# Patient Record
Sex: MALE | Race: WHITE | NOT HISPANIC OR LATINO | Employment: OTHER | URBAN - METROPOLITAN AREA
[De-identification: names, ages, dates, MRNs, and addresses within clinical notes are randomized per-mention and may not be internally consistent; named-entity substitution may affect disease eponyms.]

---

## 2018-08-15 ENCOUNTER — NON-PROVIDER VISIT (OUTPATIENT)
Dept: MEDICAL GROUP | Facility: MEDICAL CENTER | Age: 68
End: 2018-08-15
Payer: MEDICARE

## 2018-08-15 DIAGNOSIS — E11.9 TYPE 2 DIABETES MELLITUS WITHOUT COMPLICATION, WITHOUT LONG-TERM CURRENT USE OF INSULIN (HCC): ICD-10-CM

## 2018-08-15 PROCEDURE — G0109 DIAB MANAGE TRN IND/GROUP: HCPCS | Performed by: INTERNAL MEDICINE

## 2018-08-15 NOTE — LETTER
"         August 15, 2018      Cole Alonso D.O.  [address] [place] [address]          RE: Deangelo Bella  [id number]    Dear:Cole Alonso D.O.    The above referenced patient received 3 hours of diabetes education from [facility].    Topics taught (may include but not limited to):  Introduction to diabetes, benefits and responsibilities of patient, physiology of diabetes and the diease process, benefits of blood glucose monitoring and record keeping, medication action and possible side effects, hypoglycemia, sick day management, exercise, stress reduction and travel with diabetes.     Provided meter and instructed in use: no. Pt using Accucheck Avivameter.  Blood pressure:    Goal is less than 130/80  Dilated eye exam within the past year: yes Goal is for patient to have yearly.   Lipid panel:   Lab Results   Component Value Date/Time    CHOLSTRLTOT 193 03/10/2016 06:09 AM     (H) 03/10/2016 06:09 AM    HDL 41 03/10/2016 06:09 AM    TRIGLYCERIDE 183 (H) 03/10/2016 06:09 AM    Chol./HDL ratio 4.70  Goal is less than 5   Micro-albumin/Creat. Ratio: not available  Goal is less than 20 ng/dl  HbA1c:   Lab Results   Component Value Date/Time    HBA1C 5.9 (H) 03/10/2016 06:09 AM    Goal is <7% in the next 3 months.  Vitamin D level: not available Goal is greater than 30 ng/ml.    Daily aspirin use if >30 years old w/o contradictions:no     Patient/caregiver appeared to understand the content as demonstrated by appropriate questions.         Notes:William presents for diabetes education Type II DM class, 1202-7078.  He reports he has \"boarderline diabetes\", medical records, HbA1c not available.  Fasting FSBG 107-145, no diabetes medications.  I recommend he quit smoking.    The patient was provided with written materials to back-up verbal education.   Thank you for this consultation,     Micki Rogers R.N.  Certified Diabetes Nurse Educator  "

## 2018-08-15 NOTE — PROGRESS NOTES
"William presents for diabetes education Type II DM class, 3585-1830.  He reports he has \"boarderline diabetes\", medical records, HbA1c not available.  Fasting FSBG 107-145, no diabetes medications.  I recommend he quit smoking.    Define Type 2 diabetes: Education taught  Define Type 1 diabetes: Education taught  Understand feaures and benefits of education and management: Education taught  Describe who is responsible for diabetes management: Education taught      Diabetes Lifestyle Changes / Goals  State benefits of making appropriate lifestyle changes: Education taught  Identify lifestyle behaviors participant wants to change: Education taught  Identify risk factors that interfere with health and strategies to reduce : Education taught  Verbalize need for and frequency of health care follow-up: Education taught  Develop behavioral objectives and expected health outcomes: Education taught    Diabetes Exercise and Activity  Describe role of exercise in diabetes management: Education taught  State relationship of exercise to blood sugar: Education taught  State the benefits/risk(s) of exercise and precautions to follow: Education taught    Diabetes Self Blood Glucose Monitoring  Discuss rationale and importance of SBGM: Education taught  Discuss appropriate record keeping: Education taught  Discuss how to use results from blood glucose testing: Education taught  Evaluation and interpretation of blood glucose patterns: Education taught    Diabetes Disease Process  Discuss signs, symptoms, TX and prevention of hyperglycemia: Education taught  Discuss beta cell dysfunction and insulin resistance: Education taught  Discuss Insulin and its role in the body: Education taught  Discuss the role of the liver in glucose metabolism: Education taught  Discuss hormonal regulation: Education taught  Define benefits of good control and discuss what it means to be in \"good control\": Education taught  Discuss impact of exercise, food, " meds, stress, and special factors on diabetes: Education taught  Discuss when to confer with HCP for possible treatment plan adjustments: Education taught    Diabetes Insulin and Medications  Action of oral medications, onset and duration: Education taught  Discuss incretin secretagogs and their use in diabetes management: Education taught    Hypoglycemia  List signs, symptoms and causes of hypoglycemia: Education taught  Discuss physiology of hypoglycemic reactions: Education taught  Accurately describe appropriate treatment and prevention: Education taught  Discuss when to contact HCP: Education taught    Sick Day Care  Verbalize important items to monitor when sick and when to contact HCP: Education taught  Review sick day box: Education taught  State diabetes medication adjustments on sick days: Education taught    Complications (Chronic)  Explain prevention, TX, signs/symptoms of: retinopathy, neuropathy, nephropathy, infections: Education taught  Explain prevention, TX, signs/symptoms of: CAD, cerebral-vascular disease and sexual dysfunction: Education taught  Identify when to notify HCP of complications: Education taught  State principles of skin, dental and foot care.  Discuss proper foot care, prevention of foot probelms when to notify HCP>: Education taught  Demonstrate how to examine feet and what to look for: Education taught    Psychosocial adjustment  Identify sources of stress: Education taught  Identify resources and techniques for stress reduction: Education taught  Discuss health care referral network / community resources for support: Education taught

## 2018-08-17 ENCOUNTER — NON-PROVIDER VISIT (OUTPATIENT)
Dept: MEDICAL GROUP | Facility: MEDICAL CENTER | Age: 68
End: 2018-08-17
Payer: MEDICARE

## 2018-08-17 DIAGNOSIS — E11.9 TYPE 2 DIABETES MELLITUS WITHOUT COMPLICATION, WITHOUT LONG-TERM CURRENT USE OF INSULIN (HCC): ICD-10-CM

## 2018-08-17 PROCEDURE — G0109 DIAB MANAGE TRN IND/GROUP: HCPCS | Performed by: INTERNAL MEDICINE

## 2018-08-17 NOTE — PROGRESS NOTES
8/17/2018    Cole Alonso D.O.  68 y.o.   Time in/out: 137 - 7475    Subjective:  -Here for day 2 of type 2 class    Nutrition Diagnosis (PES Statement)    Altered nutrition-related lab values related to endocrine dysfunction as evidenced by HbA1c of 5.9%.    Biochemical data, medical test and procedures  Lab Results   Component Value Date/Time    HBA1C 5.9 (H) 03/10/2016 06:09 AM     Lab Results   Component Value Date/Time    CHOLSTRLTOT 193 03/10/2016 06:09 AM     (H) 03/10/2016 06:09 AM    HDL 41 03/10/2016 06:09 AM    TRIGLYCERIDE 183 (H) 03/10/2016 06:09 AM         Nutrition Intervention  Meal and Snack  Recommend a general/healthful diet    Comprehensive Nutrition education Instruction or training leading to in-depth nutrition related knowledge about:  Benefits to following meal plan, Combine carb, protein and fat at each meal, Eating out, Fast food, Meal timing and spacing, Menu Planning, Metabolism of carb, protein, fat, Physical activity/exercise, Portion control, Sweets and alcohol in moderation, Heart-healthy guidelines, Increasing/Decreasing PO intake, Label Reading and Handouts provided regarding topics discussed    Monitoring & Evaluation Plan  Behavioral-Environmental:  Behavior:  Consistent CHO intake throughout the day  Physical activity:  Increase as tolerated    Food / Nutrient Intake:  Food intake:  Use the plate method recommendations for portions/balance at meals  Macronutrient intake:  ~60 grams CHO with meals, 15-20 grams CHO with snacks    Physical Signs / Symptoms:  HbA1c profiles:  Within ADA guidelines      Assessment Notes:  William attended day 2 of the Type 2 class today.  He was taught that exercise works as a medication for controlling DM.  Different exercises were shown that can be done easily at home, like walking in place, lifting light weights while sitting, etc.  It was emphasized that if exercise is a consistent part of William’s habits that DM control will be the most  ideal it can be.     Food was then discussed next, with a brief review of William’s current eating habits.  He was taught the differences between CHO/protein/fat and their effects on BG’s.  This information was related to the plate method to help with meal planning/portions at meals; he was also taught to use their hands as measuring tools (fist for starch, palm for protein) to help when eating out or on a large plate.  Non-starchy vegetables were emphasized as foods that will help satisfy without raising BG’s at meals and snacks.  I stressed to the patient to not go more than 4 hours without eating and if a snack is necessary he was taught how to construct a proper snack of ~15 grams CHO and ~7 grams protein.  Finally, we moved onto the food label and how to effectively read a label using serving size, trans fat, total CHO, and % sodium to evaluate a food.  An alternative way of meal planning was given by using the food label and CHO counting; the patient can eat ~60 grams CHO at meals and 15-20 grams CHO at snacks, if needed.  He set goals to try to achieve in the next 3 months to help with DM control; he can follow-up with me, if needed, for a more intensive meal plan and CHO counting education.  F/u prn.

## 2018-08-17 NOTE — LETTER
Dr. Alonso,    Your patient, William Bella, was seen August 17, 2018 for 2.5 hours regarding nutrition/exercise recommendations for diabetes as part of day 2 of our Type 2 diabetes management class.  He was taught the following information:     -The basics of nutrition  -The plate method for portion control (¼ plate starch, ¼ plate protein, ½ plate non-starchy vegetables)  -Appropriate snacking recommendations  -Heart-healthy eating, including controlling/managing/improving hyperlipidemia and HTN  -Food label reading, including CHO counting  -Exercise recommendations of at least 150 minutes/week of moderate-intensity exercise on a minimum of 3 days each week    William was encouraged to use community resources to help improve their glycemic control and given a number of resources available to him.  He was also given our phone number in the case of any question that may arise.      Thank you for your referral for this patient.  Feel free to contact us with any questions you may have at [phone].    Sincerely,    MARISABEL Arredondo Jr, RD, LD, CDE  Outpatient Dietary Educator  [facility]

## 2018-09-20 ENCOUNTER — APPOINTMENT (RX ONLY)
Dept: URBAN - METROPOLITAN AREA CLINIC 4 | Facility: CLINIC | Age: 68
Setting detail: DERMATOLOGY
End: 2018-09-20

## 2018-09-20 DIAGNOSIS — L81.4 OTHER MELANIN HYPERPIGMENTATION: ICD-10-CM

## 2018-09-20 DIAGNOSIS — L82.1 OTHER SEBORRHEIC KERATOSIS: ICD-10-CM

## 2018-09-20 DIAGNOSIS — L11.1 TRANSIENT ACANTHOLYTIC DERMATOSIS [GROVER]: ICD-10-CM

## 2018-09-20 DIAGNOSIS — D22 MELANOCYTIC NEVI: ICD-10-CM

## 2018-09-20 DIAGNOSIS — Z71.89 OTHER SPECIFIED COUNSELING: ICD-10-CM

## 2018-09-20 DIAGNOSIS — L57.0 ACTINIC KERATOSIS: ICD-10-CM

## 2018-09-20 PROBLEM — D22.5 MELANOCYTIC NEVI OF TRUNK: Status: ACTIVE | Noted: 2018-09-20

## 2018-09-20 PROBLEM — I10 ESSENTIAL (PRIMARY) HYPERTENSION: Status: ACTIVE | Noted: 2018-09-20

## 2018-09-20 PROCEDURE — 99203 OFFICE O/P NEW LOW 30 MIN: CPT | Mod: 25

## 2018-09-20 PROCEDURE — 17000 DESTRUCT PREMALG LESION: CPT

## 2018-09-20 PROCEDURE — 17003 DESTRUCT PREMALG LES 2-14: CPT

## 2018-09-20 PROCEDURE — ? PRESCRIPTION

## 2018-09-20 PROCEDURE — ? COUNSELING

## 2018-09-20 PROCEDURE — ? LIQUID NITROGEN

## 2018-09-20 RX ORDER — TRIAMCINOLONE ACETONIDE 1 MG/G
CREAM TOPICAL BID
Qty: 1 | Refills: 1 | Status: ERX | COMMUNITY
Start: 2018-09-20

## 2018-09-20 RX ADMIN — TRIAMCINOLONE ACETONIDE: 1 CREAM TOPICAL at 00:00

## 2018-09-20 ASSESSMENT — LOCATION DETAILED DESCRIPTION DERM
LOCATION DETAILED: LEFT ANTERIOR PROXIMAL THIGH
LOCATION DETAILED: RIGHT SUPERIOR LATERAL LOWER BACK
LOCATION DETAILED: SUPERIOR LUMBAR SPINE
LOCATION DETAILED: RIGHT LATERAL EYEBROW
LOCATION DETAILED: RIGHT SUPERIOR UPPER BACK
LOCATION DETAILED: RIGHT PROXIMAL DORSAL FOREARM
LOCATION DETAILED: LEFT ANTERIOR DISTAL THIGH
LOCATION DETAILED: LEFT SUPERIOR PREAURICULAR CHEEK
LOCATION DETAILED: LEFT FOREHEAD
LOCATION DETAILED: LEFT ANTERIOR DISTAL UPPER ARM
LOCATION DETAILED: RIGHT ANTERIOR DISTAL UPPER ARM
LOCATION DETAILED: LEFT SUPERIOR LATERAL LOWER BACK
LOCATION DETAILED: LEFT LATERAL MANDIBULAR CHEEK
LOCATION DETAILED: PERIUMBILICAL SKIN
LOCATION DETAILED: RIGHT ANTERIOR SHOULDER
LOCATION DETAILED: RIGHT FOREHEAD
LOCATION DETAILED: RIGHT ANTERIOR DISTAL THIGH
LOCATION DETAILED: LEFT INFERIOR ANTERIOR NECK
LOCATION DETAILED: LEFT MEDIAL UPPER BACK
LOCATION DETAILED: EPIGASTRIC SKIN
LOCATION DETAILED: RIGHT DISTAL DORSAL FOREARM

## 2018-09-20 ASSESSMENT — LOCATION SIMPLE DESCRIPTION DERM
LOCATION SIMPLE: LEFT LOWER BACK
LOCATION SIMPLE: LEFT ANTERIOR NECK
LOCATION SIMPLE: ABDOMEN
LOCATION SIMPLE: RIGHT UPPER BACK
LOCATION SIMPLE: LEFT FOREHEAD
LOCATION SIMPLE: LOWER BACK
LOCATION SIMPLE: RIGHT SHOULDER
LOCATION SIMPLE: LEFT UPPER BACK
LOCATION SIMPLE: LEFT CHEEK
LOCATION SIMPLE: LEFT UPPER ARM
LOCATION SIMPLE: RIGHT THIGH
LOCATION SIMPLE: RIGHT UPPER ARM
LOCATION SIMPLE: RIGHT FOREARM
LOCATION SIMPLE: RIGHT FOREHEAD
LOCATION SIMPLE: LEFT THIGH
LOCATION SIMPLE: RIGHT EYEBROW
LOCATION SIMPLE: RIGHT LOWER BACK

## 2018-09-20 ASSESSMENT — LOCATION ZONE DERM
LOCATION ZONE: NECK
LOCATION ZONE: LEG
LOCATION ZONE: TRUNK
LOCATION ZONE: ARM
LOCATION ZONE: FACE

## 2019-10-24 ENCOUNTER — APPOINTMENT (RX ONLY)
Dept: URBAN - METROPOLITAN AREA CLINIC 22 | Facility: CLINIC | Age: 69
Setting detail: DERMATOLOGY
End: 2019-10-24

## 2019-10-24 DIAGNOSIS — L85.3 XEROSIS CUTIS: ICD-10-CM

## 2019-10-24 DIAGNOSIS — Z71.89 OTHER SPECIFIED COUNSELING: ICD-10-CM

## 2019-10-24 DIAGNOSIS — L82.1 OTHER SEBORRHEIC KERATOSIS: ICD-10-CM

## 2019-10-24 DIAGNOSIS — L663 OTHER SPECIFIED DISEASES OF HAIR AND HAIR FOLLICLES: ICD-10-CM

## 2019-10-24 DIAGNOSIS — D22 MELANOCYTIC NEVI: ICD-10-CM

## 2019-10-24 DIAGNOSIS — L11.1 TRANSIENT ACANTHOLYTIC DERMATOSIS [GROVER]: ICD-10-CM

## 2019-10-24 DIAGNOSIS — L57.0 ACTINIC KERATOSIS: ICD-10-CM

## 2019-10-24 DIAGNOSIS — L738 OTHER SPECIFIED DISEASES OF HAIR AND HAIR FOLLICLES: ICD-10-CM

## 2019-10-24 DIAGNOSIS — L73.9 FOLLICULAR DISORDER, UNSPECIFIED: ICD-10-CM

## 2019-10-24 PROBLEM — D22.5 MELANOCYTIC NEVI OF TRUNK: Status: ACTIVE | Noted: 2019-10-24

## 2019-10-24 PROBLEM — L02.223 FURUNCLE OF CHEST WALL: Status: ACTIVE | Noted: 2019-10-24

## 2019-10-24 PROCEDURE — ? PRESCRIPTION

## 2019-10-24 PROCEDURE — 99214 OFFICE O/P EST MOD 30 MIN: CPT | Mod: 25

## 2019-10-24 PROCEDURE — 17003 DESTRUCT PREMALG LES 2-14: CPT

## 2019-10-24 PROCEDURE — ? COUNSELING

## 2019-10-24 PROCEDURE — ? LIQUID NITROGEN

## 2019-10-24 PROCEDURE — 17000 DESTRUCT PREMALG LESION: CPT

## 2019-10-24 RX ORDER — CLINDAMYCIN PHOSPHATE 10 MG/ML
SOLUTION TOPICAL
Qty: 1 | Refills: 5 | Status: ERX | COMMUNITY
Start: 2019-10-24

## 2019-10-24 RX ORDER — TRIAMCINOLONE ACETONIDE 1 MG/G
CREAM TOPICAL BID
Qty: 1 | Refills: 2 | Status: ERX | COMMUNITY
Start: 2019-10-24

## 2019-10-24 RX ADMIN — TRIAMCINOLONE ACETONIDE: 1 CREAM TOPICAL at 18:07

## 2019-10-24 RX ADMIN — CLINDAMYCIN PHOSPHATE: 10 SOLUTION TOPICAL at 18:08

## 2019-10-24 ASSESSMENT — LOCATION DETAILED DESCRIPTION DERM
LOCATION DETAILED: LEFT INFERIOR POSTERIOR NECK
LOCATION DETAILED: SUPERIOR THORACIC SPINE
LOCATION DETAILED: NASAL DORSUM
LOCATION DETAILED: EPIGASTRIC SKIN
LOCATION DETAILED: RIGHT INFERIOR MEDIAL UPPER BACK
LOCATION DETAILED: INFERIOR THORACIC SPINE
LOCATION DETAILED: LOWER STERNUM
LOCATION DETAILED: RIGHT SUPERIOR LATERAL BUCCAL CHEEK
LOCATION DETAILED: STERNUM

## 2019-10-24 ASSESSMENT — LOCATION SIMPLE DESCRIPTION DERM
LOCATION SIMPLE: ABDOMEN
LOCATION SIMPLE: POSTERIOR NECK
LOCATION SIMPLE: UPPER BACK
LOCATION SIMPLE: RIGHT CHEEK
LOCATION SIMPLE: RIGHT UPPER BACK
LOCATION SIMPLE: NOSE
LOCATION SIMPLE: CHEST

## 2019-10-24 ASSESSMENT — LOCATION ZONE DERM
LOCATION ZONE: NECK
LOCATION ZONE: NOSE
LOCATION ZONE: TRUNK
LOCATION ZONE: FACE

## 2019-10-24 NOTE — PROCEDURE: MIPS QUALITY
Quality 226: Preventive Care And Screening: Tobacco Use: Screening And Cessation Intervention: Patient screened for tobacco use, is a smoker AND received Cessation Counseling
Quality 111:Pneumonia Vaccination Status For Older Adults: Pneumococcal Vaccination not Administered or Previously Received, Reason not Otherwise Specified
Quality 130: Documentation Of Current Medications In The Medical Record: Current Medications Documented
Detail Level: Detailed

## 2019-10-31 ENCOUNTER — HOSPITAL ENCOUNTER (OUTPATIENT)
Dept: RADIOLOGY | Facility: MEDICAL CENTER | Age: 69
End: 2019-10-31

## 2019-10-31 ENCOUNTER — HOSPITAL ENCOUNTER (INPATIENT)
Facility: MEDICAL CENTER | Age: 69
LOS: 6 days | DRG: 988 | End: 2019-11-06
Attending: EMERGENCY MEDICINE | Admitting: INTERNAL MEDICINE
Payer: MEDICARE

## 2019-10-31 DIAGNOSIS — E11.9 TYPE 2 DIABETES MELLITUS WITHOUT COMPLICATION, WITHOUT LONG-TERM CURRENT USE OF INSULIN (HCC): ICD-10-CM

## 2019-10-31 DIAGNOSIS — R16.0 LIVER MASS, RIGHT LOBE: ICD-10-CM

## 2019-10-31 PROBLEM — I10 ESSENTIAL HYPERTENSION: Status: ACTIVE | Noted: 2019-10-31

## 2019-10-31 PROBLEM — R19.00 MASS IN THE ABDOMEN: Status: ACTIVE | Noted: 2019-10-31

## 2019-10-31 PROBLEM — D69.6 THROMBOCYTOPENIA (HCC): Status: ACTIVE | Noted: 2019-10-31

## 2019-10-31 LAB
ALBUMIN SERPL BCP-MCNC: 3.1 G/DL (ref 3.2–4.9)
ALBUMIN/GLOB SERPL: 1.1 G/DL
ALP SERPL-CCNC: 118 U/L (ref 30–99)
ALT SERPL-CCNC: 13 U/L (ref 2–50)
ANION GAP SERPL CALC-SCNC: 7 MMOL/L (ref 0–11.9)
AST SERPL-CCNC: 17 U/L (ref 12–45)
BASOPHILS # BLD AUTO: 0.5 % (ref 0–1.8)
BASOPHILS # BLD: 0.03 K/UL (ref 0–0.12)
BILIRUB SERPL-MCNC: 0.8 MG/DL (ref 0.1–1.5)
BUN SERPL-MCNC: 11 MG/DL (ref 8–22)
CALCIUM SERPL-MCNC: 7.9 MG/DL (ref 8.5–10.5)
CHLORIDE SERPL-SCNC: 108 MMOL/L (ref 96–112)
CO2 SERPL-SCNC: 23 MMOL/L (ref 20–33)
CREAT SERPL-MCNC: 0.65 MG/DL (ref 0.5–1.4)
EOSINOPHIL # BLD AUTO: 0.03 K/UL (ref 0–0.51)
EOSINOPHIL NFR BLD: 0.5 % (ref 0–6.9)
ERYTHROCYTE [DISTWIDTH] IN BLOOD BY AUTOMATED COUNT: 44.4 FL (ref 35.9–50)
GLOBULIN SER CALC-MCNC: 2.8 G/DL (ref 1.9–3.5)
GLUCOSE SERPL-MCNC: 88 MG/DL (ref 65–99)
HCT VFR BLD AUTO: 37.7 % (ref 42–52)
HGB BLD-MCNC: 12.7 G/DL (ref 14–18)
IMM GRANULOCYTES # BLD AUTO: 0.01 K/UL (ref 0–0.11)
IMM GRANULOCYTES NFR BLD AUTO: 0.2 % (ref 0–0.9)
LIPASE SERPL-CCNC: 10 U/L (ref 11–82)
LYMPHOCYTES # BLD AUTO: 0.97 K/UL (ref 1–4.8)
LYMPHOCYTES NFR BLD: 17.4 % (ref 22–41)
MCH RBC QN AUTO: 33.5 PG (ref 27–33)
MCHC RBC AUTO-ENTMCNC: 33.7 G/DL (ref 33.7–35.3)
MCV RBC AUTO: 99.5 FL (ref 81.4–97.8)
MONOCYTES # BLD AUTO: 0.79 K/UL (ref 0–0.85)
MONOCYTES NFR BLD AUTO: 14.2 % (ref 0–13.4)
NEUTROPHILS # BLD AUTO: 3.74 K/UL (ref 1.82–7.42)
NEUTROPHILS NFR BLD: 67.2 % (ref 44–72)
NRBC # BLD AUTO: 0 K/UL
NRBC BLD-RTO: 0 /100 WBC
PLATELET # BLD AUTO: 109 K/UL (ref 164–446)
PMV BLD AUTO: 8.3 FL (ref 9–12.9)
POTASSIUM SERPL-SCNC: 3.7 MMOL/L (ref 3.6–5.5)
PROT SERPL-MCNC: 5.9 G/DL (ref 6–8.2)
RBC # BLD AUTO: 3.79 M/UL (ref 4.7–6.1)
SODIUM SERPL-SCNC: 138 MMOL/L (ref 135–145)
WBC # BLD AUTO: 5.6 K/UL (ref 4.8–10.8)

## 2019-10-31 PROCEDURE — 99285 EMERGENCY DEPT VISIT HI MDM: CPT

## 2019-10-31 PROCEDURE — 99223 1ST HOSP IP/OBS HIGH 75: CPT | Performed by: INTERNAL MEDICINE

## 2019-10-31 PROCEDURE — 770006 HCHG ROOM/CARE - MED/SURG/GYN SEMI*

## 2019-10-31 PROCEDURE — 83690 ASSAY OF LIPASE: CPT

## 2019-10-31 PROCEDURE — A9270 NON-COVERED ITEM OR SERVICE: HCPCS | Performed by: INTERNAL MEDICINE

## 2019-10-31 PROCEDURE — 85025 COMPLETE CBC W/AUTO DIFF WBC: CPT

## 2019-10-31 PROCEDURE — 700105 HCHG RX REV CODE 258: Performed by: EMERGENCY MEDICINE

## 2019-10-31 PROCEDURE — 700102 HCHG RX REV CODE 250 W/ 637 OVERRIDE(OP): Performed by: INTERNAL MEDICINE

## 2019-10-31 PROCEDURE — 80053 COMPREHEN METABOLIC PANEL: CPT

## 2019-10-31 PROCEDURE — 36415 COLL VENOUS BLD VENIPUNCTURE: CPT

## 2019-10-31 PROCEDURE — 99358 PROLONG SERVICE W/O CONTACT: CPT | Performed by: INTERNAL MEDICINE

## 2019-10-31 PROCEDURE — 700105 HCHG RX REV CODE 258: Performed by: INTERNAL MEDICINE

## 2019-10-31 RX ORDER — LOSARTAN POTASSIUM 50 MG/1
50 TABLET ORAL EVERY MORNING
COMMUNITY
End: 2021-07-02

## 2019-10-31 RX ORDER — POLYETHYLENE GLYCOL 3350 17 G/17G
1 POWDER, FOR SOLUTION ORAL
Status: DISCONTINUED | OUTPATIENT
Start: 2019-10-31 | End: 2019-11-06 | Stop reason: HOSPADM

## 2019-10-31 RX ORDER — SODIUM CHLORIDE, SODIUM LACTATE, POTASSIUM CHLORIDE, CALCIUM CHLORIDE 600; 310; 30; 20 MG/100ML; MG/100ML; MG/100ML; MG/100ML
1000 INJECTION, SOLUTION INTRAVENOUS ONCE
Status: COMPLETED | OUTPATIENT
Start: 2019-10-31 | End: 2019-10-31

## 2019-10-31 RX ORDER — ONDANSETRON 2 MG/ML
4 INJECTION INTRAMUSCULAR; INTRAVENOUS EVERY 4 HOURS PRN
Status: DISCONTINUED | OUTPATIENT
Start: 2019-10-31 | End: 2019-11-06 | Stop reason: HOSPADM

## 2019-10-31 RX ORDER — ONDANSETRON 4 MG/1
4 TABLET, ORALLY DISINTEGRATING ORAL EVERY 4 HOURS PRN
Status: DISCONTINUED | OUTPATIENT
Start: 2019-10-31 | End: 2019-11-06 | Stop reason: HOSPADM

## 2019-10-31 RX ORDER — BISACODYL 10 MG
10 SUPPOSITORY, RECTAL RECTAL
Status: DISCONTINUED | OUTPATIENT
Start: 2019-10-31 | End: 2019-11-06 | Stop reason: HOSPADM

## 2019-10-31 RX ORDER — ACETAMINOPHEN 325 MG/1
650 TABLET ORAL EVERY 6 HOURS PRN
Status: DISCONTINUED | OUTPATIENT
Start: 2019-10-31 | End: 2019-11-06 | Stop reason: HOSPADM

## 2019-10-31 RX ORDER — SODIUM CHLORIDE 9 MG/ML
INJECTION, SOLUTION INTRAVENOUS CONTINUOUS
Status: DISCONTINUED | OUTPATIENT
Start: 2019-10-31 | End: 2019-11-05

## 2019-10-31 RX ORDER — LOSARTAN POTASSIUM 50 MG/1
50 TABLET ORAL DAILY
Status: DISCONTINUED | OUTPATIENT
Start: 2019-11-01 | End: 2019-11-06 | Stop reason: HOSPADM

## 2019-10-31 RX ORDER — MORPHINE SULFATE 4 MG/ML
2 INJECTION, SOLUTION INTRAMUSCULAR; INTRAVENOUS EVERY 4 HOURS PRN
Status: DISCONTINUED | OUTPATIENT
Start: 2019-10-31 | End: 2019-11-06 | Stop reason: HOSPADM

## 2019-10-31 RX ORDER — AMOXICILLIN 250 MG
2 CAPSULE ORAL 2 TIMES DAILY
Status: DISCONTINUED | OUTPATIENT
Start: 2019-11-01 | End: 2019-11-06 | Stop reason: HOSPADM

## 2019-10-31 RX ORDER — OXYCODONE HYDROCHLORIDE 5 MG/1
5 TABLET ORAL EVERY 4 HOURS PRN
Status: DISCONTINUED | OUTPATIENT
Start: 2019-10-31 | End: 2019-11-06 | Stop reason: HOSPADM

## 2019-10-31 RX ADMIN — SODIUM CHLORIDE: 9 INJECTION, SOLUTION INTRAVENOUS at 18:14

## 2019-10-31 RX ADMIN — SODIUM CHLORIDE, POTASSIUM CHLORIDE, SODIUM LACTATE AND CALCIUM CHLORIDE 1000 ML: 600; 310; 30; 20 INJECTION, SOLUTION INTRAVENOUS at 15:33

## 2019-10-31 RX ADMIN — OXYCODONE HYDROCHLORIDE 5 MG: 5 TABLET ORAL at 18:29

## 2019-10-31 SDOH — HEALTH STABILITY: MENTAL HEALTH: HOW OFTEN DO YOU HAVE 6 OR MORE DRINKS ON ONE OCCASION?: LESS THAN MONTHLY

## 2019-10-31 SDOH — HEALTH STABILITY: MENTAL HEALTH: HOW OFTEN DO YOU HAVE A DRINK CONTAINING ALCOHOL?: 4 OR MORE TIMES A WEEK

## 2019-10-31 SDOH — HEALTH STABILITY: MENTAL HEALTH: HOW MANY STANDARD DRINKS CONTAINING ALCOHOL DO YOU HAVE ON A TYPICAL DAY?: 3 OR 4

## 2019-10-31 ASSESSMENT — ENCOUNTER SYMPTOMS
MYALGIAS: 0
STRIDOR: 0
PALPITATIONS: 0
NECK PAIN: 0
HEADACHES: 0
EYE REDNESS: 0
VOMITING: 0
COUGH: 0
NAUSEA: 0
NERVOUS/ANXIOUS: 0
EYE PAIN: 0
BACK PAIN: 0
FOCAL WEAKNESS: 0
BLURRED VISION: 0
SEIZURES: 0
HEARTBURN: 0
CHILLS: 0
EYE DISCHARGE: 0
ABDOMINAL PAIN: 1
DIARRHEA: 0
WEIGHT LOSS: 0
ORTHOPNEA: 0
DEPRESSION: 0
DIZZINESS: 0
INSOMNIA: 0
SPUTUM PRODUCTION: 0
SHORTNESS OF BREATH: 0
FEVER: 0

## 2019-10-31 ASSESSMENT — COPD QUESTIONNAIRES
HAVE YOU SMOKED AT LEAST 100 CIGARETTES IN YOUR ENTIRE LIFE: YES
DO YOU EVER COUGH UP ANY MUCUS OR PHLEGM?: NO/ONLY WITH OCCASIONAL COLDS OR INFECTIONS
DURING THE PAST 4 WEEKS HOW MUCH DID YOU FEEL SHORT OF BREATH: NONE/LITTLE OF THE TIME
COPD SCREENING SCORE: 4
IN THE PAST 12 MONTHS DO YOU DO LESS THAN YOU USED TO BECAUSE OF YOUR BREATHING PROBLEMS: DISAGREE/UNSURE

## 2019-10-31 ASSESSMENT — LIFESTYLE VARIABLES
TOTAL SCORE: 0
ALCOHOL_USE: YES
DOES PATIENT WANT TO STOP DRINKING: NO
EVER FELT BAD OR GUILTY ABOUT YOUR DRINKING: NO
EVER_SMOKED: YES
HAVE PEOPLE ANNOYED YOU BY CRITICIZING YOUR DRINKING: NO
TOTAL SCORE: 0
HOW MANY TIMES IN THE PAST YEAR HAVE YOU HAD 5 OR MORE DRINKS IN A DAY: 20
AVERAGE NUMBER OF DAYS PER WEEK YOU HAVE A DRINK CONTAINING ALCOHOL: 5
ON A TYPICAL DAY WHEN YOU DRINK ALCOHOL HOW MANY DRINKS DO YOU HAVE: 2
HAVE YOU EVER FELT YOU SHOULD CUT DOWN ON YOUR DRINKING: NO
CONSUMPTION TOTAL: POSITIVE
EVER HAD A DRINK FIRST THING IN THE MORNING TO STEADY YOUR NERVES TO GET RID OF A HANGOVER: NO
TOTAL SCORE: 0

## 2019-10-31 ASSESSMENT — COGNITIVE AND FUNCTIONAL STATUS - GENERAL
SUGGESTED CMS G CODE MODIFIER DAILY ACTIVITY: CH
DAILY ACTIVITIY SCORE: 24
SUGGESTED CMS G CODE MODIFIER MOBILITY: CH
MOBILITY SCORE: 24

## 2019-10-31 ASSESSMENT — PATIENT HEALTH QUESTIONNAIRE - PHQ9
SUM OF ALL RESPONSES TO PHQ9 QUESTIONS 1 AND 2: 0
2. FEELING DOWN, DEPRESSED, IRRITABLE, OR HOPELESS: NOT AT ALL
1. LITTLE INTEREST OR PLEASURE IN DOING THINGS: NOT AT ALL

## 2019-10-31 NOTE — H&P
Hospital Medicine History & Physical Note    Date of Service  10/31/2019    Primary Care Physician  Cole Alonso D.O.    Consultants  IR  oncology    Code Status  full    Chief Complaint  RUQ pain    History of Presenting Illness  69 y.o. male with past medical history of essential hypertension who was transferred from [facility] emergency room to [facility] for liver and pancreatic mass.  According to the patient since around 6 PM last night he started having sudden onset of right upper quadrant and epigastric pain.  He described the pain as dull aching pain, 10 out of 10 intensity, radiated to his back, constant in nature.  The patient denies any fever, chills, weight loss, loss of appetite, constipation or diarrhea.  He initially presented to [facility] where he had ultrasound abdomen and CT scan of the abdomen done and found to have liver and pancreatic mass.  He was transferred here for oncology evaluation.  I review of the chart from [facility] ER including labs and imaging.  Labs WBC 5.8, hemoglobin 14.9, platelet 132, sodium 137, potassium 3.9, chloride 103, CO2 24, glucose 135, BUN 13, creatinine 0.9, calcium 9, bilirubin 1.1, alkaline phosphatase 168, AST 34, ALT 25, troponin is 0.017  Chest x-ray no acute finding  Ultrasound abdomen showed 15 cm liver lesion could be related to hemangioma, metastatic focus or primary hepatocellular carcinoma.  CT scan was done and showed 18 cm enhancing mass in the medial segment of the left lobe of liver and anterior segment right lower liver extending from the dome of the liver to the inferior edge potentially representing metastatic disease from a 3 cm cystic tumor in the pancreatic tail potentially representing a mucinous cyst adenocarcinoma.  Metastatic disease of other origin or primary hepatocellular carcinoma or consideration.  Benign cystic lesions of the pancreatic tail such as  pseudocyst or cyst adenoma are also consideration.  Consider CT-guided biopsy of the liver for further evaluation.      Review of Systems  Review of Systems   Constitutional: Negative for chills, fever and weight loss.   HENT: Negative for congestion and nosebleeds.    Eyes: Negative for blurred vision, pain, discharge and redness.   Respiratory: Negative for cough, sputum production, shortness of breath and stridor.    Cardiovascular: Negative for chest pain, palpitations and orthopnea.   Gastrointestinal: Positive for abdominal pain. Negative for diarrhea, heartburn, nausea and vomiting.   Genitourinary: Negative for dysuria, frequency and urgency.   Musculoskeletal: Negative for back pain, myalgias and neck pain.   Skin: Negative for itching and rash.   Neurological: Negative for dizziness, focal weakness, seizures and headaches.   Psychiatric/Behavioral: Negative for depression. The patient is not nervous/anxious and does not have insomnia.        Past Medical History   has a past medical history of Alcohol use (3/31/2016), Borderline diabetes, Bowel habit changes, Breath shortness, Cataract, Dental disorder, Disorder of thyroid, Heart burn, Hypertension, Indigestion, and Infectious disease (3/31/2016).    Surgical History   has a past surgical history that includes bone graft (Right, 1960's); wrist orif (Right, 1960's); cataract phaco with iol (Left, 4/7/2016); and parathyroidectomy (N/A, 5/13/2016).     Family History  Reviewed and no pertinent family history     Social History   reports that he has been smoking cigarettes. He has a 30.00 pack-year smoking history. He has never used smokeless tobacco. He reports that he drinks alcohol. He reports that he does not use drugs.    Allergies  Allergies   Allergen Reactions   • Sulfa Drugs      Reaction unknown       Medications  Prior to Admission Medications   Prescriptions Last Dose Informant Patient Reported? Taking?   losartan (COZAAR) 50 MG Tab 10/31/2019 at  0600 Patient Yes Yes   Sig: Take 50 mg by mouth every day.      Facility-Administered Medications: None       Physical Exam  Pulse:  [84-92] 90  BP: (115-135)/(68-75) 135/71  SpO2:  [93 %-96 %] 96 %    Physical Exam   Constitutional: He is oriented to person, place, and time. No distress.   HENT:   Head: Normocephalic and atraumatic.   Mouth/Throat: Oropharynx is clear and moist.   Eyes: Pupils are equal, round, and reactive to light. Conjunctivae and EOM are normal.   Neck: Normal range of motion. Neck supple. No tracheal deviation present. No thyromegaly present.   Cardiovascular: Normal rate and regular rhythm.   No murmur heard.  Pulmonary/Chest: Effort normal and breath sounds normal. No respiratory distress. He has no wheezes.   Abdominal: Soft. Bowel sounds are normal. He exhibits no distension. There is tenderness. There is no rebound.   Musculoskeletal: He exhibits no edema or tenderness.   Neurological: He is alert and oriented to person, place, and time. No cranial nerve deficit.   Skin: Skin is warm and dry. He is not diaphoretic. No erythema.   Psychiatric: He has a normal mood and affect. His behavior is normal. Thought content normal.       Laboratory:  Recent Labs     10/31/19  1538   WBC 5.6   RBC 3.79*   HEMOGLOBIN 12.7*   HEMATOCRIT 37.7*   MCV 99.5*   MCH 33.5*   MCHC 33.7   RDW 44.4   PLATELETCT 109*   MPV 8.3*         No results for input(s): ALTSGPT, ASTSGOT, ALKPHOSPHAT, TBILIRUBIN, DBILIRUBIN, GAMMAGT, AMYLASE, LIPASE, ALB, PREALBUMIN, GLUCOSE in the last 72 hours.      No results for input(s): NTPROBNP in the last 72 hours.      No results for input(s): TROPONINT in the last 72 hours.    Urinalysis:    No results found     Imaging:  OUTSIDE IMAGES-DX CHEST   Final Result      US-FOREIGN FILM ULTRASOUND   Final Result      OUTSIDE IMAGES-CT ABDOMEN /PELVIS   Final Result      IR-CONSULT AND TREAT    (Results Pending)         Assessment/Plan:  I anticipate this patient will require at least  two midnights for appropriate medical management, necessitating inpatient admission.    Mass in the abdomen- (present on admission)  Assessment & Plan  Liver and pancreatic mass(concerning for malignancy)  CT scan: showed 18 cm enhancing mass in the medial segment of the left lobe of liver and anterior segment right lower liver extending from the dome of the liver to the inferior edge potentially representing metastatic disease from a 3 cm cystic tumor in the pancreatic tail potentially representing a mucinous cyst adenocarcinoma.  Metastatic disease of other origin or primary hepatocellular carcinoma or consideration.  Benign cystic lesions of the pancreatic tail such as pseudocyst or cyst adenoma are also consideration.    Will get IR to have liver biopsy: ordered  I ordered AFP and   To get CT chest with contrast in next 24-48 hrs, patient received contrast at [facility]  Once biopsy result is back, to consult oncology    Thrombocytopenia (HCC)- (present on admission)  Assessment & Plan  Follow cbc in am    Essential hypertension- (present on admission)  Assessment & Plan  On losartan    I spent a total of 30 minutes of non face to face time performing additional research, reviewing old medical records, provided on going management by following up on labs, placing orders, discussing plan of care with other healthcare providers and nursing staff. Start time: 04:00PM. End time: 04:30PM     Billing code [id number]    VTE prophylaxis: ambulatory

## 2019-10-31 NOTE — ED PROVIDER NOTES
ED Provider Note    Scribed for Neri Mo D.O. by Nila Farias. 10/31/2019  2:56 PM    Primary care provider: Cole Alonso D.O.  Means of arrival: Ambulance  History obtained from: Patient  History limited by: None    CHIEF COMPLAINT  Chief Complaint   Patient presents with   • RUQ Pain     Arrived via [facility] transfer from [facility] following c/o abd pain and workup at  with CT finding of large mass on liver.        ZULY Bella is a 69 y.o. male who presents to the Emergency Department via [facility] as a transfer from [facility] for evaluation of right upper quadrant abdominal pain. Patient received a CT that indicated a large mass on his liver. Patient states the pain began last night at 8 PM. He describes the pain as sharp and intermittent with diffuse radiation and radiation to his back. He has been unable to eat or lay flat due to his pain. Patient says that movement and deep breaths exacerbates his pain. Patient is having some difficulty breathing secondary to his pain. He denies any nausea, fever, cough, or shortness of breath. Patient denies a history or familial history of cancer. Patient was placed on oxygen in ED due to his decreased respiration secondary to pain.     REVIEW OF SYSTEMS  Pertinent positives include right upper quadrant pain, difficulty breathing. Pertinent negatives include no nausea, fever, cough, shortness of breath.  All other systems reviewed and negative.    PAST MEDICAL HISTORY  Past Medical History:   Diagnosis Date   • Alcohol use 3/31/2016    2-3 per day   • Borderline diabetes    • Bowel habit changes     occasional constipation or diarrhea   • Breath shortness    • Cataract    • Dental disorder     needs dentures upper   • Disorder of thyroid    • Heart burn    • Hypertension    • Indigestion    • Infectious disease 3/31/2016    wife with cold       SURGICAL HISTORY  Past Surgical History:   Procedure Laterality Date   • PARATHYROIDECTOMY N/A 5/13/2016    Procedure:  PARATHYROIDECTOMY;  Surgeon: Kale Lord M.D.;  Location: SURGERY SAME DAY [facility];  Service:    • CATARACT PHACO WITH IOL Left 4/7/2016    Procedure: CATARACT PHACO WITH IOL;  Surgeon: Ephraim Jamison M.D.;  Location: SURGERY SURGICAL [facility];  Service:    • BONE GRAFT Right 1960's    right wrist   • WRIST ORIF Right 1960's        SOCIAL HISTORY  Social History     Tobacco Use   • Smoking status: Current Every Day Smoker     Packs/day: 1.00     Years: 30.00     Pack years: 30.00     Types: Cigarettes   • Smokeless tobacco: Never Used   Substance Use Topics   • Alcohol use: Yes     Comment: 2-3 per day, 8-10/week   • Drug use: No      Social History     Substance and Sexual Activity   Drug Use No       FAMILY HISTORY  No family history noted.     CURRENT MEDICATIONS     Medication List      ASK your doctor about these medications      Instructions   APPLE CIDER VINEGAR PO   Take 100 mg by mouth every day.  Dose:  100 mg     ascorbic acid 500 MG Tabs  Commonly known as:  ascorbic acid   Take 500 mg by mouth every day.  Dose:  500 mg     calcium carbonate 500 MG Chew  Commonly known as:  TUMS   Take 2 Tabs by mouth 6 Times a Day.  Dose:  1,000 mg     Chromium Picolinate 200 MCG Caps   Take  by mouth every day.     CoQ10 100 MG Caps   Take  by mouth every day.     FISH OIL PO   Take 180 mg by mouth every day.  Dose:  180 mg     GRAPE SEED EXTRACT PO   Take  by mouth every day.     HYDROcodone-acetaminophen 5-325 MG Tabs per tablet  Commonly known as:  NORCO   Take 1-2 Tabs by mouth every four hours as needed.  Dose:  1-2 Tab     losartan-hydrochlorothiazide 100-25 MG per tablet  Commonly known as:  HYZAAR   Take 1 Tab by mouth every day.  Dose:  1 Tab     lysine 500 MG Tabs   Take 500 mg by mouth every day.  Dose:  500 mg     raNITidine 150 MG Tabs  Commonly known as:  ZANTAC   Take 150 mg by mouth 2 times a day.  Dose:  150 mg     thiamine 100 MG Tabs  Commonly known as:  THIAMINE   Take 100 mg by mouth every  "day.  Dose:  100 mg              ALLERGIES  Allergies   Allergen Reactions   • Sulfa Drugs      Reaction unknown       PHYSICAL EXAM  VITAL SIGNS: /75   Pulse 84   Ht 1.803 m (5' 11\")   Wt 93 kg (205 lb)   SpO2 96%   BMI 28.59 kg/m²     Nursing note and vitals reviewed.  Constitutional: Well developed, Well nourished, No acute distress, Non-toxic appearance.   Eyes: PERRLA, EOMI, Conjunctiva normal, No discharge.   Cardiovascular: Normal heart rate, Normal rhythm, No murmurs, No rubs, No gallops.   Thorax & Lungs:  Normal breath sounds, breath sounds clear to auscultation bilaterally, No respiratory distress, no rales, no rhonchi, No wheezing, No chest tenderness.   Abdomen: Abdomen is softSignificant right upper quadrant tenderness. Positive Jordan’s sign, no Mcburney’s point tenderness, negative psoas sign, negative Rosving's sign, negative heal tap, no pulsatile mass, normal bowel sounds.   Skin: Warm, Dry, No erythema, No rash.   Extremities: Intact distal pulses, No edema, No tenderness, No cyanosis, No clubbing.   Musculoskeletal: Good range of motion in all major joints. No tenderness to palpation or major deformities noted, no CVA tenderness, no midline back tenderness.   Neurologic: Alert & oriented x 3, Normal motor function, Normal sensory function, No focal deficits noted, normal gait.  Psychiatric: Affect normal for clinical presentation.    DIAGNOSTIC STUDIES/PROCEDURES    LABS  Results for orders placed or performed during the hospital encounter of 10/31/19   CBC WITH DIFFERENTIAL   Result Value Ref Range    WBC 5.6 4.8 - 10.8 K/uL    RBC 3.79 (L) 4.70 - 6.10 M/uL    Hemoglobin 12.7 (L) 14.0 - 18.0 g/dL    Hematocrit 37.7 (L) 42.0 - 52.0 %    MCV 99.5 (H) 81.4 - 97.8 fL    MCH 33.5 (H) 27.0 - 33.0 pg    MCHC 33.7 33.7 - 35.3 g/dL    RDW 44.4 35.9 - 50.0 fL    Platelet Count 109 (L) 164 - 446 K/uL    MPV 8.3 (L) 9.0 - 12.9 fL    Neutrophils-Polys 67.20 44.00 - 72.00 %    Lymphocytes 17.40 " (L) 22.00 - 41.00 %    Monocytes 14.20 (H) 0.00 - 13.40 %    Eosinophils 0.50 0.00 - 6.90 %    Basophils 0.50 0.00 - 1.80 %    Immature Granulocytes 0.20 0.00 - 0.90 %    Nucleated RBC 0.00 /100 WBC    Neutrophils (Absolute) 3.74 1.82 - 7.42 K/uL    Lymphs (Absolute) 0.97 (L) 1.00 - 4.80 K/uL    Monos (Absolute) 0.79 0.00 - 0.85 K/uL    Eos (Absolute) 0.03 0.00 - 0.51 K/uL    Baso (Absolute) 0.03 0.00 - 0.12 K/uL    Immature Granulocytes (abs) 0.01 0.00 - 0.11 K/uL    NRBC (Absolute) 0.00 K/uL   CMP   Result Value Ref Range    Sodium 138 135 - 145 mmol/L    Potassium 3.7 3.6 - 5.5 mmol/L    Chloride 108 96 - 112 mmol/L    Co2 23 20 - 33 mmol/L    Anion Gap 7.0 0.0 - 11.9    Glucose 88 65 - 99 mg/dL    Bun 11 8 - 22 mg/dL    Creatinine 0.65 0.50 - 1.40 mg/dL    Calcium 7.9 (L) 8.5 - 10.5 mg/dL    AST(SGOT) 17 12 - 45 U/L    ALT(SGPT) 13 2 - 50 U/L    Alkaline Phosphatase 118 (H) 30 - 99 U/L    Total Bilirubin 0.8 0.1 - 1.5 mg/dL    Albumin 3.1 (L) 3.2 - 4.9 g/dL    Total Protein 5.9 (L) 6.0 - 8.2 g/dL    Globulin 2.8 1.9 - 3.5 g/dL    A-G Ratio 1.1 g/dL   LIPASE   Result Value Ref Range    Lipase 10 (L) 11 - 82 U/L   ESTIMATED GFR   Result Value Ref Range    GFR If African American >60 >60 mL/min/1.73 m 2    GFR If Non African American >60 >60 mL/min/1.73 m 2     All labs reviewed by me.      COURSE & MEDICAL DECISION MAKING  Pertinent Labs & Imaging studies reviewed. (See chart for details)    Review of past medical records shows the patient show US indicating 18 cm mass on liver. CT indicting 18 cm mass on liver and 3 cm cystic tumor on pancreatic tail.     2:56 PM - Patient seen and examined at bedside. Patient will be treated with lactated ringer bolus. Ordered lipase, CBC w/ differential, CMP to evaluate his symptoms.     3:28 PM Paged Hospitalist.     3:36 PM I discussed the patient's case and the above findings with Dr. Bowen (Hospitalist) who agreed to admit the patient.     This is a charming 69 y.o. male that  presents with right upper quadrant abdominal pain.  The patient was found to have an 18 cm mass in the liver, severe pain and shortness of breath secondary the pain.  For this reason, I did repeat the labs showed evidence of pancreatitis, no significant total bilirubin elevation, no evidence of a significant leukocytosis.  The patient will be admitted to Dr. Curry for pain control as well as probable biopsy of the liver mass and oncology consultation.    DISPOSITION:  Patient will be admitted to Dr. Bowen (Hospitalist) in guarded condition.    FINAL IMPRESSION  Liver mass  Abdominal pain     Nila FRANCIS (Charito), am scribing for, and in the presence of, Neri Mo D.O    Electronically signed by: Nila Farias (Charito), 10/31/2019    INeri D.O. personally performed the services described in this documentation, as scribed by Nila Farias in my presence, and it is both accurate and complete.    E    The note accurately reflects work and decisions made by me.  Neri Mo  10/31/2019  7:23 PM

## 2019-10-31 NOTE — ED TRIAGE NOTES
Chief Complaint   Patient presents with   • RUQ Pain     Arrived via [facility] transfer from [facility] following c/o abd pain and workup at  with CT finding of large mass on liver.

## 2019-11-01 ENCOUNTER — APPOINTMENT (OUTPATIENT)
Dept: RADIOLOGY | Facility: MEDICAL CENTER | Age: 69
DRG: 988 | End: 2019-11-01
Attending: NURSE PRACTITIONER
Payer: MEDICARE

## 2019-11-01 PROBLEM — Z72.0 TOBACCO ABUSE: Status: ACTIVE | Noted: 2019-11-01

## 2019-11-01 PROBLEM — R06.2 WHEEZING: Status: ACTIVE | Noted: 2019-11-01

## 2019-11-01 PROBLEM — R06.89 ABNORMAL BREATH SOUNDS: Status: ACTIVE | Noted: 2019-11-01

## 2019-11-01 LAB
ALBUMIN SERPL BCP-MCNC: 2.9 G/DL (ref 3.2–4.9)
ALBUMIN/GLOB SERPL: 1.1 G/DL
ALP SERPL-CCNC: 106 U/L (ref 30–99)
ALT SERPL-CCNC: 12 U/L (ref 2–50)
ANION GAP SERPL CALC-SCNC: 8 MMOL/L (ref 0–11.9)
APTT PPP: 29.9 SEC (ref 24.7–36)
AST SERPL-CCNC: 18 U/L (ref 12–45)
BILIRUB SERPL-MCNC: 0.7 MG/DL (ref 0.1–1.5)
BUN SERPL-MCNC: 10 MG/DL (ref 8–22)
CALCIUM SERPL-MCNC: 8 MG/DL (ref 8.5–10.5)
CANCER AG19-9 SERPL-ACNC: 9.3 U/ML (ref 0–35)
CHLORIDE SERPL-SCNC: 107 MMOL/L (ref 96–112)
CO2 SERPL-SCNC: 24 MMOL/L (ref 20–33)
CREAT SERPL-MCNC: 0.72 MG/DL (ref 0.5–1.4)
EKG IMPRESSION: NORMAL
ERYTHROCYTE [DISTWIDTH] IN BLOOD BY AUTOMATED COUNT: 44 FL (ref 35.9–50)
GLOBULIN SER CALC-MCNC: 2.7 G/DL (ref 1.9–3.5)
GLUCOSE SERPL-MCNC: 102 MG/DL (ref 65–99)
HCT VFR BLD AUTO: 38.2 % (ref 42–52)
HGB BLD-MCNC: 12.3 G/DL (ref 14–18)
INR PPP: 1.03 (ref 0.87–1.13)
MCH RBC QN AUTO: 32.4 PG (ref 27–33)
MCHC RBC AUTO-ENTMCNC: 32.2 G/DL (ref 33.7–35.3)
MCV RBC AUTO: 100.5 FL (ref 81.4–97.8)
PLATELET # BLD AUTO: 99 K/UL (ref 164–446)
PMV BLD AUTO: 8.2 FL (ref 9–12.9)
POTASSIUM SERPL-SCNC: 3.8 MMOL/L (ref 3.6–5.5)
PROT SERPL-MCNC: 5.6 G/DL (ref 6–8.2)
PROTHROMBIN TIME: 13.7 SEC (ref 12–14.6)
RBC # BLD AUTO: 3.8 M/UL (ref 4.7–6.1)
SODIUM SERPL-SCNC: 139 MMOL/L (ref 135–145)
WBC # BLD AUTO: 4.9 K/UL (ref 4.8–10.8)

## 2019-11-01 PROCEDURE — 700101 HCHG RX REV CODE 250: Performed by: NURSE PRACTITIONER

## 2019-11-01 PROCEDURE — 71260 CT THORAX DX C+: CPT

## 2019-11-01 PROCEDURE — 770006 HCHG ROOM/CARE - MED/SURG/GYN SEMI*

## 2019-11-01 PROCEDURE — 71045 X-RAY EXAM CHEST 1 VIEW: CPT

## 2019-11-01 PROCEDURE — 94640 AIRWAY INHALATION TREATMENT: CPT

## 2019-11-01 PROCEDURE — 93010 ELECTROCARDIOGRAM REPORT: CPT | Performed by: INTERNAL MEDICINE

## 2019-11-01 PROCEDURE — 82105 ALPHA-FETOPROTEIN SERUM: CPT

## 2019-11-01 PROCEDURE — A9270 NON-COVERED ITEM OR SERVICE: HCPCS | Performed by: INTERNAL MEDICINE

## 2019-11-01 PROCEDURE — 36415 COLL VENOUS BLD VENIPUNCTURE: CPT

## 2019-11-01 PROCEDURE — 85027 COMPLETE CBC AUTOMATED: CPT

## 2019-11-01 PROCEDURE — 80053 COMPREHEN METABOLIC PANEL: CPT

## 2019-11-01 PROCEDURE — 93005 ELECTROCARDIOGRAM TRACING: CPT | Performed by: NURSE PRACTITIONER

## 2019-11-01 PROCEDURE — 85610 PROTHROMBIN TIME: CPT

## 2019-11-01 PROCEDURE — 99233 SBSQ HOSP IP/OBS HIGH 50: CPT | Performed by: INTERNAL MEDICINE

## 2019-11-01 PROCEDURE — 86301 IMMUNOASSAY TUMOR CA 19-9: CPT

## 2019-11-01 PROCEDURE — 94760 N-INVAS EAR/PLS OXIMETRY 1: CPT

## 2019-11-01 PROCEDURE — 700117 HCHG RX CONTRAST REV CODE 255: Performed by: NURSE PRACTITIONER

## 2019-11-01 PROCEDURE — 85730 THROMBOPLASTIN TIME PARTIAL: CPT

## 2019-11-01 PROCEDURE — 700102 HCHG RX REV CODE 250 W/ 637 OVERRIDE(OP): Performed by: INTERNAL MEDICINE

## 2019-11-01 RX ORDER — IPRATROPIUM BROMIDE AND ALBUTEROL SULFATE 2.5; .5 MG/3ML; MG/3ML
3 SOLUTION RESPIRATORY (INHALATION)
Status: DISCONTINUED | OUTPATIENT
Start: 2019-11-01 | End: 2019-11-02

## 2019-11-01 RX ADMIN — IPRATROPIUM BROMIDE AND ALBUTEROL SULFATE 3 ML: .5; 3 SOLUTION RESPIRATORY (INHALATION) at 15:33

## 2019-11-01 RX ADMIN — OXYCODONE HYDROCHLORIDE 5 MG: 5 TABLET ORAL at 02:52

## 2019-11-01 RX ADMIN — LOSARTAN POTASSIUM 50 MG: 50 TABLET, FILM COATED ORAL at 04:12

## 2019-11-01 RX ADMIN — SENNOSIDES AND DOCUSATE SODIUM 2 TABLET: 8.6; 5 TABLET ORAL at 16:27

## 2019-11-01 RX ADMIN — OXYCODONE HYDROCHLORIDE 5 MG: 5 TABLET ORAL at 16:27

## 2019-11-01 RX ADMIN — OXYCODONE HYDROCHLORIDE 5 MG: 5 TABLET ORAL at 08:29

## 2019-11-01 RX ADMIN — OXYCODONE HYDROCHLORIDE 5 MG: 5 TABLET ORAL at 22:17

## 2019-11-01 RX ADMIN — IPRATROPIUM BROMIDE AND ALBUTEROL SULFATE 3 ML: .5; 3 SOLUTION RESPIRATORY (INHALATION) at 20:37

## 2019-11-01 RX ADMIN — IOHEXOL 75 ML: 350 INJECTION, SOLUTION INTRAVENOUS at 17:55

## 2019-11-01 ASSESSMENT — ENCOUNTER SYMPTOMS
COUGH: 0
DIAPHORESIS: 1
SHORTNESS OF BREATH: 0
FEVER: 0
CHILLS: 0
VOMITING: 0
WEIGHT LOSS: 0
BLOOD IN STOOL: 0
NAUSEA: 0
DIARRHEA: 0
ABDOMINAL PAIN: 1
BACK PAIN: 0
MYALGIAS: 0
SPUTUM PRODUCTION: 0
NECK PAIN: 0
HEMOPTYSIS: 0

## 2019-11-01 ASSESSMENT — COPD QUESTIONNAIRES
COPD SCREENING SCORE: 4
DURING THE PAST 4 WEEKS HOW MUCH DID YOU FEEL SHORT OF BREATH: NONE/LITTLE OF THE TIME
HAVE YOU SMOKED AT LEAST 100 CIGARETTES IN YOUR ENTIRE LIFE: YES
DO YOU EVER COUGH UP ANY MUCUS OR PHLEGM?: NO/ONLY WITH OCCASIONAL COLDS OR INFECTIONS

## 2019-11-01 ASSESSMENT — LIFESTYLE VARIABLES: EVER_SMOKED: YES

## 2019-11-01 NOTE — PROGRESS NOTES
Alert and oriented x4. Diet re-instructed to be on NPO. Admission profile done. Safety precautions in placed. Bed in lowest position. Upper side rails up. Treaded socks on. Reinforced the use of call light when needing assistance.

## 2019-11-01 NOTE — PROGRESS NOTES
2 RN skin check complete with YASMIN Taylor.   Devices in place: PIV  Skin assessed under devices: yes.  Confirmed pressure ulcers found on NA.  New potential pressure ulcers noted on na. Wound consult placed na.  The following skin issues noted: ears are red and blanching. No tubing in place. Heels are red and blanching. Heel float boots applied. Sacrum is red and blanching. Refused mepilex. Left buttock had healed old injury, red and blanching. Elbows are red and blanching. Refused padding with mepilex.

## 2019-11-01 NOTE — ASSESSMENT & PLAN NOTE
Consistent with hepatocellular carcinoma.  Alpha-fetoprotein high.  Hepatitis panel negative.  Suspect hemorrhage into tumor causing pain/symptoms-post IR embolization.  Check bone scan to help with staging.  Once the patient is stable he may be a candidate for neoadjuvant treatment-to follow-up with oncology

## 2019-11-01 NOTE — PROGRESS NOTES
[facility] Medicine Daily Progress Note    Date of Service  11/1/2019    Chief Complaint  69 y.o. male admitted 10/31/2019 with RUQ pain and liver/pancreatic mass    Hospital Course   Patient is a 69-year-old male with history of hypertension, EtOH abuse, and tobacco abuse with approximately 30-year pack history who presented with right upper quadrant abdominal pain radiating to the right flank.  Duration was approximately 2 to 3 days.  He endorsed periodic night sweats but otherwise was negative for unintentional weight loss, anorexia, or jaundice.  CT scan of the abdomen and pelvis showed an 18 cm enhancing mass in the medial segment of the left lobe of the liver and anterior segment of the right lower liver extending from the dome to the inferior edge.  Oncology consultation ensued.       Interval Problem Update  H/H 12/38, plt 99  Alk phos 106  Hypoalbuminemia  -- mild  Ca 19-9 normal  Coags ok  EKG  C/O RUQ pain radiating to the right flank worse with movement and relieved with rest.  No further associated symptoms although he does endorse night sweats periodically  Case discussed extensively with oncology--Dr. Birch    Consultants/Specialty  Oncology    Code Status  Full    Disposition  To be determined    Review of Systems  Review of Systems   Constitutional: Positive for diaphoresis. Negative for chills, fever, malaise/fatigue and weight loss.   Respiratory: Negative for cough, hemoptysis, sputum production and shortness of breath.    Cardiovascular: Negative for chest pain.   Gastrointestinal: Positive for abdominal pain. Negative for blood in stool, diarrhea, melena, nausea and vomiting.   Genitourinary: Negative for dysuria.   Musculoskeletal: Negative for back pain, myalgias and neck pain.   Skin: Negative for itching and rash.   All other systems reviewed and are negative.       Physical Exam  Temp:  [36.6 °C (97.8 °F)-36.9 °C (98.5 °F)] 36.6 °C (97.8 °F)  Pulse:  [80-99] 80  Resp:  [17-19] 19  BP:  (115-146)/(65-81) 127/78  SpO2:  [91 %-99 %] 95 %    Physical Exam   Constitutional: He is oriented to person, place, and time. He appears well-developed and well-nourished. No distress.   HENT:   Head: Normocephalic and atraumatic.   Eyes: Pupils are equal, round, and reactive to light. EOM are normal.   Neck: Neck supple.   Cardiovascular: Normal rate, regular rhythm and normal heart sounds.   Pulmonary/Chest: Effort normal. No respiratory distress. He has no wheezes. He has rales in the right lower field.   Abdominal: Soft. He exhibits no distension. There is tenderness in the right upper quadrant, epigastric area and periumbilical area.   Neurological: He is alert and oriented to person, place, and time. No cranial nerve deficit.   Skin: Skin is warm and dry.   Nursing note and vitals reviewed.      Fluids    Intake/Output Summary (Last 24 hours) at 11/1/2019 0756  Last data filed at 10/31/2019 1719  Gross per 24 hour   Intake 1000 ml   Output --   Net 1000 ml       Laboratory  Recent Labs     10/31/19  1538 11/01/19  0242   WBC 5.6 4.9   RBC 3.79* 3.80*   HEMOGLOBIN 12.7* 12.3*   HEMATOCRIT 37.7* 38.2*   MCV 99.5* 100.5*   MCH 33.5* 32.4   MCHC 33.7 32.2*   RDW 44.4 44.0   PLATELETCT 109* 99*   MPV 8.3* 8.2*     Recent Labs     10/31/19  1538 11/01/19  0242   SODIUM 138 139   POTASSIUM 3.7 3.8   CHLORIDE 108 107   CO2 23 24   GLUCOSE 88 102*   BUN 11 10   CREATININE 0.65 0.72   CALCIUM 7.9* 8.0*                   Imaging  OUTSIDE IMAGES-DX CHEST   Final Result      US-FOREIGN FILM ULTRASOUND   Final Result      OUTSIDE IMAGES-CT ABDOMEN /PELVIS   Final Result      IR-CONSULT AND TREAT    (Results Pending)   MR-ABDOMEN-WITH & W/O    (Results Pending)   CT-CHEST (THORAX) WITH    (Results Pending)        Assessment/Plan  Mass in the abdomen- (present on admission)  Assessment & Plan  Liver and pancreatic mass(concerning for malignancy)  CT scan: showed 18 cm enhancing mass in the medial segment of the left lobe of  liver and anterior segment right lower liver extending from the dome of the liver to the inferior edge potentially representing metastatic disease from a 3 cm cystic tumor in the pancreatic tail potentially representing a mucinous cyst adenocarcinoma.  Metastatic disease of other origin or primary hepatocellular carcinoma or consideration.  Benign cystic lesions of the pancreatic tail such as pseudocyst or cyst adenoma are also consideration.    AFP    normal  Oncology consultation  MRI liver  CT chest with contrast  Hepatitis panel    Abnormal breath sounds  Assessment & Plan  ProBNP  CXR    Wheezing  Assessment & Plan  Likely a component of COPD  RT protocol  PRN nebs    Tobacco abuse  Assessment & Plan  Less than 10 minutes spent discussing tobacco cessation including strategies and medications with side effects    Thrombocytopenia (HCC)- (present on admission)  Assessment & Plan  Follow cbc in am    Essential hypertension- (present on admission)  Assessment & Plan  On losartan       VTE prophylaxis: SCDs

## 2019-11-01 NOTE — ASSESSMENT & PLAN NOTE
Follow-up chest x-ray with findings atelectasis on right.  Mild hypoxia requiring oxygen overnight.  Encourage I-S  Wean off O2 as tolerated  Pain control  Arrange for home O2 if needed

## 2019-11-01 NOTE — DIETARY
Nutrition Services - Poor po and/or weight loss reported on admission. Malnutrition Screening Tool score <2. No other risk for malnutrition, or other nutrition concerns,  identified on screening. Nutrition assessment not indicated at this time.     RD will monitor per department guidelines.

## 2019-11-01 NOTE — CONSULTS
DATE OF SERVICE:  11/01/2019    Consultation was called by Dr. Brett Dmuont.    REASON FOR CONSULTATION:    1.  Liver mass.  2.  Pancreatic mass.    HISTORY OF PRESENT ILLNESS:  The patient is a very pleasant 69-year-old   gentleman who was seen by me as an inpatient at the request of Dr. Brett Ott for the above-mentioned problems  Patient otherwise is fairly healthy   other than history of hypertension, dyslipidemia, and borderline diabetes who   was transferred to the [facility] from [facility] due to the above-mentioned reasons.  The patient started having   severe abdominal pain last night and went to the [facility] ER where a   CT scan showed a very large liver mass and also a mass in the pancreatic tail   due to which he was transferred to [facility].  Patient's   pain is well controlled.  His wife is at the bedside.  Otherwise, his blood   counts look fairly stable and labs also looked acceptable including fairly   unremarkable liver function tests.  CT scan showed an 18 cm mass in his liver   and also approximately 3 cm mass in his pancreatic tail.  Ultrasound of the   abdomen was also done on 10/31/2019.  Heme/onc consultation was called for   further management of his condition.  His CA 19-9 was 9.3.  His calcium was   lowish normal at 8 and his total bilirubin was also found to be within normal   limits.  His wife is at the bedside.    PAST MEDICAL HISTORY:  Hypertension, borderline diabetes, dyslipidemia, GERD.    PAST SURGICAL HISTORY:  Right hand surgery including bone graft ORIF, cataract   surgery, parathyroidectomy in 2016.    PERSONAL AND SOCIAL HISTORY:  Retired, smoker, less than a pack of cigarettes   per day as per the patient.  Social drinker, , lives in [place].    ALLERGIES:  SULFA.    FAMILY HISTORY:  Not pertinent to this hospitalization.    REVIEW OF SYSTEMS:  GENERAL AND CONSTITUTIONAL:  Denies any  weight loss, night sweats or fevers.  HEAD AND NECK, EARS, NOSE AND THROAT:  Denies any headaches or any visual   symptoms.  RESPIRATORY:  Denies any cough or hemoptysis.  CARDIOVASCULAR:  No chest pain or palpitations.  GASTROINTESTINAL:  Abdominal pain as mentioned in history of present illness.    No diarrhea or constipation.  No nausea.  Has GERD.  NEUROLOGICAL:  Denies any history of seizure, stroke, or weakness.  PSYCHIATRIC:  Anxious, but denies any depression.  SKIN/INTEGUMENTARY:  No rash or any bruising.  MUSCULOSKELETAL:  No back or any bony discomfort.    PHYSICAL EXAMINATION:  GENERAL:  The patient is alert and oriented x3.  He is not in any distress.  VITAL SIGNS:  Temperature 36.1, pulse 97, respiratory rate is 17, /76.  HEENT:  Pupils are equal.  There is no icterus.  There is no subconjunctival   hemorrhage.    NECK:  Supple with no JVD or lymphadenopathy.  LUNGS:  Clear to auscultation with good bilateral air entry.  HEART:  Reveals no S3, S4.  ABDOMEN:  There is minimal tenderness in the upper abdomen and the right upper   quadrant.  There is no splenomegaly, but there is possible some hepatomegaly.      EXTREMITIES:  There is no edema of lower extremities.  BACK:  Reveals no kyphoscoliosis.  NEUROLOGIC:  Power is equal bilaterally in both upper and lower extremities.  PSYCHIATRIC:  Reveals normal affect, orientation, and mood.     LABORATORY DATA:  WBC 4900, hemoglobin 12.3, hematocrit 38.2, platelet count   99,000, neutrophils 67.2%.    ASSESSMENT AND PLAN:  Liver mass, underlying etiology is not clear.  The   patient denies any history of hepatitis or cirrhosis of the liver.  There is a   small cystic lesion in his pancreatic tail.  My plan at this time is to get   an MRI of the abdomen.  I will also check his tumor markers including CA 19-9.    I will also check out his alpha-fetoprotein level.  Further management will   depend upon the results of the above-mentioned testing.  If needed,  we will   perform a biopsy from his liver lesions.  He denies any history of bleeding   per rectum or any significant weight loss.  I explained to the patient and his   wife that most likely he has an underlying malignancy, which he has a good   understanding of that.  I will continue to follow him up ____ this remains   well controlled.  I have discussed this with Dr. Brett Dumont.       ____________________________________     MD WILLIAM Gabriel / ARRON    DD:  11/01/2019 15:18:17  DT:  11/01/2019 16:45:19    D#:  [id number]  Job#:  [id number]

## 2019-11-01 NOTE — PROGRESS NOTES
Procedure Requested: CT Liver Biopsy    Date of request: 11/1/2019    Date of consultation: same    Requesting Provider: Dr Bowen    Summary:    68 yo male presents with large hepatic mass likely representing a Hepatocellular Carcinoma      Imaging Findings:    Recent CT scan of Abdomen /pelvis demonstrates large aggressive hepatic mass    Interventional Radiology Recommendation:      1.  Do not perform CT biopsy of hepatic mass until results of AFP are obtained.  Our policy is not to biopsy obvious HCCs to prevent seeding of tumor along needle track.

## 2019-11-01 NOTE — CARE PLAN
Problem: Knowledge Deficit  Goal: Knowledge of the prescribed therapeutic regimen will improve  Outcome: PROGRESSING AS EXPECTED  Intervention: Discuss information regarding therpeutic regimen and document in education  Note:   Educated pt about MRI and Ct. Pt verbalized understanding     Problem: Pain Management  Goal: Pain level will decrease to patient's comfort goal  Outcome: PROGRESSING AS EXPECTED  Intervention: Follow pain managment plan developed in collaboration with patient and Interdisciplinary Team  Note:   PRN oxycodone given for pain

## 2019-11-01 NOTE — PROGRESS NOTES
Assumed care of pt at shift change. A/Ox4, discussed plan of care. Pt on room air. PRN oxycodone given for pain. And refused more meds for pain. MRI screening completed. Pt is on NPO waiting for liver biopsy.       All needs met at this time. Bed in lowest position, treaded socks on, personal belongings and call light within reach, instructed to call for any assistance, hourly rounding in place.

## 2019-11-01 NOTE — DISCHARGE PLANNING
Care Transition Team Assessment    Anticipated Discharge Disposition: home with no needs    Action: Meet with pt at bedside. Pt NPO pending IR biopsy per documentation and pt. Pt lives with his wife in [place]. Information on facesheet verified. Pt was independent of ADLs prior to arrival.     Barriers to Discharge: medial clearance, IR biopsy     Plan: pending clinical course, biopsy     Length of Stay: 1    Information Source  Orientation : Oriented x 4  Information Given By: Patient  Informant's Name: William Bella  Who is responsible for making decisions for patient? : Patient    Readmission Evaluation  Is this a readmission?: No    Elopement Risk  Legal Hold: No  Ambulatory or Self Mobile in Wheelchair: Yes  Disoriented: No  Psychiatric Symptoms: None  History of Wandering: No  Elopement this Admit: No  Vocalizing Wanting to Leave: No  Displays Behaviors, Body Language Wanting to Leave: No-Not at Risk for Elopement  Elopement Risk: Not at Risk for Elopement  Wanderguard On: No (See Comments)  Personal Belongings: Hospital Clothing Only  Picture of Patient on Inside Chart Front Cover: No (See Comments)  Purple Armband on Patient: No (See Comments)    Interdisciplinary Discharge Planning  Patient or legal guardian wants to designate a caregiver (see row info): No    Discharge Preparedness  What is your plan after discharge?: Uncertain - pending medical team collaboration  What are your discharge supports?: Spouse  Prior Functional Level: Ambulatory  Difficulity with ADLs: None  Difficulity with IADLs: None    Functional Assesment  Prior Functional Level: Ambulatory    Finances  Financial Barriers to Discharge: No  Prescription Coverage: Yes    Vision / Hearing Impairment  Vision Impairment : Yes  Right Eye Vision: Impaired, Patient Declines to Wear Visual Aid  Left Eye Vision: Impaired, Patient Declines to Wear Visual Aid  Hearing Impairment : No         Advance Directive  Advance Directive?: None  Advance  Directive offered?: AD Booklet refused    Domestic Abuse  Have you ever been the victim of abuse or violence?: No  Physical Abuse or Sexual Abuse: No  Verbal Abuse or Emotional Abuse: No  Possible Abuse Reported to:: Not Applicable    Psychological Assessment  History of Substance Abuse: None  History of Psychiatric Problems: No  Non-compliant with Treatment: No  Newly Diagnosed Illness: No    Discharge Risks or Barriers  Discharge risks or barriers?: No  Patient risk factors: Complex medical needs    Anticipated Discharge Information  Anticipated discharge disposition: Home  Discharge Address: [address] [place] [id number]  Discharge Contact Phone Number: [phone]

## 2019-11-02 ENCOUNTER — APPOINTMENT (OUTPATIENT)
Dept: RADIOLOGY | Facility: MEDICAL CENTER | Age: 69
DRG: 988 | End: 2019-11-02
Attending: NURSE PRACTITIONER
Payer: MEDICARE

## 2019-11-02 PROBLEM — K59.00 CONSTIPATION: Status: ACTIVE | Noted: 2019-11-02

## 2019-11-02 LAB
AFP-TM SERPL-MCNC: 3137 NG/ML (ref 0–9)
ALBUMIN SERPL BCP-MCNC: 2.9 G/DL (ref 3.2–4.9)
ALBUMIN/GLOB SERPL: 1.1 G/DL
ALP SERPL-CCNC: 95 U/L (ref 30–99)
ALT SERPL-CCNC: 9 U/L (ref 2–50)
ANION GAP SERPL CALC-SCNC: 7 MMOL/L (ref 0–11.9)
AST SERPL-CCNC: 17 U/L (ref 12–45)
BASOPHILS # BLD AUTO: 0.6 % (ref 0–1.8)
BASOPHILS # BLD: 0.03 K/UL (ref 0–0.12)
BILIRUB SERPL-MCNC: 0.4 MG/DL (ref 0.1–1.5)
BUN SERPL-MCNC: 10 MG/DL (ref 8–22)
CALCIUM SERPL-MCNC: 8 MG/DL (ref 8.5–10.5)
CHLORIDE SERPL-SCNC: 107 MMOL/L (ref 96–112)
CHOLEST SERPL-MCNC: 121 MG/DL (ref 100–199)
CO2 SERPL-SCNC: 24 MMOL/L (ref 20–33)
CREAT SERPL-MCNC: 0.73 MG/DL (ref 0.5–1.4)
EOSINOPHIL # BLD AUTO: 0.09 K/UL (ref 0–0.51)
EOSINOPHIL NFR BLD: 1.9 % (ref 0–6.9)
ERYTHROCYTE [DISTWIDTH] IN BLOOD BY AUTOMATED COUNT: 43.6 FL (ref 35.9–50)
EST. AVERAGE GLUCOSE BLD GHB EST-MCNC: 120 MG/DL
GLOBULIN SER CALC-MCNC: 2.7 G/DL (ref 1.9–3.5)
GLUCOSE SERPL-MCNC: 125 MG/DL (ref 65–99)
HAV IGM SERPL QL IA: NEGATIVE
HBA1C MFR BLD: 5.8 % (ref 0–5.6)
HBV CORE IGM SER QL: NEGATIVE
HBV SURFACE AG SER QL: NEGATIVE
HCT VFR BLD AUTO: 35.1 % (ref 42–52)
HCV AB SER QL: NEGATIVE
HDLC SERPL-MCNC: 38 MG/DL
HGB BLD-MCNC: 11.7 G/DL (ref 14–18)
IMM GRANULOCYTES # BLD AUTO: 0.02 K/UL (ref 0–0.11)
IMM GRANULOCYTES NFR BLD AUTO: 0.4 % (ref 0–0.9)
LDLC SERPL CALC-MCNC: 71 MG/DL
LYMPHOCYTES # BLD AUTO: 0.82 K/UL (ref 1–4.8)
LYMPHOCYTES NFR BLD: 17.3 % (ref 22–41)
MAGNESIUM SERPL-MCNC: 1.9 MG/DL (ref 1.5–2.5)
MCH RBC QN AUTO: 33.2 PG (ref 27–33)
MCHC RBC AUTO-ENTMCNC: 33.3 G/DL (ref 33.7–35.3)
MCV RBC AUTO: 99.7 FL (ref 81.4–97.8)
MONOCYTES # BLD AUTO: 0.65 K/UL (ref 0–0.85)
MONOCYTES NFR BLD AUTO: 13.7 % (ref 0–13.4)
NEUTROPHILS # BLD AUTO: 3.12 K/UL (ref 1.82–7.42)
NEUTROPHILS NFR BLD: 66.1 % (ref 44–72)
NRBC # BLD AUTO: 0 K/UL
NRBC BLD-RTO: 0 /100 WBC
NT-PROBNP SERPL IA-MCNC: 477 PG/ML (ref 0–125)
PHOSPHATE SERPL-MCNC: 3.5 MG/DL (ref 2.5–4.5)
PLATELET # BLD AUTO: 101 K/UL (ref 164–446)
PMV BLD AUTO: 8.1 FL (ref 9–12.9)
POTASSIUM SERPL-SCNC: 3.7 MMOL/L (ref 3.6–5.5)
PROT SERPL-MCNC: 5.6 G/DL (ref 6–8.2)
RBC # BLD AUTO: 3.52 M/UL (ref 4.7–6.1)
SODIUM SERPL-SCNC: 138 MMOL/L (ref 135–145)
TRIGL SERPL-MCNC: 60 MG/DL (ref 0–149)
WBC # BLD AUTO: 4.7 K/UL (ref 4.8–10.8)

## 2019-11-02 PROCEDURE — 700102 HCHG RX REV CODE 250 W/ 637 OVERRIDE(OP): Performed by: INTERNAL MEDICINE

## 2019-11-02 PROCEDURE — 84100 ASSAY OF PHOSPHORUS: CPT

## 2019-11-02 PROCEDURE — 99232 SBSQ HOSP IP/OBS MODERATE 35: CPT | Performed by: INTERNAL MEDICINE

## 2019-11-02 PROCEDURE — 770006 HCHG ROOM/CARE - MED/SURG/GYN SEMI*

## 2019-11-02 PROCEDURE — 94760 N-INVAS EAR/PLS OXIMETRY 1: CPT

## 2019-11-02 PROCEDURE — A9270 NON-COVERED ITEM OR SERVICE: HCPCS | Performed by: INTERNAL MEDICINE

## 2019-11-02 PROCEDURE — 700101 HCHG RX REV CODE 250: Performed by: NURSE PRACTITIONER

## 2019-11-02 PROCEDURE — 85025 COMPLETE CBC W/AUTO DIFF WBC: CPT

## 2019-11-02 PROCEDURE — 80061 LIPID PANEL: CPT

## 2019-11-02 PROCEDURE — 83735 ASSAY OF MAGNESIUM: CPT

## 2019-11-02 PROCEDURE — 700101 HCHG RX REV CODE 250: Performed by: INTERNAL MEDICINE

## 2019-11-02 PROCEDURE — 80053 COMPREHEN METABOLIC PANEL: CPT

## 2019-11-02 PROCEDURE — 83880 ASSAY OF NATRIURETIC PEPTIDE: CPT

## 2019-11-02 PROCEDURE — 700102 HCHG RX REV CODE 250 W/ 637 OVERRIDE(OP): Performed by: NURSE PRACTITIONER

## 2019-11-02 PROCEDURE — 94640 AIRWAY INHALATION TREATMENT: CPT

## 2019-11-02 PROCEDURE — 36415 COLL VENOUS BLD VENIPUNCTURE: CPT

## 2019-11-02 PROCEDURE — 80074 ACUTE HEPATITIS PANEL: CPT

## 2019-11-02 PROCEDURE — 83036 HEMOGLOBIN GLYCOSYLATED A1C: CPT

## 2019-11-02 PROCEDURE — A9270 NON-COVERED ITEM OR SERVICE: HCPCS | Performed by: NURSE PRACTITIONER

## 2019-11-02 RX ORDER — LORAZEPAM 1 MG/1
1 TABLET ORAL
Status: COMPLETED | OUTPATIENT
Start: 2019-11-02 | End: 2019-11-02

## 2019-11-02 RX ORDER — GABAPENTIN 100 MG/1
100 CAPSULE ORAL 3 TIMES DAILY
Status: DISCONTINUED | OUTPATIENT
Start: 2019-11-02 | End: 2019-11-06 | Stop reason: HOSPADM

## 2019-11-02 RX ORDER — IPRATROPIUM BROMIDE AND ALBUTEROL SULFATE 2.5; .5 MG/3ML; MG/3ML
3 SOLUTION RESPIRATORY (INHALATION)
Status: DISCONTINUED | OUTPATIENT
Start: 2019-11-02 | End: 2019-11-02

## 2019-11-02 RX ORDER — IPRATROPIUM BROMIDE AND ALBUTEROL SULFATE 2.5; .5 MG/3ML; MG/3ML
3 SOLUTION RESPIRATORY (INHALATION)
Status: DISCONTINUED | OUTPATIENT
Start: 2019-11-03 | End: 2019-11-03

## 2019-11-02 RX ADMIN — IPRATROPIUM BROMIDE AND ALBUTEROL SULFATE 3 ML: .5; 3 SOLUTION RESPIRATORY (INHALATION) at 23:08

## 2019-11-02 RX ADMIN — LORAZEPAM 1 MG: 1 TABLET ORAL at 12:50

## 2019-11-02 RX ADMIN — IPRATROPIUM BROMIDE AND ALBUTEROL SULFATE 3 ML: .5; 3 SOLUTION RESPIRATORY (INHALATION) at 11:55

## 2019-11-02 RX ADMIN — OXYCODONE HYDROCHLORIDE 5 MG: 5 TABLET ORAL at 08:30

## 2019-11-02 RX ADMIN — IPRATROPIUM BROMIDE AND ALBUTEROL SULFATE 3 ML: .5; 3 SOLUTION RESPIRATORY (INHALATION) at 06:37

## 2019-11-02 RX ADMIN — OXYCODONE HYDROCHLORIDE 5 MG: 5 TABLET ORAL at 17:32

## 2019-11-02 RX ADMIN — GABAPENTIN 100 MG: 100 CAPSULE ORAL at 17:35

## 2019-11-02 RX ADMIN — LOSARTAN POTASSIUM 50 MG: 50 TABLET, FILM COATED ORAL at 05:33

## 2019-11-02 RX ADMIN — POLYETHYLENE GLYCOL 3350 1 PACKET: 17 POWDER, FOR SOLUTION ORAL at 15:32

## 2019-11-02 RX ADMIN — OXYCODONE HYDROCHLORIDE 5 MG: 5 TABLET ORAL at 03:40

## 2019-11-02 ASSESSMENT — COPD QUESTIONNAIRES
COPD SCREENING SCORE: 4
HAVE YOU SMOKED AT LEAST 100 CIGARETTES IN YOUR ENTIRE LIFE: YES
DO YOU EVER COUGH UP ANY MUCUS OR PHLEGM?: NO/ONLY WITH OCCASIONAL COLDS OR INFECTIONS
DURING THE PAST 4 WEEKS HOW MUCH DID YOU FEEL SHORT OF BREATH: NONE/LITTLE OF THE TIME
DURING THE PAST 4 WEEKS HOW MUCH DID YOU FEEL SHORT OF BREATH: NONE/LITTLE OF THE TIME
HAVE YOU SMOKED AT LEAST 100 CIGARETTES IN YOUR ENTIRE LIFE: YES
COPD SCREENING SCORE: 4
DO YOU EVER COUGH UP ANY MUCUS OR PHLEGM?: NO/ONLY WITH OCCASIONAL COLDS OR INFECTIONS

## 2019-11-02 ASSESSMENT — LIFESTYLE VARIABLES
EVER_SMOKED: YES
EVER_SMOKED: YES

## 2019-11-02 ASSESSMENT — ENCOUNTER SYMPTOMS
PALPITATIONS: 0
HEMOPTYSIS: 0
BACK PAIN: 0
DIARRHEA: 0
BLOOD IN STOOL: 0
SPUTUM PRODUCTION: 0
SHORTNESS OF BREATH: 0
NAUSEA: 0
WEIGHT LOSS: 0
DEPRESSION: 0
FEVER: 0
MYALGIAS: 0
COUGH: 0
ORTHOPNEA: 0
VOMITING: 0
ABDOMINAL PAIN: 1
CONSTIPATION: 1
NECK PAIN: 0
NERVOUS/ANXIOUS: 1
CHILLS: 0

## 2019-11-02 NOTE — PROGRESS NOTES
Received call from [facility], machine used for bone scan not functioning. YESSI Fair notified.

## 2019-11-02 NOTE — PROGRESS NOTES
Oncology/Hematology Progress Note               Author: Abdelrahmanyinajames Eyal Date & Time created: 11/2/2019  1:58 PM   Diagnosis-likely hepatocellular carcinoma.  Interval History:  CT of the chest did not show any metastatic disease.  Alpha  fetoprotein level is high.  Hepatitis panel is negative.  And is well controlled.    Review of Systems:  Review of Systems   Constitutional: Negative for chills, fever and weight loss.   Respiratory: Negative for cough and shortness of breath.    Cardiovascular: Negative for chest pain, palpitations and orthopnea.   Gastrointestinal: Positive for abdominal pain and constipation.   Genitourinary: Negative for dysuria and hematuria.   Musculoskeletal: Positive for joint pain. Negative for myalgias.   Skin: Negative for itching and rash.   Psychiatric/Behavioral: Negative for depression. The patient is nervous/anxious.        Physical Exam:  Physical Exam   Constitutional: He appears well-developed and well-nourished. No distress.   HENT:   Head: Normocephalic.   Mouth/Throat: No oropharyngeal exudate.   Eyes: Pupils are equal, round, and reactive to light. Conjunctivae are normal. Right eye exhibits no discharge. Left eye exhibits no discharge.   Abdominal: Soft. Bowel sounds are normal. He exhibits no distension. There is no tenderness. There is no rebound.   Skin: He is not diaphoretic.       Labs:          Recent Labs     10/31/19  1538 11/01/19 0242 11/02/19  0200   SODIUM 138 139 138   POTASSIUM 3.7 3.8 3.7   CHLORIDE 108 107 107   CO2 23 24 24   BUN 11 10 10   CREATININE 0.65 0.72 0.73   MAGNESIUM  --   --  1.9   PHOSPHORUS  --   --  3.5   CALCIUM 7.9* 8.0* 8.0*     Recent Labs     10/31/19  1538 11/01/19  0242 11/02/19  0200   ALTSGPT 13 12 9   ASTSGOT 17 18 17   ALKPHOSPHAT 118* 106* 95   TBILIRUBIN 0.8 0.7 0.4   LIPASE 10*  --   --    GLUCOSE 88 102* 125*     Recent Labs     10/31/19  1538 11/01/19  0242 11/01/19  1053 11/02/19  0200   RBC 3.79* 3.80*  --  3.52*   HEMOGLOBIN  12.7* 12.3*  --  11.7*   HEMATOCRIT 37.7* 38.2*  --  35.1*   PLATELETCT 109* 99*  --  101*   PROTHROMBTM  --   --  13.7  --    APTT  --   --  29.9  --    INR  --   --  1.03  --      Recent Labs     10/31/19  1538 19  0242 19  0200   WBC 5.6 4.9 4.7*   NEUTSPOLYS 67.20  --  66.10   LYMPHOCYTES 17.40*  --  17.30*   MONOCYTES 14.20*  --  13.70*   EOSINOPHILS 0.50  --  1.90   BASOPHILS 0.50  --  0.60   ASTSGOT 17 18 17   ALTSGPT 13 12 9   ALKPHOSPHAT 118* 106* 95   TBILIRUBIN 0.8 0.7 0.4     Recent Labs     10/31/19  1538 19  0242 19  0200   SODIUM 138 139 138   POTASSIUM 3.7 3.8 3.7   CHLORIDE 108 107 107   CO2 23 24 24   GLUCOSE 88 102* 125*   BUN 11 10 10   CREATININE 0.65 0.72 0.73   CALCIUM 7.9* 8.0* 8.0*     Hemodynamics:  Temp (24hrs), Av.8 °C (98.2 °F), Min:36.5 °C (97.7 °F), Max:37 °C (98.6 °F)  Temperature: 37 °C (98.6 °F)  Pulse  Av.2  Min: 80  Max: 102   Blood Pressure : 124/68     Respiratory:    Respiration: 16, Pulse Oximetry: 92 %, O2 Daily Delivery Respiratory : Room Air with O2 Available     Given By:: Mouthpiece, Work Of Breathing / Effort: Mild  RUL Breath Sounds: Clear, RML Breath Sounds: Clear, RLL Breath Sounds: Clear, ASHLEY Breath Sounds: Clear, LLL Breath Sounds: Clear  Fluids:    Intake/Output Summary (Last 24 hours) at 2019 1358  Last data filed at 2019 0356  Gross per 24 hour   Intake 240 ml   Output 600 ml   Net -360 ml        GI/Nutrition:  Orders Placed This Encounter   Procedures   • Diet Order Regular     Standing Status:   Standing     Number of Occurrences:   1     Order Specific Question:   Diet:     Answer:   Regular [1]     Medical Decision Making, by Problem:  Active Hospital Problems    Diagnosis   • Mass in the abdomen [R19.00]   • Tobacco abuse [Z72.0]   • Wheezing [R06.2]   • Abnormal breath sounds [R06.89]   • Essential hypertension [I10]   • Thrombocytopenia (HCC) [D69.6]       Plan:  Liver mass-likely of hepatocellular cancer.   Alpha-fetoprotein levels is high.  MRI of the abdomen today.  Considering the diagnosis and the fact that the mass is very large, a bone scan was ordered.  Pain is well controlled.  Constipation- management per Dr. Tru Ramos.  Quality-Core Measures

## 2019-11-02 NOTE — PROGRESS NOTES
Until further notice bone scans cannot be done at main campus due to out of service scanners for an indefinite period of time. Informed YASMIN Watts

## 2019-11-02 NOTE — PROGRESS NOTES
Hospital Medicine Daily Progress Note    Date of Service  11/2/2019    Chief Complaint  69 y.o. male admitted 10/31/2019 with RUQ pain and liver/pancreatic mass    Hospital Course   Patient is a 69-year-old male with history of hypertension, EtOH abuse, and tobacco abuse with approximately 30-year pack history who presented with right upper quadrant abdominal pain radiating to the right flank.  Duration was approximately 2 to 3 days.  He endorsed periodic night sweats but otherwise was negative for unintentional weight loss, anorexia, or jaundice.  CT scan of the abdomen and pelvis showed an 18 cm enhancing mass in the medial segment of the left lobe of the liver and anterior segment of the right lower liver extending from the dome to the inferior edge.  Oncology consultation ensued.       Interval Problem Update  Patient in good spirits.  Says his abdomen feels somewhat distended and constipated.  Complains of left lower extremity radicular pattern pain which she says has been a chronic problem.  CT scan of his chest negative for mass/tumor burden  MRI of the abdomen pending--patient was not able to tolerate IV sedation  AFP  Lipid panel overall benign  BNP mildly elevated  A1c 5.8  Macrocytic anemia stable    Consultants/Specialty  Oncology    Code Status  Full    Disposition  To be determined    Review of Systems  Review of Systems   Constitutional: Negative for chills, fever, malaise/fatigue and weight loss.   Respiratory: Negative for cough, hemoptysis, sputum production and shortness of breath.    Cardiovascular: Negative for chest pain.   Gastrointestinal: Positive for abdominal pain and constipation. Negative for blood in stool, diarrhea, melena, nausea and vomiting.   Genitourinary: Negative for dysuria.   Musculoskeletal: Negative for back pain, myalgias and neck pain.   Skin: Negative for itching and rash.   All other systems reviewed and are negative.       Physical Exam  Temp:  [36.5 °C (97.7 °F)-37 °C  (98.6 °F)] 37 °C (98.6 °F)  Pulse:  [] 94  Resp:  [16-19] 16  BP: (113-168)/(56-83) 124/68  SpO2:  [91 %-97 %] 92 %    Physical Exam   Constitutional: He is oriented to person, place, and time. He appears well-developed and well-nourished. No distress.   HENT:   Head: Normocephalic and atraumatic.   Eyes: Pupils are equal, round, and reactive to light. EOM are normal.   Neck: Neck supple.   Cardiovascular: Normal rate, regular rhythm and normal heart sounds.   Pulmonary/Chest: Effort normal. No respiratory distress. He has no wheezes. He has no rales.   Abdominal: Soft. He exhibits no distension. There is tenderness in the right upper quadrant, epigastric area and periumbilical area.   Neurological: He is alert and oriented to person, place, and time. No cranial nerve deficit.   Skin: Skin is warm and dry.   Nursing note and vitals reviewed.      Fluids    Intake/Output Summary (Last 24 hours) at 11/2/2019 1505  Last data filed at 11/2/2019 0356  Gross per 24 hour   Intake 240 ml   Output 600 ml   Net -360 ml       Laboratory  Recent Labs     10/31/19  1538 11/01/19  0242 11/02/19  0200   WBC 5.6 4.9 4.7*   RBC 3.79* 3.80* 3.52*   HEMOGLOBIN 12.7* 12.3* 11.7*   HEMATOCRIT 37.7* 38.2* 35.1*   MCV 99.5* 100.5* 99.7*   MCH 33.5* 32.4 33.2*   MCHC 33.7 32.2* 33.3*   RDW 44.4 44.0 43.6   PLATELETCT 109* 99* 101*   MPV 8.3* 8.2* 8.1*     Recent Labs     10/31/19  1538 11/01/19  0242 11/02/19  0200   SODIUM 138 139 138   POTASSIUM 3.7 3.8 3.7   CHLORIDE 108 107 107   CO2 23 24 24   GLUCOSE 88 102* 125*   BUN 11 10 10   CREATININE 0.65 0.72 0.73   CALCIUM 7.9* 8.0* 8.0*     Recent Labs     11/01/19  1053   APTT 29.9   INR 1.03         Recent Labs     11/02/19  0200   TRIGLYCERIDE 60   HDL 38*   LDL 71       Imaging  CT-CHEST (THORAX) WITH   Final Result      Small right and trace left pleural effusions.   Atelectasis/possible mild edema or pneumonitis within the lower lobes.   No pulmonary consolidation or mass.    Large right hepatic lobe heterogeneously enhancing mass consistent with malignant neoplasm/hepatocellular carcinoma.   Hepatic steatosis. Splenomegaly.            DX-CHEST-PORTABLE (1 VIEW)   Final Result      Elevated right hemidiaphragm and mild right lung base atelectasis similar to previous findings.      OUTSIDE IMAGES-DX CHEST   Final Result      US-FOREIGN FILM ULTRASOUND   Final Result      OUTSIDE IMAGES-CT ABDOMEN /PELVIS   Final Result      NM-BONE SCAN WHOLE BODY    (Results Pending)   MR-ABDOMEN-WITH & W/O    (Results Pending)        Assessment/Plan  Mass in the abdomen- (present on admission)  Assessment & Plan  Liver and pancreatic mass(concerning for malignancy)  CT scan: showed 18 cm enhancing mass in the medial segment of the left lobe of liver and anterior segment right lower liver extending from the dome of the liver to the inferior edge potentially representing metastatic disease from a 3 cm cystic tumor in the pancreatic tail potentially representing a mucinous cyst adenocarcinoma.  Metastatic disease of other origin or primary hepatocellular carcinoma or consideration.  Benign cystic lesions of the pancreatic tail such as pseudocyst or cyst adenoma are also consideration.    AFP high   normal  Oncology consultation  MRI liver - reordered w anesthesia  CT chest with contrast  Hepatitis panel    Constipation  Assessment & Plan  Advance bowel regimen    Abnormal breath sounds  Assessment & Plan  ProBNP  CXR    Wheezing  Assessment & Plan  Likely a component of COPD  RT protocol  PRN nebs    Tobacco abuse  Assessment & Plan  Less than 10 minutes spent discussing tobacco cessation including strategies and medications with side effects    Thrombocytopenia (HCC)- (present on admission)  Assessment & Plan  Follow cbc in am    Essential hypertension- (present on admission)  Assessment & Plan  On losartan       VTE prophylaxis: SCDs

## 2019-11-02 NOTE — PROGRESS NOTES
Patient states of claustrophobia for upcoming MRI scan. Informed and received a one-time PRN Ativan 1 mg from Dr. Dumont. Ativan administered to patient. Called MRI and was informed it should take about half hour to be ready for patient.

## 2019-11-03 ENCOUNTER — APPOINTMENT (OUTPATIENT)
Dept: RADIOLOGY | Facility: MEDICAL CENTER | Age: 69
DRG: 988 | End: 2019-11-03
Attending: INTERNAL MEDICINE
Payer: MEDICARE

## 2019-11-03 PROBLEM — M54.10 RADICULOPATHY: Status: ACTIVE | Noted: 2019-11-03

## 2019-11-03 PROBLEM — F41.8 SITUATIONAL ANXIETY: Status: ACTIVE | Noted: 2019-11-03

## 2019-11-03 PROCEDURE — 99232 SBSQ HOSP IP/OBS MODERATE 35: CPT | Performed by: INTERNAL MEDICINE

## 2019-11-03 PROCEDURE — 94760 N-INVAS EAR/PLS OXIMETRY 1: CPT

## 2019-11-03 PROCEDURE — A9270 NON-COVERED ITEM OR SERVICE: HCPCS | Performed by: INTERNAL MEDICINE

## 2019-11-03 PROCEDURE — 770006 HCHG ROOM/CARE - MED/SURG/GYN SEMI*

## 2019-11-03 PROCEDURE — 700102 HCHG RX REV CODE 250 W/ 637 OVERRIDE(OP): Performed by: NURSE PRACTITIONER

## 2019-11-03 PROCEDURE — A9270 NON-COVERED ITEM OR SERVICE: HCPCS | Performed by: NURSE PRACTITIONER

## 2019-11-03 PROCEDURE — 94667 MNPJ CHEST WALL 1ST: CPT

## 2019-11-03 PROCEDURE — 94640 AIRWAY INHALATION TREATMENT: CPT

## 2019-11-03 PROCEDURE — 700102 HCHG RX REV CODE 250 W/ 637 OVERRIDE(OP): Performed by: INTERNAL MEDICINE

## 2019-11-03 PROCEDURE — 700101 HCHG RX REV CODE 250: Performed by: INTERNAL MEDICINE

## 2019-11-03 RX ORDER — ALPRAZOLAM 0.25 MG/1
0.25 TABLET ORAL 2 TIMES DAILY PRN
Status: DISCONTINUED | OUTPATIENT
Start: 2019-11-03 | End: 2019-11-06 | Stop reason: HOSPADM

## 2019-11-03 RX ORDER — IPRATROPIUM BROMIDE AND ALBUTEROL SULFATE 2.5; .5 MG/3ML; MG/3ML
3 SOLUTION RESPIRATORY (INHALATION)
Status: DISCONTINUED | OUTPATIENT
Start: 2019-11-03 | End: 2019-11-06 | Stop reason: HOSPADM

## 2019-11-03 RX ADMIN — GABAPENTIN 100 MG: 100 CAPSULE ORAL at 12:47

## 2019-11-03 RX ADMIN — SENNOSIDES AND DOCUSATE SODIUM 2 TABLET: 8.6; 5 TABLET ORAL at 18:25

## 2019-11-03 RX ADMIN — IPRATROPIUM BROMIDE AND ALBUTEROL SULFATE 3 ML: .5; 3 SOLUTION RESPIRATORY (INHALATION) at 06:59

## 2019-11-03 RX ADMIN — MAGNESIUM HYDROXIDE 30 ML: 400 SUSPENSION ORAL at 15:46

## 2019-11-03 RX ADMIN — LOSARTAN POTASSIUM 50 MG: 50 TABLET, FILM COATED ORAL at 06:07

## 2019-11-03 RX ADMIN — GABAPENTIN 100 MG: 100 CAPSULE ORAL at 18:25

## 2019-11-03 RX ADMIN — GABAPENTIN 100 MG: 100 CAPSULE ORAL at 06:07

## 2019-11-03 ASSESSMENT — ENCOUNTER SYMPTOMS
COUGH: 0
NERVOUS/ANXIOUS: 1
NAUSEA: 0
DIARRHEA: 0
CONSTIPATION: 1
FEVER: 0
DEPRESSION: 0
BACK PAIN: 0
BLOOD IN STOOL: 0
WEIGHT LOSS: 0
ORTHOPNEA: 0
HEMOPTYSIS: 0
SHORTNESS OF BREATH: 0
CHILLS: 0
MYALGIAS: 0
NECK PAIN: 0
VOMITING: 0
PALPITATIONS: 0
SPUTUM PRODUCTION: 0
ABDOMINAL PAIN: 0
ABDOMINAL PAIN: 1

## 2019-11-03 NOTE — PROGRESS NOTES
Oncology/Hematology Progress Note               Author: Nori Birch Date & Time created: 11/3/2019  1:20 PM   Diagnosis-likely hepatocellular carcinoma.  Interval History:  CT of the chest did not show any metastatic disease.  Alpha  fetoprotein level is high.  Hepatitis panel is negative.  Pain is well controlled.  Or MRI of the abdomen tomorrow  Review of Systems:  Review of Systems   Constitutional: Negative for chills, fever and weight loss.   HENT: Negative for hearing loss and tinnitus.    Respiratory: Negative for cough and shortness of breath.    Cardiovascular: Negative for chest pain, palpitations and orthopnea.   Gastrointestinal: Positive for constipation. Negative for abdominal pain.   Genitourinary: Negative for dysuria and hematuria.   Musculoskeletal: Negative for joint pain and myalgias.   Skin: Negative for itching and rash.   Psychiatric/Behavioral: Negative for depression. The patient is nervous/anxious.        Physical Exam:  Physical Exam   Constitutional: He is oriented to person, place, and time. He appears well-developed and well-nourished. No distress.   HENT:   Head: Normocephalic.   Mouth/Throat: No oropharyngeal exudate.   Eyes: Pupils are equal, round, and reactive to light. Conjunctivae are normal. Right eye exhibits no discharge. Left eye exhibits no discharge.   Abdominal: Soft. Bowel sounds are normal. He exhibits no distension. There is no tenderness. There is no rebound.   Neurological: He is alert and oriented to person, place, and time.   Skin: Skin is warm and dry. No rash noted. He is not diaphoretic. No erythema. No pallor.   Psychiatric: He has a normal mood and affect.       Labs:          Recent Labs     10/31/19  1538 11/01/19  0242 11/02/19  0200   SODIUM 138 139 138   POTASSIUM 3.7 3.8 3.7   CHLORIDE 108 107 107   CO2 23 24 24   BUN 11 10 10   CREATININE 0.65 0.72 0.73   MAGNESIUM  --   --  1.9   PHOSPHORUS  --   --  3.5   CALCIUM 7.9* 8.0* 8.0*     Recent Labs      10/31/19  1538 19  0242 19  0200   ALTSGPT 13 12 9   ASTSGOT 17 18 17   ALKPHOSPHAT 118* 106* 95   TBILIRUBIN 0.8 0.7 0.4   LIPASE 10*  --   --    GLUCOSE 88 102* 125*     Recent Labs     10/31/19  1538 19  0242 19  1053 19  0200   RBC 3.79* 3.80*  --  3.52*   HEMOGLOBIN 12.7* 12.3*  --  11.7*   HEMATOCRIT 37.7* 38.2*  --  35.1*   PLATELETCT 109* 99*  --  101*   PROTHROMBTM  --   --  13.7  --    APTT  --   --  29.9  --    INR  --   --  1.03  --      Recent Labs     10/31/19  15319  0242 19  0200   WBC 5.6 4.9 4.7*   NEUTSPOLYS 67.20  --  66.10   LYMPHOCYTES 17.40*  --  17.30*   MONOCYTES 14.20*  --  13.70*   EOSINOPHILS 0.50  --  1.90   BASOPHILS 0.50  --  0.60   ASTSGOT 17 18 17   ALTSGPT 13 12 9   ALKPHOSPHAT 118* 106* 95   TBILIRUBIN 0.8 0.7 0.4     Recent Labs     10/31/19  15319  0242 19  0200   SODIUM 138 139 138   POTASSIUM 3.7 3.8 3.7   CHLORIDE 108 107 107   CO2 23 24 24   GLUCOSE 88 102* 125*   BUN 11 10 10   CREATININE 0.65 0.72 0.73   CALCIUM 7.9* 8.0* 8.0*     Hemodynamics:  Temp (24hrs), Av.7 °C (98 °F), Min:36.3 °C (97.4 °F), Max:37.2 °C (99 °F)  Temperature: 36.6 °C (97.9 °F)  Pulse  Av.7  Min: 64  Max: 114   Blood Pressure : 109/63     Respiratory:    Respiration: 18, Pulse Oximetry: 90 %, O2 Daily Delivery Respiratory : Room Air with O2 Available     Given By:: Mouthpiece, PEP/CPT Method: Flutter Valve, Work Of Breathing / Effort: Mild  RUL Breath Sounds: Clear, RML Breath Sounds: Clear, RLL Breath Sounds: Clear, ASHLEY Breath Sounds: Clear, LLL Breath Sounds: Clear  Fluids:    Intake/Output Summary (Last 24 hours) at 2019 1358  Last data filed at 2019 0356  Gross per 24 hour   Intake 240 ml   Output 600 ml   Net -360 ml     Weight: 95.9 kg (211 lb 6.7 oz)  GI/Nutrition:  Orders Placed This Encounter   Procedures   • Diet Order Regular     Standing Status:   Standing     Number of Occurrences:   1     Order Specific Question:    Diet:     Answer:   Regular [1]     Medical Decision Making, by Problem:  Active Hospital Problems    Diagnosis   • Mass in the abdomen [R19.00]   • Tobacco abuse [Z72.0]   • Wheezing [R06.2]   • Abnormal breath sounds [R06.89]   • Essential hypertension [I10]   • Thrombocytopenia (HCC) [D69.6]       Plan:  Liver mass-likely of hepatocellular cancer.  Alpha-fetoprotein levels is high.  MRI of the abdomen tomorrow.  Considering the diagnosis and the fact that the mass is very large, a bone scan was ordered.  Pain is well controlled.  Constipation- management per Dr. Tru Ramos.  I also discussed the case with Dr. Gtz who will see him tomorrow.  Patient will follow-up with me as an outpatient in the office.  Contact information is given to him.  Reconsult as needed.  Will sign off  Quality-Core Measures

## 2019-11-03 NOTE — PROGRESS NOTES
[facility] Medicine Daily Progress Note    Date of Service  11/3/2019    Chief Complaint  69 y.o. male admitted 10/31/2019 with RUQ pain and liver/pancreatic mass    Hospital Course   Patient is a 69-year-old male with history of hypertension, EtOH abuse, and tobacco abuse with approximately 30-year pack history who presented with right upper quadrant abdominal pain radiating to the right flank.  Duration was approximately 2 to 3 days.  He endorsed periodic night sweats but otherwise was negative for unintentional weight loss, anorexia, or jaundice.  CT scan of the abdomen and pelvis showed an 18 cm enhancing mass in the medial segment of the left lobe of the liver and anterior segment of the right lower liver extending from the dome to the inferior edge.  Oncology consultation ensued.       Interval Problem Update  No events overnight  VSS and WNL  Awaiting MRI abd w anesthesia  No labs today  AFP 3137  Elevated BNP  Wheezing resolved. Cheyanne helped his pain - which is much less today. He does feel anxious and restless - particularly given the length of his stay here.    Consultants/Specialty  Oncology    Code Status  Full    Disposition  To be determined    Review of Systems  Review of Systems   Constitutional: Negative for chills, fever, malaise/fatigue and weight loss.   Respiratory: Negative for cough, hemoptysis, sputum production and shortness of breath.    Cardiovascular: Negative for chest pain.   Gastrointestinal: Positive for abdominal pain and constipation. Negative for blood in stool, diarrhea, melena, nausea and vomiting.   Genitourinary: Negative for dysuria.   Musculoskeletal: Negative for back pain, myalgias and neck pain.   Skin: Negative for itching and rash.   All other systems reviewed and are negative.       Physical Exam  Temp:  [36.3 °C (97.4 °F)-37.2 °C (99 °F)] 36.3 °C (97.4 °F)  Pulse:  [64-99] 88  Resp:  [16-22] 16  BP: (110-136)/(51-78) 118/78  SpO2:  [90 %-99 %] 94 %    Physical Exam    Constitutional: He is oriented to person, place, and time. He appears well-developed and well-nourished. No distress.   HENT:   Head: Normocephalic and atraumatic.   Eyes: Pupils are equal, round, and reactive to light. EOM are normal.   Neck: Neck supple.   Cardiovascular: Normal rate, regular rhythm and normal heart sounds.   Pulmonary/Chest: Effort normal. No respiratory distress. He has no wheezes. He has no rales.   Abdominal: Soft. He exhibits no distension. There is tenderness in the right upper quadrant, epigastric area and periumbilical area.   Neurological: He is alert and oriented to person, place, and time. No cranial nerve deficit.   Skin: Skin is warm and dry.   Nursing note and vitals reviewed.      Fluids    Intake/Output Summary (Last 24 hours) at 11/3/2019 0654  Last data filed at 11/2/2019 1930  Gross per 24 hour   Intake 910 ml   Output --   Net 910 ml       Laboratory  Recent Labs     10/31/19  1538 11/01/19  0242 11/02/19  0200   WBC 5.6 4.9 4.7*   RBC 3.79* 3.80* 3.52*   HEMOGLOBIN 12.7* 12.3* 11.7*   HEMATOCRIT 37.7* 38.2* 35.1*   MCV 99.5* 100.5* 99.7*   MCH 33.5* 32.4 33.2*   MCHC 33.7 32.2* 33.3*   RDW 44.4 44.0 43.6   PLATELETCT 109* 99* 101*   MPV 8.3* 8.2* 8.1*     Recent Labs     10/31/19  1538 11/01/19  0242 11/02/19  0200   SODIUM 138 139 138   POTASSIUM 3.7 3.8 3.7   CHLORIDE 108 107 107   CO2 23 24 24   GLUCOSE 88 102* 125*   BUN 11 10 10   CREATININE 0.65 0.72 0.73   CALCIUM 7.9* 8.0* 8.0*     Recent Labs     11/01/19  1053   APTT 29.9   INR 1.03         Recent Labs     11/02/19  0200   TRIGLYCERIDE 60   HDL 38*   LDL 71       Imaging  CT-CHEST (THORAX) WITH   Final Result      Small right and trace left pleural effusions.   Atelectasis/possible mild edema or pneumonitis within the lower lobes.   No pulmonary consolidation or mass.   Large right hepatic lobe heterogeneously enhancing mass consistent with malignant neoplasm/hepatocellular carcinoma.   Hepatic steatosis.  Splenomegaly.            DX-CHEST-PORTABLE (1 VIEW)   Final Result      Elevated right hemidiaphragm and mild right lung base atelectasis similar to previous findings.      OUTSIDE IMAGES-DX CHEST   Final Result      US-FOREIGN FILM ULTRASOUND   Final Result      OUTSIDE IMAGES-CT ABDOMEN /PELVIS   Final Result      NM-BONE SCAN WHOLE BODY    (Results Pending)   MR-ABDOMEN-WITH & W/O    (Results Pending)        Assessment/Plan  Mass in the abdomen- (present on admission)  Assessment & Plan  Liver and pancreatic mass(concerning for malignancy)  CT scan: showed 18 cm enhancing mass in the medial segment of the left lobe of liver and anterior segment right lower liver extending from the dome of the liver to the inferior edge potentially representing metastatic disease from a 3 cm cystic tumor in the pancreatic tail potentially representing a mucinous cyst adenocarcinoma.  Metastatic disease of other origin or primary hepatocellular carcinoma or consideration.  Benign cystic lesions of the pancreatic tail such as pseudocyst or cyst adenoma are also consideration.    AFP high   normal  Oncology consultation  MRI liver - reordered w anesthesia  CT chest with contrast  Hepatitis panel    Radiculopathy  Assessment & Plan  Improved with low dose neurontin    Situational anxiety  Assessment & Plan  Xanax BID PRN    Constipation- (present on admission)  Assessment & Plan  Advance bowel regimen    Abnormal breath sounds- (present on admission)  Assessment & Plan  ProBNP  CXR  Echo    Wheezing- (present on admission)  Assessment & Plan  Likely a component of COPD  RT protocol  PRN nebs    Tobacco abuse  Assessment & Plan  Less than 10 minutes spent discussing tobacco cessation including strategies and medications with side effects    Thrombocytopenia (HCC)- (present on admission)  Assessment & Plan  Follow cbc in am    Essential hypertension- (present on admission)  Assessment & Plan  On losartan       VTE prophylaxis:  SCDs

## 2019-11-03 NOTE — CARE PLAN
Problem: Bronchoconstriction:  Goal: Improve in air movement and diminished wheezing  Outcome: PROGRESSING SLOWER THAN EXPECTED   Duoneb QID. Patient presents with cough and expiratory wheezing. Patient experiencing difficulty clearing secretions.    Problem: Bronchopulmonary Hygiene:  Goal: Increase mobilization of retained secretions  Outcome: PROGRESSING AS EXPECTED   Flutter QID

## 2019-11-03 NOTE — ASSESSMENT & PLAN NOTE
Controlled, continue Xanax BID PRN  Reports worsened with difficulty sleeping due to noisy roommate-try to accommodate patient and move him to a different room

## 2019-11-03 NOTE — CARE PLAN
Problem: Pain Management  Goal: Pain level will decrease to patient's comfort goal  Outcome: PROGRESSING AS EXPECTED     Problem: Bowel/Gastric:  Goal: Normal bowel function is maintained or improved  Outcome: PROGRESSING SLOWER THAN EXPECTED  Note:   Still no BM since Miralax was given 11/2, will give MOM today to facilitate BM.

## 2019-11-04 ENCOUNTER — APPOINTMENT (OUTPATIENT)
Dept: CARDIOLOGY | Facility: MEDICAL CENTER | Age: 69
DRG: 988 | End: 2019-11-04
Attending: NURSE PRACTITIONER
Payer: MEDICARE

## 2019-11-04 PROBLEM — R16.0 LIVER MASS, RIGHT LOBE: Status: ACTIVE | Noted: 2019-10-31

## 2019-11-04 LAB
LV EJECT FRACT  99904: 60
LV EJECT FRACT MOD 4C 99902: 54.01

## 2019-11-04 PROCEDURE — 700117 HCHG RX CONTRAST REV CODE 255: Performed by: NURSE PRACTITIONER

## 2019-11-04 PROCEDURE — 700102 HCHG RX REV CODE 250 W/ 637 OVERRIDE(OP): Performed by: NURSE PRACTITIONER

## 2019-11-04 PROCEDURE — A9270 NON-COVERED ITEM OR SERVICE: HCPCS | Performed by: INTERNAL MEDICINE

## 2019-11-04 PROCEDURE — 93306 TTE W/DOPPLER COMPLETE: CPT

## 2019-11-04 PROCEDURE — 700102 HCHG RX REV CODE 250 W/ 637 OVERRIDE(OP): Performed by: INTERNAL MEDICINE

## 2019-11-04 PROCEDURE — 99223 1ST HOSP IP/OBS HIGH 75: CPT | Performed by: SURGERY

## 2019-11-04 PROCEDURE — 99233 SBSQ HOSP IP/OBS HIGH 50: CPT | Performed by: INTERNAL MEDICINE

## 2019-11-04 PROCEDURE — 93306 TTE W/DOPPLER COMPLETE: CPT | Mod: 26 | Performed by: INTERNAL MEDICINE

## 2019-11-04 PROCEDURE — 770006 HCHG ROOM/CARE - MED/SURG/GYN SEMI*

## 2019-11-04 PROCEDURE — A9270 NON-COVERED ITEM OR SERVICE: HCPCS | Performed by: NURSE PRACTITIONER

## 2019-11-04 RX ADMIN — SENNOSIDES AND DOCUSATE SODIUM 2 TABLET: 8.6; 5 TABLET ORAL at 08:54

## 2019-11-04 RX ADMIN — GABAPENTIN 100 MG: 100 CAPSULE ORAL at 17:56

## 2019-11-04 RX ADMIN — LOSARTAN POTASSIUM 50 MG: 50 TABLET, FILM COATED ORAL at 05:35

## 2019-11-04 RX ADMIN — GABAPENTIN 100 MG: 100 CAPSULE ORAL at 05:35

## 2019-11-04 RX ADMIN — GABAPENTIN 100 MG: 100 CAPSULE ORAL at 12:07

## 2019-11-04 RX ADMIN — HUMAN ALBUMIN MICROSPHERES AND PERFLUTREN 3 ML: 10; .22 INJECTION, SOLUTION INTRAVENOUS at 11:32

## 2019-11-04 RX ADMIN — SENNOSIDES AND DOCUSATE SODIUM 2 TABLET: 8.6; 5 TABLET ORAL at 17:56

## 2019-11-04 ASSESSMENT — ENCOUNTER SYMPTOMS
DIARRHEA: 0
BACK PAIN: 0
CHILLS: 0
SPUTUM PRODUCTION: 0
WEIGHT LOSS: 0
MYALGIAS: 0
HEMOPTYSIS: 0
COUGH: 0
ABDOMINAL PAIN: 1
SHORTNESS OF BREATH: 0
CONSTIPATION: 1
FEVER: 0
NECK PAIN: 0
BLOOD IN STOOL: 0
VOMITING: 0
NAUSEA: 0

## 2019-11-04 NOTE — PROGRESS NOTES
"0347: Patient A&Ox4. Educated and reinforced on use of IS. Pt refused and stated 'I don't need it\".   MOM 30 mL given per constipation management, as pt's LBM was 10/30.     1820: Pt reports he had a firm and soft bowel movement.  "

## 2019-11-04 NOTE — PROGRESS NOTES
[facility] Medicine Daily Progress Note    Date of Service  11/4/2019    Chief Complaint  69 y.o. male admitted 10/31/2019 with RUQ pain and liver/pancreatic mass    Hospital Course   Patient is a 69-year-old male with history of hypertension, EtOH abuse, and tobacco abuse with approximately 30-year pack history who presented with right upper quadrant abdominal pain radiating to the right flank.  Duration was approximately 2 to 3 days.  He endorsed periodic night sweats but otherwise was negative for unintentional weight loss, anorexia, or jaundice.  CT scan of the abdomen and pelvis showed an 18 cm enhancing mass in the medial segment of the left lobe of the liver and anterior segment of the right lower liver extending from the dome to the inferior edge.  Oncology consultation ensued.    Patient has been worked up for HCC. AFP high and Ca 19-9 normal. Hepatic and pancreatic surgeon evaluation suggests he would benefit from IR embolization of the hepatic parenchymal hematoma, planned for 11/5.      Interval Problem Update  No events overnight  Echo reviewed and normal  IR embolization planned for the morning    Consultants/Specialty  Oncology    Code Status  Full    Disposition  To be determined    Review of Systems  Review of Systems   Constitutional: Negative for chills, fever, malaise/fatigue and weight loss.   Respiratory: Negative for cough, hemoptysis, sputum production and shortness of breath.    Cardiovascular: Negative for chest pain.   Gastrointestinal: Positive for abdominal pain and constipation. Negative for blood in stool, diarrhea, melena, nausea and vomiting.   Genitourinary: Negative for dysuria.   Musculoskeletal: Negative for back pain, myalgias and neck pain.   Skin: Negative for itching and rash.   All other systems reviewed and are negative.       Physical Exam  Temp:  [36.3 °C (97.4 °F)-36.9 °C (98.4 °F)] 36.7 °C (98.1 °F)  Pulse:  [78-88] 81  Resp:  [14-17] 14  BP: (122-134)/(63-77)  127/77  SpO2:  [91 %-95 %] 95 %    Physical Exam   Constitutional: He is oriented to person, place, and time. He appears well-developed and well-nourished. No distress.   HENT:   Head: Normocephalic and atraumatic.   Eyes: Pupils are equal, round, and reactive to light. EOM are normal.   Neck: Neck supple.   Cardiovascular: Normal rate, regular rhythm and normal heart sounds.   Pulmonary/Chest: Effort normal. No respiratory distress. He has no wheezes. He has no rales.   Abdominal: Soft. He exhibits no distension. There is tenderness in the right upper quadrant.   Neurological: He is alert and oriented to person, place, and time. No cranial nerve deficit.   Skin: Skin is warm and dry.   Nursing note and vitals reviewed.      Fluids  No intake or output data in the 24 hours ending 11/04/19 1426    Laboratory  Recent Labs     11/02/19  0200   WBC 4.7*   RBC 3.52*   HEMOGLOBIN 11.7*   HEMATOCRIT 35.1*   MCV 99.7*   MCH 33.2*   MCHC 33.3*   RDW 43.6   PLATELETCT 101*   MPV 8.1*     Recent Labs     11/02/19  0200   SODIUM 138   POTASSIUM 3.7   CHLORIDE 107   CO2 24   GLUCOSE 125*   BUN 10   CREATININE 0.73   CALCIUM 8.0*             Recent Labs     11/02/19  0200   TRIGLYCERIDE 60   HDL 38*   LDL 71       Imaging  EC-ECHOCARDIOGRAM COMPLETE W/ CONT   Final Result      CT-CHEST (THORAX) WITH   Final Result      Small right and trace left pleural effusions.   Atelectasis/possible mild edema or pneumonitis within the lower lobes.   No pulmonary consolidation or mass.   Large right hepatic lobe heterogeneously enhancing mass consistent with malignant neoplasm/hepatocellular carcinoma.   Hepatic steatosis. Splenomegaly.            DX-CHEST-PORTABLE (1 VIEW)   Final Result      Elevated right hemidiaphragm and mild right lung base atelectasis similar to previous findings.      OUTSIDE IMAGES-DX CHEST   Final Result      US-FOREIGN FILM ULTRASOUND   Final Result      OUTSIDE IMAGES-CT ABDOMEN /PELVIS   Final Result       MR-ABDOMEN-WITH & W/O    (Results Pending)   IR-CONSULT AND TREAT    (Results Pending)        Assessment/Plan  Mass in the abdomen- (present on admission)  Assessment & Plan  Liver and pancreatic mass(concerning for malignancy)  CT scan: showed 18 cm enhancing mass in the medial segment of the left lobe of liver and anterior segment right lower liver extending from the dome of the liver to the inferior edge potentially representing metastatic disease from a 3 cm cystic tumor in the pancreatic tail potentially representing a mucinous cyst adenocarcinoma.  Metastatic disease of other origin or primary hepatocellular carcinoma or consideration.  Benign cystic lesions of the pancreatic tail such as pseudocyst or cyst adenoma are also consideration.    AFP high   normal  Oncology consultation  MRI liver - reordered w anesthesia  CT chest with contrast  Hepatitis panel  NPO MN 11/4 for IR embolization 11/5    Radiculopathy  Assessment & Plan  Improved with low dose neurontin    Situational anxiety  Assessment & Plan  Xanax BID PRN    Constipation- (present on admission)  Assessment & Plan  Advance bowel regimen    Abnormal breath sounds- (present on admission)  Assessment & Plan  ProBNP  CXR   Echo -ok    Wheezing- (present on admission)  Assessment & Plan  Likely a component of COPD  RT protocol  PRN nebs    Tobacco abuse  Assessment & Plan  Less than 10 minutes spent discussing tobacco cessation including strategies and medications with side effects    Thrombocytopenia (HCC)- (present on admission)  Assessment & Plan  Follow cbc in am    Essential hypertension- (present on admission)  Assessment & Plan  On losartan       VTE prophylaxis: SCDs

## 2019-11-04 NOTE — DISCHARGE PLANNING
Anticipated Discharge Disposition: Home     Action: Discussed pt in IDT rounds. Per MD, pt to be NPO at midnight for IR embolization 11/5. Pt also in need of a bone scan and MRI. Pt to follow up with Dr. Gtz outpt.     Barriers to Discharge: medical clearance: IR embolization, bone scan, MRI    Plan: pending clinical course     Length of Stay: 4

## 2019-11-04 NOTE — PROGRESS NOTES
Assumed care of patient at change of shift.  Patient (pt) A&Ox4, laying in bed, on room air. Pt reported pain 5/10 but declines any medication. Discussed non-pharmacological interventions- positioning, rest, distraction. Pt sleeping comfortably. No other needs at this time.

## 2019-11-04 NOTE — CARE PLAN
Problem: Safety  Goal: Will remain free from falls  Outcome: PROGRESSING AS EXPECTED     Problem: Bowel/Gastric:  Goal: Normal bowel function is maintained or improved  Outcome: PROGRESSING AS EXPECTED  Note:   Had BM today 11/3

## 2019-11-04 NOTE — CONSULTS
DATE OF SERVICE:  11/04/2019    REQUESTING PHYSICIANS:  Dr. Birch and Dr. Calixto.    PROBLEM LIST:    1) mass in the liver  2) HCC,   3) thrombocytopenia  4) essential hypertension  5) history of alcohol abuse  6) early cirrhosis  7) right upper quadrant abdominal pain  8) ruptured liver tumor.    HISTORY OF PRESENT ILLNESS:  The patient is a 69-year-old male patient who was   recently admitted to the hospital under Dr. Ott for a liver mass and a   questionable pancreatic mass.  The patient also suffers from borderline   diabetes, was transferred to [facility] due to   what was concerning for this large liver tumor.  In any event, the patient   was seen last night.  He was seen on 10/31/2019 at [facility] for severe   right upper quadrant pain.  The patient immediately underwent a CT scan, and   at that time, they noted the patient had a large tumor in the right upper   quadrant.  He ended up transferred to [facility] due to   this large tumor.  As of note, the patient complained that he was sleeping   fine, he has had no pain whatsoever and this had awakened him from sleep.  The   tumor at its greatest dimension measures approximately 17-18 cm and extends   from the right lobe into the medial segment of the left lobe and abuts both   the right and left portal pedicle.  I have personally reviewed the imaging   from 10/31 as well as his chest imaging from a CT scan on 11/01/2019, which   reveals that the tumor is present again involving the right lobe and portion   of the medial segment of the left lobe all the way to the falciform ligament.    It also seems to abut and/or involve the area of the bifurcation of the   denise.  The patient also had a chest CT, which I have personally reviewed   during this visit with contrast.  The patient does have a cystic lesion in the   tail of his pancreas, could be splenosis versus an actual tumor, but it is   unclear,  we do not have any biopsies of this mass.  The mass in the tail of   pancreas also is well demarcated, but the size is identical to what was seen   in [facility].  In any event, the patient has a history of heavy drinking   several 6 packs a day with some shots of vodka, but stopped over the last 3   years.  His CT scan does not show any obvious cirrhosis, but he does have what   appears to be mild cytopenia and enlarged spleen and the fact that his AFP is   in the 3000s, this is consistent with ruptured hepatoma that most likely has   some underlying cirrhosis.  The patient's CA 19-9 was 9.3, but his AFP was   elevated at 3137.    PAST MEDICAL HISTORY:  Hypertension, borderline diabetes, dyslipidemia,   history of heavy drinking over the last 3 years.    PAST SURGICAL HISTORY:  He has had a right hand surgery including bone graft,   ORIF, cataract surgery, parathyroidectomy in 2016.    SOCIAL HISTORY:  He is retired.  He is a smoker, less than a pack a day, but   he is a social drinker.   and lives in [place].    ALLERGIES:  HE HAS ALLERGY TO SULFA.    FAMILY HISTORY:  None pertinent to this.  He denies any history of malignant   liver tumors.  He denies any upper or lower GI bleeds.    REVIEW OF SYSTEMS:  He states he still has pain in the right upper quadrant   and has difficulty taking a deep breath, but he denies any hematochezia,   melanotic stools or hemoptysis.  He denies any weight loss.  He denies any   shortness of breath at this time, but just pain on deep breath.  The rest of   the review of systems are all negative.    PHYSICAL EXAMINATION:  VITAL SIGNS:  He is afebrile.  Vital signs are stable.  His blood pressure is   127/77, his pulse is 81 with a respiratory rate of 14.  He has normal sats on   room air.  HEENT:  Extraocular movements are intact.  No sign of jaundice.  NECK:  Soft and supple.  There is no cervical, supraclavicular, or occipital   lymphadenopathy.  LUNGS:  Clear to  auscultation, good inspiratory effort.  CARDIOVASCULAR:  Regular rate and rhythm.  ABDOMEN:  Soft, distended, but some discomfort in the right upper quadrant.    No obvious scars or flank hematomas.  EXTREMITIES:  No clubbing, cyanosis or edema.  RECTAL:  Deferred, but he does state he has hemorrhoids at times.    LABORATORY DATA:  His H and H from the 2nd shows hemoglobin of 11.7 and 35.1,   with platelet count of 101.  It was down to 99 the day before.  His LFTs show   slight bump in his total protein, but other than that, essentially he had a   little bump in his alkaline phosphatase consistent with some parenchymal   injury.  His coags show an INR of 1.03.    PROBLEM LIST:    1) mass in the liver  2) HCC,   3) thrombocytopenia  4) essential hypertension  5) history of alcohol abuse  6) early cirrhosis  7) right upper quadrant abdominal pain  8) ruptured liver tumor    ASSESSMENT AND PLAN:  Based on my findings and review of all the imaging and   the patient's story, my concern is he has actually had a ruptured hepatoma   with large hematoma within it.  It was contained within the liver parenchyma   and has not ruptured.  There was no free fluid on the imaging.  My concern is   that this may continue to rupture and thus I am recommending the patient   undergo a chemoembolization versus bland embolization.  This will be decided   by interventional radiology and have it done while in the hospital.  Also, the   patient will go for an MRI.  I suspect the patient's original tumor was much   smaller than what is present at this time and that the massive expansion and   the rapid expansion was causing him pain.  Presently, he is not a surgical   candidate from a perspective of resection, but the patient might benefit from   down staging embolization using chemo and/or bland embolization, reassess him,   down stage him and then proceed with possible laparoscopic ablation if he   does well.  The patient also will need a  bone scan.  He has had a chest CT   already, which does show no evidence of adenopathy.  There is slight   right-sided pleural effusion, which is not unusual for what happened recently;   some atelectasis, no mass or consolidation, no sign of metastatic disease.    Again, I gave the patient my card and he can follow up with me as an   outpatient, but I did order the TACE/bland embolization to be done in the   hospital to stop any future rupturing or expansion.       ____________________________________     MD RODERICK Mittal / NTS    DD:  11/04/2019 12:25:11  DT:  11/04/2019 12:43:42    D#:  [id number]  Job#:  [id number]    cc: Nori Birch MD

## 2019-11-04 NOTE — CARE PLAN
Problem: Communication  Goal: The ability to communicate needs accurately and effectively will improve  Outcome: PROGRESSING AS EXPECTED  Note:   Patient understands when to call for assistance and symptoms to report to nursing.     Problem: Bowel/Gastric:  Goal: Normal bowel function is maintained or improved  Outcome: PROGRESSING AS EXPECTED  Note:   Last bowel movement 11/3/19.  On scheduled medications.     Problem: Pain Management  Goal: Pain level will decrease to patient's comfort goal  Outcome: PROGRESSING AS EXPECTED  Note:   Patient understand pain rating scale and asks for PRN medication appropriately.

## 2019-11-05 ENCOUNTER — APPOINTMENT (OUTPATIENT)
Dept: RADIOLOGY | Facility: MEDICAL CENTER | Age: 69
DRG: 988 | End: 2019-11-05
Attending: SURGERY
Payer: MEDICARE

## 2019-11-05 ENCOUNTER — ANESTHESIA (OUTPATIENT)
Dept: RADIOLOGY | Facility: MEDICAL CENTER | Age: 69
DRG: 988 | End: 2019-11-05
Payer: MEDICARE

## 2019-11-05 ENCOUNTER — ANESTHESIA EVENT (OUTPATIENT)
Dept: RADIOLOGY | Facility: MEDICAL CENTER | Age: 69
DRG: 988 | End: 2019-11-05
Payer: MEDICARE

## 2019-11-05 ENCOUNTER — APPOINTMENT (OUTPATIENT)
Dept: RADIOLOGY | Facility: MEDICAL CENTER | Age: 69
DRG: 988 | End: 2019-11-05
Attending: NURSE PRACTITIONER
Payer: MEDICARE

## 2019-11-05 PROCEDURE — 04L33DZ OCCLUSION OF HEPATIC ARTERY WITH INTRALUMINAL DEVICE, PERCUTANEOUS APPROACH: ICD-10-PCS | Performed by: RADIOLOGY

## 2019-11-05 PROCEDURE — 700111 HCHG RX REV CODE 636 W/ 250 OVERRIDE (IP)

## 2019-11-05 PROCEDURE — 700111 HCHG RX REV CODE 636 W/ 250 OVERRIDE (IP): Performed by: INTERNAL MEDICINE

## 2019-11-05 PROCEDURE — 160002 HCHG RECOVERY MINUTES (STAT)

## 2019-11-05 PROCEDURE — A9576 INJ PROHANCE MULTIPACK: HCPCS | Performed by: NURSE PRACTITIONER

## 2019-11-05 PROCEDURE — 700102 HCHG RX REV CODE 250 W/ 637 OVERRIDE(OP): Performed by: INTERNAL MEDICINE

## 2019-11-05 PROCEDURE — A9270 NON-COVERED ITEM OR SERVICE: HCPCS | Performed by: NURSE PRACTITIONER

## 2019-11-05 PROCEDURE — B4121ZZ FLUOROSCOPY OF HEPATIC ARTERY USING LOW OSMOLAR CONTRAST: ICD-10-PCS | Performed by: RADIOLOGY

## 2019-11-05 PROCEDURE — 770006 HCHG ROOM/CARE - MED/SURG/GYN SEMI*

## 2019-11-05 PROCEDURE — 700105 HCHG RX REV CODE 258: Performed by: RADIOLOGY

## 2019-11-05 PROCEDURE — 4410588 MR-ABDOMEN-WITH & W/O

## 2019-11-05 PROCEDURE — 700111 HCHG RX REV CODE 636 W/ 250 OVERRIDE (IP): Performed by: RADIOLOGY

## 2019-11-05 PROCEDURE — A9270 NON-COVERED ITEM OR SERVICE: HCPCS | Performed by: INTERNAL MEDICINE

## 2019-11-05 PROCEDURE — 700105 HCHG RX REV CODE 258: Performed by: ANESTHESIOLOGY

## 2019-11-05 PROCEDURE — 700111 HCHG RX REV CODE 636 W/ 250 OVERRIDE (IP): Performed by: ANESTHESIOLOGY

## 2019-11-05 PROCEDURE — 700117 HCHG RX CONTRAST REV CODE 255: Performed by: NURSE PRACTITIONER

## 2019-11-05 PROCEDURE — 99232 SBSQ HOSP IP/OBS MODERATE 35: CPT | Performed by: HOSPITALIST

## 2019-11-05 PROCEDURE — 700102 HCHG RX REV CODE 250 W/ 637 OVERRIDE(OP): Performed by: NURSE PRACTITIONER

## 2019-11-05 PROCEDURE — 99153 MOD SED SAME PHYS/QHP EA: CPT

## 2019-11-05 RX ORDER — ONDANSETRON 2 MG/ML
INJECTION INTRAMUSCULAR; INTRAVENOUS
Status: COMPLETED
Start: 2019-11-05 | End: 2019-11-05

## 2019-11-05 RX ORDER — ONDANSETRON 2 MG/ML
4 INJECTION INTRAMUSCULAR; INTRAVENOUS PRN
Status: ACTIVE | OUTPATIENT
Start: 2019-11-05 | End: 2019-11-05

## 2019-11-05 RX ORDER — SODIUM CHLORIDE, SODIUM LACTATE, POTASSIUM CHLORIDE, CALCIUM CHLORIDE 600; 310; 30; 20 MG/100ML; MG/100ML; MG/100ML; MG/100ML
INJECTION, SOLUTION INTRAVENOUS
Status: DISCONTINUED | OUTPATIENT
Start: 2019-11-05 | End: 2019-11-05 | Stop reason: SURG

## 2019-11-05 RX ORDER — MIDAZOLAM HYDROCHLORIDE 1 MG/ML
.5-2 INJECTION INTRAMUSCULAR; INTRAVENOUS PRN
Status: ACTIVE | OUTPATIENT
Start: 2019-11-05 | End: 2019-11-05

## 2019-11-05 RX ORDER — SODIUM CHLORIDE, SODIUM LACTATE, POTASSIUM CHLORIDE, CALCIUM CHLORIDE 600; 310; 30; 20 MG/100ML; MG/100ML; MG/100ML; MG/100ML
1000 INJECTION, SOLUTION INTRAVENOUS CONTINUOUS
Status: DISCONTINUED | OUTPATIENT
Start: 2019-11-05 | End: 2019-11-05 | Stop reason: HOSPADM

## 2019-11-05 RX ORDER — ONDANSETRON 2 MG/ML
4 INJECTION INTRAMUSCULAR; INTRAVENOUS
Status: DISCONTINUED | OUTPATIENT
Start: 2019-11-05 | End: 2019-11-05 | Stop reason: HOSPADM

## 2019-11-05 RX ORDER — MIDAZOLAM HYDROCHLORIDE 1 MG/ML
INJECTION INTRAMUSCULAR; INTRAVENOUS
Status: COMPLETED
Start: 2019-11-05 | End: 2019-11-05

## 2019-11-05 RX ORDER — DIPHENHYDRAMINE HYDROCHLORIDE 50 MG/ML
12.5 INJECTION INTRAMUSCULAR; INTRAVENOUS
Status: DISCONTINUED | OUTPATIENT
Start: 2019-11-05 | End: 2019-11-05 | Stop reason: HOSPADM

## 2019-11-05 RX ORDER — HYDRALAZINE HYDROCHLORIDE 20 MG/ML
5 INJECTION INTRAMUSCULAR; INTRAVENOUS
Status: DISCONTINUED | OUTPATIENT
Start: 2019-11-05 | End: 2019-11-05 | Stop reason: HOSPADM

## 2019-11-05 RX ORDER — SODIUM CHLORIDE 9 MG/ML
500 INJECTION, SOLUTION INTRAVENOUS
Status: ACTIVE | OUTPATIENT
Start: 2019-11-05 | End: 2019-11-05

## 2019-11-05 RX ORDER — MEPERIDINE HYDROCHLORIDE 25 MG/ML
12.5 INJECTION INTRAMUSCULAR; INTRAVENOUS; SUBCUTANEOUS
Status: DISCONTINUED | OUTPATIENT
Start: 2019-11-05 | End: 2019-11-05 | Stop reason: HOSPADM

## 2019-11-05 RX ADMIN — LOSARTAN POTASSIUM 50 MG: 50 TABLET, FILM COATED ORAL at 05:04

## 2019-11-05 RX ADMIN — FENTANYL CITRATE 25 MCG: 50 INJECTION, SOLUTION INTRAMUSCULAR; INTRAVENOUS at 17:10

## 2019-11-05 RX ADMIN — ONDANSETRON 4 MG: 2 INJECTION INTRAMUSCULAR; INTRAVENOUS at 17:31

## 2019-11-05 RX ADMIN — MIDAZOLAM 1 MG: 1 INJECTION INTRAMUSCULAR; INTRAVENOUS at 17:08

## 2019-11-05 RX ADMIN — MIDAZOLAM HYDROCHLORIDE 1 MG: 1 INJECTION, SOLUTION INTRAMUSCULAR; INTRAVENOUS at 16:36

## 2019-11-05 RX ADMIN — ONDANSETRON 4 MG: 2 INJECTION INTRAMUSCULAR; INTRAVENOUS at 13:40

## 2019-11-05 RX ADMIN — MIDAZOLAM 1 MG: 1 INJECTION INTRAMUSCULAR; INTRAVENOUS at 16:36

## 2019-11-05 RX ADMIN — PIPERACILLIN AND TAZOBACTAM 4.5 G: 4; .5 INJECTION, POWDER, LYOPHILIZED, FOR SOLUTION INTRAVENOUS; PARENTERAL at 16:45

## 2019-11-05 RX ADMIN — FENTANYL CITRATE 25 MCG: 50 INJECTION, SOLUTION INTRAMUSCULAR; INTRAVENOUS at 17:19

## 2019-11-05 RX ADMIN — FENTANYL CITRATE 50 MCG: 50 INJECTION, SOLUTION INTRAMUSCULAR; INTRAVENOUS at 13:04

## 2019-11-05 RX ADMIN — MIDAZOLAM 1 MG: 1 INJECTION INTRAMUSCULAR; INTRAVENOUS at 16:45

## 2019-11-05 RX ADMIN — MIDAZOLAM HYDROCHLORIDE 1 MG: 1 INJECTION, SOLUTION INTRAMUSCULAR; INTRAVENOUS at 17:08

## 2019-11-05 RX ADMIN — GABAPENTIN 100 MG: 100 CAPSULE ORAL at 18:30

## 2019-11-05 RX ADMIN — MORPHINE SULFATE 2 MG: 4 INJECTION INTRAVENOUS at 19:48

## 2019-11-05 RX ADMIN — FENTANYL CITRATE 50 MCG: 50 INJECTION, SOLUTION INTRAMUSCULAR; INTRAVENOUS at 16:39

## 2019-11-05 RX ADMIN — FENTANYL CITRATE 50 MCG: 50 INJECTION, SOLUTION INTRAMUSCULAR; INTRAVENOUS at 12:59

## 2019-11-05 RX ADMIN — GABAPENTIN 100 MG: 100 CAPSULE ORAL at 05:04

## 2019-11-05 RX ADMIN — OXYCODONE HYDROCHLORIDE 5 MG: 5 TABLET ORAL at 22:46

## 2019-11-05 RX ADMIN — OXYCODONE HYDROCHLORIDE 5 MG: 5 TABLET ORAL at 18:30

## 2019-11-05 RX ADMIN — LIDOCAINE HYDROCHLORIDE 80 MG: 20 INJECTION, SOLUTION INFILTRATION; PERINEURAL at 12:21

## 2019-11-05 RX ADMIN — GADOTERIDOL 20 ML: 279.3 INJECTION, SOLUTION INTRAVENOUS at 13:31

## 2019-11-05 RX ADMIN — SODIUM CHLORIDE, POTASSIUM CHLORIDE, SODIUM LACTATE AND CALCIUM CHLORIDE: 600; 310; 30; 20 INJECTION, SOLUTION INTRAVENOUS at 12:17

## 2019-11-05 RX ADMIN — FENTANYL CITRATE 50 MCG: 50 INJECTION, SOLUTION INTRAMUSCULAR; INTRAVENOUS at 16:51

## 2019-11-05 RX ADMIN — PROPOFOL 150 MG: 10 INJECTION, EMULSION INTRAVENOUS at 12:21

## 2019-11-05 ASSESSMENT — ENCOUNTER SYMPTOMS
WHEEZING: 0
TREMORS: 0
SENSORY CHANGE: 0
COUGH: 0
BACK PAIN: 0
STRIDOR: 0
VOMITING: 0
DIAPHORESIS: 0
FLANK PAIN: 0
NECK PAIN: 0
ABDOMINAL PAIN: 1
WEAKNESS: 0
EYE REDNESS: 0
FEVER: 0
DIARRHEA: 0
PALPITATIONS: 0
SPEECH CHANGE: 0
CHILLS: 0
EYE DISCHARGE: 0
SHORTNESS OF BREATH: 0
HALLUCINATIONS: 0
FOCAL WEAKNESS: 0

## 2019-11-05 ASSESSMENT — PAIN SCALES - GENERAL: PAIN_LEVEL: 0

## 2019-11-05 ASSESSMENT — LIFESTYLE VARIABLES: SUBSTANCE_ABUSE: 0

## 2019-11-05 NOTE — PROGRESS NOTES
Assumed care of patient at change of shift.  Patient (pt) A&Ox4,, on room air, sitting in bed.  Pt reports pain of 5/10 but declines any intervention. Discussed tomorrows procedure and need for NPO at midnight. Pt sleeping comfortably at this time.

## 2019-11-05 NOTE — ANESTHESIA PREPROCEDURE EVALUATION
Relevant Problems   CARDIAC   (+) Essential hypertension      ENDO   (+) Type 2 diabetes mellitus without complication, without long-term current use of insulin (HCC)      Other   (+) Constipation   (+) Liver mass, right lobe   (+) Parathyroid adenoma   (+) Radiculopathy   (+) Situational anxiety   (+) Thrombocytopenia (HCC)   (+) Tobacco abuse   (+) Wheezing       Physical Exam    Airway   Mallampati: II  TM distance: >3 FB  Neck ROM: full       Cardiovascular - normal exam  Rhythm: regular  Rate: normal  (-) murmur     Dental - normal exam  Comments: Missing dentition w/ exposed roots noted    Very poor dentition   Pulmonary - normal exam  Breath sounds clear to auscultation     Abdominal    Neurological - normal exam               Anesthesia Plan    ASA 2       Plan - general       Airway plan will be LMA        Induction: intravenous    Postoperative Plan: Postoperative administration of opioids is intended.    Pertinent diagnostic labs and testing reviewed    Informed Consent:    Anesthetic plan and risks discussed with patient.    Use of blood products discussed with: patient whom consented to blood products.

## 2019-11-05 NOTE — OR NURSING
1331: received to PACU via MRI gurney from MRI. Awake. Respirations even and unlabored. Denies pain or nausea.  1400: report called to Evelyn HOFFMAN. Patient comfortable and stable. Denies pain or nausea.  1427: transported via MRI gurney to S 626-2 by transport. Stable.

## 2019-11-05 NOTE — ANESTHESIA PROCEDURE NOTES
Airway  Date/Time: 11/5/2019 12:21 PM  Performed by: Soha Madden M.D.  Authorized by: Soha Madden M.D.     Location:  OR (MRI suite)  Urgency:  Elective  Difficult Airway: No    Indications for Airway Management:  Anesthesia  Spontaneous Ventilation: absent    Sedation Level:  Deep  Preoxygenated: Yes    Final Airway Type:  Supraglottic airway  Final Supraglottic Airway:  Standard LMA  SGA Size:  4  Number of Attempts at Approach:  1  Number of Other Approaches Attempted:  0

## 2019-11-05 NOTE — ANESTHESIA POSTPROCEDURE EVALUATION
Patient: Deangelo Bella    Procedure Summary     Date:  11/05/19 Room / Location:  [facility]    Anesthesia Start:  1217 Anesthesia Stop:  1334    Procedure:  MR-ABDOMEN-WITH & W/O Diagnosis:      Scheduled Providers:   Responsible Provider:  Soha Madden M.D.    Anesthesia Type:  general ASA Status:  2          Final Anesthesia Type: general  Last vitals  BP   Blood Pressure : 106/76    Temp   (!) 35.4 °C (95.8 °F)(Informed RN)    Pulse   Pulse: 89   Resp   20    SpO2   96 %      Anesthesia Post Evaluation    Patient location during evaluation: PACU  Patient participation: complete - patient participated  Level of consciousness: awake and alert  Pain score: 0    Airway patency: patent  Anesthetic complications: no  Cardiovascular status: hemodynamically stable  Respiratory status: acceptable  Hydration status: euvolemic    PONV: none           Nurse Pain Score: 0 (NPRS)

## 2019-11-05 NOTE — PROGRESS NOTES
MRI NURSING NOTES:  MRI/ANESTH sched today @ Noon.  S/w bedside RN, Evelyn @ 2901.  Evelyn confirms pt has been NPO since MN, a/o x4 to sign consent, functioning IV to R wrist, on RA, & needs no assistance @ baseline.  Evelyn sanchez.  Request for Fabrizio MRI tech, to have pt come down to inpt MRI about 10 mins early.  Fabrizio sanchez.

## 2019-11-05 NOTE — PROGRESS NOTES
Hospital Medicine Daily Progress Note    Date of Service  11/5/2019    Chief Complaint  69 y.o. male admitted 10/31/2019 with right upper quadrant pain.    Hospital Course    History of hypertension, alcohol abuse, tobacco abuse admitted with findings of 18 similar enhancing mass medial segment of left lobe liver.  Alpha-fetoprotein elevated.  Findings consistent with hepatoma with suspected hemorrhage .  surgery consulted.    Interval Problem Update    Plan for MRI to assess hepatocellular carcinoma with IR embolization.  Pain better controlled.     Consultants/Specialty     , surgery  Dr. Moore, IR    Code Status    Full code    Disposition    Anticipate DC home when stable    Review of Systems  Review of Systems   Constitutional: Positive for malaise/fatigue. Negative for chills, diaphoresis and fever.   HENT: Negative for congestion.    Eyes: Negative for discharge and redness.   Respiratory: Negative for cough, shortness of breath, wheezing and stridor.    Cardiovascular: Negative for chest pain, palpitations and leg swelling.   Gastrointestinal: Positive for abdominal pain. Negative for diarrhea and vomiting.   Genitourinary: Negative for flank pain and hematuria.   Musculoskeletal: Negative for back pain, joint pain and neck pain.   Neurological: Negative for tremors, sensory change, speech change, focal weakness and weakness.   Psychiatric/Behavioral: Negative for hallucinations and substance abuse.        Physical Exam  Temp:  [35.4 °C (95.8 °F)-37.2 °C (99 °F)] 35.4 °C (95.8 °F)  Pulse:  [] 107  Resp:  [16-17] 17  BP: (116-147)/(60-71) 116/60    Physical Exam  Constitutional:       General: He is not in acute distress.     Appearance: Normal appearance. He is not diaphoretic.   HENT:      Head: Normocephalic and atraumatic.      Right Ear: External ear normal.      Left Ear: External ear normal.      Nose: Nose normal.   Eyes:      General: No scleral icterus.      Conjunctiva/sclera: Conjunctivae normal.      Pupils: Pupils are equal, round, and reactive to light.   Neck:      Musculoskeletal: Normal range of motion. No neck rigidity.   Cardiovascular:      Rate and Rhythm: Normal rate and regular rhythm.      Heart sounds: No murmur.   Pulmonary:      Breath sounds: No stridor. No wheezing, rhonchi or rales.      Comments: Diminished breath sounds lung bases right and left  Abdominal:      Tenderness: There is tenderness (Right upper quadrant). There is guarding (Mild). There is no rebound.   Musculoskeletal:         General: No swelling.   Skin:     General: Skin is dry.   Neurological:      Mental Status: He is alert.         Fluids    Intake/Output Summary (Last 24 hours) at 11/5/2019 0841  Last data filed at 11/5/2019 0800  Gross per 24 hour   Intake 480 ml   Output --   Net 480 ml       Laboratory                        Imaging  MR-ABDOMEN-WITH & W/O   Final Result         1.  Large heterogeneously enhancing hepatic mass, likely representing primary hepatic neoplasm. Correlation with AFP would be helpful.      2.  Cystic mass in the pancreatic tail. No communication to the pancreatic duct or ductal dilatation identified. Findings may be related to prior pancreatitis.      3.  Diverticulosis.               EC-ECHOCARDIOGRAM COMPLETE W/ CONT   Final Result      CT-CHEST (THORAX) WITH   Final Result      Small right and trace left pleural effusions.   Atelectasis/possible mild edema or pneumonitis within the lower lobes.   No pulmonary consolidation or mass.   Large right hepatic lobe heterogeneously enhancing mass consistent with malignant neoplasm/hepatocellular carcinoma.   Hepatic steatosis. Splenomegaly.            DX-CHEST-PORTABLE (1 VIEW)   Final Result      Elevated right hemidiaphragm and mild right lung base atelectasis similar to previous findings.      OUTSIDE IMAGES-DX CHEST   Final Result      US-FOREIGN FILM ULTRASOUND   Final Result      OUTSIDE IMAGES-CT  ABDOMEN /PELVIS   Final Result      IR-CONSULT AND TREAT    (Results Pending)        Assessment/Plan  Liver mass, right lobe- (present on admission)  Assessment & Plan  Consistent with hepatocellular carcinoma.  Alpha-fetoprotein high.  Hepatitis panel negative.  Suspect hemorrhage into tumor causing pain/symptoms  Plan for hepatic MRI with IR embolization.  Once the patient is stable he may be a candidate for neoadjuvant treatment-to follow-up with oncology   Surgery input.      Radiculopathy  Assessment & Plan  Improved  Continue Neurontin-titrate up dose if uncontrolled symptom    Situational anxiety  Assessment & Plan  Controlled, continue Xanax BID PRN    Constipation- (present on admission)  Assessment & Plan  Resolved    Abnormal breath sounds- (present on admission)  Assessment & Plan  Follow-up chest x-ray with findings atelectasis on right.  Not hypoxic.  Encourage I-S  Abdominal pain control  Follow clinically    Wheezing- (present on admission)  Assessment & Plan  Currently resolved    Tobacco abuse  Assessment & Plan  Tobacco cessation counseling started.  emphasize stop smoking.    Thrombocytopenia (HCC)- (present on admission)  Assessment & Plan  Stable    Essential hypertension- (present on admission)  Assessment & Plan  On losartan       VTE prophylaxis: SCDs    Discussed with wife and multidisciplinary team plan of care.

## 2019-11-05 NOTE — ANESTHESIA TIME REPORT
Anesthesia Start and Stop Event Times     Date Time Event    11/5/2019 1200 Ready for Procedure     1217 Anesthesia Start     1334 Anesthesia Stop        Responsible Staff  11/05/19    Name Role Begin End    Soha Madden M.D. Anesth 1217 1334        Preop Diagnosis (Free Text):  Pre-op Diagnosis             Preop Diagnosis (Codes):    Post op Diagnosis  Liver mass  Severe Claustrophobia w/ previously failed MRI under sedation    Premium Reason  Non-Premium    Comments: MRI Abdomen

## 2019-11-05 NOTE — PROGRESS NOTES
Echo completed today.  Awaiting MRI with anesthesia tomorrow as well as IR embolization, scheduled for noon.  NPO at midnight.  VSS.  Pain tolerable per patient report.

## 2019-11-05 NOTE — CARE PLAN
Problem: Knowledge Deficit  Goal: Knowledge of disease process/condition, treatment plan, diagnostic tests, and medications will improve  Outcome: PROGRESSING AS EXPECTED  Note:   Patient understands plan of care for today.     Problem: Pain Management  Goal: Pain level will decrease to patient's comfort goal  Outcome: PROGRESSING AS EXPECTED  Note:   Mild pain.  Patient understands to call nursing if PRN medication is required.

## 2019-11-05 NOTE — CARE PLAN
Problem: Communication  Goal: The ability to communicate needs accurately and effectively will improve  Outcome: PROGRESSING AS EXPECTED  Note:   Pt questions about plan for tomorrow answered, pt able to use call light appropriately.     Problem: Safety  Goal: Will remain free from falls  Outcome: PROGRESSING AS EXPECTED  Note:   Bed is locked in lowest position, call light and bedside table are within reach, treaded slipper socks are on, and hourly rounding is in place.

## 2019-11-06 VITALS
HEART RATE: 93 BPM | OXYGEN SATURATION: 93 % | SYSTOLIC BLOOD PRESSURE: 130 MMHG | RESPIRATION RATE: 18 BRPM | WEIGHT: 211.42 LBS | DIASTOLIC BLOOD PRESSURE: 80 MMHG | BODY MASS INDEX: 29.6 KG/M2 | TEMPERATURE: 97.7 F | HEIGHT: 71 IN

## 2019-11-06 PROCEDURE — 700102 HCHG RX REV CODE 250 W/ 637 OVERRIDE(OP): Performed by: NURSE PRACTITIONER

## 2019-11-06 PROCEDURE — A9270 NON-COVERED ITEM OR SERVICE: HCPCS | Performed by: INTERNAL MEDICINE

## 2019-11-06 PROCEDURE — 700102 HCHG RX REV CODE 250 W/ 637 OVERRIDE(OP): Performed by: INTERNAL MEDICINE

## 2019-11-06 PROCEDURE — A9270 NON-COVERED ITEM OR SERVICE: HCPCS | Performed by: NURSE PRACTITIONER

## 2019-11-06 PROCEDURE — 700111 HCHG RX REV CODE 636 W/ 250 OVERRIDE (IP): Performed by: INTERNAL MEDICINE

## 2019-11-06 PROCEDURE — 99239 HOSP IP/OBS DSCHRG MGMT >30: CPT | Performed by: HOSPITALIST

## 2019-11-06 RX ORDER — DOCUSATE SODIUM 100 MG/1
100 CAPSULE, LIQUID FILLED ORAL 2 TIMES DAILY PRN
Qty: 30 CAP | Refills: 0 | Status: SHIPPED | OUTPATIENT
Start: 2019-11-06 | End: 2020-01-21

## 2019-11-06 RX ORDER — GABAPENTIN 100 MG/1
100 CAPSULE ORAL 3 TIMES DAILY
Qty: 90 CAP | Refills: 1 | Status: SHIPPED | OUTPATIENT
Start: 2019-11-06 | End: 2020-01-21

## 2019-11-06 RX ORDER — ONDANSETRON 4 MG/1
4 TABLET, ORALLY DISINTEGRATING ORAL EVERY 6 HOURS PRN
Qty: 20 TAB | Refills: 0 | Status: SHIPPED | OUTPATIENT
Start: 2019-11-06 | End: 2020-01-21

## 2019-11-06 RX ORDER — OXYCODONE HYDROCHLORIDE 5 MG/1
5 TABLET ORAL EVERY 6 HOURS PRN
Qty: 20 TAB | Refills: 0 | Status: SHIPPED | OUTPATIENT
Start: 2019-11-06 | End: 2019-11-13

## 2019-11-06 RX ADMIN — ONDANSETRON 4 MG: 2 INJECTION INTRAMUSCULAR; INTRAVENOUS at 00:58

## 2019-11-06 RX ADMIN — MORPHINE SULFATE 2 MG: 4 INJECTION INTRAVENOUS at 05:43

## 2019-11-06 RX ADMIN — GABAPENTIN 100 MG: 100 CAPSULE ORAL at 05:47

## 2019-11-06 RX ADMIN — LOSARTAN POTASSIUM 50 MG: 50 TABLET, FILM COATED ORAL at 05:47

## 2019-11-06 RX ADMIN — ONDANSETRON 4 MG: 2 INJECTION INTRAMUSCULAR; INTRAVENOUS at 05:17

## 2019-11-06 ASSESSMENT — ENCOUNTER SYMPTOMS
SHORTNESS OF BREATH: 0
FEVER: 0
DIAPHORESIS: 0
TREMORS: 0
FOCAL WEAKNESS: 0
NECK PAIN: 0
SPEECH CHANGE: 0
VOMITING: 0
WEAKNESS: 0
BACK PAIN: 0
SENSORY CHANGE: 0
STRIDOR: 0
PALPITATIONS: 0
DIARRHEA: 0
CHILLS: 0
COUGH: 0
ROS GI COMMENTS: IMPROVED
ABDOMINAL PAIN: 1

## 2019-11-06 NOTE — OR SURGEON
Immediate Post- Operative Note        PostOp Diagnosis: HCC RIGHT LOBE LIVER. POSSIBLE CONTAINED INTRA-TUMORAL HEMORRHAGE      Procedure(s): CELIAC ANGIO, MILAGROS ANGIO, RHA ANGIO, RHA EMBOSPHERE EMBOLIZATION    100-300 MICRON X 3 SYRINGES (10 ML EACH)    500-700 MICRON X 2 SYRINGES (10 ML EACH)    RHA ANGIO THRU EXISTING CATHETER CONTROL ANGIO X 5 (AFTER EACH ALIQUOT SPHERES)    FINAL MILAGROS ANGIO    R CFA ANGIO FOR ANGIOSEAL. 6F ANGIOSEAL PLACED WITH HEMOSTASIS.    US GUIDANCE FOR VASCULAR PUNCTURE R CFA      Estimated Blood Loss: Less than 20 ml (FLUSHES)        Complications: None            11/5/2019     5:29 PM     Cordell Moore

## 2019-11-06 NOTE — PROGRESS NOTES
Patient back from IR at 1800.  Right groin site with gauze dressing, c/d/i.  Neurovascular status WNL.

## 2019-11-06 NOTE — PROGRESS NOTES
Pt discharged off unit via wheelchair. PIV removed. Discharge instructions provided and questions answered.

## 2019-11-06 NOTE — DISCHARGE SUMMARY
Hospital Medicine Discharge Note     Admit Date:  10/31/2019       Discharge Date:   11/6/2019    Attending Physician:  Jesús Mitchell M.D.      Diagnoses (includes active and resolved):     Principal Problem:    Liver mass, right lobe POA: Yes  Active Problems:    Type 2 diabetes mellitus without complication, without long-term current use of insulin (HCC) POA: Yes    Essential hypertension POA: Yes    Thrombocytopenia (HCC) POA: Yes    Tobacco abuse POA: Unknown    Wheezing POA: Yes    Abnormal breath sounds POA: Yes    Constipation POA: Yes    Situational anxiety POA: Unknown    Radiculopathy POA: Unknown  Resolved Problems:  Wheezing  Abnormal breath sounds    Hospital Summary (Brief Narrative):          69-year-old male history of hypertension, alcohol abuse, tobacco abuse admitted with findings of 18 cm enhancing mass medial segment of left lobe liver.  Alpha-fetoprotein elevated at 3137.  CA 19 9 -normal .  His liver enzymes were normal.  Findings consistent with hepatoma with suspected hemorrhage .  surgery consulted.  It was felt he likely had a ruptured hepatoma with large hematoma causing symptoms.  Surgery recommended TACE/embolization to stop any further bleeding.  Interventional radiology was consulted and patient underwent embolization on 11/5/2019.  Post surgery patient felt markedly improved with decreased right upper quadrant pain.  Patient tolerating diet.  He initially did have some wheezing and mild hypoxia that resolved.  Likely from atelectasis he was encouraged to continue I-S.  Recommended for bone scan-unfortunately the machine was down and will be arranged outpatient to further stage the hepatocellular carcinoma.  Dr. Birch oncology was consulted and was recommend he follow-up in his clinic.  Patient had radiculopathy that improved with trial of Neurontin.  Recommend outpatient follow-up.  He was advised stopping alcohol and tobacco use.  Given marked clinical improvement  patient was discharged home.  On day of discharge,  I examined patient, discussed findings, plan of care and follow-up.  Patient was discharged home in stable condition.     Consultants:        Dr. Birch , oncology  Dr. Gtz surgery    Imaging/ Testing:      IR-CONSULT AND TREAT   Final Result         1.  RIGHT HEPATIC ARTERY TAE (BLAND TRANSARTERIAL EMBOLIZATION) WITH EMBOSPHERES FOR TREATMENT OF A LARGE HYPERVASCULAR HEPATOMA OCCUPYING MOST OF THE RIGHT HEPATIC LOBE CONCORDANT WITH MRI AND CT FINDINGS.      MR-ABDOMEN-WITH & W/O   Final Result         1.  Large heterogeneously enhancing hepatic mass, likely representing primary hepatic neoplasm. Correlation with AFP would be helpful.      2.  Cystic mass in the pancreatic tail. No communication to the pancreatic duct or ductal dilatation identified. Findings may be related to prior pancreatitis.      3.  Diverticulosis.               EC-ECHOCARDIOGRAM COMPLETE W/ CONT   Final Result      CT-CHEST (THORAX) WITH   Final Result      Small right and trace left pleural effusions.   Atelectasis/possible mild edema or pneumonitis within the lower lobes.   No pulmonary consolidation or mass.   Large right hepatic lobe heterogeneously enhancing mass consistent with malignant neoplasm/hepatocellular carcinoma.   Hepatic steatosis. Splenomegaly.            DX-CHEST-PORTABLE (1 VIEW)   Final Result      Elevated right hemidiaphragm and mild right lung base atelectasis similar to previous findings.      OUTSIDE IMAGES-DX CHEST   Final Result      US-FOREIGN FILM ULTRASOUND   Final Result      OUTSIDE IMAGES-CT ABDOMEN /PELVIS   Final Result      NM-BONE SCAN WHOLE BODY    (Results Pending)   NM-BONE SCAN WHOLE BODY    (Results Pending)         Procedures:          PostOp Diagnosis: HCC RIGHT LOBE LIVER. POSSIBLE CONTAINED INTRA-TUMORAL HEMORRHAGE        Procedure(s): CELIAC ANGIO, MILAGROS ANGIO, RHA ANGIO, RHA EMBOSPHERE EMBOLIZATION     100-300 MICRON X 3 SYRINGES (10 ML  EACH)     500-700 MICRON X 2 SYRINGES (10 ML EACH)     RHA ANGIO THRU EXISTING CATHETER CONTROL ANGIO X 5 (AFTER EACH ALIQUOT SPHERES)     FINAL MILAGROS ANGIO     R CFA ANGIO FOR ANGIOSEAL. 6F ANGIOSEAL PLACED WITH HEMOSTASIS.     US GUIDANCE FOR VASCULAR PUNCTURE R CFA        Estimated Blood Loss: Less than 20 ml (FLUSHES)           Complications: None                 11/5/2019     5:29 PM     Cordell Moore       Discharge Medications:             Medication List      START taking these medications      Instructions   docusate sodium 100 MG Caps  Commonly known as:  COLACE   Take 1 Cap by mouth 2 times a day as needed for Constipation.  Dose:  100 mg     gabapentin 100 MG Caps  Commonly known as:  NEURONTIN   Take 1 Cap by mouth 3 times a day.  Dose:  100 mg     ondansetron 4 MG Tbdp  Commonly known as:  ZOFRAN ODT   Take 1 Tab by mouth every 6 hours as needed for Nausea.  Dose:  4 mg     oxyCODONE immediate-release 5 MG Tabs  Commonly known as:  ROXICODONE   Take 1 Tab by mouth every 6 hours as needed for Severe Pain for up to 7 days. Indications: Acute Pain  Dose:  5 mg        CONTINUE taking these medications      Instructions   losartan 50 MG Tabs  Commonly known as:  COZAAR   Take 50 mg by mouth every day.  Dose:  50 mg               Diet:       DIET ORDERS (From admission to next 24h)     Start     Ordered    11/05/19 1805  Diet Order Regular  ALL MEALS     Question:  Diet:  Answer:  Regular    11/05/19 1805                  Activity:   As tolerated.      Code status:   Full code    Primary Care Provider:    Cole Alonso D.O.    Follow up appointment details :      .  Cole Alonso D.O.  [address] [facility] [place] [address]  [phone]    In 1 week      River Gtz M.D.  [address] [facility] [address]  [place] [id number]  [phone]    In 1 week      Nori Birch M.D.  [address] [place] [id number] [phone]    In 1 week      outpatient Bone scan as arranged  .                  Time spent on discharge day patient visit: 40 minutes    #################################################

## 2019-11-06 NOTE — CARE PLAN
Problem: Safety  Goal: Will remain free from falls  Outcome: PROGRESSING AS EXPECTED     Problem: Pain Management  Goal: Pain level will decrease to patient's comfort goal  Outcome: PROGRESSING SLOWER THAN EXPECTED  Note:   Pt using oxycodone and morphine to control pain.

## 2019-11-06 NOTE — PROGRESS NOTES
Received bedside report. This pt is AOx4, recently returned to floor from IR for embolization procedure. Required by orders to keep R. Leg still until 2000. Pt compliant. Vitals and RLE  pulse performed Q15x4 and then Q30 x2. Also puncture sight being checked for bleeding Q15x4,  Q30x2, and Q1x4 and dressing has been CDI. Pt using oxycodone and morphine to control pain.  Pulse ox dropped to 89% and pt put on 2L O2 before morphine administered. Pulse ox 94%.  Pt vomited at 2110 but declined additional nauseua medication.  Patient sleeping at this time.

## 2019-11-06 NOTE — PROGRESS NOTES
MRI with anesthesia completed.  Patient tolerated well.  Patient leaving unit now for IR, wife at bedside.  Pain tolerable per patient report.

## 2019-11-06 NOTE — DISCHARGE INSTRUCTIONS
Discharge Instructions    Discharged to home by car with relative. Discharged via wheelchair, hospital escort: Yes.  Special equipment needed: Not Applicable    Be sure to schedule a follow-up appointment with your primary care doctor or any specialists as instructed.     Discharge Plan:   Diet Plan: Discussed  Activity Level: Discussed  Smoking Cessation Offered: Patient Refused  Confirmed Follow up Appointment: Patient to Call and Schedule Appointment  Confirmed Symptoms Management: Discussed  Medication Reconciliation Updated: Yes  Influenza Vaccine Indication: Patient Refuses    I understand that a diet low in cholesterol, fat, and sodium is recommended for good health. Unless I have been given specific instructions below for another diet, I accept this instruction as my diet prescription.   Other diet: Regular    Special Instructions: None    · Is patient discharged on Warfarin / Coumadin?   No         Hematoma  A hematoma is a collection of blood. The collection of blood can turn into a hard, painful lump under the skin. Your skin may turn blue or yellow if the hematoma is close to the surface of the skin. Most hematomas get better in a few days to weeks. Some hematomas are serious and need medical care. Hematomas can be very small or very big.  Follow these instructions at home:  · Apply ice to the injured area:  ¨ Put ice in a plastic bag.  ¨ Place a towel between your skin and the bag.  ¨ Leave the ice on for 20 minutes, 2-3 times a day for the first 1 to 2 days.  · After the first 2 days, switch to using warm packs on the injured area.  · Raise (elevate) the injured area to lessen pain and puffiness (swelling). You may also wrap the area with an elastic bandage. Make sure the bandage is not wrapped too tight.  · If you have a painful hematoma on your leg or foot, you may use crutches for a couple days.  · Only take medicines as told by your doctor.  Get help right away if:  · Your pain gets worse.  · Your  pain is not controlled with medicine.  · You have a fever.  · Your puffiness gets worse.  · Your skin turns more blue or yellow.  · Your skin over the hematoma breaks or starts bleeding.  · Your hematoma is in your chest or belly (abdomen) and you are short of breath, feel weak, or have a change in consciousness.  · Your hematoma is on your scalp and you have a headache that gets worse or a change in alertness or consciousness.  This information is not intended to replace advice given to you by your health care provider. Make sure you discuss any questions you have with your health care provider.  Document Released: 01/25/2006 Document Revised: 05/25/2017 Document Reviewed: 05/28/2014  [employer] Interactive Patient Education © 2017 [employer]    Bone Scan  A bone scan is an imaging study of your bones. It is used to identify and diagnose bone problems. You may have this test to check for:  · Cancer in your bones.  · A broken or cracked bone.  · Bone infection.  · A cause of bone pain.  · Certain other bone diseases.  For this test, a small amount of a radioactive substance (radiotracer) is injected into your blood. Your bones will absorb the radiotracer for a short time. The radiotracer gives off radioactive energy. This energy can be captured by a type of camera that makes images of your bones (scintigrams). Abnormal bones will take up too much or too little of the radiotracer. This will show up in the images.  Tell a health care provider about:  · Any allergies you have, including any previous reactions you have had during an exam that used radiotracer.  · All medicines you are taking, including vitamins, herbs, eye drops, creams, and over-the-counter medicines.  · Any blood disorders you have.  · Any surgeries you have had.  · Any medical conditions you have.  · If you are pregnant or you think that you may be pregnant.  · If you are breastfeeding.  What are the risks?  Generally, this is a safe procedure.  However, problems may occur, including:  · Exposure to radiation (a small amount).  · Bleeding at the injection site.  · Infection at the injection site. This is rare.  · Allergic reaction to the radiotracer. Severe reactions may cause a rash or breathing trouble. These reactions are rare.  What happens before the procedure?  · Ask your health care provider about changing or stopping your regular medicines. This is especially important if you are taking diabetes medicines or blood thinners.  · Do not take any medicines that contain bismuth for 4 days before the scan.  · Do not have X-rays that use barium contrast material for 4 days before the scan.  · Do not wear metallic jewelry to the scan.  · Do not drink very much for 4 hours before the scan. At the beginning of the scan, you will need to drink several glasses of water.  What happens during the procedure?  · An IV tube will be inserted into one of your veins.  · You will lie down on an exam table.  · The radiotracer will be injected through the IV tube. You may feel a cold sensation in your arm.  · Some pictures (images) may be taken right after the injection.  · Then you will need to drink 6-8 glasses of water to flush the excess tracer out of your system.  · You may have to wait several hours before more images are taken.  · You will get back on the exam table for more images.  ¨ The camera may move around your body.  ¨ You may be asked to stay still or to change your position.  The procedure may vary among health care providers and hospitals.  What happens after the procedure?  · You may have to wait until a nuclear medicine specialist (radiologist) checks the images to make sure that they are readable.  · More images may be taken, if necessary.  · You will be watched to make sure that you do not have a reaction to the procedure or the injected medicine.  · It is your responsibility to obtain your test results. Ask your health care provider or the department  performing the test when and how you will get your results.  · Return to your normal activities as directed by your health care provider.  This information is not intended to replace advice given to you by your health care provider. Make sure you discuss any questions you have with your health care provider.  Document Released: 12/15/2001 Document Revised: 04/27/2017 Document Reviewed: 10/21/2015  [employer] Interactive Patient Education © 2017 [employer]          Depression / Suicide Risk    As you are discharged from this [facility] facility, it is important to learn how to keep safe from harming yourself.    Recognize the warning signs:  · Abrupt changes in personality, positive or negative- including increase in energy   · Giving away possessions  · Change in eating patterns- significant weight changes-  positive or negative  · Change in sleeping patterns- unable to sleep or sleeping all the time   · Unwillingness or inability to communicate  · Depression  · Unusual sadness, discouragement and loneliness  · Talk of wanting to die  · Neglect of personal appearance   · Rebelliousness- reckless behavior  · Withdrawal from people/activities they love  · Confusion- inability to concentrate     If you or a loved one observes any of these behaviors or has concerns about self-harm, here's what you can do:  · Talk about it- your feelings and reasons for harming yourself  · Remove any means that you might use to hurt yourself (examples: pills, rope, extension cords, firearm)  · Get professional help from the community (Mental Health, Substance Abuse, psychological counseling)  · Do not be alone:Call your Safe Contact- someone whom you trust who will be there for you.  · Call your local CRISIS HOTLINE 823-8703 or 804-463-6257  · Call your local Children's Mobile Crisis Response Team [place] (155) 013-9792 or www.[employer] [place]  · Call the toll free National Suicide Prevention Hotlines   · National Suicide  Prevention Lifeline 922-118-EEHR (5951)  · National [place] Line Network 800-SUICIDE (537-1495)

## 2019-11-06 NOTE — PROGRESS NOTES
[facility] Medicine Daily Progress Note    Date of Service  11/6/2019    Chief Complaint  69 y.o. male admitted 10/31/2019 with right upper quadrant pain.    Hospital Course    History of hypertension, alcohol abuse, tobacco abuse admitted with findings of 18 similar enhancing mass medial segment of left lobe liver.  Alpha-fetoprotein elevated.  Findings consistent with hepatoma with suspected hemorrhage .  surgery consulted.    Interval Problem Update    Post IR embolization yesterday-reports right upper quadrant pain much improved.  Requiring O2 2 L nasal cannula overnight-trying  to wean off oxygen.  Encouraging IS use.  Patient requesting private room as cannot rest with loud neighbor.  Consultants/Specialty     , surgery  Dr. Moore, IR    Code Status    Full code    Disposition    Anticipate DC home when stable    Review of Systems  Review of Systems   Constitutional: Negative for chills, diaphoresis and fever.   HENT: Negative for congestion.    Respiratory: Negative for cough, shortness of breath and stridor.    Cardiovascular: Negative for chest pain, palpitations and leg swelling.   Gastrointestinal: Positive for abdominal pain. Negative for diarrhea and vomiting.        Improved   Musculoskeletal: Negative for back pain, joint pain and neck pain.   Neurological: Negative for tremors, sensory change, speech change, focal weakness and weakness.        Physical Exam  Temp:  [36.1 °C (97 °F)-36.8 °C (98.3 °F)] 36.5 °C (97.7 °F)  Pulse:  [] 93  Resp:  [13-20] 18  BP: (106-148)/(58-83) 130/80  SpO2:  [89 %-98 %] 93 %    Physical Exam  Constitutional:       General: He is not in acute distress.     Appearance: Normal appearance. He is not diaphoretic.   HENT:      Head: Normocephalic and atraumatic.      Right Ear: External ear normal.      Left Ear: External ear normal.      Nose: Nose normal.   Eyes:      General: No scleral icterus.     Conjunctiva/sclera: Conjunctivae normal.       Pupils: Pupils are equal, round, and reactive to light.   Neck:      Musculoskeletal: Normal range of motion. No neck rigidity.   Cardiovascular:      Rate and Rhythm: Normal rate and regular rhythm.      Heart sounds: No murmur.   Pulmonary:      Breath sounds: No stridor. No wheezing, rhonchi or rales.      Comments: Diminished breath sounds lung bases right and left  Abdominal:      General: There is distension.      Palpations: There is mass.      Tenderness: There is tenderness (Mild right upper quadrant to moderate palpation). There is no guarding or rebound.   Musculoskeletal:         General: No swelling.   Skin:     General: Skin is dry.   Neurological:      Mental Status: He is alert.         Fluids    Intake/Output Summary (Last 24 hours) at 11/6/2019 1113  Last data filed at 11/6/2019 0830  Gross per 24 hour   Intake 1940 ml   Output 950 ml   Net 990 ml       Laboratory                        Imaging  IR-CONSULT AND TREAT   Final Result         1.  RIGHT HEPATIC ARTERY TAE (BLAND TRANSARTERIAL EMBOLIZATION) WITH EMBOSPHERES FOR TREATMENT OF A LARGE HYPERVASCULAR HEPATOMA OCCUPYING MOST OF THE RIGHT HEPATIC LOBE CONCORDANT WITH MRI AND CT FINDINGS.      MR-ABDOMEN-WITH & W/O   Final Result         1.  Large heterogeneously enhancing hepatic mass, likely representing primary hepatic neoplasm. Correlation with AFP would be helpful.      2.  Cystic mass in the pancreatic tail. No communication to the pancreatic duct or ductal dilatation identified. Findings may be related to prior pancreatitis.      3.  Diverticulosis.               EC-ECHOCARDIOGRAM COMPLETE W/ CONT   Final Result      CT-CHEST (THORAX) WITH   Final Result      Small right and trace left pleural effusions.   Atelectasis/possible mild edema or pneumonitis within the lower lobes.   No pulmonary consolidation or mass.   Large right hepatic lobe heterogeneously enhancing mass consistent with malignant neoplasm/hepatocellular carcinoma.    Hepatic steatosis. Splenomegaly.            DX-CHEST-PORTABLE (1 VIEW)   Final Result      Elevated right hemidiaphragm and mild right lung base atelectasis similar to previous findings.      OUTSIDE IMAGES-DX CHEST   Final Result      US-FOREIGN FILM ULTRASOUND   Final Result      OUTSIDE IMAGES-CT ABDOMEN /PELVIS   Final Result      NM-BONE SCAN WHOLE BODY    (Results Pending)        Assessment/Plan  * Liver mass, right lobe- (present on admission)  Assessment & Plan  Consistent with hepatocellular carcinoma.  Alpha-fetoprotein high.  Hepatitis panel negative.  Suspect hemorrhage into tumor causing pain/symptoms-post IR embolization.  Check bone scan to help with staging.  Once the patient is stable he may be a candidate for neoadjuvant treatment-to follow-up with oncology         Radiculopathy  Assessment & Plan  Improved  Continue Neurontin    Situational anxiety  Assessment & Plan  Controlled, continue Xanax BID PRN  Reports worsened with difficulty sleeping due to noisy roommate-try to accommodate patient and move him to a different room    Constipation- (present on admission)  Assessment & Plan  Resolved    Abnormal breath sounds- (present on admission)  Assessment & Plan  Follow-up chest x-ray with findings atelectasis on right.  Mild hypoxia requiring oxygen overnight.  Encourage I-S  Wean off O2 as tolerated  Pain control  Arrange for home O2 if needed    Wheezing- (present on admission)  Assessment & Plan  Currently resolved    Tobacco abuse  Assessment & Plan  Tobacco cessation counseling started.  emphasize stop smoking.    Thrombocytopenia (HCC)- (present on admission)  Assessment & Plan  Stable    Essential hypertension- (present on admission)  Assessment & Plan  On losartan       VTE prophylaxis: SCDs

## 2019-11-06 NOTE — PROGRESS NOTES
Received report from night shift and assumed care. Assessment completed, POC discussed. Pt is A&Ox4. Denies pain or discomfort. Provided incentive spirometer and educated patient on importance of use. Pt verbalizes understanding and demonstrates properly. Pt's current O2 saturation is 92% on RA. All other needs met at this time. Safety precautions and hourly rounding in place.

## 2019-11-06 NOTE — PROGRESS NOTES
Hepatic arteriogram with embolization  performed by Dr Moore via right femoral access. Procedure BARs explained to patient by physician and consent obtained. Patient assisted to table in IR2. Patient was monitored and assessed continuously throughout procedure; ETCO2 32-39 with consistent waveform. Procedure completed without complication, however patient c/o increased mid-abd pain with N & V afterward; zofran given with moderate relief. Right groin puncture closed with angioseal and site CDI; sterile guaze/tegaderm dressing applied. Patient tolerated procedure quite well, was slightly drowsy and appropriately responsive afterward. Patient returned to his room in good condition. Bedside handoff to Carolina.    Terumo Angioseal #6F vascular closure ref [id number] lot [id number]

## 2019-11-22 ENCOUNTER — HOSPITAL ENCOUNTER (OUTPATIENT)
Dept: RADIOLOGY | Facility: MEDICAL CENTER | Age: 69
End: 2019-11-22
Attending: HOSPITALIST
Payer: MEDICARE

## 2019-11-22 DIAGNOSIS — R16.0 LIVER MASS, RIGHT LOBE: ICD-10-CM

## 2019-11-22 DIAGNOSIS — E11.9 TYPE 2 DIABETES MELLITUS WITHOUT COMPLICATION, WITHOUT LONG-TERM CURRENT USE OF INSULIN (HCC): ICD-10-CM

## 2019-11-22 PROCEDURE — A9503 TC99M MEDRONATE: HCPCS

## 2019-12-05 ENCOUNTER — HOSPITAL ENCOUNTER (OUTPATIENT)
Dept: RADIOLOGY | Facility: MEDICAL CENTER | Age: 69
End: 2019-12-05
Attending: INTERNAL MEDICINE
Payer: MEDICARE

## 2019-12-05 DIAGNOSIS — C22.0 ERYTHROCYTOSIS DUE TO HEPATOMA (HCC): ICD-10-CM

## 2019-12-05 DIAGNOSIS — D75.1 ERYTHROCYTOSIS DUE TO HEPATOMA (HCC): ICD-10-CM

## 2019-12-05 PROCEDURE — 73552 X-RAY EXAM OF FEMUR 2/>: CPT | Mod: RT

## 2020-01-07 ENCOUNTER — HOSPITAL ENCOUNTER (OUTPATIENT)
Dept: RADIOLOGY | Facility: MEDICAL CENTER | Age: 70
End: 2020-01-07
Attending: INTERNAL MEDICINE
Payer: MEDICARE

## 2020-01-07 DIAGNOSIS — C22.0 ERYTHROCYTOSIS DUE TO HEPATOMA (HCC): ICD-10-CM

## 2020-01-07 DIAGNOSIS — D75.1 ERYTHROCYTOSIS DUE TO HEPATOMA (HCC): ICD-10-CM

## 2020-01-07 PROCEDURE — 700117 HCHG RX CONTRAST REV CODE 255: Performed by: INTERNAL MEDICINE

## 2020-01-07 PROCEDURE — 74160 CT ABDOMEN W/CONTRAST: CPT

## 2020-01-07 RX ADMIN — IOHEXOL 50 ML: 240 INJECTION, SOLUTION INTRATHECAL; INTRAVASCULAR; INTRAVENOUS; ORAL at 11:46

## 2020-01-07 RX ADMIN — IOHEXOL 100 ML: 350 INJECTION, SOLUTION INTRAVENOUS at 11:46

## 2020-01-13 NOTE — PROGRESS NOTES
Subjective:   1/14/2020  7:52 AM  Primary care physician:Cole Alonso D.O.  Referring Provider: [facility]  Medical Oncologist: Nori Birch MD      Chief Complaint:   Chief Complaint   Patient presents with   • Follow-Up     [facility] LIVER [facility] VISIT     Diagnosis:   1. Cirrhosis of liver without ascites, unspecified hepatic cirrhosis type (HCC)  IR-CONSULT ONLY-OUTPATIENT    AFP SERUM TUMOR MARKER    AFP with AFP L3%    Ze-Gamma-Carboxy Prothrombin    Comp Metabolic Panel    CBC WITH DIFFERENTIAL    Prothrombin Time   2. History of alcohol abuse  IR-CONSULT ONLY-OUTPATIENT    AFP SERUM TUMOR MARKER    AFP with AFP L3%    Ze-Gamma-Carboxy Prothrombin    Comp Metabolic Panel    CBC WITH DIFFERENTIAL    Prothrombin Time   3. Abdominal pain, unspecified abdominal location  IR-CONSULT ONLY-OUTPATIENT    AFP SERUM TUMOR MARKER    AFP with AFP L3%    Ze-Gamma-Carboxy Prothrombin    Comp Metabolic Panel    CBC WITH DIFFERENTIAL    Prothrombin Time   4. Hepatoma (HCC)  IR-CONSULT ONLY-OUTPATIENT    AFP SERUM TUMOR MARKER    AFP with AFP L3%    Ze-Gamma-Carboxy Prothrombin    Comp Metabolic Panel    CBC WITH DIFFERENTIAL    Prothrombin Time   5. Liver rupture, sequela  IR-CONSULT ONLY-OUTPATIENT    AFP SERUM TUMOR MARKER    AFP with AFP L3%    Ze-Gamma-Carboxy Prothrombin    Comp Metabolic Panel    CBC WITH DIFFERENTIAL    Prothrombin Time       History of presenting illness:  Lorraine Bella  is a pleasant 69 y.o. year old male who presented with follow-up status post admission to the hospital for ruptured hepatoma in the right lobe of the liver.  The patient was admitted to the hospital back in November 2019 with acute abdominal pain, hemoperitoneum, and what appeared to be a ruptured mass measuring following rupture at approximate 12 cm.  It encompasses the majority of the right lobe and into the medial segment of the left lobe.  It is difficult to assess where it originated from in the right  lobe but the patient was sent for embolization.  This can successfully stop the bleeding.  He has stabilized.  His main complaint is fatigue.  He had a repeat CT scan which I personally reviewed from July 2020 which revealed the tumor is stable but there are multiple areas of arterial enhancement consistent with hepatoma in this fracture tumor.  There is a large area of necrosis and hematoma but no hemoperitoneum or any signs of peritoneal disease.  The patient was a heavy drinker and stopped though he continues to drink a couple beers a day.  The patient does show signs of cirrhosis and portal hypertension.  He is here with his wife to discuss the neck steps in his care.  His main complaint is pain on deep breathing or intermittent right upper quadrant sharp pain.  There is no sign of new rupture or worsening rupture.      Past Medical History:   Diagnosis Date   • Alcohol use 3/31/2016    2-3 per day   • Borderline diabetes    • Bowel habit changes     occasional constipation or diarrhea   • Breath shortness    • Cataract    • Dental disorder     needs dentures upper   • Disorder of thyroid    • Heart burn    • Hypertension    • Indigestion    • Infectious disease 3/31/2016    wife with cold     Past Surgical History:   Procedure Laterality Date   • PARATHYROIDECTOMY N/A 5/13/2016    Procedure: PARATHYROIDECTOMY;  Surgeon: Kale Lord M.D.;  Location: SURGERY SAME DAY [facility] ORS;  Service:    • CATARACT PHACO WITH IOL Left 4/7/2016    Procedure: CATARACT PHACO WITH IOL;  Surgeon: Ephraim Jamison M.D.;  Location: SURGERY SURGICAL [facility] ORS;  Service:    • BONE GRAFT Right 1960's    right wrist   • WRIST ORIF Right 1960's     Allergies   Allergen Reactions   • Sulfa Drugs      Reaction unknown     Outpatient Encounter Medications as of 1/14/2020   Medication Sig Dispense Refill   • gabapentin (NEURONTIN) 100 MG Cap Take 1 Cap by mouth 3 times a day. 90 Cap 1   • ondansetron (ZOFRAN ODT) 4 MG TABLET DISPERSIBLE  Take 1 Tab by mouth every 6 hours as needed for Nausea. 20 Tab 0   • docusate sodium (COLACE) 100 MG Cap Take 1 Cap by mouth 2 times a day as needed for Constipation. 30 Cap 0   • losartan (COZAAR) 50 MG Tab Take 50 mg by mouth every day.       No facility-administered encounter medications on file as of 1/14/2020.      Social History     Socioeconomic History   • Marital status:      Spouse name: Not on file   • Number of children: Not on file   • Years of education: Not on file   • Highest education level: Not on file   Occupational History   • Not on file   Social Needs   • Financial resource strain: Not on file   • Food insecurity:     Worry: Not on file     Inability: Not on file   • Transportation needs:     Medical: Not on file     Non-medical: Not on file   Tobacco Use   • Smoking status: Current Every Day Smoker     Packs/day: 1.00     Years: 30.00     Pack years: 30.00     Types: Cigarettes     Start date: 1/1/1990   • Smokeless tobacco: Never Used   Substance and Sexual Activity   • Alcohol use: Yes     Alcohol/week: 2.4 oz     Types: 4 Shots of liquor per week     Frequency: 4 or more times a week     Drinks per session: 3 or 4     Binge frequency: Less than monthly     Comment: 2-3 per day, 8-10/week   • Drug use: No   • Sexual activity: Not on file   Lifestyle   • Physical activity:     Days per week: Not on file     Minutes per session: Not on file   • Stress: Not on file   Relationships   • Social connections:     Talks on phone: Not on file     Gets together: Not on file     Attends [employer] service: Not on file     Active member of club or organization: Not on file     Attends meetings of clubs or organizations: Not on file     Relationship status: Not on file   • Intimate partner violence:     Fear of current or ex partner: Not on file     Emotionally abused: Not on file     Physically abused: Not on file     Forced sexual activity: Not on file   Other Topics Concern   • Not on file  "  Social History Narrative   • Not on file      Social History     Tobacco Use   Smoking Status Current Every Day Smoker   • Packs/day: 1.00   • Years: 30.00   • Pack years: 30.00   • Types: Cigarettes   • Start date: 1/1/1990   Smokeless Tobacco Never Used     Social History     Substance and Sexual Activity   Alcohol Use Yes   • Alcohol/week: 2.4 oz   • Types: 4 Shots of liquor per week   • Frequency: 4 or more times a week   • Drinks per session: 3 or 4   • Binge frequency: Less than monthly    Comment: 2-3 per day, 8-10/week     Social History     Substance and Sexual Activity   Drug Use No        History reviewed. No pertinent family history.  No pertinent family history on file    Review of Systems   Constitutional: Positive for malaise/fatigue.   Musculoskeletal:        Muscle weekness    Neurological: Positive for dizziness.   All other systems reviewed and are negative.       Objective:   /68 (BP Location: Left arm, Patient Position: Sitting, BP Cuff Size: Adult)   Pulse (!) 102   Temp 36.1 °C (97 °F) (Temporal)   Ht 1.803 m (5' 11\")   Wt 91.4 kg (201 lb 6.4 oz)   SpO2 95%   BMI 28.09 kg/m²     Physical Exam   Constitutional: He appears well-developed and well-nourished.   HENT:   Head: Normocephalic and atraumatic.   Eyes: Pupils are equal, round, and reactive to light. Conjunctivae are normal.   Neck: Normal range of motion. Neck supple.   Cardiovascular: Normal rate and regular rhythm.   Pulmonary/Chest: Effort normal and breath sounds normal.   Abdominal: Soft. Bowel sounds are normal. There is tenderness.   Intermittent pain on deep breath   Musculoskeletal: Normal range of motion.   Neurological: He is alert.   Skin: Skin is warm and dry.   Psychiatric: He has a normal mood and affect. His behavior is normal. Judgment and thought content normal.   Nursing note and vitals reviewed.      Labs:  Results for VALDO DIAZ (MRN [id number]) as of 1/13/2020 10:13   Ref. Range " 11/1/2019 10:53 11/2/2019 02:00   WBC Latest Ref Range: 4.8 - 10.8 K/uL  4.7 (L)   RBC Latest Ref Range: 4.70 - 6.10 M/uL  3.52 (L)   Hemoglobin Latest Ref Range: 14.0 - 18.0 g/dL  11.7 (L)   Hematocrit Latest Ref Range: 42.0 - 52.0 %  35.1 (L)   MCV Latest Ref Range: 81.4 - 97.8 fL  99.7 (H)   MCH Latest Ref Range: 27.0 - 33.0 pg  33.2 (H)   MCHC Latest Ref Range: 33.7 - 35.3 g/dL  33.3 (L)   RDW Latest Ref Range: 35.9 - 50.0 fL  43.6   Platelet Count Latest Ref Range: 164 - 446 K/uL  101 (L)   MPV Latest Ref Range: 9.0 - 12.9 fL  8.1 (L)   Neutrophils-Polys Latest Ref Range: 44.00 - 72.00 %  66.10   Neutrophils (Absolute) Latest Ref Range: 1.82 - 7.42 K/uL  3.12   Lymphocytes Latest Ref Range: 22.00 - 41.00 %  17.30 (L)   Lymphs (Absolute) Latest Ref Range: 1.00 - 4.80 K/uL  0.82 (L)   Monocytes Latest Ref Range: 0.00 - 13.40 %  13.70 (H)   Monos (Absolute) Latest Ref Range: 0.00 - 0.85 K/uL  0.65   Eosinophils Latest Ref Range: 0.00 - 6.90 %  1.90   Eos (Absolute) Latest Ref Range: 0.00 - 0.51 K/uL  0.09   Basophils Latest Ref Range: 0.00 - 1.80 %  0.60   Baso (Absolute) Latest Ref Range: 0.00 - 0.12 K/uL  0.03   Immature Granulocytes Latest Ref Range: 0.00 - 0.90 %  0.40   Immature Granulocytes (abs) Latest Ref Range: 0.00 - 0.11 K/uL  0.02   Nucleated RBC Latest Units: /100 WBC  0.00   NRBC (Absolute) Latest Units: K/uL  0.00   Sodium Latest Ref Range: 135 - 145 mmol/L  138   Potassium Latest Ref Range: 3.6 - 5.5 mmol/L  3.7   Chloride Latest Ref Range: 96 - 112 mmol/L  107   Co2 Latest Ref Range: 20 - 33 mmol/L  24   Anion Gap Latest Ref Range: 0.0 - 11.9   7.0   Glucose Latest Ref Range: 65 - 99 mg/dL  125 (H)   Bun Latest Ref Range: 8 - 22 mg/dL  10   Creatinine Latest Ref Range: 0.50 - 1.40 mg/dL  0.73   GFR If  Latest Ref Range: >60 mL/min/1.73 m 2  >60   GFR If Non  Latest Ref Range: >60 mL/min/1.73 m 2  >60   Calcium Latest Ref Range: 8.5 - 10.5 mg/dL  8.0 (L)   AST(SGOT)  Latest Ref Range: 12 - 45 U/L  17   ALT(SGPT) Latest Ref Range: 2 - 50 U/L  9   Alkaline Phosphatase Latest Ref Range: 30 - 99 U/L  95   Total Bilirubin Latest Ref Range: 0.1 - 1.5 mg/dL  0.4   Albumin Latest Ref Range: 3.2 - 4.9 g/dL  2.9 (L)   Total Protein Latest Ref Range: 6.0 - 8.2 g/dL  5.6 (L)   Globulin Latest Ref Range: 1.9 - 3.5 g/dL  2.7   A-G Ratio Latest Units: g/dL  1.1   Phosphorus Latest Ref Range: 2.5 - 4.5 mg/dL  3.5   Magnesium Latest Ref Range: 1.5 - 2.5 mg/dL  1.9   Glycohemoglobin Latest Ref Range: 0.0 - 5.6 %  5.8 (H)   Estim. Avg Glu Latest Units: mg/dL  120   Cholesterol,Tot Latest Ref Range: 100 - 199 mg/dL  121   Triglycerides Latest Ref Range: 0 - 149 mg/dL  60   HDL Latest Ref Range: >=40 mg/dL  38 (A)   LDL Latest Ref Range: <100 mg/dL  71   NT-proBNP Latest Ref Range: 0 - 125 pg/mL  477 (H)   INR Latest Ref Range: 0.87 - 1.13  1.03    PT Latest Ref Range: 12.0 - 14.6 sec 13.7    APTT Latest Ref Range: 24.7 - 36.0 sec 29.9    Hepatitis A Virus Ab, IgM Latest Ref Range: Negative   Negative   Hepatitis B Surface Antigen Latest Ref Range: Negative   Negative   Hepatitis B Cors Ab,IgM Latest Ref Range: Negative   Negative   Hepatitis C Antibody Latest Ref Range: Negative   Negative       Imaging:  Per my read, CT 1/7/20    IMPRESSION:     1.  There is no significant interval change in size of the large hepatocellular carcinoma.  2.  There is continued arterial enhancement with washout consistent with residual disease.  3.  There are no new suspicious hepatic lesions.  4.  There is a stable 2.4 cm simple appearing pancreatic tail cyst, possibly postinflammatory.        Pathology:  NA    Procedures: 11/4/19    1.  Ultrasound guidance for vascular puncture right common femoral artery  2.  Inferior phrenic trunk angiography  3.  Common hepatic artery angiography  4.  Right hepatic artery selective microcatheter angiography  5.  TAE - Transarterial Embolization (bland particulate embolization  right hepatic artery with embospheres)  6.  Right hepatic artery control angiogram through existing catheter x5  7.  Common hepatic artery control angiogram through existing catheter x1 post embolization  8.  Right iliac-femoral angiogram for Angio-Seal placement  9.  6 [place] Angio-Seal closure device placement       Diagnosis:     1. Cirrhosis of liver without ascites, unspecified hepatic cirrhosis type (HCC)  IR-CONSULT ONLY-OUTPATIENT    AFP SERUM TUMOR MARKER    AFP with AFP L3%    Ze-Gamma-Carboxy Prothrombin    Comp Metabolic Panel    CBC WITH DIFFERENTIAL    Prothrombin Time   2. History of alcohol abuse  IR-CONSULT ONLY-OUTPATIENT    AFP SERUM TUMOR MARKER    AFP with AFP L3%    Ze-Gamma-Carboxy Prothrombin    Comp Metabolic Panel    CBC WITH DIFFERENTIAL    Prothrombin Time   3. Abdominal pain, unspecified abdominal location  IR-CONSULT ONLY-OUTPATIENT    AFP SERUM TUMOR MARKER    AFP with AFP L3%    Ze-Gamma-Carboxy Prothrombin    Comp Metabolic Panel    CBC WITH DIFFERENTIAL    Prothrombin Time   4. Hepatoma (HCC)  IR-CONSULT ONLY-OUTPATIENT    AFP SERUM TUMOR MARKER    AFP with AFP L3%    Ze-Gamma-Carboxy Prothrombin    Comp Metabolic Panel    CBC WITH DIFFERENTIAL    Prothrombin Time   5. Liver rupture, sequela  IR-CONSULT ONLY-OUTPATIENT    AFP SERUM TUMOR MARKER    AFP with AFP L3%    Ze-Gamma-Carboxy Prothrombin    Comp Metabolic Panel    CBC WITH DIFFERENTIAL    Prothrombin Time           Medical Decision Making:  Today's Assessment / Status / Plan:     In light of the present findings, because of the size of the tumor and its fragmented characteristics, it will be difficult to do a single ablation.  My recommendation is to proceed with a Y 90 or possibly multiple chemoembolization's to treat the areas of enhancement.  Hopefully this will shrink enough so that we could evaluate for possible surgical intervention.  Presently resecting this tumor would do a right trisegmentectomy and in light of  the patient's cirrhosis he would not tolerate or survive it.  He would go into imminent liver failure.  He understood this and agreed above plan.  We will send the patient to interventional radiology and get their opinion for therapy.    I, Dr. Gtz have entered, reviewed and confirmed the above diagnoses related to this patient on this date of service, 1/14/2020  7:52 AM.    He agreed and verbalized his agreement and understanding with the current plan. I answered all questions and concerns he has at this time and advised him to call at any time in the interim with questions or concerns in regards to his care.    Thank you for allowing me to participate in his care, I will continue to follow closely.       Please note that this dictation was created using voice recognition software. I have made every reasonable attempt to correct obvious errors, but I expect that there are errors of grammar and possibly content that I did not discover before finalizing the note.     Thank you for this consultation and allowing me to participate in your patient's care. If I can be of further service please contact my office.

## 2020-01-14 ENCOUNTER — OFFICE VISIT (OUTPATIENT)
Dept: SURGICAL ONCOLOGY | Facility: MEDICAL CENTER | Age: 70
End: 2020-01-14
Payer: MEDICARE

## 2020-01-14 VITALS
BODY MASS INDEX: 28.19 KG/M2 | HEART RATE: 102 BPM | TEMPERATURE: 97 F | WEIGHT: 201.4 LBS | HEIGHT: 71 IN | DIASTOLIC BLOOD PRESSURE: 68 MMHG | SYSTOLIC BLOOD PRESSURE: 114 MMHG | OXYGEN SATURATION: 95 %

## 2020-01-14 DIAGNOSIS — K74.60 CIRRHOSIS OF LIVER WITHOUT ASCITES, UNSPECIFIED HEPATIC CIRRHOSIS TYPE (HCC): ICD-10-CM

## 2020-01-14 DIAGNOSIS — S36.116S: ICD-10-CM

## 2020-01-14 DIAGNOSIS — F10.11 HISTORY OF ALCOHOL ABUSE: ICD-10-CM

## 2020-01-14 DIAGNOSIS — R10.9 ABDOMINAL PAIN, UNSPECIFIED ABDOMINAL LOCATION: ICD-10-CM

## 2020-01-14 DIAGNOSIS — C22.0 HEPATOMA (HCC): ICD-10-CM

## 2020-01-14 PROBLEM — S36.116A: Status: ACTIVE | Noted: 2020-01-14

## 2020-01-14 PROCEDURE — 99215 OFFICE O/P EST HI 40 MIN: CPT | Performed by: SURGERY

## 2020-01-14 ASSESSMENT — ENCOUNTER SYMPTOMS: DIZZINESS: 1

## 2020-01-14 NOTE — PATIENT INSTRUCTIONS
The patient will be sent to interventional radiology for either a Y 90 or chemoembolization.  He will see me after he undergoes repeat imaging.

## 2020-01-15 NOTE — PROGRESS NOTES
Interventional Oncology Consultation      Re: Lorraine Bella     MRN: [id number]   : 1950    Lorraine Bella was referred by River Gtz MD. He is a 69 y.o. male seen in clinic for evaluation and possible intervention of unresectable multifocal hepatocellular carcinoma. He is also under the care of Cole Alonso DO and Nori Birch MD.    History of Present Illness:   has a history of heavy alcohol use and cirrhosis. In November he developed acute severe abdominal pain and right upper quadrant swelling that woke him up in the middle of the night. He presented to the ED and imaging revealed a large hepatoma measuring about 12 cm involving the majority of the right lobe and infiltrating the left with possible intratumoral hematoma. He underwent emergent bland embolization of the hepatoma on  and was discharged to home on . There are no follow up hematology studies since 3 days prior to and none following the embolization.  has been referred to interventional radiology for evaluation and management.    He is seen today for review of imaging studies and discussion of possible intervention with image-guided ablation, drug eluting bead chemoembolization, or Y90  radioembolization. Today, he reports right shoulder pain. He stopped drinking alcohol entirely. He smokes about 1/2 pack of cigarettes daily. He still has some tenderness in the right upper quadrant. He denies unintentional weight loss, jaundice, vomiting, and ascites. He is fatigued more than usual. He reports some cold intolerance. He has a pancreatic mass with a referral to GI for workup for this. He is retired and accompanied by his wife to the appointment today.     Past Medical History:   Diagnosis Date   • Alcohol use 3/31/2016    2-3 per day   • Borderline diabetes    • Bowel habit changes     occasional constipation or diarrhea   • Breath shortness    • Cataract    • Dental disorder      needs dentures upper   • Disorder of thyroid    • Heart burn    • Hypertension    • Indigestion    • Infectious disease 3/31/2016    wife with cold     Past Surgical History:   Procedure Laterality Date   • PARATHYROIDECTOMY N/A 5/13/2016    Procedure: PARATHYROIDECTOMY;  Surgeon: Kale Lord M.D.;  Location: SURGERY SAME DAY [facility];  Service:    • CATARACT PHACO WITH IOL Left 4/7/2016    Procedure: CATARACT PHACO WITH IOL;  Surgeon: Ephraim Jamison M.D.;  Location: SURGERY [facility] ORS;  Service:    • BONE GRAFT Right 1960's    right wrist   • WRIST ORIF Right 1960's     Social History     Socioeconomic History   • Marital status:      Spouse name: Not on file   • Number of children: Not on file   • Years of education: Not on file   • Highest education level: Not on file   Occupational History   • Not on file   Social Needs   • Financial resource strain: Not on file   • Food insecurity:     Worry: Not on file     Inability: Not on file   • Transportation needs:     Medical: Not on file     Non-medical: Not on file   Tobacco Use   • Smoking status: Current Every Day Smoker     Packs/day: 1.00     Years: 30.00     Pack years: 30.00     Types: Cigarettes     Start date: 1/1/1990   • Smokeless tobacco: Never Used   Substance and Sexual Activity   • Alcohol use: Yes     Alcohol/week: 2.4 oz     Types: 4 Shots of liquor per week     Frequency: 4 or more times a week     Drinks per session: 3 or 4     Binge frequency: Less than monthly     Comment: 2-3 per day, 8-10/week   • Drug use: No   • Sexual activity: Not on file   Lifestyle   • Physical activity:     Days per week: Not on file     Minutes per session: Not on file   • Stress: Not on file   Relationships   • Social connections:     Talks on phone: Not on file     Gets together: Not on file     Attends Yazidi service: Not on file     Active member of club or organization: Not on file     Attends meetings of clubs or organizations: Not on  file     Relationship status: Not on file   • Intimate partner violence:     Fear of current or ex partner: Not on file     Emotionally abused: Not on file     Physically abused: Not on file     Forced sexual activity: Not on file   Other Topics Concern   • Not on file   Social History Narrative   • Not on file     No family history on file.    Review of Systems   Constitutional: Positive for malaise/fatigue. Negative for chills, fever and weight loss.   Respiratory: Negative.    Cardiovascular: Negative.    Gastrointestinal: Positive for abdominal pain. Negative for blood in stool, constipation, diarrhea, nausea and vomiting.        Negative for ascites   Musculoskeletal: Positive for joint pain.   Skin:        Negative for jaundice   Neurological: Negative.    Endo/Heme/Allergies: Does not bruise/bleed easily.   Psychiatric/Behavioral: Positive for substance abuse (positive for tobacco. Negative for current alcohol use.).       A comprehensive 14-point review of systems was negative except as described above.     Labs:   Unknown lab due to poor legibility, December 6, 2019  Sodium 140  Potassium 4.0  CREATININE 0.9  TBILIRUBIN 0.4  ALBUMIN 4.0    Child Rodriguez Class unable to calculate, no INR  MARILU Grade A1  NLR unable to calculate, no recent CBC w/    AFP 1939.0      Ref. Range 11/2/2019 02:00   WBC Latest Ref Range: 4.8 - 10.8 K/uL 4.7 (L)   RBC Latest Ref Range: 4.70 - 6.10 M/uL 3.52 (L)   Hemoglobin Latest Ref Range: 14.0 - 18.0 g/dL 11.7 (L)   Hematocrit Latest Ref Range: 42.0 - 52.0 % 35.1 (L)   MCV Latest Ref Range: 81.4 - 97.8 fL 99.7 (H)   MCH Latest Ref Range: 27.0 - 33.0 pg 33.2 (H)   MCHC Latest Ref Range: 33.7 - 35.3 g/dL 33.3 (L)   RDW Latest Ref Range: 35.9 - 50.0 fL 43.6   Platelet Count Latest Ref Range: 164 - 446 K/uL 101 (L)   MPV Latest Ref Range: 9.0 - 12.9 fL 8.1 (L)   Neutrophils-Polys Latest Ref Range: 44.00 - 72.00 % 66.10   Neutrophils (Absolute) Latest Ref Range: 1.82 - 7.42 K/uL 3.12    Lymphocytes Latest Ref Range: 22.00 - 41.00 % 17.30 (L)   Lymphs (Absolute) Latest Ref Range: 1.00 - 4.80 K/uL 0.82 (L)   Monocytes Latest Ref Range: 0.00 - 13.40 % 13.70 (H)   Monos (Absolute) Latest Ref Range: 0.00 - 0.85 K/uL 0.65   Eosinophils Latest Ref Range: 0.00 - 6.90 % 1.90   Eos (Absolute) Latest Ref Range: 0.00 - 0.51 K/uL 0.09   Basophils Latest Ref Range: 0.00 - 1.80 % 0.60   Baso (Absolute) Latest Ref Range: 0.00 - 0.12 K/uL 0.03   Immature Granulocytes Latest Ref Range: 0.00 - 0.90 % 0.40   Immature Granulocytes (abs) Latest Ref Range: 0.00 - 0.11 K/uL 0.02   Nucleated RBC Latest Units: /100 WBC 0.00   NRBC (Absolute) Latest Units: K/uL 0.00      Ref. Range 11/2/2019 02:00   Sodium Latest Ref Range: 135 - 145 mmol/L 138   Potassium Latest Ref Range: 3.6 - 5.5 mmol/L 3.7   Chloride Latest Ref Range: 96 - 112 mmol/L 107   Co2 Latest Ref Range: 20 - 33 mmol/L 24   Anion Gap Latest Ref Range: 0.0 - 11.9  7.0   Glucose Latest Ref Range: 65 - 99 mg/dL 125 (H)   Bun Latest Ref Range: 8 - 22 mg/dL 10   Creatinine Latest Ref Range: 0.50 - 1.40 mg/dL 0.73   GFR If  Latest Ref Range: >60 mL/min/1.73 m 2 >60   GFR If Non  Latest Ref Range: >60 mL/min/1.73 m 2 >60   Calcium Latest Ref Range: 8.5 - 10.5 mg/dL 8.0 (L)   AST(SGOT) Latest Ref Range: 12 - 45 U/L 17   ALT(SGPT) Latest Ref Range: 2 - 50 U/L 9   Alkaline Phosphatase Latest Ref Range: 30 - 99 U/L 95   Total Bilirubin Latest Ref Range: 0.1 - 1.5 mg/dL 0.4   Albumin Latest Ref Range: 3.2 - 4.9 g/dL 2.9 (L)   Total Protein Latest Ref Range: 6.0 - 8.2 g/dL 5.6 (L)   Globulin Latest Ref Range: 1.9 - 3.5 g/dL 2.7   A-G Ratio Latest Units: g/dL 1.1   Phosphorus Latest Ref Range: 2.5 - 4.5 mg/dL 3.5   Magnesium Latest Ref Range: 1.5 - 2.5 mg/dL 1.9   Glycohemoglobin Latest Ref Range: 0.0 - 5.6 % 5.8 (H)   Estim. Avg Glu Latest Units: mg/dL 120      Ref. Range 11/1/2019 10:53   INR Latest Ref Range: 0.87 - 1.13  1.03   PT Latest  Ref Range: 12.0 - 14.6 sec 13.7   APTT Latest Ref Range: 24.7 - 36.0 sec 29.9      Ref. Range 11/1/2019 02:42   Alpha Fetoprotein Latest Ref Range: 0 - 9 ng/mL 3137 (H)   Ca 19-9 Latest Ref Range: 0.0 - 35.0 U/mL 9.3     Pathology:  No liver pathology available     Radiology:   CT liver January 7, 2020 at [facility]:  1.  There is no significant interval change in size of the large hepatocellular carcinoma.  2.  There is continued arterial enhancement with washout consistent with residual disease.  3.  There are no new suspicious hepatic lesions.  4.  There is a stable 2.4 cm simple appearing pancreatic tail cyst, possibly postinflammatory.      LI-RADS: LR-5: definitely HCC, previously treated      Femur xray December 5, 2019 at [facility]:  4 cm maximal diameter oval sclerotic lesion in the posterior right distal femur which corresponds to location of activity on recent bone scan. Radiographic findings are consistent with enchondroma. No lytic lesions are appreciated.    Nuclear medicine bone scan November 22, 2019 at [facility]:  1.  Focal uptake in the distal RIGHT femur concerning for metastasis.  2.  Degenerative changes in the lower lumbar spine, shoulders, and RIGHT knee.    Interventional radiology procedure on November 5, 2019 at [facility] ([place]):  1.  RIGHT HEPATIC ARTERY TAE (BLAND TRANSARTERIAL EMBOLIZATION) WITH EMBOSPHERES FOR TREATMENT OF A LARGE HYPERVASCULAR HEPATOMA OCCUPYING MOST OF THE RIGHT HEPATIC LOBE CONCORDANT WITH MRI AND CT FINDINGS.      MRI abdomen November 5, 2019 at [facility]:  1.  Large heterogeneously enhancing hepatic mass, likely representing primary hepatic neoplasm. Correlation with AFP would be helpful.   2.  Cystic mass in the pancreatic tail. No communication to the pancreatic duct or ductal dilatation identified. Findings may be related to prior pancreatitis.   3.  Diverticulosis.      CT chest November 1, 2019 at [facility]:   Small right and trace left pleural effusions.  Atelectasis/possible  mild edema or pneumonitis within the lower lobes.  No pulmonary consolidation or mass.  Large right hepatic lobe heterogeneously enhancing mass consistent with malignant neoplasm/hepatocellular carcinoma.  Hepatic steatosis. Splenomegaly.     Current Outpatient Medications   Medication Sig Dispense Refill   • gabapentin (NEURONTIN) 100 MG Cap Take 1 Cap by mouth 3 times a day. 90 Cap 1   • ondansetron (ZOFRAN ODT) 4 MG TABLET DISPERSIBLE Take 1 Tab by mouth every 6 hours as needed for Nausea. 20 Tab 0   • docusate sodium (COLACE) 100 MG Cap Take 1 Cap by mouth 2 times a day as needed for Constipation. 30 Cap 0   • losartan (COZAAR) 50 MG Tab Take 50 mg by mouth every day.       No current facility-administered medications for this encounter.        Allergies   Allergen Reactions   • Sulfa Drugs      Reaction unknown       Physical Exam   Constitutional: He is oriented to person, place, and time and well-developed, well-nourished, and in no distress. No distress.   HENT:   Head: Normocephalic.   Eyes: No scleral icterus.   Cardiovascular: Normal rate, regular rhythm and normal heart sounds.   Pulmonary/Chest: Effort normal and breath sounds normal. No respiratory distress. He has no wheezes. He has no rales.   Abdominal: Soft. Bowel sounds are normal. He exhibits no distension.   No ascites noted   Neurological: He is alert and oriented to person, place, and time. He has normal sensation and normal strength. He is not agitated and not disoriented. He displays no weakness, no tremor, facial symmetry, normal stance and normal speech. No cranial nerve deficit. Gait normal. Coordination and gait normal.   Skin: Skin is warm and dry. No rash noted. He is not diaphoretic. No erythema. No pallor.   Psychiatric: Mood, memory, affect and judgment normal.       ECOG Performance Status 0    Impression:   1. Unresectable multifocal hepatocellular carcinoma, amenable to ZULEYMA TACE.  2. Cirrhosis.   3. History of alcohol dependence  with current cessation.  4. Hypertension.  5. Thrombocytopenia.  6. Tobacco dependence.   7. Pancreatic lesion.    Plan:   Cristian Hobbs MD has reviewed 's history and imaging studies, examined the patient, and discussed treatment options.  is a candidate for palliative transarterial chemoembolization of a large unresectable hepatoma. We will require multiple embolization treatments due to the size. We discussed the method of the procedure at length including angiography and embolization as well as the expected clinical course with the possibility of post-embolization syndrome nausea and pain and hospitalization. We explained that this procedure is palliative and not curative and he may require additional modalities of intervention in the future. We discussed the possibility of tumor recurrence and development of future tumors and the need to continue surveillance. Future treatment depends on multiple factors including lab studies, imaging, and performance status.     We additionally discussed the risks, including bleeding and infection, damage to the arteries or adjacent tissue, reaction to any medications given during the procedure, potential side effects of contrast administration including renal damage, non-target embolization, radiation-induced ulcers or liver disease in the setting of radioembolization, liver failure, and death. The right shoulder pain may be referred pain from the tumor and this may get worse after the procedure.There is a risk the procedure will not be effective. We discussed alternatives to the procedure including surveillance with no intervention, Y90 SIRT, and SBRT. We recommend against Y90 SIRT due to the size of the lesion and explained that while we can perform ZULEYMA TACE given his current presentation, he may benefit from SBRT in the future. The patient verbalizes understanding of risks, benefits, and alternatives to IR intervention and elects to proceed. All  questions were answered. Continued alcohol cessation was discussed. Written pre- and post- procedure care instructions were provided. He has been scheduled for January 27.    YESSI Dunbar with Cristian Hobbs MD  Interventional Radiology   [address] ([id number])  [place] [address]  [phone]

## 2020-01-16 ENCOUNTER — HOSPITAL ENCOUNTER (OUTPATIENT)
Dept: RADIOLOGY | Facility: MEDICAL CENTER | Age: 70
End: 2020-01-16
Attending: SURGERY
Payer: MEDICARE

## 2020-01-16 DIAGNOSIS — K74.60 CIRRHOSIS OF LIVER WITHOUT ASCITES, UNSPECIFIED HEPATIC CIRRHOSIS TYPE (HCC): ICD-10-CM

## 2020-01-16 DIAGNOSIS — F10.11 HISTORY OF ALCOHOL ABUSE: ICD-10-CM

## 2020-01-16 DIAGNOSIS — C22.0 HEPATOMA (HCC): ICD-10-CM

## 2020-01-16 DIAGNOSIS — S36.116S: ICD-10-CM

## 2020-01-16 DIAGNOSIS — R10.9 ABDOMINAL PAIN, UNSPECIFIED ABDOMINAL LOCATION: ICD-10-CM

## 2020-01-16 RX ORDER — SODIUM CHLORIDE 9 MG/ML
INJECTION, SOLUTION INTRAVENOUS CONTINUOUS
Status: CANCELLED | OUTPATIENT
Start: 2020-01-27

## 2020-01-16 ASSESSMENT — ENCOUNTER SYMPTOMS
NAUSEA: 0
CHILLS: 0
ABDOMINAL PAIN: 1
CONSTIPATION: 0
DIARRHEA: 0
WEIGHT LOSS: 0
NEUROLOGICAL NEGATIVE: 1
RESPIRATORY NEGATIVE: 1
FEVER: 0
ROS GI COMMENTS: NEGATIVE FOR ASCITES
BLOOD IN STOOL: 0
ROS SKIN COMMENTS: NEGATIVE FOR JAUNDICE
BRUISES/BLEEDS EASILY: 0
VOMITING: 0
CARDIOVASCULAR NEGATIVE: 1

## 2020-01-16 ASSESSMENT — LIFESTYLE VARIABLES: SUBSTANCE_ABUSE: 1

## 2020-01-21 DIAGNOSIS — Z01.812 PRE-OPERATIVE LABORATORY EXAMINATION: ICD-10-CM

## 2020-01-21 LAB
ALBUMIN SERPL BCP-MCNC: 4 G/DL (ref 3.2–4.9)
ALBUMIN/GLOB SERPL: 1 G/DL
ALP SERPL-CCNC: 120 U/L (ref 30–99)
ALT SERPL-CCNC: 13 U/L (ref 2–50)
ANION GAP SERPL CALC-SCNC: 8 MMOL/L (ref 0–11.9)
AST SERPL-CCNC: 20 U/L (ref 12–45)
BASOPHILS # BLD AUTO: 0.5 % (ref 0–1.8)
BASOPHILS # BLD: 0.03 K/UL (ref 0–0.12)
BILIRUB SERPL-MCNC: 0.5 MG/DL (ref 0.1–1.5)
BUN SERPL-MCNC: 24 MG/DL (ref 8–22)
CALCIUM SERPL-MCNC: 10.2 MG/DL (ref 8.5–10.5)
CHLORIDE SERPL-SCNC: 104 MMOL/L (ref 96–112)
CO2 SERPL-SCNC: 25 MMOL/L (ref 20–33)
CREAT SERPL-MCNC: 0.98 MG/DL (ref 0.5–1.4)
EOSINOPHIL # BLD AUTO: 0.08 K/UL (ref 0–0.51)
EOSINOPHIL NFR BLD: 1.3 % (ref 0–6.9)
ERYTHROCYTE [DISTWIDTH] IN BLOOD BY AUTOMATED COUNT: 47.2 FL (ref 35.9–50)
GLOBULIN SER CALC-MCNC: 3.9 G/DL (ref 1.9–3.5)
GLUCOSE SERPL-MCNC: 111 MG/DL (ref 65–99)
HCT VFR BLD AUTO: 41.6 % (ref 42–52)
HGB BLD-MCNC: 13.3 G/DL (ref 14–18)
IMM GRANULOCYTES # BLD AUTO: 0.01 K/UL (ref 0–0.11)
IMM GRANULOCYTES NFR BLD AUTO: 0.2 % (ref 0–0.9)
INR PPP: 0.95 (ref 0.87–1.13)
LYMPHOCYTES # BLD AUTO: 1.15 K/UL (ref 1–4.8)
LYMPHOCYTES NFR BLD: 19.2 % (ref 22–41)
MCH RBC QN AUTO: 30.8 PG (ref 27–33)
MCHC RBC AUTO-ENTMCNC: 32 G/DL (ref 33.7–35.3)
MCV RBC AUTO: 96.3 FL (ref 81.4–97.8)
MONOCYTES # BLD AUTO: 0.61 K/UL (ref 0–0.85)
MONOCYTES NFR BLD AUTO: 10.2 % (ref 0–13.4)
NEUTROPHILS # BLD AUTO: 4.11 K/UL (ref 1.82–7.42)
NEUTROPHILS NFR BLD: 68.6 % (ref 44–72)
NRBC # BLD AUTO: 0 K/UL
NRBC BLD-RTO: 0 /100 WBC
PLATELET # BLD AUTO: 155 K/UL (ref 164–446)
PMV BLD AUTO: 8.8 FL (ref 9–12.9)
POTASSIUM SERPL-SCNC: 4.8 MMOL/L (ref 3.6–5.5)
PROT SERPL-MCNC: 7.9 G/DL (ref 6–8.2)
PROTHROMBIN TIME: 12.8 SEC (ref 12–14.6)
RBC # BLD AUTO: 4.32 M/UL (ref 4.7–6.1)
SODIUM SERPL-SCNC: 137 MMOL/L (ref 135–145)
WBC # BLD AUTO: 6 K/UL (ref 4.8–10.8)

## 2020-01-21 PROCEDURE — 82105 ALPHA-FETOPROTEIN SERUM: CPT

## 2020-01-21 PROCEDURE — 85610 PROTHROMBIN TIME: CPT

## 2020-01-21 PROCEDURE — 85025 COMPLETE CBC W/AUTO DIFF WBC: CPT

## 2020-01-21 PROCEDURE — 80053 COMPREHEN METABOLIC PANEL: CPT

## 2020-01-21 PROCEDURE — 36415 COLL VENOUS BLD VENIPUNCTURE: CPT

## 2020-01-22 LAB — AFP-TM SERPL-MCNC: 2197 NG/ML (ref 0–9)

## 2020-01-27 ENCOUNTER — HOSPITAL ENCOUNTER (OUTPATIENT)
Facility: MEDICAL CENTER | Age: 70
End: 2020-01-27
Attending: RADIOLOGY | Admitting: RADIOLOGY
Payer: MEDICARE

## 2020-01-27 ENCOUNTER — APPOINTMENT (OUTPATIENT)
Dept: RADIOLOGY | Facility: MEDICAL CENTER | Age: 70
End: 2020-01-27
Attending: RADIOLOGY
Payer: MEDICARE

## 2020-01-27 VITALS
HEIGHT: 71 IN | DIASTOLIC BLOOD PRESSURE: 63 MMHG | RESPIRATION RATE: 17 BRPM | SYSTOLIC BLOOD PRESSURE: 98 MMHG | WEIGHT: 200.18 LBS | OXYGEN SATURATION: 95 % | TEMPERATURE: 97.4 F | HEART RATE: 79 BPM | BODY MASS INDEX: 28.02 KG/M2

## 2020-01-27 DIAGNOSIS — C22.0 HEPATOMA (HCC): ICD-10-CM

## 2020-01-27 PROCEDURE — A9270 NON-COVERED ITEM OR SERVICE: HCPCS

## 2020-01-27 PROCEDURE — 99153 MOD SED SAME PHYS/QHP EA: CPT

## 2020-01-27 PROCEDURE — 700105 HCHG RX REV CODE 258: Performed by: NURSE PRACTITIONER

## 2020-01-27 PROCEDURE — 700111 HCHG RX REV CODE 636 W/ 250 OVERRIDE (IP): Performed by: RADIOLOGY

## 2020-01-27 PROCEDURE — 700111 HCHG RX REV CODE 636 W/ 250 OVERRIDE (IP): Mod: JG

## 2020-01-27 PROCEDURE — 160002 HCHG RECOVERY MINUTES (STAT)

## 2020-01-27 PROCEDURE — 700111 HCHG RX REV CODE 636 W/ 250 OVERRIDE (IP)

## 2020-01-27 PROCEDURE — 75774 ARTERY X-RAY EACH VESSEL: CPT | Mod: XU

## 2020-01-27 PROCEDURE — 700102 HCHG RX REV CODE 250 W/ 637 OVERRIDE(OP)

## 2020-01-27 PROCEDURE — 700117 HCHG RX CONTRAST REV CODE 255: Performed by: RADIOLOGY

## 2020-01-27 RX ORDER — OXYCODONE HYDROCHLORIDE 10 MG/1
10 TABLET ORAL
Status: DISCONTINUED | OUTPATIENT
Start: 2020-01-27 | End: 2020-01-27 | Stop reason: HOSPADM

## 2020-01-27 RX ORDER — SODIUM CHLORIDE 9 MG/ML
500 INJECTION, SOLUTION INTRAVENOUS
Status: ACTIVE | OUTPATIENT
Start: 2020-01-27 | End: 2020-01-27

## 2020-01-27 RX ORDER — DEXAMETHASONE SODIUM PHOSPHATE 4 MG/ML
12 INJECTION, SOLUTION INTRA-ARTICULAR; INTRALESIONAL; INTRAMUSCULAR; INTRAVENOUS; SOFT TISSUE
Status: COMPLETED | OUTPATIENT
Start: 2020-01-27 | End: 2020-01-27

## 2020-01-27 RX ORDER — OXYCODONE HYDROCHLORIDE 5 MG/1
5 TABLET ORAL
Status: DISCONTINUED | OUTPATIENT
Start: 2020-01-27 | End: 2020-01-27 | Stop reason: HOSPADM

## 2020-01-27 RX ORDER — HEPARIN SODIUM 1000 [USP'U]/ML
5000 INJECTION, SOLUTION INTRAVENOUS; SUBCUTANEOUS ONCE
Status: COMPLETED | OUTPATIENT
Start: 2020-01-27 | End: 2020-01-27

## 2020-01-27 RX ORDER — VERAPAMIL HYDROCHLORIDE 2.5 MG/ML
INJECTION, SOLUTION INTRAVENOUS
Status: COMPLETED
Start: 2020-01-27 | End: 2020-01-27

## 2020-01-27 RX ORDER — VERAPAMIL HYDROCHLORIDE 2.5 MG/ML
2.5 INJECTION, SOLUTION INTRAVENOUS ONCE
Status: COMPLETED | OUTPATIENT
Start: 2020-01-27 | End: 2020-01-27

## 2020-01-27 RX ORDER — VERAPAMIL HYDROCHLORIDE 2.5 MG/ML
INJECTION, SOLUTION INTRAVENOUS
Status: DISCONTINUED
Start: 2020-01-27 | End: 2020-01-27 | Stop reason: HOSPADM

## 2020-01-27 RX ORDER — MORPHINE SULFATE 4 MG/ML
4 INJECTION, SOLUTION INTRAMUSCULAR; INTRAVENOUS
Status: DISCONTINUED | OUTPATIENT
Start: 2020-01-27 | End: 2020-01-27 | Stop reason: HOSPADM

## 2020-01-27 RX ORDER — MIDAZOLAM HYDROCHLORIDE 1 MG/ML
INJECTION INTRAMUSCULAR; INTRAVENOUS
Status: COMPLETED
Start: 2020-01-27 | End: 2020-01-27

## 2020-01-27 RX ORDER — HEPARIN SODIUM,PORCINE 1000/ML
VIAL (ML) INJECTION
Status: COMPLETED
Start: 2020-01-27 | End: 2020-01-27

## 2020-01-27 RX ORDER — ONDANSETRON 2 MG/ML
4 INJECTION INTRAMUSCULAR; INTRAVENOUS PRN
Status: ACTIVE | OUTPATIENT
Start: 2020-01-27 | End: 2020-01-27

## 2020-01-27 RX ORDER — ONDANSETRON 2 MG/ML
4 INJECTION INTRAMUSCULAR; INTRAVENOUS EVERY 8 HOURS PRN
Status: DISCONTINUED | OUTPATIENT
Start: 2020-01-27 | End: 2020-01-27 | Stop reason: HOSPADM

## 2020-01-27 RX ORDER — MIDAZOLAM HYDROCHLORIDE 1 MG/ML
.5-2 INJECTION INTRAMUSCULAR; INTRAVENOUS PRN
Status: ACTIVE | OUTPATIENT
Start: 2020-01-27 | End: 2020-01-27

## 2020-01-27 RX ORDER — OXYCODONE HYDROCHLORIDE 5 MG/1
TABLET ORAL
Status: COMPLETED
Start: 2020-01-27 | End: 2020-01-27

## 2020-01-27 RX ORDER — OXYCODONE HYDROCHLORIDE 5 MG/1
5 TABLET ORAL EVERY 4 HOURS PRN
Qty: 30 TAB | Refills: 0 | Status: SHIPPED | OUTPATIENT
Start: 2020-01-27 | End: 2020-02-01

## 2020-01-27 RX ORDER — SODIUM CHLORIDE 9 MG/ML
INJECTION, SOLUTION INTRAVENOUS CONTINUOUS
Status: DISCONTINUED | OUTPATIENT
Start: 2020-01-27 | End: 2020-01-27 | Stop reason: HOSPADM

## 2020-01-27 RX ORDER — ONDANSETRON 4 MG/1
4 TABLET, FILM COATED ORAL EVERY 4 HOURS PRN
Qty: 20 TAB | Refills: 2 | Status: SHIPPED | OUTPATIENT
Start: 2020-01-27 | End: 2020-02-01

## 2020-01-27 RX ORDER — CEFTRIAXONE SODIUM 10 G/100ML
2 INJECTION, POWDER, FOR SOLUTION INTRAVENOUS
Status: COMPLETED | OUTPATIENT
Start: 2020-01-27 | End: 2020-01-27

## 2020-01-27 RX ADMIN — MIDAZOLAM HYDROCHLORIDE 1 MG: 1 INJECTION, SOLUTION INTRAMUSCULAR; INTRAVENOUS at 16:02

## 2020-01-27 RX ADMIN — HEPARIN SODIUM 5000 UNITS: 1000 INJECTION, SOLUTION INTRAVENOUS; SUBCUTANEOUS at 15:43

## 2020-01-27 RX ADMIN — MIDAZOLAM HYDROCHLORIDE 1 MG: 1 INJECTION, SOLUTION INTRAMUSCULAR; INTRAVENOUS at 16:17

## 2020-01-27 RX ADMIN — DEXAMETHASONE SODIUM PHOSPHATE 12 MG: 4 INJECTION, SOLUTION INTRA-ARTICULAR; INTRALESIONAL; INTRAMUSCULAR; INTRAVENOUS; SOFT TISSUE at 13:53

## 2020-01-27 RX ADMIN — IOHEXOL 100 ML: 300 INJECTION, SOLUTION INTRAVENOUS at 16:49

## 2020-01-27 RX ADMIN — MIDAZOLAM HYDROCHLORIDE 1 MG: 1 INJECTION, SOLUTION INTRAMUSCULAR; INTRAVENOUS at 15:49

## 2020-01-27 RX ADMIN — VERAPAMIL HYDROCHLORIDE 2.5 MG: 2.5 INJECTION INTRAVENOUS at 15:43

## 2020-01-27 RX ADMIN — MIDAZOLAM HYDROCHLORIDE 1 MG: 1 INJECTION, SOLUTION INTRAMUSCULAR; INTRAVENOUS at 15:32

## 2020-01-27 RX ADMIN — MIDAZOLAM HYDROCHLORIDE 1 MG: 1 INJECTION, SOLUTION INTRAMUSCULAR; INTRAVENOUS at 15:36

## 2020-01-27 RX ADMIN — FENTANYL CITRATE 25 MCG: 50 INJECTION, SOLUTION INTRAMUSCULAR; INTRAVENOUS at 15:36

## 2020-01-27 RX ADMIN — ONDANSETRON HYDROCHLORIDE 16 MG: 2 SOLUTION INTRAMUSCULAR; INTRAVENOUS at 13:51

## 2020-01-27 RX ADMIN — OXYCODONE HYDROCHLORIDE 5 MG: 5 TABLET ORAL at 17:49

## 2020-01-27 RX ADMIN — NITROGLYCERIN 0.5 ML: 20 INJECTION INTRAVENOUS at 15:43

## 2020-01-27 RX ADMIN — FENTANYL CITRATE 25 MCG: 50 INJECTION, SOLUTION INTRAMUSCULAR; INTRAVENOUS at 16:16

## 2020-01-27 RX ADMIN — FENTANYL CITRATE 25 MCG: 0.05 INJECTION, SOLUTION INTRAMUSCULAR; INTRAVENOUS at 15:32

## 2020-01-27 RX ADMIN — CEFTRIAXONE SODIUM 2 G: 10 INJECTION, POWDER, FOR SOLUTION INTRAVENOUS at 14:12

## 2020-01-27 RX ADMIN — FENTANYL CITRATE 25 MCG: 50 INJECTION, SOLUTION INTRAMUSCULAR; INTRAVENOUS at 15:32

## 2020-01-27 RX ADMIN — VERAPAMIL HYDROCHLORIDE 2.5 MG: 2.5 INJECTION, SOLUTION INTRAVENOUS at 15:43

## 2020-01-27 RX ADMIN — FENTANYL CITRATE 25 MCG: 50 INJECTION, SOLUTION INTRAMUSCULAR; INTRAVENOUS at 15:49

## 2020-01-27 RX ADMIN — SODIUM CHLORIDE: 9 INJECTION, SOLUTION INTRAVENOUS at 13:47

## 2020-01-27 NOTE — PROGRESS NOTES
"Pharmacy Chemotherapy calculation:    Protocol: TACE with doxorubicin loaded Oncozene microspheres    *Dosing Reference*  Doxorubicin 100-150 mg, loaded in 100 micron microspheres   Or  Irinotecan 100 mg, loaded in 100 micron microspheres    Uriel CM et al. Transarterial chemoembolization of unresectable hepatocellular carcinoma with drug eluting beads: results of an open-label study of 62 patients. 2008. Cardiovasc Intervent Radiol. Mar-Apr;31(2):269-80.   Debbie MILES et al. Chemoembolization clinic: tumor response to a new, small diameter drug-eluting embolic in hepatocellular carcinoma (HCC). Abstract #143 SIR March 2015: Presentation March 2, 2015.   Ellie CANDELARIO et al. Continuation of: First experiences with superselective TANDEM® TACE in [place]. Abstract #333. (JVIR. 2014. 25:F022-122).     Dx: HCC     Allergies:  Sulfa drugs     Ht 1.803 m (5' 11\")   Wt 91.4 kg (201 lb 8 oz)   BMI 28.10 kg/m²  Body surface area is 2.14 meters squared.    Labs not required    Drug Order   (Drug name, dose, route, IV Fluid & volume, frequency, number of doses)      Previous treatment: n/a     Medication = doxorubicin  Base Dose = 150 mg   Size = 100 micron Oncozene microspheres  Calc Dose: fixed dose  Final Dose = 150 mg  Route = Intrahepatic  Fluid & Volume = in 60 mL syringe  Admin Duration = to be administered during procedure by interventional radiologist   To be given in IR       <5% difference, ok to treat with final written dose     By my signature below, I confirm this process was performed independently with the BSA and all final chemotherapy dosing calculations congruent. I have reviewed the above chemotherapy order and that my calculation of the final dose and BSA (when applicable) corroborate those calculations of the  pharmacist. Discrepancies of 5% or greater in the written dose have been addressed and documented within the [facility] Progress notes.    Donovan Chanel, JackD      "

## 2020-01-27 NOTE — PROGRESS NOTES
IR Nursing Note:    Procedure Confirmed with MD, patient and RN pre procedure. Consent obtained by MD with all questions answered and placed in Patient's cart. Patient assisted to the table in IR 3 in the supine position with all bony prominences padded and draped in sterile fashion.    ZULEYMA TACE of Liver Lesion by MD Hobbs assisted by RT Hong, left radial access site.     End Tidal CO2 range 16-39 during procedure.     Patient tolerated procedure, hemodynamically stable; pt drowsy but easily aroused post procedure; report given to YASMIN Huizar; patient transported to PPU via IR RN monitored then transferred care to report RN.    Right Hepatic Artery  [employer] Embosphere Microspheres 300-500um  REF# [id number] LOT# [id number]  EXP. 09/30/2021

## 2020-01-27 NOTE — PROGRESS NOTES
"Pharmacy Chemotherapy Calculation:    Patient Name: Lorraine Bella  Dx: HCC      Protocol: TACE with doxorubicin loaded Oncozene microspheres    *Dosing Reference*  Doxorubicin 100-150 mg, loaded in 100 micron microspheres   Or  Irinotecan 100 mg, loaded in 100 micron microspheres   Uriel CM et al. Transarterial chemoembolization of unresectable hepatocellular carcinoma with drug eluting beads: results of an open-label study of 62 patients. 2008. Cardiovasc Intervent Radiol. Mar-Apr;31(2):269-80.   Debbie MILES et al. Chemoembolization clinic: tumor response to a new, small diameter drug-eluting embolic in hepatocellular carcinoma (HCC). Abstract #143 SIR March 2015: Presentation March 2, 2015.   Ellie CANDELARIO, et al. Continuation of: First experiences with superselective TANDEM® TACE in [place]. Abstract #333. (JVIR. 2014. 25:B474-046).     Allergies:  Sulfa drugs       Ht 1.803 m (5' 11\")   Wt 91.4 kg (201 lb 8 oz)   BMI 28.10 kg/m²  Body surface area is 2.14 meters squared.    Labs not required    DOXOrubicin (Adriamycin) 150 mg fixed dose in Oncozene microspheres 100 micron   No Calc required, ok to proceed with dose as ordered    For TACE procedure in radiology to be administered  by Radiologist      Jevon Cristina, PharmD, BCOP      "

## 2020-01-27 NOTE — OR SURGEON
Immediate Post- Operative Note        PostOp Diagnosis: R lobe HCC.      Procedure(s): R lobe TACE.       Estimated Blood Loss: Less than 5 ml        Complications: None            1/27/2020     1645 PM     Cristian Hobbs M.D.

## 2020-01-28 DIAGNOSIS — C22.0 HEPATOMA (HCC): ICD-10-CM

## 2020-01-28 DIAGNOSIS — K74.60 CIRRHOSIS OF LIVER WITHOUT ASCITES, UNSPECIFIED HEPATIC CIRRHOSIS TYPE (HCC): ICD-10-CM

## 2020-01-28 NOTE — OR NURSING
1659 Pt over from IR post TACE of liver procedure. Left wrist site with TR band in place, site CDI and no signs of bleeding, pt awake and alert, denies pain and nausea, VSS. Spouse at bedside.  1710 Tolerating orals  1730 2mls of air removed from TR band, site CDI and no signs of bleeding  1745 3 mls of air removed from TR band, bleeding noted, air replaced  1800 2 mls of air removed from TR band, site CDI and no signs of bleeding  1815 3 mls of air removed from TR band, site CDI and no signs of bleeding  1830 3 mls of air removed from TR band, site CDI and no signs of bleeding  1840 Final 3 mls of air removed from TR band, site CDI and no signs of bleeding.  TR band removed, gauze and tegaderm placed, no signs of bleeding.  1843 Criteria met  1845 Discharge instructions provided to pt and spouse. Discussed diet, activity, follow up, prescriptions and symptom management. Pt and  spousr state understanding. Pt and spouse state all questions have been answered. Copy of discharge provided to pt. Sling provided to pt.  1855 Pt ambulated to restroom without incident.  1900 Pt wheeled off of unit with all belongings by RN without incident.

## 2020-01-28 NOTE — DISCHARGE INSTRUCTIONS
ACTIVITY: Rest and take it easy for the first 24 hours.  A responsible adult is recommended to remain with you during that time.  It is normal to feel sleepy.  We encourage you to not do anything that requires balance, judgment or coordination.    MILD FLU-LIKE SYMPTOMS ARE NORMAL. YOU MAY EXPERIENCE GENERALIZED MUSCLE ACHES, THROAT IRRITATION, HEADACHE AND/OR SOME NAUSEA.    FOR 24 HOURS DO NOT:  Drive, operate machinery or run household appliances.  Drink beer or alcoholic beverages.   Make important decisions or sign legal documents.    SPECIAL INSTRUCTIONS:   Chemoembolization for Liver Cancer: The Procedure  Chemoembolization is a way to treat cancer in the liver. It can be used for cancer that starts in the liver. Or it can be used for cancer that has spread (metastasized) to the liver from other parts of the body. The procedure treats only cancer in the liver. It is done by a specially trained doctor (interventional radiologist).   How does chemoembolization work?  The hepatic artery is a large blood vessel. It sends blood to the liver. To grow, a liver tumor takes most of its blood from this artery. During the procedure, chemotherapy medicines are put into the hepatic artery. The artery is then blocked off from the rest of the body. This makes sure the medicines stay in the liver. And it cuts off blood to the tumor.      The goals of chemoembolization  · Block the tumor’s blood flow so it gets no oxygen or nutrients.   · Send high doses of chemotherapy medicines right to the tumor site.   · Keep chemotherapy medicines in the tumor for long periods of time.   · Reduce side effects to the rest of the body. This is because the medicines don't leave the liver.     During the procedure  When you arrive for the procedure, an IV (intravenous) line will be put into your arm. This IV will give you fluids and medicine to prepare your body for the procedure. This preparation may take several hours. To begin the  procedure:   · The healthcare provider puts a long, flexible tube (catheter) into an artery in your groin.   · The provider puts an X-ray dye (contrast medium) through the catheter. This helps the artery and catheter show up better on X-rays. The provider can see the catheter's movement on a video screen.   · The provider guides the catheter to the hepatic artery in the liver. He or she then moves it to the tumor.   · The provider injects the chemoembolization medicines through the catheter. He or she then injects a substance that blocks the artery.   · The catheter is removed. The provider puts pressure on the insertion site for 15 minutes. This is to prevent bleeding.   · You will lie flat for several hours. During this time, the IV line will give you fluids. You will likely stay in the hospital for a few days after the procedure.   Side effects of chemoembolization  Side effects include tiredness, belly pain, fever, nausea, and loss of appetite. These may last for several days. Medicines can help reduce certain side effects.   Possible risks  · Blood clot in a blood vessel  · Infection or bruising where the catheter was inserted  · Death of normal liver tissue, which may lead to liver failure   · Damage to the gallbladder or other nearby organs  · Problems because of the X-ray dye, such as an allergic reaction or kidney damage   · Damage to an artery  · Death    Radial Site Care  Introduction  Refer to this sheet in the next few weeks. These instructions provide you with information about caring for yourself after your procedure. Your health care provider may also give you more specific instructions. Your treatment has been planned according to current medical practices, but problems sometimes occur. Call your health care provider if you have any problems or questions after your procedure.  What can I expect after the procedure?  After your procedure, it is typical to have the following:  · Bruising at the radial  site that usually fades within 1-2 weeks.  · Blood collecting in the tissue (hematoma) that may be painful to the touch. It should usually decrease in size and tenderness within 1-2 weeks.  Follow these instructions at home:  · Take medicines only as directed by your health care provider.  · You may shower 24 hours after the procedure or as directed by your health care provider. Remove the bandage (dressing) and gently wash the site with plain soap and water. Pat the area dry with a clean towel. Do not rub the site, because this may cause bleeding.  · Do not take baths, swim, or use a hot tub until your health care provider approves.  · Check your insertion site every day for redness, swelling, or drainage.  · Do not apply powder or lotion to the site.  · Do not flex or bend the affected arm for 24 hours or as directed by your health care provider.  · Do not push or pull heavy objects with the affected arm for 24 hours or as directed by your health care provider.  · Do not lift over 10 lb (4.5 kg) for 5 days after your procedure or as directed by your health care provider.  · Ask your health care provider when it is okay to:  ¨ Return to work or school.  ¨ Resume usual physical activities or sports.  ¨ Resume sexual activity.  · Do not drive home if you are discharged the same day as the procedure. Have someone else drive you.  · You may drive 24 hours after the procedure unless otherwise instructed by your health care provider.  · Do not operate machinery or power tools for 24 hours after the procedure.  · If your procedure was done as an outpatient procedure, which means that you went home the same day as your procedure, a responsible adult should be with you for the first 24 hours after you arrive home.  · Keep all follow-up visits as directed by your health care provider. This is important.  Contact a health care provider if:  · You have a fever.  · You have chills.  · You have increased bleeding from the radial  site. Hold pressure on the site.  Get help right away if:  · You have unusual pain at the radial site.  · You have redness, warmth, or swelling at the radial site.  · You have drainage (other than a small amount of blood on the dressing) from the radial site.  · The radial site is bleeding, and the bleeding does not stop after 30 minutes of holding steady pressure on the site.  · Your arm or hand becomes pale, cool, tingly, or numb.  This information is not intended to replace advice given to you by your health care provider. Make sure you discuss any questions you have with your health care provider.  Document Released: 01/20/2012 Document Revised: 05/25/2017 Document Reviewed: 07/06/2015  © 2017 [employer]    DIET: To avoid nausea, slowly advance diet as tolerated, avoiding spicy or greasy foods for the first day.  Add more substantial food to your diet according to your physician's instructions.  INCREASE FLUIDS AND FIBER TO AVOID CONSTIPATION.    SURGICAL DRESSING/BATHING:   Leave dressing in place for 24 hours, may shower once removed    FOLLOW-UP APPOINTMENT:  A follow-up appointment should be arranged with your doctor; call to schedule.    You should CALL YOUR PHYSICIAN if you develop:  Fever greater than 101 degrees F.  Pain not relieved by medication, or persistent nausea or vomiting.  Excessive bleeding (blood soaking through dressing) or unexpected drainage from the wound.  Extreme redness or swelling around the incision site, drainage of pus or foul smelling drainage.  Inability to urinate or empty your bladder within 8 hours.  Problems with breathing or chest pain.    You should call 911 if you develop problems with breathing or chest pain.  If you are unable to contact your doctor or surgical center, you should go to the nearest emergency room or urgent care center.  Physician's telephone #: [phone]    If any questions arise, call your doctor.  If your doctor is not available, please feel free to call  the Surgical Center at [phone].  The Center is open Monday through Friday from 7AM to 7PM.  You can also call the HEALTH HOTLINE open 24 hours/day, 7 days/week and speak to a nurse at [phone], or toll free at [phone].    A registered nurse may call you a few days after your surgery to see how you are doing after your procedure.    MEDICATIONS: Resume taking daily medication.  Take prescribed pain medication with food.  If no medication is prescribed, you may take non-aspirin pain medication if needed.  PAIN MEDICATION CAN BE VERY CONSTIPATING.  Take a stool softener or laxative such as senokot, pericolace, or milk of magnesia if needed.      If your physician has prescribed pain medication that includes Acetaminophen (Tylenol), do not take additional Acetaminophen (Tylenol) while taking the prescribed medication.    Depression / Suicide Risk    As you are discharged from this [facility] facility, it is important to learn how to keep safe from harming yourself.    Recognize the warning signs:  · Abrupt changes in personality, positive or negative- including increase in energy   · Giving away possessions  · Change in eating patterns- significant weight changes-  positive or negative  · Change in sleeping patterns- unable to sleep or sleeping all the time   · Unwillingness or inability to communicate  · Depression  · Unusual sadness, discouragement and loneliness  · Talk of wanting to die  · Neglect of personal appearance   · Rebelliousness- reckless behavior  · Withdrawal from people/activities they love  · Confusion- inability to concentrate     If you or a loved one observes any of these behaviors or has concerns about self-harm, here's what you can do:  · Talk about it- your feelings and reasons for harming yourself  · Remove any means that you might use to hurt yourself (examples: pills, rope, extension cords, firearm)  · Get professional help from the community (Mental Health, Substance  Abuse, psychological counseling)  · Do not be alone:Call your Safe Contact- someone whom you trust who will be there for you.  · Call your local CRISIS HOTLINE 421-8760 or 354-815-8225  · Call your local Children's Mobile Crisis Response Team [place] (193) 723-0337 or www.[employer]  · Call the toll free National Suicide Prevention Hotlines   · National Suicide Prevention Lifeline 962-178-GWNL (9720)  · National Hope Line Network 800-SUICIDE (470-7778)

## 2020-01-28 NOTE — PROGRESS NOTES
In prescribing controlled substances to this patient, I certify that I have obtained and reviewed the medical history of Lorraine Bella. I have also made a good roldan effort to obtain applicable records from other providers who have treated the patient and records did not demonstrate any increased risk of substance abuse that would prevent me from prescribing controlled substances.     I have conducted a physical exam and documented it. I have reviewed Mr. Bella’s prescription history as maintained by the [place] Prescription Monitoring Program.     I have assessed the patient’s risk for abuse, dependency, and addiction using the validated Opioid Risk Tool available at https://www.mdcalc.com/etrpnt-msrv-hkte-ort-narcotic-abuse.     Given the above, I believe the benefits of controlled substance therapy outweigh the risks. The reasons for prescribing controlled substances include non-narcotic, oral analgesic alternatives have been inadequate for pain control. Accordingly, I have discussed the risk and benefits, treatment plan, and alternative therapies with the patient.

## 2020-01-29 RX ORDER — SODIUM CHLORIDE 9 MG/ML
INJECTION, SOLUTION INTRAVENOUS CONTINUOUS
Status: CANCELLED | OUTPATIENT
Start: 2020-02-28

## 2020-01-31 NOTE — PROGRESS NOTES
Pt contacted APRN regarding fever earlier in the week. He is s/p [facility] TACE and f/u phone call on 1/28 to discuss f/u and next TACE appt he was doing quite well with no complaints. After call, he developed fever of 103, body aches, chills, and fatigue. He is much better now but still tired, no recent fevers, and chief complaint is constipation. C/o mild abdominal pain but he thinks it is related to constipation rather than liver intervention. Asking if he is still a candidate for next TACE procedure scheduled 2/28.    Advised pt to seek care immediately for return of fever/ chills. It is not part of the expected post procedure course and requires work up. Explained that additional concerns would be infection with liver abscess, however he is improving at this point and would only recommend imaging if his fever returns. Also discussed next procedure with plan to repeat labs and clinic f/u in February prior to scheduled TACE. Will defer procedure after evaluation if needed. Advised pt of additional options for OTC constipation medications as the daily stool softeners are not helping.

## 2020-02-24 ENCOUNTER — HOSPITAL ENCOUNTER (OUTPATIENT)
Dept: LAB | Facility: MEDICAL CENTER | Age: 70
End: 2020-02-24
Attending: NURSE PRACTITIONER
Payer: MEDICARE

## 2020-02-24 DIAGNOSIS — K74.60 CIRRHOSIS OF LIVER WITHOUT ASCITES, UNSPECIFIED HEPATIC CIRRHOSIS TYPE (HCC): ICD-10-CM

## 2020-02-24 DIAGNOSIS — C22.0 HEPATOMA (HCC): ICD-10-CM

## 2020-02-24 LAB
ALBUMIN SERPL BCP-MCNC: 4 G/DL (ref 3.2–4.9)
ALBUMIN/GLOB SERPL: 1.2 G/DL
ALP SERPL-CCNC: 117 U/L (ref 30–99)
ALT SERPL-CCNC: 12 U/L (ref 2–50)
ANION GAP SERPL CALC-SCNC: 6 MMOL/L (ref 0–11.9)
AST SERPL-CCNC: 23 U/L (ref 12–45)
BASOPHILS # BLD AUTO: 0.6 % (ref 0–1.8)
BASOPHILS # BLD: 0.03 K/UL (ref 0–0.12)
BILIRUB SERPL-MCNC: 0.5 MG/DL (ref 0.1–1.5)
BUN SERPL-MCNC: 17 MG/DL (ref 8–22)
CALCIUM SERPL-MCNC: 9.3 MG/DL (ref 8.5–10.5)
CHLORIDE SERPL-SCNC: 104 MMOL/L (ref 96–112)
CO2 SERPL-SCNC: 26 MMOL/L (ref 20–33)
CREAT SERPL-MCNC: 0.85 MG/DL (ref 0.5–1.4)
EOSINOPHIL # BLD AUTO: 0.09 K/UL (ref 0–0.51)
EOSINOPHIL NFR BLD: 1.7 % (ref 0–6.9)
ERYTHROCYTE [DISTWIDTH] IN BLOOD BY AUTOMATED COUNT: 47.1 FL (ref 35.9–50)
FASTING STATUS PATIENT QL REPORTED: NORMAL
GLOBULIN SER CALC-MCNC: 3.4 G/DL (ref 1.9–3.5)
GLUCOSE SERPL-MCNC: 120 MG/DL (ref 65–99)
HCT VFR BLD AUTO: 42.2 % (ref 42–52)
HGB BLD-MCNC: 13.2 G/DL (ref 14–18)
IMM GRANULOCYTES # BLD AUTO: 0.01 K/UL (ref 0–0.11)
IMM GRANULOCYTES NFR BLD AUTO: 0.2 % (ref 0–0.9)
INR PPP: 0.96 (ref 0.87–1.13)
LYMPHOCYTES # BLD AUTO: 0.91 K/UL (ref 1–4.8)
LYMPHOCYTES NFR BLD: 17 % (ref 22–41)
MCH RBC QN AUTO: 29.8 PG (ref 27–33)
MCHC RBC AUTO-ENTMCNC: 31.3 G/DL (ref 33.7–35.3)
MCV RBC AUTO: 95.3 FL (ref 81.4–97.8)
MONOCYTES # BLD AUTO: 0.47 K/UL (ref 0–0.85)
MONOCYTES NFR BLD AUTO: 8.8 % (ref 0–13.4)
NEUTROPHILS # BLD AUTO: 3.85 K/UL (ref 1.82–7.42)
NEUTROPHILS NFR BLD: 71.7 % (ref 44–72)
NRBC # BLD AUTO: 0 K/UL
NRBC BLD-RTO: 0 /100 WBC
PLATELET # BLD AUTO: 145 K/UL (ref 164–446)
PMV BLD AUTO: 8.7 FL (ref 9–12.9)
POTASSIUM SERPL-SCNC: 4.1 MMOL/L (ref 3.6–5.5)
PROT SERPL-MCNC: 7.4 G/DL (ref 6–8.2)
PROTHROMBIN TIME: 13 SEC (ref 12–14.6)
RBC # BLD AUTO: 4.43 M/UL (ref 4.7–6.1)
SODIUM SERPL-SCNC: 136 MMOL/L (ref 135–145)
WBC # BLD AUTO: 5.4 K/UL (ref 4.8–10.8)

## 2020-02-24 PROCEDURE — 85610 PROTHROMBIN TIME: CPT

## 2020-02-24 PROCEDURE — 85025 COMPLETE CBC W/AUTO DIFF WBC: CPT

## 2020-02-24 PROCEDURE — 82105 ALPHA-FETOPROTEIN SERUM: CPT

## 2020-02-24 PROCEDURE — 36415 COLL VENOUS BLD VENIPUNCTURE: CPT

## 2020-02-24 PROCEDURE — 80053 COMPREHEN METABOLIC PANEL: CPT

## 2020-02-24 ASSESSMENT — ENCOUNTER SYMPTOMS
BRUISES/BLEEDS EASILY: 0
NEUROLOGICAL NEGATIVE: 1
FEVER: 0
CARDIOVASCULAR NEGATIVE: 1
VOMITING: 0
WEIGHT LOSS: 0
CONSTIPATION: 0
ROS SKIN COMMENTS: NEGATIVE FOR JAUNDICE
ROS GI COMMENTS: NEGATIVE FOR ASCITES
RESPIRATORY NEGATIVE: 1
NAUSEA: 0
BLOOD IN STOOL: 0
DIARRHEA: 0
CHILLS: 0

## 2020-02-24 ASSESSMENT — LIFESTYLE VARIABLES: SUBSTANCE_ABUSE: 1

## 2020-02-24 NOTE — PROGRESS NOTES
Interventional Oncology Consultation      Re: Lorraine Bella     MRN: [id number]   : 1950    Lorraine Bella was referred by River Gtz MD. He is a 69 y.o. male seen in clinic for evaluation and possible repeat intervention of unresectable multifocal hepatocellular carcinoma. He is also under the care of Cole Alonso DO, Yon Vazquez MD, and Nori Birch MD.    History of Present Illness:   has a history of heavy alcohol use and cirrhosis. In November he developed acute severe abdominal pain and right upper quadrant swelling that woke him up in the middle of the night. He presented to the ED and imaging revealed a large hepatoma measuring about 12 cm involving the majority of the right lobe and infiltrating the left with possible intratumoral hematoma. He underwent emergent bland embolization of the hepatoma on  and was discharged to home on .  was then referred to interventional radiology for evaluation and management.  · 19 bland embolization right liver 12 cm mass (Teresa)  · 20 ZULEYMA TACE right liver mass ([place])    He is seen today for review of imaging studies and discussion of an upcoming second stage drug eluting bead chemoembolization. After the January ZULEYMA TACE, Mr. Bella had been recovering well for a couple of days but then developed chills, aches, fatigue, and fever up to 104. He thought it was from the procedure and did not seek care. Today, he reports has completely recovered. He denies abdominal pain, jaundice, and ascites. His appetite is good and he has gained a couple of pounds.He has trouble sleeping at night and sometimes falls asleep during the day but does not routinely nap every day. He has been more active since the weather has been warmer. He feels like he has lost some of his strength and endurance since his illness but is still active. He stopped drinking alcohol entirely. He smokes about 1/2 pack of  "cigarettes daily. He has a pancreatic mass with a scheduled EGD in April for workup. He is retired and accompanied by his wife to the appointment today.     Past Medical History:   Diagnosis Date   • Alcohol use 3/31/2016    2-3 per day   • Borderline diabetes    • Cancer (HCC) 01/2020    liver    • Cataract     itzel IOL    • Dental disorder     needs dentures upper   • Diabetes (HCC)     diet controlled    • Disorder of thyroid    • Glaucoma     left eye    • High cholesterol    • Hypertension      Past Surgical History:   Procedure Laterality Date   • OTHER  12/2019    \"IR embolization\"    • PARATHYROIDECTOMY N/A 5/13/2016    Procedure: PARATHYROIDECTOMY;  Surgeon: Kale Lord M.D.;  Location: SURGERY SAME DAY [facility] ORS;  Service:    • CATARACT PHACO WITH IOL Left 4/7/2016    Procedure: CATARACT PHACO WITH IOL;  Surgeon: Ephraim Jamison M.D.;  Location: SURGERY [facility] ORS;  Service:    • BONE GRAFT Right 1960's    right wrist   • WRIST ORIF Right 1960's     Social History     Socioeconomic History   • Marital status:      Spouse name: Not on file   • Number of children: Not on file   • Years of education: Not on file   • Highest education level: Not on file   Occupational History   • Not on file   Social Needs   • Financial resource strain: Not on file   • Food insecurity     Worry: Not on file     Inability: Not on file   • Transportation needs     Medical: Not on file     Non-medical: Not on file   Tobacco Use   • Smoking status: Current Every Day Smoker     Packs/day: 1.00     Years: 30.00     Pack years: 30.00     Types: Cigarettes     Start date: 1/1/1990   • Smokeless tobacco: Never Used   Substance and Sexual Activity   • Alcohol use: Not Currently     Alcohol/week: 2.4 oz     Types: 4 Shots of liquor per week     Frequency: 4 or more times a week     Drinks per session: 3 or 4     Binge frequency: Less than monthly     Comment: pt quit drinking 2 months ago   • Drug use: No   • Sexual " activity: Not on file   Lifestyle   • Physical activity     Days per week: Not on file     Minutes per session: Not on file   • Stress: Not on file   Relationships   • Social connections     Talks on phone: Not on file     Gets together: Not on file     Attends Sikh service: Not on file     Active member of club or organization: Not on file     Attends meetings of clubs or organizations: Not on file     Relationship status: Not on file   • Intimate partner violence     Fear of current or ex partner: Not on file     Emotionally abused: Not on file     Physically abused: Not on file     Forced sexual activity: Not on file   Other Topics Concern   • Not on file   Social History Narrative   • Not on file     No family history on file.    Review of Systems   Constitutional: Positive for malaise/fatigue. Negative for chills, diaphoresis, fever and weight loss.   Respiratory: Negative.    Cardiovascular: Negative.    Gastrointestinal: Negative for abdominal pain, blood in stool, constipation, diarrhea, nausea and vomiting.        Negative for ascites   Musculoskeletal: Positive for joint pain.   Skin: Negative.  Negative for itching.        Negative for jaundice   Neurological: Negative.    Endo/Heme/Allergies: Does not bruise/bleed easily.   Psychiatric/Behavioral: Positive for substance abuse (positive for tobacco. Negative for current alcohol use.).     A comprehensive 14-point review of systems was negative except as described above.     Labs:   Results for VALDO DIAZ (MRN [id number]) as of 2/25/2020 10:37   Ref. Range 1/21/2020 08:27 1/27/2020 17:00 2/24/2020 06:15   WBC Latest Ref Range: 4.8 - 10.8 K/uL 6.0  5.4   RBC Latest Ref Range: 4.70 - 6.10 M/uL 4.32 (L)  4.43 (L)   Hemoglobin Latest Ref Range: 14.0 - 18.0 g/dL 13.3 (L)  13.2 (L)   Hematocrit Latest Ref Range: 42.0 - 52.0 % 41.6 (L)  42.2   MCV Latest Ref Range: 81.4 - 97.8 fL 96.3  95.3   MCH Latest Ref Range: 27.0 - 33.0 pg 30.8  29.8   MCHC  Latest Ref Range: 33.7 - 35.3 g/dL 32.0 (L)  31.3 (L)   RDW Latest Ref Range: 35.9 - 50.0 fL 47.2  47.1   Platelet Count Latest Ref Range: 164 - 446 K/uL 155 (L)  145 (L)   MPV Latest Ref Range: 9.0 - 12.9 fL 8.8 (L)  8.7 (L)   Neutrophils-Polys Latest Ref Range: 44.00 - 72.00 % 68.60  71.70   Neutrophils (Absolute) Latest Ref Range: 1.82 - 7.42 K/uL 4.11  3.85   Lymphocytes Latest Ref Range: 22.00 - 41.00 % 19.20 (L)  17.00 (L)   Lymphs (Absolute) Latest Ref Range: 1.00 - 4.80 K/uL 1.15  0.91 (L)   Monocytes Latest Ref Range: 0.00 - 13.40 % 10.20  8.80   Monos (Absolute) Latest Ref Range: 0.00 - 0.85 K/uL 0.61  0.47   Eosinophils Latest Ref Range: 0.00 - 6.90 % 1.30  1.70   Eos (Absolute) Latest Ref Range: 0.00 - 0.51 K/uL 0.08  0.09   Basophils Latest Ref Range: 0.00 - 1.80 % 0.50  0.60   Baso (Absolute) Latest Ref Range: 0.00 - 0.12 K/uL 0.03  0.03   Immature Granulocytes Latest Ref Range: 0.00 - 0.90 % 0.20  0.20   Immature Granulocytes (abs) Latest Ref Range: 0.00 - 0.11 K/uL 0.01  0.01   Nucleated RBC Latest Units: /100 WBC 0.00  0.00   NRBC (Absolute) Latest Units: K/uL 0.00  0.00   Sodium Latest Ref Range: 135 - 145 mmol/L   136   Potassium Latest Ref Range: 3.6 - 5.5 mmol/L   4.1   Chloride Latest Ref Range: 96 - 112 mmol/L   104   Co2 Latest Ref Range: 20 - 33 mmol/L   26   Anion Gap Latest Ref Range: 0.0 - 11.9    6.0   Glucose Latest Ref Range: 65 - 99 mg/dL   120 (H)   Bun Latest Ref Range: 8 - 22 mg/dL   17   Creatinine Latest Ref Range: 0.50 - 1.40 mg/dL   0.85   GFR If  Latest Ref Range: >60 mL/min/1.73 m 2   >60   GFR If Non  Latest Ref Range: >60 mL/min/1.73 m 2   >60   Calcium Latest Ref Range: 8.5 - 10.5 mg/dL   9.3   AST(SGOT) Latest Ref Range: 12 - 45 U/L   23   ALT(SGPT) Latest Ref Range: 2 - 50 U/L   12   Alkaline Phosphatase Latest Ref Range: 30 - 99 U/L   117 (H)   Total Bilirubin Latest Ref Range: 0.1 - 1.5 mg/dL   0.5   Albumin Latest Ref Range: 3.2 - 4.9  g/dL   4.0   Total Protein Latest Ref Range: 6.0 - 8.2 g/dL   7.4   Globulin Latest Ref Range: 1.9 - 3.5 g/dL   3.4   A-G Ratio Latest Units: g/dL   1.2   Fasting Status Unknown   Fasting   INR Latest Ref Range: 0.87 - 1.13  0.95  0.96   PT Latest Ref Range: 12.0 - 14.6 sec 12.8  13.0   Alpha Fetoprotein Latest Ref Range: 0 - 9 ng/mL 2197 (H)       Child Rodriguez Class A  MARILU Grade A1  NLR 4.24    Unknown lab due to poor legibility, December 6, 2019  Sodium 140  Potassium 4.0  CREATININE 0.9  TBILIRUBIN 0.4  ALBUMIN 4.0    AFP 1939.0      Ref. Range 11/2/2019 02:00   WBC Latest Ref Range: 4.8 - 10.8 K/uL 4.7 (L)   RBC Latest Ref Range: 4.70 - 6.10 M/uL 3.52 (L)   Hemoglobin Latest Ref Range: 14.0 - 18.0 g/dL 11.7 (L)   Hematocrit Latest Ref Range: 42.0 - 52.0 % 35.1 (L)   MCV Latest Ref Range: 81.4 - 97.8 fL 99.7 (H)   MCH Latest Ref Range: 27.0 - 33.0 pg 33.2 (H)   MCHC Latest Ref Range: 33.7 - 35.3 g/dL 33.3 (L)   RDW Latest Ref Range: 35.9 - 50.0 fL 43.6   Platelet Count Latest Ref Range: 164 - 446 K/uL 101 (L)   MPV Latest Ref Range: 9.0 - 12.9 fL 8.1 (L)   Neutrophils-Polys Latest Ref Range: 44.00 - 72.00 % 66.10   Neutrophils (Absolute) Latest Ref Range: 1.82 - 7.42 K/uL 3.12   Lymphocytes Latest Ref Range: 22.00 - 41.00 % 17.30 (L)   Lymphs (Absolute) Latest Ref Range: 1.00 - 4.80 K/uL 0.82 (L)   Monocytes Latest Ref Range: 0.00 - 13.40 % 13.70 (H)   Monos (Absolute) Latest Ref Range: 0.00 - 0.85 K/uL 0.65   Eosinophils Latest Ref Range: 0.00 - 6.90 % 1.90   Eos (Absolute) Latest Ref Range: 0.00 - 0.51 K/uL 0.09   Basophils Latest Ref Range: 0.00 - 1.80 % 0.60   Baso (Absolute) Latest Ref Range: 0.00 - 0.12 K/uL 0.03   Immature Granulocytes Latest Ref Range: 0.00 - 0.90 % 0.40   Immature Granulocytes (abs) Latest Ref Range: 0.00 - 0.11 K/uL 0.02   Nucleated RBC Latest Units: /100 WBC 0.00   NRBC (Absolute) Latest Units: K/uL 0.00      Ref. Range 11/2/2019 02:00   Sodium Latest Ref Range: 135 - 145 mmol/L 138    Potassium Latest Ref Range: 3.6 - 5.5 mmol/L 3.7   Chloride Latest Ref Range: 96 - 112 mmol/L 107   Co2 Latest Ref Range: 20 - 33 mmol/L 24   Anion Gap Latest Ref Range: 0.0 - 11.9  7.0   Glucose Latest Ref Range: 65 - 99 mg/dL 125 (H)   Bun Latest Ref Range: 8 - 22 mg/dL 10   Creatinine Latest Ref Range: 0.50 - 1.40 mg/dL 0.73   GFR If  Latest Ref Range: >60 mL/min/1.73 m 2 >60   GFR If Non  Latest Ref Range: >60 mL/min/1.73 m 2 >60   Calcium Latest Ref Range: 8.5 - 10.5 mg/dL 8.0 (L)   AST(SGOT) Latest Ref Range: 12 - 45 U/L 17   ALT(SGPT) Latest Ref Range: 2 - 50 U/L 9   Alkaline Phosphatase Latest Ref Range: 30 - 99 U/L 95   Total Bilirubin Latest Ref Range: 0.1 - 1.5 mg/dL 0.4   Albumin Latest Ref Range: 3.2 - 4.9 g/dL 2.9 (L)   Total Protein Latest Ref Range: 6.0 - 8.2 g/dL 5.6 (L)   Globulin Latest Ref Range: 1.9 - 3.5 g/dL 2.7   A-G Ratio Latest Units: g/dL 1.1   Phosphorus Latest Ref Range: 2.5 - 4.5 mg/dL 3.5   Magnesium Latest Ref Range: 1.5 - 2.5 mg/dL 1.9   Glycohemoglobin Latest Ref Range: 0.0 - 5.6 % 5.8 (H)   Estim. Avg Glu Latest Units: mg/dL 120      Ref. Range 11/1/2019 10:53   INR Latest Ref Range: 0.87 - 1.13  1.03   PT Latest Ref Range: 12.0 - 14.6 sec 13.7   APTT Latest Ref Range: 24.7 - 36.0 sec 29.9      Ref. Range 11/1/2019 02:42   Alpha Fetoprotein Latest Ref Range: 0 - 9 ng/mL 3137 (H)   Ca 19-9 Latest Ref Range: 0.0 - 35.0 U/mL 9.3     Pathology:  No liver pathology available     Radiology:   Interventional Radiology procedure January 27, 2020:  1.  DEBTACE procedure of the superior aspect of the large right hypervascular hepatic mass with total dose doxorubicin 150 mg administered as 100 mu Oncozene beads.  2.  The patient is returned to the post procedure unit for observation.    [facility] 7, 2020 [facility]:  1.  There is no significant interval change in size of the large hepatocellular carcinoma.  2.  There is continued arterial enhancement  with washout consistent with residual disease.  3.  There are no new suspicious hepatic lesions.  4.  There is a stable 2.4 cm simple appearing pancreatic tail cyst, possibly postinflammatory.      LI-RADS: LR-5: definitely HCC, previously treated      Femur xray December 5, 2019 at [facility]:  4 cm maximal diameter oval sclerotic lesion in the posterior right distal femur which corresponds to location of activity on recent bone scan. Radiographic findings are consistent with enchondroma. No lytic lesions are appreciated.    Nuclear medicine bone scan November 22, 2019 at [facility]:  1.  Focal uptake in the distal RIGHT femur concerning for metastasis.  2.  Degenerative changes in the lower lumbar spine, shoulders, and RIGHT knee.    Interventional radiology procedure on November 5, 2019 at [facility] ([facility]):  1.  RIGHT HEPATIC ARTERY TAE (BLAND TRANSARTERIAL EMBOLIZATION) WITH EMBOSPHERES FOR TREATMENT OF A LARGE HYPERVASCULAR HEPATOMA OCCUPYING MOST OF THE RIGHT HEPATIC LOBE CONCORDANT WITH MRI AND CT FINDINGS.      MRI abdomen November 5, 2019 at [facility]:  1.  Large heterogeneously enhancing hepatic mass, likely representing primary hepatic neoplasm. Correlation with AFP would be helpful.   2.  Cystic mass in the pancreatic tail. No communication to the pancreatic duct or ductal dilatation identified. Findings may be related to prior pancreatitis.   3.  Diverticulosis.      CT chest November 1, 2019 at [facility]:   Small right and trace left pleural effusions.  Atelectasis/possible mild edema or pneumonitis within the lower lobes.  No pulmonary consolidation or mass.  Large right hepatic lobe heterogeneously enhancing mass consistent with malignant neoplasm/hepatocellular carcinoma.  Hepatic steatosis. Splenomegaly.     Current Outpatient Medications   Medication Sig Dispense Refill   • losartan (COZAAR) 50 MG Tab Take 50 mg by mouth every morning.       No current facility-administered medications for this encounter.         Allergies   Allergen Reactions   • Sulfa Drugs      Reaction unknown       Physical Exam   Constitutional: He is oriented to person, place, and time and well-developed, well-nourished, and in no distress. No distress.   HENT:   Head: Normocephalic.   Eyes: No scleral icterus.   Cardiovascular: Normal rate, regular rhythm and normal heart sounds.   Pulmonary/Chest: Effort normal and breath sounds normal. No respiratory distress. He has no wheezes. He has no rales.   Abdominal: Soft. Bowel sounds are normal. He exhibits no distension.   No ascites noted   Neurological: He is alert and oriented to person, place, and time. He has normal sensation and normal strength. He is not agitated and not disoriented. He displays no weakness, no tremor, facial symmetry, normal stance and normal speech. No cranial nerve deficit. Gait normal. Coordination and gait normal.   Skin: Skin is warm and dry. No rash noted. He is not diaphoretic. No erythema. No pallor.   Psychiatric: Mood, memory, affect and judgment normal.     ECOG Performance Status 0    Impression:   1. Unresectable multifocal hepatocellular carcinoma, amenable to ZULEYMA TACE.  2. Cirrhosis.   3. History of alcohol dependence with current cessation.  4. Hypertension.  5. Thrombocytopenia.  6. Tobacco dependence.   7. Pancreatic lesion.    Plan:   Cristian Hobbs MD has reviewed 's history and imaging studies.  has recovered well from stage 1 palliative transarterial chemoembolization of a large unresectable hepatoma. We will move forward with the next embolization as planned in our prior consultation, which is scheduled for February 28. We reviewed the method of the procedure at length including angiography and embolization as well as the expected clinical course with the possibility of post-embolization syndrome nausea and pain and hospitalization. His described delayed post procedure fever and chills after the last ZULEYMA TACE are more consistent with  influenza-like illness symptoms rather than post embolization syndrome and we instructed him to seek immediate care for fevers that high in the future. We again explained that this procedure is palliative and not curative and he may require additional interventions in the future. We discussed the possibility of tumor recurrence and development of future tumors and the need to continue surveillance. Future treatment depends on multiple factors including lab studies, imaging, and performance status.     We additionally reviewed the risks, including bleeding and infection, damage to the arteries or adjacent tissue, reaction to any medications given during the procedure, potential side effects of contrast administration including renal damage, non-target embolization, liver failure, and death. There is a risk the procedure will not be effective. The patient verbalizes understanding of risks, benefits, and alternatives to IR intervention and elects to proceed. All questions were answered. Continued alcohol cessation was discussed. Written pre- and post- procedure care instructions were provided. He has been scheduled for February 28.    YESSI Dunbar with Cristian Hobbs MD  Interventional Radiology   [address] ([id number])  [place] [id number]  [phone]

## 2020-02-25 ENCOUNTER — HOSPITAL ENCOUNTER (OUTPATIENT)
Dept: RADIOLOGY | Facility: MEDICAL CENTER | Age: 70
End: 2020-02-25
Attending: NURSE PRACTITIONER
Payer: MEDICARE

## 2020-02-25 DIAGNOSIS — C22.0 HEPATOMA (HCC): ICD-10-CM

## 2020-02-25 DIAGNOSIS — K74.60 CIRRHOSIS OF LIVER WITHOUT ASCITES, UNSPECIFIED HEPATIC CIRRHOSIS TYPE (HCC): ICD-10-CM

## 2020-02-25 LAB — AFP-TM SERPL-MCNC: 2977 NG/ML (ref 0–9)

## 2020-02-25 ASSESSMENT — ENCOUNTER SYMPTOMS
ABDOMINAL PAIN: 0
DIAPHORESIS: 0

## 2020-02-27 RX ORDER — DEXAMETHASONE SODIUM PHOSPHATE 4 MG/ML
12 INJECTION, SOLUTION INTRA-ARTICULAR; INTRALESIONAL; INTRAMUSCULAR; INTRAVENOUS; SOFT TISSUE ONCE
Status: COMPLETED | OUTPATIENT
Start: 2020-02-28 | End: 2020-02-28

## 2020-02-28 ENCOUNTER — APPOINTMENT (OUTPATIENT)
Dept: RADIOLOGY | Facility: MEDICAL CENTER | Age: 70
End: 2020-02-28
Attending: RADIOLOGY
Payer: MEDICARE

## 2020-02-28 ENCOUNTER — HOSPITAL ENCOUNTER (OUTPATIENT)
Facility: MEDICAL CENTER | Age: 70
End: 2020-02-28
Attending: RADIOLOGY | Admitting: RADIOLOGY
Payer: MEDICARE

## 2020-02-28 VITALS
TEMPERATURE: 97.3 F | OXYGEN SATURATION: 92 % | RESPIRATION RATE: 16 BRPM | BODY MASS INDEX: 27.59 KG/M2 | WEIGHT: 197.09 LBS | DIASTOLIC BLOOD PRESSURE: 71 MMHG | HEIGHT: 71 IN | HEART RATE: 83 BPM | SYSTOLIC BLOOD PRESSURE: 117 MMHG

## 2020-02-28 DIAGNOSIS — C22.0 HEPATOCELLULAR CARCINOMA (HCC): ICD-10-CM

## 2020-02-28 PROCEDURE — 700105 HCHG RX REV CODE 258: Performed by: NURSE PRACTITIONER

## 2020-02-28 PROCEDURE — 36247 INS CATH ABD/L-EXT ART 3RD: CPT

## 2020-02-28 PROCEDURE — 36248 INS CATH ABD/L-EXT ART ADDL: CPT

## 2020-02-28 PROCEDURE — 700111 HCHG RX REV CODE 636 W/ 250 OVERRIDE (IP)

## 2020-02-28 PROCEDURE — 75774 ARTERY X-RAY EACH VESSEL: CPT | Mod: XU

## 2020-02-28 PROCEDURE — 700111 HCHG RX REV CODE 636 W/ 250 OVERRIDE (IP): Performed by: RADIOLOGY

## 2020-02-28 PROCEDURE — 160002 HCHG RECOVERY MINUTES (STAT)

## 2020-02-28 PROCEDURE — 37243 VASC EMBOLIZE/OCCLUDE ORGAN: CPT

## 2020-02-28 PROCEDURE — 700105 HCHG RX REV CODE 258: Performed by: RADIOLOGY

## 2020-02-28 PROCEDURE — 700117 HCHG RX CONTRAST REV CODE 255: Performed by: RADIOLOGY

## 2020-02-28 RX ORDER — MIDAZOLAM HYDROCHLORIDE 1 MG/ML
INJECTION INTRAMUSCULAR; INTRAVENOUS
Status: COMPLETED
Start: 2020-02-28 | End: 2020-02-28

## 2020-02-28 RX ORDER — VERAPAMIL HYDROCHLORIDE 2.5 MG/ML
2.5 INJECTION, SOLUTION INTRAVENOUS ONCE
Status: COMPLETED | OUTPATIENT
Start: 2020-02-28 | End: 2020-02-28

## 2020-02-28 RX ORDER — ONDANSETRON 2 MG/ML
4 INJECTION INTRAMUSCULAR; INTRAVENOUS PRN
Status: DISCONTINUED | OUTPATIENT
Start: 2020-02-28 | End: 2020-02-28 | Stop reason: HOSPADM

## 2020-02-28 RX ORDER — MIDAZOLAM HYDROCHLORIDE 1 MG/ML
.5-2 INJECTION INTRAMUSCULAR; INTRAVENOUS PRN
Status: DISCONTINUED | OUTPATIENT
Start: 2020-02-28 | End: 2020-02-28 | Stop reason: HOSPADM

## 2020-02-28 RX ORDER — OXYCODONE HYDROCHLORIDE 5 MG/1
5 TABLET ORAL
Status: DISCONTINUED | OUTPATIENT
Start: 2020-02-28 | End: 2020-02-28 | Stop reason: HOSPADM

## 2020-02-28 RX ORDER — SODIUM CHLORIDE 9 MG/ML
INJECTION, SOLUTION INTRAVENOUS CONTINUOUS
Status: DISCONTINUED | OUTPATIENT
Start: 2020-02-28 | End: 2020-02-28 | Stop reason: HOSPADM

## 2020-02-28 RX ORDER — MORPHINE SULFATE 4 MG/ML
4 INJECTION, SOLUTION INTRAMUSCULAR; INTRAVENOUS
Status: DISCONTINUED | OUTPATIENT
Start: 2020-02-28 | End: 2020-02-28 | Stop reason: HOSPADM

## 2020-02-28 RX ORDER — ONDANSETRON 2 MG/ML
INJECTION INTRAMUSCULAR; INTRAVENOUS
Status: COMPLETED
Start: 2020-02-28 | End: 2020-02-28

## 2020-02-28 RX ORDER — HEPARIN SODIUM 1000 [USP'U]/ML
5000 INJECTION, SOLUTION INTRAVENOUS; SUBCUTANEOUS ONCE
Status: COMPLETED | OUTPATIENT
Start: 2020-02-28 | End: 2020-02-28

## 2020-02-28 RX ORDER — ONDANSETRON 2 MG/ML
4 INJECTION INTRAMUSCULAR; INTRAVENOUS EVERY 8 HOURS PRN
Status: DISCONTINUED | OUTPATIENT
Start: 2020-02-28 | End: 2020-02-28 | Stop reason: HOSPADM

## 2020-02-28 RX ORDER — VERAPAMIL HYDROCHLORIDE 2.5 MG/ML
INJECTION, SOLUTION INTRAVENOUS
Status: COMPLETED
Start: 2020-02-28 | End: 2020-02-28

## 2020-02-28 RX ORDER — SODIUM CHLORIDE 9 MG/ML
500 INJECTION, SOLUTION INTRAVENOUS
Status: DISCONTINUED | OUTPATIENT
Start: 2020-02-28 | End: 2020-02-28 | Stop reason: HOSPADM

## 2020-02-28 RX ORDER — HEPARIN SODIUM,PORCINE 1000/ML
VIAL (ML) INJECTION
Status: COMPLETED
Start: 2020-02-28 | End: 2020-02-28

## 2020-02-28 RX ORDER — OXYCODONE HYDROCHLORIDE 10 MG/1
10 TABLET ORAL
Status: DISCONTINUED | OUTPATIENT
Start: 2020-02-28 | End: 2020-02-28 | Stop reason: HOSPADM

## 2020-02-28 RX ADMIN — ONDANSETRON 4 MG: 2 INJECTION INTRAMUSCULAR; INTRAVENOUS at 10:28

## 2020-02-28 RX ADMIN — IOHEXOL 44 ML: 300 INJECTION, SOLUTION INTRAVENOUS at 10:15

## 2020-02-28 RX ADMIN — FENTANYL CITRATE 50 MCG: 0.05 INJECTION, SOLUTION INTRAMUSCULAR; INTRAVENOUS at 09:05

## 2020-02-28 RX ADMIN — NITROGLYCERIN 0.5 ML: 20 INJECTION INTRAVENOUS at 09:26

## 2020-02-28 RX ADMIN — DEXAMETHASONE SODIUM PHOSPHATE 12 MG: 4 INJECTION, SOLUTION INTRA-ARTICULAR; INTRALESIONAL; INTRAMUSCULAR; INTRAVENOUS; SOFT TISSUE at 07:48

## 2020-02-28 RX ADMIN — MIDAZOLAM HYDROCHLORIDE 1 MG: 1 INJECTION, SOLUTION INTRAMUSCULAR; INTRAVENOUS at 09:15

## 2020-02-28 RX ADMIN — CEFTRIAXONE SODIUM 2 G: 2 INJECTION, POWDER, FOR SOLUTION INTRAMUSCULAR; INTRAVENOUS at 07:48

## 2020-02-28 RX ADMIN — SODIUM CHLORIDE: 9 INJECTION, SOLUTION INTRAVENOUS at 07:23

## 2020-02-28 RX ADMIN — ONDANSETRON HYDROCHLORIDE 16 MG: 2 SOLUTION INTRAMUSCULAR; INTRAVENOUS at 08:19

## 2020-02-28 RX ADMIN — HEPARIN SODIUM 5000 UNITS: 1000 INJECTION, SOLUTION INTRAVENOUS; SUBCUTANEOUS at 09:26

## 2020-02-28 RX ADMIN — VERAPAMIL HYDROCHLORIDE 2.5 MG: 2.5 INJECTION, SOLUTION INTRAVENOUS at 09:25

## 2020-02-28 RX ADMIN — MIDAZOLAM HYDROCHLORIDE 1 MG: 1 INJECTION, SOLUTION INTRAMUSCULAR; INTRAVENOUS at 09:20

## 2020-02-28 NOTE — OR NURSING
1003: Patient from radiology to PPU via gurney s/p TACE procedure. Patient is awake. VSS.LUE CMS intact. L TR band site intact. Vascular access care management per MD order (see assessment). No c/o pain or nausea at this time. Will monitor closely. Vital signs per MD order.   1015: Patient tolerating PO intake well.   1030: Dr Hobbs at bedside to update patient and wife. Patient c/o nausea and will medicate.   1100: Air completely off hemoband per MD order. Gauze and tegaderm dressing to L radial site. LUE circulation, movement and sensation intact.   1130: DC instructions given. Questions answered. Family at bedside. L radial site soft,CDI. No c/o pain or nausea. Patient wide awake. VSS. Patient met criteria for discharge.

## 2020-02-28 NOTE — PROGRESS NOTES
"Pharmacy Chemotherapy calculation:    Protocol: TACE with doxorubicin loaded Oncozene microspheres  *Dosing Reference*  Doxorubicin 100-150 mg, loaded in 100 micron microspheres   Or  Irinotecan 100 mg, loaded in 100 micron microspheres    Uriel CM et al. Transarterial chemoembolization of unresectable hepatocellular carcinoma with drug eluting beads: results of an open-label study of 62 patients. 2008. Cardiovasc Intervent Radiol. Mar-Apr;31(2):269-80.   Debbie MILES et al. Chemoembolization clinic: tumor response to a new, small diameter drug-eluting embolic in hepatocellular carcinoma (HCC). Abstract #143 SIR March 2015: Presentation March 2, 2015.   Ellie CANDELARIO et al. Continuation of: First experiences with superselective TANDEM® TACE in [place]. Abstract #333. (JVIR. 2014. 25:[id number]).     Dx: HCC     Allergies:  Sulfa drugs     Ht 1.803 m (5' 11\")   Wt 92 kg (202 lb 13.2 oz)   BMI 28.29 kg/m²  Body surface area is 2.15 meters squared.    Labs not required    Drug Order   (Drug name, dose, route, IV Fluid & volume, frequency, number of doses)      Previous treatment: TACE with Doxorubicin 1/27/2020     Medication = Doxorubicin (Adriamycin)  Base Dose = 150 mg   Size = 100 micron Oncozene microspheres  Calc Dose: fixed dose  Final Dose = 150 mg  Route = Intrahepatic  Fluid & Volume = in 60 mL syringe  Admin Duration = to be administered during procedure by interventional radiologist   To be given by MD        Fixed dose, no calculation required. Okay to treat with final dose.     By my signature below, I confirm this process was performed independently with the BSA and all final chemotherapy dosing calculations congruent. I have reviewed the above chemotherapy order and that my calculation of the final dose and BSA (when applicable) corroborate those calculations of the  pharmacist. Discrepancies of 10% or greater in the written dose have been addressed and documented within the EPIC Progress " notes.    Charlie Díaz, PharmD

## 2020-02-28 NOTE — DISCHARGE INSTRUCTIONS
ACTIVITY: Rest and take it easy for the first 24 hours.  A responsible adult is recommended to remain with you during that time.  It is normal to feel sleepy.  We encourage you to not do anything that requires balance, judgment or coordination.    MILD FLU-LIKE SYMPTOMS ARE NORMAL. YOU MAY EXPERIENCE GENERALIZED MUSCLE ACHES, THROAT IRRITATION, HEADACHE AND/OR SOME NAUSEA.    FOR 24 HOURS DO NOT:  Drive, operate machinery or run household appliances.  Drink beer or alcoholic beverages.   Make important decisions or sign legal documents.      DIET: To avoid nausea, slowly advance diet as tolerated, avoiding spicy or greasy foods for the first day.  Add more substantial food to your diet according to your physician's instructions.  Babies can be fed formula or breast milk as soon as they are hungry.  INCREASE FLUIDS AND FIBER TO AVOID CONSTIPATION.    SURGICAL DRESSING/BATHING:     Keep the dressing clean and dry for 24 hours.  May remove dressing tomorrow  and can shower.  Do not submerge site under water for 1 week.  No heavy lifting and limit movement for 2 days.  DO NOT DRIVE TODAY.        You should CALL YOUR PHYSICIAN if you develop:  Fever greater than 101 degrees F.  Pain not relieved by medication, or persistent nausea or vomiting.  Excessive bleeding (blood soaking through dressing) or unexpected drainage from the wound.  Extreme redness or swelling around the incision site, drainage of pus or foul smelling drainage.  Inability to urinate or empty your bladder within 8 hours.  Problems with breathing or chest pain.    You should call 911 if you develop problems with breathing or chest pain.  If you are unable to contact your doctor or surgical center, you should go to the nearest emergency room or urgent care center.      Physician's telephone #: [phone]    If any questions arise, call your doctor.  If your doctor is not available, please feel free to call the Surgical Center at [phone].  The Center  is open Monday through Friday from 7AM to 7PM.  You can also call the HEALTH HOTLINE open 24 hours/day, 7 days/week and speak to a nurse at [phone], or toll free at [phone].    A registered nurse may call you a few days after your surgery to see how you are doing after your procedure.    MEDICATIONS: Resume taking daily medication.  Take prescribed pain medication with food.  If no medication is prescribed, you may take non-aspirin pain medication if needed.  PAIN MEDICATION CAN BE VERY CONSTIPATING.  Take a stool softener or laxative such as senokot, pericolace, or milk of magnesia if needed.      If your physician has prescribed pain medication that includes Acetaminophen (Tylenol), do not take additional Acetaminophen (Tylenol) while taking the prescribed medication.    Depression / Suicide Risk    As you are discharged from this [facility] facility, it is important to learn how to keep safe from harming yourself.    Recognize the warning signs:  · Abrupt changes in personality, positive or negative- including increase in energy   · Giving away possessions  · Change in eating patterns- significant weight changes-  positive or negative  · Change in sleeping patterns- unable to sleep or sleeping all the time   · Unwillingness or inability to communicate  · Depression  · Unusual sadness, discouragement and loneliness  · Talk of wanting to die  · Neglect of personal appearance   · Rebelliousness- reckless behavior  · Withdrawal from people/activities they love  · Confusion- inability to concentrate     If you or a loved one observes any of these behaviors or has concerns about self-harm, here's what you can do:  · Talk about it- your feelings and reasons for harming yourself  · Remove any means that you might use to hurt yourself (examples: pills, rope, extension cords, firearm)  · Get professional help from the community (Mental Health, Substance Abuse, psychological counseling)  · Do not be  alone:Call your Safe Contact- someone whom you trust who will be there for you.  · Call your local CRISIS HOTLINE 201-8181 or 021-639-9666  · Call your local Children's Mobile Crisis Response Team [place] (194) 566-0567 or www.[employer]  · Call the toll free National Suicide Prevention Hotlines   · National Suicide Prevention Lifeline 341-438-RWTA (4425)  · [place] Line Network 800-SUICIDE (126-0023)                            Radial Catherization Discharge Instructions      · Do not subject hand/arm to any forceful movements for 24 hours    i.e. supporting weight when rising from the chair or bed.   · Do not drive a car for 24 hours  · You may remove the dressing tomorrow  · You may shower on the day following your procedure.  Do not take a tub bath or submerge the puncture site in water for 3 days following the procedure.  · No Lifting more than 3-5 pounds with affected wrist for 5 days  · Follow up with  in  2-4 weeks.  · Increase fluids for 2 days post procedure.  · Continue all previous medications unless otherwise instructed.    If bleeding should occur following discharge:  · Sit down and apply firm pressure to site with your fingers for 10 minutes  · If the bleeding stops, continue to sit quietly, keeping your wrist straight for 2 hours.  Notify physician as soon as possible ( [phone])  · If bleeding does not stop after 10 minutes, or if there is a large amount of bleeding or spurting, call 911 immediately.  Do not drive yourself to the hospital.

## 2020-02-28 NOTE — PROGRESS NOTES
Pharmacy Chemotherapy calculation:    Protocol: TACE with doxorubicin loaded Oncozene microspheres    *Dosing Reference*  Doxorubicin 100-150 mg, loaded in 100 micron microspheres   Or  Irinotecan 100 mg, loaded in 100 micron microspheres   Uriel CM et al. Transarterial chemoembolization of unresectable hepatocellular carcinoma with drug eluting beads: results of an open-label study of 62 patients. 2008. Cardiovasc Intervent Radiol. Mar-Apr;31(2):269-80.   Debbie MILES et al. Chemoembolization clinic: tumor response to a new, small diameter drug-eluting embolic in hepatocellular carcinoma (HCC). Abstract #143 SIR March 2015: Presentation March 2, 2015.   Ellie CANDELARIO, et al. Continuation of: First experiences with superselective TANDEM® TACE in [place]. Abstract #333. (JVIR. 2014. 25:D504-549).     Dx: HCC     Allergies:  Sulfa drugs       Labs not required    Fixed dose - no calculations required     1.  Doxorubicin 150 mg in Oncozene microspheres 100 micron              For TACE procedure in radiology to be administered  by Radiologist      Tasha Meza, Kendy, BCPS

## 2020-02-28 NOTE — PROGRESS NOTES
IR Nursing Note:    Patient underwent a transarterial chemoembolization of the liver lesion by Dr. Hobbs. Procedure site was marked by MD and verified using imaging guidance.  Patient was placed in a supine position.  Left radial artery was accessed.  Vitals were taken every 5 minutes and remained stable during procedure (see doc flow sheet for results).  CO2 waveform capnography was monitored and remained 36-41 throughout procedure. Lesion was successfully embolized utilizing doxorubicin loaded Oncozene microspheres and Embospheres.  A radial TR band was placed over the artery for hemostasis.. Report called to Addison HOFFMAN. Pt transported by yancy with RN to PPU.     [employer] Embosphere 500-700um Ref # [id number] Lot # [id number]

## 2020-02-28 NOTE — OR SURGEON
Immediate Post- Operative Note        PostOp Diagnosis: HCC.      Procedure(s): R lobe TACE.      Estimated Blood Loss: Less than 5 ml        Complications: None            2/28/2020    0948 AM     Cristian Hobbs M.D.

## 2020-03-02 DIAGNOSIS — C22.0 HEPATOMA (HCC): ICD-10-CM

## 2020-04-13 ENCOUNTER — HOSPITAL ENCOUNTER (OUTPATIENT)
Dept: LAB | Facility: MEDICAL CENTER | Age: 70
End: 2020-04-13
Attending: NURSE PRACTITIONER
Payer: MEDICARE

## 2020-04-13 ENCOUNTER — HOSPITAL ENCOUNTER (OUTPATIENT)
Dept: LAB | Facility: MEDICAL CENTER | Age: 70
End: 2020-04-13
Attending: INTERNAL MEDICINE
Payer: MEDICARE

## 2020-04-13 DIAGNOSIS — C22.0 HEPATOMA (HCC): ICD-10-CM

## 2020-04-13 LAB
ALBUMIN SERPL BCP-MCNC: 4 G/DL (ref 3.2–4.9)
ALBUMIN/GLOB SERPL: 1.1 G/DL
ALP SERPL-CCNC: 144 U/L (ref 30–99)
ALT SERPL-CCNC: 20 U/L (ref 2–50)
ANION GAP SERPL CALC-SCNC: 13 MMOL/L (ref 7–16)
AST SERPL-CCNC: 27 U/L (ref 12–45)
BASOPHILS # BLD AUTO: 0.8 % (ref 0–1.8)
BASOPHILS # BLD: 0.04 K/UL (ref 0–0.12)
BILIRUB SERPL-MCNC: 0.5 MG/DL (ref 0.1–1.5)
BUN SERPL-MCNC: 16 MG/DL (ref 8–22)
CALCIUM SERPL-MCNC: 9.5 MG/DL (ref 8.5–10.5)
CHLORIDE SERPL-SCNC: 101 MMOL/L (ref 96–112)
CO2 SERPL-SCNC: 24 MMOL/L (ref 20–33)
CREAT SERPL-MCNC: 0.79 MG/DL (ref 0.5–1.4)
EOSINOPHIL # BLD AUTO: 0.12 K/UL (ref 0–0.51)
EOSINOPHIL NFR BLD: 2.4 % (ref 0–6.9)
ERYTHROCYTE [DISTWIDTH] IN BLOOD BY AUTOMATED COUNT: 47.4 FL (ref 35.9–50)
GLOBULIN SER CALC-MCNC: 3.6 G/DL (ref 1.9–3.5)
GLUCOSE SERPL-MCNC: 124 MG/DL (ref 65–99)
HCT VFR BLD AUTO: 41.5 % (ref 42–52)
HGB BLD-MCNC: 13.3 G/DL (ref 14–18)
IMM GRANULOCYTES # BLD AUTO: 0.01 K/UL (ref 0–0.11)
IMM GRANULOCYTES NFR BLD AUTO: 0.2 % (ref 0–0.9)
INR PPP: 0.93 (ref 0.87–1.13)
LYMPHOCYTES # BLD AUTO: 1.16 K/UL (ref 1–4.8)
LYMPHOCYTES NFR BLD: 23.2 % (ref 22–41)
MCH RBC QN AUTO: 29.8 PG (ref 27–33)
MCHC RBC AUTO-ENTMCNC: 32 G/DL (ref 33.7–35.3)
MCV RBC AUTO: 92.8 FL (ref 81.4–97.8)
MONOCYTES # BLD AUTO: 0.54 K/UL (ref 0–0.85)
MONOCYTES NFR BLD AUTO: 10.8 % (ref 0–13.4)
NEUTROPHILS # BLD AUTO: 3.12 K/UL (ref 1.82–7.42)
NEUTROPHILS NFR BLD: 62.6 % (ref 44–72)
NRBC # BLD AUTO: 0 K/UL
NRBC BLD-RTO: 0 /100 WBC
PLATELET # BLD AUTO: 155 K/UL (ref 164–446)
PMV BLD AUTO: 8.8 FL (ref 9–12.9)
POTASSIUM SERPL-SCNC: 4.5 MMOL/L (ref 3.6–5.5)
PROT SERPL-MCNC: 7.6 G/DL (ref 6–8.2)
PROTHROMBIN TIME: 12.6 SEC (ref 12–14.6)
RBC # BLD AUTO: 4.47 M/UL (ref 4.7–6.1)
SODIUM SERPL-SCNC: 138 MMOL/L (ref 135–145)
WBC # BLD AUTO: 5 K/UL (ref 4.8–10.8)

## 2020-04-13 PROCEDURE — 82105 ALPHA-FETOPROTEIN SERUM: CPT

## 2020-04-13 PROCEDURE — 36415 COLL VENOUS BLD VENIPUNCTURE: CPT

## 2020-04-13 PROCEDURE — 85610 PROTHROMBIN TIME: CPT

## 2020-04-13 PROCEDURE — 80053 COMPREHEN METABOLIC PANEL: CPT

## 2020-04-13 PROCEDURE — 85025 COMPLETE CBC W/AUTO DIFF WBC: CPT

## 2020-04-14 ENCOUNTER — HOSPITAL ENCOUNTER (OUTPATIENT)
Dept: RADIOLOGY | Facility: MEDICAL CENTER | Age: 70
End: 2020-04-14
Attending: NURSE PRACTITIONER
Payer: MEDICARE

## 2020-04-14 DIAGNOSIS — C22.0 HEPATOMA (HCC): ICD-10-CM

## 2020-04-14 LAB — AFP-TM SERPL-MCNC: 3086 NG/ML (ref 0–9)

## 2020-04-14 PROCEDURE — 74160 CT ABDOMEN W/CONTRAST: CPT

## 2020-04-14 PROCEDURE — 700117 HCHG RX CONTRAST REV CODE 255: Performed by: NURSE PRACTITIONER

## 2020-04-14 RX ADMIN — IOHEXOL 100 ML: 350 INJECTION, SOLUTION INTRAVENOUS at 08:39

## 2020-04-15 DIAGNOSIS — C22.0 HEPATOMA (HCC): ICD-10-CM

## 2020-04-15 NOTE — PROGRESS NOTES
Updated labs and imaging reviewed with Dr. Hobbs. Labs show stable disease; little treatment effect noted on large hepatoma despite interval treatment with chemoembolization x 2. Repeat intervention would likely not yield any different results.     Recommend evaluation for radiation oncology intervention. Referral placed to [facility]. Pt contacted by Dr. Hobbs to discuss.  All questions answered.

## 2020-04-22 ENCOUNTER — PATIENT OUTREACH (OUTPATIENT)
Dept: OTHER | Facility: MEDICAL CENTER | Age: 70
End: 2020-04-22

## 2020-04-22 NOTE — PROGRESS NOTES
Late entry-     On 4/21/2020 at 1320 this ONN called patient. Patient's wife Sharlene answered. This ONN introduced self and explained role of nurse navigator. Sharlene requesting to come with patient to consult as patient may not retain all information. This ONN explained that at this time patient's were not allowed to bring anyone with them into appointments but that this ONN could call patient's wife Sharlene after consult with Dr. Skinner and explain next steps in plan of care. Sharlene agreeable to plan and handed phone to patient. This ONN introduced self and explained role of nurse navigator as added resource and support. Patient states he is well. Patient asked about what procedures would be done on 4/27/2020 at Dr. Skinner consult. This ONN explained that at the consult Dr. Skinner would review chart and imaging with patient and discuss options for plan of care. Patient denies any concerns at this time regarding finances or transportation. Patient states he is just anxious to hear what more can be done and get some answers. Patient states he did see Dr. Birch recently who recommended patient follow-up with Dr. Gtz. Patient states he has seen Dr. Gtz in the past. This ONN explained that patient could make an appt with Dr. Gtz but recommended patient keep appointment with Dr. Skinner. Patient agreeable. Patient took this ONN's name and number and this ONN encouraged patient to call as needed.

## 2020-04-27 ENCOUNTER — HOSPITAL ENCOUNTER (OUTPATIENT)
Dept: RADIATION ONCOLOGY | Facility: MEDICAL CENTER | Age: 70
End: 2020-04-30
Attending: RADIOLOGY
Payer: MEDICARE

## 2020-04-27 VITALS
BODY MASS INDEX: 28 KG/M2 | HEART RATE: 91 BPM | WEIGHT: 200 LBS | TEMPERATURE: 98.1 F | DIASTOLIC BLOOD PRESSURE: 67 MMHG | SYSTOLIC BLOOD PRESSURE: 111 MMHG | OXYGEN SATURATION: 98 % | HEIGHT: 71 IN

## 2020-04-27 PROCEDURE — 99214 OFFICE O/P EST MOD 30 MIN: CPT

## 2020-04-27 PROCEDURE — 99205 OFFICE O/P NEW HI 60 MIN: CPT | Performed by: RADIOLOGY

## 2020-04-27 ASSESSMENT — FIBROSIS 4 INDEX: FIB4 SCORE: 2.69

## 2020-04-27 ASSESSMENT — PAIN SCALES - GENERAL: PAINLEVEL: NO PAIN

## 2020-04-27 NOTE — NON-PROVIDER
"Patient was seen today in clinic with Dr. Rudy Skinner for consultation.  Vitals signs and weight were obtained and pain assessment was completed.  Allergies and medications were reviewed with the patient.  Review of systems completed.     Vitals/Pain:  Vitals:    04/27/20 0944   BP: 111/67   Pulse: 91   Temp: 36.7 °C (98.1 °F)   SpO2: 98%   Weight: 90.7 kg (200 lb)   Height: 1.803 m (5' 11\")   Pain Score: No pain        Allergies:   Sulfa drugs    Current Medications:  Current Outpatient Medications   Medication Sig Dispense Refill   • losartan (COZAAR) 50 MG Tab Take 50 mg by mouth every morning.       No current facility-administered medications for this encounter.          PCP:  Gavin Mac R.N.  "

## 2020-04-27 NOTE — CONSULTS
RADIATION ONCOLOGY CONSULT    DATE OF SERVICE: 4/27/2020    IDENTIFICATION: A 69 y.o. male with  Hepatoma (HCC)  Staging form: Liver, AJCC 8th Edition  - Clinical stage from 4/27/2020: Stage IIIB (cT4, cN0, cM0) - Signed by Rudy Skinner M.D. on 4/27/2020  Laterality: Right  Child-Rodriguez score: Score 5  Alpha fetoprotein (AFP) (ng/mL): 3,086  Stage used in treatment planning: Yes  National guidelines used in treatment planning: Yes  Type of national guideline used in treatment planning: NCCN     He is here at the kind request of Dr. Hobbs    HISTORY OF PRESENT ILLNESS:  Patient is a 69-year-old male with history of alcoholic cirrhosis and developed acute severe abdomen in November 2019 and presented to the ER with a large hepatoma measuring about 12 cm involving the majority the right lobe and infiltrating the left with possible intratumoral hematoma.  He underwent bland embolization on fifth and was discharged home on November 6 he was seen back in interventional radiology for TACE and underwent first TACE on January 27, 2020 with repeat TACE on February 28, 2020.  Patient had postop CT liver on April 14, 2020 which showed large hepatocellular carcinoma involving entire medial segment of left lobe the majority of right lobe with size and thickness of enhancement stable overall tumor stable however arterial phase enhancement with washout in the peripheral thickened portion consistent with residual tumor and portal hypertension with associated splenomegaly and dilated portal vein.  2.3 cm stable cyst in the pancreatic tail.  Patient currently is doing well he has recent AFP which was 3086 he denies any pain although does have intermittent headaches on the left side which have been going on for the past few months.  He does have some tinnitus.  He denies any bone pain and had bone scan in November which showed uptake in the right distal femur however x-ray showed that this was a stable enchondroma.    PAST MEDICAL  "HISTORY:   Past Medical History:   Diagnosis Date   • Alcohol use 3/31/2016    2-3 per day   • Borderline diabetes    • Cancer (HCC) 2019    liver    • Cataract     itzel IOL    • Dental disorder     needs dentures upper   • Diabetes (HCC)     diet controlled    • Disorder of thyroid     not on medication   • Glaucoma     left eye    • High cholesterol     not on medication   • Hypertension        PAST SURGICAL HISTORY:  Past Surgical History:   Procedure Laterality Date   • OTHER  12/2019    \"IR embolization\"    • PARATHYROIDECTOMY N/A 5/13/2016    Procedure: PARATHYROIDECTOMY;  Surgeon: Kale Lord M.D.;  Location: SURGERY SAME DAY [facility] ORS;  Service:    • CATARACT PHACO WITH IOL Left 4/7/2016    Procedure: CATARACT PHACO WITH IOL;  Surgeon: Ephraim Jamison M.D.;  Location: SURGERY [facility] ORS;  Service:    • BONE GRAFT Right 1960's    right wrist   • OTHER      chemoemolozation x2   • WRIST ORIF Right 1960's       CURRENT MEDICATIONS:  Current Outpatient Medications   Medication Sig Dispense Refill   • losartan (COZAAR) 50 MG Tab Take 50 mg by mouth every morning.       No current facility-administered medications for this encounter.        ALLERGIES:    Sulfa drugs    FAMILY HISTORY:    family history includes Cancer in his mother.    SOCIAL HISTORY:     reports that he has been smoking cigarettes. He started smoking about 30 years ago. He has a 15.00 pack-year smoking history. He has never used smokeless tobacco. He reports previous alcohol use of about 2.4 oz of alcohol per week. He reports that he does not use drugs.    REVIEW OF SYSTEMS:  Review of systems for today's date of service was reviewed and uploaded into the electronic medical record.     PAIN ASSESSMENT:  Pain Assessment 4/27/2020   Pain Score 0   Some recent data might be hidden          PHYSICAL EXAM:   PERFORMANCE STATUS:  Karnofsky Score 4/27/2020   Karnofsky Score 90   Some recent data might be hidden     No flowsheet data " "found.  /67   Pulse 91   Temp 36.7 °C (98.1 °F)   Ht 1.803 m (5' 11\")   Wt 90.7 kg (200 lb)   SpO2 98%   BMI 27.89 kg/m²          Pain Scale: 0-10  Pain Assessement: 0  Pain Location, Orientation and Scale: no complaints of pain at this time.      Physical Exam  Vitals signs reviewed.   Constitutional:       Appearance: Normal appearance.   HENT:      Head: Normocephalic and atraumatic.      Mouth/Throat:      Mouth: Mucous membranes are moist.   Eyes:      Pupils: Pupils are equal, round, and reactive to light.   Neck:      Musculoskeletal: Normal range of motion.   Cardiovascular:      Rate and Rhythm: Normal rate.   Pulmonary:      Effort: Pulmonary effort is normal.   Abdominal:      General: Abdomen is flat.   Musculoskeletal: Normal range of motion.   Neurological:      General: No focal deficit present.      Mental Status: He is alert.   Psychiatric:         Mood and Affect: Mood normal.           LABORATORY DATA:  Lab Results   Component Value Date/Time    WBC 5.0 04/13/2020 06:11 AM    RBC 4.47 (L) 04/13/2020 06:11 AM    HEMOGLOBIN 13.3 (L) 04/13/2020 06:11 AM    HEMATOCRIT 41.5 (L) 04/13/2020 06:11 AM    MCV 92.8 04/13/2020 06:11 AM    MCH 29.8 04/13/2020 06:11 AM    MCHC 32.0 (L) 04/13/2020 06:11 AM    RDW 47.4 04/13/2020 06:11 AM    PLATELETCT 155 (L) 04/13/2020 06:11 AM    MPV 8.8 (L) 04/13/2020 06:11 AM    NEUTSPOLYS 62.60 04/13/2020 06:11 AM    LYMPHOCYTES 23.20 04/13/2020 06:11 AM    MONOCYTES 10.80 04/13/2020 06:11 AM    EOSINOPHILS 2.40 04/13/2020 06:11 AM    BASOPHILS 0.80 04/13/2020 06:11 AM      Lab Results   Component Value Date/Time    SODIUM 138 04/13/2020 06:11 AM    POTASSIUM 4.5 04/13/2020 06:11 AM    CHLORIDE 101 04/13/2020 06:11 AM    CO2 24 04/13/2020 06:11 AM    GLUCOSE 124 (H) 04/13/2020 06:11 AM    BUN 16 04/13/2020 06:11 AM    CREATININE 0.79 04/13/2020 06:11 AM       AFP 3086    RADIOLOGY DATA:  Ir-embolization    Result Date: 2/28/2020  1.  DEBTACE procedure of the " inferior aspect of the large right hypervascular hepatic mass with total dose doxorubicin 150 mg administered as 100 mu Oncozene beads, followed by bland embolization to achieve stasis in the tumoral artery with 500-700 um embolic spheres. 2.  The patient is returned to the post procedure unit for observation.    Ct-abdomen Liver For Hepatic Mass (cirrhosis)    Result Date: 4/14/2020  1.  Large hepatocellular carcinoma involving the entire medial segment left lobe and majority of right lobe 2.  Size and thickness of enhancement are stable and therefore overall the tumor is stable 3.  There is however arterial phase enhancement with washout in the peripheral thickened portions consistent with residual tumor 4.  Portal hypertension with associated splenomegaly and dilation the portal vein 5.  Aortic and branch vessel atherosclerotic plaque 6.  Stable 2.3 cm cyst associated with the pancreatic tail LI-RADS: LR-Treated      IMPRESSION:    A 69 y.o. with  Hepatoma (HCC)  Staging form: Liver, AJCC 8th Edition  - Clinical stage from 4/27/2020: Stage IIIB (cT4, cN0, cM0) - Signed by Rudy Skinner M.D. on 4/27/2020  Laterality: Right  Child-Rodriguez score: Score 5  Alpha fetoprotein (AFP) (ng/mL): 3,086  Stage used in treatment planning: Yes  National guidelines used in treatment planning: Yes  Type of national guideline used in treatment planning: NCCN      RECOMMENDATIONS:   I discussed the role for external beam radiation in the treatment of hepatocellular carcinoma.  I explained that radiation therapy is enlarged as a therapeutic option for hepatic confined hepatocellular carcinoma with ability to use CT-based imaging as well as MRI with improved dose delivery techniques and respiratory motion management strategies.  The largest prospective report of SBRT is a phase 2 series out of [facility] with 102 patients with child's Rodriguez A and was treated with SBRT and found median survival of 17 months with low  risk of radiation-induced liver disease.    More recently a phase 2 study evaluating a 15 fraction schedule out of [facility] and [facility] Bird Angel et al showed that patients with child's A or B and good performance status with no extrahepatic disease given 15 fractions with a maximum dose of 67.5 Gray showed overall survival of 22.1 months and 2-year incidence of local recurrence 5% and 2-year survival of 71%.    Patient was previously evaluated Dr. Pal and not thought to be a surgical candidate due to extensive nature of tumor and limited amount of uninvolved liver.  I explained that while there is risk of radiation-induced liver disease with modern techniques we can minimize this.    I will set patient up for CT simulation the near future and plan for 3 weeks of radiation with weekly complete metabolic panels to evaluate for liver toxicity and daily cone beam imaging to evaluate for accurate set up.  Risk and benefits discussed patient is amenable to treatment.  I additionally said that given the size I would recommend he reevaluate with Dr. Birch to consider levatinib or other systemic therapy given high risk of distant relapse  Thank you for the opportunity to participate in his care.  If any questions or comments, please do not hesitate in calling.    CC: Krupa Hood, Dr. Hobbs, Dr. Pal, Dr. Birch

## 2020-04-28 ENCOUNTER — PATIENT OUTREACH (OUTPATIENT)
Dept: OTHER | Facility: MEDICAL CENTER | Age: 70
End: 2020-04-28

## 2020-04-28 NOTE — PROGRESS NOTES
On 4/28/2020 at 0905 this ONN called patient. Patient's wife Sharlene answered. Sharlene recalls speaking with this ONN in the past. Sharlene states her and her  feel very supported by staff at [facility]. Sharlene states that her and her  felt as though most of their questions were answered by Dr. Skinner. Patient's wife Sharlene did want to know when treatment would start. Sharlene states they are aware of mapping appointment on 4/29/2020. This ONN explained that typically treatments start on a Monday and that if the mapping and plan were ready then patient could potentially start as early as Monday 5/4/2020, this ONN stated that it may be later than that as mapping does take time. Patient's wife made verbal understanding. This ONN provided patient's wife with this ONN's number and encouraged patient's wife to call with questions or concerns. This ONN explained that patient and patient's wife should not call this ONN with medical emergencies, patient's wife made verbal understanding. Sharlene denies other questions and thanked this ONN for calling.

## 2020-04-29 ENCOUNTER — APPOINTMENT (OUTPATIENT)
Dept: RADIATION ONCOLOGY | Facility: MEDICAL CENTER | Age: 70
End: 2020-04-29
Payer: MEDICARE

## 2020-04-29 ENCOUNTER — HOSPITAL ENCOUNTER (OUTPATIENT)
Dept: RADIATION ONCOLOGY | Facility: MEDICAL CENTER | Age: 70
End: 2020-04-29
Payer: MEDICARE

## 2020-04-29 DIAGNOSIS — C22.0 HEPATOMA (HCC): ICD-10-CM

## 2020-04-29 PROCEDURE — 77290 THER RAD SIMULAJ FIELD CPLX: CPT | Performed by: RADIOLOGY

## 2020-04-29 PROCEDURE — 77334 RADIATION TREATMENT AID(S): CPT | Performed by: RADIOLOGY

## 2020-04-29 PROCEDURE — 77470 SPECIAL RADIATION TREATMENT: CPT | Performed by: RADIOLOGY

## 2020-04-29 PROCEDURE — 77263 THER RADIOLOGY TX PLNG CPLX: CPT | Performed by: RADIOLOGY

## 2020-04-29 PROCEDURE — 77334 RADIATION TREATMENT AID(S): CPT | Mod: 26 | Performed by: RADIOLOGY

## 2020-04-29 RX ORDER — ONDANSETRON 4 MG/1
4 TABLET, FILM COATED ORAL EVERY 4 HOURS PRN
Qty: 30 TAB | Refills: 3 | Status: SHIPPED | OUTPATIENT
Start: 2020-04-29 | End: 2020-07-28

## 2020-05-01 ENCOUNTER — HOSPITAL ENCOUNTER (OUTPATIENT)
Dept: RADIATION ONCOLOGY | Facility: MEDICAL CENTER | Age: 70
End: 2020-05-31
Attending: RADIOLOGY
Payer: MEDICARE

## 2020-05-04 PROCEDURE — 77300 RADIATION THERAPY DOSE PLAN: CPT | Performed by: RADIOLOGY

## 2020-05-04 PROCEDURE — 77338 DESIGN MLC DEVICE FOR IMRT: CPT | Performed by: RADIOLOGY

## 2020-05-04 PROCEDURE — 77301 RADIOTHERAPY DOSE PLAN IMRT: CPT | Mod: 26 | Performed by: RADIOLOGY

## 2020-05-04 PROCEDURE — 77300 RADIATION THERAPY DOSE PLAN: CPT | Mod: 26 | Performed by: RADIOLOGY

## 2020-05-04 PROCEDURE — 77293 RESPIRATOR MOTION MGMT SIMUL: CPT | Mod: 26 | Performed by: RADIOLOGY

## 2020-05-04 PROCEDURE — 77338 DESIGN MLC DEVICE FOR IMRT: CPT | Mod: 26 | Performed by: RADIOLOGY

## 2020-05-04 PROCEDURE — 77293 RESPIRATOR MOTION MGMT SIMUL: CPT | Performed by: RADIOLOGY

## 2020-05-04 PROCEDURE — 77301 RADIOTHERAPY DOSE PLAN IMRT: CPT | Performed by: RADIOLOGY

## 2020-05-06 ENCOUNTER — DOCUMENTATION (OUTPATIENT)
Dept: NUTRITION | Facility: MEDICAL CENTER | Age: 70
End: 2020-05-06

## 2020-05-06 ENCOUNTER — APPOINTMENT (OUTPATIENT)
Dept: RADIATION ONCOLOGY | Facility: MEDICAL CENTER | Age: 70
End: 2020-05-06
Payer: MEDICARE

## 2020-05-06 VITALS
WEIGHT: 198 LBS | BODY MASS INDEX: 27.62 KG/M2 | SYSTOLIC BLOOD PRESSURE: 108 MMHG | DIASTOLIC BLOOD PRESSURE: 64 MMHG | TEMPERATURE: 97.7 F | OXYGEN SATURATION: 98 % | HEART RATE: 84 BPM

## 2020-05-06 DIAGNOSIS — K74.60 CIRRHOSIS OF LIVER WITHOUT ASCITES, UNSPECIFIED HEPATIC CIRRHOSIS TYPE (HCC): ICD-10-CM

## 2020-05-06 LAB
CHEMOTHERAPY INFUSION START DATE: NORMAL
CHEMOTHERAPY RECORDS: 3
CHEMOTHERAPY RECORDS: 4500
CHEMOTHERAPY RECORDS: NORMAL
CHEMOTHERAPY RX CANCER: NORMAL
DATE 1ST CHEMO CANCER: NORMAL
RAD ONC ARIA COURSE LAST TREATMENT DATE: NORMAL
RAD ONC ARIA COURSE TREATMENT ELAPSED DAYS: NORMAL
RAD ONC ARIA REFERENCE POINT DOSAGE GIVEN TO DATE: 2.97
RAD ONC ARIA REFERENCE POINT DOSAGE GIVEN TO DATE: 3
RAD ONC ARIA REFERENCE POINT ID: NORMAL
RAD ONC ARIA REFERENCE POINT ID: NORMAL
RAD ONC ARIA REFERENCE POINT SESSION DOSAGE GIVEN: 2.97
RAD ONC ARIA REFERENCE POINT SESSION DOSAGE GIVEN: 3

## 2020-05-06 PROCEDURE — 77280 THER RAD SIMULAJ FIELD SMPL: CPT | Performed by: RADIOLOGY

## 2020-05-06 PROCEDURE — 77386 HCHG IMRT DELIVERY COMPLEX: CPT | Performed by: RADIOLOGY

## 2020-05-06 ASSESSMENT — PAIN SCALES - GENERAL
PAINLEVEL: NO PAIN
PAINLEVEL: NO PAIN

## 2020-05-06 ASSESSMENT — FIBROSIS 4 INDEX: FIB4 SCORE: 2.69

## 2020-05-06 NOTE — ON TREATMENT VISIT
ON TREATMENT  NOTE  RADIATION ONCOLOGY DEPARTMENT    Patient name:  Lorraine Bella    Primary Physician:  Cole Alonso D.O. MRN: [id number]  CSN: [id number]   Referring physician:  Krupa Hood A.P.N. : 1950, 69 y.o.     ENCOUNTER DATE:  20    DIAGNOSIS:  Visit Diagnoses     ICD-10-CM   1. Cirrhosis of liver without ascites, unspecified hepatic cirrhosis type (HCC) K74.60     Hepatoma (HCC)  Staging form: Liver, AJCC 8th Edition  - Clinical stage from 2020: Stage IIIB (cT4, cN0, cM0) - Signed by Rudy Skinner M.D. on 2020  Laterality: Right  Child-Rodriguez score: Score 5  Alpha fetoprotein (AFP) (ng/mL): 3,086  Stage used in treatment planning: Yes  National guidelines used in treatment planning: Yes  Type of national guideline used in treatment planning: NCCN      TREATMENT SUMMARY:  [facility] Treatment Information        Some values may be hidden. Unless noted otherwise, only the newest values recorded on each date are displayed.         [facility] Treatment Summary 20   Course First Treatment Date 2020   Course Last Treatment Date 2020   Liver Plan from Course C1_HCC   Fraction 1 of 15   Elapsed Course Days 0 @    Prescribed Fraction Dose 300 cGy   Prescribed Total Dose 4,500 cGy   Liver cp Reference Point from Course C1_HCC   Elapsed Course Days 0 @    Session Dose 297 cGy   Total Dose 297 cGy   [id number] Reference Point from Course C1_HCC   Elapsed Course Days 0 @    Session Dose 300 cGy   Total Dose 300 cGy             SUBJECTIVE:  No changes      VITAL SIGNS:  KPS: 100, Fully active, able to carry on all pre-disease performed without restriction (ECOG equivalent 0)  Encounter Vitals  Temperature: 36.5 °C (97.7 °F)  Blood Pressure : 108/64  Pulse: 84  Pulse Oximetry: 98 %  Weight: 89.8 kg (198 lb)  Pain Score: No pain  Pain Assessment 2020   Pain Assessment Denies Pain - -   Pain Score 0 0 0   Some recent  data might be hidden          PHYSICAL EXAM:    Physical Exam  Vitals signs reviewed.   Constitutional:       Appearance: Normal appearance.   Abdominal:      General: There is no distension.      Palpations: Abdomen is soft.   Skin:     Findings: No erythema.   Neurological:      Mental Status: He is alert.          Toxicity Assessment 5/6/2020   Toxicity Assessment Abdomen   Fatigue (lethargy, malaise, asthenia) Increased fatigue over baseline, but not altering normal activities   Radiation Dermatitis None   Anorexia None   Dehydration None   Diarrhea w/o Colostomy None   Nausea None   Vomiting None   Dyspepsia/heartburn None   Dysphagia, Esophagitis, odynophagoa (painful swallowing) None   Abdominal Pain or cramping None       CURRENT MEDICATIONS:    Current Outpatient Medications:   •  ondansetron (ZOFRAN) 4 MG Tab tablet, Take 1 Tab by mouth every four hours as needed for Nausea/Vomiting for up to 90 days., Disp: 30 Tab, Rfl: 3  •  losartan (COZAAR) 50 MG Tab, Take 50 mg by mouth every morning., Disp: , Rfl:     LABORATORY DATA:   Lab Results   Component Value Date/Time    SODIUM 138 04/13/2020 06:11 AM    POTASSIUM 4.5 04/13/2020 06:11 AM    CHLORIDE 101 04/13/2020 06:11 AM    CO2 24 04/13/2020 06:11 AM    GLUCOSE 124 (H) 04/13/2020 06:11 AM    BUN 16 04/13/2020 06:11 AM    CREATININE 0.79 04/13/2020 06:11 AM         Lab Results   Component Value Date/Time    WBC 5.0 04/13/2020 06:11 AM    HEMOGLOBIN 13.3 (L) 04/13/2020 06:11 AM    HEMATOCRIT 41.5 (L) 04/13/2020 06:11 AM    MCV 92.8 04/13/2020 06:11 AM    PLATELETCT 155 (L) 04/13/2020 06:11 AM        RADIOLOGY DATA:  Ct-abdomen Liver For Hepatic Mass (cirrhosis)    Result Date: 4/14/2020 4/14/2020 8:21 AM HISTORY/REASON FOR EXAM:  Hepatocellular carcinoma, assess treatment response; HCC, s/p ZULEYMA TACE 2/28/20. Eval for treatment effect/ disease progression.. Shortness TECHNIQUE/EXAM DESCRIPTION:  Triphasic CT scan of the abdomen without and with contrast.  Initial precontrast images were obtained from the diaphragmatic domes through the iliac crests using helical technique.  Following nonionic contrast administration in an intravenous bolus fashion, and postcontrast, thin-section helical scanning obtained from the diaphragmatic domes through the iliac crests.  Imaging was obtained in portal venous and arterial phases. 100 mL of Omnipaque 350 nonionic contrast was administered. Low dose optimization technique was utilized for this CT exam including automated exposure control and adjustment of the mA and/or kV according to patient size. COMPARISON: CT abdomen/liver 1/7/2020 FINDINGS: Liver morphology: The liver is normal in appearance. Focal liver findings: Lesion 1: There is a large mass within medial segment left lobe of liver, anterior segment right lower liver, and portions the posterior segment right lobe of liver. Transverse dimensions are 15.7 x 12.6 cm and previously were 14.8 x 12.1 cm. These measurements are probably not significantly different. There is arterial phase peripheral enhancement with multiple enhancing nodules. There is associated washout. This is consistent with residual tumor. The appearance is stable. At the level of the deep celiac axis the medial thickness is 5.3 cm an anterior thickness is 2.2 cm. Previously these were 4.9 and 3.5 cm respectively. Therefore, given the stability of enhancing portions and the size there is likely no significant change or progression. Lesion 2: None Hepatic arterial vasculature: Hepatic artery is patent and appears normal. There is mural thrombus along the inferior margin of the superior mesenteric artery which is likely atherosclerotic in etiology. There is aortic atherosclerotic plaque. There are no significant stenoses. Portal vein: The portal vein enhances normally and is patent. Portal vein diameter: 18.3 600 mm Splenic vein: The splenic vein is patent. Biliary system: Gallbladder and biliary tree are  unremarkable. Relative extrahepatic findings: None Other organs: Splenic length: 12.97 cm Pancreas: There is a stable 2.3 cm cyst associated with the pancreatic tail. Kidneys: Kidneys are unremarkable. Adrenals: Adrenal glands are unremarkable. Lymph nodes: There are no enlarged nodes. Bowel: Within normal limits. Pelvis: Unremarkable or not scanned. Lung bases: Lung bases are unremarkable Bones: Unremarkable.     1.  Large hepatocellular carcinoma involving the entire medial segment left lobe and majority of right lobe 2.  Size and thickness of enhancement are stable and therefore overall the tumor is stable 3.  There is however arterial phase enhancement with washout in the peripheral thickened portions consistent with residual tumor 4.  Portal hypertension with associated splenomegaly and dilation the portal vein 5.  Aortic and branch vessel atherosclerotic plaque 6.  Stable 2.3 cm cyst associated with the pancreatic tail LI-RADS: LR-Treated      IMPRESSION:  Hepatoma (HCC)  Staging form: Liver, AJCC 8th Edition  - Clinical stage from 4/27/2020: Stage IIIB (cT4, cN0, cM0) - Signed by Rudy Skinner M.D. on 4/27/2020  Laterality: Right  Child-Rodriguez score: Score 5  Alpha fetoprotein (AFP) (ng/mL): 3,086  Stage used in treatment planning: Yes  National guidelines used in treatment planning: Yes  Type of national guideline used in treatment planning: NCCN      PLAN:  No change in treatment plan    Disposition:  Treatment plan reviewed. Questions answered. Continue therapy outlined.     Rudy Skinner M.D.    Orders Placed This Encounter   • Rad Onc [facility] Session Summary   • Comp Metabolic Panel   • Comp Metabolic Panel   • Comp Metabolic Panel   • Comp Metabolic Panel

## 2020-05-06 NOTE — PROGRESS NOTES
"Nutrition Services: RD Consultation/ New Radiation Start  69 year old female with diagnosis of hepatoma.       Past Medical History:   Diagnosis Date   • Alcohol use 3/31/2016    2-3 per day   • Borderline diabetes    • Cancer (HCC) 2019    liver    • Cataract     itzel IOL    • Dental disorder     needs dentures upper   • Diabetes (HCC)     diet controlled    • Disorder of thyroid     not on medication   • Glaucoma     left eye    • High cholesterol     not on medication   • Hypertension        RD met with pt to assess current intake, appetite, and nutritional status.  Pt presents to appointment unaccompanied.  Pt states has a good appetite and has had no changes in intake. Denies n/v/d/c. Reports usually eating apples, oranges, and has red meat roughly 3x per week, though states frequently consumes steak and cheeseburgers. Acknowledges that dietary changes should likely be made. Relays 40 lb intentional weight loss after retired as a result of increased physical activity and reduced stress. Pt briefly mentions hx of prediabetes. Pt did not express any further nutrition-related questions or concerns at this time.     Assessment:  • Pertinent Labs: Glucose 124, Alkaline phosphatase 144, A1c 5.8%  • Pertinent Meds: zofran  • Weight: 198 lbs on 5/6/20  • Height: 5'11\"  • BMI: 27.6  • Overweight BMI classification    Weight History from Chart:  201 lbs on 1/14/20  240 lbs in 2016    Weight Change/Malnutrition Risk: Pt wt appears to have decreased by 42 lbs within past 4 years, pt describes as intentional; more recently weight has been stable for at least past 5 months.    Interventions:  • Introduced self and discussed role of dietitian throughout treatment process   • Provided and discussed handout regarding general nutrition guidelines as well as nutritional recommendations for patient's with cancer, including tips/tricks should side effects of tx occur.   • Encouraged balanced diet with plant-based focus. Verbalized " importance of nutrition and maintaining LBM throughout treatment. Stressed plant sources of protein, provided list and examples of how to include into meals.   • Increase meal and snack frequency to 4-6 x/day  • Focus on high calorie and protein foods, fruits and vegetables with all meals/snacks - handout provided.  • Reviewed importance of limiting sodium. Briefly reviewed label reading for food items and choosing products with less than or equal to 140 mg of sodium per serving. Reinforced and discussed fruits, vegetables, and minimally processed foods as indicated.   • Reviewed limiting saturated fat, discussed sources and plant-based substitutions.   • Incorporate boost plus or comparable protein supplement as indicated.   • Recommended physical activity after meals to help promote decline in glucose levels after eating for glycemic control.      Pt reports understanding and was receptive to information provided.   RD provided contact information. Encouraged pt to reach out as questions/concerns arise.   RD available PRN, consult as needed.   [phone]

## 2020-05-07 ENCOUNTER — HOSPITAL ENCOUNTER (OUTPATIENT)
Dept: RADIATION ONCOLOGY | Facility: MEDICAL CENTER | Age: 70
End: 2020-05-07
Payer: MEDICARE

## 2020-05-07 LAB
CHEMOTHERAPY INFUSION START DATE: NORMAL
CHEMOTHERAPY RECORDS: 3
CHEMOTHERAPY RECORDS: 4500
CHEMOTHERAPY RECORDS: NORMAL
CHEMOTHERAPY RX CANCER: NORMAL
DATE 1ST CHEMO CANCER: NORMAL
RAD ONC ARIA COURSE LAST TREATMENT DATE: NORMAL
RAD ONC ARIA COURSE TREATMENT ELAPSED DAYS: NORMAL
RAD ONC ARIA REFERENCE POINT DOSAGE GIVEN TO DATE: 5.93
RAD ONC ARIA REFERENCE POINT DOSAGE GIVEN TO DATE: 6
RAD ONC ARIA REFERENCE POINT ID: NORMAL
RAD ONC ARIA REFERENCE POINT ID: NORMAL
RAD ONC ARIA REFERENCE POINT SESSION DOSAGE GIVEN: 2.97
RAD ONC ARIA REFERENCE POINT SESSION DOSAGE GIVEN: 3

## 2020-05-07 PROCEDURE — 77014 PR CT GUIDANCE PLACEMENT RAD THERAPY FIELDS: CPT | Mod: 26 | Performed by: RADIOLOGY

## 2020-05-07 PROCEDURE — 77386 HCHG IMRT DELIVERY COMPLEX: CPT | Performed by: RADIOLOGY

## 2020-05-08 ENCOUNTER — HOSPITAL ENCOUNTER (OUTPATIENT)
Dept: RADIATION ONCOLOGY | Facility: MEDICAL CENTER | Age: 70
End: 2020-05-08
Payer: MEDICARE

## 2020-05-08 LAB
CHEMOTHERAPY INFUSION START DATE: NORMAL
CHEMOTHERAPY RECORDS: 3
CHEMOTHERAPY RECORDS: 4500
CHEMOTHERAPY RECORDS: NORMAL
CHEMOTHERAPY RX CANCER: NORMAL
DATE 1ST CHEMO CANCER: NORMAL
RAD ONC ARIA COURSE LAST TREATMENT DATE: NORMAL
RAD ONC ARIA COURSE TREATMENT ELAPSED DAYS: NORMAL
RAD ONC ARIA REFERENCE POINT DOSAGE GIVEN TO DATE: 8.9
RAD ONC ARIA REFERENCE POINT DOSAGE GIVEN TO DATE: 9
RAD ONC ARIA REFERENCE POINT ID: NORMAL
RAD ONC ARIA REFERENCE POINT ID: NORMAL
RAD ONC ARIA REFERENCE POINT SESSION DOSAGE GIVEN: 2.97
RAD ONC ARIA REFERENCE POINT SESSION DOSAGE GIVEN: 3

## 2020-05-08 PROCEDURE — 77386 HCHG IMRT DELIVERY COMPLEX: CPT | Performed by: RADIOLOGY

## 2020-05-08 PROCEDURE — 77014 PR CT GUIDANCE PLACEMENT RAD THERAPY FIELDS: CPT | Mod: 26 | Performed by: RADIOLOGY

## 2020-05-08 PROCEDURE — 77336 RADIATION PHYSICS CONSULT: CPT | Performed by: RADIOLOGY

## 2020-05-11 ENCOUNTER — HOSPITAL ENCOUNTER (OUTPATIENT)
Dept: RADIATION ONCOLOGY | Facility: MEDICAL CENTER | Age: 70
End: 2020-05-11
Payer: MEDICARE

## 2020-05-11 LAB
CHEMOTHERAPY INFUSION START DATE: NORMAL
CHEMOTHERAPY RECORDS: 3
CHEMOTHERAPY RECORDS: 4500
CHEMOTHERAPY RECORDS: NORMAL
CHEMOTHERAPY RX CANCER: NORMAL
DATE 1ST CHEMO CANCER: NORMAL
RAD ONC ARIA COURSE LAST TREATMENT DATE: NORMAL
RAD ONC ARIA COURSE TREATMENT ELAPSED DAYS: NORMAL
RAD ONC ARIA REFERENCE POINT DOSAGE GIVEN TO DATE: 11.86
RAD ONC ARIA REFERENCE POINT DOSAGE GIVEN TO DATE: 12
RAD ONC ARIA REFERENCE POINT ID: NORMAL
RAD ONC ARIA REFERENCE POINT ID: NORMAL
RAD ONC ARIA REFERENCE POINT SESSION DOSAGE GIVEN: 2.97
RAD ONC ARIA REFERENCE POINT SESSION DOSAGE GIVEN: 3

## 2020-05-11 PROCEDURE — 77014 PR CT GUIDANCE PLACEMENT RAD THERAPY FIELDS: CPT | Mod: 26 | Performed by: RADIOLOGY

## 2020-05-11 PROCEDURE — 77386 HCHG IMRT DELIVERY COMPLEX: CPT | Performed by: RADIOLOGY

## 2020-05-12 ENCOUNTER — HOSPITAL ENCOUNTER (OUTPATIENT)
Dept: RADIATION ONCOLOGY | Facility: MEDICAL CENTER | Age: 70
End: 2020-05-12
Payer: MEDICARE

## 2020-05-12 ENCOUNTER — APPOINTMENT (OUTPATIENT)
Dept: RADIOLOGY | Facility: MEDICAL CENTER | Age: 70
End: 2020-05-12
Attending: NURSE PRACTITIONER
Payer: MEDICARE

## 2020-05-12 LAB
CHEMOTHERAPY INFUSION START DATE: NORMAL
CHEMOTHERAPY RECORDS: 3
CHEMOTHERAPY RECORDS: 4500
CHEMOTHERAPY RECORDS: NORMAL
CHEMOTHERAPY RX CANCER: NORMAL
DATE 1ST CHEMO CANCER: NORMAL
RAD ONC ARIA COURSE LAST TREATMENT DATE: NORMAL
RAD ONC ARIA COURSE TREATMENT ELAPSED DAYS: NORMAL
RAD ONC ARIA REFERENCE POINT DOSAGE GIVEN TO DATE: 14.83
RAD ONC ARIA REFERENCE POINT DOSAGE GIVEN TO DATE: 15
RAD ONC ARIA REFERENCE POINT ID: NORMAL
RAD ONC ARIA REFERENCE POINT ID: NORMAL
RAD ONC ARIA REFERENCE POINT SESSION DOSAGE GIVEN: 2.97
RAD ONC ARIA REFERENCE POINT SESSION DOSAGE GIVEN: 3

## 2020-05-12 PROCEDURE — 77386 HCHG IMRT DELIVERY COMPLEX: CPT | Performed by: RADIOLOGY

## 2020-05-12 PROCEDURE — 77014 PR CT GUIDANCE PLACEMENT RAD THERAPY FIELDS: CPT | Mod: 26 | Performed by: RADIOLOGY

## 2020-05-12 PROCEDURE — 77427 RADIATION TX MANAGEMENT X5: CPT | Performed by: RADIOLOGY

## 2020-05-13 ENCOUNTER — HOSPITAL ENCOUNTER (OUTPATIENT)
Dept: RADIATION ONCOLOGY | Facility: MEDICAL CENTER | Age: 70
End: 2020-05-13
Payer: MEDICARE

## 2020-05-13 VITALS
WEIGHT: 200 LBS | DIASTOLIC BLOOD PRESSURE: 66 MMHG | OXYGEN SATURATION: 98 % | BODY MASS INDEX: 27.89 KG/M2 | HEART RATE: 96 BPM | SYSTOLIC BLOOD PRESSURE: 110 MMHG

## 2020-05-13 DIAGNOSIS — Z12.5 SPECIAL SCREENING, PROSTATE CANCER: ICD-10-CM

## 2020-05-13 DIAGNOSIS — R35.0 BENIGN PROSTATIC HYPERPLASIA WITH URINARY FREQUENCY: ICD-10-CM

## 2020-05-13 DIAGNOSIS — N40.1 BENIGN PROSTATIC HYPERPLASIA WITH URINARY FREQUENCY: ICD-10-CM

## 2020-05-13 LAB
CHEMOTHERAPY INFUSION START DATE: NORMAL
CHEMOTHERAPY RECORDS: 3
CHEMOTHERAPY RECORDS: 4500
CHEMOTHERAPY RECORDS: NORMAL
CHEMOTHERAPY RX CANCER: NORMAL
DATE 1ST CHEMO CANCER: NORMAL
RAD ONC ARIA COURSE LAST TREATMENT DATE: NORMAL
RAD ONC ARIA COURSE TREATMENT ELAPSED DAYS: NORMAL
RAD ONC ARIA REFERENCE POINT DOSAGE GIVEN TO DATE: 17.79
RAD ONC ARIA REFERENCE POINT DOSAGE GIVEN TO DATE: 18
RAD ONC ARIA REFERENCE POINT ID: NORMAL
RAD ONC ARIA REFERENCE POINT ID: NORMAL
RAD ONC ARIA REFERENCE POINT SESSION DOSAGE GIVEN: 2.97
RAD ONC ARIA REFERENCE POINT SESSION DOSAGE GIVEN: 3

## 2020-05-13 PROCEDURE — 77386 HCHG IMRT DELIVERY COMPLEX: CPT | Performed by: RADIOLOGY

## 2020-05-13 PROCEDURE — 77014 PR CT GUIDANCE PLACEMENT RAD THERAPY FIELDS: CPT | Mod: 26 | Performed by: RADIOLOGY

## 2020-05-13 ASSESSMENT — PAIN SCALES - GENERAL: PAINLEVEL: NO PAIN

## 2020-05-13 ASSESSMENT — FIBROSIS 4 INDEX: FIB4 SCORE: 2.73

## 2020-05-13 NOTE — ON TREATMENT VISIT
ON TREATMENT  NOTE  RADIATION ONCOLOGY DEPARTMENT    Patient name:  Lorraine Bella    Primary Physician:  Cole Alonso D.O. MRN: [id number]  [facility]: [id number]   Referring physician:  Krupa Hood A.P.N. : 1950, 70 y.o.     ENCOUNTER DATE:  20    DIAGNOSIS:  Visit Diagnoses     ICD-10-CM   1. Benign prostatic hyperplasia with urinary frequency  N40.1    R35.0   2. Special screening, prostate cancer   Z12.5     Hepatoma (HCC)  Staging form: Liver, AJCC 8th Edition  - Clinical stage from 2020: Stage IIIB (cT4, cN0, cM0) - Signed by Rudy Skinner M.D. on 2020  Laterality: Right  Child-Rodriguez score: Score 5  Alpha fetoprotein (AFP) (ng/mL): 3,086  Stage used in treatment planning: Yes  National guidelines used in treatment planning: Yes  Type of national guideline used in treatment planning: NCCN      TREATMENT SUMMARY:  [facility] Treatment Information        Some values may be hidden. Unless noted otherwise, only the newest values recorded on each date are displayed.         [facility] Treatment Summary 20   Course First Treatment Date 2020   Course Last Treatment Date 2020   Liver Plan from Course C1_HCC   Fraction 6 of 15   Elapsed Course Days 7 @ [id number]   Prescribed Fraction Dose 300 cGy   Prescribed Total Dose 4,500 cGy   Liver cp Reference Point from Course C1_HCC   Elapsed Course Days 7 @ [id number]   Session Dose 297 cGy   Total Dose 1,779 cGy   [id number] Reference Point from Course C1_HCC   Elapsed Course Days 7 @ [id number]   Session Dose 300 cGy   Total Dose 1,800 cGy             SUBJECTIVE:  Well appearing, insomnia      VITAL SIGNS:  KPS: 90, Able to carry on normal activity; minor signs or symptoms of disease (ECOG equivalent 0)  Encounter Vitals  Blood Pressure : 110/66  Pulse: 96  Pulse Oximetry: 98 %  Weight: 90.7 kg (200 lb)  Pain Score: No pain  Pain Assessment 2020   Pain Assessment - Denies Pain - -   Pain Score  0 0 0 0   Some recent data might be hidden          PHYSICAL EXAM:    Physical Exam  Constitutional:       Appearance: Normal appearance.   Abdominal:      General: There is no distension.      Palpations: Abdomen is soft.   Skin:     Findings: No erythema.   Neurological:      Mental Status: He is alert.          Toxicity Assessment 5/13/2020 5/6/2020   Toxicity Assessment Abdomen Abdomen   Fatigue (lethargy, malaise, asthenia) None Increased fatigue over baseline, but not altering normal activities   Radiation Dermatitis None None   Anorexia None None   Dehydration None None   Diarrhea w/o Colostomy None None   Nausea None None   Vomiting None None   Dyspepsia/heartburn None None   Dysphagia, Esophagitis, odynophagoa (painful swallowing) None None   Gastritis None -   Abdominal Pain or cramping None None       CURRENT MEDICATIONS:    Current Outpatient Medications:   •  ondansetron (ZOFRAN) 4 MG Tab tablet, Take 1 Tab by mouth every four hours as needed for Nausea/Vomiting for up to 90 days., Disp: 30 Tab, Rfl: 3  •  losartan (COZAAR) 50 MG Tab, Take 50 mg by mouth every morning., Disp: , Rfl:     LABORATORY DATA:   Lab Results   Component Value Date/Time    SODIUM 138 04/13/2020 06:11 AM    POTASSIUM 4.5 04/13/2020 06:11 AM    CHLORIDE 101 04/13/2020 06:11 AM    CO2 24 04/13/2020 06:11 AM    GLUCOSE 124 (H) 04/13/2020 06:11 AM    BUN 16 04/13/2020 06:11 AM    CREATININE 0.79 04/13/2020 06:11 AM         Lab Results   Component Value Date/Time    WBC 5.0 04/13/2020 06:11 AM    HEMOGLOBIN 13.3 (L) 04/13/2020 06:11 AM    HEMATOCRIT 41.5 (L) 04/13/2020 06:11 AM    MCV 92.8 04/13/2020 06:11 AM    PLATELETCT 155 (L) 04/13/2020 06:11 AM        RADIOLOGY DATA:  Ct-abdomen Liver For Hepatic Mass (cirrhosis)    Result Date: 4/14/2020 4/14/2020 8:21 AM HISTORY/REASON FOR EXAM:  Hepatocellular carcinoma, assess treatment response; HCC, s/p ZULEYMA TACE 2/28/20. Eval for treatment effect/ disease progression.. Shortness  TECHNIQUE/EXAM DESCRIPTION:  Triphasic CT scan of the abdomen without and with contrast. Initial precontrast images were obtained from the diaphragmatic domes through the iliac crests using helical technique.  Following nonionic contrast administration in an intravenous bolus fashion, and postcontrast, thin-section helical scanning obtained from the diaphragmatic domes through the iliac crests.  Imaging was obtained in portal venous and arterial phases. 100 mL of Omnipaque 350 nonionic contrast was administered. Low dose optimization technique was utilized for this CT exam including automated exposure control and adjustment of the mA and/or kV according to patient size. COMPARISON: CT abdomen/liver 1/7/2020 FINDINGS: Liver morphology: The liver is normal in appearance. Focal liver findings: Lesion 1: There is a large mass within medial segment left lobe of liver, anterior segment right lower liver, and portions the posterior segment right lobe of liver. Transverse dimensions are 15.7 x 12.6 cm and previously were 14.8 x 12.1 cm. These measurements are probably not significantly different. There is arterial phase peripheral enhancement with multiple enhancing nodules. There is associated washout. This is consistent with residual tumor. The appearance is stable. At the level of the deep celiac axis the medial thickness is 5.3 cm an anterior thickness is 2.2 cm. Previously these were 4.9 and 3.5 cm respectively. Therefore, given the stability of enhancing portions and the size there is likely no significant change or progression. Lesion 2: None Hepatic arterial vasculature: Hepatic artery is patent and appears normal. There is mural thrombus along the inferior margin of the superior mesenteric artery which is likely atherosclerotic in etiology. There is aortic atherosclerotic plaque. There are no significant stenoses. Portal vein: The portal vein enhances normally and is patent. Portal vein diameter: 18.3 600 mm  Splenic vein: The splenic vein is patent. Biliary system: Gallbladder and biliary tree are unremarkable. Relative extrahepatic findings: None Other organs: Splenic length: 12.97 cm Pancreas: There is a stable 2.3 cm cyst associated with the pancreatic tail. Kidneys: Kidneys are unremarkable. Adrenals: Adrenal glands are unremarkable. Lymph nodes: There are no enlarged nodes. Bowel: Within normal limits. Pelvis: Unremarkable or not scanned. Lung bases: Lung bases are unremarkable Bones: Unremarkable.     1.  Large hepatocellular carcinoma involving the entire medial segment left lobe and majority of right lobe 2.  Size and thickness of enhancement are stable and therefore overall the tumor is stable 3.  There is however arterial phase enhancement with washout in the peripheral thickened portions consistent with residual tumor 4.  Portal hypertension with associated splenomegaly and dilation the portal vein 5.  Aortic and branch vessel atherosclerotic plaque 6.  Stable 2.3 cm cyst associated with the pancreatic tail LI-RADS: LR-Treated      IMPRESSION:  Hepatoma (HCC)  Staging form: Liver, AJCC 8th Edition  - Clinical stage from 4/27/2020: Stage IIIB (cT4, cN0, cM0) - Signed by Rudy Skinner M.D. on 4/27/2020  Laterality: Right  Child-Rodriguez score: Score 5  Alpha fetoprotein (AFP) (ng/mL): 3,086  Stage used in treatment planning: Yes  National guidelines used in treatment planning: Yes  Type of national guideline used in treatment planning: NCCN      PLAN:  No change in treatment plan    Disposition:  Treatment plan reviewed. Questions answered. Continue therapy outlined.     Rudy Skinner M.D.    Orders Placed This Encounter   • PSA

## 2020-05-14 ENCOUNTER — HOSPITAL ENCOUNTER (OUTPATIENT)
Dept: RADIATION ONCOLOGY | Facility: MEDICAL CENTER | Age: 70
End: 2020-05-14
Payer: MEDICARE

## 2020-05-14 LAB
CHEMOTHERAPY INFUSION START DATE: NORMAL
CHEMOTHERAPY RECORDS: 3
CHEMOTHERAPY RECORDS: 4500
CHEMOTHERAPY RECORDS: NORMAL
CHEMOTHERAPY RX CANCER: NORMAL
DATE 1ST CHEMO CANCER: NORMAL
RAD ONC ARIA COURSE LAST TREATMENT DATE: NORMAL
RAD ONC ARIA COURSE TREATMENT ELAPSED DAYS: NORMAL
RAD ONC ARIA REFERENCE POINT DOSAGE GIVEN TO DATE: 20.76
RAD ONC ARIA REFERENCE POINT DOSAGE GIVEN TO DATE: 21
RAD ONC ARIA REFERENCE POINT ID: NORMAL
RAD ONC ARIA REFERENCE POINT ID: NORMAL
RAD ONC ARIA REFERENCE POINT SESSION DOSAGE GIVEN: 2.97
RAD ONC ARIA REFERENCE POINT SESSION DOSAGE GIVEN: 3

## 2020-05-14 PROCEDURE — 77014 PR CT GUIDANCE PLACEMENT RAD THERAPY FIELDS: CPT | Mod: 26 | Performed by: RADIOLOGY

## 2020-05-14 PROCEDURE — 77386 HCHG IMRT DELIVERY COMPLEX: CPT | Performed by: RADIOLOGY

## 2020-05-15 ENCOUNTER — HOSPITAL ENCOUNTER (OUTPATIENT)
Dept: RADIATION ONCOLOGY | Facility: MEDICAL CENTER | Age: 70
End: 2020-05-15
Payer: MEDICARE

## 2020-05-15 LAB
CHEMOTHERAPY INFUSION START DATE: NORMAL
CHEMOTHERAPY RECORDS: 3
CHEMOTHERAPY RECORDS: 4500
CHEMOTHERAPY RECORDS: NORMAL
CHEMOTHERAPY RX CANCER: NORMAL
DATE 1ST CHEMO CANCER: NORMAL
RAD ONC ARIA COURSE LAST TREATMENT DATE: NORMAL
RAD ONC ARIA COURSE TREATMENT ELAPSED DAYS: NORMAL
RAD ONC ARIA REFERENCE POINT DOSAGE GIVEN TO DATE: 23.72
RAD ONC ARIA REFERENCE POINT DOSAGE GIVEN TO DATE: 24
RAD ONC ARIA REFERENCE POINT ID: NORMAL
RAD ONC ARIA REFERENCE POINT ID: NORMAL
RAD ONC ARIA REFERENCE POINT SESSION DOSAGE GIVEN: 2.97
RAD ONC ARIA REFERENCE POINT SESSION DOSAGE GIVEN: 3

## 2020-05-15 PROCEDURE — 77336 RADIATION PHYSICS CONSULT: CPT | Performed by: RADIOLOGY

## 2020-05-15 PROCEDURE — 77014 PR CT GUIDANCE PLACEMENT RAD THERAPY FIELDS: CPT | Mod: 26 | Performed by: RADIOLOGY

## 2020-05-15 PROCEDURE — 77386 HCHG IMRT DELIVERY COMPLEX: CPT | Performed by: RADIOLOGY

## 2020-05-18 ENCOUNTER — HOSPITAL ENCOUNTER (OUTPATIENT)
Dept: RADIATION ONCOLOGY | Facility: MEDICAL CENTER | Age: 70
End: 2020-05-18
Payer: MEDICARE

## 2020-05-18 LAB
CHEMOTHERAPY INFUSION START DATE: NORMAL
CHEMOTHERAPY RECORDS: 3
CHEMOTHERAPY RECORDS: 4500
CHEMOTHERAPY RECORDS: NORMAL
CHEMOTHERAPY RX CANCER: NORMAL
DATE 1ST CHEMO CANCER: NORMAL
RAD ONC ARIA COURSE LAST TREATMENT DATE: NORMAL
RAD ONC ARIA COURSE TREATMENT ELAPSED DAYS: NORMAL
RAD ONC ARIA REFERENCE POINT DOSAGE GIVEN TO DATE: 26.69
RAD ONC ARIA REFERENCE POINT DOSAGE GIVEN TO DATE: 27
RAD ONC ARIA REFERENCE POINT ID: NORMAL
RAD ONC ARIA REFERENCE POINT ID: NORMAL
RAD ONC ARIA REFERENCE POINT SESSION DOSAGE GIVEN: 2.97
RAD ONC ARIA REFERENCE POINT SESSION DOSAGE GIVEN: 3

## 2020-05-18 PROCEDURE — 77386 HCHG IMRT DELIVERY COMPLEX: CPT | Performed by: RADIOLOGY

## 2020-05-18 PROCEDURE — 77014 PR CT GUIDANCE PLACEMENT RAD THERAPY FIELDS: CPT | Mod: 26 | Performed by: RADIOLOGY

## 2020-05-19 ENCOUNTER — HOSPITAL ENCOUNTER (OUTPATIENT)
Dept: RADIATION ONCOLOGY | Facility: MEDICAL CENTER | Age: 70
End: 2020-05-19
Payer: MEDICARE

## 2020-05-19 LAB
CHEMOTHERAPY INFUSION START DATE: NORMAL
CHEMOTHERAPY RECORDS: 3
CHEMOTHERAPY RECORDS: 4500
CHEMOTHERAPY RECORDS: NORMAL
CHEMOTHERAPY RX CANCER: NORMAL
DATE 1ST CHEMO CANCER: NORMAL
RAD ONC ARIA COURSE LAST TREATMENT DATE: NORMAL
RAD ONC ARIA COURSE TREATMENT ELAPSED DAYS: NORMAL
RAD ONC ARIA REFERENCE POINT DOSAGE GIVEN TO DATE: 29.65
RAD ONC ARIA REFERENCE POINT DOSAGE GIVEN TO DATE: 30
RAD ONC ARIA REFERENCE POINT ID: NORMAL
RAD ONC ARIA REFERENCE POINT ID: NORMAL
RAD ONC ARIA REFERENCE POINT SESSION DOSAGE GIVEN: 2.97
RAD ONC ARIA REFERENCE POINT SESSION DOSAGE GIVEN: 3

## 2020-05-19 PROCEDURE — 77386 HCHG IMRT DELIVERY COMPLEX: CPT | Performed by: RADIOLOGY

## 2020-05-19 PROCEDURE — 77427 RADIATION TX MANAGEMENT X5: CPT | Performed by: RADIOLOGY

## 2020-05-19 PROCEDURE — 77014 PR CT GUIDANCE PLACEMENT RAD THERAPY FIELDS: CPT | Mod: 26 | Performed by: RADIOLOGY

## 2020-05-20 ENCOUNTER — APPOINTMENT (OUTPATIENT)
Dept: RADIATION ONCOLOGY | Facility: MEDICAL CENTER | Age: 70
End: 2020-05-20
Payer: MEDICARE

## 2020-05-20 VITALS
SYSTOLIC BLOOD PRESSURE: 114 MMHG | DIASTOLIC BLOOD PRESSURE: 74 MMHG | OXYGEN SATURATION: 95 % | BODY MASS INDEX: 27.62 KG/M2 | TEMPERATURE: 98 F | RESPIRATION RATE: 18 BRPM | HEART RATE: 98 BPM | WEIGHT: 198 LBS

## 2020-05-20 DIAGNOSIS — C22.0 HEPATOMA (HCC): ICD-10-CM

## 2020-05-20 LAB
CHEMOTHERAPY INFUSION START DATE: NORMAL
CHEMOTHERAPY RECORDS: 3
CHEMOTHERAPY RECORDS: 4500
CHEMOTHERAPY RECORDS: NORMAL
CHEMOTHERAPY RX CANCER: NORMAL
DATE 1ST CHEMO CANCER: NORMAL
RAD ONC ARIA COURSE LAST TREATMENT DATE: NORMAL
RAD ONC ARIA COURSE TREATMENT ELAPSED DAYS: NORMAL
RAD ONC ARIA REFERENCE POINT DOSAGE GIVEN TO DATE: 32.62
RAD ONC ARIA REFERENCE POINT DOSAGE GIVEN TO DATE: 33
RAD ONC ARIA REFERENCE POINT ID: NORMAL
RAD ONC ARIA REFERENCE POINT ID: NORMAL
RAD ONC ARIA REFERENCE POINT SESSION DOSAGE GIVEN: 2.97
RAD ONC ARIA REFERENCE POINT SESSION DOSAGE GIVEN: 3

## 2020-05-20 PROCEDURE — 77014 PR CT GUIDANCE PLACEMENT RAD THERAPY FIELDS: CPT | Mod: 26 | Performed by: RADIOLOGY

## 2020-05-20 PROCEDURE — 77386 HCHG IMRT DELIVERY COMPLEX: CPT | Performed by: RADIOLOGY

## 2020-05-20 ASSESSMENT — PAIN SCALES - GENERAL: PAINLEVEL: NO PAIN

## 2020-05-20 ASSESSMENT — FIBROSIS 4 INDEX: FIB4 SCORE: 2.73

## 2020-05-20 NOTE — ON TREATMENT VISIT
ON TREATMENT  NOTE  RADIATION ONCOLOGY DEPARTMENT    Patient name:  Lorraine Bella    Primary Physician:  Cole Alonso D.O. MRN: [id number]  [facility]: [id number]   Referring physician:  Krupa Hood A.P.N. : 1950, 70 y.o.     ENCOUNTER DATE:  20    DIAGNOSIS:  Visit Diagnoses     ICD-10-CM   1. Hepatoma (HCC)  C22.0     Hepatoma (HCC)  Staging form: Liver, AJCC 8th Edition  - Clinical stage from 2020: Stage IIIB (cT4, cN0, cM0) - Signed by Rudy Skinner M.D. on 2020  Laterality: Right  Child-Rodriguez score: Score 5  Alpha fetoprotein (AFP) (ng/mL): 3,086  Stage used in treatment planning: Yes  National guidelines used in treatment planning: Yes  Type of national guideline used in treatment planning: NCCN      TREATMENT SUMMARY:  [facility] Treatment Information        Some values may be hidden. Unless noted otherwise, only the newest values recorded on each date are displayed.         [facility] Treatment Summary 20   Course First Treatment Date 2020   Course Last Treatment Date 2020   Liver Plan from Course C1_HCC   Fraction 11 of 15   Elapsed Course Days  @    Prescribed Fraction Dose 300 cGy   Prescribed Total Dose 4,500 cGy   Liver cp Reference Point from Course C1_HCC   Elapsed Course Days  @    Session Dose 297 cGy   Total Dose 3,262 cGy   [id number] Reference Point from Course C1_HCC   Elapsed Course Days    Session Dose 300 cGy   Total Dose 3,300 cGy             SUBJECTIVE:  Doing well      VITAL SIGNS:  KPS: 100, Fully active, able to carry on all pre-disease performed without restriction (ECOG equivalent 0)  Encounter Vitals  Temperature: 36.7 °C (98 °F)  Blood Pressure : 114/74  Pulse: 98  Respiration: 18  Pulse Oximetry: 95 %  Weight: 89.8 kg (198 lb)  Pain Score: No pain  Pain Assessment 2020   Pain Assessment Denies Pain - Denies Pain -   Pain Score 0 0 0 0   Some recent data might be  hidden          PHYSICAL EXAM:    Physical Exam  Constitutional:       Appearance: Normal appearance.   Abdominal:      General: There is no distension.      Palpations: Abdomen is soft.   Skin:     Findings: No erythema.   Neurological:      Mental Status: He is alert.          Toxicity Assessment 5/20/2020 5/13/2020 5/6/2020   Toxicity Assessment Abdomen Abdomen Abdomen   Fatigue (lethargy, malaise, asthenia) Moderate (e.g., decrease in performance status by 1 ECOG level or 20% Karnofsky) or causing difficulty performing some activities None Increased fatigue over baseline, but not altering normal activities   Radiation Dermatitis None None None   Anorexia Loss of appetite None None   Dehydration None None None   Diarrhea w/o Colostomy None None None   Nausea None None None   Vomiting None None None   Dyspepsia/heartburn None None None   Dysphagia, Esophagitis, odynophagoa (painful swallowing) None None None   Gastritis None None -   Abdominal Pain or cramping None None None       CURRENT MEDICATIONS:    Current Outpatient Medications:   •  ondansetron (ZOFRAN) 4 MG Tab tablet, Take 1 Tab by mouth every four hours as needed for Nausea/Vomiting for up to 90 days., Disp: 30 Tab, Rfl: 3  •  losartan (COZAAR) 50 MG Tab, Take 50 mg by mouth every morning., Disp: , Rfl:     LABORATORY DATA:   Lab Results   Component Value Date/Time    SODIUM 138 04/13/2020 06:11 AM    POTASSIUM 4.5 04/13/2020 06:11 AM    CHLORIDE 101 04/13/2020 06:11 AM    CO2 24 04/13/2020 06:11 AM    GLUCOSE 124 (H) 04/13/2020 06:11 AM    BUN 16 04/13/2020 06:11 AM    CREATININE 0.79 04/13/2020 06:11 AM         Lab Results   Component Value Date/Time    WBC 5.0 04/13/2020 06:11 AM    HEMOGLOBIN 13.3 (L) 04/13/2020 06:11 AM    HEMATOCRIT 41.5 (L) 04/13/2020 06:11 AM    MCV 92.8 04/13/2020 06:11 AM    PLATELETCT 155 (L) 04/13/2020 06:11 AM        RADIOLOGY DATA:  No results found.    IMPRESSION:  Hepatoma (HCC)  Staging form: Liver, AJCC 8th Edition  -  Clinical stage from 4/27/2020: Stage IIIB (cT4, cN0, cM0) - Signed by Rudy Skinner M.D. on 4/27/2020  Laterality: Right  Child-Rodriguez score: Score 5  Alpha fetoprotein (AFP) (ng/mL): 3,086  Stage used in treatment planning: Yes  National guidelines used in treatment planning: Yes  Type of national guideline used in treatment planning: NCCN      PLAN:  No change in treatment plan    Disposition:  Treatment plan reviewed. Questions answered. Continue therapy outlined. Follow up in 3 months. PSA normal explained should see PCP re LUTS and consider flomax. Will see Dr. Birch for consideration of systemic therapy.     Rudy Skinner M.D.    Orders Placed This Encounter   • Rad Onc [facility] Session Summary   • CT-ABDOMEN LIVER FOR HEPATIC MASS (CIRRHOSIS)   • Comp Metabolic Panel

## 2020-05-21 ENCOUNTER — HOSPITAL ENCOUNTER (OUTPATIENT)
Dept: RADIATION ONCOLOGY | Facility: MEDICAL CENTER | Age: 70
End: 2020-05-21
Payer: MEDICARE

## 2020-05-21 LAB
CHEMOTHERAPY INFUSION START DATE: NORMAL
CHEMOTHERAPY RECORDS: 3
CHEMOTHERAPY RECORDS: 4500
CHEMOTHERAPY RECORDS: NORMAL
CHEMOTHERAPY RX CANCER: NORMAL
DATE 1ST CHEMO CANCER: NORMAL
RAD ONC ARIA COURSE LAST TREATMENT DATE: NORMAL
RAD ONC ARIA COURSE TREATMENT ELAPSED DAYS: NORMAL
RAD ONC ARIA REFERENCE POINT DOSAGE GIVEN TO DATE: 35.58
RAD ONC ARIA REFERENCE POINT DOSAGE GIVEN TO DATE: 36
RAD ONC ARIA REFERENCE POINT ID: NORMAL
RAD ONC ARIA REFERENCE POINT ID: NORMAL
RAD ONC ARIA REFERENCE POINT SESSION DOSAGE GIVEN: 2.97
RAD ONC ARIA REFERENCE POINT SESSION DOSAGE GIVEN: 3

## 2020-05-21 PROCEDURE — 77014 PR CT GUIDANCE PLACEMENT RAD THERAPY FIELDS: CPT | Mod: 26 | Performed by: RADIOLOGY

## 2020-05-21 PROCEDURE — 77386 HCHG IMRT DELIVERY COMPLEX: CPT | Performed by: RADIOLOGY

## 2020-05-22 ENCOUNTER — HOSPITAL ENCOUNTER (OUTPATIENT)
Dept: RADIATION ONCOLOGY | Facility: MEDICAL CENTER | Age: 70
End: 2020-05-22
Payer: MEDICARE

## 2020-05-22 LAB
CHEMOTHERAPY INFUSION START DATE: NORMAL
CHEMOTHERAPY RECORDS: 3
CHEMOTHERAPY RECORDS: 4500
CHEMOTHERAPY RECORDS: NORMAL
CHEMOTHERAPY RX CANCER: NORMAL
DATE 1ST CHEMO CANCER: NORMAL
RAD ONC ARIA COURSE LAST TREATMENT DATE: NORMAL
RAD ONC ARIA COURSE TREATMENT ELAPSED DAYS: NORMAL
RAD ONC ARIA REFERENCE POINT DOSAGE GIVEN TO DATE: 38.55
RAD ONC ARIA REFERENCE POINT DOSAGE GIVEN TO DATE: 39
RAD ONC ARIA REFERENCE POINT ID: NORMAL
RAD ONC ARIA REFERENCE POINT ID: NORMAL
RAD ONC ARIA REFERENCE POINT SESSION DOSAGE GIVEN: 2.97
RAD ONC ARIA REFERENCE POINT SESSION DOSAGE GIVEN: 3

## 2020-05-22 PROCEDURE — 77014 PR CT GUIDANCE PLACEMENT RAD THERAPY FIELDS: CPT | Mod: 26 | Performed by: RADIOLOGY

## 2020-05-22 PROCEDURE — 77386 HCHG IMRT DELIVERY COMPLEX: CPT | Performed by: RADIOLOGY

## 2020-05-22 PROCEDURE — 77336 RADIATION PHYSICS CONSULT: CPT | Performed by: RADIOLOGY

## 2020-05-26 ENCOUNTER — HOSPITAL ENCOUNTER (OUTPATIENT)
Dept: RADIATION ONCOLOGY | Facility: MEDICAL CENTER | Age: 70
End: 2020-05-26
Payer: MEDICARE

## 2020-05-26 LAB
CHEMOTHERAPY INFUSION START DATE: NORMAL
CHEMOTHERAPY RECORDS: 3
CHEMOTHERAPY RECORDS: 4500
CHEMOTHERAPY RECORDS: NORMAL
CHEMOTHERAPY RX CANCER: NORMAL
DATE 1ST CHEMO CANCER: NORMAL
RAD ONC ARIA COURSE LAST TREATMENT DATE: NORMAL
RAD ONC ARIA COURSE TREATMENT ELAPSED DAYS: NORMAL
RAD ONC ARIA REFERENCE POINT DOSAGE GIVEN TO DATE: 41.51
RAD ONC ARIA REFERENCE POINT DOSAGE GIVEN TO DATE: 42
RAD ONC ARIA REFERENCE POINT ID: NORMAL
RAD ONC ARIA REFERENCE POINT ID: NORMAL
RAD ONC ARIA REFERENCE POINT SESSION DOSAGE GIVEN: 2.97
RAD ONC ARIA REFERENCE POINT SESSION DOSAGE GIVEN: 3

## 2020-05-26 PROCEDURE — 77014 PR CT GUIDANCE PLACEMENT RAD THERAPY FIELDS: CPT | Mod: 26 | Performed by: RADIOLOGY

## 2020-05-26 PROCEDURE — 77386 HCHG IMRT DELIVERY COMPLEX: CPT | Performed by: RADIOLOGY

## 2020-05-27 ENCOUNTER — HOSPITAL ENCOUNTER (OUTPATIENT)
Dept: RADIATION ONCOLOGY | Facility: MEDICAL CENTER | Age: 70
End: 2020-05-27
Payer: MEDICARE

## 2020-05-27 VITALS
WEIGHT: 196 LBS | HEART RATE: 98 BPM | TEMPERATURE: 97.4 F | DIASTOLIC BLOOD PRESSURE: 69 MMHG | SYSTOLIC BLOOD PRESSURE: 114 MMHG | OXYGEN SATURATION: 96 % | BODY MASS INDEX: 27.34 KG/M2

## 2020-05-27 LAB
CHEMOTHERAPY INFUSION START DATE: NORMAL
CHEMOTHERAPY INFUSION START DATE: NORMAL
CHEMOTHERAPY INFUSION STOP DATE: NORMAL
CHEMOTHERAPY RECORDS: 3
CHEMOTHERAPY RECORDS: 3
CHEMOTHERAPY RECORDS: 4500
CHEMOTHERAPY RECORDS: 4500
CHEMOTHERAPY RECORDS: NORMAL
CHEMOTHERAPY RX CANCER: NORMAL
CHEMOTHERAPY RX CANCER: NORMAL
DATE 1ST CHEMO CANCER: NORMAL
DATE 1ST CHEMO CANCER: NORMAL
RAD ONC ARIA COURSE LAST TREATMENT DATE: NORMAL
RAD ONC ARIA COURSE LAST TREATMENT DATE: NORMAL
RAD ONC ARIA COURSE TREATMENT ELAPSED DAYS: NORMAL
RAD ONC ARIA COURSE TREATMENT ELAPSED DAYS: NORMAL
RAD ONC ARIA REFERENCE POINT DOSAGE GIVEN TO DATE: 44.48
RAD ONC ARIA REFERENCE POINT DOSAGE GIVEN TO DATE: 44.48
RAD ONC ARIA REFERENCE POINT DOSAGE GIVEN TO DATE: 45
RAD ONC ARIA REFERENCE POINT DOSAGE GIVEN TO DATE: 45
RAD ONC ARIA REFERENCE POINT ID: NORMAL
RAD ONC ARIA REFERENCE POINT SESSION DOSAGE GIVEN: 2.97
RAD ONC ARIA REFERENCE POINT SESSION DOSAGE GIVEN: 3

## 2020-05-27 PROCEDURE — 77014 PR CT GUIDANCE PLACEMENT RAD THERAPY FIELDS: CPT | Mod: 26 | Performed by: RADIOLOGY

## 2020-05-27 PROCEDURE — 77386 HCHG IMRT DELIVERY COMPLEX: CPT | Performed by: RADIOLOGY

## 2020-05-27 PROCEDURE — 77427 RADIATION TX MANAGEMENT X5: CPT | Performed by: RADIOLOGY

## 2020-05-27 ASSESSMENT — FIBROSIS 4 INDEX: FIB4 SCORE: 2.73

## 2020-05-27 ASSESSMENT — PAIN SCALES - GENERAL: PAINLEVEL: NO PAIN

## 2020-05-27 NOTE — ON TREATMENT VISIT
ON TREATMENT  NOTE  RADIATION ONCOLOGY DEPARTMENT    Patient name:  Lorraine Bella    Primary Physician:  Cole Alonso D.O. MRN: [id number]  CSN: [id number]   Referring physician:  Krupa Hood A.P.N. : 1950, 70 y.o.     ENCOUNTER DATE:  2020      DIAGNOSIS:  Hepatoma (HCC)  Staging form: Liver, AJCC 8th Edition  - Clinical stage from 2020: Stage IIIB (cT4, cN0, cM0) - Signed by Rudy Skinner M.D. on 2020  Laterality: Right  Child-Rodriguez score: Score 5  Alpha fetoprotein (AFP) (ng/mL): 3,086  Stage used in treatment planning: Yes  National guidelines used in treatment planning: Yes  Type of national guideline used in treatment planning: NCCN      TREATMENT SUMMARY:  [facility] Treatment Information        Some values may be hidden. Unless noted otherwise, only the newest values recorded on each date are displayed.         [facility] Treatment Summary 20   Course First Treatment Date 2020   Course Last Treatment Date 2020   Liver Plan from Course C1_HCC   Fraction 15 of 15   Elapsed Course Days  @    Prescribed Fraction Dose 300 cGy   Prescribed Total Dose 4,500 cGy   Liver cp Reference Point from Course C1_HCC   Elapsed Course Days  @    Session Dose 297 cGy   Total Dose 4,448 cGy   [id number] Reference Point from Course C1_HCC   Elapsed Course Days  @    Session Dose 300 cGy   Total Dose 4,500 cGy              SUBJECTIVE:  No complaints. Completed today. Main issue fatigue.       VITAL SIGNS:  KPS: 70, Cares for self; unable to carry on normal activity or to do active work (ECOG equivalent 1)  Encounter Vitals  Temperature: 36.3 °C (97.4 °F)  Blood Pressure : 114/69  Pulse: 98  Pulse Oximetry: 96 %  Weight: 88.9 kg (196 lb)  Pain Score: No pain  Pain Assessment 2020   Pain Assessment - Denies Pain - Denies Pain -   Pain Score 0 0 0 0 0   Some recent data might be hidden           PHYSICAL EXAM:  Physical Exam  Vitals signs and nursing note reviewed.   Constitutional:       General: He is not in acute distress.     Appearance: He is well-developed.   HENT:      Head: Normocephalic.   Skin:     General: Skin is warm and dry.      Findings: No erythema.   Neurological:      Mental Status: He is alert and oriented to person, place, and time.   Psychiatric:         Behavior: Behavior normal.         Thought Content: Thought content normal.         Judgment: Judgment normal.          Toxicity Assessment 5/27/2020 5/20/2020 5/13/2020 5/6/2020   Toxicity Assessment Abdomen Abdomen Abdomen Abdomen   Fatigue (lethargy, malaise, asthenia) Moderate (e.g., decrease in performance status by 1 ECOG level or 20% Karnofsky) or causing difficulty performing some activities Moderate (e.g., decrease in performance status by 1 ECOG level or 20% Karnofsky) or causing difficulty performing some activities None Increased fatigue over baseline, but not altering normal activities   Radiation Dermatitis None None None None   Anorexia Loss of appetite Loss of appetite None None   Dehydration None None None None   Diarrhea w/o Colostomy None None None None   Nausea Able to eat None None None   Vomiting None None None None   Dyspepsia/heartburn None None None None   Dysphagia, Esophagitis, odynophagoa (painful swallowing) None None None None   Gastritis None None None -   Abdominal Pain or cramping None None None None       CURRENT MEDICATIONS:    Current Outpatient Medications:   •  ondansetron (ZOFRAN) 4 MG Tab tablet, Take 1 Tab by mouth every four hours as needed for Nausea/Vomiting for up to 90 days., Disp: 30 Tab, Rfl: 3  •  losartan (COZAAR) 50 MG Tab, Take 50 mg by mouth every morning., Disp: , Rfl:     LABORATORY DATA:   Lab Results   Component Value Date/Time    SODIUM 138 04/13/2020 06:11 AM    POTASSIUM 4.5 04/13/2020 06:11 AM    CHLORIDE 101 04/13/2020 06:11 AM    CO2 24 04/13/2020 06:11 AM    GLUCOSE  124 (H) 04/13/2020 06:11 AM    BUN 16 04/13/2020 06:11 AM    CREATININE 0.79 04/13/2020 06:11 AM       Lab Results   Component Value Date/Time    WBC 5.0 04/13/2020 06:11 AM    RBC 4.47 (L) 04/13/2020 06:11 AM    HEMOGLOBIN 13.3 (L) 04/13/2020 06:11 AM    HEMATOCRIT 41.5 (L) 04/13/2020 06:11 AM    MCV 92.8 04/13/2020 06:11 AM    MCH 29.8 04/13/2020 06:11 AM    MCHC 32.0 (L) 04/13/2020 06:11 AM    PLATELETCT 155 (L) 04/13/2020 06:11 AM         RADIOLOGY DATA:  Ct-abdomen Liver For Hepatic Mass (cirrhosis)    Result Date: 4/14/2020  1.  Large hepatocellular carcinoma involving the entire medial segment left lobe and majority of right lobe 2.  Size and thickness of enhancement are stable and therefore overall the tumor is stable 3.  There is however arterial phase enhancement with washout in the peripheral thickened portions consistent with residual tumor 4.  Portal hypertension with associated splenomegaly and dilation the portal vein 5.  Aortic and branch vessel atherosclerotic plaque 6.  Stable 2.3 cm cyst associated with the pancreatic tail LI-RADS: LR-Treated      IMPRESSION:  Cancer Staging  Hepatoma (HCC)  Staging form: Liver, AJCC 8th Edition  - Clinical stage from 4/27/2020: Stage IIIB (cT4, cN0, cM0) - Signed by Rudy Skinner M.D. on 4/27/2020      PLAN:  No change in treatment plan   RTC 6 weeks.    Disposition:  Treatment plan and imaging reviewed. Questions answered. Continue therapy outlined.     Ritesh MARMOLEJO M.D.    No orders of the defined types were placed in this encounter.

## 2020-06-30 ENCOUNTER — HOSPITAL ENCOUNTER (OUTPATIENT)
Dept: RADIOLOGY | Facility: MEDICAL CENTER | Age: 70
End: 2020-06-30
Attending: INTERNAL MEDICINE
Payer: MEDICARE

## 2020-06-30 DIAGNOSIS — C22.0 CARCINOMA OF LIVER (HCC): ICD-10-CM

## 2020-06-30 PROCEDURE — 700117 HCHG RX CONTRAST REV CODE 255: Performed by: INTERNAL MEDICINE

## 2020-06-30 PROCEDURE — 74160 CT ABDOMEN W/CONTRAST: CPT

## 2020-06-30 PROCEDURE — 71260 CT THORAX DX C+: CPT

## 2020-06-30 RX ADMIN — IOHEXOL 100 ML: 350 INJECTION, SOLUTION INTRAVENOUS at 12:45

## 2020-07-09 ENCOUNTER — HOSPITAL ENCOUNTER (OUTPATIENT)
Facility: MEDICAL CENTER | Age: 70
End: 2020-07-09
Payer: MEDICARE

## 2020-07-22 ENCOUNTER — APPOINTMENT (OUTPATIENT)
Dept: ONCOLOGY | Facility: MEDICAL CENTER | Age: 70
End: 2020-07-22
Attending: INTERNAL MEDICINE
Payer: MEDICARE

## 2020-07-22 RX ORDER — SODIUM CHLORIDE 9 MG/ML
INJECTION, SOLUTION INTRAVENOUS CONTINUOUS
Status: CANCELLED | OUTPATIENT
Start: 2020-07-22

## 2020-08-04 ENCOUNTER — APPOINTMENT (OUTPATIENT)
Dept: RADIOLOGY | Facility: MEDICAL CENTER | Age: 70
End: 2020-08-04
Attending: INTERNAL MEDICINE
Payer: MEDICARE

## 2020-08-26 ENCOUNTER — HOSPITAL ENCOUNTER (OUTPATIENT)
Dept: RADIOLOGY | Facility: MEDICAL CENTER | Age: 70
End: 2020-08-26
Attending: RADIOLOGY
Payer: MEDICARE

## 2020-09-23 ENCOUNTER — HOSPITAL ENCOUNTER (OUTPATIENT)
Dept: LAB | Facility: MEDICAL CENTER | Age: 70
End: 2020-09-23
Attending: FAMILY MEDICINE
Payer: MEDICARE

## 2020-09-23 ENCOUNTER — HOSPITAL ENCOUNTER (OUTPATIENT)
Dept: LAB | Facility: MEDICAL CENTER | Age: 70
End: 2020-09-23
Attending: RADIOLOGY
Payer: MEDICARE

## 2020-09-23 DIAGNOSIS — R35.0 BENIGN PROSTATIC HYPERPLASIA WITH URINARY FREQUENCY: ICD-10-CM

## 2020-09-23 DIAGNOSIS — N40.1 BENIGN PROSTATIC HYPERPLASIA WITH URINARY FREQUENCY: ICD-10-CM

## 2020-09-23 DIAGNOSIS — C22.0 HEPATOMA (HCC): ICD-10-CM

## 2020-09-23 DIAGNOSIS — Z12.5 SPECIAL SCREENING, PROSTATE CANCER: ICD-10-CM

## 2020-09-23 LAB
ALBUMIN SERPL BCP-MCNC: 4.1 G/DL (ref 3.2–4.9)
ALBUMIN SERPL BCP-MCNC: 4.1 G/DL (ref 3.2–4.9)
ALBUMIN/GLOB SERPL: 1.2 G/DL
ALBUMIN/GLOB SERPL: 1.2 G/DL
ALP SERPL-CCNC: 149 U/L (ref 30–99)
ALP SERPL-CCNC: 150 U/L (ref 30–99)
ALT SERPL-CCNC: 19 U/L (ref 2–50)
ALT SERPL-CCNC: 20 U/L (ref 2–50)
ANION GAP SERPL CALC-SCNC: 11 MMOL/L (ref 7–16)
ANION GAP SERPL CALC-SCNC: 11 MMOL/L (ref 7–16)
AST SERPL-CCNC: 37 U/L (ref 12–45)
AST SERPL-CCNC: 39 U/L (ref 12–45)
BILIRUB SERPL-MCNC: 0.4 MG/DL (ref 0.1–1.5)
BILIRUB SERPL-MCNC: 0.4 MG/DL (ref 0.1–1.5)
BUN SERPL-MCNC: 22 MG/DL (ref 8–22)
BUN SERPL-MCNC: 22 MG/DL (ref 8–22)
CALCIUM SERPL-MCNC: 9.2 MG/DL (ref 8.5–10.5)
CALCIUM SERPL-MCNC: 9.3 MG/DL (ref 8.5–10.5)
CHLORIDE SERPL-SCNC: 102 MMOL/L (ref 96–112)
CHLORIDE SERPL-SCNC: 104 MMOL/L (ref 96–112)
CHOLEST SERPL-MCNC: 166 MG/DL (ref 100–199)
CO2 SERPL-SCNC: 23 MMOL/L (ref 20–33)
CO2 SERPL-SCNC: 24 MMOL/L (ref 20–33)
CREAT SERPL-MCNC: 0.79 MG/DL (ref 0.5–1.4)
CREAT SERPL-MCNC: 0.8 MG/DL (ref 0.5–1.4)
EST. AVERAGE GLUCOSE BLD GHB EST-MCNC: 120 MG/DL
FASTING STATUS PATIENT QL REPORTED: NORMAL
GLOBULIN SER CALC-MCNC: 3.4 G/DL (ref 1.9–3.5)
GLOBULIN SER CALC-MCNC: 3.5 G/DL (ref 1.9–3.5)
GLUCOSE SERPL-MCNC: 118 MG/DL (ref 65–99)
GLUCOSE SERPL-MCNC: 118 MG/DL (ref 65–99)
HBA1C MFR BLD: 5.8 % (ref 0–5.6)
HDLC SERPL-MCNC: 39 MG/DL
LDLC SERPL CALC-MCNC: 104 MG/DL
POTASSIUM SERPL-SCNC: 4.2 MMOL/L (ref 3.6–5.5)
POTASSIUM SERPL-SCNC: 4.4 MMOL/L (ref 3.6–5.5)
PROT SERPL-MCNC: 7.5 G/DL (ref 6–8.2)
PROT SERPL-MCNC: 7.6 G/DL (ref 6–8.2)
PSA SERPL-MCNC: 1.86 NG/ML (ref 0–4)
SODIUM SERPL-SCNC: 137 MMOL/L (ref 135–145)
SODIUM SERPL-SCNC: 138 MMOL/L (ref 135–145)
TRIGL SERPL-MCNC: 116 MG/DL (ref 0–149)

## 2020-09-23 PROCEDURE — 84153 ASSAY OF PSA TOTAL: CPT

## 2020-09-23 PROCEDURE — 80061 LIPID PANEL: CPT

## 2020-09-23 PROCEDURE — 36415 COLL VENOUS BLD VENIPUNCTURE: CPT

## 2020-09-23 PROCEDURE — 80053 COMPREHEN METABOLIC PANEL: CPT | Mod: 91

## 2020-09-23 PROCEDURE — 80053 COMPREHEN METABOLIC PANEL: CPT

## 2020-09-23 PROCEDURE — 83036 HEMOGLOBIN GLYCOSYLATED A1C: CPT | Mod: GA

## 2020-09-25 ENCOUNTER — HOSPITAL ENCOUNTER (OUTPATIENT)
Dept: RADIOLOGY | Facility: MEDICAL CENTER | Age: 70
End: 2020-09-25
Attending: INTERNAL MEDICINE
Payer: MEDICARE

## 2020-09-30 ENCOUNTER — HOSPITAL ENCOUNTER (OUTPATIENT)
Dept: RADIOLOGY | Facility: MEDICAL CENTER | Age: 70
End: 2020-09-30
Attending: INTERNAL MEDICINE
Payer: MEDICARE

## 2020-09-30 DIAGNOSIS — C22.0 ERYTHROCYTOSIS DUE TO HEPATOMA (HCC): ICD-10-CM

## 2020-09-30 DIAGNOSIS — D75.1 ERYTHROCYTOSIS DUE TO HEPATOMA (HCC): ICD-10-CM

## 2020-09-30 DIAGNOSIS — R42 DIZZINESS: ICD-10-CM

## 2020-09-30 PROCEDURE — 74160 CT ABDOMEN W/CONTRAST: CPT

## 2020-09-30 PROCEDURE — 70460 CT HEAD/BRAIN W/DYE: CPT

## 2020-09-30 PROCEDURE — 71260 CT THORAX DX C+: CPT

## 2020-09-30 PROCEDURE — 700117 HCHG RX CONTRAST REV CODE 255: Performed by: INTERNAL MEDICINE

## 2020-09-30 RX ADMIN — IOHEXOL 100 ML: 350 INJECTION, SOLUTION INTRAVENOUS at 09:37

## 2021-01-15 DIAGNOSIS — Z23 NEED FOR VACCINATION: ICD-10-CM

## 2021-06-17 ENCOUNTER — HOSPITAL ENCOUNTER (OUTPATIENT)
Dept: HOSPITAL 8 - CFH | Age: 71
Discharge: HOME | End: 2021-06-17
Attending: INTERNAL MEDICINE
Payer: MEDICARE

## 2021-06-17 DIAGNOSIS — R91.8: ICD-10-CM

## 2021-06-17 DIAGNOSIS — E55.9: ICD-10-CM

## 2021-06-17 DIAGNOSIS — R16.0: ICD-10-CM

## 2021-06-17 DIAGNOSIS — C22.0: Primary | ICD-10-CM

## 2021-06-17 PROCEDURE — 74177 CT ABD & PELVIS W/CONTRAST: CPT

## 2021-06-17 PROCEDURE — 71260 CT THORAX DX C+: CPT

## 2021-07-02 ENCOUNTER — APPOINTMENT (OUTPATIENT)
Dept: RADIOLOGY | Facility: MEDICAL CENTER | Age: 71
DRG: 356 | End: 2021-07-02
Attending: EMERGENCY MEDICINE
Payer: MEDICARE

## 2021-07-02 ENCOUNTER — HOSPITAL ENCOUNTER (INPATIENT)
Facility: MEDICAL CENTER | Age: 71
LOS: 21 days | DRG: 356 | End: 2021-07-23
Attending: EMERGENCY MEDICINE | Admitting: STUDENT IN AN ORGANIZED HEALTH CARE EDUCATION/TRAINING PROGRAM
Payer: MEDICARE

## 2021-07-02 DIAGNOSIS — A41.9 ACUTE SEPSIS (HCC): ICD-10-CM

## 2021-07-02 DIAGNOSIS — C22.9 MALIGNANT NEOPLASM OF LIVER, UNSPECIFIED LIVER MALIGNANCY TYPE (HCC): ICD-10-CM

## 2021-07-02 DIAGNOSIS — S36.116S: ICD-10-CM

## 2021-07-02 DIAGNOSIS — R10.0 ACUTE ABDOMEN: ICD-10-CM

## 2021-07-02 DIAGNOSIS — K74.60 CIRRHOSIS OF LIVER WITHOUT ASCITES, UNSPECIFIED HEPATIC CIRRHOSIS TYPE (HCC): ICD-10-CM

## 2021-07-02 DIAGNOSIS — K65.9 PERITONITIS (HCC): ICD-10-CM

## 2021-07-02 DIAGNOSIS — R09.02 HYPOXIA: ICD-10-CM

## 2021-07-02 DIAGNOSIS — C22.0 HEPATOMA (HCC): ICD-10-CM

## 2021-07-02 DIAGNOSIS — R10.84 GENERALIZED ABDOMINAL PAIN: ICD-10-CM

## 2021-07-02 PROBLEM — E87.20 LACTIC ACIDOSIS: Status: ACTIVE | Noted: 2021-07-02

## 2021-07-02 PROBLEM — E87.1 HYPONATREMIA: Status: ACTIVE | Noted: 2021-07-02

## 2021-07-02 PROBLEM — R17 ELEVATED BILIRUBIN: Status: ACTIVE | Noted: 2021-07-02

## 2021-07-02 PROBLEM — E87.5 HYPERKALEMIA: Status: ACTIVE | Noted: 2021-07-02

## 2021-07-02 LAB
ALBUMIN SERPL BCP-MCNC: 3 G/DL (ref 3.2–4.9)
ALBUMIN/GLOB SERPL: 0.7 G/DL
ALP SERPL-CCNC: 526 U/L (ref 30–99)
ALT SERPL-CCNC: 33 U/L (ref 2–50)
ANION GAP SERPL CALC-SCNC: 14 MMOL/L (ref 7–16)
APPEARANCE UR: CLEAR
APTT PPP: 31.7 SEC (ref 24.7–36)
AST SERPL-CCNC: 41 U/L (ref 12–45)
BACTERIA #/AREA URNS HPF: NEGATIVE /HPF
BASOPHILS # BLD AUTO: 0.5 % (ref 0–1.8)
BASOPHILS # BLD: 0.06 K/UL (ref 0–0.12)
BILIRUB SERPL-MCNC: 2.3 MG/DL (ref 0.1–1.5)
BILIRUB UR QL STRIP.AUTO: ABNORMAL
BUN SERPL-MCNC: 18 MG/DL (ref 8–22)
CALCIUM SERPL-MCNC: 9 MG/DL (ref 8.5–10.5)
CAOX CRY #/AREA URNS HPF: ABNORMAL /HPF
CHLORIDE SERPL-SCNC: 92 MMOL/L (ref 96–112)
CO2 SERPL-SCNC: 20 MMOL/L (ref 20–33)
COLOR UR: ABNORMAL
CREAT SERPL-MCNC: 1.02 MG/DL (ref 0.5–1.4)
EKG IMPRESSION: NORMAL
EOSINOPHIL # BLD AUTO: 0 K/UL (ref 0–0.51)
EOSINOPHIL NFR BLD: 0 % (ref 0–6.9)
EPI CELLS #/AREA URNS HPF: ABNORMAL /HPF
ERYTHROCYTE [DISTWIDTH] IN BLOOD BY AUTOMATED COUNT: 51.4 FL (ref 35.9–50)
FLUAV RNA SPEC QL NAA+PROBE: NEGATIVE
FLUBV RNA SPEC QL NAA+PROBE: NEGATIVE
GLOBULIN SER CALC-MCNC: 4.6 G/DL (ref 1.9–3.5)
GLUCOSE SERPL-MCNC: 134 MG/DL (ref 65–99)
GLUCOSE UR STRIP.AUTO-MCNC: NEGATIVE MG/DL
HCT VFR BLD AUTO: 47.4 % (ref 42–52)
HGB BLD-MCNC: 15.9 G/DL (ref 14–18)
HYALINE CASTS #/AREA URNS LPF: ABNORMAL /LPF
IMM GRANULOCYTES # BLD AUTO: 0.05 K/UL (ref 0–0.11)
IMM GRANULOCYTES NFR BLD AUTO: 0.4 % (ref 0–0.9)
INR PPP: 1.12 (ref 0.87–1.13)
KETONES UR STRIP.AUTO-MCNC: ABNORMAL MG/DL
LACTATE BLD-SCNC: 2 MMOL/L (ref 0.5–2)
LACTATE BLD-SCNC: 2.7 MMOL/L (ref 0.5–2)
LEUKOCYTE ESTERASE UR QL STRIP.AUTO: NEGATIVE
LYMPHOCYTES # BLD AUTO: 0.44 K/UL (ref 1–4.8)
LYMPHOCYTES NFR BLD: 3.9 % (ref 22–41)
MCH RBC QN AUTO: 31.7 PG (ref 27–33)
MCHC RBC AUTO-ENTMCNC: 33.5 G/DL (ref 33.7–35.3)
MCV RBC AUTO: 94.6 FL (ref 81.4–97.8)
MICRO URNS: ABNORMAL
MONOCYTES # BLD AUTO: 0.57 K/UL (ref 0–0.85)
MONOCYTES NFR BLD AUTO: 5.1 % (ref 0–13.4)
NEUTROPHILS # BLD AUTO: 10.03 K/UL (ref 1.82–7.42)
NEUTROPHILS NFR BLD: 90.1 % (ref 44–72)
NITRITE UR QL STRIP.AUTO: NEGATIVE
NRBC # BLD AUTO: 0 K/UL
NRBC BLD-RTO: 0 /100 WBC
PH UR STRIP.AUTO: 5.5 [PH] (ref 5–8)
PLATELET # BLD AUTO: 115 K/UL (ref 164–446)
PMV BLD AUTO: 8.7 FL (ref 9–12.9)
POTASSIUM SERPL-SCNC: 5.3 MMOL/L (ref 3.6–5.5)
POTASSIUM SERPL-SCNC: 5.7 MMOL/L (ref 3.6–5.5)
PROT SERPL-MCNC: 7.6 G/DL (ref 6–8.2)
PROT UR QL STRIP: 30 MG/DL
PROTHROMBIN TIME: 14.1 SEC (ref 12–14.6)
RBC # BLD AUTO: 5.01 M/UL (ref 4.7–6.1)
RBC # URNS HPF: ABNORMAL /HPF
RBC UR QL AUTO: NEGATIVE
RENAL EPI CELLS #/AREA URNS HPF: ABNORMAL /HPF
RSV RNA SPEC QL NAA+PROBE: NEGATIVE
SARS-COV-2 RNA RESP QL NAA+PROBE: NOTDETECTED
SODIUM SERPL-SCNC: 126 MMOL/L (ref 135–145)
SP GR UR STRIP.AUTO: >=1.045
SPECIMEN SOURCE: NORMAL
UROBILINOGEN UR STRIP.AUTO-MCNC: 1 MG/DL
WBC # BLD AUTO: 11.2 K/UL (ref 4.8–10.8)
WBC #/AREA URNS HPF: ABNORMAL /HPF

## 2021-07-02 PROCEDURE — 99285 EMERGENCY DEPT VISIT HI MDM: CPT

## 2021-07-02 PROCEDURE — 84132 ASSAY OF SERUM POTASSIUM: CPT

## 2021-07-02 PROCEDURE — 700111 HCHG RX REV CODE 636 W/ 250 OVERRIDE (IP): Performed by: STUDENT IN AN ORGANIZED HEALTH CARE EDUCATION/TRAINING PROGRAM

## 2021-07-02 PROCEDURE — 71045 X-RAY EXAM CHEST 1 VIEW: CPT

## 2021-07-02 PROCEDURE — 700111 HCHG RX REV CODE 636 W/ 250 OVERRIDE (IP): Performed by: EMERGENCY MEDICINE

## 2021-07-02 PROCEDURE — 700102 HCHG RX REV CODE 250 W/ 637 OVERRIDE(OP): Performed by: STUDENT IN AN ORGANIZED HEALTH CARE EDUCATION/TRAINING PROGRAM

## 2021-07-02 PROCEDURE — 36415 COLL VENOUS BLD VENIPUNCTURE: CPT

## 2021-07-02 PROCEDURE — 96375 TX/PRO/DX INJ NEW DRUG ADDON: CPT

## 2021-07-02 PROCEDURE — 700117 HCHG RX CONTRAST REV CODE 255: Performed by: EMERGENCY MEDICINE

## 2021-07-02 PROCEDURE — 700111 HCHG RX REV CODE 636 W/ 250 OVERRIDE (IP)

## 2021-07-02 PROCEDURE — 93005 ELECTROCARDIOGRAM TRACING: CPT | Performed by: EMERGENCY MEDICINE

## 2021-07-02 PROCEDURE — 74177 CT ABD & PELVIS W/CONTRAST: CPT | Mod: ME

## 2021-07-02 PROCEDURE — 82962 GLUCOSE BLOOD TEST: CPT

## 2021-07-02 PROCEDURE — 83605 ASSAY OF LACTIC ACID: CPT

## 2021-07-02 PROCEDURE — 87040 BLOOD CULTURE FOR BACTERIA: CPT

## 2021-07-02 PROCEDURE — 700105 HCHG RX REV CODE 258

## 2021-07-02 PROCEDURE — 81001 URINALYSIS AUTO W/SCOPE: CPT

## 2021-07-02 PROCEDURE — 700105 HCHG RX REV CODE 258: Performed by: EMERGENCY MEDICINE

## 2021-07-02 PROCEDURE — 85025 COMPLETE CBC W/AUTO DIFF WBC: CPT

## 2021-07-02 PROCEDURE — 96365 THER/PROPH/DIAG IV INF INIT: CPT

## 2021-07-02 PROCEDURE — 85610 PROTHROMBIN TIME: CPT

## 2021-07-02 PROCEDURE — 700101 HCHG RX REV CODE 250: Performed by: EMERGENCY MEDICINE

## 2021-07-02 PROCEDURE — 700105 HCHG RX REV CODE 258: Performed by: STUDENT IN AN ORGANIZED HEALTH CARE EDUCATION/TRAINING PROGRAM

## 2021-07-02 PROCEDURE — 80053 COMPREHEN METABOLIC PANEL: CPT

## 2021-07-02 PROCEDURE — 85730 THROMBOPLASTIN TIME PARTIAL: CPT

## 2021-07-02 PROCEDURE — 99223 1ST HOSP IP/OBS HIGH 75: CPT | Mod: AI | Performed by: STUDENT IN AN ORGANIZED HEALTH CARE EDUCATION/TRAINING PROGRAM

## 2021-07-02 PROCEDURE — 96367 TX/PROPH/DG ADDL SEQ IV INF: CPT

## 2021-07-02 PROCEDURE — C9803 HOPD COVID-19 SPEC COLLECT: HCPCS | Performed by: EMERGENCY MEDICINE

## 2021-07-02 PROCEDURE — 770020 HCHG ROOM/CARE - TELE (206)

## 2021-07-02 PROCEDURE — 0241U HCHG SARS-COV-2 COVID-19 NFCT DS RESP RNA 4 TRGT MIC: CPT

## 2021-07-02 PROCEDURE — A9270 NON-COVERED ITEM OR SERVICE: HCPCS | Performed by: STUDENT IN AN ORGANIZED HEALTH CARE EDUCATION/TRAINING PROGRAM

## 2021-07-02 RX ORDER — ONDANSETRON 2 MG/ML
4 INJECTION INTRAMUSCULAR; INTRAVENOUS EVERY 4 HOURS PRN
Status: DISCONTINUED | OUTPATIENT
Start: 2021-07-02 | End: 2021-07-23 | Stop reason: HOSPADM

## 2021-07-02 RX ORDER — ACETAMINOPHEN 325 MG/1
650 TABLET ORAL EVERY 6 HOURS PRN
Status: DISCONTINUED | OUTPATIENT
Start: 2021-07-02 | End: 2021-07-12

## 2021-07-02 RX ORDER — BISACODYL 10 MG
10 SUPPOSITORY, RECTAL RECTAL
Status: DISCONTINUED | OUTPATIENT
Start: 2021-07-02 | End: 2021-07-09

## 2021-07-02 RX ORDER — NICOTINE 21 MG/24HR
21 PATCH, TRANSDERMAL 24 HOURS TRANSDERMAL
Status: DISCONTINUED | OUTPATIENT
Start: 2021-07-02 | End: 2021-07-23 | Stop reason: HOSPADM

## 2021-07-02 RX ORDER — HYDROXYZINE HYDROCHLORIDE 25 MG/1
25 TABLET, FILM COATED ORAL 3 TIMES DAILY PRN
Status: ON HOLD | COMMUNITY
End: 2021-07-23

## 2021-07-02 RX ORDER — AMOXICILLIN 250 MG
2 CAPSULE ORAL 2 TIMES DAILY
Status: DISCONTINUED | OUTPATIENT
Start: 2021-07-02 | End: 2021-07-09

## 2021-07-02 RX ORDER — DEXTROSE MONOHYDRATE 25 G/50ML
50 INJECTION, SOLUTION INTRAVENOUS
Status: DISCONTINUED | OUTPATIENT
Start: 2021-07-02 | End: 2021-07-13

## 2021-07-02 RX ORDER — HYDROMORPHONE HYDROCHLORIDE 1 MG/ML
0.25 INJECTION, SOLUTION INTRAMUSCULAR; INTRAVENOUS; SUBCUTANEOUS
Status: DISCONTINUED | OUTPATIENT
Start: 2021-07-02 | End: 2021-07-03

## 2021-07-02 RX ORDER — OXYCODONE HYDROCHLORIDE 5 MG/1
2.5 TABLET ORAL
Status: DISCONTINUED | OUTPATIENT
Start: 2021-07-02 | End: 2021-07-03

## 2021-07-02 RX ORDER — SODIUM CHLORIDE 9 MG/ML
INJECTION, SOLUTION INTRAVENOUS CONTINUOUS
Status: DISCONTINUED | OUTPATIENT
Start: 2021-07-02 | End: 2021-07-04

## 2021-07-02 RX ORDER — SODIUM CHLORIDE, SODIUM LACTATE, POTASSIUM CHLORIDE, AND CALCIUM CHLORIDE .6; .31; .03; .02 G/100ML; G/100ML; G/100ML; G/100ML
30 INJECTION, SOLUTION INTRAVENOUS ONCE
Status: COMPLETED | OUTPATIENT
Start: 2021-07-02 | End: 2021-07-02

## 2021-07-02 RX ORDER — POLYETHYLENE GLYCOL 3350 17 G/17G
1 POWDER, FOR SOLUTION ORAL
Status: DISCONTINUED | OUTPATIENT
Start: 2021-07-02 | End: 2021-07-09

## 2021-07-02 RX ORDER — ONDANSETRON 4 MG/1
4 TABLET, ORALLY DISINTEGRATING ORAL EVERY 4 HOURS PRN
Status: DISCONTINUED | OUTPATIENT
Start: 2021-07-02 | End: 2021-07-12

## 2021-07-02 RX ORDER — OXYCODONE HYDROCHLORIDE 5 MG/1
5 TABLET ORAL
Status: DISCONTINUED | OUTPATIENT
Start: 2021-07-02 | End: 2021-07-03

## 2021-07-02 RX ADMIN — SODIUM CHLORIDE, POTASSIUM CHLORIDE, SODIUM LACTATE AND CALCIUM CHLORIDE 2559 ML: 600; 310; 30; 20 INJECTION, SOLUTION INTRAVENOUS at 12:47

## 2021-07-02 RX ADMIN — HYDROMORPHONE HYDROCHLORIDE 0.25 MG: 1 INJECTION, SOLUTION INTRAMUSCULAR; INTRAVENOUS; SUBCUTANEOUS at 23:07

## 2021-07-02 RX ADMIN — OXYCODONE 5 MG: 5 TABLET ORAL at 16:45

## 2021-07-02 RX ADMIN — FENTANYL CITRATE 25 MCG: 50 INJECTION, SOLUTION INTRAMUSCULAR; INTRAVENOUS at 14:43

## 2021-07-02 RX ADMIN — DOCUSATE SODIUM 50 MG AND SENNOSIDES 8.6 MG 2 TABLET: 8.6; 5 TABLET, FILM COATED ORAL at 18:12

## 2021-07-02 RX ADMIN — SODIUM CHLORIDE: 9 INJECTION, SOLUTION INTRAVENOUS at 18:12

## 2021-07-02 RX ADMIN — OXYCODONE 5 MG: 5 TABLET ORAL at 20:19

## 2021-07-02 RX ADMIN — CEFEPIME 2 G: 2 INJECTION, POWDER, FOR SOLUTION INTRAVENOUS at 11:57

## 2021-07-02 RX ADMIN — METRONIDAZOLE 500 MG: 500 INJECTION, SOLUTION INTRAVENOUS at 12:47

## 2021-07-02 RX ADMIN — IOHEXOL 100 ML: 350 INJECTION, SOLUTION INTRAVENOUS at 14:42

## 2021-07-02 ASSESSMENT — FIBROSIS 4 INDEX
FIB4 SCORE: 3.99
FIB4 SCORE: 4.41
FIB4 SCORE: 4.41

## 2021-07-02 ASSESSMENT — PAIN DESCRIPTION - PAIN TYPE
TYPE: ACUTE PAIN

## 2021-07-02 ASSESSMENT — ENCOUNTER SYMPTOMS
SHORTNESS OF BREATH: 1
EYE PAIN: 0
VOMITING: 0
CONSTIPATION: 1
DIARRHEA: 0
BLOOD IN STOOL: 0
NECK PAIN: 0
DOUBLE VISION: 0
FEVER: 0
COUGH: 0
SENSORY CHANGE: 0
LOSS OF CONSCIOUSNESS: 0
BLURRED VISION: 0
TREMORS: 0
HEADACHES: 0
HEMOPTYSIS: 0
NAUSEA: 0
FLANK PAIN: 0
PALPITATIONS: 0
ABDOMINAL PAIN: 1
BACK PAIN: 0
WHEEZING: 0
CHILLS: 0

## 2021-07-02 NOTE — H&P
Hospital Medicine History & Physical Note    Date of Service  7/2/2021    Primary Care Physician  Cole Alonso D.O.    Consultants  General surgery    Specialist Names: Dr. Muñoz    Code Status  Full Code    Chief Complaint  Chief Complaint   Patient presents with   • Abdominal Pain   • Shoulder Pain       History of Presenting Illness  Lorraine Bella is a 71 y.o. male with known history of hepatoma since 2018, who presented 7/2/2021 with abdominal pain.  Patient reports sudden onset abdominal pain this 3 AM which  woke him up.  The pain started in the middle then to the whole abd, to the back.  He reported low grade fever for the past few days, and temp 100.1 this am. He did not have a bowel movement for 6 days.  He used suppository yesterday and he finally had a bowel movement.  He denies nausea vomiting, chest pain, cough, diarrhea, dysuria.     He was diagnosed of hepatoma in 2018 and has been on immunotherapy.  He stated that the  tumor has been smaller since treatment.    At the ER, , RR 26, /81, on room air.   Sodium 126, potassium 5.7, , bilirubin 2.3.  CT abdomen showed possible ruptured cyst, general surgery consulted.      I discussed the plan of care with patient, family and bedside RN.    Review of Systems  Review of Systems   Constitutional: Positive for malaise/fatigue. Negative for chills and fever.   HENT: Negative for ear discharge and ear pain.    Eyes: Negative for blurred vision, double vision and pain.   Respiratory: Positive for shortness of breath. Negative for cough, hemoptysis and wheezing.    Cardiovascular: Negative for chest pain and palpitations.   Gastrointestinal: Positive for abdominal pain and constipation. Negative for blood in stool, diarrhea, nausea and vomiting.   Genitourinary: Negative for dysuria, flank pain, hematuria and urgency.   Musculoskeletal: Negative for back pain and neck pain.   Neurological: Negative for tremors, sensory change,  loss of consciousness and headaches.       Past Medical History   has a past medical history of Alcohol use (3/31/2016), Borderline diabetes, Cancer (HCC) (2019), Cataract, Dental disorder, Diabetes (HCC), Disorder of thyroid, Glaucoma, High cholesterol, and Hypertension.    Surgical History   has a past surgical history that includes bone graft (Right, 1960's); wrist orif (Right, 1960's); cataract phaco with iol (Left, 4/7/2016); parathyroidectomy (N/A, 5/13/2016); other (12/2019); and other.     Family History  family history includes Cancer in his mother.   Family history reviewed with patient. There is family history that is pertinent to the chief complaint.   Positive family history of or cancer    Social History   reports that he has been smoking cigarettes. He started smoking about 31 years ago. He has a 15.00 pack-year smoking history. He has never used smokeless tobacco. He reports previous alcohol use of about 2.4 oz of alcohol per week. He reports that he does not use drugs.    Allergies  Allergies   Allergen Reactions   • Sulfa Drugs      Reaction unknown       Medications  Prior to Admission Medications   Prescriptions Last Dose Informant Patient Reported? Taking?   hydrOXYzine HCl (ATARAX) 25 MG Tab 7/1/2021 at 2230 Patient Yes Yes   Sig: Take 25 mg by mouth 3 times a day as needed for Itching.      Facility-Administered Medications: None       Physical Exam  Temp:  [36 °C (96.8 °F)-36.5 °C (97.7 °F)] 36 °C (96.8 °F)  Pulse:  [] 102  Resp:  [14-38] 18  BP: ()/(26-81) 118/74  SpO2:  [90 %-96 %] 93 %    Physical Exam  Constitutional:       General: He is not in acute distress.     Appearance: He is ill-appearing. He is not diaphoretic.   HENT:      Head: Normocephalic and atraumatic.      Nose: Nose normal.      Mouth/Throat:      Pharynx: Oropharynx is clear. No oropharyngeal exudate or posterior oropharyngeal erythema.   Eyes:      General:         Right eye: No discharge.         Left eye:  No discharge.   Cardiovascular:      Rate and Rhythm: Normal rate and regular rhythm.      Pulses: Normal pulses.      Heart sounds: Normal heart sounds. No gallop.    Pulmonary:      Effort: Pulmonary effort is normal. No respiratory distress.      Breath sounds: Normal breath sounds. No stridor. No wheezing, rhonchi or rales.   Chest:      Chest wall: No tenderness.   Abdominal:      General: Abdomen is flat. Bowel sounds are normal. There is distension.      Palpations: Abdomen is soft.      Tenderness: There is abdominal tenderness. There is no guarding or rebound.   Musculoskeletal:         General: No swelling, tenderness, deformity or signs of injury. Normal range of motion.      Cervical back: No rigidity. No muscular tenderness.      Right lower leg: No edema.      Left lower leg: No edema.   Neurological:      General: No focal deficit present.      Mental Status: He is alert and oriented to person, place, and time. Mental status is at baseline.      Sensory: No sensory deficit.      Motor: No weakness.      Gait: Gait normal.   Psychiatric:         Mood and Affect: Mood normal.         Behavior: Behavior normal.         Thought Content: Thought content normal.         Laboratory:  Recent Labs     07/02/21  1150   WBC 11.2*   RBC 5.01   HEMOGLOBIN 15.9   HEMATOCRIT 47.4   MCV 94.6   MCH 31.7   MCHC 33.5*   RDW 51.4*   PLATELETCT 115*   MPV 8.7*     Recent Labs     07/02/21  1150   SODIUM 126*   POTASSIUM 5.7*   CHLORIDE 92*   CO2 20   GLUCOSE 134*   BUN 18   CREATININE 1.02   CALCIUM 9.0     Recent Labs     07/02/21  1150   ALTSGPT 33   ASTSGOT 41   ALKPHOSPHAT 526*   TBILIRUBIN 2.3*   GLUCOSE 134*     Recent Labs     07/02/21  1150   APTT 31.7   INR 1.12     No results for input(s): NTPROBNP in the last 72 hours.      No results for input(s): TROPONINT in the last 72 hours.    Imaging:  CT-ABDOMEN-PELVIS WITH   Final Result      1.  Primarily cystic mass in the medial segment LEFT lobe liver, likely  treated hepatoma.   2.  Complex low-attenuation lesion in the inferior aspect medial segment LEFT lobe liver which is new from prior exam and may represent cystic mass or focal hematoma related to rupture larger mass.  Minimal hyperdense components may indicate blood    products.  Abscess is felt less likely.   3.  Gallbladder is not visualized and may be compressed by or involved with liver mass.   4.  Small volume ascites with potential hemoperitoneum.   5.  Splenomegaly.      These findings were discussed with CARMEN OTTO on 7/2/2021 3:02 PM.      DX-CHEST-PORTABLE (1 VIEW)   Final Result      1.  Hypoinflation with minimal LEFT lung base atelectasis.   2.  No pneumonia or pulmonary edema.          X-Ray:  I have personally reviewed the images and compared with prior images.    Assessment/Plan:  I anticipate this patient will require at least two midnights for appropriate medical management, necessitating inpatient admission.    * Abdominal pain- (present on admission)  Assessment & Plan  Diffuse abdominal pain x 1d  CT abdomen showed liver cystic mass/hematoma, likely cystic rupture    Surgery consulted, recommended to continue to monitor, no surgery planned at this point    Lactic acidosis  Assessment & Plan  Lactic acid 2.7 at admission    IV fluid  Trending down    Elevated bilirubin  Assessment & Plan  Bilirubin 2.3  Continue to monitor    Hyperkalemia  Assessment & Plan  Potassium 5.7 at admission    IV fluid  Repeat potassium, treat if stays high    Hyponatremia  Assessment & Plan  Likely due to chronic liver disease    Continue to monitor    Tobacco abuse- (present on admission)  Assessment & Plan  Smoking cessation was given at bedside  Nicotine patch      Thrombocytopenia (HCC)- (present on admission)  Assessment & Plan   at admission  Secondary to liver disease    Continue to monitor    Hepatoma (HCC)- (present on admission)  Assessment & Plan  On immunotherapy    Type 2 diabetes mellitus  without complication, without long-term current use of insulin (HCC)- (present on admission)  Assessment & Plan  Borderline diabetes, not on medications  Check  A1c  SSI        VTE prophylaxis: SCDs/TEDs

## 2021-07-02 NOTE — ED NOTES
Pt to room by wheelchair with visitor. Pt in gown, on monitor. Chart up for ERP. 2 PIVs placed, blood work and cultures sent to lab.

## 2021-07-02 NOTE — ED PROVIDER NOTES
ED Provider Note    CHIEF COMPLAINT  Chief Complaint   Patient presents with   • Abdominal Pain   • Shoulder Pain       HPI  Lorraine Bella is a 71 y.o. male who presents complaining of abdominal pain and body aches.  The patient was constipated for 6 or 7 days, trying a number of over-the-counter medications.  Yesterday he used a glycerin suppository and had success about 15 minutes later with a fair bit of stool coming out.  However since that time, has been constipated once again.  He has had a worsening abdominal pain, hard for him to characterize.  It seems to be over the lower abdomen.  Nothing makes it better or worse.  The pain radiates around his body, hurting both the shoulders, his neck, his torso, essentially he hurts everywhere.  Today he had a temperature of 101.  There is been a baseline cough, no changes here it sounds.  There is been no recent new cough or cold numbness.  No recent trauma.  No change in bladder.  No rashes.  There is no other complaint.  Is a history of liver cancer which had metastasized to the lungs.  It sounds like the lung lesions are better after immunotherapy.  The liver lesion has also shrunk quite a bit.    PAST MEDICAL HISTORY  Past Medical History:   Diagnosis Date   • Alcohol use 3/31/2016    2-3 per day   • Borderline diabetes    • Cancer (HCC) 2019    liver    • Cataract     itzel IOL    • Dental disorder     needs dentures upper   • Diabetes (HCC)     diet controlled    • Disorder of thyroid     not on medication   • Glaucoma     left eye    • High cholesterol     not on medication   • Hypertension        FAMILY HISTORY  Family History   Problem Relation Age of Onset   • Cancer Mother         unknown cancer       SOCIAL HISTORY  Social History     Tobacco Use   • Smoking status: Current Every Day Smoker     Packs/day: 0.50     Years: 30.00     Pack years: 15.00     Types: Cigarettes     Start date: 1/1/1990   • Smokeless tobacco: Never Used   • Tobacco comment:  "quit couple times of the years   Vaping Use   • Vaping Use: Never used   Substance Use Topics   • Alcohol use: Not Currently     Alcohol/week: 2.4 oz     Types: 4 Shots of liquor per week     Comment: pt quit drinking 2 months ago   • Drug use: No         SURGICAL HISTORY  Past Surgical History:   Procedure Laterality Date   • OTHER  12/2019    \"IR embolization\"    • PARATHYROIDECTOMY N/A 5/13/2016    Procedure: PARATHYROIDECTOMY;  Surgeon: Kale Lord M.D.;  Location: SURGERY SAME DAY [facility];  Service:    • CATARACT PHACO WITH IOL Left 4/7/2016    Procedure: CATARACT PHACO WITH IOL;  Surgeon: Ephraim Jamison M.D.;  Location: [facility];  Service:    • BONE GRAFT Right 1960's    right wrist   • OTHER      chemoemolozation x2   • WRIST ORIF Right 1960's       CURRENT MEDICATIONS  Home Medications     Reviewed by Fady Farrell (Pharmacy Tech) on 07/02/21 at 1237  Med List Status: Complete   Medication Last Dose Status   hydrOXYzine HCl (ATARAX) 25 MG Tab 7/1/2021 Active                I have reviewed the nurses notes and/or the list brought with the patient.    ALLERGIES  Allergies   Allergen Reactions   • Sulfa Drugs      Reaction unknown       REVIEW OF SYSTEMS  See HPI for further details. Review of systems as above, otherwise all other systems are negative.     PHYSICAL EXAM  VITAL SIGNS: /78   Pulse (!) 120   Temp 36.5 °C (97.7 °F) (Oral)   Resp (!) 36   Ht 1.803 m (5' 11\")   Wt 85.3 kg (188 lb)   SpO2 92%   BMI 26.22 kg/m²    Constitutional: Ill-appearing.  HENT: Mucus membranes moist.  Oropharynx is clear.  Eyes: Pupils equally round.  No scleral icterus.   Neck: Full nontender range of motion.  Lymphatic: No cervical lymphadenopathy noted.   Cardiovascular: Tachycardic, appears sinus on the monitor.  No murmurs, rubs, nor gallop appreciated.   Thorax & Lungs: Chest is nontender.  Lungs are clear to auscultation with good air movement bilaterally.  No wheeze, rhonchi, " nor rales.   Abdomen: Softly distended.  Diffuse tenderness, positive rebound, positive guarding.  No obvious mass.  : No inguinal hernia.  The scrotum is benign.  Skin: No purpura nor petechia noted.  Extremities/Musculoskeletal: No sign of trauma.  Calves are nontender with no cords nor edema.  No Ayala's sign.  Pulses are intact all around.   Neurologic: Alert & oriented.  Strength and sensation is intact all around.  Gait is not assessed  Psychiatric: Normal affect appropriate for the clinical situation.    EKG  I interpreted this EKG myself.  This is a 12-lead study.  The rhythm is sinus tachycardia with a rate of 117.  There are no ST segment nor T wave abnormalities.  Interpretation: No ST segment elevation myocardial infarction.    LABS  Labs Reviewed   LACTIC ACID - Abnormal; Notable for the following components:       Result Value    Lactic Acid 2.7 (*)     All other components within normal limits    Narrative:     Protective   CBC WITH DIFFERENTIAL - Abnormal; Notable for the following components:    WBC 11.2 (*)     MCHC 33.5 (*)     RDW 51.4 (*)     Platelet Count 115 (*)     MPV 8.7 (*)     Neutrophils-Polys 90.10 (*)     Lymphocytes 3.90 (*)     Neutrophils (Absolute) 10.03 (*)     Lymphs (Absolute) 0.44 (*)     All other components within normal limits    Narrative:     Protective  Indicate which anticoagulants the patient is on:->UNKNOWN   COMP METABOLIC PANEL - Abnormal; Notable for the following components:    Sodium 126 (*)     Potassium 5.7 (*)     Chloride 92 (*)     Glucose 134 (*)     Alkaline Phosphatase 526 (*)     Total Bilirubin 2.3 (*)     Albumin 3.0 (*)     Globulin 4.6 (*)     All other components within normal limits    Narrative:     Protective  Indicate which anticoagulants the patient is on:->UNKNOWN   PROTHROMBIN TIME    Narrative:     Protective  Indicate which anticoagulants the patient is on:->UNKNOWN   APTT    Narrative:     Protective  Indicate which anticoagulants the  patient is on:->UNKNOWN   BLOOD CULTURE    Narrative:     Protective  Draw one blood culture from central line and one blood  culture peripherally. If no line present, draw blood cultures  times two peripherally.   BLOOD CULTURE    Narrative:     Protective  Draw one blood culture from central line and one blood  culture peripherally. If no line present, draw blood cultures  times two peripherally.   COV-2, FLU A/B, AND RSV BY PCR    Narrative:     Protective  Have you been in close contact with a person who is suspected  or known to be positive for COVID-19 within the last 30 days  (e.g. last seen that person < 30 days ago)->Unknown   ESTIMATED GFR    Narrative:     Protective  Indicate which anticoagulants the patient is on:->UNKNOWN   LACTIC ACID   URINALYSIS         RADIOLOGY/PROCEDURES  I have reviewed the patient's film interpretations myself, and they are read out by the radiologist as:   CT-ABDOMEN-PELVIS WITH   Final Result      1.  Primarily cystic mass in the medial segment LEFT lobe liver, likely treated hepatoma.   2.  Complex low-attenuation lesion in the inferior aspect medial segment LEFT lobe liver which is new from prior exam and may represent cystic mass or focal hematoma related to rupture larger mass.  Minimal hyperdense components may indicate blood    products.  Abscess is felt less likely.   3.  Gallbladder is not visualized and may be compressed by or involved with liver mass.   4.  Small volume ascites with potential hemoperitoneum.   5.  Splenomegaly.      These findings were discussed with CARMEN OTTO on 7/2/2021 3:02 PM.      DX-CHEST-PORTABLE (1 VIEW)   Final Result      1.  Hypoinflation with minimal LEFT lung base atelectasis.   2.  No pneumonia or pulmonary edema.        .    MEDICAL RECORD  I have reviewed patient's medical record and pertinent results are listed above.    COURSE & MEDICAL DECISION MAKING  I have reviewed any medical record information, laboratory studies and  radiographic results as noted above.  This is a 71-year-old man with history of metastatic liver cancer who presents with an acute abdomen.  Clinically he has sepsis.  Sepsis protocol is initiated, I have asked nursing to give him LR 30 cc/kg.  He is not a candidate for p.o. hydration at this point.  We kept n.p.o. because of that acute abdomen.  I have asked nursing to prioritize him to get a scan immediately.  He is already received cefepime per protocol, I talk with the pharmacist, will add Flagyl.  I discussed with the patient and his wife that he is critically ill.  For now he would like to proceed with full intervention.    1350:: I have checked on the patient a few times, he continues to maintain his blood pressure, his heart rate is improving.  CT knows that he has prioritized, but I am worried about an acute abdomen, however he keeps getting pushed back because of trauma activations.    1520: I did talk with Dr. Pritchett.  His findings are noted above.  Not clear why he is hurting.  Patient still has abdominal pain with an acute abdomen clinically.  Blood pressure remains stable.  Heart rate continues to decline.  I talked with Dr. Muñoz from general surgery.  I point I do not have a etiology for his symptoms but I would like him to see the patient; he will be doing so.  I discussed the patient's case with Dr. Rhodes, [facility] hospitalist, she will be seeing the patient as well.  Patient and her  were updated of all this.  He is admitted.      FINAL IMPRESSION  1. Acute sepsis (HCC)    2. Acute abdomen    3. Malignant neoplasm of liver, unspecified liver malignancy type (HCC)    4.  Critical care time, 35 minutes, which includes obtaining history from patient and his wife historians, examination of patient, reevaluation of patient, direction of nursing staff, and discussion with admitting and consulting providers. It does not include any procedures.     This dictation was created using voice recognition  software.    Electronically signed by: Patrick Flood M.D., 7/2/2021 12:44 PM

## 2021-07-02 NOTE — ED NOTES
Med rec updated and complete. Allergies reviewed. Met with pt /family at bedside.   No antibiotic use in last 14 days.        Home pharmacy [facility] [phone]

## 2021-07-02 NOTE — CONSULTS
CONSULT NOTE  07/02/21  Consulting Physician: Dr. Flood    PATIENT ID  Name:             Lorraine Bella   YOB: 1950  Age:                 71 y.o.  male   MRN:               [id number]    CHIEF COMPLAINT:        Abdominal pain    HISTORY OF PRESENT ILLNESS:  Is a 71-year-old male with a history of hepatocellular carcinoma with reported metastases to his lungs.  He has been on chemotherapy and immunotherapy in the past.  He presents with increasing 1 day history of abdominal pain.  Constipated for the past week had resolution of that yesterday and then woke up today with increased abdominal pain of his lower abdomen as well as radiation to his shoulders.  States is febrile to 101 today.    REVIEW OF SYSTEMS:   Constitutional: Denies unintended weight change, night sweats, fatigue  Eyes:   Denies eye pain, redness, discharge, vision changes  Ears/Nose/Throat/Mouth: Denies hearing changes, ear pain, nasal congestion, sore throat, dysphagia  Cardiovascular:  Denies Chest pain, shortness of breath, orthopnea, palpitations, claudication  Respiratory:  Denies cough, sputum, wheezing, dyspnea  Gastrointestinal/Hepatic:  Denies dysphagia, melena, jaundice, hematochezia, changing heartburn  Genitourinary:  Denies dysuria, increased frequency, hematuria, urgency  Musculoskeletal/Rheum: Denies changing arthralgias, myalgias, joint swelling, joint stiffness  Skin/Breast: Denies changing skin lesions, pruritis, nipple discharge, hair changes  Neurological: Denies weakness, numbness, paresthesia, syncope, dizziness  Pyschiatric: Denies acute depression, anxiety, insomnia, personality changes, delusions  Endocrine: Denies temperature intolerance, polydipsia, polyuria  Heme/Oncology/Lymph Nodes: Denies easy bruising, bleeding, lymphadenopathy  All other systems were reviewed and are negative                 Past Medical History:   Past Medical History:   Diagnosis Date   • Alcohol use 3/31/2016    2-3 per day  "  • Borderline diabetes    • Cancer (HCC) 2019    liver    • Cataract     itzel IOL    • Dental disorder     needs dentures upper   • Diabetes (HCC)     diet controlled    • Disorder of thyroid     not on medication   • Glaucoma     left eye    • High cholesterol     not on medication   • Hypertension        Past Surgical History:  Past Surgical History:   Procedure Laterality Date   • OTHER  12/2019    \"IR embolization\"    • PARATHYROIDECTOMY N/A 5/13/2016    Procedure: PARATHYROIDECTOMY;  Surgeon: Kale Lord M.D.;  Location: SURGERY SAME DAY [facility];  Service:    • CATARACT PHACO WITH IOL Left 4/7/2016    Procedure: CATARACT PHACO WITH IOL;  Surgeon: Ephraim Jamison M.D.;  Location: SURGERY SURGICAL [facility] OR[facility];  Service:    • BONE GRAFT Right 1960's    right wrist   • OTHER      chemoemolozation x2   • WRIST ORIF Right 1960's       Current Outpatient Medications:  No current facility-administered medications on file prior to encounter.     Current Outpatient Medications on File Prior to Encounter   Medication Sig Dispense Refill   • hydrOXYzine HCl (ATARAX) 25 MG Tab Take 25 mg by mouth 3 times a day as needed for Itching.         Current Inpatient Medications:   No current facility-administered medications for this encounter.     Current Outpatient Medications   Medication   • hydrOXYzine HCl (ATARAX) 25 MG Tab       Medication Allergy/Sensitivities:  Allergies   Allergen Reactions   • Sulfa Drugs      Reaction unknown       Family History:  Family History   Problem Relation Age of Onset   • Cancer Mother         unknown cancer     Denies family history of bleeding disorders or reactions to anesthesia    Social History:  PCP: Cole Alonso D.O.  Social History     Tobacco Use   Smoking Status Current Every Day Smoker   • Packs/day: 0.50   • Years: 30.00   • Pack years: 15.00   • Types: Cigarettes   • Start date: 1/1/1990   Smokeless Tobacco Never Used   Tobacco Comment    quit couple times of the years "     Social History     Substance and Sexual Activity   Alcohol Use Not Currently   • Alcohol/week: 2.4 oz   • Types: 4 Shots of liquor per week    Comment: pt quit drinking 2 months ago     Social History     Substance and Sexual Activity   Drug Use No       PHYSICAL EXAM:  Weight/BMI: Body mass index is 26.22 kg/m².  Vitals:    07/02/21 1300 07/02/21 1330 07/02/21 1400 07/02/21 1500   BP: 128/73 124/66 128/80 132/81   Pulse: (!) 116 (!) 113 (!) 111 (!) 104   Resp: (!) 33 (!) 28 (!) 38 (!) 26   Temp:       TempSrc:       SpO2: 92% 90% 92% 96%   Weight:       Height:         Oxygen Therapy:  Pulse Oximetry: 96 %, O2 Delivery Device: None - Room Air    Constitutional: Well developed, Well nourished, No acute distress, Non-toxic appearance.    HENMT: Normocephalic, Atraumatic, Bilateral external ears normal, Oropharynx moist mucous membranes, No oral exudates, Nose normal.  No thyromegaly.   Eyes: PERRLA, EOMI, Conjunctiva normal, No discharge.   Neck: Normal range of motion, No cervical tenderness, Supple, No stridor, no JVD.  Cardiovascular: Normal heart rate, Normal rhythm, No murmurs, No rubs, No gallops.   Extremites with intact distal pulses, no cyanosis, clubbing or edema.   Lungs:  Respiratory effort is normal. Normal breath sounds, breath sounds clear to auscultation bilaterally,  no rales, no rhonchi, no wheezing.   Abdomen: Bowel sounds normal, Soft, diffusely tender, some rebound, no masses, No hepatosplenomegaly.    Skin: Warm, Dry, No erythema, No rash, no induration or crepitus.        Neurologic: Alert & oriented x 3, Normal motor function, Normal sensory function, No focal deficits noted, cranial nerves II through XII are normal.  Psychiatric: Affect normal, Judgment normal, Mood normal.     LAB DATA REVIEWED:  Recent Results (from the past 24 hour(s))   Lactic acid (lactate)    Collection Time: 07/02/21 11:50 AM   Result Value Ref Range    Lactic Acid 2.7 (H) 0.5 - 2.0 mmol/L   CBC with Differential     Collection Time: 07/02/21 11:50 AM   Result Value Ref Range    WBC 11.2 (H) 4.8 - 10.8 K/uL    RBC 5.01 4.70 - 6.10 M/uL    Hemoglobin 15.9 14.0 - 18.0 g/dL    Hematocrit 47.4 42.0 - 52.0 %    MCV 94.6 81.4 - 97.8 fL    MCH 31.7 27.0 - 33.0 pg    MCHC 33.5 (L) 33.7 - 35.3 g/dL    RDW 51.4 (H) 35.9 - 50.0 fL    Platelet Count 115 (L) 164 - 446 K/uL    MPV 8.7 (L) 9.0 - 12.9 fL    Neutrophils-Polys 90.10 (H) 44.00 - 72.00 %    Lymphocytes 3.90 (L) 22.00 - 41.00 %    Monocytes 5.10 0.00 - 13.40 %    Eosinophils 0.00 0.00 - 6.90 %    Basophils 0.50 0.00 - 1.80 %    Immature Granulocytes 0.40 0.00 - 0.90 %    Nucleated RBC 0.00 /100 WBC    Neutrophils (Absolute) 10.03 (H) 1.82 - 7.42 K/uL    Lymphs (Absolute) 0.44 (L) 1.00 - 4.80 K/uL    Monos (Absolute) 0.57 0.00 - 0.85 K/uL    Eos (Absolute) 0.00 0.00 - 0.51 K/uL    Baso (Absolute) 0.06 0.00 - 0.12 K/uL    Immature Granulocytes (abs) 0.05 0.00 - 0.11 K/uL    NRBC (Absolute) 0.00 K/uL   Comp Metabolic Panel (CMP)    Collection Time: 07/02/21 11:50 AM   Result Value Ref Range    Sodium 126 (L) 135 - 145 mmol/L    Potassium 5.7 (H) 3.6 - 5.5 mmol/L    Chloride 92 (L) 96 - 112 mmol/L    Co2 20 20 - 33 mmol/L    Anion Gap 14.0 7.0 - 16.0    Glucose 134 (H) 65 - 99 mg/dL    Bun 18 8 - 22 mg/dL    Creatinine 1.02 0.50 - 1.40 mg/dL    Calcium 9.0 8.5 - 10.5 mg/dL    AST(SGOT) 41 12 - 45 U/L    ALT(SGPT) 33 2 - 50 U/L    Alkaline Phosphatase 526 (H) 30 - 99 U/L    Total Bilirubin 2.3 (H) 0.1 - 1.5 mg/dL    Albumin 3.0 (L) 3.2 - 4.9 g/dL    Total Protein 7.6 6.0 - 8.2 g/dL    Globulin 4.6 (H) 1.9 - 3.5 g/dL    A-G Ratio 0.7 g/dL   Prothrombin Time (INR)    Collection Time: 07/02/21 11:50 AM   Result Value Ref Range    PT 14.1 12.0 - 14.6 sec    INR 1.12 0.87 - 1.13   APTT (PTT)    Collection Time: 07/02/21 11:50 AM   Result Value Ref Range    APTT 31.7 24.7 - 36.0 sec   ESTIMATED GFR    Collection Time: 07/02/21 11:50 AM   Result Value Ref Range    GFR If  >60  >60 mL/min/1.73 m 2    GFR If Non African American >60 >60 mL/min/1.73 m 2   EKG (NOW)    Collection Time: 21 12:45 PM   Result Value Ref Range    Report       [facility] Emergency Dept.    Test Date:  2021  Pt Name:    SANDRO DIAZ             Department: ER  MRN:        [id number]                      Room:       [facility]  Gender:     Male                         Technician: [id number]  :        1950                   Requested By:CARMEN OTTO  Order #:    [id number]                    Reading MD:    Measurements  Intervals                                Axis  Rate:       117                          P:          32  OH:         172                          QRS:        -90  QRSD:       92                           T:          41  QT:         312  QTc:        436    Interpretive Statements  SINUS TACHYCARDIA  MARKEDLY POSTERIOR QRS AXIS  LOW VOLTAGE THROUGHOUT  CONSIDER INFERIOR INFARCT  BORDERLINE R WAVE PROGRESSION, ANTERIOR LEADS  Compared to ECG 2019 08:35:05  Posterior QRS axis now present  Myocardial infarct finding now present  Sinus rhythm no longer present     COV-2, FLU A/B, AND RSV BY PCR (2-4 HOURS CEPHEID): Collect NP swab in VTM    Collection Time: 21 12:48 PM    Specimen: Respirate   Result Value Ref Range    Influenza virus A RNA Negative Negative    Influenza virus B, PCR Negative Negative    RSV, PCR Negative Negative    SARS-CoV-2 by PCR NotDetected     SARS-CoV-2 Source NP Swab        IMAGING:   Images Independently Reviewed   CT-ABDOMEN-PELVIS WITH   Final Result      1.  Primarily cystic mass in the medial segment LEFT lobe liver, likely treated hepatoma.   2.  Complex low-attenuation lesion in the inferior aspect medial segment LEFT lobe liver which is new from prior exam and may represent cystic mass or focal hematoma related to rupture larger mass.  Minimal hyperdense components may indicate blood    products.  Abscess is felt less likely.   3.   Gallbladder is not visualized and may be compressed by or involved with liver mass.   4.  Small volume ascites with potential hemoperitoneum.   5.  Splenomegaly.      These findings were discussed with CARMEN OTTO on 7/2/2021 3:02 PM.      DX-CHEST-PORTABLE (1 VIEW)   Final Result      1.  Hypoinflation with minimal LEFT lung base atelectasis.   2.  No pneumonia or pulmonary edema.          ASSESSMENT/PLAN     71-year-old male with history of known hepatocellular carcinoma.  It appears that one of the cystic lesions in his liver has ruptured with its contents his abdominal cavity.  He is improving as far as vital signs go I would recommend trending his hemoglobin hematocrit and will continue to follow his exam to assure no clinical worsening.    Jaspal Muñoz MD  [facility]

## 2021-07-02 NOTE — ED TRIAGE NOTES
Pt to triage in WC.  Chief Complaint   Patient presents with   • Abdominal Pain   • Shoulder Pain     Hx of constipation over past week. Has productive BM yesterday, but now had a fever at home, c/o abd pain and is tachy in triage.    Charge RN aware of pt.

## 2021-07-03 ENCOUNTER — APPOINTMENT (OUTPATIENT)
Dept: RADIOLOGY | Facility: MEDICAL CENTER | Age: 71
DRG: 356 | End: 2021-07-03
Attending: STUDENT IN AN ORGANIZED HEALTH CARE EDUCATION/TRAINING PROGRAM
Payer: MEDICARE

## 2021-07-03 LAB
ABO + RH BLD: NORMAL
ALBUMIN SERPL BCP-MCNC: 2.3 G/DL (ref 3.2–4.9)
ALBUMIN/GLOB SERPL: 0.6 G/DL
ALP SERPL-CCNC: 341 U/L (ref 30–99)
ALT SERPL-CCNC: 21 U/L (ref 2–50)
ANION GAP SERPL CALC-SCNC: 8 MMOL/L (ref 7–16)
AST SERPL-CCNC: 25 U/L (ref 12–45)
BILIRUB SERPL-MCNC: 1.7 MG/DL (ref 0.1–1.5)
BUN SERPL-MCNC: 16 MG/DL (ref 8–22)
CALCIUM SERPL-MCNC: 8.4 MG/DL (ref 8.5–10.5)
CHLORIDE SERPL-SCNC: 95 MMOL/L (ref 96–112)
CO2 SERPL-SCNC: 24 MMOL/L (ref 20–33)
CREAT SERPL-MCNC: 0.72 MG/DL (ref 0.5–1.4)
ERYTHROCYTE [DISTWIDTH] IN BLOOD BY AUTOMATED COUNT: 52 FL (ref 35.9–50)
EST. AVERAGE GLUCOSE BLD GHB EST-MCNC: 126 MG/DL
GLOBULIN SER CALC-MCNC: 3.8 G/DL (ref 1.9–3.5)
GLUCOSE BLD-MCNC: 104 MG/DL (ref 65–99)
GLUCOSE BLD-MCNC: 107 MG/DL (ref 65–99)
GLUCOSE BLD-MCNC: 115 MG/DL (ref 65–99)
GLUCOSE BLD-MCNC: 118 MG/DL (ref 65–99)
GLUCOSE BLD-MCNC: 96 MG/DL (ref 65–99)
GLUCOSE SERPL-MCNC: 109 MG/DL (ref 65–99)
HBA1C MFR BLD: 6 % (ref 4–5.6)
HCT VFR BLD AUTO: 37.2 % (ref 42–52)
HCT VFR BLD AUTO: 38.1 % (ref 42–52)
HCT VFR BLD AUTO: 38.9 % (ref 42–52)
HGB BLD-MCNC: 12.2 G/DL (ref 14–18)
HGB BLD-MCNC: 12.6 G/DL (ref 14–18)
HGB BLD-MCNC: 12.7 G/DL (ref 14–18)
MCH RBC QN AUTO: 31.2 PG (ref 27–33)
MCHC RBC AUTO-ENTMCNC: 32.8 G/DL (ref 33.7–35.3)
MCV RBC AUTO: 95.1 FL (ref 81.4–97.8)
PLATELET # BLD AUTO: 85 K/UL (ref 164–446)
PMV BLD AUTO: 8.6 FL (ref 9–12.9)
POTASSIUM SERPL-SCNC: 4.9 MMOL/L (ref 3.6–5.5)
PROT SERPL-MCNC: 6.1 G/DL (ref 6–8.2)
RBC # BLD AUTO: 3.91 M/UL (ref 4.7–6.1)
SODIUM SERPL-SCNC: 127 MMOL/L (ref 135–145)
WBC # BLD AUTO: 6.6 K/UL (ref 4.8–10.8)

## 2021-07-03 PROCEDURE — A9270 NON-COVERED ITEM OR SERVICE: HCPCS | Performed by: STUDENT IN AN ORGANIZED HEALTH CARE EDUCATION/TRAINING PROGRAM

## 2021-07-03 PROCEDURE — 80053 COMPREHEN METABOLIC PANEL: CPT

## 2021-07-03 PROCEDURE — 86900 BLOOD TYPING SEROLOGIC ABO: CPT

## 2021-07-03 PROCEDURE — 83036 HEMOGLOBIN GLYCOSYLATED A1C: CPT

## 2021-07-03 PROCEDURE — 86901 BLOOD TYPING SEROLOGIC RH(D): CPT

## 2021-07-03 PROCEDURE — 770020 HCHG ROOM/CARE - TELE (206)

## 2021-07-03 PROCEDURE — 700102 HCHG RX REV CODE 250 W/ 637 OVERRIDE(OP): Performed by: STUDENT IN AN ORGANIZED HEALTH CARE EDUCATION/TRAINING PROGRAM

## 2021-07-03 PROCEDURE — 700111 HCHG RX REV CODE 636 W/ 250 OVERRIDE (IP): Performed by: STUDENT IN AN ORGANIZED HEALTH CARE EDUCATION/TRAINING PROGRAM

## 2021-07-03 PROCEDURE — 85018 HEMOGLOBIN: CPT

## 2021-07-03 PROCEDURE — 99233 SBSQ HOSP IP/OBS HIGH 50: CPT | Performed by: STUDENT IN AN ORGANIZED HEALTH CARE EDUCATION/TRAINING PROGRAM

## 2021-07-03 PROCEDURE — 36415 COLL VENOUS BLD VENIPUNCTURE: CPT

## 2021-07-03 PROCEDURE — 85027 COMPLETE CBC AUTOMATED: CPT

## 2021-07-03 PROCEDURE — 700105 HCHG RX REV CODE 258: Performed by: STUDENT IN AN ORGANIZED HEALTH CARE EDUCATION/TRAINING PROGRAM

## 2021-07-03 PROCEDURE — 87086 URINE CULTURE/COLONY COUNT: CPT

## 2021-07-03 PROCEDURE — 71045 X-RAY EXAM CHEST 1 VIEW: CPT

## 2021-07-03 PROCEDURE — 85014 HEMATOCRIT: CPT | Mod: 91

## 2021-07-03 PROCEDURE — 86850 RBC ANTIBODY SCREEN: CPT

## 2021-07-03 PROCEDURE — 82962 GLUCOSE BLOOD TEST: CPT | Mod: 91

## 2021-07-03 RX ORDER — HYDROMORPHONE HYDROCHLORIDE 1 MG/ML
0.25 INJECTION, SOLUTION INTRAMUSCULAR; INTRAVENOUS; SUBCUTANEOUS
Status: DISCONTINUED | OUTPATIENT
Start: 2021-07-03 | End: 2021-07-13

## 2021-07-03 RX ORDER — GUAIFENESIN/DEXTROMETHORPHAN 100-10MG/5
5 SYRUP ORAL EVERY 6 HOURS PRN
Status: DISCONTINUED | OUTPATIENT
Start: 2021-07-03 | End: 2021-07-12

## 2021-07-03 RX ORDER — OXYCODONE HYDROCHLORIDE 10 MG/1
10 TABLET ORAL
Status: DISCONTINUED | OUTPATIENT
Start: 2021-07-03 | End: 2021-07-23 | Stop reason: HOSPADM

## 2021-07-03 RX ORDER — CALCIUM CARBONATE 500 MG/1
500 TABLET, CHEWABLE ORAL 3 TIMES DAILY PRN
Status: DISCONTINUED | OUTPATIENT
Start: 2021-07-03 | End: 2021-07-12

## 2021-07-03 RX ORDER — CALCIUM CARBONATE 500 MG/1
500 TABLET, CHEWABLE ORAL DAILY
Status: DISCONTINUED | OUTPATIENT
Start: 2021-07-03 | End: 2021-07-03

## 2021-07-03 RX ORDER — OXYCODONE HYDROCHLORIDE 5 MG/1
5 TABLET ORAL
Status: DISCONTINUED | OUTPATIENT
Start: 2021-07-03 | End: 2021-07-23 | Stop reason: HOSPADM

## 2021-07-03 RX ADMIN — ANTACID TABLETS 500 MG: 500 TABLET, CHEWABLE ORAL at 23:04

## 2021-07-03 RX ADMIN — HYDROMORPHONE HYDROCHLORIDE 0.25 MG: 1 INJECTION, SOLUTION INTRAMUSCULAR; INTRAVENOUS; SUBCUTANEOUS at 23:04

## 2021-07-03 RX ADMIN — SODIUM CHLORIDE: 9 INJECTION, SOLUTION INTRAVENOUS at 06:07

## 2021-07-03 RX ADMIN — HYDROMORPHONE HYDROCHLORIDE 0.25 MG: 1 INJECTION, SOLUTION INTRAMUSCULAR; INTRAVENOUS; SUBCUTANEOUS at 02:13

## 2021-07-03 RX ADMIN — DOCUSATE SODIUM 50 MG AND SENNOSIDES 8.6 MG 2 TABLET: 8.6; 5 TABLET, FILM COATED ORAL at 17:31

## 2021-07-03 RX ADMIN — OXYCODONE HYDROCHLORIDE 10 MG: 10 TABLET ORAL at 17:31

## 2021-07-03 RX ADMIN — HYDROMORPHONE HYDROCHLORIDE 0.25 MG: 1 INJECTION, SOLUTION INTRAMUSCULAR; INTRAVENOUS; SUBCUTANEOUS at 12:12

## 2021-07-03 RX ADMIN — ANTACID TABLETS 500 MG: 500 TABLET, CHEWABLE ORAL at 10:29

## 2021-07-03 RX ADMIN — OXYCODONE 2.5 MG: 5 TABLET ORAL at 11:10

## 2021-07-03 RX ADMIN — HYDROMORPHONE HYDROCHLORIDE 0.25 MG: 1 INJECTION, SOLUTION INTRAMUSCULAR; INTRAVENOUS; SUBCUTANEOUS at 19:55

## 2021-07-03 RX ADMIN — SODIUM CHLORIDE: 9 INJECTION, SOLUTION INTRAVENOUS at 17:32

## 2021-07-03 RX ADMIN — HYDROMORPHONE HYDROCHLORIDE 0.25 MG: 1 INJECTION, SOLUTION INTRAMUSCULAR; INTRAVENOUS; SUBCUTANEOUS at 06:08

## 2021-07-03 ASSESSMENT — ENCOUNTER SYMPTOMS
BLURRED VISION: 0
SINUS PAIN: 0
HEARTBURN: 1
MYALGIAS: 0
DIARRHEA: 0
FEVER: 0
FOCAL WEAKNESS: 0
HEADACHES: 0
TREMORS: 0
CHILLS: 0
WEAKNESS: 0
ABDOMINAL PAIN: 1
ORTHOPNEA: 0
COUGH: 0
EYE PAIN: 0
VOMITING: 0
CONSTIPATION: 1
NAUSEA: 0
HEMOPTYSIS: 0

## 2021-07-03 ASSESSMENT — COGNITIVE AND FUNCTIONAL STATUS - GENERAL
MOBILITY SCORE: 18
SUGGESTED CMS G CODE MODIFIER MOBILITY: CK
SUGGESTED CMS G CODE MODIFIER DAILY ACTIVITY: CK
STANDING UP FROM CHAIR USING ARMS: A LITTLE
MOVING TO AND FROM BED TO CHAIR: A LITTLE
EATING MEALS: A LITTLE
WALKING IN HOSPITAL ROOM: A LITTLE
DRESSING REGULAR LOWER BODY CLOTHING: A LITTLE
DRESSING REGULAR UPPER BODY CLOTHING: A LITTLE
PERSONAL GROOMING: A LITTLE
DAILY ACTIVITIY SCORE: 18
TOILETING: A LITTLE
TURNING FROM BACK TO SIDE WHILE IN FLAT BAD: A LITTLE
CLIMB 3 TO 5 STEPS WITH RAILING: A LITTLE
MOVING FROM LYING ON BACK TO SITTING ON SIDE OF FLAT BED: A LITTLE
HELP NEEDED FOR BATHING: A LITTLE

## 2021-07-03 ASSESSMENT — PAIN DESCRIPTION - PAIN TYPE
TYPE: ACUTE PAIN

## 2021-07-03 ASSESSMENT — LIFESTYLE VARIABLES
TOTAL SCORE: 0
EVER HAD A DRINK FIRST THING IN THE MORNING TO STEADY YOUR NERVES TO GET RID OF A HANGOVER: NO
HAVE YOU EVER FELT YOU SHOULD CUT DOWN ON YOUR DRINKING: NO
AVERAGE NUMBER OF DAYS PER WEEK YOU HAVE A DRINK CONTAINING ALCOHOL: 2
DOES PATIENT WANT TO STOP DRINKING: NO
ON A TYPICAL DAY WHEN YOU DRINK ALCOHOL HOW MANY DRINKS DO YOU HAVE: 2
ALCOHOL_USE: YES
TOTAL SCORE: 0
CONSUMPTION TOTAL: NEGATIVE
HAVE PEOPLE ANNOYED YOU BY CRITICIZING YOUR DRINKING: NO
HOW MANY TIMES IN THE PAST YEAR HAVE YOU HAD 5 OR MORE DRINKS IN A DAY: 0
EVER FELT BAD OR GUILTY ABOUT YOUR DRINKING: NO
TOTAL SCORE: 0

## 2021-07-03 ASSESSMENT — PATIENT HEALTH QUESTIONNAIRE - PHQ9
1. LITTLE INTEREST OR PLEASURE IN DOING THINGS: NOT AT ALL
SUM OF ALL RESPONSES TO PHQ9 QUESTIONS 1 AND 2: 0
2. FEELING DOWN, DEPRESSED, IRRITABLE, OR HOPELESS: NOT AT ALL

## 2021-07-03 NOTE — PROGRESS NOTES
Patient admitted from ED at 1815. Wife bedside. Educated patient on fall prevention, call bell, and bed alarm. Patient verbalized understanding. Confirmed NPO order per MD and educated patient. All current admission questions answered. Instructed patient to use call bell for RN or CNA assistance OOB.

## 2021-07-03 NOTE — CARE PLAN
Problem: Pain - Standard  Goal: Alleviation of pain or a reduction in pain to the patient’s comfort goal  7/3/2021 0802 by Radha Liu RKassandraN.  Outcome: Progressing  Flowsheets (Taken 7/2/2021 2019)  Pain Rating Scale (NPRS): 8  Note: Patient has been receiving Dilaudid for pain.  Patient states this medication has adequately controlled pain.   7/3/2021 0702 by Radha Liu RKassandraN.  Outcome: Progressing  Flowsheets (Taken 7/2/2021 2019)  Pain Rating Scale (NPRS): 8  Note: Patient has been receiving pain medication throughout night.  Patient stated that the Dilaudid helps control pain.      Problem: Knowledge Deficit - Standard  Goal: Patient and family/care givers will demonstrate understanding of plan of care, disease process/condition, diagnostic tests and medications  7/3/2021 0802 by Radha Liu RKassandraN.  Outcome: Progressing  Note: Patient has demonstrated full understanding of plan of care.  Will continue to educate patient and re-assess patient's understanding of plan of care.   7/3/2021 0702 by Radha Liu R.N.  Outcome: Progressing  Note: Patient demonstrated    The patient is Watcher - Medium risk of patient condition declining or worsening    Shift Goals  Clinical Goals: Remain free from falls, Pain reduction  Patient Goals: Pain reduction, sleep comfortably  Family Goals: N/A    Progress made toward(s) clinical / shift goals:  Patient remained free from falls and injury throughout shift.  Patient's pain was controlled throughout night with IV pain medication (Dilaudid).  Patient slept comfortably throughout night.     Patient is not progressing towards the following goals: N/A

## 2021-07-03 NOTE — PROGRESS NOTES
Received Bedside report. Assumed care at 1900. This pt is AOx4, voiding adequately, 7/10 pain. Patient and RN discussed plan of care: questions answered. Labs noted, assessment complete, patient NPO. Tele box in place. Pt is on 2 L of O2 via nasal cannula. Call light in place, fall precautions in place, patient educated on importance of calling for assistance. No additional needs at this time. VSS

## 2021-07-03 NOTE — PROGRESS NOTES
4 Eyes Skin Assessment Completed by YASMIN Estrada and YASMIN Rutledge.    Head WDL  Ears WDL  Nose WDL  Mouth WDL  Neck WDL  Breast/Chest WDL  Shoulder Blades WDL  Spine WDL  (R) Arm/Elbow/Hand WDL  (L) Arm/Elbow/Hand WDL  Abdomen WDL  Groin WDL  Scrotum/Coccyx/Buttocks WDL  (R) Leg WDL  (L) Leg WDL  (R) Heel/Foot/Toe WDL  (L) Heel/Foot/Toe WDL          Devices In Places Tele Box and Nasal Cannula      Interventions In Place Gray Ear Foams    Possible Skin Injury No    Pictures Uploaded Into Epic N/A  Wound Consult Placed N/A  RN Wound Prevention Protocol Ordered No

## 2021-07-03 NOTE — CARE PLAN
The patient is Stable - Low risk of patient condition declining or worsening    Shift Goals  Clinical Goals: Pain management  Patient Goals: Pain reduction, sleep comfortably  Family Goals: N/A      Problem: Pain - Standard  Goal: Alleviation of pain or a reduction in pain to the patient’s comfort goal  Outcome: Progressing  Pain management discussed with patient. Have provided PRN medications per MAR and non-pharmacological interventions as needed. Patient verbalizes understanding of calling for pain needs has notified RN appropriately.      Problem: Knowledge Deficit - Standard  Goal: Patient and family/care givers will demonstrate understanding of plan of care, disease process/condition, diagnostic tests and medications  Outcome: Progressing  Patient continues to ask appropriate questions about POC and disease process.

## 2021-07-03 NOTE — PROGRESS NOTES
Updated wife on POC. Wife would like to be called if patient ends up going for diagnostic laparotomy.

## 2021-07-03 NOTE — HOSPITAL COURSE
This is a 89-year-old male patient with past medical history of hepatoma since 2018, on IV immunotherapy last dose 2 weeks ago follows with Dr. Birch presented on 7/2/2021 with sudden onset abdominal pain which woke him up from his sleep. Is associated with constipation and obstipation. In the ER her labs were significant for sodium 126, potassium 5.7, bilirubin of 2.3, lactate 2.7. Her CT of the abdomen showed possible ruptured hepatic cyst with hemoperitoneum. Surgery was consulted recommended medical management with trending hemoglobin. If no clinical improvement plan for diagnostic labs in a.m.

## 2021-07-03 NOTE — PROGRESS NOTES
Received report from NOC shift RN. Patient is A&Ox4, complains of 3/10 abdominal pain. VSS, no signs of distress. Paged Dr. Lange about NPO status. MD will clarify with surgery what POC for day is. Will keep patient updated. All questions and concerns answered, bed in lowest and locked position, call light in reach, will continue to monitor.

## 2021-07-03 NOTE — ASSESSMENT & PLAN NOTE
Likely from ruptured cystic lesion related to his HCC.   2019 Embolization for hemorrhage  1/2020, 2/2020 TACE of the right hepatic lobe  7/5 Interval CT imaging with small volume ascites, stable hepatic hemorrhage  -Needs to have GI and IR following him outpatient for the hepatic mass  -Rec IR for any evidence of recurrent hemorrhage for repeat embolization  -Diet as tolerated  -Avoid anti-platelet and anti-coagulation agents  -No need for follow up with surgery  No acute surgical needs, signing off  [facility]

## 2021-07-03 NOTE — ASSESSMENT & PLAN NOTE
Bilirubin 2.3. Down to 1.7. improved  Continue to monitor  Trending down likely dehydration induced

## 2021-07-03 NOTE — PROGRESS NOTES
[facility] Medicine Daily Progress Note    Date of Service  7/3/2021    Chief Complaint  Lorraine Bella is a 71 y.o. male admitted 7/2/2021 with   Sudden onset abdominal pain  Hospital Course  This is a 89-year-old male patient with past medical history of hepatoma since 2018, on IV immunotherapy last dose 2 weeks ago follows with Dr. Birch presented on 7/2/2021 with sudden onset abdominal pain which woke him up from his sleep. Is associated with constipation and obstipation. In the ER her labs were significant for sodium 126, potassium 5.7, bilirubin of 2.3, lactate 2.7. Her CT of the abdomen showed possible ruptured hepatic cyst with hemoperitoneum. Surgery was consulted recommended medical management with trending hemoglobin. If no clinical improvement plan for diagnostic labs in a.m.      Interval Problem Update  Patient seen and examined at bedside, AAO x3, vital signs stable, labs reviewed  Patient complains of abdominal pain, constipation and not passing gas denies nausea or vomiting on exam generalized tenderness and guarding  Continue with n.p.o., trend H&H every 8 hourly, if no improvement plan for diagnostic laparotomy in a.m. as per surgery notes    I have personally seen and examined the patient at bedside. I discussed the plan of care with patient.    Consultants/Specialty  general surgery    Code Status  Full Code    Disposition  Patient is not medically cleared.   Anticipate discharge to to home with close outpatient follow-up.  I have placed the appropriate orders for post-discharge needs.    Review of Systems  Review of Systems   Constitutional: Negative for chills and fever.   HENT: Negative for ear pain and sinus pain.    Eyes: Negative for blurred vision and pain.   Respiratory: Negative for cough and hemoptysis.    Cardiovascular: Negative for chest pain and orthopnea.   Gastrointestinal: Positive for abdominal pain, constipation and heartburn. Negative for diarrhea, nausea and  vomiting.   Genitourinary: Negative for dysuria and hematuria.   Musculoskeletal: Negative for myalgias.   Skin: Negative for itching.   Neurological: Negative for tremors, focal weakness, weakness and headaches.   Psychiatric/Behavioral: Negative for suicidal ideas.        Physical Exam  Temp:  [36 °C (96.8 °F)-37.1 °C (98.7 °F)] 36.8 °C (98.2 °F)  Pulse:  [] 100  Resp:  [17-29] 19  BP: ()/(26-81) 111/64  SpO2:  [93 %-97 %] 95 %    Physical Exam  Vitals reviewed.   Constitutional:       Appearance: Normal appearance.   HENT:      Head: Normocephalic and atraumatic.      Right Ear: External ear normal.      Left Ear: External ear normal.      Mouth/Throat:      Mouth: Mucous membranes are moist.   Eyes:      Extraocular Movements: Extraocular movements intact.      Conjunctiva/sclera: Conjunctivae normal.      Pupils: Pupils are equal, round, and reactive to light.   Cardiovascular:      Rate and Rhythm: Normal rate and regular rhythm.      Pulses: Normal pulses.      Heart sounds: Normal heart sounds.   Pulmonary:      Effort: Pulmonary effort is normal.      Breath sounds: Normal breath sounds.   Abdominal:      General: Abdomen is flat. Bowel sounds are normal. There is distension.      Palpations: Abdomen is soft.      Tenderness: There is abdominal tenderness. There is guarding.   Musculoskeletal:         General: Normal range of motion.   Skin:     General: Skin is warm.      Capillary Refill: Capillary refill takes less than 2 seconds.   Neurological:      General: No focal deficit present.      Mental Status: He is alert and oriented to person, place, and time.   Psychiatric:         Mood and Affect: Mood normal.         Fluids    Intake/Output Summary (Last 24 hours) at 7/3/2021 1428  Last data filed at 7/3/2021 0940  Gross per 24 hour   Intake 2559 ml   Output 600 ml   Net 1959 ml       Laboratory  Recent Labs     07/02/21  1150 07/03/21  0338 07/03/21  1152   WBC 11.2* 6.6  --    RBC 5.01  3.91*  --    HEMOGLOBIN 15.9 12.2* 12.7*   HEMATOCRIT 47.4 37.2* 38.9*   MCV 94.6 95.1  --    MCH 31.7 31.2  --    MCHC 33.5* 32.8*  --    RDW 51.4* 52.0*  --    PLATELETCT 115* 85*  --    MPV 8.7* 8.6*  --      Recent Labs     07/02/21  1150 07/02/21  1921 07/03/21  0338   SODIUM 126*  --  127*   POTASSIUM 5.7* 5.3 4.9   CHLORIDE 92*  --  95*   CO2 20  --  24   GLUCOSE 134*  --  109*   BUN 18  --  16   CREATININE 1.02  --  0.72   CALCIUM 9.0  --  8.4*     Recent Labs     07/02/21  1150   APTT 31.7   INR 1.12               Imaging  DX-CHEST-PORTABLE (1 VIEW)   Final Result      Left basilar atelectasis.      CT-ABDOMEN-PELVIS WITH   Final Result      1.  Primarily cystic mass in the medial segment LEFT lobe liver, likely treated hepatoma.   2.  Complex low-attenuation lesion in the inferior aspect medial segment LEFT lobe liver which is new from prior exam and may represent cystic mass or focal hematoma related to rupture larger mass.  Minimal hyperdense components may indicate blood    products.  Abscess is felt less likely.   3.  Gallbladder is not visualized and may be compressed by or involved with liver mass.   4.  Small volume ascites with potential hemoperitoneum.   5.  Splenomegaly.      These findings were discussed with CARMEN OTTO on 7/2/2021 3:02 PM.      DX-CHEST-PORTABLE (1 VIEW)   Final Result      1.  Hypoinflation with minimal LEFT lung base atelectasis.   2.  No pneumonia or pulmonary edema.           Assessment/Plan  * Abdominal pain- (present on admission)  Assessment & Plan  Diffuse abdominal pain x 1d  CT abdomen showed liver cystic mass/hematoma, likely cystic rupture    Surgery consulted, recommended to continue to monitor, trend H/H if no improvement in abd pain plan for laparotomy diagnostic in am    Lactic acidosis  Assessment & Plan  Lactic acid 2.7 at admission    IV fluid  Trending down    Elevated bilirubin  Assessment & Plan  Bilirubin 2.3  Continue to monitor  Trending down likely  dehydration induced     Hyperkalemia  Assessment & Plan  Potassium 5.7 at admission    IV fluid  Trended down with IV fluids    Hyponatremia  Assessment & Plan  Likely due to chronic liver disease    Continue to monitor  C/w iv fluids, f/u u na and u osm    Tobacco abuse- (present on admission)  Assessment & Plan  Smoking cessation was given at bedside  Nicotine patch      Thrombocytopenia (HCC)- (present on admission)  Assessment & Plan   at admission  Secondary to liver disease    Continue to monitor    Hepatoma (HCC)- (present on admission)  Assessment & Plan  On immunotherapy    Type 2 diabetes mellitus without complication, without long-term current use of insulin (HCC)- (present on admission)  Assessment & Plan  Borderline diabetes, not on medications  Check  A1c  SSI         VTE prophylaxis: SCDs/TEDs    I have performed a physical exam and reviewed and updated ROS and Plan today (7/3/2021). In review of yesterday's note (7/2/2021), there are no changes except as documented above.

## 2021-07-03 NOTE — ASSESSMENT & PLAN NOTE
Diffuse abdominal pain, improved but distention is worse.  May be all due to severe constipation, fecal impaction.  7/5 - CT abdomen showed liver cystic mass/hematoma, likely cystic rupture    Surgery consulted, recommended to continue to monitor, surgery signed off 7/6 recommendations are in their last note.    Recommended to hold any antiplatelet and any anticoagulation at this time.    Order for CT abdomen pelvis without contrast, concern for increased distention, rule out pneumoperitoneum  Bowel regimen in place  Continue with suppository.  PRN enema.  If ineffective, digital rectal removal.    7/9/2021 - additional evaluation of patient done today.    Notified by bedside RN Chava, patient was being combative and attempted to hit staff, patient difficult to orient.  Unable to obtain CT A/P at first due to patient being non-cooperative. Evaluated patient at bedside, spoke with wife Mrs. Bella, she agreed patient was not in his normal mentation.  Patient has glazing stare, unable to understand the need for imaging. Patient was placed on bilateral soft wrist restraints, unable to redirect, danger to staff.  Messaged Dr. Cody Meza ([email or web address]) on updates, surgery signed off on 7/6, Dr. Meza will see patient today. Concern for acute abdominal compartment syndrome.   Start time: 1400PM End Time: 1445PM    7/9/21 - Additional time spent face to face with patient  1600PM -spoke to consult ICU Dr. Pulliam, recommended to obtain ABG, KUB, BMP, insert underwood. Bladder scan reading did not align with abdominal compartment syndrome. Orders placed. CT scan performed, pending official results.  Present bedside speaking with wife.  1615PM - Present bedside with Dr. Meza, decision to take patient to OR tonight for diagnostic ex lap. Wife was present and agrees to procedure.  Start time 1600  End time 1631PM.  More than 50% of time was in direct face to face with patient. Other time was discussing treatment plans with  specialists, wife and nursing staff.

## 2021-07-03 NOTE — PROGRESS NOTES
"    DATE: 7/3/2021    Hospital Day 2 ruptured hepatic lesion.    Interval Events:  Improved pain, .    PHYSICAL EXAMINATION:  Constitutional:     Vital Signs: BP (!) 94/57   Pulse 95   Temp 36.6 °C (97.9 °F) (Temporal)   Resp 17   Ht 1.803 m (5' 11\")   Wt 87.3 kg (192 lb 7.4 oz)   SpO2 97%    General Appearance: The patient is a pleasant  and well appointed man in no critical distress.  HEENT:    The pupils are equal, round, and reactive to light bilaterally. The extraocular muscles are intact bilaterally. The nares and oropharynx are clear. Normal range of motion.  Respiratory:   Inspection: Unlabored respirations, no intercostal retractions, paradoxical motion, or accessory muscle use.   Palpation:  The chest is nontender.    Auscultation: normal.  Cardiovascular:   Inspection: The skin is warm and dry.  Auscultation: Regular rate and rhythm.   Peripheral Pulses: Normal.   Abdomen:   Inspection: Abdominal inspection reveals no abrasions, contusions, lacerations or penetrating wounds.   Palpation: Palpation is remarkable for moderate tenderness in the generalized region.  Musculoskeletal:   Examination of the upper and lower extremities reveals normal range of motion.  Skin:    Examination of the skin reveals no significant abrasions, contusion, lacerations, or other soft tissue injury.  Neurologic:    Neurologic examination reveals no focal deficits noted.  Psychiatric:   The patient does not appear depressed or anxious.    Laboratory Values:   Recent Labs     07/02/21 1150 07/03/21  0338   WBC 11.2* 6.6   RBC 5.01 3.91*   HEMOGLOBIN 15.9 12.2*   HEMATOCRIT 47.4 37.2*   MCV 94.6 95.1   MCH 31.7 31.2   MCHC 33.5* 32.8*   RDW 51.4* 52.0*   PLATELETCT 115* 85*   MPV 8.7* 8.6*     Recent Labs     07/02/21  1150 07/02/21 1921 07/03/21  0338   SODIUM 126*  --  127*   POTASSIUM 5.7* 5.3 4.9   CHLORIDE 92*  --  95*   CO2 20  --  24   GLUCOSE 134*  --  109*   BUN 18  --  16   CREATININE 1.02  --  0.72   CALCIUM 9.0 "  --  8.4*     Recent Labs     07/02/21  1150 07/03/21  0338   ASTSGOT 41 25   ALTSGPT 33 21   TBILIRUBIN 2.3* 1.7*   ALKPHOSPHAT 526* 341*   GLOBULIN 4.6* 3.8*   INR 1.12  --      Recent Labs     07/02/21  1150   APTT 31.7   INR 1.12        Imaging:   CT-ABDOMEN-PELVIS WITH   Final Result      1.  Primarily cystic mass in the medial segment LEFT lobe liver, likely treated hepatoma.   2.  Complex low-attenuation lesion in the inferior aspect medial segment LEFT lobe liver which is new from prior exam and may represent cystic mass or focal hematoma related to rupture larger mass.  Minimal hyperdense components may indicate blood    products.  Abscess is felt less likely.   3.  Gallbladder is not visualized and may be compressed by or involved with liver mass.   4.  Small volume ascites with potential hemoperitoneum.   5.  Splenomegaly.      These findings were discussed with CARMEN OTTO on 7/2/2021 3:02 PM.      DX-CHEST-PORTABLE (1 VIEW)   Final Result      1.  Hypoinflation with minimal LEFT lung base atelectasis.   2.  No pneumonia or pulmonary edema.          ASSESSMENT AND PLAN:     * Abdominal pain- (present on admission)  Assessment & Plan  Likely from ruptured cystic lesion related to his HCC. Remains important to trend Hgb/Hct. States he feels better. . If no improvement plan for diagnostic lap in AM.         DISPOSITION: Continue nonoperative management and close clinical observation.       ____________________________________     Jaspal Muñoz M.D.    DD: 7/3/2021  10:38 AM

## 2021-07-04 LAB
ABO GROUP BLD: NORMAL
ANION GAP SERPL CALC-SCNC: 7 MMOL/L (ref 7–16)
APTT PPP: 41.7 SEC (ref 24.7–36)
BASOPHILS # BLD AUTO: 0.3 % (ref 0–1.8)
BASOPHILS # BLD: 0.02 K/UL (ref 0–0.12)
BLD GP AB SCN SERPL QL: NORMAL
BUN SERPL-MCNC: 15 MG/DL (ref 8–22)
CALCIUM SERPL-MCNC: 8.8 MG/DL (ref 8.5–10.5)
CHLORIDE SERPL-SCNC: 98 MMOL/L (ref 96–112)
CO2 SERPL-SCNC: 27 MMOL/L (ref 20–33)
CREAT SERPL-MCNC: 0.73 MG/DL (ref 0.5–1.4)
EOSINOPHIL # BLD AUTO: 0.07 K/UL (ref 0–0.51)
EOSINOPHIL NFR BLD: 1 % (ref 0–6.9)
ERYTHROCYTE [DISTWIDTH] IN BLOOD BY AUTOMATED COUNT: 53.8 FL (ref 35.9–50)
GLUCOSE BLD-MCNC: 76 MG/DL (ref 65–99)
GLUCOSE BLD-MCNC: 94 MG/DL (ref 65–99)
GLUCOSE BLD-MCNC: 99 MG/DL (ref 65–99)
GLUCOSE SERPL-MCNC: 105 MG/DL (ref 65–99)
HCT VFR BLD AUTO: 37.9 % (ref 42–52)
HCT VFR BLD AUTO: 37.9 % (ref 42–52)
HCT VFR BLD AUTO: 38.2 % (ref 42–52)
HGB BLD-MCNC: 12.1 G/DL (ref 14–18)
HGB BLD-MCNC: 12.3 G/DL (ref 14–18)
HGB BLD-MCNC: 12.6 G/DL (ref 14–18)
IMM GRANULOCYTES # BLD AUTO: 0.05 K/UL (ref 0–0.11)
IMM GRANULOCYTES NFR BLD AUTO: 0.7 % (ref 0–0.9)
INR PPP: 1.36 (ref 0.87–1.13)
LYMPHOCYTES # BLD AUTO: 0.38 K/UL (ref 1–4.8)
LYMPHOCYTES NFR BLD: 5.4 % (ref 22–41)
MAGNESIUM SERPL-MCNC: 1.9 MG/DL (ref 1.5–2.5)
MCH RBC QN AUTO: 31.2 PG (ref 27–33)
MCHC RBC AUTO-ENTMCNC: 31.9 G/DL (ref 33.7–35.3)
MCV RBC AUTO: 97.7 FL (ref 81.4–97.8)
MONOCYTES # BLD AUTO: 0.52 K/UL (ref 0–0.85)
MONOCYTES NFR BLD AUTO: 7.3 % (ref 0–13.4)
NEUTROPHILS # BLD AUTO: 6.04 K/UL (ref 1.82–7.42)
NEUTROPHILS NFR BLD: 85.3 % (ref 44–72)
NRBC # BLD AUTO: 0 K/UL
NRBC BLD-RTO: 0 /100 WBC
PHOSPHATE SERPL-MCNC: 3.7 MG/DL (ref 2.5–4.5)
PLATELET # BLD AUTO: 100 K/UL (ref 164–446)
PMV BLD AUTO: 8.4 FL (ref 9–12.9)
POTASSIUM SERPL-SCNC: 4.7 MMOL/L (ref 3.6–5.5)
PROTHROMBIN TIME: 16.4 SEC (ref 12–14.6)
RBC # BLD AUTO: 3.88 M/UL (ref 4.7–6.1)
RH BLD: NORMAL
SODIUM SERPL-SCNC: 132 MMOL/L (ref 135–145)
WBC # BLD AUTO: 7.1 K/UL (ref 4.8–10.8)

## 2021-07-04 PROCEDURE — 700102 HCHG RX REV CODE 250 W/ 637 OVERRIDE(OP): Performed by: STUDENT IN AN ORGANIZED HEALTH CARE EDUCATION/TRAINING PROGRAM

## 2021-07-04 PROCEDURE — 83735 ASSAY OF MAGNESIUM: CPT

## 2021-07-04 PROCEDURE — 85025 COMPLETE CBC W/AUTO DIFF WBC: CPT

## 2021-07-04 PROCEDURE — 85018 HEMOGLOBIN: CPT

## 2021-07-04 PROCEDURE — 770020 HCHG ROOM/CARE - TELE (206)

## 2021-07-04 PROCEDURE — 84100 ASSAY OF PHOSPHORUS: CPT

## 2021-07-04 PROCEDURE — 99232 SBSQ HOSP IP/OBS MODERATE 35: CPT | Performed by: STUDENT IN AN ORGANIZED HEALTH CARE EDUCATION/TRAINING PROGRAM

## 2021-07-04 PROCEDURE — 85730 THROMBOPLASTIN TIME PARTIAL: CPT

## 2021-07-04 PROCEDURE — 36415 COLL VENOUS BLD VENIPUNCTURE: CPT

## 2021-07-04 PROCEDURE — 85610 PROTHROMBIN TIME: CPT

## 2021-07-04 PROCEDURE — A9270 NON-COVERED ITEM OR SERVICE: HCPCS | Performed by: STUDENT IN AN ORGANIZED HEALTH CARE EDUCATION/TRAINING PROGRAM

## 2021-07-04 PROCEDURE — 85014 HEMATOCRIT: CPT

## 2021-07-04 PROCEDURE — 80048 BASIC METABOLIC PNL TOTAL CA: CPT

## 2021-07-04 PROCEDURE — 700111 HCHG RX REV CODE 636 W/ 250 OVERRIDE (IP): Performed by: STUDENT IN AN ORGANIZED HEALTH CARE EDUCATION/TRAINING PROGRAM

## 2021-07-04 PROCEDURE — 82962 GLUCOSE BLOOD TEST: CPT | Mod: 91

## 2021-07-04 RX ADMIN — OXYCODONE HYDROCHLORIDE 10 MG: 10 TABLET ORAL at 09:24

## 2021-07-04 RX ADMIN — OXYCODONE HYDROCHLORIDE 10 MG: 10 TABLET ORAL at 12:11

## 2021-07-04 RX ADMIN — ANTACID TABLETS 500 MG: 500 TABLET, CHEWABLE ORAL at 20:50

## 2021-07-04 RX ADMIN — HYDROMORPHONE HYDROCHLORIDE 0.25 MG: 1 INJECTION, SOLUTION INTRAMUSCULAR; INTRAVENOUS; SUBCUTANEOUS at 03:04

## 2021-07-04 RX ADMIN — OXYCODONE HYDROCHLORIDE 10 MG: 10 TABLET ORAL at 20:50

## 2021-07-04 RX ADMIN — HYDROMORPHONE HYDROCHLORIDE 0.25 MG: 1 INJECTION, SOLUTION INTRAMUSCULAR; INTRAVENOUS; SUBCUTANEOUS at 06:35

## 2021-07-04 RX ADMIN — OXYCODONE HYDROCHLORIDE 10 MG: 10 TABLET ORAL at 17:20

## 2021-07-04 RX ADMIN — DOCUSATE SODIUM 50 MG AND SENNOSIDES 8.6 MG 2 TABLET: 8.6; 5 TABLET, FILM COATED ORAL at 06:35

## 2021-07-04 RX ADMIN — DOCUSATE SODIUM 50 MG AND SENNOSIDES 8.6 MG 2 TABLET: 8.6; 5 TABLET, FILM COATED ORAL at 17:20

## 2021-07-04 ASSESSMENT — ENCOUNTER SYMPTOMS
ABDOMINAL PAIN: 1
CHILLS: 0
HEARTBURN: 1
EYE PAIN: 0
FEVER: 0
BLURRED VISION: 0
HEADACHES: 0
ORTHOPNEA: 0
TREMORS: 0
HEMOPTYSIS: 0
MYALGIAS: 0
VOMITING: 0
COUGH: 0
DIARRHEA: 0
FOCAL WEAKNESS: 0
WEAKNESS: 0
SINUS PAIN: 0
CONSTIPATION: 1
NAUSEA: 0

## 2021-07-04 ASSESSMENT — PAIN DESCRIPTION - PAIN TYPE
TYPE: ACUTE PAIN

## 2021-07-04 NOTE — CARE PLAN
The patient is Stable - Low risk of patient condition declining or worsening    Shift Goals  Clinical Goals: improved labs, decreased pain  Patient Goals: improved pain management, rest  Family Goals: N/A    Progress made toward(s) clinical / shift goals:  Improved pain management, improved labs    Patient is not progressing towards the following goals: N/A      Problem: Pain - Standard  Goal: Alleviation of pain or a reduction in pain to the patient’s comfort goal  Outcome: Progressing     Problem: Knowledge Deficit - Standard  Goal: Patient and family/care givers will demonstrate understanding of plan of care, disease process/condition, diagnostic tests and medications  Outcome: Progressing

## 2021-07-04 NOTE — PROGRESS NOTES
"    DATE: 7/4/2021    Hospital Day 3  ruptured hepatic lesion.    Interval Events:  Improved pain, .    PHYSICAL EXAMINATION:  Constitutional:     Vital Signs: /73   Pulse (!) 103   Temp 36.3 °C (97.3 °F) (Temporal)   Resp 18   Ht 1.803 m (5' 11\")   Wt 87.3 kg (192 lb 7.4 oz)   SpO2 98%    General Appearance: The patient is a pleasant  and well appointed man in no critical distress.  HEENT:    The pupils are equal, round, and reactive to light bilaterally. The extraocular muscles are intact bilaterally. The nares and oropharynx are clear. Normal range of motion.  Respiratory:   Inspection: Unlabored respirations, no intercostal retractions, paradoxical motion, or accessory muscle use.   Palpation:  The chest is nontender.    Auscultation: normal.  Cardiovascular:   Inspection: The skin is warm and dry.  Auscultation: Regular rate and rhythm.   Peripheral Pulses: Normal.   Abdomen:   Inspection: Abdominal inspection reveals no abrasions, contusions, lacerations or penetrating wounds.   Palpation: Palpation is remarkable for moderate tenderness in the generalized region. improving  Musculoskeletal:   Examination of the upper and lower extremities reveals normal range of motion.  Skin:    Examination of the skin reveals no significant abrasions, contusion, lacerations, or other soft tissue injury.  Neurologic:    Neurologic examination reveals no focal deficits noted.  Psychiatric:   The patient does not appear depressed or anxious.    Laboratory Values:   Recent Labs     07/02/21  1150 07/02/21  1150 07/03/21  0338 07/03/21  1152 07/03/21  1718 07/04/21  0121 07/04/21  0902   WBC 11.2*  --  6.6  --   --  7.1  --    RBC 5.01  --  3.91*  --   --  3.88*  --    HEMOGLOBIN 15.9   < > 12.2*   < > 12.6* 12.1* 12.6*   HEMATOCRIT 47.4   < > 37.2*   < > 38.1* 37.9* 38.2*   MCV 94.6  --  95.1  --   --  97.7  --    MCH 31.7  --  31.2  --   --  31.2  --    MCHC 33.5*  --  32.8*  --   --  31.9*  --    RDW 51.4*  --  " 52.0*  --   --  53.8*  --    PLATELETCT 115*  --  85*  --   --  100*  --    MPV 8.7*  --  8.6*  --   --  8.4*  --     < > = values in this interval not displayed.     Recent Labs     07/02/21  1150 07/02/21  1150 07/02/21  1921 07/03/21  0338 07/04/21  0121   SODIUM 126*  --   --  127* 132*   POTASSIUM 5.7*   < > 5.3 4.9 4.7   CHLORIDE 92*  --   --  95* 98   CO2 20  --   --  24 27   GLUCOSE 134*  --   --  109* 105*   BUN 18  --   --  16 15   CREATININE 1.02  --   --  0.72 0.73   CALCIUM 9.0  --   --  8.4* 8.8    < > = values in this interval not displayed.     Recent Labs     07/02/21  1150 07/03/21  0338 07/04/21  0121   ASTSGOT 41 25  --    ALTSGPT 33 21  --    TBILIRUBIN 2.3* 1.7*  --    ALKPHOSPHAT 526* 341*  --    GLOBULIN 4.6* 3.8*  --    INR 1.12  --  1.36*     Recent Labs     07/02/21  1150 07/04/21  0121   APTT 31.7 41.7*   INR 1.12 1.36*        Imaging:   DX-CHEST-PORTABLE (1 VIEW)   Final Result      Left basilar atelectasis.      CT-ABDOMEN-PELVIS WITH   Final Result      1.  Primarily cystic mass in the medial segment LEFT lobe liver, likely treated hepatoma.   2.  Complex low-attenuation lesion in the inferior aspect medial segment LEFT lobe liver which is new from prior exam and may represent cystic mass or focal hematoma related to rupture larger mass.  Minimal hyperdense components may indicate blood    products.  Abscess is felt less likely.   3.  Gallbladder is not visualized and may be compressed by or involved with liver mass.   4.  Small volume ascites with potential hemoperitoneum.   5.  Splenomegaly.      These findings were discussed with CARMEN OTTO on 7/2/2021 3:02 PM.      DX-CHEST-PORTABLE (1 VIEW)   Final Result      1.  Hypoinflation with minimal LEFT lung base atelectasis.   2.  No pneumonia or pulmonary edema.          ASSESSMENT AND PLAN:     * Abdominal pain- (present on admission)  Assessment & Plan  Likely from ruptured cystic lesion related to his HCC. Remains important to  trend Hgb/Hct. States he feels better. .   7/4 - improving pain, able to ambulate, crit stable, can start clears      DISPOSITION: Continue nonoperative management and close clinical observation.       ____________________________________     Jaspal Muñoz M.D.    DD: 7/3/2021  10:38 AM

## 2021-07-04 NOTE — PROGRESS NOTES
Received Bedside report. Assumed care at 1900. This pt is AOx4, ambulatory with standby assistance, voiding adequately, 10/10 pain. Patient and RN discussed plan of care: questions answered. Labs noted, assessment complete, patient NPO. Tele box in place. Pt is on 2 L of O2 via nasal cannula. Call light in place, fall precautions in place, patient educated on importance of calling for assistance. No additional needs at this time. VSS

## 2021-07-04 NOTE — CARE PLAN
Problem: Pain - Standard  Goal: Alleviation of pain or a reduction in pain to the patient’s comfort goal  Outcome: Progressing  Note: Patient is receiving IV Dilaudid for pain management.  Patient has been sleeping comfortably post receiving IV Dilaudid.       Problem: Knowledge Deficit - Standard  Goal: Patient and family/care givers will demonstrate understanding of plan of care, disease process/condition, diagnostic tests and medications  Outcome: Progressing  Note: Patient demonstrates full understanding of plan of care.  Will continue to educate patient and re-assess patient's understanding of plan of care.   The patient is Watcher - Medium risk of patient condition declining or worsening    Shift Goals  Clinical Goals: Pain management, Monitor labs  Patient Goals: Pain reduction, sleep comfortably  Family Goals: N/A    Progress made toward(s) clinical / shift goals:  Patient's pain has been controlled throughout shift with pain medications.  Patient has been sleeping comfortably.     Patient is not progressing towards the following goals: N/A

## 2021-07-05 ENCOUNTER — APPOINTMENT (OUTPATIENT)
Dept: RADIOLOGY | Facility: MEDICAL CENTER | Age: 71
DRG: 356 | End: 2021-07-05
Attending: STUDENT IN AN ORGANIZED HEALTH CARE EDUCATION/TRAINING PROGRAM
Payer: MEDICARE

## 2021-07-05 LAB
ANION GAP SERPL CALC-SCNC: 8 MMOL/L (ref 7–16)
BACTERIA UR CULT: NORMAL
BUN SERPL-MCNC: 16 MG/DL (ref 8–22)
CALCIUM SERPL-MCNC: 9 MG/DL (ref 8.5–10.5)
CHLORIDE SERPL-SCNC: 95 MMOL/L (ref 96–112)
CO2 SERPL-SCNC: 25 MMOL/L (ref 20–33)
CREAT SERPL-MCNC: 0.68 MG/DL (ref 0.5–1.4)
ERYTHROCYTE [DISTWIDTH] IN BLOOD BY AUTOMATED COUNT: 54.4 FL (ref 35.9–50)
GLUCOSE BLD-MCNC: 100 MG/DL (ref 65–99)
GLUCOSE BLD-MCNC: 102 MG/DL (ref 65–99)
GLUCOSE BLD-MCNC: 113 MG/DL (ref 65–99)
GLUCOSE BLD-MCNC: 116 MG/DL (ref 65–99)
GLUCOSE BLD-MCNC: 93 MG/DL (ref 65–99)
GLUCOSE BLD-MCNC: 99 MG/DL (ref 65–99)
GLUCOSE SERPL-MCNC: 107 MG/DL (ref 65–99)
HCT VFR BLD AUTO: 35.6 % (ref 42–52)
HCT VFR BLD AUTO: 38.2 % (ref 42–52)
HCT VFR BLD AUTO: 38.4 % (ref 42–52)
HGB BLD-MCNC: 11.5 G/DL (ref 14–18)
HGB BLD-MCNC: 12.1 G/DL (ref 14–18)
HGB BLD-MCNC: 12.2 G/DL (ref 14–18)
MCH RBC QN AUTO: 31.9 PG (ref 27–33)
MCHC RBC AUTO-ENTMCNC: 32.3 G/DL (ref 33.7–35.3)
MCV RBC AUTO: 98.9 FL (ref 81.4–97.8)
PLATELET # BLD AUTO: 80 K/UL (ref 164–446)
PMV BLD AUTO: 8.5 FL (ref 9–12.9)
POTASSIUM SERPL-SCNC: 4.2 MMOL/L (ref 3.6–5.5)
RBC # BLD AUTO: 3.6 M/UL (ref 4.7–6.1)
SIGNIFICANT IND 70042: NORMAL
SITE SITE: NORMAL
SODIUM SERPL-SCNC: 128 MMOL/L (ref 135–145)
SOURCE SOURCE: NORMAL
WBC # BLD AUTO: 6.2 K/UL (ref 4.8–10.8)

## 2021-07-05 PROCEDURE — 700117 HCHG RX CONTRAST REV CODE 255: Performed by: STUDENT IN AN ORGANIZED HEALTH CARE EDUCATION/TRAINING PROGRAM

## 2021-07-05 PROCEDURE — 80048 BASIC METABOLIC PNL TOTAL CA: CPT

## 2021-07-05 PROCEDURE — 85014 HEMATOCRIT: CPT

## 2021-07-05 PROCEDURE — 700105 HCHG RX REV CODE 258: Performed by: STUDENT IN AN ORGANIZED HEALTH CARE EDUCATION/TRAINING PROGRAM

## 2021-07-05 PROCEDURE — 700102 HCHG RX REV CODE 250 W/ 637 OVERRIDE(OP): Performed by: STUDENT IN AN ORGANIZED HEALTH CARE EDUCATION/TRAINING PROGRAM

## 2021-07-05 PROCEDURE — 700111 HCHG RX REV CODE 636 W/ 250 OVERRIDE (IP): Performed by: STUDENT IN AN ORGANIZED HEALTH CARE EDUCATION/TRAINING PROGRAM

## 2021-07-05 PROCEDURE — 71045 X-RAY EXAM CHEST 1 VIEW: CPT

## 2021-07-05 PROCEDURE — 99233 SBSQ HOSP IP/OBS HIGH 50: CPT | Performed by: STUDENT IN AN ORGANIZED HEALTH CARE EDUCATION/TRAINING PROGRAM

## 2021-07-05 PROCEDURE — 85018 HEMOGLOBIN: CPT

## 2021-07-05 PROCEDURE — 74176 CT ABD & PELVIS W/O CONTRAST: CPT | Mod: ME

## 2021-07-05 PROCEDURE — 36415 COLL VENOUS BLD VENIPUNCTURE: CPT

## 2021-07-05 PROCEDURE — 82962 GLUCOSE BLOOD TEST: CPT | Mod: 91

## 2021-07-05 PROCEDURE — 770020 HCHG ROOM/CARE - TELE (206)

## 2021-07-05 PROCEDURE — 85027 COMPLETE CBC AUTOMATED: CPT

## 2021-07-05 PROCEDURE — A9270 NON-COVERED ITEM OR SERVICE: HCPCS | Performed by: STUDENT IN AN ORGANIZED HEALTH CARE EDUCATION/TRAINING PROGRAM

## 2021-07-05 RX ORDER — SODIUM CHLORIDE, SODIUM LACTATE, POTASSIUM CHLORIDE, CALCIUM CHLORIDE 600; 310; 30; 20 MG/100ML; MG/100ML; MG/100ML; MG/100ML
INJECTION, SOLUTION INTRAVENOUS CONTINUOUS
Status: DISCONTINUED | OUTPATIENT
Start: 2021-07-05 | End: 2021-07-13

## 2021-07-05 RX ADMIN — POLYETHYLENE GLYCOL 3350 1 PACKET: 17 POWDER, FOR SOLUTION ORAL at 19:35

## 2021-07-05 RX ADMIN — DOCUSATE SODIUM 50 MG AND SENNOSIDES 8.6 MG 2 TABLET: 8.6; 5 TABLET, FILM COATED ORAL at 06:33

## 2021-07-05 RX ADMIN — IOHEXOL 25 ML: 350 INJECTION, SOLUTION INTRAVENOUS at 23:15

## 2021-07-05 RX ADMIN — OXYCODONE HYDROCHLORIDE 10 MG: 10 TABLET ORAL at 00:13

## 2021-07-05 RX ADMIN — GUAIFENESIN AND DEXTROMETHORPHAN 5 ML: 100; 10 SYRUP ORAL at 23:37

## 2021-07-05 RX ADMIN — ANTACID TABLETS 500 MG: 500 TABLET, CHEWABLE ORAL at 19:35

## 2021-07-05 RX ADMIN — OXYCODONE HYDROCHLORIDE 10 MG: 10 TABLET ORAL at 06:33

## 2021-07-05 RX ADMIN — THIAMINE HYDROCHLORIDE 100 MG: 100 INJECTION, SOLUTION INTRAMUSCULAR; INTRAVENOUS at 09:45

## 2021-07-05 RX ADMIN — OXYCODONE HYDROCHLORIDE 10 MG: 10 TABLET ORAL at 14:44

## 2021-07-05 RX ADMIN — OXYCODONE 5 MG: 5 TABLET ORAL at 23:37

## 2021-07-05 RX ADMIN — SODIUM CHLORIDE, POTASSIUM CHLORIDE, SODIUM LACTATE AND CALCIUM CHLORIDE: 600; 310; 30; 20 INJECTION, SOLUTION INTRAVENOUS at 09:36

## 2021-07-05 RX ADMIN — FAMOTIDINE 20 MG: 10 INJECTION INTRAVENOUS at 09:36

## 2021-07-05 RX ADMIN — ANTACID TABLETS 500 MG: 500 TABLET, CHEWABLE ORAL at 23:37

## 2021-07-05 ASSESSMENT — ENCOUNTER SYMPTOMS
WEAKNESS: 0
COUGH: 0
HEARTBURN: 1
HEADACHES: 0
CONSTIPATION: 1
ABDOMINAL PAIN: 1
EYE PAIN: 0
BLURRED VISION: 0
TREMORS: 0
NAUSEA: 0
VOMITING: 0
MYALGIAS: 0
ORTHOPNEA: 0
CHILLS: 0
DIARRHEA: 0
HEMOPTYSIS: 0
SINUS PAIN: 0
FOCAL WEAKNESS: 0
FEVER: 0

## 2021-07-05 ASSESSMENT — PAIN DESCRIPTION - PAIN TYPE
TYPE: ACUTE PAIN

## 2021-07-05 NOTE — THERAPY
Physical Therapy Contact Note    Re attempted PT eval, patient refused again due to pain. Will re attempt as able and appropriate.    Ryann Whitman, PT, DPT  [phone]

## 2021-07-05 NOTE — THERAPY
Physical Therapy Contact Note    PT spencer attempted. Patient refused, reported he had been sitting up earlier in the morning which resulted in severe pain. Will re attempt as appropriate and able.    Ryann Whitman, PT, DPT  [phone]

## 2021-07-05 NOTE — CARE PLAN
Problem: Pain - Standard  Goal: Alleviation of pain or a reduction in pain to the patient’s comfort goal  7/4/2021 2304 by Radha Liu R.N.  Outcome: Progressing  Flowsheets  Taken 7/4/2021 2248  Non Verbal Scale:   Sleeping   Calm   Unlabored Breathing  Taken 7/4/2021 2050  Pain Rating Scale (NPRS): 8  Note: Patient received oral pain medication to control pain.  Patient has been sleeping comfortably post administration of pain medication.   7/4/2021 2304 by Radha Liu R.N.  Outcome: Progressing     Problem: Fall Risk  Goal: Patient will remain free from falls  Outcome: Progressing  Note: Patient has remained free from falls over the course of his hospital stay.  Fall precautions in place.    The patient is Watcher - Medium risk of patient condition declining or worsening    Shift Goals  Clinical Goals: Decrease in pain. Sleep comfortably  Patient Goals: Reduction in pain. Rest  Family Goals: N/A    Progress made toward(s) clinical / shift goals:  Patient's pain has been controlled with oral pain medication administration.  Patient has been sleeping comfortably.    Patient is not progressing towards the following goals:  Patient remains on 2 L of oxygen via nasal cannula

## 2021-07-05 NOTE — PROGRESS NOTES
Received Bedside report. Assumed care at 1900. This pt is AOx4, ambulatory with standby assistance, voiding adequately, 8/10 pain. Patient and RN discussed plan of care: questions answered. Labs noted, assessment complete, patient tolerating clear liquid diet. Tele box in place. Pt is on 2 L of O2 via nasal cannula. Call light in place, fall precautions in place, patient educated on importance of calling for assistance. No additional needs at this time. VSS

## 2021-07-06 LAB
ALBUMIN SERPL BCP-MCNC: 2.4 G/DL (ref 3.2–4.9)
ALBUMIN/GLOB SERPL: 0.6 G/DL
ALP SERPL-CCNC: 309 U/L (ref 30–99)
ALT SERPL-CCNC: 16 U/L (ref 2–50)
ANION GAP SERPL CALC-SCNC: 11 MMOL/L (ref 7–16)
AST SERPL-CCNC: 31 U/L (ref 12–45)
BILIRUB SERPL-MCNC: 1.5 MG/DL (ref 0.1–1.5)
BUN SERPL-MCNC: 20 MG/DL (ref 8–22)
CALCIUM SERPL-MCNC: 9.3 MG/DL (ref 8.5–10.5)
CHLORIDE SERPL-SCNC: 97 MMOL/L (ref 96–112)
CO2 SERPL-SCNC: 24 MMOL/L (ref 20–33)
CREAT SERPL-MCNC: 0.71 MG/DL (ref 0.5–1.4)
GLOBULIN SER CALC-MCNC: 4.3 G/DL (ref 1.9–3.5)
GLUCOSE BLD-MCNC: 104 MG/DL (ref 65–99)
GLUCOSE BLD-MCNC: 110 MG/DL (ref 65–99)
GLUCOSE BLD-MCNC: 111 MG/DL (ref 65–99)
GLUCOSE BLD-MCNC: 113 MG/DL (ref 65–99)
GLUCOSE BLD-MCNC: 118 MG/DL (ref 65–99)
GLUCOSE SERPL-MCNC: 122 MG/DL (ref 65–99)
POTASSIUM SERPL-SCNC: 4.4 MMOL/L (ref 3.6–5.5)
PROT SERPL-MCNC: 6.7 G/DL (ref 6–8.2)
SODIUM SERPL-SCNC: 132 MMOL/L (ref 135–145)

## 2021-07-06 PROCEDURE — 97162 PT EVAL MOD COMPLEX 30 MIN: CPT

## 2021-07-06 PROCEDURE — A9270 NON-COVERED ITEM OR SERVICE: HCPCS | Performed by: STUDENT IN AN ORGANIZED HEALTH CARE EDUCATION/TRAINING PROGRAM

## 2021-07-06 PROCEDURE — 700102 HCHG RX REV CODE 250 W/ 637 OVERRIDE(OP): Performed by: STUDENT IN AN ORGANIZED HEALTH CARE EDUCATION/TRAINING PROGRAM

## 2021-07-06 PROCEDURE — 700111 HCHG RX REV CODE 636 W/ 250 OVERRIDE (IP): Performed by: STUDENT IN AN ORGANIZED HEALTH CARE EDUCATION/TRAINING PROGRAM

## 2021-07-06 PROCEDURE — 770020 HCHG ROOM/CARE - TELE (206)

## 2021-07-06 PROCEDURE — 99232 SBSQ HOSP IP/OBS MODERATE 35: CPT | Performed by: INTERNAL MEDICINE

## 2021-07-06 PROCEDURE — 36415 COLL VENOUS BLD VENIPUNCTURE: CPT

## 2021-07-06 PROCEDURE — 80053 COMPREHEN METABOLIC PANEL: CPT

## 2021-07-06 PROCEDURE — 700105 HCHG RX REV CODE 258: Performed by: STUDENT IN AN ORGANIZED HEALTH CARE EDUCATION/TRAINING PROGRAM

## 2021-07-06 PROCEDURE — 97165 OT EVAL LOW COMPLEX 30 MIN: CPT

## 2021-07-06 PROCEDURE — 82962 GLUCOSE BLOOD TEST: CPT

## 2021-07-06 RX ORDER — FAMOTIDINE 20 MG/1
20 TABLET, FILM COATED ORAL DAILY
Status: DISCONTINUED | OUTPATIENT
Start: 2021-07-07 | End: 2021-07-10

## 2021-07-06 RX ADMIN — ONDANSETRON 4 MG: 4 TABLET, ORALLY DISINTEGRATING ORAL at 03:01

## 2021-07-06 RX ADMIN — DOCUSATE SODIUM 50 MG AND SENNOSIDES 8.6 MG 2 TABLET: 8.6; 5 TABLET, FILM COATED ORAL at 17:24

## 2021-07-06 RX ADMIN — OXYCODONE 5 MG: 5 TABLET ORAL at 03:01

## 2021-07-06 RX ADMIN — THIAMINE HYDROCHLORIDE 100 MG: 100 INJECTION, SOLUTION INTRAMUSCULAR; INTRAVENOUS at 05:32

## 2021-07-06 RX ADMIN — POLYETHYLENE GLYCOL 3350 1 PACKET: 17 POWDER, FOR SOLUTION ORAL at 17:24

## 2021-07-06 RX ADMIN — OXYCODONE 5 MG: 5 TABLET ORAL at 23:22

## 2021-07-06 RX ADMIN — OXYCODONE 5 MG: 5 TABLET ORAL at 23:18

## 2021-07-06 RX ADMIN — FAMOTIDINE 20 MG: 10 INJECTION INTRAVENOUS at 05:32

## 2021-07-06 RX ADMIN — SODIUM CHLORIDE, POTASSIUM CHLORIDE, SODIUM LACTATE AND CALCIUM CHLORIDE: 600; 310; 30; 20 INJECTION, SOLUTION INTRAVENOUS at 03:05

## 2021-07-06 RX ADMIN — ANTACID TABLETS 500 MG: 500 TABLET, CHEWABLE ORAL at 17:24

## 2021-07-06 ASSESSMENT — GAIT ASSESSMENTS
GAIT LEVEL OF ASSIST: MINIMAL ASSIST
DISTANCE (FEET): 40
ASSISTIVE DEVICE: FRONT WHEEL WALKER
DISTANCE (FEET): 40

## 2021-07-06 ASSESSMENT — COGNITIVE AND FUNCTIONAL STATUS - GENERAL
MOVING FROM LYING ON BACK TO SITTING ON SIDE OF FLAT BED: A LITTLE
MOBILITY SCORE: 16
WALKING IN HOSPITAL ROOM: A LITTLE
DRESSING REGULAR LOWER BODY CLOTHING: A LOT
STANDING UP FROM CHAIR USING ARMS: A LITTLE
TOILETING: A LITTLE
SUGGESTED CMS G CODE MODIFIER DAILY ACTIVITY: CK
DAILY ACTIVITIY SCORE: 19
HELP NEEDED FOR BATHING: A LOT
SUGGESTED CMS G CODE MODIFIER MOBILITY: CK
TURNING FROM BACK TO SIDE WHILE IN FLAT BAD: A LITTLE
MOVING TO AND FROM BED TO CHAIR: A LOT
CLIMB 3 TO 5 STEPS WITH RAILING: A LOT

## 2021-07-06 ASSESSMENT — ENCOUNTER SYMPTOMS
COUGH: 0
HEADACHES: 0
CONSTIPATION: 0
NAUSEA: 0
ABDOMINAL PAIN: 1
SHORTNESS OF BREATH: 0
DIZZINESS: 0
CHILLS: 0
VOMITING: 0
FEVER: 0
PALPITATIONS: 0
DIARRHEA: 0
WEAKNESS: 1
BACK PAIN: 0

## 2021-07-06 ASSESSMENT — PAIN DESCRIPTION - PAIN TYPE
TYPE: ACUTE PAIN

## 2021-07-06 ASSESSMENT — ACTIVITIES OF DAILY LIVING (ADL): TOILETING: INDEPENDENT

## 2021-07-06 NOTE — PROGRESS NOTES
Received report from Day shift RN. Assumed care of patient at 1900. Patient A/Ox4 at this this time. On 2L O2, dyspneic at rest and on exertion. Acute signs of pain and abdominal distention - reviewed pain management goals and pt agreeable. Call light and belongings within reach. Bed in lowest locked position. Upper side rails raised. Hourly rounding in place. Will continue to monitor.

## 2021-07-06 NOTE — PROGRESS NOTES
"    DATE: 7/5/2021    Hospital Day 4 ruptured hepatic lesion.    Interval Events:  Improved pain, .    PHYSICAL EXAMINATION:  Constitutional:     Vital Signs: /87   Pulse 100   Temp 35.9 °C (96.7 °F) (Temporal)   Resp 18   Ht 1.803 m (5' 11\")   Wt 87.3 kg (192 lb 7.4 oz)   SpO2 99%    General Appearance: The patient is a pleasant  and well appointed man in no critical distress.  HEENT:    The pupils are equal, round, and reactive to light bilaterally. The extraocular muscles are intact bilaterally. The nares and oropharynx are clear. Normal range of motion.  Respiratory:   Inspection: Unlabored respirations, no intercostal retractions, paradoxical motion, or accessory muscle use.   Palpation:  The chest is nontender.    Auscultation: normal.  Cardiovascular:   Inspection: The skin is warm and dry.  Auscultation: Regular rate and rhythm.   Peripheral Pulses: Normal.   Abdomen:   Inspection: Abdominal inspection reveals no abrasions, contusions, lacerations or penetrating wounds.   Palpation: Palpation is remarkable for moderate tenderness in the generalized region. improving  Musculoskeletal:   Examination of the upper and lower extremities reveals normal range of motion.  Skin:    Examination of the skin reveals no significant abrasions, contusion, lacerations, or other soft tissue injury.  Neurologic:    Neurologic examination reveals no focal deficits noted.  Psychiatric:   The patient does not appear depressed or anxious.    Laboratory Values:   Recent Labs     07/03/21  0338 07/03/21  1152 07/04/21  0121 07/04/21  0902 07/05/21  0115 07/05/21  0924 07/05/21  1715   WBC 6.6  --  7.1  --  6.2  --   --    RBC 3.91*  --  3.88*  --  3.60*  --   --    HEMOGLOBIN 12.2*   < > 12.1*   < > 11.5* 12.2* 12.1*   HEMATOCRIT 37.2*   < > 37.9*   < > 35.6* 38.4* 38.2*   MCV 95.1  --  97.7  --  98.9*  --   --    MCH 31.2  --  31.2  --  31.9  --   --    MCHC 32.8*  --  31.9*  --  32.3*  --   --    RDW 52.0*  --  53.8*  " --  54.4*  --   --    PLATELETCT 85*  --  100*  --  80*  --   --    MPV 8.6*  --  8.4*  --  8.5*  --   --     < > = values in this interval not displayed.     Recent Labs     07/03/21 0338 07/04/21 0121 07/05/21  0115   SODIUM 127* 132* 128*   POTASSIUM 4.9 4.7 4.2   CHLORIDE 95* 98 95*   CO2 24 27 25   GLUCOSE 109* 105* 107*   BUN 16 15 16   CREATININE 0.72 0.73 0.68   CALCIUM 8.4* 8.8 9.0     Recent Labs     07/03/21 0338 07/04/21  0121   ASTSGOT 25  --    ALTSGPT 21  --    TBILIRUBIN 1.7*  --    ALKPHOSPHAT 341*  --    GLOBULIN 3.8*  --    INR  --  1.36*     Recent Labs     07/04/21 0121   APTT 41.7*   INR 1.36*        Imaging:   DX-CHEST-PORTABLE (1 VIEW)   Final Result      Hypoinflation with bibasilar atelectasis. No focal consolidation or pleural effusions.      DX-CHEST-PORTABLE (1 VIEW)   Final Result      Left basilar atelectasis.      CT-ABDOMEN-PELVIS WITH   Final Result      1.  Primarily cystic mass in the medial segment LEFT lobe liver, likely treated hepatoma.   2.  Complex low-attenuation lesion in the inferior aspect medial segment LEFT lobe liver which is new from prior exam and may represent cystic mass or focal hematoma related to rupture larger mass.  Minimal hyperdense components may indicate blood    products.  Abscess is felt less likely.   3.  Gallbladder is not visualized and may be compressed by or involved with liver mass.   4.  Small volume ascites with potential hemoperitoneum.   5.  Splenomegaly.      These findings were discussed with CARMEN OTTO on 7/2/2021 3:02 PM.      DX-CHEST-PORTABLE (1 VIEW)   Final Result      1.  Hypoinflation with minimal LEFT lung base atelectasis.   2.  No pneumonia or pulmonary edema.      CT-ABDOMEN-PELVIS W/O    (Results Pending)       ASSESSMENT AND PLAN:     * Abdominal pain- (present on admission)  Assessment & Plan  Likely from ruptured cystic lesion related to his HCC. Remains important to trend Hgb/Hct. States he feels better. Still  tender.   7/4 - improving pain, able to ambulate, crit stable, can start clears  7/5 -per patient pain continues to improve however still distended denies bowel movements plan for CT scan today to reevaluate fluid collection to see whether evacuation is warranted      DISPOSITION: Continue nonoperative management and close clinical observation.       ____________________________________     Jaspal Muñoz M.D.    DD: 7/3/2021  10:38 AM

## 2021-07-06 NOTE — PROGRESS NOTES
"    DATE: 7/6/2021    HD 4: Hemorrhagic Hepatic Cyst/Tumor    Interval Events:  Vitals reassuring  Hemoglobin unchanged  Reports improving abdominal pain  IR embolization for this type of bleeding is a much better option than any operation    -Needs to have GI and IR following him outpatient for the hepatic mass  -Rec IR for any evidence of recurrent hemorrhage this admission for repeat embolization  -Diet as tolerated  -Avoid anti-platelet and anti-coagulation agents  -No need for follow up with surgery    No acute surgical needs, signing off    PHYSICAL EXAMINATION:  Constitutional:     Vital Signs: /77   Pulse 89   Temp 36.2 °C (97.1 °F) (Temporal)   Resp 18   Ht 1.803 m (5' 11\")   Wt 87.3 kg (192 lb 7.4 oz)   SpO2 97%   GENERAL:  No acute distress  HEENT: Pupils equal, round and reactive to light bilaterally.  Sclera are clear bilaterally.  No otorrhea.  No rhinorrhea.  Mucus membranes are moist.  CHEST:  Lungs are clear to auscultation bilaterally  CARDIOVASCULAR:  Regular rate and rhythm  ABDOMEN: Soft, non-tender, non-distended  GENITOURINARY:  No underwood in place, voiding without issues  EXTREMITIES:  Good range of motion through out  NEUROLOGIC:  Alert and oriented.  Cranial Nerves II through XII are grossly intact, no focal deficits appreciated  PSYCHIATRIC:  Normal affect and mood appropriate for age and condition  SKIN:  Warm and well perfused, no rashes       Laboratory Values:   Recent Labs     07/04/21  0121 07/04/21  0902 07/05/21  0115 07/05/21  0924 07/05/21  1715   WBC 7.1  --  6.2  --   --    RBC 3.88*  --  3.60*  --   --    HEMOGLOBIN 12.1*   < > 11.5* 12.2* 12.1*   HEMATOCRIT 37.9*   < > 35.6* 38.4* 38.2*   MCV 97.7  --  98.9*  --   --    MCH 31.2  --  31.9  --   --    MCHC 31.9*  --  32.3*  --   --    RDW 53.8*  --  54.4*  --   --    PLATELETCT 100*  --  80*  --   --    MPV 8.4*  --  8.5*  --   --     < > = values in this interval not displayed.     Recent Labs     07/04/21  0121 " 07/05/21  0115 07/06/21  0335   SODIUM 132* 128* 132*   POTASSIUM 4.7 4.2 4.4   CHLORIDE 98 95* 97   CO2 27 25 24   GLUCOSE 105* 107* 122*   BUN 15 16 20   CREATININE 0.73 0.68 0.71   CALCIUM 8.8 9.0 9.3     Recent Labs     07/04/21  0121 07/06/21  0335   ASTSGOT  --  31   ALTSGPT  --  16   TBILIRUBIN  --  1.5   ALKPHOSPHAT  --  309*   GLOBULIN  --  4.3*   INR 1.36*  --      Recent Labs     07/04/21  0121   APTT 41.7*   INR 1.36*        Imaging:   CT-ABDOMEN-PELVIS W/O   Final Result      1.  There is no acute inflammatory process in the abdomen or pelvis.   2.  The treated lesions in the liver are again seen without interval change.   3.  Splenomegaly is again seen.   4.  Cystic pancreatic tail lesion is unchanged possibly a pseudocyst.   5.  There is colonic diverticulosis without diverticulitis.   6.  There is mild wall thickening of the distal esophagus.   7.  There is a stable small amount of ascites.   8.  There is moderate colonic stool.      DX-CHEST-PORTABLE (1 VIEW)   Final Result      Hypoinflation with bibasilar atelectasis. No focal consolidation or pleural effusions.      DX-CHEST-PORTABLE (1 VIEW)   Final Result      Left basilar atelectasis.      CT-ABDOMEN-PELVIS WITH   Final Result      1.  Primarily cystic mass in the medial segment LEFT lobe liver, likely treated hepatoma.   2.  Complex low-attenuation lesion in the inferior aspect medial segment LEFT lobe liver which is new from prior exam and may represent cystic mass or focal hematoma related to rupture larger mass.  Minimal hyperdense components may indicate blood    products.  Abscess is felt less likely.   3.  Gallbladder is not visualized and may be compressed by or involved with liver mass.   4.  Small volume ascites with potential hemoperitoneum.   5.  Splenomegaly.      These findings were discussed with CARMEN OTTO on 7/2/2021 3:02 PM.      DX-CHEST-PORTABLE (1 VIEW)   Final Result      1.  Hypoinflation with minimal LEFT lung base  atelectasis.   2.  No pneumonia or pulmonary edema.          ASSESSMENT AND PLAN:     * Abdominal pain- (present on admission)  Assessment & Plan  Likely from ruptured cystic lesion related to his HCC.   2019 Embolization for hemorrhage  1/2020, 2/2020 TACE of the right hepatic lobe  7/5 Interval CT imaging with small volume ascites, stable hepatic hemorrhage  -Needs to have GI and IR following him outpatient for the hepatic mass  -Rec IR for any evidence of recurrent hemorrhage for repeat embolization  -Diet as tolerated  -Avoid anti-platelet and anti-coagulation agents  -No need for follow up with surgery  No acute surgical needs, signing off  [facility]         _______________________________     Олег Salmon M.D.

## 2021-07-06 NOTE — PROGRESS NOTES
"Patient refused Nicotine patch and states he has been refusing it. Pt stated his last cigarette was PTA. Discussed with the patient the indication and pt stated \"I don't fee the need to smoke.\" Education requires reinforcement.    "

## 2021-07-06 NOTE — PROGRESS NOTES
Monitor summary:      Rhythm: SR/ST  Rate:   Ectopy: r PVC  Measurements: .17/09/.36      12hr chart check

## 2021-07-06 NOTE — THERAPY
"Occupational Therapy   Initial Evaluation     Patient Name: Lorraine Bella  Age:  71 y.o., Sex:  male  Medical Record #: [id number]  Today's Date: 7/6/2021     Precautions: Fall Risk    Assessment    Patient is 71 y.o. male with h/o hepatoma (on IV immunotherapy) who presents with sudden onset abdominal pain. CT showed cystic mass to L liver lobe, likely cystic rupture. Pt seen for OT eval. Pt had refused PT yesterday x 2 due to abdominal pain. Today reports decreased pain, feeling \"better\". Pt had some difficulty with date and was quite delayed with responses. Pt mobilized to EOB without assist, unable to manage socks, but donned slippers without assist. Mobilized in room and slightly beyond with PT, then requested BTB due to extreme fatigue stating \"I just want to go to sleep\". Assessed BP EOB and found to be 141/81. Pt continued to decline up to chair, so assisted BTB. Pt is limited by: generalized weakness, fatigue, balance impairment, pain, mild cognitive deficits. Will benefit from acute OT to maximize functional independence and safety.     Plan    Recommend Occupational Therapy 3 times per week until therapy goals are met for the following treatments:  Adaptive Equipment, Cognitive Skill Development, Neuro Re-Education / Balance, Self Care/Activities of Daily Living, Therapeutic Activities and Therapeutic Exercises.    DC Equipment Recommendations: Unable to determine at this time  Discharge Recommendations: Recommend post-acute placement for additional occupational therapy services prior to discharge home     Subjective    \"I feel better.\" (start of session)  \"I just want to go to sleep (end of session)     Objective       07/06/21 1023   Prior Living Situation   Housing / Facility 1 Story House   Steps Into Home 0   Bathroom Set up Walk In Shower;Shower Chair  (built-in seat)   Comments Pt lives with spouse who could assist if needed on DC   Prior Level of ADL Function   Comments Pt was independent " with BADL PTA    Prior Level of IADL Function   Comments Pt was independent with I-ADL and functional mobility PTA    Cognition    Speech/ Communication Delayed Responses   Level of Consciousness Alert   New Learning Impaired   Attention Impaired   Comments Pt slow to respond, lethargic; not oriented to month or day   Balance Assessment   Sitting Balance (Static) Fair +   Sitting Balance (Dynamic) Fair   Standing Balance (Static) Fair   Standing Balance (Dynamic) Fair -   Weight Shift Sitting Fair   Weight Shift Standing Fair   Comments FWW for standing, no overt LOB, but appears unsteady    Bed Mobility    Supine to Sit Supervised   Sit to Supine Supervised   Scooting Supervised  (seated)   Comments HOB elevated    ADL Assessment   Grooming   (declined due to already completed )   Lower Body Dressing Maximal Assist  (unable to manage either sock; was able to don slippers )   Toileting   (NT; declined need)   Functional Mobility   Sit to Stand Minimal Assist   Bed, Chair, Wheelchair Transfer Minimal Assist   Transfer Method Stand Step  (FWW)   Mobility Supine > < EOB, gait in hallway with PT   Activity Tolerance   Sitting in Chair NT, declined    Sitting Edge of Bed 6 min    Standing 5 min    Comments OOB activity limited by fatigue    Short Term Goals   Short Term Goal # 1 Pt will complete ADL transfers with supv    Short Term Goal # 2 Pt will complete LB dressing with supv using AE PRN   Short Term Goal # 3 Pt will complete standing grooming with supv    Short Term Goal # 4 Pt will tolerate >20 min OOB activity without c/o fatigue

## 2021-07-06 NOTE — PROGRESS NOTES
Hospital Medicine Daily Progress Note    Date of Service  7/5/2021    Chief Complaint  Lorraine Bella is a 71 y.o. male admitted 7/2/2021 with   Sudden onset abdominal pain  Hospital Course  This is a 89-year-old male patient with past medical history of hepatoma since 2018, on IV immunotherapy last dose 2 weeks ago follows with Dr. Birch presented on 7/2/2021 with sudden onset abdominal pain which woke him up from his sleep. Is associated with constipation and obstipation. In the ER her labs were significant for sodium 126, potassium 5.7, bilirubin of 2.3, lactate 2.7. Her CT of the abdomen showed possible ruptured hepatic cyst with hemoperitoneum. Surgery was consulted recommended medical management with trending hemoglobin. If no clinical improvement plan for diagnostic labs in a.m.      Interval Problem Update  Patient seen and examined at bedside, AAO x3, vital signs stable, labs reviewed  Patient complains of abdominal pain, constipation and not passing gas denies nausea or vomiting on exam generalized tenderness and guarding  Continue with n.p.o., trend H&H every 8 hourly, if no improvement plan for diagnostic laparotomy in a.m. as per surgery notes    7/4/21: Patient reports improvement in abdominal pain passing gases with stool softener, VSS, labs reviewed, H&H stable, patient ambulating, started on clears as per surgery recommendation, continue with H&H trending  7/5/21: Still reports abdominal pain, H&H stable, reports heartburn and unable to tolerate clears, abdomen , patient dry, labs with hyponatremia sodium 128, likely secondary to hypovolemic hyponatremia  Will restart on IV fluids with Ringer lactate, discuss with surgery we will repeat CT abdomen and pelvis with oral contrast    I have personally seen and examined the patient at bedside. I discussed the plan of care with patient.    Consultants/Specialty  general surgery    Code Status  Full Code    Disposition  Patient is not  medically cleared.   Anticipate discharge to to home with close outpatient follow-up.  I have placed the appropriate orders for post-discharge needs.    Review of Systems  Review of Systems   Constitutional: Negative for chills and fever.   HENT: Negative for ear pain and sinus pain.    Eyes: Negative for blurred vision and pain.   Respiratory: Negative for cough and hemoptysis.    Cardiovascular: Negative for chest pain and orthopnea.   Gastrointestinal: Positive for abdominal pain, constipation and heartburn. Negative for diarrhea, nausea and vomiting.   Genitourinary: Negative for dysuria and hematuria.   Musculoskeletal: Negative for myalgias.   Skin: Negative for itching.   Neurological: Negative for tremors, focal weakness, weakness and headaches.   Psychiatric/Behavioral: Negative for suicidal ideas.        Physical Exam  Temp:  [35.9 °C (96.7 °F)-36.9 °C (98.4 °F)] 35.9 °C (96.7 °F)  Pulse:  [] 100  Resp:  [17-19] 18  BP: (112-140)/(56-87) 138/87  SpO2:  [94 %-99 %] 99 %    Physical Exam  Vitals reviewed.   Constitutional:       Appearance: Normal appearance.   HENT:      Head: Normocephalic and atraumatic.      Right Ear: External ear normal.      Left Ear: External ear normal.      Mouth/Throat:      Mouth: Mucous membranes are moist.   Eyes:      Extraocular Movements: Extraocular movements intact.      Conjunctiva/sclera: Conjunctivae normal.      Pupils: Pupils are equal, round, and reactive to light.   Cardiovascular:      Rate and Rhythm: Normal rate and regular rhythm.      Pulses: Normal pulses.      Heart sounds: Normal heart sounds.   Pulmonary:      Effort: Pulmonary effort is normal.      Breath sounds: Normal breath sounds.   Abdominal:      General: Abdomen is flat. Bowel sounds are normal. There is distension.      Palpations: Abdomen is soft.      Tenderness: There is abdominal tenderness. There is guarding.   Musculoskeletal:         General: Normal range of motion.   Skin:      General: Skin is warm.      Capillary Refill: Capillary refill takes less than 2 seconds.   Neurological:      General: No focal deficit present.      Mental Status: He is alert and oriented to person, place, and time.   Psychiatric:         Mood and Affect: Mood normal.         Fluids  No intake or output data in the 24 hours ending 07/05/21 1735    Laboratory  Recent Labs     07/03/21 0338 07/03/21  1152 07/04/21  0121 07/04/21  0902 07/04/21  1737 07/05/21  0115 07/05/21  0924   WBC 6.6  --  7.1  --   --  6.2  --    RBC 3.91*  --  3.88*  --   --  3.60*  --    HEMOGLOBIN 12.2*   < > 12.1*   < > 12.3* 11.5* 12.2*   HEMATOCRIT 37.2*   < > 37.9*   < > 37.9* 35.6* 38.4*   MCV 95.1  --  97.7  --   --  98.9*  --    MCH 31.2  --  31.2  --   --  31.9  --    MCHC 32.8*  --  31.9*  --   --  32.3*  --    RDW 52.0*  --  53.8*  --   --  54.4*  --    PLATELETCT 85*  --  100*  --   --  80*  --    MPV 8.6*  --  8.4*  --   --  8.5*  --     < > = values in this interval not displayed.     Recent Labs     07/03/21 0338 07/04/21 0121 07/05/21  0115   SODIUM 127* 132* 128*   POTASSIUM 4.9 4.7 4.2   CHLORIDE 95* 98 95*   CO2 24 27 25   GLUCOSE 109* 105* 107*   BUN 16 15 16   CREATININE 0.72 0.73 0.68   CALCIUM 8.4* 8.8 9.0     Recent Labs     07/04/21 0121   APTT 41.7*   INR 1.36*               Imaging  DX-CHEST-PORTABLE (1 VIEW)   Final Result      Hypoinflation with bibasilar atelectasis. No focal consolidation or pleural effusions.      DX-CHEST-PORTABLE (1 VIEW)   Final Result      Left basilar atelectasis.      CT-ABDOMEN-PELVIS WITH   Final Result      1.  Primarily cystic mass in the medial segment LEFT lobe liver, likely treated hepatoma.   2.  Complex low-attenuation lesion in the inferior aspect medial segment LEFT lobe liver which is new from prior exam and may represent cystic mass or focal hematoma related to rupture larger mass.  Minimal hyperdense components may indicate blood    products.  Abscess is felt less  likely.   3.  Gallbladder is not visualized and may be compressed by or involved with liver mass.   4.  Small volume ascites with potential hemoperitoneum.   5.  Splenomegaly.      These findings were discussed with CARMEN OTTO on 7/2/2021 3:02 PM.      DX-CHEST-PORTABLE (1 VIEW)   Final Result      1.  Hypoinflation with minimal LEFT lung base atelectasis.   2.  No pneumonia or pulmonary edema.      CT-ABDOMEN-PELVIS W/O    (Results Pending)        Assessment/Plan  * Abdominal pain- (present on admission)  Assessment & Plan  Diffuse abdominal pain x 1d  CT abdomen showed liver cystic mass/hematoma, likely cystic rupture    Surgery consulted, recommended to continue to monitor, trend H/H if no improvement in abd pain plan for laparotomy diagnostic in am    Lactic acidosis  Assessment & Plan  Lactic acid 2.7 at admission    IV fluid  Trending down    Elevated bilirubin  Assessment & Plan  Bilirubin 2.3  Continue to monitor  Trending down likely dehydration induced     Hyperkalemia  Assessment & Plan  Potassium 5.7 at admission    IV fluid  Trended down with IV fluids    Hyponatremia  Assessment & Plan  Likely due to chronic liver disease    Continue to monitor  C/w iv fluids, f/u u na and u osm  Improving with IV fluids    Tobacco abuse- (present on admission)  Assessment & Plan  Smoking cessation was given at bedside  Nicotine patch      Thrombocytopenia (HCC)- (present on admission)  Assessment & Plan   at admission  Secondary to liver disease    Continue to monitor    Hepatoma (HCC)- (present on admission)  Assessment & Plan  On immunotherapy    Type 2 diabetes mellitus without complication, without long-term current use of insulin (HCC)- (present on admission)  Assessment & Plan  Borderline diabetes, not on medications  Check  A1c  SSI         VTE prophylaxis: SCDs/TEDs    I have performed a physical exam and reviewed and updated ROS and Plan today (7/5/2021). In review of yesterday's note (7/4/2021),  there are no changes except as documented above.

## 2021-07-06 NOTE — CARE PLAN
The patient is Watcher - Medium risk of patient condition declining or worsening    Shift Goals  Clinical Goals: Complete CT Abdomen  Patient Goals: Have a clear treatment plan explained  Family Goals: Have questions answered about treatment plan    Progress made toward(s) clinical / shift goals:    Problem: Gastrointestinal Irritability  Goal: Nausea and vomiting will be absent or improve  Outcome: Progressing  Goal: Diarrhea will be absent or improved  Outcome: Progressing       Patient is not progressing towards the following goals:      Problem: Bowel Elimination  Goal: Establish and maintain regular bowel function  Outcome: Not Met  Note: Patient bowel function is assessed. Education provided on diet, fluid intake and activities that promote bowel function. Toileting at scheduled intervals in place for patient. Patient verbalizes understanding.

## 2021-07-06 NOTE — THERAPY
"Physical Therapy   Initial Evaluation     Patient Name: Lorraine Bella  Age:  71 y.o., Sex:  male  Medical Record #: [id number]  Today's Date: 7/6/2021     Precautions: Fall Risk    Assessment  Patient is a 71 y.o. male who presented to [facility] for sudden onset of abdominal pain and fever. Medical dx of liver cystic mass. PMH of hepatoma, on immunotherapy, DM, HTN, glaucoma, tobacco use. Patient presented to physical therapy with impaired cognition, decreased activity tolerance, pain, and strength and balance deficits affecting his ability to perform mobility at [facility]. He ambulated with a FWW and SPV, but become fatigued soon after leaving his room. He demonstrated unsteadiness, decreased toe clearance and a shuffling gait that required cueing to increase step length/height. Upon return to room he stated that he was tired and needed to go to sleep. Given patient's sudden fatigue, vitals were taken and patient was slighty tachycardic but otherwise normal. Patient had decreased eye opening and delayed responses throughout session. Given patient's current status, recommend post-acute placement, although patient may progress while in house to safe level to return home. Will follow.    Plan    Recommend Physical Therapy 3 times per week until therapy goals are met for the following treatments:  Community Re-integration, Equipment, Gait Training, Manual Therapy, Neuro Re-Education / Balance, Self Care/Home Evaluation, Therapeutic Activities and Therapeutic Exercises    DC Equipment Recommendations: Unable to determine at this time  Discharge Recommendations: Recommend post-acute placement for additional physical therapy services prior to discharge home       Subjective    \"I need to go to sleep.\"     Objective       07/06/21 1029   Cosignature   Documentation Review Approved with modifications made by preceptor in flowsheet   Total Time Spent   Total Time Spent (Mins) 29   Charge Group   PT Evaluation PT Evaluation " Mod   Initial Contact Note    Initial Contact Note Order Received and Verified, Physical Therapy Evaluation in Progress with Full Report to Follow.   Precautions   Precautions Fall Risk   Vitals   Pulse (!) 104   Patient BP Position Sitting   Blood Pressure  141/81   Pulse Oximetry 95 %   O2 (LPM) 2   O2 Delivery Device Silicone Nasal Cannula   Vitals Comments after ambulation   Pain 0 - 10 Group   Location Abdomen   Therapist Pain Assessment 7;During Activity;Post Activity;Nurse Notified   Prior Living Situation   Prior Services None   Housing / Facility 1 Story House   Steps Into Home 0   Steps In Home 0   Equipment Owned None   Lives with - Patient's Self Care Capacity Spouse   Comments wife able to assist as needed   Prior Level of Functional Mobility   Bed Mobility Independent   Transfer Status Independent   Ambulation Independent   Distance Ambulation (Feet)   (community)   Assistive Devices Used None   Stairs Independent   Cognition    Cognition / Consciousness X   Level of Consciousness Alert   Comments slow to respond, decreased eye opening, impaired attention, perseverating over finger color and cell phone   Passive ROM Lower Body   Passive ROM Lower Body WDL   Comments not formally tested, WFL for mobility   Active ROM Lower Body    Active ROM Lower Body  WDL   Comments as above   Strength Lower Body   Lower Body Strength  X   Comments not formally tested, grossly 4/5 BLE   Sensation Lower Body   Lower Extremity Sensation   Not Tested   Lower Body Muscle Tone   Lower Body Muscle Tone  WDL   Coordination Lower Body    Coordination Lower Body  X   Comments impaired motor control, impaired initiation   Balance Assessment   Sitting Balance (Static) Fair   Sitting Balance (Dynamic) Fair   Standing Balance (Static) Fair   Standing Balance (Dynamic) Fair -   Weight Shift Sitting Fair   Weight Shift Standing Fair   Comments w/ FWW, no overt LOB but unsteady   Gait Analysis   Gait Level Of Assist Minimal Assist    Assistive Device Front Wheel Walker   Distance (Feet) 40   # of Times Distance was Traveled 1   # of Stairs Climbed 0   Weight Bearing Status FWB   Comments decreased step length, bri, inconsistent JULES, decreased toe clearance, shuffling gait, provided frequent cueing for increasing clearance   Bed Mobility    Supine to Sit Supervised   Sit to Supine Supervised   Scooting Supervised  (to EOB)   Rolling Supervised   Comments increased time required   Functional Mobility   Sit to Stand Minimal Assist   Bed, Chair, Wheelchair Transfer Minimal Assist   Transfer Method Stand Step   How much difficulty does the patient currently have...   Turning over in bed (including adjusting bedclothes, sheets and blankets)? 3   Sitting down on and standing up from a chair with arms (e.g., wheelchair, bedside commode, etc.) 3   Moving from lying on back to sitting on the side of the bed? 2   How much help from another person does the patient currently need...   Moving to and from a bed to a chair (including a wheelchair)? 3   Need to walk in a hospital room? 3   Climbing 3-5 steps with a railing? 2   6 clicks Mobility Score 16   Activity Tolerance   Sitting in Chair NT - left in bed   Sitting Edge of Bed 10 min   Standing 5 min   Comments limited by fatigue, denied SOB, dizziness   Edema / Skin Assessment   Edema / Skin  Not Assessed   Patient / Family Goals    Patient / Family Goal #1 to go home   Short Term Goals    Short Term Goal # 1 Pt will perform transfers with SPV in 6tx to allow for functional transfers at home.   Short Term Goal # 2 Pt will ambulate 150ft with SPV in 6tx to allow fot mobility at home.   Education Group   Education Provided Role of Physical Therapist;Use of Assistive Device   Role of Physical Therapist Patient Response Patient;Acceptance;Explanation;Demonstration;Verbal Demonstration;Action Demonstration   Use of Assistive Device Patient Response Patient;Acceptance;Explanation;Verbal  Demonstration;Action Demonstration   Problem List    Problems Pain;Impaired Transfers;Impaired Ambulation;Functional Strength Deficit;Impaired Balance;Impaired Coordination;Decreased Activity Tolerance;Safety Awareness Deficits / Cognition   Anticipated Discharge Equipment and Recommendations   DC Equipment Recommendations Unable to determine at this time   Discharge Recommendations Recommend post-acute placement for additional physical therapy services prior to discharge home   Interdisciplinary Plan of Care Collaboration   IDT Collaboration with  Nursing;Occupational Therapist   Patient Position at End of Therapy In Bed;Bed Alarm On;Call Light within Reach;Tray Table within Reach;Phone within Reach   Collaboration Comments RN aware   Session Information   Date / Session Number  7/6 - 1(1/3, 7/12)

## 2021-07-07 ENCOUNTER — PATIENT OUTREACH (OUTPATIENT)
Dept: HEALTH INFORMATION MANAGEMENT | Facility: OTHER | Age: 71
End: 2021-07-07

## 2021-07-07 ENCOUNTER — APPOINTMENT (OUTPATIENT)
Dept: RADIOLOGY | Facility: MEDICAL CENTER | Age: 71
DRG: 356 | End: 2021-07-07
Attending: INTERNAL MEDICINE
Payer: MEDICARE

## 2021-07-07 PROBLEM — E83.42 HYPOMAGNESEMIA: Status: ACTIVE | Noted: 2021-07-07

## 2021-07-07 LAB
ALBUMIN SERPL BCP-MCNC: 2.6 G/DL (ref 3.2–4.9)
BACTERIA BLD CULT: NORMAL
BACTERIA BLD CULT: NORMAL
BASOPHILS # BLD AUTO: 0.6 % (ref 0–1.8)
BASOPHILS # BLD: 0.04 K/UL (ref 0–0.12)
BUN SERPL-MCNC: 17 MG/DL (ref 8–22)
CALCIUM SERPL-MCNC: 9.1 MG/DL (ref 8.5–10.5)
CHLORIDE SERPL-SCNC: 95 MMOL/L (ref 96–112)
CO2 SERPL-SCNC: 27 MMOL/L (ref 20–33)
CREAT SERPL-MCNC: 0.55 MG/DL (ref 0.5–1.4)
EOSINOPHIL # BLD AUTO: 0.03 K/UL (ref 0–0.51)
EOSINOPHIL NFR BLD: 0.4 % (ref 0–6.9)
ERYTHROCYTE [DISTWIDTH] IN BLOOD BY AUTOMATED COUNT: 53.1 FL (ref 35.9–50)
GLUCOSE BLD-MCNC: 102 MG/DL (ref 65–99)
GLUCOSE BLD-MCNC: 111 MG/DL (ref 65–99)
GLUCOSE BLD-MCNC: 120 MG/DL (ref 65–99)
GLUCOSE SERPL-MCNC: 113 MG/DL (ref 65–99)
HCT VFR BLD AUTO: 35.9 % (ref 42–52)
HGB BLD-MCNC: 11.9 G/DL (ref 14–18)
IMM GRANULOCYTES # BLD AUTO: 0.03 K/UL (ref 0–0.11)
IMM GRANULOCYTES NFR BLD AUTO: 0.4 % (ref 0–0.9)
LYMPHOCYTES # BLD AUTO: 0.28 K/UL (ref 1–4.8)
LYMPHOCYTES NFR BLD: 4 % (ref 22–41)
MAGNESIUM SERPL-MCNC: 1.7 MG/DL (ref 1.5–2.5)
MCH RBC QN AUTO: 31.7 PG (ref 27–33)
MCHC RBC AUTO-ENTMCNC: 33.1 G/DL (ref 33.7–35.3)
MCV RBC AUTO: 95.7 FL (ref 81.4–97.8)
MONOCYTES # BLD AUTO: 0.61 K/UL (ref 0–0.85)
MONOCYTES NFR BLD AUTO: 8.8 % (ref 0–13.4)
NEUTROPHILS # BLD AUTO: 5.98 K/UL (ref 1.82–7.42)
NEUTROPHILS NFR BLD: 85.8 % (ref 44–72)
NRBC # BLD AUTO: 0 K/UL
NRBC BLD-RTO: 0 /100 WBC
PHOSPHATE SERPL-MCNC: 3.4 MG/DL (ref 2.5–4.5)
PLATELET # BLD AUTO: 80 K/UL (ref 164–446)
PMV BLD AUTO: 8.8 FL (ref 9–12.9)
POTASSIUM SERPL-SCNC: 4.7 MMOL/L (ref 3.6–5.5)
RBC # BLD AUTO: 3.75 M/UL (ref 4.7–6.1)
SIGNIFICANT IND 70042: NORMAL
SIGNIFICANT IND 70042: NORMAL
SITE SITE: NORMAL
SITE SITE: NORMAL
SODIUM SERPL-SCNC: 132 MMOL/L (ref 135–145)
SOURCE SOURCE: NORMAL
SOURCE SOURCE: NORMAL
WBC # BLD AUTO: 7 K/UL (ref 4.8–10.8)

## 2021-07-07 PROCEDURE — 82962 GLUCOSE BLOOD TEST: CPT | Mod: 91

## 2021-07-07 PROCEDURE — A9270 NON-COVERED ITEM OR SERVICE: HCPCS | Performed by: STUDENT IN AN ORGANIZED HEALTH CARE EDUCATION/TRAINING PROGRAM

## 2021-07-07 PROCEDURE — 80069 RENAL FUNCTION PANEL: CPT

## 2021-07-07 PROCEDURE — 700111 HCHG RX REV CODE 636 W/ 250 OVERRIDE (IP): Performed by: STUDENT IN AN ORGANIZED HEALTH CARE EDUCATION/TRAINING PROGRAM

## 2021-07-07 PROCEDURE — 700102 HCHG RX REV CODE 250 W/ 637 OVERRIDE(OP): Performed by: INTERNAL MEDICINE

## 2021-07-07 PROCEDURE — A9270 NON-COVERED ITEM OR SERVICE: HCPCS | Performed by: INTERNAL MEDICINE

## 2021-07-07 PROCEDURE — 700111 HCHG RX REV CODE 636 W/ 250 OVERRIDE (IP): Performed by: INTERNAL MEDICINE

## 2021-07-07 PROCEDURE — 700102 HCHG RX REV CODE 250 W/ 637 OVERRIDE(OP): Performed by: STUDENT IN AN ORGANIZED HEALTH CARE EDUCATION/TRAINING PROGRAM

## 2021-07-07 PROCEDURE — 83735 ASSAY OF MAGNESIUM: CPT

## 2021-07-07 PROCEDURE — 85025 COMPLETE CBC W/AUTO DIFF WBC: CPT

## 2021-07-07 PROCEDURE — 770020 HCHG ROOM/CARE - TELE (206)

## 2021-07-07 PROCEDURE — 99232 SBSQ HOSP IP/OBS MODERATE 35: CPT | Performed by: INTERNAL MEDICINE

## 2021-07-07 PROCEDURE — 700105 HCHG RX REV CODE 258: Performed by: STUDENT IN AN ORGANIZED HEALTH CARE EDUCATION/TRAINING PROGRAM

## 2021-07-07 PROCEDURE — 74018 RADEX ABDOMEN 1 VIEW: CPT

## 2021-07-07 PROCEDURE — 36415 COLL VENOUS BLD VENIPUNCTURE: CPT

## 2021-07-07 RX ORDER — LACTULOSE 20 G/30ML
15 SOLUTION ORAL ONCE
Status: DISCONTINUED | OUTPATIENT
Start: 2021-07-07 | End: 2021-07-07

## 2021-07-07 RX ORDER — LACTULOSE 20 G/30ML
30 SOLUTION ORAL ONCE
Status: COMPLETED | OUTPATIENT
Start: 2021-07-07 | End: 2021-07-07

## 2021-07-07 RX ORDER — MAGNESIUM SULFATE 1 G/100ML
1 INJECTION INTRAVENOUS ONCE
Status: COMPLETED | OUTPATIENT
Start: 2021-07-07 | End: 2021-07-07

## 2021-07-07 RX ADMIN — MAGNESIUM SULFATE 1 G: 1 INJECTION INTRAVENOUS at 08:30

## 2021-07-07 RX ADMIN — OXYCODONE HYDROCHLORIDE 10 MG: 10 TABLET ORAL at 20:56

## 2021-07-07 RX ADMIN — SODIUM CHLORIDE, POTASSIUM CHLORIDE, SODIUM LACTATE AND CALCIUM CHLORIDE: 600; 310; 30; 20 INJECTION, SOLUTION INTRAVENOUS at 23:16

## 2021-07-07 RX ADMIN — OXYCODONE HYDROCHLORIDE 10 MG: 10 TABLET ORAL at 08:30

## 2021-07-07 RX ADMIN — OXYCODONE HYDROCHLORIDE 10 MG: 10 TABLET ORAL at 12:14

## 2021-07-07 RX ADMIN — SODIUM CHLORIDE, POTASSIUM CHLORIDE, SODIUM LACTATE AND CALCIUM CHLORIDE: 600; 310; 30; 20 INJECTION, SOLUTION INTRAVENOUS at 06:43

## 2021-07-07 RX ADMIN — HYDROMORPHONE HYDROCHLORIDE 0.25 MG: 1 INJECTION, SOLUTION INTRAMUSCULAR; INTRAVENOUS; SUBCUTANEOUS at 14:02

## 2021-07-07 RX ADMIN — DOCUSATE SODIUM 50 MG AND SENNOSIDES 8.6 MG 2 TABLET: 8.6; 5 TABLET, FILM COATED ORAL at 06:43

## 2021-07-07 RX ADMIN — NICOTINE TRANSDERMAL SYSTEM 21 MG: 21 PATCH, EXTENDED RELEASE TRANSDERMAL at 06:43

## 2021-07-07 RX ADMIN — THIAMINE HYDROCHLORIDE 100 MG: 100 INJECTION, SOLUTION INTRAMUSCULAR; INTRAVENOUS at 06:43

## 2021-07-07 RX ADMIN — FAMOTIDINE 20 MG: 20 TABLET ORAL at 06:43

## 2021-07-07 RX ADMIN — DOCUSATE SODIUM 50 MG AND SENNOSIDES 8.6 MG 2 TABLET: 8.6; 5 TABLET, FILM COATED ORAL at 18:34

## 2021-07-07 RX ADMIN — LACTULOSE 30 ML: 20 SOLUTION ORAL at 14:46

## 2021-07-07 ASSESSMENT — ENCOUNTER SYMPTOMS
DIARRHEA: 0
BACK PAIN: 0
SHORTNESS OF BREATH: 1
WEAKNESS: 1
COUGH: 0
ABDOMINAL PAIN: 1
NAUSEA: 0
HEADACHES: 0
FEVER: 0
CONSTIPATION: 0
PALPITATIONS: 0
DIZZINESS: 0
VOMITING: 0

## 2021-07-07 ASSESSMENT — PAIN DESCRIPTION - PAIN TYPE
TYPE: ACUTE PAIN

## 2021-07-07 NOTE — DISCHARGE PLANNING
Received Choice form at 1158  Agency[facility]Fac[facility] Name: [facility]  Referral sent per Choice form @ 6021 -5126  [facility]   Spoke To: Vickie   Outcome: Per Vickie, facility is only taking vaccinated pts. This DPA asked Vickie if there were any other reasons to decline Vickie state no. This DPA asked if pt would be reviewed if vaccinated? Vickie stated pt would need to be vaccinated 2 weeks prior to admittance.     Agency[facility] Name: [facility]  Outcome: Per Epic response pt declined due to no skilled need.     RN CM notified via Teams.    Agency[facility]Facility Name: [facility]  Spoke To: Mariana  Outcome: Per Mariana PT/OT notes only state pt needs supervision and no other needs seem to come up. Mariana also stated pt was independent status before. Per Mariana for pt to be accepted pt needs to show more needs apart from supervision.       RN CM notified

## 2021-07-07 NOTE — CARE PLAN
Problem: Nutritional:  Goal: Achieve adequate nutritional intake  Description: [facility] diet beyond clears when medically feasible. Patient will consume >50% of meals  Outcome: Not Met

## 2021-07-07 NOTE — DIETARY
"Nutrition services: Day 5 of admit.  Lorraine Bella is a 71 y.o. male with admitting DX of AP (abdominal pain).     Consult received for NPO/clears x 5 days      Assessment:  Height: 180.3 cm (5' 11\")  Weight: 87.3 kg (192 lb 7.4 oz)  Body mass index is 26.84 kg/m²., BMI classification: overweight  Diet/Intake: clear liquids/refused breakfast    Evaluation:   1. Per MD note, pt with bloating that may be due to lack of BM x 1 week. X-ray shows stool all the way to the cecum. Lactulose was ordered.   2. DX includes hepatoma since 2018. Pt with abdominal pain and possible ruptured hepatic cyst with hemoperitoneum. DX includes type 2 DM.  3. Pt either NPO or on clears for 5 days. Current diet is not providing sufficient nutrition. Boost Breeze supplement may not be appropriate because pt has DM dx and this supplement is not CHO controlled. If advancement of diet is not medically feasible, pt may benefit from initiation of parenteral nutrition.   4. Labs: 7/7/21: sodium=132 (L), phos=3.4, mag=1.7. 7/3/21: A1c=6  5. Meds: SSI, senna-docusate    Malnutrition Risk: criteria not met but risk noted due to NPO/clears x 5 days.     Recommendations/Plan:  1. Advance diet beyond clears when medically feasible.   2. Laxatives as ordered by MD   3. Encourage intake of >50%  4. Document intake of all meals  as % taken in ADL's to provide interdisciplinary communication across all shifts.   5. Monitor weight.  6. Nutrition rep will continue to see patient for ongoing meal and snack preferences.     RD will follow.      "

## 2021-07-07 NOTE — DISCHARGE PLANNING
Anticipated Discharge Disposition: SNF    Action: RN LUDWIG spoke with the patient and his wife Sharlene at bedside to discuss SNF choice.  Patient and his wife provided choice for 1. [facility] 2. [facility].  Choice form faxed to ABDIRIZAK SERRANO.     Barriers to Discharge: Placement, medical clearance     Plan: Case coordination to continue to follow up with medical team to discuss discharge barriers.

## 2021-07-07 NOTE — ASSESSMENT & PLAN NOTE
Patient has had chronic issues with constipation.  Severe, not improving.  Reported last bowel movement was over a week ago.  Abdominal x-ray showed constipation in the large colon.    Order for CT abdomen pelvis without contrast, concern for increased distention, rule out pneumoperitoneum  Bowel regimen in place  Continue with suppository.  PRN enema.  If ineffective, digital rectal removal.

## 2021-07-07 NOTE — PROGRESS NOTES
Report received from night shift RN, assumed care of pt. Plan of care discussed with pt, labs and chart reviewed. All needs met at this time. Tele box on, on 2L oxygen via NC. Call light within reach, bed locked and in lowest position. Pt refused nonslip socks, has nonslip shoes at bedside for ambulating. All other fall precautions and hourly rounding in place. Will continue to monitor.

## 2021-07-07 NOTE — CARE PLAN
Problem: Knowledge Deficit - Standard  Goal: Patient and family/care givers will demonstrate understanding of plan of care, disease process/condition, diagnostic tests and medications  Outcome: Progressing     Problem: Bowel Elimination  Goal: Establish and maintain regular bowel function  Outcome: Not Progressing     Problem: Gastrointestinal Irritability  Goal: Nausea and vomiting will be absent or improve  Outcome: Progressing

## 2021-07-07 NOTE — PROGRESS NOTES
[facility] Medicine Daily Progress Note    Date of Service  7/7/2021    Chief Complaint  Lorraine Bella is a 71 y.o. male admitted 7/2/2021 with marily pain and shoulder pain    Hospital Course  71-year-old male patient with past medical history of hepatoma since 2018, on IV immunotherapy last dose 2 weeks ago follows with Dr. Birch presented on 7/2/2021 with sudden onset abdominal pain.   CT of the abdomen showed possible ruptured hepatic cyst with hemoperitoneum.   Surgery was consulted recommended medical management with trending hemoglobin.   Patient was requiring oxygen supplementation as the pain was causing him respiratory distress and difficulty with full inspiration.    Interval Problem Update  7/6-  Patient stated he had decreased eating, increase in abdominal bloating.  Patient was still on 2 L nasal cannula for short shallow breathing.  Patient is not on home oxygen. Patient's pain has improved it was severe at home, currently mild and controlled.    Generally surgery has recommended for patient to have IR embolization if there is any evidence of bleeding.  Patient to follow-up with GI and IR outpatient.  General surgery signing off patient's case today.    Called to patient's wife Mrs. Sharlene Bella, no answer on phone call.  Will attempt again for update tomorrow.    7/7 -patient stated he was having increased bloating today.  Patient reported that he has not had a bowel for 1 week now.  Patient denied any chest pain, any nausea or vomiting.  Patient was requesting for pain medication.  I obtain x-ray of the abdomen, showed patient has back above stool all the way to the cecum.  Ordered for lactulose today.  SNF was ordered and choice was obtained from the wife.    I have personally seen and examined the patient at bedside. I discussed the plan of care with patient, bedside RN, charge RN and .    Consultants/Specialty  general surgery    Code Status  Full Code    Disposition  Patient  is not medically cleared.   Anticipate discharge to to skilled nursing facility.   I have placed the appropriate orders for post-discharge needs.    Review of Systems  Review of Systems   Constitutional: Negative for fever and malaise/fatigue.   Respiratory: Positive for shortness of breath. Negative for cough.    Cardiovascular: Negative for chest pain and palpitations.   Gastrointestinal: Positive for abdominal pain (Distention). Negative for constipation, diarrhea, nausea and vomiting.   Musculoskeletal: Negative for back pain and joint pain.   Neurological: Positive for weakness. Negative for dizziness and headaches.   All other systems reviewed and are negative.       Physical Exam  Temp:  [36.2 °C (97.2 °F)-36.6 °C (97.8 °F)] 36.6 °C (97.8 °F)  Pulse:  [] 116  Resp:  [17-18] 18  BP: (102-135)/(7-91) 125/91  SpO2:  [92 %-97 %] 93 %    Physical Exam  Vitals and nursing note reviewed.   Constitutional:       General: He is not in acute distress.     Appearance: Normal appearance. He is not diaphoretic.   Cardiovascular:      Rate and Rhythm: Normal rate and regular rhythm.      Pulses: Normal pulses.      Heart sounds: Normal heart sounds. No murmur heard.     Pulmonary:      Effort: Respiratory distress (Mild) present.      Breath sounds: Normal breath sounds.      Comments: On 2 L nasal cannula  Abdominal:      General: Abdomen is flat. Bowel sounds are normal. There is distension.      Palpations: Abdomen is soft.      Tenderness: There is abdominal tenderness (Right side).   Musculoskeletal:         General: No swelling or tenderness. Normal range of motion.   Skin:     General: Skin is warm.      Capillary Refill: Capillary refill takes less than 2 seconds.      Coloration: Skin is not jaundiced or pale.   Neurological:      General: No focal deficit present.      Mental Status: He is alert and oriented to person, place, and time. Mental status is at baseline.   Psychiatric:         Mood and Affect:  Mood normal.         Behavior: Behavior normal.         Thought Content: Thought content normal.         Judgment: Judgment normal.         Fluids    Intake/Output Summary (Last 24 hours) at 7/7/2021 1352  Last data filed at 7/7/2021 0747  Gross per 24 hour   Intake --   Output 200 ml   Net -200 ml       Laboratory  Recent Labs     07/05/21  0115 07/05/21  0115 07/05/21  0924 07/05/21  1715 07/07/21  0309   WBC 6.2  --   --   --  7.0   RBC 3.60*  --   --   --  3.75*   HEMOGLOBIN 11.5*   < > 12.2* 12.1* 11.9*   HEMATOCRIT 35.6*   < > 38.4* 38.2* 35.9*   MCV 98.9*  --   --   --  95.7   MCH 31.9  --   --   --  31.7   MCHC 32.3*  --   --   --  33.1*   RDW 54.4*  --   --   --  53.1*   PLATELETCT 80*  --   --   --  80*   MPV 8.5*  --   --   --  8.8*    < > = values in this interval not displayed.     Recent Labs     07/05/21  0115 07/06/21  0335 07/07/21  0309   SODIUM 128* 132* 132*   POTASSIUM 4.2 4.4 4.7   CHLORIDE 95* 97 95*   CO2 25 24 27   GLUCOSE 107* 122* 113*   BUN 16 20 17   CREATININE 0.68 0.71 0.55   CALCIUM 9.0 9.3 9.1                   Imaging  [id number] VIEW   Final Result      1. Moderate stool mixed with contrast in the ascending colon.   2. Moderate stool in the transverse colon.   3. No bowel obstruction.   4. Other chronic degenerative changes.      CT-ABDOMEN-PELVIS W/O   Final Result      1.  There is no acute inflammatory process in the abdomen or pelvis.   2.  The treated lesions in the liver are again seen without interval change.   3.  Splenomegaly is again seen.   4.  Cystic pancreatic tail lesion is unchanged possibly a pseudocyst.   5.  There is colonic diverticulosis without diverticulitis.   6.  There is mild wall thickening of the distal esophagus.   7.  There is a stable small amount of ascites.   8.  There is moderate colonic stool.      DX-CHEST-PORTABLE (1 VIEW)   Final Result      Hypoinflation with bibasilar atelectasis. No focal consolidation or pleural effusions.       DX-CHEST-PORTABLE (1 VIEW)   Final Result      Left basilar atelectasis.      CT-ABDOMEN-PELVIS WITH   Final Result      1.  Primarily cystic mass in the medial segment LEFT lobe liver, likely treated hepatoma.   2.  Complex low-attenuation lesion in the inferior aspect medial segment LEFT lobe liver which is new from prior exam and may represent cystic mass or focal hematoma related to rupture larger mass.  Minimal hyperdense components may indicate blood    products.  Abscess is felt less likely.   3.  Gallbladder is not visualized and may be compressed by or involved with liver mass.   4.  Small volume ascites with potential hemoperitoneum.   5.  Splenomegaly.      These findings were discussed with CARMEN OTTO on 7/2/2021 3:02 PM.      DX-CHEST-PORTABLE (1 VIEW)   Final Result      1.  Hypoinflation with minimal LEFT lung base atelectasis.   2.  No pneumonia or pulmonary edema.           Assessment/Plan  * Abdominal pain- (present on admission)  Assessment & Plan  Diffuse abdominal pain x 1d  CT abdomen showed liver cystic mass/hematoma, likely cystic rupture    Surgery consulted, recommended to continue to monitor, surgery signed off 7/6 recommendations are in their last note.  Recommended to hold any antiplatelet and any anticoagulation at this time.  Improving on severity, abdominal pain due to constipation at this time.  Bowel regimen in place, one-time lactulose.    Hypomagnesemia  Assessment & Plan  Patient's magnesium was 1.7.  Patient does have constipation but was hesitant for oral replacements.  Replace via IV.  -We will have magnesium tablets replacements  Recheck magnesium level.    Lactic acidosis- (present on admission)  Assessment & Plan  Lactic acid 2.7 at admission  Improved no need for further recheck  IV fluid  Trending down    Elevated bilirubin- (present on admission)  Assessment & Plan  Bilirubin 2.3  Continue to monitor  Trending down likely dehydration induced     Hyperkalemia-  (present on admission)  Assessment & Plan  Potassium 5.7 at admission  Normalizing  IV fluid  Trended down with IV fluids    Hyponatremia- (present on admission)  Assessment & Plan  Likely due to chronic liver disease  Improving  Continue to monitor  C/w iv fluids, f/u u na and u osm  Improving with IV fluids    Constipation- (present on admission)  Assessment & Plan  Patient has had chronic issues with constipation.  Reported last bowel movement was over a week ago.  Abdominal x-ray showed constipation in the large colon.  -Ordered for one-time lactulose  -Bowel regimen in place    Tobacco abuse- (present on admission)  Assessment & Plan  Smoking cessation was given at bedside  Nicotine patch      Thrombocytopenia (HCC)- (present on admission)  Assessment & Plan   at admission  Secondary to liver disease    Continue to monitor    Hepatoma (HCC)- (present on admission)  Assessment & Plan  On immunotherapy    Type 2 diabetes mellitus without complication, without long-term current use of insulin (HCC)- (present on admission)  Assessment & Plan  Borderline diabetes, not on medications  07/03/21 - HbA1c 6.0, controlled, prediabetes at this time.  ISS, accuchecks, hypoglycemia protocol orders in place       VTE prophylaxis: SCDs/TEDs    I have performed a physical exam and reviewed and updated ROS and Plan today (7/7/2021). In review of yesterday's note (7/6/2021), there are no changes except as documented above.

## 2021-07-07 NOTE — PROGRESS NOTES
Hospital Medicine Daily Progress Note    Date of Service  7/6/2021    Chief Complaint  Lorraine Bella is a 71 y.o. male admitted 7/2/2021 with marily pain and shoulder pain    Hospital Course  71-year-old male patient with past medical history of hepatoma since 2018, on IV immunotherapy last dose 2 weeks ago follows with Dr. Birch presented on 7/2/2021 with sudden onset abdominal pain.   CT of the abdomen showed possible ruptured hepatic cyst with hemoperitoneum.   Surgery was consulted recommended medical management with trending hemoglobin.   Patient was requiring oxygen supplementation as the pain was causing him respiratory distress and difficulty with full inspiration.    Interval Problem Update  Evaluated at bedside.  Patient stated he had decreased eating, increase in abdominal bloating.  Patient was still on 2 L nasal cannula for short shallow breathing.  Patient is not on home oxygen. Patient's pain has improved it was severe at home, currently mild and controlled.    Generally surgery has recommended for patient to have IR embolization if there is any evidence of bleeding.  Patient to follow-up with GI and IR outpatient.  General surgery signing off patient's case today.    Called to patient's wife Mrs. Sharlene Bella, no answer on phone call.  Will attempt again for update tomorrow.    I have personally seen and examined the patient at bedside. I discussed the plan of care with patient, bedside RN, charge RN and .    Consultants/Specialty  general surgery    Code Status  Full Code    Disposition  Patient is not medically cleared.   Anticipate discharge to to skilled nursing facility.  As per PT/OT.  I have placed the appropriate orders for post-discharge needs.    Review of Systems  Review of Systems   Constitutional: Negative for chills, fever and malaise/fatigue.   Respiratory: Negative for cough and shortness of breath.    Cardiovascular: Negative for chest pain and palpitations.    Gastrointestinal: Positive for abdominal pain. Negative for constipation, diarrhea, nausea and vomiting.   Musculoskeletal: Negative for back pain and joint pain.   Neurological: Positive for weakness. Negative for dizziness and headaches.   All other systems reviewed and are negative.       Physical Exam  Temp:  [35.8 °C (96.4 °F)-36.6 °C (97.8 °F)] 36.6 °C (97.8 °F)  Pulse:  [] 92  Resp:  [15-19] 18  BP: (107-141)/(66-81) 135/72  SpO2:  [92 %-97 %] 92 %    Physical Exam  Vitals and nursing note reviewed.   Constitutional:       General: He is not in acute distress.     Appearance: Normal appearance. He is not diaphoretic.   Cardiovascular:      Rate and Rhythm: Normal rate and regular rhythm.      Pulses: Normal pulses.      Heart sounds: Normal heart sounds. No murmur heard.     Pulmonary:      Effort: Respiratory distress (Short shallow breathing) present.      Breath sounds: Normal breath sounds.      Comments: On 2 L nasal cannula  Abdominal:      General: Abdomen is flat. Bowel sounds are normal. There is no distension.      Palpations: Abdomen is soft.      Tenderness: There is abdominal tenderness (Right side).   Musculoskeletal:         General: No swelling or tenderness. Normal range of motion.   Skin:     General: Skin is warm.      Capillary Refill: Capillary refill takes less than 2 seconds.      Coloration: Skin is not jaundiced or pale.   Neurological:      General: No focal deficit present.      Mental Status: He is alert and oriented to person, place, and time. Mental status is at baseline.   Psychiatric:         Mood and Affect: Mood normal.         Behavior: Behavior normal.         Thought Content: Thought content normal.         Judgment: Judgment normal.         Fluids    Intake/Output Summary (Last 24 hours) at 7/6/2021 1828  Last data filed at 7/6/2021 0305  Gross per 24 hour   Intake 1751.12 ml   Output --   Net 1751.12 ml       Laboratory  Recent Labs     07/04/21  0121  07/04/21  0902 07/05/21  0115 07/05/21  0924 07/05/21  1715   WBC 7.1  --  6.2  --   --    RBC 3.88*  --  3.60*  --   --    HEMOGLOBIN 12.1*   < > 11.5* 12.2* 12.1*   HEMATOCRIT 37.9*   < > 35.6* 38.4* 38.2*   MCV 97.7  --  98.9*  --   --    MCH 31.2  --  31.9  --   --    MCHC 31.9*  --  32.3*  --   --    RDW 53.8*  --  54.4*  --   --    PLATELETCT 100*  --  80*  --   --    MPV 8.4*  --  8.5*  --   --     < > = values in this interval not displayed.     Recent Labs     07/04/21  0121 07/05/21  0115 07/06/21  0335   SODIUM 132* 128* 132*   POTASSIUM 4.7 4.2 4.4   CHLORIDE 98 95* 97   CO2 27 25 24   GLUCOSE 105* 107* 122*   BUN 15 16 20   CREATININE 0.73 0.68 0.71   CALCIUM 8.8 9.0 9.3     Recent Labs     07/04/21  0121   APTT 41.7*   INR 1.36*               Imaging  CT-ABDOMEN-PELVIS W/O   Final Result      1.  There is no acute inflammatory process in the abdomen or pelvis.   2.  The treated lesions in the liver are again seen without interval change.   3.  Splenomegaly is again seen.   4.  Cystic pancreatic tail lesion is unchanged possibly a pseudocyst.   5.  There is colonic diverticulosis without diverticulitis.   6.  There is mild wall thickening of the distal esophagus.   7.  There is a stable small amount of ascites.   8.  There is moderate colonic stool.      DX-CHEST-PORTABLE (1 VIEW)   Final Result      Hypoinflation with bibasilar atelectasis. No focal consolidation or pleural effusions.      DX-CHEST-PORTABLE (1 VIEW)   Final Result      Left basilar atelectasis.      CT-ABDOMEN-PELVIS WITH   Final Result      1.  Primarily cystic mass in the medial segment LEFT lobe liver, likely treated hepatoma.   2.  Complex low-attenuation lesion in the inferior aspect medial segment LEFT lobe liver which is new from prior exam and may represent cystic mass or focal hematoma related to rupture larger mass.  Minimal hyperdense components may indicate blood    products.  Abscess is felt less likely.   3.  Gallbladder  is not visualized and may be compressed by or involved with liver mass.   4.  Small volume ascites with potential hemoperitoneum.   5.  Splenomegaly.      These findings were discussed with CARMEN OTTO on 7/2/2021 3:02 PM.      DX-CHEST-PORTABLE (1 VIEW)   Final Result      1.  Hypoinflation with minimal LEFT lung base atelectasis.   2.  No pneumonia or pulmonary edema.           Assessment/Plan  * Abdominal pain- (present on admission)  Assessment & Plan  Diffuse abdominal pain x 1d  CT abdomen showed liver cystic mass/hematoma, likely cystic rupture    Surgery consulted, recommended to continue to monitor, surgery signed off 7/6 recommendations are in their last note.  Recommended to hold any antiplatelet and any anticoagulation at this time.  Improving on severity    Lactic acidosis- (present on admission)  Assessment & Plan  Lactic acid 2.7 at admission  Improved no need for further recheck  IV fluid  Trending down    Elevated bilirubin- (present on admission)  Assessment & Plan  Bilirubin 2.3  Continue to monitor  Trending down likely dehydration induced     Hyperkalemia- (present on admission)  Assessment & Plan  Potassium 5.7 at admission  Normalizing  IV fluid  Trended down with IV fluids    Hyponatremia- (present on admission)  Assessment & Plan  Likely due to chronic liver disease  Improving  Continue to monitor  C/w iv fluids, f/u u na and u osm  Improving with IV fluids    Tobacco abuse- (present on admission)  Assessment & Plan  Smoking cessation was given at bedside  Nicotine patch      Thrombocytopenia (HCC)- (present on admission)  Assessment & Plan   at admission  Secondary to liver disease    Continue to monitor    Hepatoma (HCC)- (present on admission)  Assessment & Plan  On immunotherapy    Type 2 diabetes mellitus without complication, without long-term current use of insulin (HCC)- (present on admission)  Assessment & Plan  Borderline diabetes, not on medications  Check  A1c  SSI          VTE prophylaxis: SCDs/TEDs    I have performed a physical exam and reviewed and updated ROS and Plan today (7/6/2021). In review of yesterday's note (7/5/2021), there are no changes except as documented above.

## 2021-07-08 LAB
ALBUMIN SERPL BCP-MCNC: 2.6 G/DL (ref 3.2–4.9)
AMMONIA PLAS-SCNC: 32 UMOL/L (ref 11–45)
BUN SERPL-MCNC: 25 MG/DL (ref 8–22)
CALCIUM SERPL-MCNC: 9.4 MG/DL (ref 8.5–10.5)
CHLORIDE SERPL-SCNC: 93 MMOL/L (ref 96–112)
CO2 SERPL-SCNC: 25 MMOL/L (ref 20–33)
CREAT SERPL-MCNC: 0.7 MG/DL (ref 0.5–1.4)
GLUCOSE BLD-MCNC: 117 MG/DL (ref 65–99)
GLUCOSE BLD-MCNC: 120 MG/DL (ref 65–99)
GLUCOSE BLD-MCNC: 123 MG/DL (ref 65–99)
GLUCOSE BLD-MCNC: 128 MG/DL (ref 65–99)
GLUCOSE SERPL-MCNC: 123 MG/DL (ref 65–99)
MAGNESIUM SERPL-MCNC: 1.6 MG/DL (ref 1.5–2.5)
PHOSPHATE SERPL-MCNC: 4.9 MG/DL (ref 2.5–4.5)
POTASSIUM SERPL-SCNC: 5.3 MMOL/L (ref 3.6–5.5)
SODIUM SERPL-SCNC: 127 MMOL/L (ref 135–145)

## 2021-07-08 PROCEDURE — 770020 HCHG ROOM/CARE - TELE (206)

## 2021-07-08 PROCEDURE — 700111 HCHG RX REV CODE 636 W/ 250 OVERRIDE (IP): Performed by: INTERNAL MEDICINE

## 2021-07-08 PROCEDURE — 80069 RENAL FUNCTION PANEL: CPT

## 2021-07-08 PROCEDURE — 99232 SBSQ HOSP IP/OBS MODERATE 35: CPT | Performed by: INTERNAL MEDICINE

## 2021-07-08 PROCEDURE — 36415 COLL VENOUS BLD VENIPUNCTURE: CPT

## 2021-07-08 PROCEDURE — 83735 ASSAY OF MAGNESIUM: CPT

## 2021-07-08 PROCEDURE — 700102 HCHG RX REV CODE 250 W/ 637 OVERRIDE(OP): Performed by: STUDENT IN AN ORGANIZED HEALTH CARE EDUCATION/TRAINING PROGRAM

## 2021-07-08 PROCEDURE — A9270 NON-COVERED ITEM OR SERVICE: HCPCS | Performed by: STUDENT IN AN ORGANIZED HEALTH CARE EDUCATION/TRAINING PROGRAM

## 2021-07-08 PROCEDURE — 82140 ASSAY OF AMMONIA: CPT

## 2021-07-08 PROCEDURE — A9270 NON-COVERED ITEM OR SERVICE: HCPCS | Performed by: INTERNAL MEDICINE

## 2021-07-08 PROCEDURE — 700111 HCHG RX REV CODE 636 W/ 250 OVERRIDE (IP): Performed by: STUDENT IN AN ORGANIZED HEALTH CARE EDUCATION/TRAINING PROGRAM

## 2021-07-08 PROCEDURE — 700102 HCHG RX REV CODE 250 W/ 637 OVERRIDE(OP): Performed by: INTERNAL MEDICINE

## 2021-07-08 PROCEDURE — 700105 HCHG RX REV CODE 258: Performed by: STUDENT IN AN ORGANIZED HEALTH CARE EDUCATION/TRAINING PROGRAM

## 2021-07-08 PROCEDURE — 82962 GLUCOSE BLOOD TEST: CPT

## 2021-07-08 RX ORDER — LACTULOSE 20 G/30ML
15 SOLUTION ORAL ONCE
Status: COMPLETED | OUTPATIENT
Start: 2021-07-08 | End: 2021-07-08

## 2021-07-08 RX ORDER — MAGNESIUM SULFATE HEPTAHYDRATE 40 MG/ML
2 INJECTION, SOLUTION INTRAVENOUS ONCE
Status: COMPLETED | OUTPATIENT
Start: 2021-07-08 | End: 2021-07-08

## 2021-07-08 RX ADMIN — DOCUSATE SODIUM 50 MG AND SENNOSIDES 8.6 MG 2 TABLET: 8.6; 5 TABLET, FILM COATED ORAL at 05:44

## 2021-07-08 RX ADMIN — DOCUSATE SODIUM 50 MG AND SENNOSIDES 8.6 MG 2 TABLET: 8.6; 5 TABLET, FILM COATED ORAL at 17:09

## 2021-07-08 RX ADMIN — HYDROMORPHONE HYDROCHLORIDE 0.25 MG: 1 INJECTION, SOLUTION INTRAMUSCULAR; INTRAVENOUS; SUBCUTANEOUS at 05:50

## 2021-07-08 RX ADMIN — FAMOTIDINE 20 MG: 20 TABLET ORAL at 05:44

## 2021-07-08 RX ADMIN — BISACODYL 10 MG: 10 SUPPOSITORY RECTAL at 08:56

## 2021-07-08 RX ADMIN — LACTULOSE 15 ML: 20 SOLUTION ORAL at 03:04

## 2021-07-08 RX ADMIN — SODIUM CHLORIDE, POTASSIUM CHLORIDE, SODIUM LACTATE AND CALCIUM CHLORIDE: 600; 310; 30; 20 INJECTION, SOLUTION INTRAVENOUS at 17:16

## 2021-07-08 RX ADMIN — MAGNESIUM SULFATE IN WATER 2 G: 40 INJECTION, SOLUTION INTRAVENOUS at 08:56

## 2021-07-08 RX ADMIN — OXYCODONE HYDROCHLORIDE 10 MG: 10 TABLET ORAL at 03:04

## 2021-07-08 RX ADMIN — POLYETHYLENE GLYCOL 3350 1 PACKET: 17 POWDER, FOR SOLUTION ORAL at 05:50

## 2021-07-08 ASSESSMENT — ENCOUNTER SYMPTOMS
ABDOMINAL PAIN: 1
SHORTNESS OF BREATH: 1
NAUSEA: 0
BACK PAIN: 0
PALPITATIONS: 0
FEVER: 0
DIZZINESS: 0
DIARRHEA: 0
VOMITING: 0
CONSTIPATION: 0
WEAKNESS: 1
HEADACHES: 0
COUGH: 0

## 2021-07-08 ASSESSMENT — PAIN DESCRIPTION - PAIN TYPE
TYPE: ACUTE PAIN

## 2021-07-08 NOTE — CARE PLAN
The patient is Watcher - Medium risk of patient condition declining or worsening    Shift Goals  Clinical Goals: Complete home O2 evaluation, discharge planning  Patient Goals: Pain management   Family Goals: NA    Progress made toward(s) clinical / shift goals:    Problem: Pain - Standard  Goal: Alleviation of pain or a reduction in pain to the patient’s comfort goal  Outcome: Progressing  Pt's pain was unrelieved until he was able to receive hydromorphone. Pain unrelieved by repositioning and rest.      Patient is not progressing towards the following goals:  Problem: Bowel Elimination  Goal: Establish and maintain regular bowel function  Outcome: Not Progressing   Pt still unable to have bowel movement today. Lactulose given per orders.

## 2021-07-08 NOTE — PROGRESS NOTES
Patient's orientation decrease throughout night.  Patient was AO x4 at beginning of shift and rapidly decreased to AO x2.  Charge nurse and rapid response nurse assessed patient.  Rapid response nurse stated that a rapid response did not need to be called at this time.  Dr. Jose Gonsalez notified of patient's change in orientation. Orders were placed for an ammonia level to be drawn and lactulose was given. Will continue to monitor patient

## 2021-07-08 NOTE — PROGRESS NOTES
Received Bedside report. Assumed care at 1900. This pt is AOx3, ambulatory with x1 assistance and a front wheel walker, 8/10 pain. Patient and RN discussed plan of care: questions answered. Labs noted, assessment complete, patient tolerating clear liquid diet. Tele box in place. Pt is on 2 L of O2 via nasal cannula. Call light in place, fall precautions in place, patient educated on importance of calling for assistance. No additional needs at this time. VSS

## 2021-07-08 NOTE — PROGRESS NOTES
[facility] Medicine Daily Progress Note    Date of Service  7/8/2021    Chief Complaint  Lorraine Bella is a 71 y.o. male admitted 7/2/2021 with marily pain and shoulder pain    Hospital Course  71-year-old male patient with past medical history of hepatoma since 2018, on IV immunotherapy last dose 2 weeks ago follows with Dr. Birch presented on 7/2/2021 with sudden onset abdominal pain.   CT of the abdomen showed possible ruptured hepatic cyst with hemoperitoneum.   Surgery was consulted recommended medical management with trending hemoglobin.   Patient was requiring oxygen supplementation as the pain was causing him respiratory distress and difficulty with full inspiration.    Interval Problem Update  7/6-  Patient stated he had decreased eating, increase in abdominal bloating.  Patient was still on 2 L nasal cannula for short shallow breathing.  Patient is not on home oxygen. Patient's pain has improved it was severe at home, currently mild and controlled.    Generally surgery has recommended for patient to have IR embolization if there is any evidence of bleeding.  Patient to follow-up with GI and IR outpatient.  General surgery signing off patient's case today.    Called to patient's wife Mrs. Sharlene Bella, no answer on phone call.  Will attempt again for update tomorrow.    7/7 -patient stated he was having increased bloating today.  Patient reported that he has not had a bowel for 1 week now.  Patient denied any chest pain, any nausea or vomiting.  Patient was requesting for pain medication.  I obtain x-ray of the abdomen, showed patient has back above stool all the way to the cecum.  Ordered for lactulose today.  SNF was ordered and choice was obtained from the wife.    7/8-patient seen and evaluated bedside with nurse.  Patient again shows bloating in his abdomen.  Patient did not have a bowel movement yesterday.  Patient was given lactulose yesterday without any help.  Patient was given a  suppository today and nursing reported he had a small bowel movement.  Patient was appearing to be more obtunded and difficult to speak with but is AO x3. Working on bowel movements. Patient does not appear septic.    I have personally seen and examined the patient at bedside. I discussed the plan of care with patient, bedside RN, charge RN and .    Consultants/Specialty  general surgery    Code Status  Full Code    Disposition  Patient is not medically cleared.   Anticipate discharge to to skilled nursing facility.   I have placed the appropriate orders for post-discharge needs.    Review of Systems  Review of Systems   Constitutional: Negative for fever and malaise/fatigue.   Respiratory: Positive for shortness of breath. Negative for cough.    Cardiovascular: Negative for chest pain and palpitations.   Gastrointestinal: Positive for abdominal pain (Distention). Negative for constipation, diarrhea, nausea and vomiting.   Musculoskeletal: Negative for back pain and joint pain.   Neurological: Positive for weakness. Negative for dizziness and headaches.   All other systems reviewed and are negative.     Physical Exam  Temp:  [35.9 °C (96.7 °F)-36.6 °C (97.8 °F)] 35.9 °C (96.7 °F)  Pulse:  [109-125] 111  Resp:  [18-21] 20  BP: (114-149)/(62-93) 115/81  SpO2:  [90 %-95 %] 94 %    Physical Exam  Vitals and nursing note reviewed.   Constitutional:       General: He is not in acute distress.     Appearance: Normal appearance. He is not diaphoretic.   Cardiovascular:      Rate and Rhythm: Normal rate and regular rhythm.      Pulses: Normal pulses.      Heart sounds: Normal heart sounds. No murmur heard.     Pulmonary:      Effort: Respiratory distress (Mild) present.      Breath sounds: Normal breath sounds.      Comments: On 2 L nasal cannula  Abdominal:      General: Abdomen is flat. Bowel sounds are normal. There is distension.      Palpations: Abdomen is soft.      Tenderness: There is abdominal tenderness  (Right side).   Musculoskeletal:         General: No swelling or tenderness. Normal range of motion.   Skin:     General: Skin is warm.      Capillary Refill: Capillary refill takes less than 2 seconds.      Coloration: Skin is not jaundiced or pale.   Neurological:      General: No focal deficit present.      Mental Status: He is alert and oriented to person, place, and time. Mental status is at baseline.   Psychiatric:         Mood and Affect: Mood normal.         Behavior: Behavior normal.         Thought Content: Thought content normal.         Judgment: Judgment normal.         Fluids    Intake/Output Summary (Last 24 hours) at 7/8/2021 1529  Last data filed at 7/8/2021 1032  Gross per 24 hour   Intake 50 ml   Output --   Net 50 ml       Laboratory  Recent Labs     07/05/21  1715 07/07/21  0309   WBC  --  7.0   RBC  --  3.75*   HEMOGLOBIN 12.1* 11.9*   HEMATOCRIT 38.2* 35.9*   MCV  --  95.7   MCH  --  31.7   MCHC  --  33.1*   RDW  --  53.1*   PLATELETCT  --  80*   MPV  --  8.8*     Recent Labs     07/06/21  0335 07/07/21  0309 07/08/21  0040   SODIUM 132* 132* 127*   POTASSIUM 4.4 4.7 5.3   CHLORIDE 97 95* 93*   CO2 24 27 25   GLUCOSE 122* 113* 123*   BUN 20 17 25*   CREATININE 0.71 0.55 0.70   CALCIUM 9.3 9.1 9.4                   Imaging  [id number] VIEW   Final Result      1. Moderate stool mixed with contrast in the ascending colon.   2. Moderate stool in the transverse colon.   3. No bowel obstruction.   4. Other chronic degenerative changes.      CT-ABDOMEN-PELVIS W/O   Final Result      1.  There is no acute inflammatory process in the abdomen or pelvis.   2.  The treated lesions in the liver are again seen without interval change.   3.  Splenomegaly is again seen.   4.  Cystic pancreatic tail lesion is unchanged possibly a pseudocyst.   5.  There is colonic diverticulosis without diverticulitis.   6.  There is mild wall thickening of the distal esophagus.   7.  There is a stable small amount of  ascites.   8.  There is moderate colonic stool.      DX-CHEST-PORTABLE (1 VIEW)   Final Result      Hypoinflation with bibasilar atelectasis. No focal consolidation or pleural effusions.      DX-CHEST-PORTABLE (1 VIEW)   Final Result      Left basilar atelectasis.      CT-ABDOMEN-PELVIS WITH   Final Result      1.  Primarily cystic mass in the medial segment LEFT lobe liver, likely treated hepatoma.   2.  Complex low-attenuation lesion in the inferior aspect medial segment LEFT lobe liver which is new from prior exam and may represent cystic mass or focal hematoma related to rupture larger mass.  Minimal hyperdense components may indicate blood    products.  Abscess is felt less likely.   3.  Gallbladder is not visualized and may be compressed by or involved with liver mass.   4.  Small volume ascites with potential hemoperitoneum.   5.  Splenomegaly.      These findings were discussed with CARMEN OTTO on 7/2/2021 3:02 PM.      DX-CHEST-PORTABLE (1 VIEW)   Final Result      1.  Hypoinflation with minimal LEFT lung base atelectasis.   2.  No pneumonia or pulmonary edema.           Assessment/Plan  * Abdominal pain- (present on admission)  Assessment & Plan  Diffuse abdominal pain x 1d  CT abdomen showed liver cystic mass/hematoma, likely cystic rupture    Surgery consulted, recommended to continue to monitor, surgery signed off 7/6 recommendations are in their last note.  Recommended to hold any antiplatelet and any anticoagulation at this time.  Improving on severity, abdominal pain due to constipation at this time.  Bowel regimen in place, one-time lactulose.  Continue with suppository.  PRN enema.    Hypomagnesemia  Assessment & Plan  Patient's magnesium was 1.7.  Patient does have constipation but was hesitant for oral replacements.  Replace via IV.  -We will have magnesium tablets replacements  Recheck magnesium level.    Lactic acidosis- (present on admission)  Assessment & Plan  Lactic acid 2.7 at  admission  Improved no need for further recheck  IV fluid  Trending down    Elevated bilirubin- (present on admission)  Assessment & Plan  Bilirubin 2.3  Continue to monitor  Trending down likely dehydration induced     Hyperkalemia- (present on admission)  Assessment & Plan  Potassium 5.7 at admission  Normalizing  IV fluid  Trended down with IV fluids    Hyponatremia- (present on admission)  Assessment & Plan  Likely due to chronic liver disease  Improving  Continue to monitor  C/w iv fluids, f/u u na and u osm  Improving with IV fluids    Constipation- (present on admission)  Assessment & Plan  Patient has had chronic issues with constipation.  Reported last bowel movement was over a week ago.  Abdominal x-ray showed constipation in the large colon.  -Ordered for one-time lactulose  -Bowel regimen in place    Tobacco abuse- (present on admission)  Assessment & Plan  Smoking cessation was given at bedside  Nicotine patch      Thrombocytopenia (HCC)- (present on admission)  Assessment & Plan   at admission  Secondary to liver disease    Continue to monitor    Hepatoma (HCC)- (present on admission)  Assessment & Plan  On immunotherapy    Type 2 diabetes mellitus without complication, without long-term current use of insulin (HCC)- (present on admission)  Assessment & Plan  Borderline diabetes, not on medications  07/03/21 - HbA1c 6.0, controlled, prediabetes at this time.  ISS, accuchecks, hypoglycemia protocol orders in place     VTE prophylaxis: SCDs/TEDs    I have performed a physical exam and reviewed and updated ROS and Plan today (7/8/2021). In review of yesterday's note (7/7/2021), there are no changes except as documented above.

## 2021-07-08 NOTE — DISCHARGE PLANNING
Agency[facility]Facility Name: [facility]   Outcome: Per Epic response pt has no skilled need    Agency/Facility Name: [facility]  Outcome: Per Epic response pt declined due to not being vaccinated     -1523  Agency/Facility Name: [facility]  Outcome: Per Epic response pt accepted.     RN CM notified via Teams

## 2021-07-08 NOTE — THERAPY
Missed Therapy     Patient Name: Lorraine Bella  Age:  71 y.o., Sex:  male  Medical Record #: [id number]  Today's Date: 7/8/2021    Discussed missed therapy with RN    Attempted to see pt for OT tx. Pt w/ increased lethargy and just received suppository. Will try again later as able.

## 2021-07-08 NOTE — DISCHARGE PLANNING
Anticipated Discharge Disposition: SNF    Action: RN CM met with the patient's wife Sharlene to discuss making more SNF referrals since the previous 2 facilities declined.  Sharlene consented to sending a referral to [facility].  Referral faxed to [facility].  Amended choice form faxed to [facility].    Barriers to Discharge: Placement, medical clearance     Plan: Case coordination to continue to follow up with medical team to discuss discharge barriers.

## 2021-07-08 NOTE — PROGRESS NOTES
Report received from night shift RN, assumed care of pt. Pt is A&Ox1, slow to respond. VSS. Plan of care discussed with pt, labs and chart reviewed. All needs met at this time. Tele box on. Call light within reach, bed locked and in lowest position. All fall precautions and hourly rounding in place. Will continue to monitor.

## 2021-07-08 NOTE — PROGRESS NOTES
Notified Dr. Justin regarding concern over pt's orientation status. MD to assess pt soon. Will continue to monitor.

## 2021-07-08 NOTE — CARE PLAN
The patient is Watcher - Medium risk of patient condition declining or worsening    Shift Goals  Clinical Goals: Monitor orientation, administer bowel regimen medications to promote GI motility  Patient Goals: LAILA  Family Goals: NA    Progress made toward(s) clinical / shift goals:    Problem: Fall Risk  Goal: Patient will remain free from falls  Outcome: Progressing  All high risk fall interventions in place: non-slip socks on, bed alarm on, bed locked and in lowest position with three rails up. Call light within reach.  Appropriate signage in use to communicate risk.     Problem: Bowel Elimination  Goal: Establish and maintain regular bowel function  Outcome: Progressing  Pt had one bowel movement after suppository administration.     Patient is not progressing towards the following goals:  Pt withdrawn and less responsive to questions today. Has delayed speech and delayed following of commands. Orientation waxes and wanes from x1 to x3. MD aware, pt likely having mental fog from lack of bowel movements from admission up until today. Continuing to monitor and assess for acute changes in neurological status.

## 2021-07-09 ENCOUNTER — APPOINTMENT (OUTPATIENT)
Dept: RADIOLOGY | Facility: MEDICAL CENTER | Age: 71
DRG: 356 | End: 2021-07-09
Attending: INTERNAL MEDICINE
Payer: MEDICARE

## 2021-07-09 ENCOUNTER — ANESTHESIA EVENT (OUTPATIENT)
Dept: SURGERY | Facility: MEDICAL CENTER | Age: 71
DRG: 356 | End: 2021-07-09
Payer: MEDICARE

## 2021-07-09 ENCOUNTER — ANESTHESIA (OUTPATIENT)
Dept: SURGERY | Facility: MEDICAL CENTER | Age: 71
DRG: 356 | End: 2021-07-09
Payer: MEDICARE

## 2021-07-09 PROBLEM — A41.9 SEPSIS (HCC): Status: ACTIVE | Noted: 2021-07-09

## 2021-07-09 PROBLEM — G93.40 ENCEPHALOPATHY ACUTE: Status: ACTIVE | Noted: 2021-07-09

## 2021-07-09 PROBLEM — R33.8 ACUTE URINARY RETENTION: Status: ACTIVE | Noted: 2021-07-09

## 2021-07-09 LAB
ALBUMIN SERPL BCP-MCNC: 2.2 G/DL (ref 3.2–4.9)
ALBUMIN SERPL BCP-MCNC: 2.3 G/DL (ref 3.2–4.9)
ALBUMIN SERPL BCP-MCNC: 2.3 G/DL (ref 3.2–4.9)
ALP SERPL-CCNC: 361 U/L (ref 30–99)
ALP SERPL-CCNC: 362 U/L (ref 30–99)
ALT SERPL-CCNC: 19 U/L (ref 2–50)
ALT SERPL-CCNC: 19 U/L (ref 2–50)
APPEARANCE UR: CLEAR
AST SERPL-CCNC: 59 U/L (ref 12–45)
AST SERPL-CCNC: 60 U/L (ref 12–45)
BACTERIA #/AREA URNS HPF: NEGATIVE /HPF
BILIRUB CONJ SERPL-MCNC: 1.1 MG/DL (ref 0.1–0.5)
BILIRUB CONJ SERPL-MCNC: 1.2 MG/DL (ref 0.1–0.5)
BILIRUB INDIRECT SERPL-MCNC: 0.5 MG/DL (ref 0–1)
BILIRUB INDIRECT SERPL-MCNC: 0.6 MG/DL (ref 0–1)
BILIRUB SERPL-MCNC: 1.7 MG/DL (ref 0.1–1.5)
BILIRUB SERPL-MCNC: 1.7 MG/DL (ref 0.1–1.5)
BILIRUB UR QL STRIP.AUTO: ABNORMAL
BUN SERPL-MCNC: 30 MG/DL (ref 8–22)
CALCIUM SERPL-MCNC: 9.1 MG/DL (ref 8.5–10.5)
CHLORIDE SERPL-SCNC: 98 MMOL/L (ref 96–112)
CO2 SERPL-SCNC: 27 MMOL/L (ref 20–33)
COLOR UR: ABNORMAL
CREAT SERPL-MCNC: 0.67 MG/DL (ref 0.5–1.4)
EKG IMPRESSION: NORMAL
EPI CELLS #/AREA URNS HPF: NEGATIVE /HPF
ERYTHROCYTE [DISTWIDTH] IN BLOOD BY AUTOMATED COUNT: 55.4 FL (ref 35.9–50)
GLUCOSE BLD-MCNC: 109 MG/DL (ref 65–99)
GLUCOSE BLD-MCNC: 111 MG/DL (ref 65–99)
GLUCOSE BLD-MCNC: 115 MG/DL (ref 65–99)
GLUCOSE BLD-MCNC: 121 MG/DL (ref 65–99)
GLUCOSE BLD-MCNC: 91 MG/DL (ref 65–99)
GLUCOSE SERPL-MCNC: 115 MG/DL (ref 65–99)
GLUCOSE UR STRIP.AUTO-MCNC: NEGATIVE MG/DL
HCT VFR BLD AUTO: 38.5 % (ref 42–52)
HGB BLD-MCNC: 12.6 G/DL (ref 14–18)
HYALINE CASTS #/AREA URNS LPF: ABNORMAL /LPF
INR PPP: 1.48 (ref 0.87–1.13)
KETONES UR STRIP.AUTO-MCNC: ABNORMAL MG/DL
LACTATE BLD-SCNC: 1.7 MMOL/L (ref 0.5–2)
LACTATE BLD-SCNC: 2.2 MMOL/L (ref 0.5–2)
LACTATE BLD-SCNC: 2.3 MMOL/L (ref 0.5–2)
LACTATE BLD-SCNC: 2.7 MMOL/L (ref 0.5–2)
LEUKOCYTE ESTERASE UR QL STRIP.AUTO: ABNORMAL
MAGNESIUM SERPL-MCNC: 2.1 MG/DL (ref 1.5–2.5)
MCH RBC QN AUTO: 31.5 PG (ref 27–33)
MCHC RBC AUTO-ENTMCNC: 32.7 G/DL (ref 33.7–35.3)
MCV RBC AUTO: 96.3 FL (ref 81.4–97.8)
MICRO URNS: ABNORMAL
NITRITE UR QL STRIP.AUTO: NEGATIVE
PH UR STRIP.AUTO: 5 [PH] (ref 5–8)
PHOSPHATE SERPL-MCNC: 4.1 MG/DL (ref 2.5–4.5)
PLATELET # BLD AUTO: 89 K/UL (ref 164–446)
PMV BLD AUTO: 8.8 FL (ref 9–12.9)
POTASSIUM SERPL-SCNC: 5.4 MMOL/L (ref 3.6–5.5)
PROCALCITONIN SERPL-MCNC: 2.95 NG/ML
PROCALCITONIN SERPL-MCNC: 3.08 NG/ML
PROT SERPL-MCNC: 6.2 G/DL (ref 6–8.2)
PROT SERPL-MCNC: 6.3 G/DL (ref 6–8.2)
PROT UR QL STRIP: NEGATIVE MG/DL
PROTHROMBIN TIME: 17.4 SEC (ref 12–14.6)
RBC # BLD AUTO: 4 M/UL (ref 4.7–6.1)
RBC # URNS HPF: ABNORMAL /HPF
RBC UR QL AUTO: NEGATIVE
SODIUM SERPL-SCNC: 135 MMOL/L (ref 135–145)
SP GR UR STRIP.AUTO: 1.03
TROPONIN T SERPL-MCNC: 18 NG/L (ref 6–19)
TROPONIN T SERPL-MCNC: 19 NG/L (ref 6–19)
UROBILINOGEN UR STRIP.AUTO-MCNC: 2 MG/DL
WBC # BLD AUTO: 13.4 K/UL (ref 4.8–10.8)
WBC #/AREA URNS HPF: ABNORMAL /HPF

## 2021-07-09 PROCEDURE — 51798 US URINE CAPACITY MEASURE: CPT

## 2021-07-09 PROCEDURE — 71045 X-RAY EXAM CHEST 1 VIEW: CPT

## 2021-07-09 PROCEDURE — 82247 BILIRUBIN TOTAL: CPT

## 2021-07-09 PROCEDURE — 93010 ELECTROCARDIOGRAM REPORT: CPT | Performed by: INTERNAL MEDICINE

## 2021-07-09 PROCEDURE — 700105 HCHG RX REV CODE 258: Performed by: ANESTHESIOLOGY

## 2021-07-09 PROCEDURE — 99153 MOD SED SAME PHYS/QHP EA: CPT

## 2021-07-09 PROCEDURE — 84155 ASSAY OF PROTEIN SERUM: CPT

## 2021-07-09 PROCEDURE — 0W9G40Z DRAINAGE OF PERITONEAL CAVITY WITH DRAINAGE DEVICE, PERCUTANEOUS ENDOSCOPIC APPROACH: ICD-10-PCS | Performed by: SURGERY

## 2021-07-09 PROCEDURE — 160039 HCHG SURGERY MINUTES - EA ADDL 1 MIN LEVEL 3: Performed by: SURGERY

## 2021-07-09 PROCEDURE — 700105 HCHG RX REV CODE 258: Performed by: INTERNAL MEDICINE

## 2021-07-09 PROCEDURE — 31500 INSERT EMERGENCY AIRWAY: CPT

## 2021-07-09 PROCEDURE — 74176 CT ABD & PELVIS W/O CONTRAST: CPT | Mod: MG

## 2021-07-09 PROCEDURE — 94760 N-INVAS EAR/PLS OXIMETRY 1: CPT

## 2021-07-09 PROCEDURE — 81001 URINALYSIS AUTO W/SCOPE: CPT

## 2021-07-09 PROCEDURE — 83735 ASSAY OF MAGNESIUM: CPT

## 2021-07-09 PROCEDURE — 160009 HCHG ANES TIME/MIN: Performed by: SURGERY

## 2021-07-09 PROCEDURE — A6402 STERILE GAUZE <= 16 SQ IN: HCPCS | Performed by: SURGERY

## 2021-07-09 PROCEDURE — 700101 HCHG RX REV CODE 250: Performed by: ANESTHESIOLOGY

## 2021-07-09 PROCEDURE — 82803 BLOOD GASES ANY COMBINATION: CPT

## 2021-07-09 PROCEDURE — 160036 HCHG PACU - EA ADDL 30 MINS PHASE I: Performed by: SURGERY

## 2021-07-09 PROCEDURE — 700105 HCHG RX REV CODE 258: Performed by: STUDENT IN AN ORGANIZED HEALTH CARE EDUCATION/TRAINING PROGRAM

## 2021-07-09 PROCEDURE — 99233 SBSQ HOSP IP/OBS HIGH 50: CPT | Performed by: INTERNAL MEDICINE

## 2021-07-09 PROCEDURE — 87040 BLOOD CULTURE FOR BACTERIA: CPT | Mod: 91

## 2021-07-09 PROCEDURE — 160035 HCHG PACU - 1ST 60 MINS PHASE I: Performed by: SURGERY

## 2021-07-09 PROCEDURE — 700102 HCHG RX REV CODE 250 W/ 637 OVERRIDE(OP): Performed by: INTERNAL MEDICINE

## 2021-07-09 PROCEDURE — 92950 HEART/LUNG RESUSCITATION CPR: CPT

## 2021-07-09 PROCEDURE — 87075 CULTR BACTERIA EXCEPT BLOOD: CPT

## 2021-07-09 PROCEDURE — 80076 HEPATIC FUNCTION PANEL: CPT

## 2021-07-09 PROCEDURE — 80069 RENAL FUNCTION PANEL: CPT

## 2021-07-09 PROCEDURE — 84145 PROCALCITONIN (PCT): CPT

## 2021-07-09 PROCEDURE — 501583 HCHG TROCAR, THRD CAN&SEAL 5X100: Performed by: SURGERY

## 2021-07-09 PROCEDURE — 83605 ASSAY OF LACTIC ACID: CPT

## 2021-07-09 PROCEDURE — 37799 UNLISTED PX VASCULAR SURGERY: CPT

## 2021-07-09 PROCEDURE — 700111 HCHG RX REV CODE 636 W/ 250 OVERRIDE (IP): Performed by: INTERNAL MEDICINE

## 2021-07-09 PROCEDURE — 84460 ALANINE AMINO (ALT) (SGPT): CPT

## 2021-07-09 PROCEDURE — 36556 INSERT NON-TUNNEL CV CATH: CPT

## 2021-07-09 PROCEDURE — 87077 CULTURE AEROBIC IDENTIFY: CPT | Mod: 91

## 2021-07-09 PROCEDURE — 160028 HCHG SURGERY MINUTES - 1ST 30 MINS LEVEL 3: Performed by: SURGERY

## 2021-07-09 PROCEDURE — 160002 HCHG RECOVERY MINUTES (STAT): Performed by: SURGERY

## 2021-07-09 PROCEDURE — 82962 GLUCOSE BLOOD TEST: CPT

## 2021-07-09 PROCEDURE — A9270 NON-COVERED ITEM OR SERVICE: HCPCS | Performed by: STUDENT IN AN ORGANIZED HEALTH CARE EDUCATION/TRAINING PROGRAM

## 2021-07-09 PROCEDURE — 82248 BILIRUBIN DIRECT: CPT

## 2021-07-09 PROCEDURE — 87205 SMEAR GRAM STAIN: CPT

## 2021-07-09 PROCEDURE — 99356 PR PROLONGED SVC I/P OR OBS SETTING 1ST HOUR: CPT | Performed by: INTERNAL MEDICINE

## 2021-07-09 PROCEDURE — 99152 MOD SED SAME PHYS/QHP 5/>YRS: CPT

## 2021-07-09 PROCEDURE — 700101 HCHG RX REV CODE 250: Performed by: INTERNAL MEDICINE

## 2021-07-09 PROCEDURE — A9270 NON-COVERED ITEM OR SERVICE: HCPCS | Performed by: INTERNAL MEDICINE

## 2021-07-09 PROCEDURE — 87070 CULTURE OTHR SPECIMN AEROBIC: CPT

## 2021-07-09 PROCEDURE — 85027 COMPLETE CBC AUTOMATED: CPT

## 2021-07-09 PROCEDURE — 94002 VENT MGMT INPAT INIT DAY: CPT

## 2021-07-09 PROCEDURE — 03HY32Z INSERTION OF MONITORING DEVICE INTO UPPER ARTERY, PERCUTANEOUS APPROACH: ICD-10-PCS | Performed by: ANESTHESIOLOGY

## 2021-07-09 PROCEDURE — 160048 HCHG OR STATISTICAL LEVEL 1-5: Performed by: SURGERY

## 2021-07-09 PROCEDURE — 700111 HCHG RX REV CODE 636 W/ 250 OVERRIDE (IP): Performed by: ANESTHESIOLOGY

## 2021-07-09 PROCEDURE — 700102 HCHG RX REV CODE 250 W/ 637 OVERRIDE(OP): Performed by: STUDENT IN AN ORGANIZED HEALTH CARE EDUCATION/TRAINING PROGRAM

## 2021-07-09 PROCEDURE — 501838 HCHG SUTURE GENERAL: Performed by: SURGERY

## 2021-07-09 PROCEDURE — 84450 TRANSFERASE (AST) (SGOT): CPT

## 2021-07-09 PROCEDURE — 84075 ASSAY ALKALINE PHOSPHATASE: CPT

## 2021-07-09 PROCEDURE — 99291 CRITICAL CARE FIRST HOUR: CPT | Performed by: INTERNAL MEDICINE

## 2021-07-09 PROCEDURE — 84484 ASSAY OF TROPONIN QUANT: CPT | Mod: 91

## 2021-07-09 PROCEDURE — 770022 HCHG ROOM/CARE - ICU (200)

## 2021-07-09 PROCEDURE — 85007 BL SMEAR W/DIFF WBC COUNT: CPT

## 2021-07-09 PROCEDURE — 87102 FUNGUS ISOLATION CULTURE: CPT

## 2021-07-09 PROCEDURE — C1751 CATH, INF, PER/CENT/MIDLINE: HCPCS

## 2021-07-09 PROCEDURE — 85610 PROTHROMBIN TIME: CPT

## 2021-07-09 PROCEDURE — 501570 HCHG TROCAR, SEPARATOR: Performed by: SURGERY

## 2021-07-09 PROCEDURE — 93005 ELECTROCARDIOGRAM TRACING: CPT | Performed by: INTERNAL MEDICINE

## 2021-07-09 RX ORDER — DEXMEDETOMIDINE HYDROCHLORIDE 4 UG/ML
0-1.5 INJECTION, SOLUTION INTRAVENOUS CONTINUOUS
Status: DISCONTINUED | OUTPATIENT
Start: 2021-07-10 | End: 2021-07-10

## 2021-07-09 RX ORDER — NALOXONE HYDROCHLORIDE 0.4 MG/ML
INJECTION, SOLUTION INTRAMUSCULAR; INTRAVENOUS; SUBCUTANEOUS PRN
Status: DISCONTINUED | OUTPATIENT
Start: 2021-07-09 | End: 2021-07-09 | Stop reason: SURG

## 2021-07-09 RX ORDER — CEFOTETAN DISODIUM 2 G/20ML
INJECTION, POWDER, FOR SOLUTION INTRAMUSCULAR; INTRAVENOUS PRN
Status: DISCONTINUED | OUTPATIENT
Start: 2021-07-09 | End: 2021-07-09 | Stop reason: SURG

## 2021-07-09 RX ORDER — PHENYLEPHRINE HCL IN 0.9% NACL 0.5 MG/5ML
100 SYRINGE (ML) INTRAVENOUS ONCE
Status: COMPLETED | OUTPATIENT
Start: 2021-07-09 | End: 2021-07-09

## 2021-07-09 RX ORDER — OXYCODONE HCL 5 MG/5 ML
10 SOLUTION, ORAL ORAL
Status: DISCONTINUED | OUTPATIENT
Start: 2021-07-09 | End: 2021-07-09 | Stop reason: HOSPADM

## 2021-07-09 RX ORDER — NOREPINEPHRINE BITARTRATE 0.03 MG/ML
0-30 INJECTION, SOLUTION INTRAVENOUS CONTINUOUS
Status: DISCONTINUED | OUTPATIENT
Start: 2021-07-10 | End: 2021-07-13

## 2021-07-09 RX ORDER — HETASTARCH 6 G/100ML
INJECTION, SOLUTION INTRAVENOUS PRN
Status: DISCONTINUED | OUTPATIENT
Start: 2021-07-09 | End: 2021-07-09 | Stop reason: SURG

## 2021-07-09 RX ORDER — ENEMA 19; 7 G/133ML; G/133ML
1 ENEMA RECTAL ONCE
Status: COMPLETED | OUTPATIENT
Start: 2021-07-09 | End: 2021-07-09

## 2021-07-09 RX ORDER — ROCURONIUM BROMIDE 10 MG/ML
50 INJECTION, SOLUTION INTRAVENOUS ONCE
Status: COMPLETED | OUTPATIENT
Start: 2021-07-09 | End: 2021-07-09

## 2021-07-09 RX ORDER — PHENYLEPHRINE HCL IN 0.9% NACL 0.5 MG/5ML
SYRINGE (ML) INTRAVENOUS PRN
Status: DISCONTINUED | OUTPATIENT
Start: 2021-07-09 | End: 2021-07-09 | Stop reason: SURG

## 2021-07-09 RX ORDER — MEPERIDINE HYDROCHLORIDE 25 MG/ML
12.5 INJECTION INTRAMUSCULAR; INTRAVENOUS; SUBCUTANEOUS
Status: DISCONTINUED | OUTPATIENT
Start: 2021-07-09 | End: 2021-07-09 | Stop reason: HOSPADM

## 2021-07-09 RX ORDER — HALOPERIDOL 5 MG/ML
1 INJECTION INTRAMUSCULAR
Status: DISCONTINUED | OUTPATIENT
Start: 2021-07-09 | End: 2021-07-09 | Stop reason: HOSPADM

## 2021-07-09 RX ORDER — HYDROMORPHONE HYDROCHLORIDE 2 MG/ML
INJECTION, SOLUTION INTRAMUSCULAR; INTRAVENOUS; SUBCUTANEOUS PRN
Status: DISCONTINUED | OUTPATIENT
Start: 2021-07-09 | End: 2021-07-09 | Stop reason: SURG

## 2021-07-09 RX ORDER — DIPHENHYDRAMINE HYDROCHLORIDE 50 MG/ML
12.5 INJECTION INTRAMUSCULAR; INTRAVENOUS
Status: DISCONTINUED | OUTPATIENT
Start: 2021-07-09 | End: 2021-07-09 | Stop reason: HOSPADM

## 2021-07-09 RX ORDER — POLYETHYLENE GLYCOL 3350 17 G/17G
1 POWDER, FOR SOLUTION ORAL
Status: DISCONTINUED | OUTPATIENT
Start: 2021-07-09 | End: 2021-07-10

## 2021-07-09 RX ORDER — SODIUM CHLORIDE, SODIUM GLUCONATE, SODIUM ACETATE, POTASSIUM CHLORIDE AND MAGNESIUM CHLORIDE 526; 502; 368; 37; 30 MG/100ML; MG/100ML; MG/100ML; MG/100ML; MG/100ML
500 INJECTION, SOLUTION INTRAVENOUS CONTINUOUS
Status: DISCONTINUED | OUTPATIENT
Start: 2021-07-09 | End: 2021-07-09 | Stop reason: HOSPADM

## 2021-07-09 RX ORDER — MIDAZOLAM HYDROCHLORIDE 1 MG/ML
1 INJECTION INTRAMUSCULAR; INTRAVENOUS
Status: DISCONTINUED | OUTPATIENT
Start: 2021-07-09 | End: 2021-07-09 | Stop reason: HOSPADM

## 2021-07-09 RX ORDER — HYDROMORPHONE HYDROCHLORIDE 1 MG/ML
1 INJECTION, SOLUTION INTRAMUSCULAR; INTRAVENOUS; SUBCUTANEOUS
Status: DISCONTINUED | OUTPATIENT
Start: 2021-07-09 | End: 2021-07-09 | Stop reason: HOSPADM

## 2021-07-09 RX ORDER — DEXAMETHASONE SODIUM PHOSPHATE 4 MG/ML
INJECTION, SOLUTION INTRA-ARTICULAR; INTRALESIONAL; INTRAMUSCULAR; INTRAVENOUS; SOFT TISSUE PRN
Status: DISCONTINUED | OUTPATIENT
Start: 2021-07-09 | End: 2021-07-09 | Stop reason: SURG

## 2021-07-09 RX ORDER — LACTULOSE 20 G/30ML
30 SOLUTION ORAL 2 TIMES DAILY
Status: DISCONTINUED | OUTPATIENT
Start: 2021-07-09 | End: 2021-07-11

## 2021-07-09 RX ORDER — CALCIUM CHLORIDE 100 MG/ML
INJECTION INTRAVENOUS; INTRAVENTRICULAR PRN
Status: DISCONTINUED | OUTPATIENT
Start: 2021-07-09 | End: 2021-07-09 | Stop reason: SURG

## 2021-07-09 RX ORDER — FLUMAZENIL 0.1 MG/ML
INJECTION INTRAVENOUS PRN
Status: DISCONTINUED | OUTPATIENT
Start: 2021-07-09 | End: 2021-07-09 | Stop reason: SURG

## 2021-07-09 RX ORDER — ONDANSETRON 2 MG/ML
4 INJECTION INTRAMUSCULAR; INTRAVENOUS
Status: DISCONTINUED | OUTPATIENT
Start: 2021-07-09 | End: 2021-07-09 | Stop reason: HOSPADM

## 2021-07-09 RX ORDER — OXYCODONE HCL 5 MG/5 ML
5 SOLUTION, ORAL ORAL
Status: DISCONTINUED | OUTPATIENT
Start: 2021-07-09 | End: 2021-07-09 | Stop reason: HOSPADM

## 2021-07-09 RX ORDER — LIDOCAINE HYDROCHLORIDE 40 MG/ML
SOLUTION TOPICAL PRN
Status: DISCONTINUED | OUTPATIENT
Start: 2021-07-09 | End: 2021-07-09 | Stop reason: SURG

## 2021-07-09 RX ORDER — BISACODYL 10 MG
10 SUPPOSITORY, RECTAL RECTAL
Status: DISCONTINUED | OUTPATIENT
Start: 2021-07-09 | End: 2021-07-10

## 2021-07-09 RX ORDER — AMOXICILLIN 250 MG
2 CAPSULE ORAL 2 TIMES DAILY
Status: DISCONTINUED | OUTPATIENT
Start: 2021-07-10 | End: 2021-07-10

## 2021-07-09 RX ORDER — LIDOCAINE HYDROCHLORIDE 20 MG/ML
INJECTION, SOLUTION EPIDURAL; INFILTRATION; INTRACAUDAL; PERINEURAL PRN
Status: DISCONTINUED | OUTPATIENT
Start: 2021-07-09 | End: 2021-07-09 | Stop reason: SURG

## 2021-07-09 RX ORDER — ONDANSETRON 2 MG/ML
INJECTION INTRAMUSCULAR; INTRAVENOUS PRN
Status: DISCONTINUED | OUTPATIENT
Start: 2021-07-09 | End: 2021-07-09 | Stop reason: SURG

## 2021-07-09 RX ORDER — MIDAZOLAM HYDROCHLORIDE 1 MG/ML
INJECTION INTRAMUSCULAR; INTRAVENOUS PRN
Status: DISCONTINUED | OUTPATIENT
Start: 2021-07-09 | End: 2021-07-09 | Stop reason: SURG

## 2021-07-09 RX ORDER — FAMOTIDINE 20 MG/1
20 TABLET, FILM COATED ORAL EVERY 12 HOURS
Status: DISCONTINUED | OUTPATIENT
Start: 2021-07-10 | End: 2021-07-10

## 2021-07-09 RX ORDER — SODIUM CHLORIDE, SODIUM LACTATE, POTASSIUM CHLORIDE, AND CALCIUM CHLORIDE .6; .31; .03; .02 G/100ML; G/100ML; G/100ML; G/100ML
1000 INJECTION, SOLUTION INTRAVENOUS ONCE
Status: COMPLETED | OUTPATIENT
Start: 2021-07-09 | End: 2021-07-09

## 2021-07-09 RX ORDER — ROCURONIUM BROMIDE 10 MG/ML
INJECTION, SOLUTION INTRAVENOUS PRN
Status: DISCONTINUED | OUTPATIENT
Start: 2021-07-09 | End: 2021-07-09 | Stop reason: SURG

## 2021-07-09 RX ORDER — ETOMIDATE 2 MG/ML
20 INJECTION INTRAVENOUS ONCE
Status: COMPLETED | OUTPATIENT
Start: 2021-07-09 | End: 2021-07-09

## 2021-07-09 RX ORDER — LORAZEPAM 2 MG/ML
1 INJECTION INTRAMUSCULAR ONCE
Status: COMPLETED | OUTPATIENT
Start: 2021-07-09 | End: 2021-07-09

## 2021-07-09 RX ORDER — KETAMINE HYDROCHLORIDE 50 MG/ML
INJECTION, SOLUTION INTRAMUSCULAR; INTRAVENOUS
Status: DISCONTINUED
Start: 2021-07-09 | End: 2021-07-09

## 2021-07-09 RX ORDER — HYDROMORPHONE HYDROCHLORIDE 1 MG/ML
0.2 INJECTION, SOLUTION INTRAMUSCULAR; INTRAVENOUS; SUBCUTANEOUS
Status: DISCONTINUED | OUTPATIENT
Start: 2021-07-09 | End: 2021-07-09 | Stop reason: HOSPADM

## 2021-07-09 RX ORDER — HYDROMORPHONE HYDROCHLORIDE 1 MG/ML
0.4 INJECTION, SOLUTION INTRAMUSCULAR; INTRAVENOUS; SUBCUTANEOUS
Status: DISCONTINUED | OUTPATIENT
Start: 2021-07-09 | End: 2021-07-09 | Stop reason: HOSPADM

## 2021-07-09 RX ORDER — SODIUM CHLORIDE, SODIUM GLUCONATE, SODIUM ACETATE, POTASSIUM CHLORIDE AND MAGNESIUM CHLORIDE 526; 502; 368; 37; 30 MG/100ML; MG/100ML; MG/100ML; MG/100ML; MG/100ML
INJECTION, SOLUTION INTRAVENOUS
Status: DISCONTINUED | OUTPATIENT
Start: 2021-07-09 | End: 2021-07-09 | Stop reason: SURG

## 2021-07-09 RX ORDER — IPRATROPIUM BROMIDE AND ALBUTEROL SULFATE 2.5; .5 MG/3ML; MG/3ML
3 SOLUTION RESPIRATORY (INHALATION)
Status: DISCONTINUED | OUTPATIENT
Start: 2021-07-09 | End: 2021-07-09 | Stop reason: HOSPADM

## 2021-07-09 RX ADMIN — LIDOCAINE HYDROCHLORIDE 4 ML: 40 SOLUTION TOPICAL at 17:48

## 2021-07-09 RX ADMIN — CALCIUM CHLORIDE 500 MG: 100 INJECTION INTRAVENOUS; INTRAVENTRICULAR at 18:39

## 2021-07-09 RX ADMIN — SUGAMMADEX 400 MG: 100 INJECTION, SOLUTION INTRAVENOUS at 18:31

## 2021-07-09 RX ADMIN — METRONIDAZOLE 500 MG: 500 INJECTION, SOLUTION INTRAVENOUS at 14:37

## 2021-07-09 RX ADMIN — ROCURONIUM BROMIDE 50 MG: 10 INJECTION, SOLUTION INTRAVENOUS at 22:44

## 2021-07-09 RX ADMIN — FAMOTIDINE 20 MG: 20 TABLET ORAL at 05:19

## 2021-07-09 RX ADMIN — NALOXONE HYDROCHLORIDE 0.1 MCG: 0.4 INJECTION, SOLUTION INTRAMUSCULAR; INTRAVENOUS; SUBCUTANEOUS at 18:45

## 2021-07-09 RX ADMIN — CEFOTETAN DISODIUM 2 G: 2 INJECTION, POWDER, FOR SOLUTION INTRAMUSCULAR; INTRAVENOUS at 17:51

## 2021-07-09 RX ADMIN — Medication 100 MCG: at 22:50

## 2021-07-09 RX ADMIN — SODIUM CHLORIDE, POTASSIUM CHLORIDE, SODIUM LACTATE AND CALCIUM CHLORIDE 1000 ML: 600; 310; 30; 20 INJECTION, SOLUTION INTRAVENOUS at 23:00

## 2021-07-09 RX ADMIN — SODIUM CHLORIDE, POTASSIUM CHLORIDE, SODIUM LACTATE AND CALCIUM CHLORIDE: 600; 310; 30; 20 INJECTION, SOLUTION INTRAVENOUS at 18:35

## 2021-07-09 RX ADMIN — MAGNESIUM HYDROXIDE 30 ML: 400 SUSPENSION ORAL at 05:20

## 2021-07-09 RX ADMIN — FLUMAZENIL 0.2 MCG: 0.1 INJECTION, SOLUTION INTRAVENOUS at 18:45

## 2021-07-09 RX ADMIN — HETASTARCH IN SODIUM CHLORIDE 500 ML: 6; .9 INJECTION, SOLUTION INTRAVENOUS at 18:40

## 2021-07-09 RX ADMIN — MIDAZOLAM HYDROCHLORIDE 2 MG: 1 INJECTION, SOLUTION INTRAMUSCULAR; INTRAVENOUS at 18:17

## 2021-07-09 RX ADMIN — SODIUM CHLORIDE, SODIUM GLUCONATE, SODIUM ACETATE, POTASSIUM CHLORIDE AND MAGNESIUM CHLORIDE: 526; 502; 368; 37; 30 INJECTION, SOLUTION INTRAVENOUS at 18:05

## 2021-07-09 RX ADMIN — DOCUSATE SODIUM 50 MG AND SENNOSIDES 8.6 MG 2 TABLET: 8.6; 5 TABLET, FILM COATED ORAL at 05:19

## 2021-07-09 RX ADMIN — ETOMIDATE 20 MG: 2 INJECTION INTRAVENOUS at 22:44

## 2021-07-09 RX ADMIN — LIDOCAINE HYDROCHLORIDE 50 MG: 20 INJECTION, SOLUTION EPIDURAL; INFILTRATION; INTRACAUDAL at 17:48

## 2021-07-09 RX ADMIN — BISACODYL 10 MG: 10 SUPPOSITORY RECTAL at 09:49

## 2021-07-09 RX ADMIN — HYDROMORPHONE HYDROCHLORIDE 2 MG: 2 INJECTION, SOLUTION INTRAMUSCULAR; INTRAVENOUS; SUBCUTANEOUS at 18:17

## 2021-07-09 RX ADMIN — SODIUM CHLORIDE, POTASSIUM CHLORIDE, SODIUM LACTATE AND CALCIUM CHLORIDE: 600; 310; 30; 20 INJECTION, SOLUTION INTRAVENOUS at 05:32

## 2021-07-09 RX ADMIN — CALCIUM CHLORIDE 500 MG: 100 INJECTION INTRAVENOUS; INTRAVENTRICULAR at 18:45

## 2021-07-09 RX ADMIN — LORAZEPAM 1 MG: 2 INJECTION INTRAMUSCULAR; INTRAVENOUS at 15:09

## 2021-07-09 RX ADMIN — Medication 100 MCG: at 17:54

## 2021-07-09 RX ADMIN — IPRATROPIUM BROMIDE AND ALBUTEROL SULFATE 3 ML: .5; 3 SOLUTION RESPIRATORY (INHALATION) at 19:22

## 2021-07-09 RX ADMIN — PROPOFOL 100 MG: 10 INJECTION, EMULSION INTRAVENOUS at 17:48

## 2021-07-09 RX ADMIN — ONDANSETRON 4 MG: 2 INJECTION INTRAMUSCULAR; INTRAVENOUS at 18:31

## 2021-07-09 RX ADMIN — CEFEPIME 2 G: 2 INJECTION, POWDER, FOR SOLUTION INTRAVENOUS at 14:37

## 2021-07-09 RX ADMIN — DEXAMETHASONE SODIUM PHOSPHATE 8 MG: 4 INJECTION, SOLUTION INTRA-ARTICULAR; INTRALESIONAL; INTRAMUSCULAR; INTRAVENOUS; SOFT TISSUE at 17:51

## 2021-07-09 RX ADMIN — SODIUM PHOSPHATE, DIBASIC AND SODIUM PHOSPHATE, MONOBASIC 133 ML: 7; 19 ENEMA RECTAL at 09:00

## 2021-07-09 RX ADMIN — ROCURONIUM BROMIDE 100 MG: 10 INJECTION, SOLUTION INTRAVENOUS at 17:48

## 2021-07-09 ASSESSMENT — PAIN DESCRIPTION - PAIN TYPE
TYPE: SURGICAL PAIN
TYPE: ACUTE PAIN
TYPE: ACUTE PAIN
TYPE: SURGICAL PAIN
TYPE: ACUTE PAIN
TYPE: SURGICAL PAIN
TYPE: ACUTE PAIN
TYPE: SURGICAL PAIN
TYPE: ACUTE PAIN
TYPE: SURGICAL PAIN
TYPE: SURGICAL PAIN
TYPE: ACUTE PAIN
TYPE: SURGICAL PAIN
TYPE: ACUTE PAIN
TYPE: SURGICAL PAIN

## 2021-07-09 ASSESSMENT — ENCOUNTER SYMPTOMS
WEAKNESS: 1
SHORTNESS OF BREATH: 1
DIARRHEA: 0
DIZZINESS: 0
PALPITATIONS: 0
FEVER: 0
COUGH: 0
CONSTIPATION: 0
NAUSEA: 0
ABDOMINAL PAIN: 1
VOMITING: 0
BACK PAIN: 0
HEADACHES: 0

## 2021-07-09 ASSESSMENT — PAIN SCALES - GENERAL: PAIN_LEVEL: 8

## 2021-07-09 NOTE — CONSULTS
"Surgical History and Physical    Date: 7/9/2021    Requesting Physician: Dr Justin    Consulting Physician: Cody Meza M.D.    Reason for consultation: Altered mental status and abdominal pain    CC: Patient is not responsive    HPI: This is a 71 y.o. male who is presenting with a history of hepatocellular carcinoma with pulmonary metastasis on chemotherapy and immunotherapy with a history of rupture of a liver cyst with hemoperitoneum.   The patient has been doing relatively well with a stable hemoglobin when he began to have worsening abdominal pain and altered mental status. He had new onset leukocytosis to 13.4. CT imaging demonstrated a new fluid filled distended small bowel loops worrisome for enteritis or possible obstruction. There was also wall thickening of the ascending colon consistent with colitis. Due to abdominal rigidity the differential included abdominal compartment syndrome and a Triana catheter was placed and there was only 45 cc of urine noted.    Past Medical History:   Diagnosis Date   • Alcohol use 3/31/2016    2-3 per day   • Borderline diabetes    • Cancer (HCC) 2019    liver    • Cataract     itzel IOL    • Dental disorder     needs dentures upper   • Diabetes (HCC)     diet controlled    • Disorder of thyroid     not on medication   • Glaucoma     left eye    • High cholesterol     not on medication   • Hypertension        Past Surgical History:   Procedure Laterality Date   • OTHER  12/2019    \"IR embolization\"    • PARATHYROIDECTOMY N/A 5/13/2016    Procedure: PARATHYROIDECTOMY;  Surgeon: Kale Lord M.D.;  Location: SURGERY SAME DAY [facility] ORS;  Service:    • CATARACT PHACO WITH IOL Left 4/7/2016    Procedure: CATARACT PHACO WITH IOL;  Surgeon: Ephraim Jamison M.D.;  Location: SURGERY SURGICAL [facility] ORS;  Service:    • BONE GRAFT Right 1960's    right wrist   • OTHER      chemoemolozation x2   • WRIST ORIF Right 1960's       Current Facility-Administered Medications "   Medication Dose Route Frequency Provider Last Rate Last Admin   • cefepime (Maxipime) 2 g in sterile water 20 mL IV syringe  2 g Intravenous Q8HRS Jossue Justin M.D.   Stopped at 07/09/21 1442   • metroNIDAZOLE (FLAGYL) IVPB 500 mg  500 mg Intravenous Q8HRS Jossue Justin M.D.   Stopped at 07/09/21 1537   • lactulose 20 GM/30ML solution 30 mL  30 mL Oral BID Jossue Justin M.D.       • famotidine (PEPCID) tablet 20 mg  20 mg Oral DAILY Jossue Justin M.D.   20 mg at 07/09/21 0519   • lactated ringers infusion   Intravenous Continuous Koby Lange M.D. 83 mL/hr at 07/09/21 0532 New Bag at 07/09/21 0532   • guaiFENesin dextromethorphan (ROBITUSSIN DM) 100-10 MG/5ML syrup 5 mL  5 mL Oral Q6HRS PRIZAIAH Lange M.D.   5 mL at 07/05/21 2337   • [Held by provider] oxyCODONE immediate-release (ROXICODONE) tablet 5 mg  5 mg Oral Q3HRS PRIZAIAH Lange M.D.   5 mg at 07/06/21 2322    Or   • [Held by provider] oxyCODONE immediate release (ROXICODONE) tablet 10 mg  10 mg Oral Q3HRS PRIZAIAH Lange M.D.   10 mg at 07/08/21 0304    Or   • [Held by provider] HYDROmorphone (Dilaudid) injection 0.25 mg  0.25 mg Intravenous Q3HRS PRIZAIAH Lange M.D.   0.25 mg at 07/08/21 0550   • calcium carbonate (TUMS) chewable tab 500 mg  500 mg Oral TID PRIZAIAH Lange M.D.   500 mg at 07/06/21 1724   • senna-docusate (PERICOLACE or SENOKOT S) 8.6-50 MG per tablet 2 tablet  2 tablet Oral BID Mel Howell M.D.   2 tablet at 07/09/21 0519    And   • polyethylene glycol/lytes (MIRALAX) PACKET 1 Packet  1 Packet Oral QDAY PRN JUAN LalD.   1 Packet at 07/08/21 0550    And   • magnesium hydroxide (MILK OF MAGNESIA) suspension 30 mL  30 mL Oral QDAY LUKAS Howell M.D.   30 mL at 07/09/21 0520    And   • bisacodyl (DULCOLAX) suppository 10 mg  10 mg Rectal QDAY LUKAS Howell M.D.   10 mg at 07/09/21 0949   • ondansetron (ZOFRAN) syringe/vial injection 4 mg  4 mg Intravenous Q4HRS LUKAS Howell M.D.       • ondansetron  (CARLYLE KRAFT) dispertab 4 mg  4 mg Oral Q4HRS PRN Mel Howell M.D.   4 mg at 07/06/21 0301   • nicotine (NICODERM) 21 MG/24HR 21 mg  21 mg Transdermal Daily-0600 Mel Howell M.D.   21 mg at 07/07/21 0643    And   • nicotine polacrilex (NICORETTE) 2 MG piece 2 mg  2 mg Oral Q HOUR PRN Mel Howell M.D.       • insulin regular (HumuLIN R,NovoLIN R) injection  1-6 Units Subcutaneous Q6HRS Mel Howell M.D.        And   • glucose 4 g chewable tablet 16 g  16 g Oral Q15 MIN PRN Mel Howell M.D.        And   • dextrose 50% (D50W) injection 50 mL  50 mL Intravenous Q15 MIN PRN Mel Howell M.D.       • acetaminophen (Tylenol) tablet 650 mg  650 mg Oral Q6HRS PRN Mel Howell M.D.       • Pharmacy Consult Request ...Pain Management Review 1 Each  1 Each Other PHARMACY TO DOSE Mel Howell M.D.           Social History     Socioeconomic History   • Marital status:      Spouse name: Not on file   • Number of children: Not on file   • Years of education: Not on file   • Highest education level: Not on file   Occupational History   • Not on file   Tobacco Use   • Smoking status: Current Every Day Smoker     Packs/day: 0.50     Years: 30.00     Pack years: 15.00     Types: Cigarettes     Start date: 1/1/1990   • Smokeless tobacco: Never Used   • Tobacco comment: quit couple times of the years   Vaping Use   • Vaping Use: Never used   Substance and Sexual Activity   • Alcohol use: Not Currently     Alcohol/week: 2.4 oz     Types: 4 Shots of liquor per week     Comment: pt quit drinking 2 months ago   • Drug use: No   • Sexual activity: Not on file   Other Topics Concern   • Not on file   Social History Narrative   • Not on file     Social Determinants of Health     Financial Resource Strain:    • Difficulty of Paying Living Expenses:    Food Insecurity:    • Worried About Running Out of Food in the Last Year:    • Ran Out of Food in the Last Year:    Transportation Needs:    • Lack of Transportation (Medical):    • Lack of Transportation (Non-Medical):  "   Physical Activity:    • Days of Exercise per Week:    • Minutes of Exercise per Session:    Stress:    • Feeling of Stress :    Social Connections:    • Frequency of Communication with Friends and Family:    • Frequency of Social Gatherings with Friends and Family:    • Attends Sabianism Services:    • Active Member of Clubs or Organizations:    • Attends Club or Organization Meetings:    • Marital Status:    Intimate Partner Violence:    • Fear of Current or Ex-Partner:    • Emotionally Abused:    • Physically Abused:    • Sexually Abused:        Family History   Problem Relation Age of Onset   • Cancer Mother         unknown cancer       Allergies:  Sulfa drugs    Review of Systems: Unable to obtain due to patient's altered mental status      Physical Exam:  /94   Pulse (!) 123   Temp 36.2 °C (97.2 °F) (Temporal)   Resp (!) 24   Ht 1.803 m (5' 11\")   Wt 87.3 kg (192 lb 7.4 oz)   SpO2 94%     Constitutional: Awake and not following commands   Head: Normocephalic and atraumatic.   Neck: Normal range of motion. Neck supple. No JVD present. No tracheal deviation present. No thyromegaly present.   Cardiovascular: Tachycardia     Pulmonary/Chest: Tachypnea  Abdominal: Distended and rigid  Musculoskeletal: Normal range of motion.  exhibits no edema and no tenderness.   Neurological: Unable to evaluate due to altered mental status  Skin: Skin is warm and dry. No rash noted. he is not diaphoretic. No erythema. No pallor.   Psychiatric: Unable to evaluate due to altered mental status    Labs:  Recent Labs     07/07/21  0309 07/09/21  0256   WBC 7.0 13.4*   RBC 3.75* 4.00*   HEMOGLOBIN 11.9* 12.6*   HEMATOCRIT 35.9* 38.5*   MCV 95.7 96.3   MCH 31.7 31.5   MCHC 33.1* 32.7*   RDW 53.1* 55.4*   PLATELETCT 80* 89*   MPV 8.8* 8.8*     Recent Labs     07/07/21  0309 07/08/21  0040 07/09/21  0256   SODIUM 132* 127* 135   POTASSIUM 4.7 5.3 5.4   CHLORIDE 95* 93* 98   CO2 27 25 27   GLUCOSE 113* 123* 115*   BUN 17 25* " 30*   CREATININE 0.55 0.70 0.67   CALCIUM 9.1 9.4 9.1     Recent Labs     07/09/21  0811   INR 1.48*     Recent Labs     07/08/21  0040 07/09/21  0811   ASTSGOT  --  60*  59*   ALTSGPT  --  19  19   TBILIRUBIN  --  1.7*  1.7*   IBILIRUBIN  --  0.6  0.5   DBILIRUBIN  --  1.1*  1.2*   ALKPHOSPHAT  --  361*  362*   INR  --  1.48*   AMMONIA 32  --          Radiology:  CT-ABDOMEN-PELVIS W/O   Final Result      1.  New fluid distended small bowel loops which are nonspecific and could be related to enteritis or small bowel ileus. Developing obstruction is considered less likely though not excluded.   2.  There appears to be new wall thickening of the ascending colon likely representing infectious/inflammatory colitis.   3.  Mild ascites which is mildly increased from prior study. Omental and mesenteric infiltration which is new from prior and could be related to edema or inflammation.   4.  Small right pleural effusion. Bibasilar atelectasis.   5.   No other significant change.      DX-CHEST-PORTABLE (1 VIEW)   Final Result      1.  New right basilar airspace process consistent with atelectasis or pneumonia      2.  Mild left midlung zone atelectasis      [id number] VIEW   Final Result      1. Moderate stool mixed with contrast in the ascending colon.   2. Moderate stool in the transverse colon.   3. No bowel obstruction.   4. Other chronic degenerative changes.      CT-ABDOMEN-PELVIS W/O   Final Result      1.  There is no acute inflammatory process in the abdomen or pelvis.   2.  The treated lesions in the liver are again seen without interval change.   3.  Splenomegaly is again seen.   4.  Cystic pancreatic tail lesion is unchanged possibly a pseudocyst.   5.  There is colonic diverticulosis without diverticulitis.   6.  There is mild wall thickening of the distal esophagus.   7.  There is a stable small amount of ascites.   8.  There is moderate colonic stool.      DX-CHEST-PORTABLE (1 VIEW)   Final Result       Hypoinflation with bibasilar atelectasis. No focal consolidation or pleural effusions.      DX-CHEST-PORTABLE (1 VIEW)   Final Result      Left basilar atelectasis.      CT-ABDOMEN-PELVIS WITH   Final Result      1.  Primarily cystic mass in the medial segment LEFT lobe liver, likely treated hepatoma.   2.  Complex low-attenuation lesion in the inferior aspect medial segment LEFT lobe liver which is new from prior exam and may represent cystic mass or focal hematoma related to rupture larger mass.  Minimal hyperdense components may indicate blood    products.  Abscess is felt less likely.   3.  Gallbladder is not visualized and may be compressed by or involved with liver mass.   4.  Small volume ascites with potential hemoperitoneum.   5.  Splenomegaly.      These findings were discussed with CARMEN OTTO on 7/2/2021 3:02 PM.      DX-CHEST-PORTABLE (1 VIEW)   Final Result      1.  Hypoinflation with minimal LEFT lung base atelectasis.   2.  No pneumonia or pulmonary edema.          Assessment: This is a 71 y.o.     Active Hospital Problems    Diagnosis    • Abdominal pain [R10.9]      Priority: High   • Hepatoma (HCC) [C22.0]      Priority: High   • Sepsis (HCC) [A41.9]    • Acute urinary retention [R33.8]    • Encephalopathy acute [G93.40]    • Hypomagnesemia [E83.42]    • Hyponatremia [E87.1]    • Hyperkalemia [E87.5]    • Elevated bilirubin [R17]    • Lactic acidosis [E87.2]    • Constipation [K59.00]    • Tobacco abuse [Z72.0]    • Thrombocytopenia (HCC) [D69.6]    • Type 2 diabetes mellitus without complication, without long-term current use of insulin (HCC) [E11.9]        Plan: 71-year-old male with hepatocellular carcinoma and pulmonary metastasis status post hemoperitoneum now with possible bacterial peritonitis versus ischemic bowel compartment syndrome  Given the above presentation, the patient will be taken to the operating room for laparoscopic versus open colon resection. The surgical plan was  discussed with the patient's available family. Potential complications were discussed in detail and include but are not limited to infection, bleeding, damage to adjacent tissues and organs, anesthetic complications . Additional possible complications specifically related to the planned procedure included in the discussion were related to leak,failure to localize lesion, bowel, bladder, or vascular trocar injury, abscess, fistula, hernia or need for permanent or temporary ostomy.    Questions were elicited and answered to the patient's and available family's satisfaction. The patient understands the rationale for surgery and agrees to proceed.  Operative consent was obtained.  According to the [employer] NSQIP surgical risk report the patient is above average in terms of risk for any or serious complications including respiratory, cardiac, death, return to OR for discharge to a nursing or rehabilitation facility.        Cody Meza MD PhD  [facility]  Colon and Rectal Surgeon  [phone]

## 2021-07-09 NOTE — ASSESSMENT & PLAN NOTE
This is Sepsis Present on admission  SIRS criteria identified on my evaluation include: Tachycardia, with heart rate greater than 90 BPM, Tachypnea, with respirations greater than 20 per minute and Leukocytosis, with WBC greater than 12,000  Source - Pneumonia  Sepsis protocol initiated  Fluid resuscitation ordered per protocol

## 2021-07-09 NOTE — THERAPY
Missed Therapy     Patient Name: Lorraine Bella  Age:  71 y.o., Sex:  male  Medical Record #: [id number]  Today's Date: 7/9/2021 07/09/21 0914   Interdisciplinary Plan of Care Collaboration   Collaboration Comments Attempted therapy follow up session this am. Per RN  hold on therapy session right now, pt is lethargic, in pain and currently receiving an enema. Will follow up as able.

## 2021-07-09 NOTE — DIETARY
NUTRITION SERVICES  Diet order has been NPO/clear liquid for 7 days. Therefore, nutrition has been inadequate.    Abdominal x-ray from 7/7 showed patient has back above stool all the way to the cecum. Lactulose given on 7/7. Pt had a small bowel movement yesterday. MD ordered a CT abdomen pelvis without contrast today and ordered for enema and suppository again today.    Please advance diet order past NPO/clear liquid when medically feasible.

## 2021-07-09 NOTE — ASSESSMENT & PLAN NOTE
Acute metabolic encephalopathy   Avoid benzodiazepines and anticholinergics  Frequent orientation  Avoid early morning labs  Avoid vital signs during sleep  Ambulate if possible

## 2021-07-09 NOTE — CARE PLAN
The patient is Watcher - Medium risk of patient condition declining or worsening    Shift Goals  Clinical Goals: Monitor orientation; have a bowel movement  Patient given medications to relieve constipation, no substantial progress made. Patient has altered mental status, will continue to monitor.    Patient Goals: Have a bowel movement  Family Goals: N/A    Progress made toward(s) clinical / shift goals:    Patient is not progressing towards the following goals:      Problem: Bowel Elimination  Goal: Establish and maintain regular bowel function  Outcome: Not Progressing

## 2021-07-09 NOTE — DISCHARGE PLANNING
Anticipated Discharge Disposition: SNF when medically cleared    Action: Discussed the patient in IDT rounds. The patient has been accepted at [facility]. Per MD, the patient is not medically cleared for discharge at this time.    Barriers to Discharge: Medical clearance, bed availability    Plan: Will continue to follow for any discharge needs/barriers

## 2021-07-09 NOTE — THERAPY
Missed Therapy     Patient Name: Lorraine Bella  Age:  71 y.o., Sex:  male  Medical Record #: [id number]  Today's Date: 7/9/2021    Discussed missed therapy with RN    Attempted to see pt for OT tx. RN asked therapy to hold at this time d/t pt being in high levels of pain and increased lethargy. Will try again later as able.

## 2021-07-09 NOTE — PROGRESS NOTES
Handoff report received from night shift nurse. Pt care assumed. Pt is currently resting in bed. POC discussed with Pt and Pt verbalizes no questions at this time. Pt is A&Ox3 to self, time, and location, on 2L NC, on Tele monitoring, and VSS. Call light and belongings within reach, bed in lowest and locked position, and Pt educated on use of call light. Will continue to monitor.

## 2021-07-09 NOTE — PROGRESS NOTES
[facility] Medicine Daily Progress Note    Date of Service  7/9/2021    Chief Complaint  Lorraine Bella is a 71 y.o. male admitted 7/2/2021 with abdominal pain and shoulder pain    Hospital Course  71-year-old male patient with past medical history of hepatoma since 2018, on IV immunotherapy last dose 2 weeks ago follows with Dr. Birch presented on 7/2/2021 with sudden onset abdominal pain.  CT of the abdomen showed possible ruptured hepatic cyst with hemoperitoneum.   Surgery was consulted recommended medical management with trending hemoglobin.   Patient was requiring oxygen supplementation as the pain was causing him respiratory distress and difficulty with full inspiration.    Interval Problem Update  7/6-  Patient stated he had decreased eating, increase in abdominal bloating.  Patient was still on 2 L nasal cannula for short shallow breathing.  Patient is not on home oxygen. Patient's pain has improved it was severe at home, currently mild and controlled.    Generally surgery has recommended for patient to have IR embolization if there is any evidence of bleeding.  Patient to follow-up with GI and IR outpatient.  General surgery signing off patient's case today.    Called to patient's wife Mrs. Sharlene Bella, no answer on phone call.  Will attempt again for update tomorrow.    7/7 -patient stated he was having increased bloating today.  Patient reported that he has not had a bowel for 1 week now.  Patient denied any chest pain, any nausea or vomiting.  Patient was requesting for pain medication.  I obtain x-ray of the abdomen, showed patient has back above stool all the way to the cecum.  Ordered for lactulose today.  SNF was ordered and choice was obtained from the wife.    7/8-patient seen and evaluated bedside with nurse.  Patient again shows bloating in his abdomen.  Patient did not have a bowel movement yesterday.  Patient was given lactulose yesterday without any help.  Patient was given a  suppository today and nursing reported he had a small bowel movement.  Patient was appearing to be more obtunded and difficult to speak with but is AO x3. Working on bowel movements. Patient does not appear septic.    7/9-patient seen and evaluated at bedside at 9AM.  Patient stated he did not have any pain compared to yesterday.  Patient continues to have similar appearance from yesterday, sleeping with his eyes open.  Patient is AO x3 when fully awake.  Patient's abdomen noted to be distended still.  Patient has small bowel movement yesterday.  Ordered for a CT abdomen pelvis without contrast today to see if there is any pneumoperitoneum, ordered for enema and suppository again today.    1400PM - notified by bedside RN Chava, patient was being combative and attempted to hit staff, patient difficult to orient. Evaluated patient at bedside, spoke with wife Mrs. Bella, she agreed patient was not in his normal mentation. Patient was placed on bilateral soft wrist restraints.   Messaged Dr. Cody Meza ([email or web address]) on updates, surgery signed off on 7/6, Dr. Meza will see patient today.  Traction bladder scan showed 500mL of urine retention, ordered for Underwood catheter insertion, wife verbally consented to insertion.    1600--spoke to consult ICU Dr. Pulliam, recommended to obtain ABG, KUB, BMP, insert underwood. Bladder scan reading did not align with abdominal compartment syndrome. Orders placed.  CT scan performed, pending official results.    1620PM - Present bedside with Dr. Meza, decision to take patient to OR tonight for diagnostic ex lap. Wife was present and agrees to procedure.    I have personally seen and examined the patient at bedside. I discussed the plan of care with patient, family, bedside RN, charge RN,  and general surgery.    Consultants/Specialty  general surgery    Code Status  Full Code    Disposition  Patient is not medically cleared.   Anticipate discharge to to skilled nursing  facility.   I have placed the appropriate orders for post-discharge needs.    Review of Systems  Review of Systems   Constitutional: Negative for fever and malaise/fatigue.   Respiratory: Positive for shortness of breath. Negative for cough.    Cardiovascular: Negative for chest pain and palpitations.   Gastrointestinal: Positive for abdominal pain (Distention). Negative for constipation, diarrhea, nausea and vomiting.   Musculoskeletal: Negative for back pain and joint pain.   Neurological: Positive for weakness. Negative for dizziness and headaches.   All other systems reviewed and are negative.     Physical Exam  Temp:  [36 °C (96.8 °F)-36.3 °C (97.3 °F)] 36.2 °C (97.2 °F)  Pulse:  [116-124] 123  Resp:  [20-24] 24  BP: (123-156)/(85-95) 123/94  SpO2:  [93 %-97 %] 94 %    Physical Exam  Vitals and nursing note reviewed.   Constitutional:       General: He is not in acute distress.     Appearance: Normal appearance. He is not diaphoretic.   HENT:      Head: Normocephalic and atraumatic.      Nose: Nose normal. No rhinorrhea.   Eyes:      General: No scleral icterus.     Pupils: Pupils are equal, round, and reactive to light.   Cardiovascular:      Rate and Rhythm: Normal rate and regular rhythm.      Pulses: Normal pulses.      Heart sounds: Normal heart sounds. No murmur heard.     Pulmonary:      Effort: Respiratory distress (Mild) present.      Breath sounds: Normal breath sounds.      Comments: On 2 L nasal cannula  Abdominal:      General: There is distension (Tympanic).      Tenderness: There is no abdominal tenderness. There is no guarding.   Musculoskeletal:         General: No swelling or tenderness. Normal range of motion.      Cervical back: Normal range of motion and neck supple.   Skin:     General: Skin is warm.      Capillary Refill: Capillary refill takes less than 2 seconds.      Coloration: Skin is not jaundiced or pale.   Neurological:      General: No focal deficit present.      Mental Status:  He is alert and oriented to person, place, and time. Mental status is at baseline.   Psychiatric:         Mood and Affect: Mood normal.         Behavior: Behavior normal.         Thought Content: Thought content normal.         Judgment: Judgment normal.         Fluids    Intake/Output Summary (Last 24 hours) at 7/9/2021 1642  Last data filed at 7/9/2021 1530  Gross per 24 hour   Intake --   Output 995 ml   Net -995 ml       Laboratory  Recent Labs     07/07/21  0309 07/09/21  0256   WBC 7.0 13.4*   RBC 3.75* 4.00*   HEMOGLOBIN 11.9* 12.6*   HEMATOCRIT 35.9* 38.5*   MCV 95.7 96.3   MCH 31.7 31.5   MCHC 33.1* 32.7*   RDW 53.1* 55.4*   PLATELETCT 80* 89*   MPV 8.8* 8.8*     Recent Labs     07/07/21  0309 07/08/21  0040 07/09/21  0256   SODIUM 132* 127* 135   POTASSIUM 4.7 5.3 5.4   CHLORIDE 95* 93* 98   CO2 27 25 27   GLUCOSE 113* 123* 115*   BUN 17 25* 30*   CREATININE 0.55 0.70 0.67   CALCIUM 9.1 9.4 9.1     Recent Labs     07/09/21  0811   INR 1.48*               Imaging  CT-ABDOMEN-PELVIS W/O   Final Result      1.  New fluid distended small bowel loops which are nonspecific and could be related to enteritis or small bowel ileus. Developing obstruction is considered less likely though not excluded.   2.  There appears to be new wall thickening of the ascending colon likely representing infectious/inflammatory colitis.   3.  Mild ascites which is mildly increased from prior study. Omental and mesenteric infiltration which is new from prior and could be related to edema or inflammation.   4.  Small right pleural effusion. Bibasilar atelectasis.   5.   No other significant change.      DX-CHEST-PORTABLE (1 VIEW)   Final Result      1.  New right basilar airspace process consistent with atelectasis or pneumonia      2.  Mild left midlung zone atelectasis      [id number] VIEW   Final Result      1. Moderate stool mixed with contrast in the ascending colon.   2. Moderate stool in the transverse colon.   3. No bowel  obstruction.   4. Other chronic degenerative changes.      CT-ABDOMEN-PELVIS W/O   Final Result      1.  There is no acute inflammatory process in the abdomen or pelvis.   2.  The treated lesions in the liver are again seen without interval change.   3.  Splenomegaly is again seen.   4.  Cystic pancreatic tail lesion is unchanged possibly a pseudocyst.   5.  There is colonic diverticulosis without diverticulitis.   6.  There is mild wall thickening of the distal esophagus.   7.  There is a stable small amount of ascites.   8.  There is moderate colonic stool.      DX-CHEST-PORTABLE (1 VIEW)   Final Result      Hypoinflation with bibasilar atelectasis. No focal consolidation or pleural effusions.      DX-CHEST-PORTABLE (1 VIEW)   Final Result      Left basilar atelectasis.      CT-ABDOMEN-PELVIS WITH   Final Result      1.  Primarily cystic mass in the medial segment LEFT lobe liver, likely treated hepatoma.   2.  Complex low-attenuation lesion in the inferior aspect medial segment LEFT lobe liver which is new from prior exam and may represent cystic mass or focal hematoma related to rupture larger mass.  Minimal hyperdense components may indicate blood    products.  Abscess is felt less likely.   3.  Gallbladder is not visualized and may be compressed by or involved with liver mass.   4.  Small volume ascites with potential hemoperitoneum.   5.  Splenomegaly.      These findings were discussed with CARMEN OTTO on 7/2/2021 3:02 PM.      DX-CHEST-PORTABLE (1 VIEW)   Final Result      1.  Hypoinflation with minimal LEFT lung base atelectasis.   2.  No pneumonia or pulmonary edema.           Assessment/Plan  * Abdominal pain- (present on admission)  Assessment & Plan  Diffuse abdominal pain, improved but distention is worse.  May be all due to severe constipation, fecal impaction.  7/5 - CT abdomen showed liver cystic mass/hematoma, likely cystic rupture    Surgery consulted, recommended to continue to monitor,  surgery signed off 7/6 recommendations are in their last note.    Recommended to hold any antiplatelet and any anticoagulation at this time.    Order for CT abdomen pelvis without contrast, concern for increased distention, rule out pneumoperitoneum  Bowel regimen in place  Continue with suppository.  PRN enema.  If ineffective, digital rectal removal.    7/9/2021 - additional evaluation of patient done today.    Notified by bedside RN Chava, patient was being combative and attempted to hit staff, patient difficult to orient.  Unable to obtain CT A/P at first due to patient being non-cooperative. Evaluated patient at bedside, spoke with wife Mrs. Bella, she agreed patient was not in his normal mentation.  Patient has glazing stare, unable to understand the need for imaging. Patient was placed on bilateral soft wrist restraints, unable to redirect, danger to staff.  Messaged Dr. Cody Meza ([employer]) on updates, surgery signed off on 7/6, Dr. Meza will see patient today. Concern for acute abdominal compartment syndrome.   Start time: 1400PM End Time: 1445PM    7/9/21 - Additional time spent face to face with patient  1600PM -spoke to consult ICU Dr. Pulliam, recommended to obtain ABG, KUB, BMP, insert underwood. Bladder scan reading did not align with abdominal compartment syndrome. Orders placed. CT scan performed, pending official results.  Present bedside speaking with wife.  1615PM - Present bedside with Dr. Meza, decision to take patient to OR tonight for diagnostic ex lap. Wife was present and agrees to procedure.  Start time 1600  End time 1631PM.  More than 50% of time was in direct face to face with patient. Other time was discussing treatment plans with specialists, wife and nursing staff.    Encephalopathy acute  Assessment & Plan  Patient had mental status change 7/9 afternoon. Due to suspected infection, source unclear at this time, likely source is intra-abdominal.  IV cefepime and flagyl started  "today  Continue oxygen support  Checking ABG, bmp  CT A/P obtained pending official read.    Acute urinary retention  Assessment & Plan  7/9 - traction bladder scan = 500mL. Ordered for underwood catheter insertion, wife consented to insertion.  Underwood removed 45ml urine only. Bladder scan likely picked up ascites.    Sepsis (HCC)  Assessment & Plan  This is Sepsis Present on admission  SIRS criteria identified on my evaluation include: Tachycardia, with heart rate greater than 90 BPM, Tachypnea, with respirations greater than 20 per minute and Leukocytosis, with WBC greater than 12,000  Source is unknown, possibly abdominal  Sepsis protocol initiated  Fluid resuscitation ordered per protocol  IV antibiotics as appropriate for source of sepsis  While organ dysfunction may be noted elsewhere in this problem list or in the chart, degree of organ dysfunction does not meet CMS criteria for severe sepsis    Sepsis was present during admission, patient was tachycardic and tachypneic. Patient was given cefepime and flagyl on day of admission.  Sepsis appears to be returning despite patient improvement.  May be a new process or ongoing from original.    7/5/21 CT showed:   \"Lower Chest: Visualized lung bases demonstrate a small dependent right pleural effusion and trace of left pleural fluid. There is dependent atelectasis or scar, right greater than left. There is a small amount of pericardial effusion\"     \"Liver: Liver again demonstrates a hypodense area most consistent with a treated hepatoma in the right lobe measuring 10.8 x 9.0 cm (previously 11.1 x 9.4 cm). 2nd ill-defined hypodense area more inferiorly possibly in the medial segment left lobe   measuring 5.3 x 4.9 cm (previously 5.0 x 5.5 cm). These do not have any significant interval change during is very short time interval.  Pancreas: The 16 x 14 mm cystic lesion in the pancreatic tail is not significantly changed.  Bowel: There is residual contrast in the distal " "esophagus with mild wall thickening. There is colonic diverticulosis. There is moderate colonic stool.\"    Hypomagnesemia  Assessment & Plan  Patient's magnesium was 1.7.  Patient does have constipation but was hesitant for oral replacements.  Replace via IV.  -We will have magnesium tablets replacements  Recheck magnesium level.    Lactic acidosis- (present on admission)  Assessment & Plan  Lactic acid 2.7 at admission  Improved no need for further recheck  IV fluid  Trending down    Elevated bilirubin- (present on admission)  Assessment & Plan  Bilirubin 2.3. Down to 1.7. improved  Continue to monitor  Trending down likely dehydration induced     Hyperkalemia- (present on admission)  Assessment & Plan  Potassium 5.7 at admission  Normalizing  IV fluid  Trended down with IV fluids    Hyponatremia- (present on admission)  Assessment & Plan  Likely due to chronic liver disease  Improving  Continue to monitor  C/w iv fluids, f/u u na and u osm  Improving with IV fluids    Constipation- (present on admission)  Assessment & Plan  Patient has had chronic issues with constipation.  Severe, not improving.  Reported last bowel movement was over a week ago.  Abdominal x-ray showed constipation in the large colon.    Order for CT abdomen pelvis without contrast, concern for increased distention, rule out pneumoperitoneum  Bowel regimen in place  Continue with suppository.  PRN enema.  If ineffective, digital rectal removal.    Hepatoma (HCC)- (present on admission)  Assessment & Plan  On immunotherapy    Tobacco abuse- (present on admission)  Assessment & Plan  Smoking cessation was given at bedside  Nicotine patch      Thrombocytopenia (HCC)- (present on admission)  Assessment & Plan   at admission  Secondary to liver disease    Continue to monitor    Type 2 diabetes mellitus without complication, without long-term current use of insulin (HCC)- (present on admission)  Assessment & Plan  Borderline diabetes, not on " medications  07/03/21 - HbA1c 6.0, controlled, prediabetes at this time.  ISS, accuchecks, hypoglycemia protocol orders in place     VTE prophylaxis: SCDs/TEDs    I have performed a physical exam and reviewed and updated ROS and Plan today (7/9/2021). In review of yesterday's note (7/8/2021), there are no changes except as documented above.

## 2021-07-09 NOTE — CARE PLAN
Problem: Pain - Standard  Goal: Alleviation of pain or a reduction in pain to the patient’s comfort goal  Outcome: Progressing  Flowsheets (Taken 7/8/2021 2000)  Pain Rating Scale (NPRS): 8  Note: Patient remains in 8/10 abdominal pain.  Pain medications are being held by the provider.      Problem: Fall Risk  Goal: Patient will remain free from falls  Outcome: Progressing     Problem: Bowel Elimination  Goal: Establish and maintain regular bowel function  Outcome: Progressing  Note: Patient has one small bowel movement yesterday.  Will continue to give patient medications to help him have another bowel movement   The patient is Unstable - High likelihood or risk of patient condition declining or worsening    Shift Goals  Clinical Goals: Monitor orientation, have a bowel movement  Patient Goals: Reduction in pain  Family Goals: N/A    Progress made toward(s) clinical / shift goals: Patient has remained free from falls throughout shift.    Patient is not progressing towards the following goals: Patient has not had a bowel movement for me.  Patient remains AO x3 and confused.  Patient is continually pulling at IV, condom cath, and taking off oxygen.  Will continue to re-educate patient and assess patient's understanding.

## 2021-07-10 ENCOUNTER — APPOINTMENT (OUTPATIENT)
Dept: RADIOLOGY | Facility: MEDICAL CENTER | Age: 71
DRG: 356 | End: 2021-07-10
Attending: INTERNAL MEDICINE
Payer: MEDICARE

## 2021-07-10 ENCOUNTER — APPOINTMENT (OUTPATIENT)
Dept: RADIOLOGY | Facility: MEDICAL CENTER | Age: 71
DRG: 356 | End: 2021-07-10
Attending: NURSE PRACTITIONER
Payer: MEDICARE

## 2021-07-10 PROBLEM — J96.02 ACUTE RESPIRATORY FAILURE WITH HYPERCAPNIA (HCC): Status: ACTIVE | Noted: 2021-07-10

## 2021-07-10 PROBLEM — D64.9 NORMOCHROMIC ANEMIA: Status: ACTIVE | Noted: 2021-07-10

## 2021-07-10 PROBLEM — Z98.890 STATUS POST LAPAROTOMY: Status: ACTIVE | Noted: 2021-07-10

## 2021-07-10 PROBLEM — R56.9 NEW ONSET SEIZURE (HCC): Status: ACTIVE | Noted: 2021-07-10

## 2021-07-10 PROBLEM — R29.90 ABNORMAL NEUROLOGICAL EXAM: Status: ACTIVE | Noted: 2021-07-10

## 2021-07-10 PROBLEM — J69.0 ASPIRATION PNEUMONIA (HCC): Status: ACTIVE | Noted: 2021-07-10

## 2021-07-10 LAB
ALBUMIN SERPL BCP-MCNC: 1.6 G/DL (ref 3.2–4.9)
ANION GAP SERPL CALC-SCNC: 10 MMOL/L (ref 7–16)
ANION GAP SERPL CALC-SCNC: 8 MMOL/L (ref 7–16)
ANISOCYTOSIS BLD QL SMEAR: ABNORMAL
BASE EXCESS BLDA CALC-SCNC: 1 MMOL/L (ref -4–3)
BASE EXCESS BLDA CALC-SCNC: 2 MMOL/L (ref -4–3)
BASE EXCESS BLDA CALC-SCNC: 4 MMOL/L (ref -4–3)
BASOPHILS # BLD AUTO: 0 % (ref 0–1.8)
BASOPHILS # BLD: 0 K/UL (ref 0–0.12)
BODY TEMPERATURE: ABNORMAL DEGREES
BREATHS SETTING VENT: 20
BREATHS SETTING VENT: 26
BREATHS SETTING VENT: 26
BUN SERPL-MCNC: 30 MG/DL (ref 8–22)
BUN SERPL-MCNC: 36 MG/DL (ref 8–22)
BUN SERPL-MCNC: 38 MG/DL (ref 8–22)
CALCIUM SERPL-MCNC: 8.4 MG/DL (ref 8.5–10.5)
CALCIUM SERPL-MCNC: 8.5 MG/DL (ref 8.5–10.5)
CALCIUM SERPL-MCNC: 8.7 MG/DL (ref 8.5–10.5)
CHLORIDE SERPL-SCNC: 101 MMOL/L (ref 96–112)
CHLORIDE SERPL-SCNC: 102 MMOL/L (ref 96–112)
CHLORIDE SERPL-SCNC: 103 MMOL/L (ref 96–112)
CO2 BLDA-SCNC: 29 MMOL/L (ref 20–33)
CO2 BLDA-SCNC: 30 MMOL/L (ref 20–33)
CO2 BLDA-SCNC: 32 MMOL/L (ref 20–33)
CO2 SERPL-SCNC: 24 MMOL/L (ref 20–33)
CO2 SERPL-SCNC: 25 MMOL/L (ref 20–33)
CO2 SERPL-SCNC: 27 MMOL/L (ref 20–33)
CREAT SERPL-MCNC: 0.74 MG/DL (ref 0.5–1.4)
CREAT SERPL-MCNC: 0.81 MG/DL (ref 0.5–1.4)
CREAT SERPL-MCNC: 0.82 MG/DL (ref 0.5–1.4)
DELSYS IDSYS: ABNORMAL
END TIDAL CARBON DIOXIDE IECO2: 29 MMHG
END TIDAL CARBON DIOXIDE IECO2: 32 MMHG
END TIDAL CARBON DIOXIDE IECO2: 46 MMHG
EOSINOPHIL # BLD AUTO: 0 K/UL (ref 0–0.51)
EOSINOPHIL NFR BLD: 0 % (ref 0–6.9)
ERYTHROCYTE [DISTWIDTH] IN BLOOD BY AUTOMATED COUNT: 58.4 FL (ref 35.9–50)
FUNGUS SPEC FUNGUS STN: NORMAL
GLUCOSE BLD-MCNC: 114 MG/DL (ref 65–99)
GLUCOSE BLD-MCNC: 138 MG/DL (ref 65–99)
GLUCOSE BLD-MCNC: 141 MG/DL (ref 65–99)
GLUCOSE SERPL-MCNC: 119 MG/DL (ref 65–99)
GLUCOSE SERPL-MCNC: 127 MG/DL (ref 65–99)
GLUCOSE SERPL-MCNC: 147 MG/DL (ref 65–99)
GRAM STN SPEC: NORMAL
GRAM STN SPEC: NORMAL
HCO3 BLDA-SCNC: 27.7 MMOL/L (ref 17–25)
HCO3 BLDA-SCNC: 28.9 MMOL/L (ref 17–25)
HCO3 BLDA-SCNC: 30.2 MMOL/L (ref 17–25)
HCT VFR BLD AUTO: 36.1 % (ref 42–52)
HGB BLD-MCNC: 11.4 G/DL (ref 14–18)
HOROWITZ INDEX BLDA+IHG-RTO: 113 MM[HG]
HOROWITZ INDEX BLDA+IHG-RTO: 116 MM[HG]
HOROWITZ INDEX BLDA+IHG-RTO: 168 MM[HG]
LACTATE BLD-SCNC: 1.1 MMOL/L (ref 0.5–2)
LACTATE BLD-SCNC: 2.2 MMOL/L (ref 0.5–2)
LACTATE BLD-SCNC: 2.7 MMOL/L (ref 0.5–2)
LYMPHOCYTES # BLD AUTO: 0 K/UL (ref 1–4.8)
LYMPHOCYTES NFR BLD: 0 % (ref 22–41)
MACROCYTES BLD QL SMEAR: ABNORMAL
MAGNESIUM SERPL-MCNC: 2.2 MG/DL (ref 1.5–2.5)
MANUAL DIFF BLD: NORMAL
MCH RBC QN AUTO: 31.8 PG (ref 27–33)
MCHC RBC AUTO-ENTMCNC: 31.6 G/DL (ref 33.7–35.3)
MCV RBC AUTO: 100.8 FL (ref 81.4–97.8)
MODE IMODE: ABNORMAL
MONOCYTES # BLD AUTO: 0.4 K/UL (ref 0–0.85)
MONOCYTES NFR BLD AUTO: 2.6 % (ref 0–13.4)
MORPHOLOGY BLD-IMP: NORMAL
NEUTROPHILS # BLD AUTO: 14.9 K/UL (ref 1.82–7.42)
NEUTROPHILS NFR BLD: 97.4 % (ref 44–72)
NRBC # BLD AUTO: 0 K/UL
NRBC BLD-RTO: 0 /100 WBC
O2/TOTAL GAS SETTING VFR VENT: 60 %
O2/TOTAL GAS SETTING VFR VENT: 70 %
O2/TOTAL GAS SETTING VFR VENT: 70 %
PCO2 BLDA: 42.2 MMHG (ref 26–37)
PCO2 BLDA: 47.2 MMHG (ref 26–37)
PCO2 BLDA: 73.9 MMHG (ref 26–37)
PCO2 TEMP ADJ BLDA: 41.6 MMHG (ref 26–37)
PCO2 TEMP ADJ BLDA: 47.2 MMHG (ref 26–37)
PCO2 TEMP ADJ BLDA: 69.7 MMHG (ref 26–37)
PEEP END EXPIRATORY PRESSURE IPEEP: 10 CMH20
PEEP END EXPIRATORY PRESSURE IPEEP: 10 CMH20
PEEP END EXPIRATORY PRESSURE IPEEP: 8 CMH20
PH BLDA: 7.22 [PH] (ref 7.4–7.5)
PH BLDA: 7.38 [PH] (ref 7.4–7.5)
PH BLDA: 7.44 [PH] (ref 7.4–7.5)
PH TEMP ADJ BLDA: 7.24 [PH] (ref 7.4–7.5)
PH TEMP ADJ BLDA: 7.38 [PH] (ref 7.4–7.5)
PH TEMP ADJ BLDA: 7.45 [PH] (ref 7.4–7.5)
PHOSPHATE SERPL-MCNC: 5 MG/DL (ref 2.5–4.5)
PHOSPHATE SERPL-MCNC: 6.5 MG/DL (ref 2.5–4.5)
PLATELET # BLD AUTO: 73 K/UL (ref 164–446)
PLATELET BLD QL SMEAR: NORMAL
PMV BLD AUTO: 9.4 FL (ref 9–12.9)
PO2 BLDA: 101 MMHG (ref 64–87)
PO2 BLDA: 79 MMHG (ref 64–87)
PO2 BLDA: 81 MMHG (ref 64–87)
PO2 TEMP ADJ BLDA: 74 MMHG (ref 64–87)
PO2 TEMP ADJ BLDA: 79 MMHG (ref 64–87)
PO2 TEMP ADJ BLDA: 99 MMHG (ref 64–87)
POTASSIUM SERPL-SCNC: 5.7 MMOL/L (ref 3.6–5.5)
POTASSIUM SERPL-SCNC: 5.9 MMOL/L (ref 3.6–5.5)
POTASSIUM SERPL-SCNC: 6.1 MMOL/L (ref 3.6–5.5)
PROCALCITONIN SERPL-MCNC: 2.38 NG/ML
RBC # BLD AUTO: 3.58 M/UL (ref 4.7–6.1)
RBC BLD AUTO: PRESENT
SAO2 % BLDA: 93 % (ref 93–99)
SAO2 % BLDA: 95 % (ref 93–99)
SAO2 % BLDA: 98 % (ref 93–99)
SIGNIFICANT IND 70042: NORMAL
SITE SITE: NORMAL
SODIUM SERPL-SCNC: 133 MMOL/L (ref 135–145)
SODIUM SERPL-SCNC: 135 MMOL/L (ref 135–145)
SODIUM SERPL-SCNC: 138 MMOL/L (ref 135–145)
SOURCE SOURCE: NORMAL
SPECIMEN DRAWN FROM PATIENT: ABNORMAL
TIDAL VOLUME IVT: 440 ML
WBC # BLD AUTO: 15.3 K/UL (ref 4.8–10.8)

## 2021-07-10 PROCEDURE — 700102 HCHG RX REV CODE 250 W/ 637 OVERRIDE(OP): Performed by: INTERNAL MEDICINE

## 2021-07-10 PROCEDURE — 71045 X-RAY EXAM CHEST 1 VIEW: CPT

## 2021-07-10 PROCEDURE — 700101 HCHG RX REV CODE 250: Performed by: INTERNAL MEDICINE

## 2021-07-10 PROCEDURE — 770022 HCHG ROOM/CARE - ICU (200)

## 2021-07-10 PROCEDURE — 99292 CRITICAL CARE ADDL 30 MIN: CPT | Mod: 25 | Performed by: INTERNAL MEDICINE

## 2021-07-10 PROCEDURE — A9270 NON-COVERED ITEM OR SERVICE: HCPCS | Performed by: INTERNAL MEDICINE

## 2021-07-10 PROCEDURE — 80048 BASIC METABOLIC PNL TOTAL CA: CPT

## 2021-07-10 PROCEDURE — 82803 BLOOD GASES ANY COMBINATION: CPT | Mod: 91

## 2021-07-10 PROCEDURE — 31624 DX BRONCHOSCOPE/LAVAGE: CPT | Performed by: EMERGENCY MEDICINE

## 2021-07-10 PROCEDURE — 70450 CT HEAD/BRAIN W/O DYE: CPT | Mod: ME

## 2021-07-10 PROCEDURE — 95714 VEEG EA 12-26 HR UNMNTR: CPT | Performed by: STUDENT IN AN ORGANIZED HEALTH CARE EDUCATION/TRAINING PROGRAM

## 2021-07-10 PROCEDURE — 70553 MRI BRAIN STEM W/O & W/DYE: CPT | Mod: MG

## 2021-07-10 PROCEDURE — 99223 1ST HOSP IP/OBS HIGH 75: CPT | Performed by: PSYCHIATRY & NEUROLOGY

## 2021-07-10 PROCEDURE — 5A1945Z RESPIRATORY VENTILATION, 24-96 CONSECUTIVE HOURS: ICD-10-PCS | Performed by: INTERNAL MEDICINE

## 2021-07-10 PROCEDURE — 0BH17EZ INSERTION OF ENDOTRACHEAL AIRWAY INTO TRACHEA, VIA NATURAL OR ARTIFICIAL OPENING: ICD-10-PCS | Performed by: INTERNAL MEDICINE

## 2021-07-10 PROCEDURE — 94003 VENT MGMT INPAT SUBQ DAY: CPT

## 2021-07-10 PROCEDURE — 4A00X4Z MEASUREMENT OF CENTRAL NERVOUS ELECTRICAL ACTIVITY, EXTERNAL APPROACH: ICD-10-PCS | Performed by: STUDENT IN AN ORGANIZED HEALTH CARE EDUCATION/TRAINING PROGRAM

## 2021-07-10 PROCEDURE — A9576 INJ PROHANCE MULTIPACK: HCPCS | Performed by: INTERNAL MEDICINE

## 2021-07-10 PROCEDURE — P9047 ALBUMIN (HUMAN), 25%, 50ML: HCPCS | Mod: JG | Performed by: INTERNAL MEDICINE

## 2021-07-10 PROCEDURE — 87205 SMEAR GRAM STAIN: CPT

## 2021-07-10 PROCEDURE — 02HV33Z INSERTION OF INFUSION DEVICE INTO SUPERIOR VENA CAVA, PERCUTANEOUS APPROACH: ICD-10-PCS | Performed by: INTERNAL MEDICINE

## 2021-07-10 PROCEDURE — 700111 HCHG RX REV CODE 636 W/ 250 OVERRIDE (IP): Performed by: INTERNAL MEDICINE

## 2021-07-10 PROCEDURE — 700102 HCHG RX REV CODE 250 W/ 637 OVERRIDE(OP): Performed by: STUDENT IN AN ORGANIZED HEALTH CARE EDUCATION/TRAINING PROGRAM

## 2021-07-10 PROCEDURE — 84100 ASSAY OF PHOSPHORUS: CPT

## 2021-07-10 PROCEDURE — 99291 CRITICAL CARE FIRST HOUR: CPT | Mod: 25 | Performed by: INTERNAL MEDICINE

## 2021-07-10 PROCEDURE — 700105 HCHG RX REV CODE 258: Performed by: INTERNAL MEDICINE

## 2021-07-10 PROCEDURE — 95700 EEG CONT REC W/VID EEG TECH: CPT | Performed by: STUDENT IN AN ORGANIZED HEALTH CARE EDUCATION/TRAINING PROGRAM

## 2021-07-10 PROCEDURE — 95720 EEG PHY/QHP EA INCR W/VEEG: CPT | Performed by: STUDENT IN AN ORGANIZED HEALTH CARE EDUCATION/TRAINING PROGRAM

## 2021-07-10 PROCEDURE — 700105 HCHG RX REV CODE 258: Performed by: STUDENT IN AN ORGANIZED HEALTH CARE EDUCATION/TRAINING PROGRAM

## 2021-07-10 PROCEDURE — 36556 INSERT NON-TUNNEL CV CATH: CPT | Mod: RT | Performed by: INTERNAL MEDICINE

## 2021-07-10 PROCEDURE — 83605 ASSAY OF LACTIC ACID: CPT

## 2021-07-10 PROCEDURE — 31500 INSERT EMERGENCY AIRWAY: CPT | Performed by: INTERNAL MEDICINE

## 2021-07-10 PROCEDURE — 0B9F8ZX DRAINAGE OF RIGHT LOWER LUNG LOBE, VIA NATURAL OR ARTIFICIAL OPENING ENDOSCOPIC, DIAGNOSTIC: ICD-10-PCS | Performed by: EMERGENCY MEDICINE

## 2021-07-10 PROCEDURE — 99233 SBSQ HOSP IP/OBS HIGH 50: CPT | Mod: 25 | Performed by: INTERNAL MEDICINE

## 2021-07-10 PROCEDURE — 37799 UNLISTED PX VASCULAR SURGERY: CPT

## 2021-07-10 PROCEDURE — 94799 UNLISTED PULMONARY SVC/PX: CPT

## 2021-07-10 PROCEDURE — A9270 NON-COVERED ITEM OR SERVICE: HCPCS | Performed by: STUDENT IN AN ORGANIZED HEALTH CARE EDUCATION/TRAINING PROGRAM

## 2021-07-10 PROCEDURE — 87070 CULTURE OTHR SPECIMN AEROBIC: CPT

## 2021-07-10 PROCEDURE — 302977 HCHG BRONCHOSCOPY PROC-THERAPEUTIC

## 2021-07-10 PROCEDURE — 82962 GLUCOSE BLOOD TEST: CPT

## 2021-07-10 PROCEDURE — 94760 N-INVAS EAR/PLS OXIMETRY 1: CPT

## 2021-07-10 PROCEDURE — 700117 HCHG RX CONTRAST REV CODE 255: Performed by: INTERNAL MEDICINE

## 2021-07-10 RX ORDER — LEVETIRACETAM 5 MG/ML
500 INJECTION INTRAVASCULAR EVERY 12 HOURS
Status: DISCONTINUED | OUTPATIENT
Start: 2021-07-10 | End: 2021-07-11

## 2021-07-10 RX ORDER — ROCURONIUM BROMIDE 10 MG/ML
INJECTION, SOLUTION INTRAVENOUS
Status: DISCONTINUED
Start: 2021-07-10 | End: 2021-07-10

## 2021-07-10 RX ORDER — BISACODYL 10 MG
10 SUPPOSITORY, RECTAL RECTAL
Status: DISCONTINUED | OUTPATIENT
Start: 2021-07-10 | End: 2021-07-19

## 2021-07-10 RX ORDER — FAMOTIDINE 20 MG/1
20 TABLET, FILM COATED ORAL EVERY 12 HOURS
Status: DISCONTINUED | OUTPATIENT
Start: 2021-07-10 | End: 2021-07-19

## 2021-07-10 RX ORDER — POLYETHYLENE GLYCOL 3350 17 G/17G
1 POWDER, FOR SOLUTION ORAL
Status: DISCONTINUED | OUTPATIENT
Start: 2021-07-10 | End: 2021-07-19

## 2021-07-10 RX ORDER — IPRATROPIUM BROMIDE AND ALBUTEROL SULFATE 2.5; .5 MG/3ML; MG/3ML
3 SOLUTION RESPIRATORY (INHALATION)
Status: DISCONTINUED | OUTPATIENT
Start: 2021-07-10 | End: 2021-07-23 | Stop reason: HOSPADM

## 2021-07-10 RX ORDER — ALBUMIN (HUMAN) 12.5 G/50ML
25 SOLUTION INTRAVENOUS EVERY 6 HOURS
Status: COMPLETED | OUTPATIENT
Start: 2021-07-10 | End: 2021-07-11

## 2021-07-10 RX ORDER — LORAZEPAM 2 MG/ML
2 INJECTION INTRAMUSCULAR
Status: DISCONTINUED | OUTPATIENT
Start: 2021-07-10 | End: 2021-07-13

## 2021-07-10 RX ORDER — LEVETIRACETAM 10 MG/ML
1000 INJECTION INTRAVASCULAR ONCE
Status: COMPLETED | OUTPATIENT
Start: 2021-07-10 | End: 2021-07-10

## 2021-07-10 RX ORDER — AMOXICILLIN 250 MG
2 CAPSULE ORAL 2 TIMES DAILY
Status: DISCONTINUED | OUTPATIENT
Start: 2021-07-10 | End: 2021-07-19

## 2021-07-10 RX ADMIN — NOREPINEPHRINE BITARTRATE 2 MCG/MIN: 1 INJECTION, SOLUTION, CONCENTRATE INTRAVENOUS at 02:50

## 2021-07-10 RX ADMIN — GADOTERIDOL 18 ML: 279.3 INJECTION, SOLUTION INTRAVENOUS at 11:44

## 2021-07-10 RX ADMIN — LEVETIRACETAM INJECTION 500 MG: 5 INJECTION INTRAVENOUS at 09:35

## 2021-07-10 RX ADMIN — LACTULOSE 30 ML: 20 SOLUTION ORAL at 05:25

## 2021-07-10 RX ADMIN — NICOTINE TRANSDERMAL SYSTEM 21 MG: 21 PATCH, EXTENDED RELEASE TRANSDERMAL at 05:25

## 2021-07-10 RX ADMIN — PIPERACILLIN AND TAZOBACTAM 4.5 G: 4; .5 INJECTION, POWDER, LYOPHILIZED, FOR SOLUTION INTRAVENOUS; PARENTERAL at 13:07

## 2021-07-10 RX ADMIN — FENTANYL CITRATE 100 MCG: 50 INJECTION, SOLUTION INTRAMUSCULAR; INTRAVENOUS at 19:33

## 2021-07-10 RX ADMIN — PIPERACILLIN AND TAZOBACTAM 4.5 G: 4; .5 INJECTION, POWDER, LYOPHILIZED, FOR SOLUTION INTRAVENOUS; PARENTERAL at 09:48

## 2021-07-10 RX ADMIN — METRONIDAZOLE 500 MG: 500 INJECTION, SOLUTION INTRAVENOUS at 05:33

## 2021-07-10 RX ADMIN — SODIUM CHLORIDE, POTASSIUM CHLORIDE, SODIUM LACTATE AND CALCIUM CHLORIDE: 600; 310; 30; 20 INJECTION, SOLUTION INTRAVENOUS at 06:48

## 2021-07-10 RX ADMIN — LEVETIRACETAM INJECTION 500 MG: 5 INJECTION INTRAVENOUS at 20:29

## 2021-07-10 RX ADMIN — PROPOFOL 20 MCG/KG/MIN: 10 INJECTION, EMULSION INTRAVENOUS at 11:00

## 2021-07-10 RX ADMIN — NOREPINEPHRINE BITARTRATE 15 MCG/MIN: 1 INJECTION, SOLUTION, CONCENTRATE INTRAVENOUS at 10:45

## 2021-07-10 RX ADMIN — FENTANYL CITRATE 100 MCG: 50 INJECTION, SOLUTION INTRAMUSCULAR; INTRAVENOUS at 11:15

## 2021-07-10 RX ADMIN — DOCUSATE SODIUM 50 MG AND SENNOSIDES 8.6 MG 2 TABLET: 8.6; 5 TABLET, FILM COATED ORAL at 05:25

## 2021-07-10 RX ADMIN — LORAZEPAM 2 MG: 2 INJECTION INTRAMUSCULAR; INTRAVENOUS at 00:45

## 2021-07-10 RX ADMIN — DOCUSATE SODIUM 50 MG AND SENNOSIDES 8.6 MG 2 TABLET: 8.6; 5 TABLET, FILM COATED ORAL at 18:54

## 2021-07-10 RX ADMIN — FAMOTIDINE 20 MG: 10 INJECTION INTRAVENOUS at 05:26

## 2021-07-10 RX ADMIN — FAMOTIDINE 20 MG: 10 INJECTION INTRAVENOUS at 18:54

## 2021-07-10 RX ADMIN — LORAZEPAM 2 MG: 2 INJECTION INTRAMUSCULAR; INTRAVENOUS at 05:56

## 2021-07-10 RX ADMIN — ALBUMIN (HUMAN) 25 G: 5 SOLUTION INTRAVENOUS at 09:17

## 2021-07-10 RX ADMIN — LACTULOSE 30 ML: 20 SOLUTION ORAL at 18:54

## 2021-07-10 RX ADMIN — FAMOTIDINE 20 MG: 10 INJECTION INTRAVENOUS at 05:46

## 2021-07-10 RX ADMIN — VASOPRESSIN 0.03 UNITS/MIN: 20 INJECTION INTRAVENOUS at 09:06

## 2021-07-10 RX ADMIN — ALBUMIN (HUMAN) 25 G: 5 SOLUTION INTRAVENOUS at 20:13

## 2021-07-10 RX ADMIN — LEVETIRACETAM INJECTION 1000 MG: 10 INJECTION INTRAVENOUS at 00:50

## 2021-07-10 RX ADMIN — PIPERACILLIN AND TAZOBACTAM 4.5 G: 4; .5 INJECTION, POWDER, LYOPHILIZED, FOR SOLUTION INTRAVENOUS; PARENTERAL at 20:44

## 2021-07-10 RX ADMIN — ALBUMIN (HUMAN) 25 G: 5 SOLUTION INTRAVENOUS at 15:06

## 2021-07-10 RX ADMIN — CEFEPIME 2 G: 2 INJECTION, POWDER, FOR SOLUTION INTRAVENOUS at 05:27

## 2021-07-10 ASSESSMENT — PAIN DESCRIPTION - PAIN TYPE
TYPE: ACUTE PAIN

## 2021-07-10 NOTE — ANESTHESIA PROCEDURE NOTES
Peripheral IV    Date/Time: 7/9/2021 6:02 PM  Performed by: Joaquim Childers III, M.D.  Authorized by: Joaquim Childers III, M.D.     Size:  16 G (long Jelco)  Laterality:  Left (hand)  Local Anesthetic:  None  Site Prep:  Chlorhexidine  Technique:  Direct puncture  Attempts:  1   Done under GA

## 2021-07-10 NOTE — OR NURSING
Spouse calls pacu. Updated. Pt still in recovery. Will be going to ICU for closer observation. Apologized for wait. Had to coordinate w/ doctors. Spouse plans to go home. Expresses relief that pt going to ICU

## 2021-07-10 NOTE — ASSESSMENT & PLAN NOTE
History of hepatocellular carcinoma, presented 7/2 with abdominal pain  Worsening abdominal process 7/9, suspected ruptured hepatic cyst/mass-taken for laparotomy based on exam and imaging  Enteritis/colitis identified at laparotomy cultures obtained and antibiotics initiated  Empiric antibiotics -Zosyn through 7/15 for 7-day course, extend as needed

## 2021-07-10 NOTE — PROGRESS NOTES
Interval Critical Care Progress Note:    Pt went to OR earlier today for abd pain.  Pt had laparoscopy and was found to have purulent fluid in abd as well as diffuse enteritis and colitis.  Etiology of purulent fluid is unknown, but likely due to ruptured liver cyst.  Dr. Meza placed 19 F Jacques drain.    After surgery pt went to PACU and was extubated.  Pt was transferred to SICU.  In SICU pt was lethargic and had bradypnea.  RN called me as pt was not breathing well and was hypotensive.    I arrived at bedside and pt was minimally responsive and being assisted with respirations.  Pt was emergently intubated, please see intubation procedure note for details.  Upon intubation pt was noted to have bilious fluid in his posterior pharynx and airway.  The fluid was suctioned and the ETT placed.  Pt on cefepime, which should cover respiratory bacteria.  Pt to undergo bronchoscopy to suction enteric contents from lungs.  Please see Dr. Vilchis's bronchoscopy procedure note for details.    A central line was placed for vasopressor medications.  Please see procedure note for central line placement details.    After intubation and line placement it was noted that the pt's right pupil was dilated, and the pt's left pupil was constricted.  The pt was sent for emergent CT Head.  The CT Head did not show any acute pathology.  Upon returning to SICU the RN noted seizure activity.  The pt was ordered ativan for seizure abatement and Keppra for seizure prophylaxis.      An EEG would be beneficial.

## 2021-07-10 NOTE — ANESTHESIA TIME REPORT
Anesthesia Start and Stop Event Times     Date Time Event    7/9/2021 1731 Ready for Procedure     1741 Anesthesia Start     1856 Anesthesia Stop        Responsible Staff  07/09/21    Name Role Begin End    Joaquim Childers III, M.D. Anesth 1741 1856        Preop Diagnosis (Free Text):  Pre-op Diagnosis     HEPATOMA         Preop Diagnosis (Codes):    Post op Diagnosis  AP (abdominal pain)      Premium Reason  A. 3PM - 7AM    Comments: Emergency case, Arterial line insertion with US guidance under GA post induction

## 2021-07-10 NOTE — CONSULTS
Neurology Initial Consult H&P  Neurohospitalist Service, [facility]    Referring Physician: Jossue Justin M.D.    Reason for Referral: Abnormal neurological exam    HPI: Lorriane Bella is a 71 y.o. male with history of liver cancer with lung metastasis who was admitted to [facility] on 7/2 for acute sepsis and for whom neurology has been consulted for abnormal neurological exam with fixed and dilated right pupil and unresponsiveness. The patient underwent laparoscopy on 7/10, and was found to have purulent fluid in abd, diffuse enteritis and colitis. After surgery patient went to PACU and was extubated. Later that day, he was emergently intubated, found to have aspirated, underwent bronchoscopy to suction enteric contents from lungs. Patient noted to have right dilated pupil, left constricted, emergent CT Head without acute pathology. Patient with tonic/clonic seizure activity, abated with ativan, loaded with Keppra. Additional ROS unable to obtain at this time due to unresponsiveness.     Review of systems: In addition to what is detailed in the HPI above, all other systems reviewed and are negative.    Past Medical History:    has a past medical history of Alcohol use (3/31/2016), Borderline diabetes, Cancer (HCC) (2019), Cataract, Dental disorder, Diabetes (HCC), Disorder of thyroid, Glaucoma, High cholesterol, and Hypertension.    FHx:  family history includes Cancer in his mother.    SHx:   reports that he has been smoking cigarettes. He started smoking about 31 years ago. He has a 15.00 pack-year smoking history. He has never used smokeless tobacco. He reports previous alcohol use of about 2.4 oz of alcohol per week. He reports that he does not use drugs.    Allergies:  Allergies   Allergen Reactions   • Sulfa Drugs      Reaction unknown       Medications:    Current Facility-Administered Medications:   •  LORazepam (ATIVAN) injection 2 mg, 2 mg, Intravenous, Q HOUR PRN,  Stephen Chowdhury D.O., 2 mg at 07/10/21 0556  •  levETIRAcetam (Keppra) 500 mg in 100 mL NaCl IV premix, 500 mg, Intravenous, Q12HRS, Stephen Chowdhury D.O.  •  Respiratory Therapy Consult, , Nebulization, Continuous RT, Stephen Chowdhury D.O.  •  famotidine (PEPCID) tablet 20 mg, 20 mg, Enteral Tube, Q12HRS **OR** famotidine (PEPCID) injection 20 mg, 20 mg, Intravenous, Q12HRS, Stephen Chowdhury D.O.  •  senna-docusate (PERICOLACE or SENOKOT S) 8.6-50 MG per tablet 2 tablet, 2 tablet, Enteral Tube, BID, 2 tablet at 07/10/21 0525 **AND** polyethylene glycol/lytes (MIRALAX) PACKET 1 Packet, 1 Packet, Enteral Tube, QDAY PRN **AND** magnesium hydroxide (MILK OF MAGNESIA) suspension 30 mL, 30 mL, Enteral Tube, QDAY PRN **AND** bisacodyl (DULCOLAX) suppository 10 mg, 10 mg, Rectal, QDAY PRN, Stephen Chowdhury D.O.  •  MD Alert...ICU Electrolyte Replacement per Pharmacy, , Other, PHARMACY TO DOSE, Stephen Chowdhury D.O.  •  lidocaine (XYLOCAINE) 1 % injection 2 mL, 2 mL, Tracheal Tube, Q30 MIN PRN, Stephen Chowdhury D.O.  •  fentaNYL (SUBLIMAZE) injection 100 mcg, 100 mcg, Intravenous, Q15 MIN PRN **AND** fentaNYL (SUBLIMAZE) injection 200 mcg, 200 mcg, Intravenous, Q15 MIN PRN **AND** fentaNYL (SUBLIMAZE) 50 mcg/mL in 50mL (Continuous Infusion), , Intravenous, Continuous, Dose not Required at 07/10/21 0330 **AND** propofol (DIPRIVAN) injection, 0-80 mcg/kg/min, Intravenous, Continuous, Dose not Required at 07/10/21 0330 **AND** [START ON 7/11/2021] Triglyceride, , , Every 3 Days (0300), Stephen Chowdhury D.O.  •  ipratropium-albuterol (DUONEB) nebulizer solution, 3 mL, Nebulization, Q2HRS PRN (RT), Stephen Chowdhury D.O.  •  vasopressin (VASOSTRICT) 20 Units in  mL Infusion, 0.03 Units/min, Intravenous, Continuous, Vimal Preciado M.D.  •  albumin human 25% solution 25 g, 25 g, Intravenous, Q6HR, Vimal Preciado M.D.  •  cefepime (Maxipime) 2 g in sterile water 20 mL IV syringe, 2 g, Intravenous, Q8HRS, Jossue Justin,  M.D., Stopped at 07/10/21 0532  •  metroNIDAZOLE (FLAGYL) IVPB 500 mg, 500 mg, Intravenous, Q8HRS, Jossue Justin M.D., Stopped at 07/10/21 0633  •  lactulose 20 GM/30ML solution 30 mL, 30 mL, Oral, BID, Jossue Justin M.D., 30 mL at 07/10/21 0525  •  norepinephrine (Levophed) 8 mg in 250 mL NS infusion (premix), 0-30 mcg/min, Intravenous, Continuous, Stephen Chowdhury, D.O., Last Rate: 22.5 mL/hr at 07/10/21 0644, 12 mcg/min at 07/10/21 0644  •  [DISCONTINUED] fentaNYL (SUBLIMAZE) injection 100 mcg, 100 mcg, Intravenous, Q15 MIN PRN **AND** [DISCONTINUED] fentaNYL (SUBLIMAZE) injection 200 mcg, 200 mcg, Intravenous, Q15 MIN PRN **AND** [DISCONTINUED] fentaNYL (SUBLIMAZE) 50 mcg/mL in 50mL (Continuous Infusion), , Intravenous, Continuous, Held at 07/10/21 0000 **AND** dexmedetomidine (PRECEDEX) 400 mcg/100mL NS premix infusion, 0-1.5 mcg/kg/hr, Intravenous, Continuous, Stephen Ratliffin, D.O., Held at 07/10/21 0000  •  lactated ringers infusion, , Intravenous, Continuous, Koby Lange M.D., Last Rate: 83 mL/hr at 07/10/21 0648, New Bag at 07/10/21 0648  •  guaiFENesin dextromethorphan (ROBITUSSIN DM) 100-10 MG/5ML syrup 5 mL, 5 mL, Oral, Q6HRS PRN, Koby Lange M.D., 5 mL at 07/05/21 2337  •  [Held by provider] oxyCODONE immediate-release (ROXICODONE) tablet 5 mg, 5 mg, Oral, Q3HRS PRN, 5 mg at 07/06/21 2322 **OR** [Held by provider] oxyCODONE immediate release (ROXICODONE) tablet 10 mg, 10 mg, Oral, Q3HRS PRN, 10 mg at 07/08/21 0304 **OR** [Held by provider] HYDROmorphone (Dilaudid) injection 0.25 mg, 0.25 mg, Intravenous, Q3HRS PRN, Koby Lange M.D., 0.25 mg at 07/08/21 0550  •  calcium carbonate (TUMS) chewable tab 500 mg, 500 mg, Oral, TID PRN, Koby Lange M.D., 500 mg at 07/06/21 1724  •  ondansetron (ZOFRAN) syringe/vial injection 4 mg, 4 mg, Intravenous, Q4HRS PRN, Mel Howell M.D.  •  ondansetron (ZOFRAN ODT) dispertab 4 mg, 4 mg, Oral, Q4HRS PRN, Mel Howell M.D., 4 mg at 07/06/21 0301  •  nicotine  (NICODERM) 21 MG/24HR 21 mg, 21 mg, Transdermal, Daily-0600, 21 mg at 07/10/21 0525 **AND** Nicotine Replacement Patient Education Materials, , , Once **AND** nicotine polacrilex (NICORETTE) 2 MG piece 2 mg, 2 mg, Oral, Q HOUR PRN, Mel Howell M.D.  •  insulin regular (HumuLIN R,NovoLIN R) injection, 1-6 Units, Subcutaneous, Q6HRS **AND** POC blood glucose manual result, , , Q6H **AND** NOTIFY MD and PharmD, , , Once **AND** glucose 4 g chewable tablet 16 g, 16 g, Oral, Q15 MIN PRN **AND** dextrose 50% (D50W) injection 50 mL, 50 mL, Intravenous, Q15 MIN PRN, Mel Howell M.D.  •  acetaminophen (Tylenol) tablet 650 mg, 650 mg, Oral, Q6HRS PRN, Mel Howell M.D.  •  Notify provider if pain remains uncontrolled, , , CONTINUOUS **AND** Use the Numeric Rating Scale (NRS), Linares-Baker Faces (WBF), or FLACC on regular floors and Critical-Care Pain Observation Tool (CPOT) on ICUs/Trauma to assess pain, , , CONTINUOUS **AND** Pulse Ox, , , CONTINUOUS **AND** Pharmacy Consult Request ...Pain Management Review 1 Each, 1 Each, Other, PHARMACY TO DOSE **AND** If patient difficult to arouse and/or has respiratory depression (respiratory rate of 10 or less), stop any opiates that are currently infusing and call a Rapid Response., , , CONTINUOUS, Mel Howell M.D.    Physical Examination:     Vitals:    07/10/21 0530 07/10/21 0545 07/10/21 0600 07/10/21 0626   BP:   121/58    Pulse: (!) 102 (!) 103 (!) 111 (!) 109   Resp: 19 15 (!) 30    Temp:       TempSrc:       SpO2: 93% 92% 93% 94%   Weight:       Height:           General: Toxic appearing, on mechanical ventilation, febrile.  Cardiovascular:      Rate and Rhythm: Tachycardia      Pulses: thready pulses.   Pulmonary:      Mechanical ventilation  Abdominal:      General: Distended  Skin:     General: warm to touch  Neurological:   Mental status: does not open eyes to noxious stimulus, does not follow commands  Speech and language: intubated  Cranial nerve exam: Pupils on right is 5mm, fixed;  left is 1 mm, sluggishly reactive. Visual fields: does not blink to threat bilaterally. Disconjugate gaze with upward preference. Corneal reflexes intact bilaterally.  Extraocular muscles are not intact to oculocephalic maneuver ie VOR intact. Face is symmetric. Unable to assess sensation in the face due to mental status. Cough and gag reflexes are intact.  Motor exam: Does not participate in formal strength testing. Tone is increased in upper extremities, normal in lower extremities. No abnormal movements were seen on exam.  Sensory exam: no response to noxious stimuli x4 extremities  Deep tendon reflexes:  1+ throughout. Toes mute bilaterally  Coordination: not participatory due to mental status  Gait: deferred due to ICU status    Objective Data:    Labs:  Lab Results   Component Value Date/Time    PROTHROMBTM 17.4 (H) 07/09/2021 08:11 AM    INR 1.48 (H) 07/09/2021 08:11 AM      Lab Results   Component Value Date/Time    WBC 15.3 (H) 07/09/2021 11:39 PM    RBC 3.58 (L) 07/09/2021 11:39 PM    HEMOGLOBIN 11.4 (L) 07/09/2021 11:39 PM    HEMATOCRIT 36.1 (L) 07/09/2021 11:39 PM    .8 (H) 07/09/2021 11:39 PM    MCH 31.8 07/09/2021 11:39 PM    MCHC 31.6 (L) 07/09/2021 11:39 PM    MPV 9.4 07/09/2021 11:39 PM    NEUTSPOLYS 97.40 (H) 07/09/2021 11:39 PM    LYMPHOCYTES 0.00 (L) 07/09/2021 11:39 PM    MONOCYTES 2.60 07/09/2021 11:39 PM    EOSINOPHILS 0.00 07/09/2021 11:39 PM    BASOPHILS 0.00 07/09/2021 11:39 PM    ANISOCYTOSIS 1+ 07/09/2021 11:39 PM      Lab Results   Component Value Date/Time    SODIUM 133 (L) 07/10/2021 04:46 AM    POTASSIUM 5.9 (H) 07/10/2021 04:46 AM    CHLORIDE 101 07/10/2021 04:46 AM    CO2 24 07/10/2021 04:46 AM    GLUCOSE 119 (H) 07/10/2021 04:46 AM    BUN 38 (H) 07/10/2021 04:46 AM    CREATININE 0.81 07/10/2021 04:46 AM      Lab Results   Component Value Date/Time    CHOLSTRLTOT 166 09/23/2020 06:21 AM     (H) 09/23/2020 06:21 AM    HDL 39 (A) 09/23/2020 06:21 AM    TRIGLYCERIDE 116  09/23/2020 06:21 AM       Lab Results   Component Value Date/Time    ALKPHOSPHAT 361 (H) 07/09/2021 08:11 AM    ALKPHOSPHAT 362 (H) 07/09/2021 08:11 AM    ASTSGOT 60 (H) 07/09/2021 08:11 AM    ASTSGOT 59 (H) 07/09/2021 08:11 AM    ALTSGPT 19 07/09/2021 08:11 AM    ALTSGPT 19 07/09/2021 08:11 AM    TBILIRUBIN 1.7 (H) 07/09/2021 08:11 AM    TBILIRUBIN 1.7 (H) 07/09/2021 08:11 AM        Imaging/Testing:    I interpreted and/or reviewed the patient's neuroimaging    CT-HEAD W/O   Final Result         1. No acute intracranial abnormality. No evidence of acute intracranial hemorrhage or mass lesion.               DX-CHEST-PORTABLE (1 VIEW)   Final Result         Endotracheal tube with tip projecting over the mid thoracic trachea.      Gastric drainage tube courses below diaphragm, tip is in the stomach.      Right central venous catheter with tip projecting over the expected area of the lower SVC.      CT-ABDOMEN-PELVIS W/O   Final Result      1.  New fluid distended small bowel loops which are nonspecific and could be related to enteritis or small bowel ileus. Developing obstruction is considered less likely though not excluded.   2.  There appears to be new wall thickening of the ascending colon likely representing infectious/inflammatory colitis.   3.  Mild ascites which is mildly increased from prior study. Omental and mesenteric infiltration which is new from prior and could be related to edema or inflammation.   4.  Small right pleural effusion. Bibasilar atelectasis.   5.   No other significant change.      DX-CHEST-PORTABLE (1 VIEW)   Final Result      1.  New right basilar airspace process consistent with atelectasis or pneumonia      2.  Mild left midlung zone atelectasis      [id number] VIEW   Final Result      1. Moderate stool mixed with contrast in the ascending colon.   2. Moderate stool in the transverse colon.   3. No bowel obstruction.   4. Other chronic degenerative changes.      CT-ABDOMEN-PELVIS W/O    Final Result      1.  There is no acute inflammatory process in the abdomen or pelvis.   2.  The treated lesions in the liver are again seen without interval change.   3.  Splenomegaly is again seen.   4.  Cystic pancreatic tail lesion is unchanged possibly a pseudocyst.   5.  There is colonic diverticulosis without diverticulitis.   6.  There is mild wall thickening of the distal esophagus.   7.  There is a stable small amount of ascites.   8.  There is moderate colonic stool.      DX-CHEST-PORTABLE (1 VIEW)   Final Result      Hypoinflation with bibasilar atelectasis. No focal consolidation or pleural effusions.      DX-CHEST-PORTABLE (1 VIEW)   Final Result      Left basilar atelectasis.      CT-ABDOMEN-PELVIS WITH   Final Result      1.  Primarily cystic mass in the medial segment LEFT lobe liver, likely treated hepatoma.   2.  Complex low-attenuation lesion in the inferior aspect medial segment LEFT lobe liver which is new from prior exam and may represent cystic mass or focal hematoma related to rupture larger mass.  Minimal hyperdense components may indicate blood    products.  Abscess is felt less likely.   3.  Gallbladder is not visualized and may be compressed by or involved with liver mass.   4.  Small volume ascites with potential hemoperitoneum.   5.  Splenomegaly.      These findings were discussed with CARMEN OTTO on 7/2/2021 3:02 PM.      DX-CHEST-PORTABLE (1 VIEW)   Final Result      1.  Hypoinflation with minimal LEFT lung base atelectasis.   2.  No pneumonia or pulmonary edema.          Assessment and Plan:  Lorraine Bella is a 71 y.o. male with history of liver cancer with lung metastasis admitted to [facility] on 7/2 for acute sepsis, likely ruptured liver cyst. Now intubated s/p aspiration in the SICU. Clinical exam reveals right dilated pupil, left constricted, patient unresponsive. Emergent CT Head without acute pathology. Episode of tonic/clonic seizure activity, abated with ativan,  loaded with Keppra. Recommend STAT MRI Brain with and without, followed by continuous EEG to evaluate for status epilepticus. Continue Keppra. Patient remains critically ill in SICU, prognosis is poor; neurology will continue to follow.    Plan:  -Stat MRI Brain with and without  - Continuous EEG  - Continue Keppra    The evaluation of the patient, and recommended management, was discussed with attending neurologist, Dr. Cody Berger.    Corinne Canavero, APRN  Acute [facility] Neurohospitalist Service

## 2021-07-10 NOTE — RESPIRATORY CARE
Adult Ventilation Update    Airway ETT Oral 8.0-Secured At  (cm): 25  Vent Day: 2    Vent Mode: APVCMV      26,440, +10, 70%      Events/Summary: Bronch

## 2021-07-10 NOTE — PROGRESS NOTES
Critical Care Progress Note    Date of admission  7/2/2021    Chief Complaint  71 y.o. male who presented 7/2/2021 with abdominal pain.  Pt was admitted to medicine service and general surgery was consulted.  General surgery believes pt had cystic lesion in his liver which ruptured causing pt's abd pain. Gen surg recommended conservative mgt.  Today, pt's abd pain worsened and pt's abd become more distended.  Pt also became lethargic.  Critical care consulted for concern of abd compartment syndrome.  (Dr Chowdhury HPI)    Hospital Course  No notes on file    Interval Problem Update  Reviewed last 24 hour events:    D/w Neurology re: EEG and MRI necessity after seizure last night that was controlled with Ativan and focal exam with dilated right pupil and altered LOC.  Stat MRI with and without ordered and this will be followed by EEG.  Continuing Keppra for now with as needed Ativan.    Reviewed with surgery at the bedside at length    Sedate on vent  No further seizure activity  R pupil dilated  Keppra loaded last night and maintenance doses ongoing brain  CT neg  Propofol for sedation  SR 90-100s  SBp 90-110s  NE actively titrating norepinephrine  Low UO  I/Os reviewed  LR 83  VD#2  PEEP 10, 70%  CXR obtained a little late in the morning, increasing right lower lobe opacities  MARIANA 500 serosang  Plt 73 <- 89  Hgb 11.4  K 5.9  B/c 38/0.81  Tm 99.9  WBC 15.3 mildly up  Cefepime/Flagyl  Cultures neg  Lactic acid 2.7      CXR daily  Cefepime to Zosyn, Had Sz, avoid seizure promoting med  Sedation with propofol not dexmedetomidine for its seizure suppression  Serial follow-up lactic acid levels  Follow-up BMP to recheck potassium  Add vasopressin as needed support blood pressure  IV albumin  Stat MRI brain with and without contrast  EEG after MRI    Case reviewed with family at bedside times several    MRI with and without contrast without stroke  EEG initially without obvious status, patient had to have room changed times  a couple secondary to network issues to upload EEG to reading neurologist  Neurologically starting to spontaneously move better but not following yet  Follow-up lactic acid levels normalized  Follow potassium level improved to 5.7  Bicarb remains normal and anion gap is normal as well  Norepinephrine dose came down after IV albumin started being administered  Urine output adequate throughout the day      Review of Systems  Review of Systems   Unable to perform ROS: Acuity of condition        Vital Signs for last 24 hours   Pulse:  [] 90  Resp:  [9-32] 26  BP: ()/(52-76) 104/58  SpO2:  [85 %-99 %] 98 %    Hemodynamic parameters for last 24 hours       Respiratory Information for the last 24 hours  Vent Mode: APVCMV  Rate (breaths/min): 26  Vt Target (mL): 440  PEEP/CPAP: 10  MAP: 16  Control VTE (exp VT): 443    Physical Exam   Physical Exam  Vitals reviewed.   Constitutional:       Appearance: He is normal weight. He is ill-appearing. He is not toxic-appearing or diaphoretic.      Interventions: He is sedated, intubated and restrained.   HENT:      Head: Normocephalic and atraumatic.      Mouth/Throat:      Mouth: Mucous membranes are moist.   Eyes:      Pupils: Pupils are equal, round, and reactive to light.   Neck:      Vascular: No JVD.   Cardiovascular:      Rate and Rhythm: Normal rate and regular rhythm. Occasional extrasystoles are present.     Pulses: Normal pulses.      Heart sounds: No murmur heard.   No gallop.       Comments:   Pulmonary:      Effort: He is intubated.      Breath sounds: Rhonchi present. No wheezing or rales.   Chest:      Comments: Right subclavian CVC  Abdominal:      General: There is distension.      Palpations: Abdomen is soft.   Genitourinary:     Comments: Triana  Musculoskeletal:      Cervical back: Neck supple.      Right lower leg: No edema.      Left lower leg: No edema.      Comments: Arterial line   Skin:     General: Skin is warm and dry.      Capillary Refill:  Capillary refill takes less than 2 seconds.      Coloration: Skin is not cyanotic or mottled.      Nails: There is no clubbing.   Neurological:      Mental Status: He is lethargic.      GCS: GCS eye subscore is 2. GCS verbal subscore is 1. GCS motor subscore is 5.      Comments: Right pupil midposition and sluggish, left pupil 2 mm, brainstem reflexes intact, no tremor or seizure activity, not following commands, does respond to tactile stimuli but better in the lower extremities   Psychiatric:      Comments: Unable to assess         Medications  Current Facility-Administered Medications   Medication Dose Route Frequency Provider Last Rate Last Admin   • LORazepam (ATIVAN) injection 2 mg  2 mg Intravenous Q HOUR PRN PORTILLO Sam.O.   2 mg at 07/10/21 0556   • levETIRAcetam (Keppra) 500 mg in 100 mL NaCl IV premix  500 mg Intravenous Q12HRS PORTILLO Sam.O.   Stopped at 07/10/21 2044   • Respiratory Therapy Consult   Nebulization Continuous RT Stephen Chowdhury D.O.       • famotidine (PEPCID) tablet 20 mg  20 mg Enteral Tube Q12HRS PORTILLO Sam.O.        Or   • famotidine (PEPCID) injection 20 mg  20 mg Intravenous Q12HRS PORTILLO Sam.O.   20 mg at 07/10/21 1854   • senna-docusate (PERICOLACE or SENOKOT S) 8.6-50 MG per tablet 2 tablet  2 tablet Enteral Tube BID PORTILLO Sam.O.   2 tablet at 07/10/21 1854    And   • polyethylene glycol/lytes (MIRALAX) PACKET 1 Packet  1 Packet Enteral Tube QDAY PRN PORTILLO Sam.O.        And   • magnesium hydroxide (MILK OF MAGNESIA) suspension 30 mL  30 mL Enteral Tube QDAY PRN BYRON SamO.        And   • bisacodyl (DULCOLAX) suppository 10 mg  10 mg Rectal QDAY PRN PORTILLO Sam.O.       • MD Alert...ICU Electrolyte Replacement per Pharmacy   Other PHARMACY TO DOSE Stephen Chowdhury D.O.       • lidocaine (XYLOCAINE) 1 % injection 2 mL  2 mL Tracheal Tube Q30 MIN PRN Stephen Chowdhury D.O.       • fentaNYL (SUBLIMAZE) injection 100 mcg  100 mcg  Intravenous Q15 MIN PRN Stephen Ratliffin, D.O.   100 mcg at 07/10/21 1933    And   • fentaNYL (SUBLIMAZE) injection 200 mcg  200 mcg Intravenous Q15 MIN PRN Stephen Ratliffin, D.O.        And   • fentaNYL (SUBLIMAZE) 50 mcg/mL in 50mL (Continuous Infusion)   Intravenous Continuous Stephenkady Ratliffin, D.O.   Dose not Required at 07/10/21 0330    And   • propofol (DIPRIVAN) injection  0-80 mcg/kg/min Intravenous Continuous Stephen Ratliffin, D.O.   Stopped at 07/10/21 1240   • ipratropium-albuterol (DUONEB) nebulizer solution  3 mL Nebulization Q2HRS PRN (RT) Stephen Chowdhury, D.O.       • vasopressin (VASOSTRICT) 20 Units in  mL Infusion  0.03 Units/min Intravenous Continuous Vimal Preciado M.D.   Stopped at 07/10/21 1523   • albumin human 25% solution 25 g  25 g Intravenous Q6HR Vimal Preciado M.D.   25 g at 07/10/21 2013   • piperacillin-tazobactam (ZOSYN) 4.5 g in  mL IVPB  4.5 g Intravenous Q8HRS Vimal Preciado M.D. 25 mL/hr at 07/10/21 2044 4.5 g at 07/10/21 2044   • lactulose 20 GM/30ML solution 30 mL  30 mL Oral BID Jossue Justin M.D.   30 mL at 07/10/21 1854   • norepinephrine (Levophed) 8 mg in 250 mL NS infusion (premix)  0-30 mcg/min Intravenous Continuous Stephen Ratliffin, D.O.   Stopped at 07/10/21 1714   • lactated ringers infusion   Intravenous Continuous Koby Lange M.D. 83 mL/hr at 07/10/21 1246 Restarted at 07/10/21 1246   • guaiFENesin dextromethorphan (ROBITUSSIN DM) 100-10 MG/5ML syrup 5 mL  5 mL Oral Q6HRS PRN Koby Lange M.D.   5 mL at 07/05/21 2337   • [Held by provider] oxyCODONE immediate-release (ROXICODONE) tablet 5 mg  5 mg Oral Q3HRS PRIZAIAH Lange M.D.   5 mg at 07/06/21 2322    Or   • [Held by provider] oxyCODONE immediate release (ROXICODONE) tablet 10 mg  10 mg Oral Q3HRS PRIZAIAH Lange M.D.   10 mg at 07/08/21 0304    Or   • [Held by provider] HYDROmorphone (Dilaudid) injection 0.25 mg  0.25 mg Intravenous Q3HRS PRIZAIAH Lange M.D.   0.25 mg at 07/08/21  0550   • calcium carbonate (TUMS) chewable tab 500 mg  500 mg Oral TID PRN Koby Lange M.D.   500 mg at 07/06/21 1724   • ondansetron (ZOFRAN) syringe/vial injection 4 mg  4 mg Intravenous Q4HRS PRN Mel Howell M.D.       • ondansetron (ZOFRAN ODT) dispertab 4 mg  4 mg Oral Q4HRS PRN Mel Howell M.D.   4 mg at 07/06/21 0301   • nicotine (NICODERM) 21 MG/24HR 21 mg  21 mg Transdermal Daily-0600 Mel Howell M.D.   21 mg at 07/10/21 0525    And   • nicotine polacrilex (NICORETTE) 2 MG piece 2 mg  2 mg Oral Q HOUR PRN Mel Howell M.D.       • insulin regular (HumuLIN R,NovoLIN R) injection  1-6 Units Subcutaneous Q6HRS Mel Howell M.D.        And   • glucose 4 g chewable tablet 16 g  16 g Oral Q15 MIN PRN Mel Howell M.D.        And   • dextrose 50% (D50W) injection 50 mL  50 mL Intravenous Q15 MIN PRN Mel Howell M.D.       • acetaminophen (Tylenol) tablet 650 mg  650 mg Oral Q6HRS PRN Mel Howell M.D.       • Pharmacy Consult Request ...Pain Management Review 1 Each  1 Each Other PHARMACY TO DOSE Mel Howell M.D.           Fluids    Intake/Output Summary (Last 24 hours) at 7/10/2021 2245  Last data filed at 7/10/2021 2200  Gross per 24 hour   Intake 2522.48 ml   Output 2055 ml   Net 467.48 ml       Laboratory  Recent Labs     07/09/21  2347 07/10/21  0420 07/10/21  1431   ISTATAPH 7.220* 7.376* 7.443   ISTATAPCO2 73.9* 47.2* 42.2*   ISTATAPO2 81 79 101*   ISTATATCO2 32 29 30   [id number] 93 95 98   ISTATARTHCO3 30.2* 27.7* 28.9*   ISTATARTBE 1 2 4*   ISTATTEMP 96.2 F 98.6 F 98.0 F   ISTATFIO2 70 70 60   ISTATSPEC Arterial Arterial Arterial   ISTATAPHTC 7.238* 7.376* 7.448   [id number] 74 79 99*         Recent Labs     07/08/21  0040 07/08/21  0040 07/09/21  0256 07/09/21  0256 07/09/21  2339 07/10/21  0446 07/10/21  1514   SODIUM 127*   < > 135   < > 135 133* 138   POTASSIUM 5.3   < > 5.4   < > 6.1* 5.9* 5.7*   CHLORIDE 93*   < > 98   < > 102 101 103   CO2 25   < > 27   < > 27 24 25   BUN 25*   < > 30*   < > 30* 38* 36*   CREATININE 0.70    < > 0.67   < > 0.74 0.81 0.82   MAGNESIUM 1.6  --  2.1  --  2.2  --   --    PHOSPHORUS 4.9*   < > 4.1  --  6.5* 5.0*  --    CALCIUM 9.4   < > 9.1   < > 8.7 8.5 8.4*    < > = values in this interval not displayed.     Recent Labs     07/09/21  0256 07/09/21  0811 07/09/21  2339 07/10/21  0446 07/10/21  1514   ALTSGPT  --  19  19  --   --   --    ASTSGOT  --  60*  59*  --   --   --    ALKPHOSPHAT  --  361*  362*  --   --   --    TBILIRUBIN  --  1.7*  1.7*  --   --   --    DBILIRUBIN  --  1.1*  1.2*  --   --   --    GLUCOSE   < >  --  127* 119* 147*    < > = values in this interval not displayed.     Recent Labs     07/09/21 0256 07/09/21 0811 07/09/21 2339   WBC 13.4*  --  15.3*   NEUTSPOLYS  --   --  97.40*   LYMPHOCYTES  --   --  0.00*   MONOCYTES  --   --  2.60   EOSINOPHILS  --   --  0.00   BASOPHILS  --   --  0.00   ASTSGOT  --  60*  59*  --    ALTSGPT  --  19  19  --    ALKPHOSPHAT  --  361*  362*  --    TBILIRUBIN  --  1.7*  1.7*  --      Recent Labs     07/09/21 0256 07/09/21 0811 07/09/21 2339   RBC 4.00*  --  3.58*   HEMOGLOBIN 12.6*  --  11.4*   HEMATOCRIT 38.5*  --  36.1*   PLATELETCT 89*  --  73*   PROTHROMBTM  --  17.4*  --    INR  --  1.48*  --        Imaging  X-Ray:  I have personally reviewed the images and compared with prior images.  EKG:  I have personally reviewed the images and compared with prior images.  CT:    Reviewed  MRI:   Reviewed    Assessment/Plan  * Acute respiratory failure with hypercapnia (HCC)  Assessment & Plan  Intubated early a.m. 7/10  Significant hypoxemia and hypercarbia on initial evaluation  Likely etiology aspiration pneumonia in patient with probable abdominal sepsis as well  RT protocol/ventilator bundle  Serial ABG/chest x-ray/ventilator mechanics review  SAT/SBT when clinically appropriate    Aspiration pneumonia (HCC)  Assessment & Plan  Intubated for respiratory failure and aspiration pneumonia early a.m. 7/10  Aspiration GI contents evident by  intubation as well as bronchoscopy  BAL specimen sent early a.m. 7/10  Empiric antibiotic therapy ongoing with Zosyn/Flagyl  Serial chest x-ray  Aggressive pulmonary toilet  Repeat bronchoscopy as clinically prudent  De-escalate antibiotics as clinically prudent    Sepsis (HCC)  Assessment & Plan  This is Septic shock Not present on admission  SIRS criteria identified on my evaluation include: Tachycardia, with heart rate greater than 90 BPM, Tachypnea, with respirations greater than 20 per minute and Leukocytosis, with WBC greater than 12,000  Source is GI/pulmonary  Presentation includes: Severe sepsis present, persistent hypotension after 30 ml/kg completed, and initial lactate level result is >= 4 mmol/L.   Despite appropriate fluid resuscitation with crystalloid given per sepsis guidelines, the patient remains hypotensive with systolic blood pressure less than 90 or MAP less than 65  Hemodynamic support with additional fluids and IV vasopressors as needed to maintain a SBP of 90 or MAP of 65  IV antibiotics as appropriate for source of sepsis  Reassessment: I have reassessed the patient's hemodynamic status    Septic shock is present while in the ICU  Patient developed respiratory failure requiring intubation  New onset seizure and altered LOC requiring neuro work-up possibly related to metabolic issues of sepsis  Actively titrating norepinephrine, add vasopressin as needed  Severe hypoalbuminemia status post fluid resuscitation we will add albumin to the regimen  Acidosis improving, lactic acid trending down, no bicarb at this time  Cefepime changed to Zosyn secondary to concerns of seizure threshold alteration by cefepime  Continuing Flagyl  Await cultures from BAL and abdomen and modify antibiotic regimen as appropriate    Status post laparotomy enteritis/colitis noted with purulent secretions in the peritoneum  Assessment & Plan  Status post laparotomy 7/9 by Dr. Meza, case reviewed with him at  length  Operative findings of enteritis/colitis but no ischemic bowel or bowel necrosis  Suspect ruptured cyst from liver?  Multiple abdominal cultures pending and broad-spectrum antibiotics ongoing  Surgery following, will review with them daily    Abnormal neurological exam  Assessment & Plan  Noted in the setting of sepsis and new onset seizure-dilated right pupil, abnormal focal neuro exam  CT head noncontrast negative  No other focality on exam except for encephalopathy, sluggish brainstem reflexes and diminished response to tactile stimuli  Neurological consultation, case reviewed with Dr Burkett  Stat MRI brain with and without-no CVA  Stat EEG-no obvious NCSE on review with EEG tech at the bedside, formal interpretation pending  Serial neuro exam    New onset seizure (HCC)  Assessment & Plan  Tonic-clonic generalized seizure activity identified by nursing team  Resolved with IV Ativan, repeat as needed  Loaded with Keppra  Neurology consultation for EEG-rule out nonconvulsive status  Initial CT negative  MRI with and without contrast negative  Optimize electrolytes  Aspiration/seizure precautions  Query related to toxic metabolic state from sepsis    Lactic acidosis- (present on admission)  Assessment & Plan  Secondary to sepsis, improved  Trend as clinically prudent    Hyperkalemia- (present on admission)  Assessment & Plan  Mild, slowly improving  Type IV RTA related to diabetes?  Avoid potassium-containing infusions  Serial BMP  Pharmacotherapy for elevated potassium as needed    Hepatoma (HCC)- (present on admission)  Assessment & Plan  History of hepatocellular carcinoma, presented 7/2 with abdominal pain  Worsening abdominal process 7/9, suspected ruptured hepatic cyst/mass-taken for laparotomy based on exam and imaging  Enteritis/colitis identified at laparotomy cultures obtained and antibiotics initiated  Case reviewed with Dr. Meza at length at the bedside    Encephalopathy acute  Assessment &  Plan  Multifactorial  New onset seizure control with Ativan  CT head negative  MRI without stroke or mass  EEG ongoing  Optimize electrolytes  Sedation vacations  Neurology following    Normochromic anemia  Assessment & Plan  Multifactorial etiology, not bleeding clinically  Serial CBC  Transfuse per protocol    Acute urinary retention  Assessment & Plan  Triana catheter  Triana/bladder protocols when clinically improved  Bladder scan as needed  Monitor urine output and renal function    Hypomagnesemia  Assessment & Plan  Replete    Hyponatremia- (present on admission)  Assessment & Plan  Mild, improved, SIADH?  Trend BMP    Tobacco abuse- (present on admission)  Assessment & Plan  Smoking cessation to be encouraged when clinically appropriate if patient still actively smoking    Thrombocytopenia (HCC)- (present on admission)  Assessment & Plan  Mild, no bleeding clinically at this time, serial CBC  Chronic?  Multifactorial etiology?  Transfuse per protocols    Essential hypertension- (present on admission)  Assessment & Plan  History of  Currently hypotensive on norepinephrine  Antihypertensive medications as clinically prudent    Type 2 diabetes mellitus without complication, without long-term current use of insulin (HCC)- (present on admission)  Assessment & Plan  Serial blood sugar testing  SSI, add Lantus when on enteral feeding as clinically appropriate  Hypoglycemia protocols  Transition to insulin 180 protocol if needed     VTE:  Add subcu heparin/Lovenox when okay with surgery, sequentials in place  Ulcer: H2 Antagonist  Lines: Central Line  Ongoing indication addressed, Arterial Line  Ongoing indication addressed and Triana Catheter  Ongoing indication addressed    I have performed a physical exam and reviewed and updated ROS and Plan today (7/10/2021). In review of yesterday's note (7/9/2021), there are no changes except as documented above.     Discussed patient condition and risk of morbidity and/or  mortality with Family, RN, RT, Pharmacy, Code status disscussed, Charge nurse / hot rounds and general surgery and neurology    The patient remains critically ill.  Patient remains on full support on the ventilator and inappropriate for liberation trial.  Actively titrating norepinephrine, adding albumin and may need to add vasopressin.  Work-up ongoing for new onset seizure, MRI negative, EEG ongoing, patient on Keppra.  Patient at high risk for significant clinical deterioration.    Critical care time = 115 minutes in directly providing and coordinating critical care and extensive data review.  No time overlap and excludes procedures.

## 2021-07-10 NOTE — OR NURSING
Patient to floor in stable condition. VSS. Surgical dressing clean dry intact with MARIANA to bulb suction. Patient alerted and on 10 L O2 oxymask attached to full tank. Call made to T7 regarding patient's belongings. No further needs.

## 2021-07-10 NOTE — PROGRESS NOTES
The patient is post opt diagnostic laparoscopy with washout and drain placement.  Findings include diffuse enteritis and colitis without evidence of ischemia or necrosis.  Purulent fluid drained about 1850 cc.   For Dr. Meza the patient need to be admitted to surgical ICU.  I reviewed the chart and noticed that intensivist Dr. Chowdhury was already consulted earlier today for possible ICU admission after the surgery.  I also informed Dr. Chowdhury about the patient ICU admission.

## 2021-07-10 NOTE — ANESTHESIA POSTPROCEDURE EVALUATION
Patient: Lorraine Bella    Procedure Summary     Date: 07/09/21 Room / Location: [facility] 09 / SURGERY [facility]    Anesthesia Start: 1741 Anesthesia Stop: 1856    Procedure: DIAGNOSITIC LAPAROSCOPY WITH WASH OUT AND DRAIN PLACEMENT (Abdomen) Diagnosis: (PERITONITIS )    Surgeons: Cody Meza M.D. Responsible Provider: Joaquim Childers III, M.D.    Anesthesia Type: general ASA Status: 4 - Emergent          Final Anesthesia Type: general  Last vitals  BP   Blood Pressure : 105/66, Arterial BP: 102/48    Temp   36.2 °C (97.1 °F)    Pulse   (!) 105   Resp   13    SpO2   95 %      Anesthesia Post Evaluation    Patient location during evaluation: PACU  Patient participation: complete - patient participated  Level of consciousness: awake and alert  Pain score: 8  Acute post op pain being well managed by recovery room staff    Airway patency: patent  Anesthetic complications: no  Cardiovascular status: hemodynamically stable  Respiratory status: acceptable  Hydration status: euvolemic    PONV: none          No complications documented.     Nurse Pain Score: 8 (NPRS)

## 2021-07-10 NOTE — OP REPORT
DATE OF OPERATION: 7/9/2021    PREOPERATIVE DIAGNOSIS:    Metastatic liver disease  Altered mental status  Peritonitis    POSTOPERATIVE DIAGNOSIS:    Metastatic liver disease  Altered mental status  Peritonitis    PROCEDURE PERFORMED:  1.  Diagnostic laparoscopy with washout and drain placement    SURGEON: Cody Meza M.D.    ASSISTANT: Sandra DICKSON  The indications for a surgical assistant in this surgery were indicated due to complexity of the procedure. Their role included aiding in incision, retraction, holding devices including camera for laparoscopic procedure, and closure of the wound.      ANESTHESIOLOGIST: Kris Childers M.D.    ANESTHESIA:  General endotracheal anesthesia.    ASA CLASSIFICATION: IV.    INDICATION:  The patient is a 71 y.o. male with history of metastatic liver disease who underwent ruptured cyst and now has peritonitis. He is taken to the operating room for diagnostic laparoscopy to rule out ischemic bowel or necrosis with washout and drain placement and cultures for antibiotic therapy.    FINDINGS:  1850mL gross purulence with diffuse enteritis and colitis without evidence of ischemia or necrosis.  Cultures sent for aerobic, anaerobic and fungal.  19 Romanian round drain placed in the pelvis..    WOUND CLASSIFICATION: Class IV, Dirty, Infected.    SPECIMEN:  Peritoneal fluid.    ESTIMATED BLOOD LOSS:  10 mL.    PROCEDURE:    After informed witnessed consent was obtained, the patient was taken to the operating room and underwent general endotracheal anesthesia without incident.  Preoperative antibiotics were administered.  Bilateral lower sequential compression devices were placed.  The abdomen was prepped and draped in the usual standard sterile fashion.  A timeout was performed verifying the correct patient, procedure, site, positioning in availability of equipment prior to starting surgery.  A 5 mm incision was made superior to the umbilicus and using a 5 mm 0°  laparoscope into the abdominal cavity using an Optiview technique.  There is no injury to the underlying bowel.  There was obvious.  Once the out the abdomen.  Additional ports were placed in the left lower quadrant and left upper quadrant.  I then aspirated almost 2 L of purulent fluid which was sampled and sent for aerobic, anaerobic and fungal cultures.  We irrigated the abdomen as best as I could but most of the intestine was densely adhered with the redness exudate and there was no evidence of ischemia or necrosis.  I placed a 19 Urdu drain through a trocar into the pelvis and secured to the skin with a drain suture.  Trochars removed and the abdomen desufflated.  The other skin incisions were closed with skin staples.  Patient was taken to the recovery room in guarded condition.  Cody Meza MD PhD  [facility]  Colon and Rectal Surgeon  [phone]

## 2021-07-10 NOTE — PROCEDURES
CONTINUOUS VIDEO ELECTROENCEPHALOGRAM REPORT      Referring provider: Dr. Burkett    DOS: 7/11/2021 (16 hours and 10 minutes of total recording time).     INDICATION:  Lorraine Bella 71 y.o. male presenting with seizure(s)    CURRENT ANTIEPILEPTIC AND/OR SEDATING REGIMEN:   Keppra (levetiracitam) (500mg Q12)  Fentanyl (100mcg @ 115)  Ativan (lorazepam) (2mg @ 0556)    TECHNIQUE: CVEEG was set up by a Neurodiagnostic technologist who performed education to the patient and staff. A minimum of 23 electrodes and 23 channel recording was setup and performed by Neurodiagnostic technologist, in accordance with the international 10-20 system. Impedence, electrode integrity, and technical impressions were documented a minimum of every 2-24 hour period by a Neurodiagnostic Technologist and reviewed by Interpreting Physician. The study was reviewed in bipolar and referential montages. The recording examined the patient in the awake, drowsy, and sleep state(s).     DESCRIPTION OF THE RECORD:  During wakefulness, the background was continuous a mixture of theta/delta activity.  Reactivity and state changes were present. Clinical wakefulness was characterized by increased muscle artifact, faster theta frequencies, and eye blinks but there was no discerning posterior dominant rhythm,      Sleep was captured and was characterized by diffuse background delta/theta activity with a loss of myogenic artifact.  N2 sleep transients in the form of sleep spindles and vertex waves were seen in the leads over the central regions.     ACTIVATION PROCEDURES:   NA      ICTAL AND INTERICTAL FINDINGS:   No focal or generalized epileptiform activity noted.     No regional slowing was seen during this routine study.      No clinical events or seizures were reported or recorded during the study.     EKG: sampling of the EKG recording did not demonstrate any abnormalities    EVENTS:  None    INTERPRETATION:  Abnormal video EEG recording in  the awake, drowsy, and sleep state(s):  - Moderate background slowing suggestive of diffuse/multifocal cerebral dysfunction.    - No persistent focal asymmetries seen.  - No epileptiform discharges seen   - No seizures. Clinical correlation is recommended.      Findings discussed with Dr. Madelaine Hicks MD  Epilepsy and General Neurology  Department of Neurology  Instructor of Clinical Neurology [employer] [facility].   Office: [phone]  Fax: [phone]

## 2021-07-10 NOTE — PROCEDURES
Intubation    Date/Time: 7/10/2021 12:28 AM  Performed by: Stephen Chowdhury D.O.  Authorized by: Stephen Chowdhury D.O.     Consent:     Consent obtained:  Emergent situation    Risks discussed:  Aspiration and hypoxia    Alternatives discussed:  No treatment  Pre-procedure details:     Patient status:  Unresponsive    Pretreatment medications:  None    Paralytics:  Rocuronium  Procedure details:     Preoxygenation:  Bag valve mask    CPR in progress: no      Intubation method:  Oral    Oral intubation technique:  Video-assisted    Laryngoscope type:  GlideScope    Laryngoscope blade:  Mac 4    Tube size (mm):  8.0    Tube type:  Cuffed    Number of attempts:  1    Ventilation between attempts: no      Cricoid pressure: no      Tube visualized through cords: yes    Placement assessment:     ETT to teeth:  25 cm    Tube secured with:  ETT ayala    Breath sounds:  Equal and absent over the epigastrium    Placement verification: chest rise, condensation, CXR verification, equal breath sounds and ETCO2 detector      CXR findings:  ETT in proper place  Post-procedure details:     Patient tolerance of procedure:  Tolerated well, no immediate complications  Comments:      Upon placement of Mac 4 blade pt noted to have bilious fluid in posterior pharynx and airway.  Copious amounts of fluid were suctioned from patients airway.

## 2021-07-10 NOTE — PROGRESS NOTES
Dr. Chowdhury at bedside with primary RN, Madyson and RT to perform intubation.    All necessary life support equipment in the room.     Time Out 2243. All in agreement.     Pt identified with 2 patient identifiers. Consent signed and placed in pt's chart.    Procedure start time: 2243  Procedure end time: 2250     Medications given per MD order, please see MAR.     2244 20 mg etomidate   2244 50 mg rocuronium  2250 100 mcg phenylephrine

## 2021-07-10 NOTE — PROGRESS NOTES
Patient transported to MRI via gurney with RN, RT, and transport tech. ACLS precautions in place. Propofol and Levophed drips infusing on MRI pumps for scan per Dr. Preciado. Drips verified with Irasema HOFFMAN. Patient medicated for pain with prn fentanyl before transport. Scan completed and patient returned to [facility] without incident. Will continue to monitor.

## 2021-07-10 NOTE — DIETARY
Nutrition Services Update: following for adequate nutrition - NPO / CLD x 8 days - now in critical care     Day 8 of admit. NPO.     Admitted to surgical ICU post op diagnostic laparoscopy with washout and drain placement 7/9. Required intubation overnight - met cart consult received. Neurology consulted and recommending stat MRI of brain.     Pt with high nutrition risk diagnosis of metastatic cancer to the lungs, and now with inadequate nutrition x 8 days. Spoke with RN regarding nutrition status.     Plan/Recommend:   1. Start nutrition support if medically feasible / within plan of care - may need parenteral nutrition  2. RD will continue to monitor for nutrition plan of care     RD following

## 2021-07-10 NOTE — ANESTHESIA PROCEDURE NOTES
Arterial Line  Performed by: Joaquim Childers III, M.D.  Authorized by: Joaquim Childers III, M.D.     Start Time:  7/9/2021 5:55 PM  End Time:  7/9/2021 5:57 PM  Localization: ultrasound guidance  Image captured, interpreted and electronically stored.  Patient Location:  OR  Indication: continuous blood pressure monitoring        Catheter Size:  20 G  Seldinger Technique?: Yes    Laterality:  Left  Site:  Radial artery  Line Secured:  Antimicrobial disc, tape and transparent dressing  Events: patient tolerated procedure well with no complications     Done under GA

## 2021-07-10 NOTE — PROGRESS NOTES
Dr. Chowdhury at bedside with primary RN, Madyson and RT to perform central line placement.    All necessary life support equipment in the room.     Time Out 2255. All in agreement.     Pt identified with 2 patient identifiers. Consent signed and placed in pt's chart.    Procedure start time: 2305  Procedure end time: 2318     CXR ordered.

## 2021-07-10 NOTE — RESPIRATORY CARE
Respiratory Therapy Update        ETT 8.0 at 24  Vent day 1    apvcmv  440, 20, +8, 50%     Pt unresponsive with 4-6 respirations. Care team at bedside. Pt intubated and placed on a ventilator.   Glidescope used, positive color change, bilateral breath sounds, tube and tube ayala secure

## 2021-07-10 NOTE — ANESTHESIA PROCEDURE NOTES
Airway    Date/Time: 7/9/2021 5:48 PM  Performed by: Joaquim Childers III, M.D.  Authorized by: Joaquim Childers III, M.D.     Location:  OR  Urgency:  Elective  Difficult Airway: No    Indications for Airway Management:  Anesthesia      Spontaneous Ventilation: absent    Sedation Level:  Deep  Preoxygenated: Yes    Patient Position:  Sniffing  Final Airway Type:  Endotracheal airway  Final Endotracheal Airway:  ETT  Cuffed: Yes    Technique Used for Successful ETT Placement:  Direct laryngoscopy    Insertion Site:  Oral  Blade Type:  Xavier  Laryngoscope Blade/Videolaryngoscope Blade Size:  3  ETT Size (mm):  8.0  Measured from:  Lips  ETT to Lips (cm):  24  Placement Verified by: auscultation and capnometry    Cormack-Lehane Classification:  Grade III - view of epiglottis only  Number of Attempts at Approach:  1

## 2021-07-10 NOTE — PROCEDURES
Central Line Insertion    Date/Time: 7/10/2021 12:30 AM  Performed by: Stephen Chowdhury D.O.  Authorized by: Stephen Chowdhury D.O.     Consent:     Consent obtained:  Emergent situation  Pre-procedure details:     Hand hygiene: Hand hygiene performed prior to insertion      Sterile barrier technique: All elements of maximal sterile technique followed      Skin preparation:  2% chlorhexidine    Skin preparation agent: Skin preparation agent completely dried prior to procedure    Sedation:     Sedation type:  Deep  Procedure details:     Location:  R subclavian    Patient position:  Trendelenburg    Procedural supplies:  Triple lumen    Catheter size:  7 Fr    Landmarks identified: yes      Ultrasound guidance: no      Number of attempts:  1    Successful placement: yes    Post-procedure details:     Post-procedure:  Dressing applied and line sutured    Assessment:  Blood return through all ports, free fluid flow, no pneumothorax on x-ray and placement verified by x-ray    Patient tolerance of procedure:  Tolerated well, no immediate complications

## 2021-07-10 NOTE — PROGRESS NOTES
Bronchoscopy completed by Dr. Vilchis. Alexey, RT present to assist as well as writer.    Time out at 201. All in agreement.    Pt identified with 2 identifiers. Emergent procedure per Dr. Vilchis. Samples sent to lab.

## 2021-07-10 NOTE — CARE PLAN
Problem: Nutritional:  Goal: Achieve adequate nutritional intake  Description: Advance diet beyond clears when medically feasible. Patient will consume >50% of meals  Outcome: Not Met   See dietary note. RD following.

## 2021-07-10 NOTE — CONSULTS
Critical Care Consultation    Date of consult: 7/9/2021    Referring Physician  Jossue Justin M.D.    Reason for Consultation  Abdominal Compartment Syndrome    History of Presenting Illness  71 y.o. male who presented 7/2/2021 with abdominal pain.  Pt was admitted to medicine service and general surgery was consulted.  General surgery believes pt had cystic lesion in his liver which ruptured causing pt's abd pain. Gen surg recommended conservative mgt.    Today, pt's abd pain worsened and pt's abd become more distended.  Pt also became lethargic.  Critical care consulted for concern of abd compartment syndrome.    Code Status  Full Code    Review of Systems  Review of Systems   Unable to perform ROS: Mental status change       Past Medical History   has a past medical history of Alcohol use (3/31/2016), Borderline diabetes, Cancer (HCC) (2019), Cataract, Dental disorder, Diabetes (HCC), Disorder of thyroid, Glaucoma, High cholesterol, and Hypertension.    Surgical History   has a past surgical history that includes bone graft (Right, 1960's); wrist orif (Right, 1960's); cataract phaco with iol (Left, 4/7/2016); parathyroidectomy (N/A, 5/13/2016); other (12/2019); and other.    Family History  family history includes Cancer in his mother.    Social History   reports that he has been smoking cigarettes. He started smoking about 31 years ago. He has a 15.00 pack-year smoking history. He has never used smokeless tobacco. He reports previous alcohol use of about 2.4 oz of alcohol per week. He reports that he does not use drugs.    Medications  Home Medications     Reviewed by Fady Farrell (Pharmacy Tech) on 07/02/21 at 1237  Med List Status: Complete   Medication Last Dose Status   hydrOXYzine HCl (ATARAX) 25 MG Tab 7/1/2021 Active              Current Facility-Administered Medications   Medication Dose Route Frequency Provider Last Rate Last Admin   • [MAR Hold] cefepime (Maxipime) 2 g in sterile water 20 mL  IV syringe  2 g Intravenous Q8HRS Jossue Justin M.D.   Stopped at 07/09/21 1442   • [MAR Hold] metroNIDAZOLE (FLAGYL) IVPB 500 mg  500 mg Intravenous Q8HRS Jossue Justin M.D.   Stopped at 07/09/21 1537   • [MAR Hold] lactulose 20 GM/30ML solution 30 mL  30 mL Oral BID Jossue Justin M.D.       • [MAR Hold] famotidine (PEPCID) tablet 20 mg  20 mg Oral DAILY Jossue Justin M.D.   20 mg at 07/09/21 0519   • lactated ringers infusion   Intravenous Continuous Koby Lange M.D. 83 mL/hr at 07/09/21 0532 New Bag at 07/09/21 0532   • [MAR Hold] guaiFENesin dextromethorphan (ROBITUSSIN DM) 100-10 MG/5ML syrup 5 mL  5 mL Oral Q6HRS PRN Koby Lange M.D.   5 mL at 07/05/21 2337   • [Held by provider] oxyCODONE immediate-release (ROXICODONE) tablet 5 mg  5 mg Oral Q3HRS PRIZAIAH Lange M.D.   5 mg at 07/06/21 2322    Or   • [Held by provider] oxyCODONE immediate release (ROXICODONE) tablet 10 mg  10 mg Oral Q3HRS PRN Koby Lange M.D.   10 mg at 07/08/21 0304    Or   • [Held by provider] HYDROmorphone (Dilaudid) injection 0.25 mg  0.25 mg Intravenous Q3HRS PRN Koby Lange M.D.   0.25 mg at 07/08/21 0550   • [MAR Hold] calcium carbonate (TUMS) chewable tab 500 mg  500 mg Oral TID PRN Koby Lange M.D.   500 mg at 07/06/21 1724   • [MAR Hold] senna-docusate (PERICOLACE or SENOKOT S) 8.6-50 MG per tablet 2 tablet  2 tablet Oral BID Mel Howell M.D.   2 tablet at 07/09/21 0519    And   • [MAR Hold] polyethylene glycol/lytes (MIRALAX) PACKET 1 Packet  1 Packet Oral QDAY PRN Mel Howell, M.D.   1 Packet at 07/08/21 0550    And   • [MAR Hold] magnesium hydroxide (MILK OF MAGNESIA) suspension 30 mL  30 mL Oral QDAY PRIZAIAH Howell M.D.   30 mL at 07/09/21 0520    And   • [MAR Hold] bisacodyl (DULCOLAX) suppository 10 mg  10 mg Rectal QDAY PRIZAIAH Howell M.D.   10 mg at 07/09/21 0949   • [MAR Hold] ondansetron (ZOFRAN) syringe/vial injection 4 mg  4 mg Intravenous Q4HRS LUKAS Howell M.D.       • [MAR Hold] ondansetron  (ROSITAFRMAURO ODT) dispertab 4 mg  4 mg Oral Q4HRS PRN Mel Howell M.D.   4 mg at 07/06/21 0301   • [MAR Hold] nicotine (NICODERM) 21 MG/24HR 21 mg  21 mg Transdermal Daily-0600 Mel Howell M.D.   21 mg at 07/07/21 0643    And   • [MAR Hold] nicotine polacrilex (NICORETTE) 2 MG piece 2 mg  2 mg Oral Q HOUR PRN Mel Howell M.D.       • [MAR Hold] insulin regular (HumuLIN R,NovoLIN R) injection  1-6 Units Subcutaneous Q6HRS Mel Howell M.D.        And   • [MAR Hold] glucose 4 g chewable tablet 16 g  16 g Oral Q15 MIN PRN Mel Howell M.D.        And   • [MAR Hold] dextrose 50% (D50W) injection 50 mL  50 mL Intravenous Q15 MIN PRN Mel Howell M.D.       • [MAR Hold] acetaminophen (Tylenol) tablet 650 mg  650 mg Oral Q6HRS PRN Mel Howell M.D.       • [MAR Hold] Pharmacy Consult Request ...Pain Management Review 1 Each  1 Each Other PHARMACY TO DOSE Mel Howell M.D.           Allergies  Allergies   Allergen Reactions   • Sulfa Drugs      Reaction unknown       Vital Signs last 24 hours  Temp:  [36 °C (96.8 °F)-36.3 °C (97.3 °F)] 36.2 °C (97.2 °F)  Pulse:  [116-124] 123  Resp:  [20-24] 24  BP: (123-156)/(85-95) 123/94  SpO2:  [93 %-95 %] 94 %    Physical Exam  Physical Exam  Constitutional:       General: He is in acute distress.      Appearance: He is ill-appearing and toxic-appearing.   HENT:      Head: Normocephalic and atraumatic.      Nose: Nose normal.      Mouth/Throat:      Mouth: Mucous membranes are dry.      Pharynx: Oropharynx is clear.   Eyes:      Conjunctiva/sclera: Conjunctivae normal.      Pupils: Pupils are equal, round, and reactive to light.   Cardiovascular:      Rate and Rhythm: Regular rhythm. Tachycardia present.      Pulses: Normal pulses.   Pulmonary:      Effort: Pulmonary effort is normal.      Breath sounds: Rhonchi present.   Abdominal:      General: There is distension.      Tenderness: There is abdominal tenderness. There is guarding.      Comments: Pt has abd pain with heel tap and percussion of abd.   Musculoskeletal:       Cervical back: Neck supple.      Right lower leg: Edema present.      Left lower leg: Edema present.   Skin:     General: Skin is warm and dry.      Capillary Refill: Capillary refill takes less than 2 seconds.   Neurological:      Mental Status: He is disoriented.      Comments: Pt only responsive to painful stimuli.         Fluids    Intake/Output Summary (Last 24 hours) at 7/9/2021 1812  Last data filed at 7/9/2021 1530  Gross per 24 hour   Intake --   Output 795 ml   Net -795 ml       Laboratory  Recent Results (from the past 48 hour(s))   POCT glucose device results    Collection Time: 07/07/21 11:24 PM   Result Value Ref Range    Glucose - Accu-Ck 123 (H) 65 - 99 mg/dL   Renal Function Panel    Collection Time: 07/08/21 12:40 AM   Result Value Ref Range    Sodium 127 (L) 135 - 145 mmol/L    Potassium 5.3 3.6 - 5.5 mmol/L    Chloride 93 (L) 96 - 112 mmol/L    Co2 25 20 - 33 mmol/L    Glucose 123 (H) 65 - 99 mg/dL    Creatinine 0.70 0.50 - 1.40 mg/dL    Bun 25 (H) 8 - 22 mg/dL    Calcium 9.4 8.5 - 10.5 mg/dL    Phosphorus 4.9 (H) 2.5 - 4.5 mg/dL    Albumin 2.6 (L) 3.2 - 4.9 g/dL   MAGNESIUM    Collection Time: 07/08/21 12:40 AM   Result Value Ref Range    Magnesium 1.6 1.5 - 2.5 mg/dL   AMMONIA    Collection Time: 07/08/21 12:40 AM   Result Value Ref Range    Ammonia 32 11 - 45 umol/L   ESTIMATED GFR    Collection Time: 07/08/21 12:40 AM   Result Value Ref Range    GFR If African American >60 >60 mL/min/1.73 m 2    GFR If Non African American >60 >60 mL/min/1.73 m 2   POCT glucose device results    Collection Time: 07/08/21  5:53 AM   Result Value Ref Range    Glucose - Accu-Ck 117 (H) 65 - 99 mg/dL   POCT glucose device results    Collection Time: 07/08/21 11:26 AM   Result Value Ref Range    Glucose - Accu-Ck 128 (H) 65 - 99 mg/dL   POCT glucose device results    Collection Time: 07/08/21  5:08 PM   Result Value Ref Range    Glucose - Accu-Ck 120 (H) 65 - 99 mg/dL   POCT glucose device results     Collection Time: 07/09/21 12:23 AM   Result Value Ref Range    Glucose - Accu-Ck 111 (H) 65 - 99 mg/dL   Renal Function Panel    Collection Time: 07/09/21  2:56 AM   Result Value Ref Range    Sodium 135 135 - 145 mmol/L    Potassium 5.4 3.6 - 5.5 mmol/L    Chloride 98 96 - 112 mmol/L    Co2 27 20 - 33 mmol/L    Glucose 115 (H) 65 - 99 mg/dL    Creatinine 0.67 0.50 - 1.40 mg/dL    Bun 30 (H) 8 - 22 mg/dL    Calcium 9.1 8.5 - 10.5 mg/dL    Phosphorus 4.1 2.5 - 4.5 mg/dL    Albumin 2.3 (L) 3.2 - 4.9 g/dL   MAGNESIUM    Collection Time: 07/09/21  2:56 AM   Result Value Ref Range    Magnesium 2.1 1.5 - 2.5 mg/dL   CBC WITHOUT DIFFERENTIAL    Collection Time: 07/09/21  2:56 AM   Result Value Ref Range    WBC 13.4 (H) 4.8 - 10.8 K/uL    RBC 4.00 (L) 4.70 - 6.10 M/uL    Hemoglobin 12.6 (L) 14.0 - 18.0 g/dL    Hematocrit 38.5 (L) 42.0 - 52.0 %    MCV 96.3 81.4 - 97.8 fL    MCH 31.5 27.0 - 33.0 pg    MCHC 32.7 (L) 33.7 - 35.3 g/dL    RDW 55.4 (H) 35.9 - 50.0 fL    Platelet Count 89 (L) 164 - 446 K/uL    MPV 8.8 (L) 9.0 - 12.9 fL   ESTIMATED GFR    Collection Time: 07/09/21  2:56 AM   Result Value Ref Range    GFR If African American >60 >60 mL/min/1.73 m 2    GFR If Non African American >60 >60 mL/min/1.73 m 2   POCT glucose device results    Collection Time: 07/09/21  5:30 AM   Result Value Ref Range    Glucose - Accu-Ck 109 (H) 65 - 99 mg/dL   PROCALCITONIN    Collection Time: 07/09/21  8:11 AM   Result Value Ref Range    Procalcitonin 2.95 (H) <0.25 ng/mL   Lactic Acid -STAT Once    Collection Time: 07/09/21  8:11 AM   Result Value Ref Range    Lactic Acid 2.7 (H) 0.5 - 2.0 mmol/L   Prothrombin time (INR)    Collection Time: 07/09/21  8:11 AM   Result Value Ref Range    PT 17.4 (H) 12.0 - 14.6 sec    INR 1.48 (H) 0.87 - 1.13   HEPATIC FUNCTION PANEL    Collection Time: 07/09/21  8:11 AM   Result Value Ref Range    Alkaline Phosphatase 361 (H) 30 - 99 U/L    AST(SGOT) 60 (H) 12 - 45 U/L    ALT(SGPT) 19 2 - 50 U/L    Total  Bilirubin 1.7 (H) 0.1 - 1.5 mg/dL    Direct Bilirubin 1.1 (H) 0.1 - 0.5 mg/dL    Indirect Bilirubin 0.6 0.0 - 1.0 mg/dL    Albumin 2.3 (L) 3.2 - 4.9 g/dL    Total Protein 6.3 6.0 - 8.2 g/dL   TROPONIN    Collection Time: 21  8:11 AM   Result Value Ref Range    Troponin T 19 6 - 19 ng/L   HEPATIC FUNCTION PANEL    Collection Time: 21  8:11 AM   Result Value Ref Range    Alkaline Phosphatase 362 (H) 30 - 99 U/L    AST(SGOT) 59 (H) 12 - 45 U/L    ALT(SGPT) 19 2 - 50 U/L    Total Bilirubin 1.7 (H) 0.1 - 1.5 mg/dL    Direct Bilirubin 1.2 (H) 0.1 - 0.5 mg/dL    Indirect Bilirubin 0.5 0.0 - 1.0 mg/dL    Albumin 2.2 (L) 3.2 - 4.9 g/dL    Total Protein 6.2 6.0 - 8.2 g/dL   PROCALCITONIN    Collection Time: 21  8:11 AM   Result Value Ref Range    Procalcitonin 3.08 (H) <0.25 ng/mL   TROPONIN    Collection Time: 21  8:11 AM   Result Value Ref Range    Troponin T 18 6 - 19 ng/L   EKG    Collection Time: 21  8:34 AM   Result Value Ref Range    Report       [facility]    Test Date:  2021  Pt Name:    SANDRO DIAZ             Department: [facility]  MRN:        [id number]                      Room:       [facility]  Gender:     Male                         Technician: HOWIEKODAK  :        1950                   Requested By:CARRIE WHITE  Order #:    [id number]                    Reading MD: Vimal Reed MD    Measurements  Intervals                                Axis  Rate:       117                          P:          36  NE:         172                          QRS:        -14  QRSD:       84                           T:          47  QT:         300  QTc:        419    Interpretive Statements  SINUS TACHYCARDIA  CONSIDER ANTERIOR INFARCT  Electronically Signed On 2021 17:52:25 PDT by Vimal Reed MD     Urinalysis    Collection Time: 21  9:51 AM    Specimen: Urine, Clean Catch   Result Value Ref Range    Color DK Yellow     Character Clear     Specific Gravity  1.026 <1.035    Ph 5.0 5.0 - 8.0    Glucose Negative Negative mg/dL    Ketones Trace (A) Negative mg/dL    Protein Negative Negative mg/dL    Bilirubin Moderate (A) Negative    Urobilinogen, Urine 2.0 Negative    Nitrite Negative Negative    Leukocyte Esterase Trace (A) Negative    Occult Blood Negative Negative    Micro Urine Req Microscopic    URINE MICROSCOPIC (W/UA)    Collection Time: 07/09/21  9:51 AM   Result Value Ref Range    WBC 2-5 (A) /hpf    RBC 10-20 (A) /hpf    Bacteria Negative None /hpf    Epithelial Cells Negative /hpf    Hyaline Cast 3-5 (A) /lpf   POCT glucose device results    Collection Time: 07/09/21 12:28 PM   Result Value Ref Range    Glucose - Accu-Ck 115 (H) 65 - 99 mg/dL   Lactic Acid Every four hours after STAT order    Collection Time: 07/09/21 12:39 PM   Result Value Ref Range    Lactic Acid 2.2 (H) 0.5 - 2.0 mmol/L   Lactic Acid Every four hours after STAT order    Collection Time: 07/09/21  3:25 PM   Result Value Ref Range    Lactic Acid 2.3 (H) 0.5 - 2.0 mmol/L       Imaging  CT-ABDOMEN-PELVIS W/O   Final Result      1.  New fluid distended small bowel loops which are nonspecific and could be related to enteritis or small bowel ileus. Developing obstruction is considered less likely though not excluded.   2.  There appears to be new wall thickening of the ascending colon likely representing infectious/inflammatory colitis.   3.  Mild ascites which is mildly increased from prior study. Omental and mesenteric infiltration which is new from prior and could be related to edema or inflammation.   4.  Small right pleural effusion. Bibasilar atelectasis.   5.   No other significant change.      DX-CHEST-PORTABLE (1 VIEW)   Final Result      1.  New right basilar airspace process consistent with atelectasis or pneumonia      2.  Mild left midlung zone atelectasis      [id number] VIEW   Final Result      1. Moderate stool mixed with contrast in the ascending colon.   2. Moderate stool in  the transverse colon.   3. No bowel obstruction.   4. Other chronic degenerative changes.      CT-ABDOMEN-PELVIS W/O   Final Result      1.  There is no acute inflammatory process in the abdomen or pelvis.   2.  The treated lesions in the liver are again seen without interval change.   3.  Splenomegaly is again seen.   4.  Cystic pancreatic tail lesion is unchanged possibly a pseudocyst.   5.  There is colonic diverticulosis without diverticulitis.   6.  There is mild wall thickening of the distal esophagus.   7.  There is a stable small amount of ascites.   8.  There is moderate colonic stool.      DX-CHEST-PORTABLE (1 VIEW)   Final Result      Hypoinflation with bibasilar atelectasis. No focal consolidation or pleural effusions.      DX-CHEST-PORTABLE (1 VIEW)   Final Result      Left basilar atelectasis.      CT-ABDOMEN-PELVIS WITH   Final Result      1.  Primarily cystic mass in the medial segment LEFT lobe liver, likely treated hepatoma.   2.  Complex low-attenuation lesion in the inferior aspect medial segment LEFT lobe liver which is new from prior exam and may represent cystic mass or focal hematoma related to rupture larger mass.  Minimal hyperdense components may indicate blood    products.  Abscess is felt less likely.   3.  Gallbladder is not visualized and may be compressed by or involved with liver mass.   4.  Small volume ascites with potential hemoperitoneum.   5.  Splenomegaly.      These findings were discussed with CARMEN OTTO on 7/2/2021 3:02 PM.      DX-CHEST-PORTABLE (1 VIEW)   Final Result      1.  Hypoinflation with minimal LEFT lung base atelectasis.   2.  No pneumonia or pulmonary edema.          Assessment/Plan  Hepatoma (HCC)- (present on admission)  Assessment & Plan  Pt is a 72 yo  male with h/o hepatocellular carcinoma.  Pt was admitted 7 days ago with abd pain.  Today, pt's abd pain worsened and pt became lethargic.  CT abd/pelvis showed increased fluid in abd, fluid has  [place] units of 1-2 HU.  Radiology read fluid around liver as hematoma related to ruptured mass in liver which may indicate blood products in abd.  Pt has surgical abd on exam and surgery is taking pt to OR for emergent exploratory laparotomy.  If surgery finds surgical pathology for fluid then pt can go to surgical critical care team for management.  If there is no surgical pathology for pt's abd pain, and pt cannot be extubated post operatively, then pt can come to medical ICU team for management.        Discussed patient condition and risk of morbidity and/or mortality with Hospitalist and RN.    The patient remains critically ill.  Critical care time = 52 minutes in directly providing and coordinating critical care and extensive data review.  No time overlap and excludes procedures.

## 2021-07-10 NOTE — OR SURGEON
Immediate Post OP Note    PreOp Diagnosis:   Metastatic liver disease  Altered mental status  Peritonitis    PostOp Diagnosis: Same      Procedure(s):  DIAGNOSITIC LAPAROSCOPY WITH WASH OUT AND DRAIN PLACEMENT - Wound Class: Dirty or Infected    Surgeon(s):  Cody Meza M.D.    Anesthesiologist/Type of Anesthesia:  Anesthesiologist: Joaquim Childers III, M.D./General    Surgical Staff:  Circulator: Lucretia Duran R.N.  Scrub Person: Mirian Celestin    Specimens removed if any:  Peritoneal fluid    Estimated Blood Loss: 5    Findings: Diffuse enteritis and colitis without evidence of ischemia or necrosis.  Purulent fluid drained approximately 1850 cc.  Culture sent for aerobic, anaerobic and fungal.  19 [place] round drain placed in the pelvis.    Complications: No complications noted        7/9/2021 6:36 PM Cody Meza M.D.

## 2021-07-10 NOTE — ANESTHESIA PREPROCEDURE EVALUATION
Relevant Problems   NEURO   (positive) History of alcohol abuse      CARDIAC   (positive) Essential hypertension         (positive) Cirrhosis of liver without ascites (HCC)   (positive) Hepatic rupture   (positive) Hepatoma (HCC)      ENDO   (positive) Type 2 diabetes mellitus without complication, without long-term current use of insulin (HCC)       Physical Exam    Airway   Mallampati: III  TM distance: >3 FB  Neck ROM: limited       Cardiovascular - normal exam  Rhythm: regular  Rate: normal  (-) murmur     Dental - normal exam           Pulmonary - normal exam  Breath sounds clear to auscultation     Abdominal    Neurological - normal exam         Other findings: AMS, hemoperitoneum, ?SBO vs colitis, hepatocellualr cacinoma, dec urine output, ?sepsis, leukocytosis, elevated lactate, coagulopathy, alocohol abuse            Anesthesia Plan    ASA 4- EMERGENT       Plan - general       Airway plan will be ETT    (Emergency case, Arterial line)      Induction: intravenous    Postoperative Plan: Postoperative administration of opioids is intended.    Pertinent diagnostic labs and testing reviewed    Informed Consent:    Anesthetic plan and risks discussed with patient.    Use of blood products discussed with: patient whom consented to blood products.

## 2021-07-10 NOTE — PROCEDURES
Procedures     Fiberoptic bronchoscopy with bronchoalveolar lavage     Pre-procedure diagnosis: Respiratory failure  Post-procedure diagnosis: Respiratory failure     Indication for procedure: Increased respiratory secretions; evaluate for infection     Universal protocol:  - Anonymous protocol with patient's name was matched to armband  - Timeout called just prior to procedure     Pre-procedure:  - All equipment was available  - All imaging and diagnostics were available and reviewed  - Oxygenated with 100% FiO2 on the ventilator     Procedural sedation:  - Patient was on continuous sedation with propofol and fentanyl     Procedure:  - Advanced fiberoptic bronchoscope via endotracheal tube  - Evaluated bilateral lungs  - BAL performed to RLL  - Fiberoptic bronchoscope was withdrawn intact     Findings:  - ETT 3.5 cm above the neelam  - Bilious secretions throughout L lung  - Minimal secretions in RLL  - All lobes inspected     Complications:  - None

## 2021-07-10 NOTE — OR NURSING
Hospitalist and surgeon made aware of patient's condition. No new orders received at this time. Possibility of patient transferring to ICU. Will continue to monitor.

## 2021-07-10 NOTE — PROGRESS NOTES
4 Eyes Skin Assessment Completed by Madyson RN and Barrie RN.    Head WDL  Ears WDL  Nose WDL  Mouth WDL  Neck WDL  Breast/Chest WDL  Shoulder Blades WDL  Spine WDL  (R) Arm/Elbow/Hand WDL  (L) Arm/Elbow/Hand WDL  Abdomen Incision/ wound vac and laparoscopic site  Groin WDL  Scrotum/Coccyx/Buttocks WDL  (R) Leg WDL  (L) Leg WDL  (R) Heel/Foot/Toe WDL  (L) Heel/Foot/Toe WDL      Scattered bruising    Devices In Places ECG, Blood Pressure Cuff, Pulse Ox, Triana, Arterial Line, SCD's, OG/NG and Central Line      Interventions In Place Sacral Mepilex, Heel Float Boots, Pillows, Q2 Turns and Low Air Loss Mattress    Possible Skin Injury No    Pictures Uploaded Into Epic N/A  Wound Consult Placed N/A  RN Wound Prevention Protocol Ordered Yes

## 2021-07-10 NOTE — PROGRESS NOTES
Patient arrived from PACU. Unresponsive to vigorous stimulation. Rihgt pupil >L pupil, both reactive. RR 5. Dr. Chowdhury paged, RT notified. Trauma surgeon aware and at bedside. Intubated by Dr. Chowdhury. 100 mcg asa administered post intubation and 1 L fluid bolus administered. Central Line placed by Dr. Chowdhury. Time outs performed for both procedures and everybody agrees. VSS.    Head CT completed due to unequal pupils. Scan negative for acute abnormalities. 2 mg ativan administered for possible seizure. Dr. Chowdhury Notified. Will continue to monitor.

## 2021-07-10 NOTE — ANESTHESIA PROCEDURE NOTES
Peripheral IV    Date/Time: 7/9/2021 6:05 PM  Performed by: Joaquim Childers III, M.D.  Authorized by: Joaquim Childers III, M.D.     Size:  14 G (long Jelco)  Laterality:  Right (hand)  Local Anesthetic:  None  Site Prep:  Chlorhexidine  Technique:  Direct puncture  Attempts:  1   Done under GA

## 2021-07-10 NOTE — OR NURSING
Patient in PACU in stable condition. VSS. Surgical dressing clean dry intact with MARIANA to bulb suction. Will continue to monitor and medicate appropriately.

## 2021-07-10 NOTE — CARE PLAN
Problem: Ventilation  Goal: Ability to achieve and maintain unassisted ventilation or tolerate decreased levels of ventilator support  Description: Document on Vent flowsheet    1.  Support and monitor invasive and noninvasive mechanical ventilation  2.  Monitor ventilator weaning response  3.  Perform ventilator associated pneumonia prevention interventions  4.  Manage ventilation therapy by monitoring diagnostic test results  Outcome: Not Progressing      Ventilator Daily Summary    Vent Day # 2  CMV 26, 440, 10, 60  Ventilator settings changed this shift: lowered oxygen as nanette    Weaning trials: no, oxygen was >60 and then continuous eeg    Respiratory Procedures: no    Plan: Continue current ventilator settings and wean mechanical ventilation as tolerated per physician orders.

## 2021-07-10 NOTE — CARE PLAN
The patient is Unstable - High likelihood or risk of patient condition declining or worsening    Shift Goals  Clinical Goals: airway protection/ stable neuro  Patient Goals: LAILA  Family Goals: LAILA    Problem: Hemodynamics  Goal: Patient's hemodynamics, fluid balance and neurologic status will be stable or improve  Outcome: Not Progressing     Had to start levophed to maintain MAP >65,currently infusing at 10 mcg at time of signed note. Arterial line in place. ST w/ 's. R>L pupil. Right pupil fixed. Head CT completed. STAT EEG planned for today.      Problem: Respiratory  Goal: Patient will achieve/maintain optimum respiratory ventilation and gas exchange  Outcome: Progressing  Intubated when arrived to ICU. Bronchoscopy completed during shift. 100% FiO2, PEEP 8 on ventilator. Diminished lung sounds.

## 2021-07-11 ENCOUNTER — APPOINTMENT (OUTPATIENT)
Dept: RADIOLOGY | Facility: MEDICAL CENTER | Age: 71
DRG: 356 | End: 2021-07-11
Attending: INTERNAL MEDICINE
Payer: MEDICARE

## 2021-07-11 ENCOUNTER — APPOINTMENT (OUTPATIENT)
Dept: RADIOLOGY | Facility: MEDICAL CENTER | Age: 71
DRG: 356 | End: 2021-07-11
Attending: SURGERY
Payer: MEDICARE

## 2021-07-11 LAB
ANION GAP SERPL CALC-SCNC: 6 MMOL/L (ref 7–16)
ANISOCYTOSIS BLD QL SMEAR: ABNORMAL
BACTERIA FLD AEROBE CULT: ABNORMAL
BACTERIA FLD AEROBE CULT: ABNORMAL
BASOPHILS # BLD AUTO: 0 % (ref 0–1.8)
BASOPHILS # BLD: 0 K/UL (ref 0–0.12)
BUN SERPL-MCNC: 37 MG/DL (ref 8–22)
CALCIUM SERPL-MCNC: 8.6 MG/DL (ref 8.5–10.5)
CHLORIDE SERPL-SCNC: 108 MMOL/L (ref 96–112)
CO2 SERPL-SCNC: 27 MMOL/L (ref 20–33)
CREAT SERPL-MCNC: 0.65 MG/DL (ref 0.5–1.4)
EOSINOPHIL # BLD AUTO: 0 K/UL (ref 0–0.51)
EOSINOPHIL NFR BLD: 0 % (ref 0–6.9)
ERYTHROCYTE [DISTWIDTH] IN BLOOD BY AUTOMATED COUNT: 56.2 FL (ref 35.9–50)
GLUCOSE BLD-MCNC: 100 MG/DL (ref 65–99)
GLUCOSE BLD-MCNC: 105 MG/DL (ref 65–99)
GLUCOSE BLD-MCNC: 132 MG/DL (ref 65–99)
GLUCOSE BLD-MCNC: 134 MG/DL (ref 65–99)
GLUCOSE SERPL-MCNC: 132 MG/DL (ref 65–99)
GRAM STN SPEC: ABNORMAL
HCT VFR BLD AUTO: 27.8 % (ref 42–52)
HGB BLD-MCNC: 9 G/DL (ref 14–18)
LACTATE BLD-SCNC: 1.1 MMOL/L (ref 0.5–2)
LACTATE BLD-SCNC: 1.2 MMOL/L (ref 0.5–2)
LYMPHOCYTES # BLD AUTO: 0.14 K/UL (ref 1–4.8)
LYMPHOCYTES NFR BLD: 2.6 % (ref 22–41)
MACROCYTES BLD QL SMEAR: ABNORMAL
MAGNESIUM SERPL-MCNC: 2.1 MG/DL (ref 1.5–2.5)
MANUAL DIFF BLD: NORMAL
MCH RBC QN AUTO: 31.6 PG (ref 27–33)
MCHC RBC AUTO-ENTMCNC: 32.4 G/DL (ref 33.7–35.3)
MCV RBC AUTO: 97.5 FL (ref 81.4–97.8)
MICROCYTES BLD QL SMEAR: ABNORMAL
MONOCYTES # BLD AUTO: 0.19 K/UL (ref 0–0.85)
MONOCYTES NFR BLD AUTO: 3.5 % (ref 0–13.4)
MORPHOLOGY BLD-IMP: NORMAL
NEUTROPHILS # BLD AUTO: 5.16 K/UL (ref 1.82–7.42)
NEUTROPHILS NFR BLD: 93.9 % (ref 44–72)
NRBC # BLD AUTO: 0 K/UL
NRBC BLD-RTO: 0 /100 WBC
PHOSPHATE SERPL-MCNC: 3.2 MG/DL (ref 2.5–4.5)
PLATELET # BLD AUTO: 57 K/UL (ref 164–446)
PLATELET BLD QL SMEAR: NORMAL
PMV BLD AUTO: 9.3 FL (ref 9–12.9)
POTASSIUM SERPL-SCNC: 4.6 MMOL/L (ref 3.6–5.5)
RBC # BLD AUTO: 2.85 M/UL (ref 4.7–6.1)
RBC BLD AUTO: PRESENT
SIGNIFICANT IND 70042: ABNORMAL
SITE SITE: ABNORMAL
SODIUM SERPL-SCNC: 141 MMOL/L (ref 135–145)
SOURCE SOURCE: ABNORMAL
WBC # BLD AUTO: 5.5 K/UL (ref 4.8–10.8)

## 2021-07-11 PROCEDURE — 700111 HCHG RX REV CODE 636 W/ 250 OVERRIDE (IP): Performed by: INTERNAL MEDICINE

## 2021-07-11 PROCEDURE — 99291 CRITICAL CARE FIRST HOUR: CPT | Performed by: INTERNAL MEDICINE

## 2021-07-11 PROCEDURE — 83605 ASSAY OF LACTIC ACID: CPT | Mod: 91

## 2021-07-11 PROCEDURE — P9047 ALBUMIN (HUMAN), 25%, 50ML: HCPCS | Mod: JG | Performed by: INTERNAL MEDICINE

## 2021-07-11 PROCEDURE — 700105 HCHG RX REV CODE 258: Performed by: STUDENT IN AN ORGANIZED HEALTH CARE EDUCATION/TRAINING PROGRAM

## 2021-07-11 PROCEDURE — A9270 NON-COVERED ITEM OR SERVICE: HCPCS | Performed by: INTERNAL MEDICINE

## 2021-07-11 PROCEDURE — 85027 COMPLETE CBC AUTOMATED: CPT

## 2021-07-11 PROCEDURE — 770022 HCHG ROOM/CARE - ICU (200)

## 2021-07-11 PROCEDURE — 74018 RADEX ABDOMEN 1 VIEW: CPT

## 2021-07-11 PROCEDURE — 700102 HCHG RX REV CODE 250 W/ 637 OVERRIDE(OP): Performed by: INTERNAL MEDICINE

## 2021-07-11 PROCEDURE — 99231 SBSQ HOSP IP/OBS SF/LOW 25: CPT | Performed by: NURSE PRACTITIONER

## 2021-07-11 PROCEDURE — 82962 GLUCOSE BLOOD TEST: CPT | Mod: 91

## 2021-07-11 PROCEDURE — A9270 NON-COVERED ITEM OR SERVICE: HCPCS | Performed by: STUDENT IN AN ORGANIZED HEALTH CARE EDUCATION/TRAINING PROGRAM

## 2021-07-11 PROCEDURE — 84100 ASSAY OF PHOSPHORUS: CPT

## 2021-07-11 PROCEDURE — 85007 BL SMEAR W/DIFF WBC COUNT: CPT

## 2021-07-11 PROCEDURE — 37799 UNLISTED PX VASCULAR SURGERY: CPT

## 2021-07-11 PROCEDURE — 700102 HCHG RX REV CODE 250 W/ 637 OVERRIDE(OP): Performed by: STUDENT IN AN ORGANIZED HEALTH CARE EDUCATION/TRAINING PROGRAM

## 2021-07-11 PROCEDURE — 82803 BLOOD GASES ANY COMBINATION: CPT

## 2021-07-11 PROCEDURE — 83735 ASSAY OF MAGNESIUM: CPT

## 2021-07-11 PROCEDURE — 71045 X-RAY EXAM CHEST 1 VIEW: CPT

## 2021-07-11 PROCEDURE — 700105 HCHG RX REV CODE 258: Performed by: INTERNAL MEDICINE

## 2021-07-11 PROCEDURE — 94003 VENT MGMT INPAT SUBQ DAY: CPT

## 2021-07-11 PROCEDURE — 94799 UNLISTED PULMONARY SVC/PX: CPT

## 2021-07-11 PROCEDURE — 80048 BASIC METABOLIC PNL TOTAL CA: CPT

## 2021-07-11 RX ORDER — LACTULOSE 20 G/30ML
30 SOLUTION ORAL 2 TIMES DAILY
Status: DISCONTINUED | OUTPATIENT
Start: 2021-07-11 | End: 2021-07-11

## 2021-07-11 RX ADMIN — FENTANYL CITRATE 100 MCG: 50 INJECTION, SOLUTION INTRAMUSCULAR; INTRAVENOUS at 10:39

## 2021-07-11 RX ADMIN — SODIUM CHLORIDE, POTASSIUM CHLORIDE, SODIUM LACTATE AND CALCIUM CHLORIDE: 600; 310; 30; 20 INJECTION, SOLUTION INTRAVENOUS at 08:18

## 2021-07-11 RX ADMIN — PROPOFOL 5 MCG/KG/MIN: 10 INJECTION, EMULSION INTRAVENOUS at 10:17

## 2021-07-11 RX ADMIN — SODIUM CHLORIDE, POTASSIUM CHLORIDE, SODIUM LACTATE AND CALCIUM CHLORIDE: 600; 310; 30; 20 INJECTION, SOLUTION INTRAVENOUS at 21:00

## 2021-07-11 RX ADMIN — FENTANYL CITRATE 100 MCG: 50 INJECTION, SOLUTION INTRAMUSCULAR; INTRAVENOUS at 05:30

## 2021-07-11 RX ADMIN — PIPERACILLIN AND TAZOBACTAM 4.5 G: 4; .5 INJECTION, POWDER, LYOPHILIZED, FOR SOLUTION INTRAVENOUS; PARENTERAL at 20:58

## 2021-07-11 RX ADMIN — FENTANYL CITRATE 100 MCG: 50 INJECTION, SOLUTION INTRAMUSCULAR; INTRAVENOUS at 00:35

## 2021-07-11 RX ADMIN — LACTULOSE 30 ML: 20 SOLUTION ORAL at 05:31

## 2021-07-11 RX ADMIN — NICOTINE TRANSDERMAL SYSTEM 21 MG: 21 PATCH, EXTENDED RELEASE TRANSDERMAL at 05:31

## 2021-07-11 RX ADMIN — ALBUMIN (HUMAN) 25 G: 5 SOLUTION INTRAVENOUS at 01:51

## 2021-07-11 RX ADMIN — FAMOTIDINE 20 MG: 10 INJECTION INTRAVENOUS at 17:33

## 2021-07-11 RX ADMIN — FAMOTIDINE 20 MG: 10 INJECTION INTRAVENOUS at 05:31

## 2021-07-11 RX ADMIN — PIPERACILLIN AND TAZOBACTAM 4.5 G: 4; .5 INJECTION, POWDER, LYOPHILIZED, FOR SOLUTION INTRAVENOUS; PARENTERAL at 05:31

## 2021-07-11 RX ADMIN — FENTANYL CITRATE 100 MCG: 50 INJECTION, SOLUTION INTRAMUSCULAR; INTRAVENOUS at 16:14

## 2021-07-11 RX ADMIN — PIPERACILLIN AND TAZOBACTAM 4.5 G: 4; .5 INJECTION, POWDER, LYOPHILIZED, FOR SOLUTION INTRAVENOUS; PARENTERAL at 13:38

## 2021-07-11 RX ADMIN — PROPOFOL 15 MCG/KG/MIN: 10 INJECTION, EMULSION INTRAVENOUS at 22:02

## 2021-07-11 RX ADMIN — FENTANYL CITRATE 100 MCG: 50 INJECTION, SOLUTION INTRAMUSCULAR; INTRAVENOUS at 02:37

## 2021-07-11 ASSESSMENT — FIBROSIS 4 INDEX: FIB4 SCORE: 13.16

## 2021-07-11 ASSESSMENT — PAIN DESCRIPTION - PAIN TYPE
TYPE: ACUTE PAIN
TYPE: ACUTE PAIN

## 2021-07-11 NOTE — ASSESSMENT & PLAN NOTE
Multifactorial-suspect predominantly due to sepsis, improved  New onset seizure control with Ativan  CT head negative  MRI without stroke or mass  EEG negative

## 2021-07-11 NOTE — PROGRESS NOTES
Brief Neurology Note  Neurohospitalist Service, [facility]    Referring Physician: Vimal Preciado M.D.    Lorraine Bella is a 71 y.o. male with history of liver cancer with lung metastasis admitted to [facility] on 7/2 for acute sepsis, likely ruptured liver cyst. Intubated, underwnt bronchoscopy s/p aspiration in the SICU. Clinical exam reveals right dilated pupil, left constricted, patient unresponsive. Emergent CT Head without acute pathology. Episode of tonic/clonic seizure activity, abated with ativan, loaded with Keppra. MRI Brain with and without acute abonormality. 24 hour EEG shows moderate background slowing suggestive of diffuse/multifocal cerebral dysfunction; but no persistent focal asymmetries, epileptiform discharges, seizures. Discontinue Keppra. Nuerologic symptoms most like represent toxic metabolic encephalopathy vs anoxic injury. No further recommendations or further studies from an acute neurological standpoint at this time. Please re-consult if you have further questions or there is a change in status.     Plan:  Discontinue Keppra     The evaluation of the patient, and recommended management, was discussed with attending neurologist, Dr. Cody Berger.    Corinne E. Canavero, APRN  Neurohospitalist [facility], Acute Care Services

## 2021-07-11 NOTE — CARE PLAN
The patient is Watcher - Medium risk of patient condition declining or worsening    Shift Goals  Clinical Goals: Pt will remain off vasopressor medications, pt will continue to follow commands  Patient Goals: LAILA  Family Goals: no family present    Progress made toward(s) clinical / shift goals:  Pt has been off vasopressors since yesterday. He is more awake and following commands.    Patient is not progressing towards the following goals:      Problem: Bowel Elimination  Goal: Establish and maintain regular bowel function  Outcome: Not Progressing   We will be advancing bowel protocol per surgery recommendations.    Problem: Mechanical Ventilation  Goal: Successful weaning off mechanical ventilator, spontaneously maintains adequate gas exchange  Outcome: Not Met   Pt remains on ventilator at this time.

## 2021-07-11 NOTE — PROGRESS NOTES
Critical Care Progress Note    Date of admission  7/2/2021    Chief Complaint  71 y.o. male who presented 7/2/2021 with abdominal pain.  Pt was admitted to medicine service and general surgery was consulted.  General surgery believes pt had cystic lesion in his liver which ruptured causing pt's abd pain. Gen surg recommended conservative mgt.  Today, pt's abd pain worsened and pt's abd become more distended.  Pt also became lethargic.  Critical care consulted for concern of abd compartment syndrome.  (Dr Chowdhury [facility])    Hospital Course    7/10 - Sz in early AM, ativan/keppra, CT neg, MRI w/wo neg, EEG no NCSE,  Titrating NE drip, Cefepime -> Zosyn, LA and K improved      Interval Problem Update  Reviewed last 24 hour events:    Sedate on vent  Moving all 4, following per nurses but not me  No further Sz  EEG and MRI ok  PROP off  SR/ST 80-100s  SBp 100-130s  UO good  NE weaning nicely after albumin administered  LR 83  Tm 98.3  WBC 5.5  LA 1.2 improved  Cultures neg so far including BAL and abdominal surgery cultures  Zosyn  VD#3  PEEP 8, 40%  CXR bilat opacites better?  PEEP 8  Secretions small  No active bleeding  Plt 57  Hgb 9      Mobilize in bed  D/c EEG, no seizures  D/c lactulose, very bloated  Not ready for tube feeds  Reduce neurochecks  SAT/SBT okay but inappropriate for liberation trial from a neuro and from abdominal standpoint       YESTERDAY  D/w Neurology re: EEG and MRI necessity after seizure last night that was controlled with Ativan and focal exam with dilated right pupil and altered LOC.  Stat MRI with and without ordered and this will be followed by EEG.  Continuing Keppra for now with as needed Ativan.    Reviewed with surgery at the bedside at length    Sedate on vent  No further seizure activity  R pupil dilated  Keppra loaded last night and maintenance doses ongoing brain  CT neg  Propofol for sedation  SR 90-100s  SBp 90-110s  NE actively titrating norepinephrine  Low UO  I/Os reviewed  LR  83  VD#2  PEEP 10, 70%  CXR obtained a little late in the morning, increasing right lower lobe opacities  MARIANA 500 serosang  Plt 73 <- 89  Hgb 11.4  K 5.9  B/c 38/0.81  Tm 99.9  WBC 15.3 mildly up  Cefepime/Flagyl  Cultures neg  Lactic acid 2.7      CXR daily  Cefepime to Zosyn, Had Sz, avoid seizure promoting med  Sedation with propofol not dexmedetomidine for its seizure suppression  Serial follow-up lactic acid levels  Follow-up BMP to recheck potassium  Add vasopressin as needed support blood pressure  IV albumin  Stat MRI brain with and without contrast  EEG after MRI    Case reviewed with family at bedside times several    MRI with and without contrast without stroke  EEG initially without obvious status, patient had to have room changed times a couple secondary to network issues to upload EEG to reading neurologist  Neurologically starting to spontaneously move better but not following yet  Follow-up lactic acid levels normalized  Follow potassium level improved to 5.7  Bicarb remains normal and anion gap is normal as well  Norepinephrine dose came down after IV albumin started being administered  Urine output adequate throughout the day      Review of Systems  Review of Systems   Unable to perform ROS: Acuity of condition        Vital Signs for last 24 hours   Temp:  [36.3 °C (97.3 °F)-36.8 °C (98.3 °F)] 36.3 °C (97.3 °F)  Pulse:  [70-98] 81  Resp:  [26-59] 26  BP: ()/(54-73) 129/73  SpO2:  [97 %-100 %] 100 %    Hemodynamic parameters for last 24 hours       Respiratory Information for the last 24 hours  Vent Mode: APVCMV  Rate (breaths/min): 26  Vt Target (mL): 440  PEEP/CPAP: 8  MAP: 15  Control VTE (exp VT): 435    Physical Exam   Physical Exam  Vitals reviewed.   Constitutional:       Appearance: He is normal weight. He is ill-appearing. He is not toxic-appearing or diaphoretic.      Interventions: He is sedated, intubated and restrained.   HENT:      Head: Normocephalic and atraumatic.       Mouth/Throat:      Mouth: Mucous membranes are moist.   Eyes:      Pupils: Pupils are equal, round, and reactive to light.   Neck:      Vascular: No JVD.   Cardiovascular:      Rate and Rhythm: Normal rate and regular rhythm. Occasional extrasystoles are present.     Pulses: Normal pulses.      Heart sounds: No murmur heard.   No gallop.       Comments:   Pulmonary:      Effort: He is intubated.      Breath sounds: Rhonchi present. No wheezing or rales.   Chest:      Comments: Right subclavian CVC  Abdominal:      General: Abdomen is protuberant. A surgical scar is present. Bowel sounds are decreased. There is distension.      Palpations: Abdomen is soft. There is no fluid wave.      Tenderness: There is no abdominal tenderness. There is no guarding or rebound. Negative signs include Jordan's sign and McBurney's sign.      Comments: tympanetic abdomen full of gas-significantly distended-nonacute   Genitourinary:     Comments: Kong  Musculoskeletal:      Cervical back: Neck supple.      Right lower leg: No edema.      Left lower leg: No edema.      Comments: Arterial line   Skin:     General: Skin is warm and dry.      Capillary Refill: Capillary refill takes less than 2 seconds.      Coloration: Skin is not cyanotic or mottled.      Nails: There is no clubbing.   Neurological:      Mental Status: He is lethargic.      GCS: GCS eye subscore is 2. GCS verbal subscore is 1. GCS motor subscore is 5.      Comments: Sedate but starting to respond, moves all 4 spontaneously and follows for nursing staff, brainstem reflexes now normal including right pupil, withdraws in all 4   Psychiatric:      Comments: Unable to assess         Medications  Current Facility-Administered Medications   Medication Dose Route Frequency Provider Last Rate Last Admin   • LORazepam (ATIVAN) injection 2 mg  2 mg Intravenous Q HOUR PRN Stephen Chowdhury D.O.   2 mg at 07/10/21 0556   • Respiratory Therapy Consult   Nebulization Continuous RT  PORTILLO Sam.O.       • famotidine (PEPCID) tablet 20 mg  20 mg Enteral Tube Q12HRS PORTILLO Sam.O.        Or   • famotidine (PEPCID) injection 20 mg  20 mg Intravenous Q12HRS PORTILLO Sam.O.   20 mg at 07/11/21 1733   • senna-docusate (PERICOLACE or SENOKOT S) 8.6-50 MG per tablet 2 tablet  2 tablet Enteral Tube BID PORTILLO Sam.O.   2 tablet at 07/10/21 1854    And   • polyethylene glycol/lytes (MIRALAX) PACKET 1 Packet  1 Packet Enteral Tube QDAY PRN PORTILLO Sam.O.        And   • magnesium hydroxide (MILK OF MAGNESIA) suspension 30 mL  30 mL Enteral Tube QDAY PRN PORTILLO Sam.O.        And   • bisacodyl (DULCOLAX) suppository 10 mg  10 mg Rectal QDAY PRN PORTILLO Sam.O.       • MD Alert...ICU Electrolyte Replacement per Pharmacy   Other PHARMACY TO DOSE PORTILLO Sam.O.       • lidocaine (XYLOCAINE) 1 % injection 2 mL  2 mL Tracheal Tube Q30 MIN PRN PORTILLO Sam.O.       • fentaNYL (SUBLIMAZE) injection 100 mcg  100 mcg Intravenous Q15 MIN PRN Stephen Chowdhury D.O.   100 mcg at 07/11/21 1614    And   • fentaNYL (SUBLIMAZE) injection 200 mcg  200 mcg Intravenous Q15 MIN PRN PORTILLO Sam.O.        And   • fentaNYL (SUBLIMAZE) 50 mcg/mL in 50mL (Continuous Infusion)   Intravenous Continuous Stephen Chowdhury D.O.   Dose not Required at 07/10/21 0330    And   • propofol (DIPRIVAN) injection  0-80 mcg/kg/min Intravenous Continuous Stephen Chowdhury D.O. 7.9 mL/hr at 07/11/21 1756 15 mcg/kg/min at 07/11/21 1756   • ipratropium-albuterol (DUONEB) nebulizer solution  3 mL Nebulization Q2HRS PRN (RT) Stephen Trenotn, D.O.       • vasopressin (VASOSTRICT) 20 Units in  mL Infusion  0.03 Units/min Intravenous Continuous Vimal Preciado M.D.   Stopped at 07/10/21 1523   • piperacillin-tazobactam (ZOSYN) 4.5 g in  mL IVPB  4.5 g Intravenous Q8HRS Vimal Preciado M.D. 25 mL/hr at 07/11/21 1338 4.5 g at 07/11/21 1338   • norepinephrine (Levophed) 8 mg in 250 mL NS  infusion (premix)  0-30 mcg/min Intravenous Continuous Stephen Chowdhury D.O.   Stopped at 07/10/21 1714   • lactated ringers infusion   Intravenous Continuous Koby Lange M.D. 83 mL/hr at 07/11/21 0818 New Bag at 07/11/21 0818   • guaiFENesin dextromethorphan (ROBITUSSIN DM) 100-10 MG/5ML syrup 5 mL  5 mL Oral Q6HRS PRN Koby Lange M.D.   5 mL at 07/05/21 2337   • [Held by provider] oxyCODONE immediate-release (ROXICODONE) tablet 5 mg  5 mg Oral Q3HRS PRIZAIAH Lange M.D.   5 mg at 07/06/21 2322    Or   • [Held by provider] oxyCODONE immediate release (ROXICODONE) tablet 10 mg  10 mg Oral Q3HRS PRN Koby Lange M.D.   10 mg at 07/08/21 0304    Or   • [Held by provider] HYDROmorphone (Dilaudid) injection 0.25 mg  0.25 mg Intravenous Q3HRS PRN Koby Lange M.D.   0.25 mg at 07/08/21 0550   • calcium carbonate (TUMS) chewable tab 500 mg  500 mg Oral TID PRIZAIAH Lange M.D.   500 mg at 07/06/21 1724   • ondansetron (ZOFRAN) syringe/vial injection 4 mg  4 mg Intravenous Q4HRS PRIZAIAH Howell M.D.       • ondansetron (ZOFRAN ODT) dispertab 4 mg  4 mg Oral Q4HRS PRIZAIAH Howell M.D.   4 mg at 07/06/21 0301   • nicotine (NICODERM) 21 MG/24HR 21 mg  21 mg Transdermal Daily-0600 Mel Howell M.D.   21 mg at 07/11/21 0531    And   • nicotine polacrilex (NICORETTE) 2 MG piece 2 mg  2 mg Oral Q HOUR PRN Mel Howell M.D.       • insulin regular (HumuLIN R,NovoLIN R) injection  1-6 Units Subcutaneous Q6HRS Mel Howell M.D.        And   • glucose 4 g chewable tablet 16 g  16 g Oral Q15 MIN PRN Mel Howell M.D.        And   • dextrose 50% (D50W) injection 50 mL  50 mL Intravenous Q15 MIN PRN Mel Howell M.D.       • acetaminophen (Tylenol) tablet 650 mg  650 mg Oral Q6HRS PRN Mel Howell M.D.       • Pharmacy Consult Request ...Pain Management Review 1 Each  1 Each Other PHARMACY TO DOSE Mel Howell M.D.           Fluids    Intake/Output Summary (Last 24 hours) at 7/11/2021 1838  Last data filed at 7/11/2021 1800  Gross per 24 hour   Intake 2678.54  ml   Output 1320 ml   Net 1358.54 ml       Laboratory  Recent Labs     07/09/21  2347 07/10/21  0420 07/10/21  1431   ISTATAPH 7.220* 7.376* 7.443   ISTATAPCO2 73.9* 47.2* 42.2*   ISTATAPO2 81 79 101*   ISTATATCO2 32 29 30   [id number] 93 95 98   ISTATARTHCO3 30.2* 27.7* 28.9*   ISTATARTBE 1 2 4*   ISTATTEMP 96.2 F 98.6 F 98.0 F   ISTATFIO2 70 70 60   ISTATSPEC Arterial Arterial Arterial   ISTATAPHTC 7.238* 7.376* 7.448   [id number] 74 79 99*         Recent Labs     07/09/21  0256 07/09/21  0256 07/09/21  2339 07/09/21  2339 07/10/21  0446 07/10/21  1514 07/11/21  0520   SODIUM 135   < > 135   < > 133* 138 141   POTASSIUM 5.4   < > 6.1*   < > 5.9* 5.7* 4.6   CHLORIDE 98   < > 102   < > 101 103 108   CO2 27   < > 27   < > 24 25 27   BUN 30*   < > 30*   < > 38* 36* 37*   CREATININE 0.67   < > 0.74   < > 0.81 0.82 0.65   MAGNESIUM 2.1  --  2.2  --   --   --  2.1   PHOSPHORUS 4.1   < > 6.5*  --  5.0*  --  3.2   CALCIUM 9.1   < > 8.7   < > 8.5 8.4* 8.6    < > = values in this interval not displayed.     Recent Labs     07/09/21  0811 07/09/21  2339 07/10/21  0446 07/10/21  1514 07/11/21  0520   ALTSGPT 19  19  --   --   --   --    ASTSGOT 60*  59*  --   --   --   --    ALKPHOSPHAT 361*  362*  --   --   --   --    TBILIRUBIN 1.7*  1.7*  --   --   --   --    DBILIRUBIN 1.1*  1.2*  --   --   --   --    GLUCOSE  --    < > 119* 147* 132*    < > = values in this interval not displayed.     Recent Labs     07/09/21 0256 07/09/21 0811 07/09/21 2339 07/11/21  0520   WBC 13.4*  --  15.3* 5.5   NEUTSPOLYS  --   --  97.40* 93.90*   LYMPHOCYTES  --   --  0.00* 2.60*   MONOCYTES  --   --  2.60 3.50   EOSINOPHILS  --   --  0.00 0.00   BASOPHILS  --   --  0.00 0.00   ASTSGOT  --  60*  59*  --   --    ALTSGPT  --  19  19  --   --    ALKPHOSPHAT  --  361*  362*  --   --    TBILIRUBIN  --  1.7*  1.7*  --   --      Recent Labs     07/09/21 0256 07/09/21 0811 07/09/21 2339 07/11/21  0520   RBC 4.00*  --  3.58* 2.85*    HEMOGLOBIN 12.6*  --  11.4* 9.0*   HEMATOCRIT 38.5*  --  36.1* 27.8*   PLATELETCT 89*  --  73* 57*   PROTHROMBTM  --  17.4*  --   --    INR  --  1.48*  --   --        Imaging  X-Ray:  I have personally reviewed the images and compared with prior images.  EKG:  I have personally reviewed the images and compared with prior images.  CT:    Reviewed  MRI:   Reviewed    Assessment/Plan  * Acute respiratory failure with hypercapnia (HCC)  Assessment & Plan  Intubated early a.m. 7/10  Significant hypoxemia and hypercarbia on initial evaluation  Likely etiology aspiration pneumonia in patient with probable abdominal sepsis as well  RT protocol/ventilator bundle  Serial ABG/chest x-ray/ventilator mechanics review  SAT/SBT to begin -not ready for liberation trial    Aspiration pneumonia (HCC)  Assessment & Plan  Intubated for respiratory failure and aspiration pneumonia early a.m. 7/10  Aspiration GI contents evident by intubation as well as bronchoscopy  BAL specimen sent early a.m. 7/10  Empiric antibiotic therapy ongoing with Zosyn/Flagyl  Serial chest x-ray  Aggressive pulmonary toilet  Repeat bronchoscopy as clinically prudent  De-escalate antibiotics as clinically prudent    Sepsis (HCC)  Assessment & Plan  This is Septic shock Not present on admission  SIRS criteria identified on my evaluation include: Tachycardia, with heart rate greater than 90 BPM, Tachypnea, with respirations greater than 20 per minute and Leukocytosis, with WBC greater than 12,000  Source is GI/pulmonary  Presentation includes: Severe sepsis present, persistent hypotension after 30 ml/kg completed, and initial lactate level result is >= 4 mmol/L.   Despite appropriate fluid resuscitation with crystalloid given per sepsis guidelines, the patient remains hypotensive with systolic blood pressure less than 90 or MAP less than 65  Hemodynamic support with additional fluids and IV vasopressors as needed to maintain a SBP of 90 or MAP of 65  IV  antibiotics as appropriate for source of sepsis  Reassessment: I have reassessed the patient's hemodynamic status    Septic shock is present while in the ICU  Patient developed respiratory failure requiring intubation  New onset seizure and altered LOC requiring neuro work-up possibly related to metabolic issues of sepsis  Actively titrating norepinephrine, add vasopressin as needed  Severe hypoalbuminemia status post fluid resuscitation we will add albumin to the regimen  Acidosis improving, lactic acid trending down, no bicarb at this time  Cefepime changed to Zosyn secondary to concerns of seizure threshold alteration by cefepime  Continuing Flagyl  Await cultures from BAL and abdomen and modify antibiotic regimen as appropriate-all cultures are still negative  Plan 5 to 7 days of antibiotics minimum  Hemodynamics improved, weaned off norepinephrine    Status post laparotomy enteritis/colitis noted with purulent secretions in the peritoneum  Assessment & Plan  Status post laparotomy 7/9 by Dr. Meza, case reviewed with him at length  Operative findings of enteritis/colitis but no ischemic bowel or bowel necrosis  Suspect ruptured cyst from liver?  Multiple abdominal cultures pending and broad-spectrum antibiotics ongoing-cultures nonrevealing so far  Ileus on exam of abdomen, hold on tube feeds, stop lactulose, mobilize if we can, limit narcotics if we can  Surgery following, will review with them daily    Abnormal neurological exam  Assessment & Plan  Neuro exam now nonfocal, right pupil now functioning normally  CT head noncontrast negative  No other focality on exam except for encephalopathy, sluggish brainstem reflexes and diminished response to tactile stimuli  Neurological consultation, case reviewed with Dr Burkett  Stat MRI brain with and without->no CVA  Stat EEG->no obvious NCSE on review with EEG tech at the bedside, formal interpretation pending  Serial neuro exam normalized  DC continuous  EEG  Neurology signing off    New onset seizure (HCC)  Assessment & Plan  Tonic-clonic generalized seizure activity identified by nursing team-no recurrence  Resolved with IV Ativan, repeat as needed  Keppra x7 days then stop?  Neurology consultation reviewed  Initial CT negative  MRI with and without contrast negative negative  EEG negative for NCSE  Continue to optimize electrolytes  Aspiration/seizure precautions  Query related to toxic metabolic state from sepsis    Lactic acidosis- (present on admission)  Assessment & Plan  Secondary to sepsis, improved  Trend as clinically prudent    Hyperkalemia- (present on admission)  Assessment & Plan  Normalized-continue to monitor  Etiology?  Avoid potassium-containing infusions  Serial BMP  Pharmacotherapy for elevated potassium as needed    Hepatoma (HCC)- (present on admission)  Assessment & Plan  History of hepatocellular carcinoma, presented 7/2 with abdominal pain  Worsening abdominal process 7/9, suspected ruptured hepatic cyst/mass-taken for laparotomy based on exam and imaging  Enteritis/colitis identified at laparotomy cultures obtained and antibiotics initiated  Case reviewed with Dr. Meza at length at the bedside    Encephalopathy acute  Assessment & Plan  Twwgzihgybzbam-ordeuejt-jvdkxyhge toxic metabolic?  New onset seizure control with Ativan  CT head negative  MRI without stroke or mass  EEG negative  Optimize electrolytes  Sedation vacations ongoing  Negative work-up    Normochromic anemia  Assessment & Plan  Multifactorial etiology, not bleeding clinically but hemoglobin trending down  Serial CBC and monitor closely  Transfuse per protocol    Acute urinary retention  Assessment & Plan  Triana catheter  Triana/bladder protocols when clinically improved  Bladder scan as needed  Monitor urine output and renal function    Hypomagnesemia  Assessment & Plan  Replete    Hyponatremia- (present on admission)  Assessment & Plan  Mild, improved, SIADH?  Trend  BMP    Tobacco abuse- (present on admission)  Assessment & Plan  Smoking cessation to be encouraged when clinically appropriate if patient still actively smoking  RT protocols    Thrombocytopenia (HCC)- (present on admission)  Assessment & Plan  Trending down, now moderate to severe at 57  No bleeding clinically at this time, serial CBC  Chronic?  Multifactorial etiology?  Transfuse per protocols, if needs transfusion check TEG/mapping to evaluate platelet function    Essential hypertension- (present on admission)  Assessment & Plan  History of  Hypotension improved, weaned off norepinephrine  Antihypertensive medications as clinically prudent    Type 2 diabetes mellitus without complication, without long-term current use of insulin (HCC)- (present on admission)  Assessment & Plan  Serial blood sugar testing-glycemic control acceptable but not receiving normal nutrition at this time  SSI, add Lantus when on enteral feeding as clinically appropriate  Hypoglycemia protocols  Transition to insulin 180 protocol if needed     VTE:  Add subcu heparin/Lovenox when okay with surgery, sequentials in place  Ulcer: H2 Antagonist  Lines: Central Line  Ongoing indication addressed, Arterial Line  Ongoing indication addressed and Triana Catheter  Ongoing indication addressed    I have performed a physical exam and reviewed and updated ROS and Plan today (7/11/2021). In review of yesterday's note (7/10/2021), there are no changes except as documented above.     Discussed patient condition and risk of morbidity and/or mortality with RN, RT, Pharmacy, Charge nurse / hot rounds and general surgery and neurology    The patient remains critically ill.  Patient remains on full support on the ventilator and inappropriate for liberation trial.  The addition of albumin and vasopressin improved blood pressure, weaned off norepinephrine.  CT, MRI and EEG all okay on review with neurology, continue Keppra for approximately 7 days and reassess.  Patient at high risk for significant clinical deterioration.    Critical care time = 40 minutes in directly providing and coordinating critical care and extensive data review.  No time overlap and excludes procedures.

## 2021-07-11 NOTE — ASSESSMENT & PLAN NOTE
Intubated for respiratory failure and aspiration pneumonia early a.m. 7/10  Aspiration GI contents evident by intubation as well as bronchoscopy  BAL specimen sent early a.m. 7/10  Empiric antibiotic therapy ongoing with Zosyn  Clinically improving

## 2021-07-11 NOTE — ASSESSMENT & PLAN NOTE
Multifactorial etiology, not bleeding clinically but hemoglobin trending down  Serial CBC and monitor closely  Transfuse per protocol

## 2021-07-11 NOTE — CARE PLAN
Problem: Nutritional:  Goal: Achieve adequate nutritional intake  Description: nutrition support to begin.   Outcome: Not Met, see dietary progress note.

## 2021-07-11 NOTE — ASSESSMENT & PLAN NOTE
Status post laparoscopy 7/9 by Dr. Meza, case reviewed with him at length  Operative findings of enteritis/colitis but no ischemic bowel or bowel necrosis  Suspect ruptured cyst from liver?  Multiple abdominal cultures pending and broad-spectrum antibiotics ongoing  Ileus now improved and tolerating enteral nutrition

## 2021-07-11 NOTE — ASSESSMENT & PLAN NOTE
Smoking cessation to be encouraged when clinically appropriate if patient still actively smoking  RT protocols

## 2021-07-11 NOTE — ASSESSMENT & PLAN NOTE
History of  Hypotension improved, weaned off norepinephrine  Antihypertensive medications as clinically prudent

## 2021-07-11 NOTE — ASSESSMENT & PLAN NOTE
GI source, post laparoscopy and washout with purulent material noted  Continue Zosyn, clinically improved  Hemodynamics improved, weaned off norepinephrine

## 2021-07-11 NOTE — ASSESSMENT & PLAN NOTE
No bleeding clinically at this time, serial CBC  Secondary to sepsis, question underlying chronic etiology, follow

## 2021-07-11 NOTE — DIETARY
Brief Nutrition Services: Pt NPO/clears X 9 days. RN states nutrition support TF vs TPN was brought up with MD today; no orders for tube feeding as pt still with distended abd and MD does not want to start TPN due to pt having active infection. Plan/Rec: Provide nutrition support as medically feasible, RD to follow for plan of care.

## 2021-07-11 NOTE — ASSESSMENT & PLAN NOTE
Neuro exam now nonfocal, right pupil now functioning normally  CT head noncontrast negative  No other focality on exam except for encephalopathy, sluggish brainstem reflexes and diminished response to tactile stimuli  Neurological consultation, case reviewed with Dr Burkett  Stat MRI brain with and without->no CVA  Stat EEG->no obvious NCSE on review with EEG tech at the bedside, formal interpretation pending  Serial neuro exam normalized  DC continuous EEG  Neurology signing off

## 2021-07-11 NOTE — PROGRESS NOTES
"    DATE: 7/11/2021    Post Operative Day  2 diagnostic laparoscopy with washout and drain placement.    Interval Events:  Patient remains intubated.  Plain film reveals no dilated bowel..    PHYSICAL EXAMINATION:  Vital Signs: /64   Pulse 78   Temp 36.3 °C (97.3 °F) (Temporal)   Resp (!) 26   Ht 1.803 m (5' 11\")   Wt 93.7 kg (206 lb 9.1 oz)   SpO2 100%     Sedated on the vent  Abdomen soft and incisions are clean and dry  Drain with purulent output    Laboratory Values:   Recent Labs     07/09/21  0256 07/09/21  2339 07/11/21  0520   WBC 13.4* 15.3* 5.5   RBC 4.00* 3.58* 2.85*   HEMOGLOBIN 12.6* 11.4* 9.0*   HEMATOCRIT 38.5* 36.1* 27.8*   MCV 96.3 100.8* 97.5   MCH 31.5 31.8 31.6   MCHC 32.7* 31.6* 32.4*   RDW 55.4* 58.4* 56.2*   PLATELETCT 89* 73* 57*   MPV 8.8* 9.4 9.3     Recent Labs     07/10/21  0446 07/10/21  1514 07/11/21  0520   SODIUM 133* 138 141   POTASSIUM 5.9* 5.7* 4.6   CHLORIDE 101 103 108   CO2 24 25 27   GLUCOSE 119* 147* 132*   BUN 38* 36* 37*   CREATININE 0.81 0.82 0.65   CALCIUM 8.5 8.4* 8.6     Recent Labs     07/09/21  0811   ASTSGOT 60*  59*   ALTSGPT 19  19   TBILIRUBIN 1.7*  1.7*   IBILIRUBIN 0.6  0.5   DBILIRUBIN 1.1*  1.2*   ALKPHOSPHAT 361*  362*   INR 1.48*     Recent Labs     07/09/21  0811   INR 1.48*        Imaging:   [id number] VIEW   Final Result      No dilated bowel      DX-CHEST-PORTABLE (1 VIEW)   Final Result         No significant interval change.         MR-BRAIN-WITH & W/O   Final Result      1.  No acute abnormality.   2.  Mild to moderate cerebral volume loss.   3.  Minimal chronic microvascular ischemic disease.      DX-CHEST-PORTABLE (1 VIEW)   Final Result      1.  Interval increasing bilateral airspace opacity suspicious for pneumonia most prominent in the right lower lobe.   2.  There is probably superimposed vascular congestion.      CT-HEAD W/O   Final Result         1. No acute intracranial abnormality. No evidence of acute intracranial " hemorrhage or mass lesion.               DX-CHEST-PORTABLE (1 VIEW)   Final Result         Endotracheal tube with tip projecting over the mid thoracic trachea.      Gastric drainage tube courses below diaphragm, tip is in the stomach.      Right central venous catheter with tip projecting over the expected area of the lower SVC.      CT-ABDOMEN-PELVIS W/O   Final Result      1.  New fluid distended small bowel loops which are nonspecific and could be related to enteritis or small bowel ileus. Developing obstruction is considered less likely though not excluded.   2.  There appears to be new wall thickening of the ascending colon likely representing infectious/inflammatory colitis.   3.  Mild ascites which is mildly increased from prior study. Omental and mesenteric infiltration which is new from prior and could be related to edema or inflammation.   4.  Small right pleural effusion. Bibasilar atelectasis.   5.   No other significant change.      DX-CHEST-PORTABLE (1 VIEW)   Final Result      1.  New right basilar airspace process consistent with atelectasis or pneumonia      2.  Mild left midlung zone atelectasis      [id number] VIEW   Final Result      1. Moderate stool mixed with contrast in the ascending colon.   2. Moderate stool in the transverse colon.   3. No bowel obstruction.   4. Other chronic degenerative changes.      CT-ABDOMEN-PELVIS W/O   Final Result      1.  There is no acute inflammatory process in the abdomen or pelvis.   2.  The treated lesions in the liver are again seen without interval change.   3.  Splenomegaly is again seen.   4.  Cystic pancreatic tail lesion is unchanged possibly a pseudocyst.   5.  There is colonic diverticulosis without diverticulitis.   6.  There is mild wall thickening of the distal esophagus.   7.  There is a stable small amount of ascites.   8.  There is moderate colonic stool.      DX-CHEST-PORTABLE (1 VIEW)   Final Result      Hypoinflation with bibasilar  atelectasis. No focal consolidation or pleural effusions.      DX-CHEST-PORTABLE (1 VIEW)   Final Result      Left basilar atelectasis.      CT-ABDOMEN-PELVIS WITH   Final Result      1.  Primarily cystic mass in the medial segment LEFT lobe liver, likely treated hepatoma.   2.  Complex low-attenuation lesion in the inferior aspect medial segment LEFT lobe liver which is new from prior exam and may represent cystic mass or focal hematoma related to rupture larger mass.  Minimal hyperdense components may indicate blood    products.  Abscess is felt less likely.   3.  Gallbladder is not visualized and may be compressed by or involved with liver mass.   4.  Small volume ascites with potential hemoperitoneum.   5.  Splenomegaly.      These findings were discussed with CARMEN OTTO on 7/2/2021 3:02 PM.      DX-CHEST-PORTABLE (1 VIEW)   Final Result      1.  Hypoinflation with minimal LEFT lung base atelectasis.   2.  No pneumonia or pulmonary edema.          ASSESSMENT AND PLAN:   71-year-old male with a history of ruptured appendicitis is likely related to liver cancer now with spontaneous bacterial peritonitis and septic shock with respiratory failure  -Okay with TF and medications per his NG-tube  - Okay with DVT prophylaxis       ____________________________________     Cody Meza M.D.    DD: 7/11/2021  3:49 PM

## 2021-07-11 NOTE — CARE PLAN
Problem: Pain - Standard  Goal: Alleviation of pain or a reduction in pain to the patient’s comfort goal  Outcome: Progressing     Problem: Knowledge Deficit - Standard  Goal: Patient and family/care givers will demonstrate understanding of plan of care, disease process/condition, diagnostic tests and medications  Outcome: Progressing     Problem: Fall Risk  Goal: Patient will remain free from falls  Outcome: Progressing     Problem: Bowel Elimination  Goal: Establish and maintain regular bowel function  Outcome: Progressing     Problem: Gastrointestinal Irritability  Goal: Nausea and vomiting will be absent or improve  Outcome: Progressing  Goal: Diarrhea will be absent or improved  Outcome: Progressing     Problem: Skin Integrity  Goal: Skin integrity is maintained or improved  Outcome: Progressing     Problem: Hemodynamics  Goal: Patient's hemodynamics, fluid balance and neurologic status will be stable or improve  Outcome: Progressing     Problem: Fluid Volume  Goal: Fluid volume balance will be maintained  Outcome: Progressing     Problem: Respiratory  Goal: Patient will achieve/maintain optimum respiratory ventilation and gas exchange  Outcome: Progressing     Problem: Mechanical Ventilation  Goal: Safe management of artificial airway and ventilation  Outcome: Progressing  Goal: Successful weaning off mechanical ventilator, spontaneously maintains adequate gas exchange  Outcome: Progressing  Goal: Patient will be able to express needs and understand communication  Outcome: Progressing     Problem: Safety - Medical Restraint  Goal: Remains free of injury from restraints (Restraint for Interference with Medical Device)  Outcome: Progressing  Goal: Free from restraint(s) (Restraint for Interference with Medical Device)  Outcome: Progressing   The patient is Stable - Low risk of patient condition declining or worsening    Shift Goals  Clinical Goals: vss, stable neuro  Patient Goals: LAILA  Family Goals: no family  present    Progress made toward(s) clinical / shift goals: neuro assessment stable overnight, no significant changes. VSS. Pt remained off of pressors overnight    Patient is not progressing towards the following goals: Still no bowel movement, lactulose given.

## 2021-07-11 NOTE — CARE PLAN
Ventilator Daily Summary    Vent Day #3    Ventilator settings: 26, 440, +10, 60%    Plan: Continue current ventilator settings and wean mechanical ventilation as tolerated per physician orders.

## 2021-07-11 NOTE — ASSESSMENT & PLAN NOTE
Normalized-continue to monitor  Etiology?  Avoid potassium-containing infusions  Serial BMP  Pharmacotherapy for elevated potassium as needed

## 2021-07-11 NOTE — ASSESSMENT & PLAN NOTE
Tonic-clonic generalized seizure activity identified by nursing team-no recurrence  Resolved with IV Ativan, repeat as needed  Initial CT negative  MRI with and without contrast negative negative  EEG negative for NCSE  Continue to optimize electrolytes  Aspiration/seizure precautions  Query related to toxic metabolic state from sepsis

## 2021-07-12 ENCOUNTER — APPOINTMENT (OUTPATIENT)
Dept: RADIOLOGY | Facility: MEDICAL CENTER | Age: 71
DRG: 356 | End: 2021-07-12
Attending: INTERNAL MEDICINE
Payer: MEDICARE

## 2021-07-12 ENCOUNTER — PATIENT OUTREACH (OUTPATIENT)
Dept: HEALTH INFORMATION MANAGEMENT | Facility: OTHER | Age: 71
End: 2021-07-12

## 2021-07-12 LAB
ALBUMIN SERPL BCP-MCNC: 2.3 G/DL (ref 3.2–4.9)
ALBUMIN/GLOB SERPL: 0.9 G/DL
ALP SERPL-CCNC: 235 U/L (ref 30–99)
ALT SERPL-CCNC: 8 U/L (ref 2–50)
ANION GAP SERPL CALC-SCNC: 6 MMOL/L (ref 7–16)
ANISOCYTOSIS BLD QL SMEAR: ABNORMAL
AST SERPL-CCNC: 25 U/L (ref 12–45)
BACTERIA SPEC ANAEROBE CULT: NORMAL
BACTERIA SPEC RESP CULT: NORMAL
BASE EXCESS BLDA CALC-SCNC: 6 MMOL/L (ref -4–3)
BASE EXCESS BLDA CALC-SCNC: 7 MMOL/L (ref -4–3)
BASOPHILS # BLD AUTO: 0 % (ref 0–1.8)
BASOPHILS # BLD: 0 K/UL (ref 0–0.12)
BILIRUB SERPL-MCNC: 1.3 MG/DL (ref 0.1–1.5)
BODY TEMPERATURE: ABNORMAL DEGREES
BODY TEMPERATURE: ABNORMAL DEGREES
BREATHS SETTING VENT: 26
BREATHS SETTING VENT: 26
BUN SERPL-MCNC: 31 MG/DL (ref 8–22)
CALCIUM SERPL-MCNC: 8.6 MG/DL (ref 8.5–10.5)
CHLORIDE SERPL-SCNC: 105 MMOL/L (ref 96–112)
CO2 BLDA-SCNC: 31 MMOL/L (ref 20–33)
CO2 BLDA-SCNC: 31 MMOL/L (ref 20–33)
CO2 SERPL-SCNC: 26 MMOL/L (ref 20–33)
CREAT SERPL-MCNC: 0.7 MG/DL (ref 0.5–1.4)
DELSYS IDSYS: ABNORMAL
DELSYS IDSYS: ABNORMAL
END TIDAL CARBON DIOXIDE IECO2: 27 MMHG
END TIDAL CARBON DIOXIDE IECO2: 27 MMHG
EOSINOPHIL # BLD AUTO: 0 K/UL (ref 0–0.51)
EOSINOPHIL NFR BLD: 0 % (ref 0–6.9)
ERYTHROCYTE [DISTWIDTH] IN BLOOD BY AUTOMATED COUNT: 57.4 FL (ref 35.9–50)
GLOBULIN SER CALC-MCNC: 2.7 G/DL (ref 1.9–3.5)
GLUCOSE BLD-MCNC: 90 MG/DL (ref 65–99)
GLUCOSE BLD-MCNC: 91 MG/DL (ref 65–99)
GLUCOSE BLD-MCNC: 93 MG/DL (ref 65–99)
GLUCOSE SERPL-MCNC: 98 MG/DL (ref 65–99)
GRAM STN SPEC: NORMAL
HCO3 BLDA-SCNC: 29.7 MMOL/L (ref 17–25)
HCO3 BLDA-SCNC: 30.2 MMOL/L (ref 17–25)
HCT VFR BLD AUTO: 32.2 % (ref 42–52)
HGB BLD-MCNC: 10.5 G/DL (ref 14–18)
HOROWITZ INDEX BLDA+IHG-RTO: 220 MM[HG]
HOROWITZ INDEX BLDA+IHG-RTO: 257 MM[HG]
LACTATE BLD-SCNC: 1 MMOL/L (ref 0.5–2)
LYMPHOCYTES # BLD AUTO: 0.37 K/UL (ref 1–4.8)
LYMPHOCYTES NFR BLD: 7 % (ref 22–41)
MAGNESIUM SERPL-MCNC: 2 MG/DL (ref 1.5–2.5)
MANUAL DIFF BLD: NORMAL
MCH RBC QN AUTO: 31.4 PG (ref 27–33)
MCHC RBC AUTO-ENTMCNC: 32.6 G/DL (ref 33.7–35.3)
MCV RBC AUTO: 96.4 FL (ref 81.4–97.8)
MODE IMODE: ABNORMAL
MODE IMODE: ABNORMAL
MONOCYTES # BLD AUTO: 0.23 K/UL (ref 0–0.85)
MONOCYTES NFR BLD AUTO: 4.4 % (ref 0–13.4)
MORPHOLOGY BLD-IMP: NORMAL
NEUTROPHILS # BLD AUTO: 4.7 K/UL (ref 1.82–7.42)
NEUTROPHILS NFR BLD: 84.2 % (ref 44–72)
NEUTS BAND NFR BLD MANUAL: 4.4 % (ref 0–10)
NRBC # BLD AUTO: 0 K/UL
NRBC BLD-RTO: 0 /100 WBC
O2/TOTAL GAS SETTING VFR VENT: 50 %
O2/TOTAL GAS SETTING VFR VENT: 60 %
PCO2 BLDA: 37.9 MMHG (ref 26–37)
PCO2 BLDA: 40.4 MMHG (ref 26–37)
PCO2 TEMP ADJ BLDA: 37.4 MMHG (ref 26–37)
PCO2 TEMP ADJ BLDA: 38.7 MMHG (ref 26–37)
PEEP END EXPIRATORY PRESSURE IPEEP: 10 CMH20
PEEP END EXPIRATORY PRESSURE IPEEP: 8 CMH20
PH BLDA: 7.47 [PH] (ref 7.4–7.5)
PH BLDA: 7.51 [PH] (ref 7.4–7.5)
PH TEMP ADJ BLDA: 7.49 [PH] (ref 7.4–7.5)
PH TEMP ADJ BLDA: 7.51 [PH] (ref 7.4–7.5)
PHOSPHATE SERPL-MCNC: 2.9 MG/DL (ref 2.5–4.5)
PLATELET # BLD AUTO: 57 K/UL (ref 164–446)
PLATELET BLD QL SMEAR: NORMAL
PMV BLD AUTO: 9.2 FL (ref 9–12.9)
PO2 BLDA: 110 MMHG (ref 64–87)
PO2 BLDA: 154 MMHG (ref 64–87)
PO2 TEMP ADJ BLDA: 108 MMHG (ref 64–87)
PO2 TEMP ADJ BLDA: 148 MMHG (ref 64–87)
POTASSIUM SERPL-SCNC: 4.3 MMOL/L (ref 3.6–5.5)
PROT SERPL-MCNC: 5 G/DL (ref 6–8.2)
RBC # BLD AUTO: 3.34 M/UL (ref 4.7–6.1)
RBC BLD AUTO: PRESENT
SAO2 % BLDA: 99 % (ref 93–99)
SAO2 % BLDA: 99 % (ref 93–99)
SIGNIFICANT IND 70042: NORMAL
SIGNIFICANT IND 70042: NORMAL
SITE SITE: NORMAL
SITE SITE: NORMAL
SODIUM SERPL-SCNC: 137 MMOL/L (ref 135–145)
SOURCE SOURCE: NORMAL
SOURCE SOURCE: NORMAL
SPECIMEN DRAWN FROM PATIENT: ABNORMAL
SPECIMEN DRAWN FROM PATIENT: ABNORMAL
TIDAL VOLUME IVT: 440 ML
TIDAL VOLUME IVT: 440 ML
WBC # BLD AUTO: 5.3 K/UL (ref 4.8–10.8)

## 2021-07-12 PROCEDURE — 97168 OT RE-EVAL EST PLAN CARE: CPT

## 2021-07-12 PROCEDURE — 700111 HCHG RX REV CODE 636 W/ 250 OVERRIDE (IP): Performed by: INTERNAL MEDICINE

## 2021-07-12 PROCEDURE — 94003 VENT MGMT INPAT SUBQ DAY: CPT

## 2021-07-12 PROCEDURE — 84100 ASSAY OF PHOSPHORUS: CPT

## 2021-07-12 PROCEDURE — 94150 VITAL CAPACITY TEST: CPT

## 2021-07-12 PROCEDURE — 302136 NUTRITION PUMP: Performed by: INTERNAL MEDICINE

## 2021-07-12 PROCEDURE — 770022 HCHG ROOM/CARE - ICU (200)

## 2021-07-12 PROCEDURE — 82803 BLOOD GASES ANY COMBINATION: CPT

## 2021-07-12 PROCEDURE — 71045 X-RAY EXAM CHEST 1 VIEW: CPT

## 2021-07-12 PROCEDURE — 85007 BL SMEAR W/DIFF WBC COUNT: CPT

## 2021-07-12 PROCEDURE — A9270 NON-COVERED ITEM OR SERVICE: HCPCS | Performed by: STUDENT IN AN ORGANIZED HEALTH CARE EDUCATION/TRAINING PROGRAM

## 2021-07-12 PROCEDURE — 97164 PT RE-EVAL EST PLAN CARE: CPT

## 2021-07-12 PROCEDURE — 83735 ASSAY OF MAGNESIUM: CPT

## 2021-07-12 PROCEDURE — 306565 RIGID MIT RESTRAINT(PAIR): Performed by: INTERNAL MEDICINE

## 2021-07-12 PROCEDURE — 700105 HCHG RX REV CODE 258: Performed by: STUDENT IN AN ORGANIZED HEALTH CARE EDUCATION/TRAINING PROGRAM

## 2021-07-12 PROCEDURE — 700102 HCHG RX REV CODE 250 W/ 637 OVERRIDE(OP): Performed by: STUDENT IN AN ORGANIZED HEALTH CARE EDUCATION/TRAINING PROGRAM

## 2021-07-12 PROCEDURE — 83605 ASSAY OF LACTIC ACID: CPT

## 2021-07-12 PROCEDURE — 85027 COMPLETE CBC AUTOMATED: CPT

## 2021-07-12 PROCEDURE — 700105 HCHG RX REV CODE 258: Performed by: INTERNAL MEDICINE

## 2021-07-12 PROCEDURE — 82962 GLUCOSE BLOOD TEST: CPT | Mod: 91

## 2021-07-12 PROCEDURE — 94799 UNLISTED PULMONARY SVC/PX: CPT

## 2021-07-12 PROCEDURE — 99291 CRITICAL CARE FIRST HOUR: CPT | Performed by: INTERNAL MEDICINE

## 2021-07-12 PROCEDURE — 37799 UNLISTED PX VASCULAR SURGERY: CPT

## 2021-07-12 PROCEDURE — 80053 COMPREHEN METABOLIC PANEL: CPT

## 2021-07-12 RX ORDER — ACETAMINOPHEN 325 MG/1
650 TABLET ORAL EVERY 6 HOURS PRN
Status: DISCONTINUED | OUTPATIENT
Start: 2021-07-12 | End: 2021-07-19

## 2021-07-12 RX ORDER — CALCIUM CARBONATE 500 MG/1
500 TABLET, CHEWABLE ORAL 3 TIMES DAILY PRN
Status: DISCONTINUED | OUTPATIENT
Start: 2021-07-12 | End: 2021-07-16

## 2021-07-12 RX ORDER — ONDANSETRON 4 MG/1
4 TABLET, ORALLY DISINTEGRATING ORAL EVERY 4 HOURS PRN
Status: DISCONTINUED | OUTPATIENT
Start: 2021-07-12 | End: 2021-07-19

## 2021-07-12 RX ORDER — GUAIFENESIN/DEXTROMETHORPHAN 100-10MG/5
5 SYRUP ORAL EVERY 6 HOURS PRN
Status: DISCONTINUED | OUTPATIENT
Start: 2021-07-12 | End: 2021-07-19

## 2021-07-12 RX ADMIN — FAMOTIDINE 20 MG: 10 INJECTION INTRAVENOUS at 18:14

## 2021-07-12 RX ADMIN — FENTANYL CITRATE 100 MCG: 50 INJECTION, SOLUTION INTRAMUSCULAR; INTRAVENOUS at 00:41

## 2021-07-12 RX ADMIN — PIPERACILLIN AND TAZOBACTAM 4.5 G: 4; .5 INJECTION, POWDER, LYOPHILIZED, FOR SOLUTION INTRAVENOUS; PARENTERAL at 05:08

## 2021-07-12 RX ADMIN — ENOXAPARIN SODIUM 40 MG: 40 INJECTION SUBCUTANEOUS at 12:39

## 2021-07-12 RX ADMIN — FENTANYL CITRATE 100 MCG: 50 INJECTION, SOLUTION INTRAMUSCULAR; INTRAVENOUS at 20:21

## 2021-07-12 RX ADMIN — NICOTINE TRANSDERMAL SYSTEM 21 MG: 21 PATCH, EXTENDED RELEASE TRANSDERMAL at 05:08

## 2021-07-12 RX ADMIN — FAMOTIDINE 20 MG: 10 INJECTION INTRAVENOUS at 05:08

## 2021-07-12 RX ADMIN — FENTANYL CITRATE 100 MCG: 50 INJECTION, SOLUTION INTRAMUSCULAR; INTRAVENOUS at 04:24

## 2021-07-12 RX ADMIN — SODIUM CHLORIDE, POTASSIUM CHLORIDE, SODIUM LACTATE AND CALCIUM CHLORIDE: 600; 310; 30; 20 INJECTION, SOLUTION INTRAVENOUS at 20:21

## 2021-07-12 RX ADMIN — PROPOFOL 20 MCG/KG/MIN: 10 INJECTION, EMULSION INTRAVENOUS at 12:31

## 2021-07-12 RX ADMIN — PIPERACILLIN AND TAZOBACTAM 4.5 G: 4; .5 INJECTION, POWDER, LYOPHILIZED, FOR SOLUTION INTRAVENOUS; PARENTERAL at 12:39

## 2021-07-12 RX ADMIN — PIPERACILLIN AND TAZOBACTAM 4.5 G: 4; .5 INJECTION, POWDER, LYOPHILIZED, FOR SOLUTION INTRAVENOUS; PARENTERAL at 20:21

## 2021-07-12 ASSESSMENT — COGNITIVE AND FUNCTIONAL STATUS - GENERAL
MOVING FROM LYING ON BACK TO SITTING ON SIDE OF FLAT BED: UNABLE
TURNING FROM BACK TO SIDE WHILE IN FLAT BAD: UNABLE
PERSONAL GROOMING: A LOT
EATING MEALS: TOTAL
MOBILITY SCORE: 6
STANDING UP FROM CHAIR USING ARMS: TOTAL
WALKING IN HOSPITAL ROOM: TOTAL
CLIMB 3 TO 5 STEPS WITH RAILING: TOTAL
HELP NEEDED FOR BATHING: TOTAL
DRESSING REGULAR LOWER BODY CLOTHING: TOTAL
SUGGESTED CMS G CODE MODIFIER MOBILITY: CN
TOILETING: TOTAL
DAILY ACTIVITIY SCORE: 8
SUGGESTED CMS G CODE MODIFIER DAILY ACTIVITY: CM
MOVING TO AND FROM BED TO CHAIR: UNABLE
DRESSING REGULAR UPPER BODY CLOTHING: A LOT

## 2021-07-12 ASSESSMENT — COPD QUESTIONNAIRES
HAVE YOU SMOKED AT LEAST 100 CIGARETTES IN YOUR ENTIRE LIFE: YES
DO YOU EVER COUGH UP ANY MUCUS OR PHLEGM?: NO/ONLY WITH OCCASIONAL COLDS OR INFECTIONS
COPD SCREENING SCORE: 4
DURING THE PAST 4 WEEKS HOW MUCH DID YOU FEEL SHORT OF BREATH: NONE/LITTLE OF THE TIME

## 2021-07-12 ASSESSMENT — GAIT ASSESSMENTS: GAIT LEVEL OF ASSIST: UNABLE TO PARTICIPATE

## 2021-07-12 ASSESSMENT — FIBROSIS 4 INDEX: FIB4 SCORE: 16.86

## 2021-07-12 ASSESSMENT — LIFESTYLE VARIABLES: EVER_SMOKED: YES

## 2021-07-12 ASSESSMENT — PAIN DESCRIPTION - PAIN TYPE: TYPE: ACUTE PAIN

## 2021-07-12 ASSESSMENT — PULMONARY FUNCTION TESTS
FVC: .9
FVC: .8

## 2021-07-12 NOTE — PROGRESS NOTES
Critical Care Progress Note    Date of admission  7/2/2021    Chief Complaint  71 y.o. male, h/o hepatoma admitted 7/2/2021 with abdominal Pain, found to have diffuse, enteritis/colitis, OR for diagnostic laparoscopy with washout and drain placement 7/9; sz in PACU and aspirated, intubated    Hospital Course  7/10 - Sz in early AM, ativan/keppra, CT neg, MRI w/wo neg, EEG no NCSE,  Titrating NE drip, Cefepime -> Zosyn, LA and K improved  7/12 -extubated today, core track, tube feeding, SLP eval, continue Zosyn    Interval Problem Update  Reviewed last 24 hour events:  POD #3  Firm BM, hypo BS  Alert, follows x 4  Off prop  SR  Off pressors  afbrile  NGT to LWS  I/O = 2.7/1.4 (+6.2 total)  VD #4, 40%, SBT -24, RSBI 37  Mild brown secretions  pepcid  Zosyn  Plts 57K, start VTE ppx    Review of Systems  Review of Systems   Unable to perform ROS: Acuity of condition        Vital Signs for last 24 hours   Temp:  [36.3 °C (97.3 °F)-36.8 °C (98.3 °F)] 36.7 °C (98 °F)  Pulse:  [] 104  Resp:  [21-59] 23  BP: ()/(60-73) 101/61  SpO2:  [96 %-100 %] 96 %    Hemodynamic parameters for last 24 hours       Respiratory Information for the last 24 hours  Vent Mode: APVCMV  Rate (breaths/min): 24  Vt Target (mL): 440  PEEP/CPAP: 8  P Support: 5  MAP: 9.9  Length of Weaning Trial (Hours): 1.5  Control VTE (exp VT): 446    Physical Exam   Physical Exam  Vitals reviewed.   Constitutional:       Appearance: He is normal weight. He is ill-appearing. He is not toxic-appearing or diaphoretic.      Interventions: He is sedated, intubated and restrained.   HENT:      Head: Normocephalic and atraumatic.      Mouth/Throat:      Mouth: Mucous membranes are moist.   Eyes:      Pupils: Pupils are equal, round, and reactive to light.   Neck:      Vascular: No JVD.   Cardiovascular:      Rate and Rhythm: Normal rate and regular rhythm. Occasional extrasystoles are present.     Pulses: Normal pulses.      Heart sounds: No murmur heard.    No gallop.       Comments:   Pulmonary:      Effort: He is intubated.      Breath sounds: Rhonchi present. No wheezing or rales.   Chest:      Comments: Right subclavian CVC  Abdominal:      General: Abdomen is protuberant. A surgical scar is present. Bowel sounds are decreased. There is distension.      Palpations: Abdomen is soft. There is no fluid wave.      Tenderness: There is no abdominal tenderness. There is no guarding or rebound. Negative signs include Jordan's sign and McBurney's sign.      Comments: tympanetic abdomen full of gas-significantly distended-nonacute   Genitourinary:     Comments: Triana  Musculoskeletal:      Cervical back: Neck supple.      Right lower leg: No edema.      Left lower leg: No edema.      Comments: Arterial line   Skin:     General: Skin is warm and dry.      Capillary Refill: Capillary refill takes less than 2 seconds.      Coloration: Skin is not cyanotic or mottled.      Nails: There is no clubbing.   Neurological:      Mental Status: He is lethargic.      GCS: GCS eye subscore is 2. GCS verbal subscore is 1. GCS motor subscore is 5.      Comments: Sedate but starting to respond, moves all 4 spontaneously and follows for nursing staff, brainstem reflexes now normal including right pupil, withdraws in all 4   Psychiatric:      Comments: Unable to assess         Medications  Current Facility-Administered Medications   Medication Dose Route Frequency Provider Last Rate Last Admin   • LORazepam (ATIVAN) injection 2 mg  2 mg Intravenous Q HOUR PRN Stephen Chowdhury, D.O.   2 mg at 07/10/21 0556   • Respiratory Therapy Consult   Nebulization Continuous RT Stephen Chowdhury D.O.       • famotidine (PEPCID) tablet 20 mg  20 mg Enteral Tube Q12HRS Stephen Chowdhury, D.O.        Or   • famotidine (PEPCID) injection 20 mg  20 mg Intravenous Q12HRS Stephen Chowdhury, D.O.   20 mg at 07/12/21 0508   • senna-docusate (PERICOLACE or SENOKOT S) 8.6-50 MG per tablet 2 tablet  2 tablet Enteral Tube  BID Stephen Trenton, D.O.   2 tablet at 07/10/21 1854    And   • polyethylene glycol/lytes (MIRALAX) PACKET 1 Packet  1 Packet Enteral Tube QDAY PRN Stephen Marieklin, D.O.        And   • magnesium hydroxide (MILK OF MAGNESIA) suspension 30 mL  30 mL Enteral Tube QDAY PRN Stephen Ratliffin, D.O.        And   • bisacodyl (DULCOLAX) suppository 10 mg  10 mg Rectal QDAY PRN Stephen Ratliffin, D.O.       • MD Alert...ICU Electrolyte Replacement per Pharmacy   Other PHARMACY TO DOSE Stephen Trenton, D.O.       • lidocaine (XYLOCAINE) 1 % injection 2 mL  2 mL Tracheal Tube Q30 MIN PRN Stephen Ratliffin, D.O.       • fentaNYL (SUBLIMAZE) injection 100 mcg  100 mcg Intravenous Q15 MIN PRN Stephen Ratliffin, D.O.   100 mcg at 07/12/21 0424    And   • fentaNYL (SUBLIMAZE) injection 200 mcg  200 mcg Intravenous Q15 MIN PRN Stephen Ratliffin, D.O.        And   • fentaNYL (SUBLIMAZE) 50 mcg/mL in 50mL (Continuous Infusion)   Intravenous Continuous Stephen Ratliffin, D.O.   Dose not Required at 07/10/21 0330    And   • propofol (DIPRIVAN) injection  0-80 mcg/kg/min Intravenous Continuous Stephen Trenton, D.O.   Paused at 07/12/21 0626   • ipratropium-albuterol (DUONEB) nebulizer solution  3 mL Nebulization Q2HRS PRN (RT) Stephen Ratliffin, D.O.       • vasopressin (VASOSTRICT) 20 Units in  mL Infusion  0.03 Units/min Intravenous Continuous Vimal Preciado M.D.   Stopped at 07/10/21 1523   • piperacillin-tazobactam (ZOSYN) 4.5 g in  mL IVPB  4.5 g Intravenous Q8HRS Vimal Preciado M.D. 25 mL/hr at 07/12/21 0508 4.5 g at 07/12/21 0508   • norepinephrine (Levophed) 8 mg in 250 mL NS infusion (premix)  0-30 mcg/min Intravenous Continuous Stephen Chowdhury D.O.   Stopped at 07/10/21 1714   • lactated ringers infusion   Intravenous Continuous Koby Lange M.D. 83 mL/hr at 07/11/21 2100 New Bag at 07/11/21 2100   • guaiFENesin dextromethorphan (ROBITUSSIN DM) 100-10 MG/5ML syrup 5 mL  5 mL Oral Q6HRS PRN Koby Lange M.D.   5 mL at 07/05/21 0142    • [Held by provider] oxyCODONE immediate-release (ROXICODONE) tablet 5 mg  5 mg Oral Q3HRS PRIZAIAH Lange M.D.   5 mg at 07/06/21 2322    Or   • [Held by provider] oxyCODONE immediate release (ROXICODONE) tablet 10 mg  10 mg Oral Q3HRS PRIZAIAH Lange M.D.   10 mg at 07/08/21 0304    Or   • [Held by provider] HYDROmorphone (Dilaudid) injection 0.25 mg  0.25 mg Intravenous Q3HRS PRIZAIAH Lange M.D.   0.25 mg at 07/08/21 0550   • calcium carbonate (TUMS) chewable tab 500 mg  500 mg Oral TID PRIZAIAH Lange M.D.   500 mg at 07/06/21 1724   • ondansetron (ZOFRAN) syringe/vial injection 4 mg  4 mg Intravenous Q4HRS PRIZAIAH Howell M.D.       • ondansetron (ZOFRAN ODT) dispertab 4 mg  4 mg Oral Q4HRS PRIZAIAH Howell M.D.   4 mg at 07/06/21 0301   • nicotine (NICODERM) 21 MG/24HR 21 mg  21 mg Transdermal Daily-0600 Mel Howell M.D.   21 mg at 07/12/21 0508    And   • nicotine polacrilex (NICORETTE) 2 MG piece 2 mg  2 mg Oral Q HOUR PRN Mel Howell M.D.       • insulin regular (HumuLIN R,NovoLIN R) injection  1-6 Units Subcutaneous Q6HRS Mel Howell M.D.        And   • glucose 4 g chewable tablet 16 g  16 g Oral Q15 MIN PRN Mel Howell M.D.        And   • dextrose 50% (D50W) injection 50 mL  50 mL Intravenous Q15 MIN PRIZAIAH Howell M.D.       • acetaminophen (Tylenol) tablet 650 mg  650 mg Oral Q6HRS PRIZAIAH Howell M.D.       • Pharmacy Consult Request ...Pain Management Review 1 Each  1 Each Other PHARMACY TO DOSE Mel Howell M.D.           Fluids    Intake/Output Summary (Last 24 hours) at 7/12/2021 0755  Last data filed at 7/12/2021 0600  Gross per 24 hour   Intake 2703.34 ml   Output 1480 ml   Net 1223.34 ml       Laboratory  Recent Labs     07/10/21  1431 07/11/21  0450 07/12/21  0443   ISTATAPH 7.443 7.475 7.509*   ISTATAPCO2 42.2* 40.4* 37.9*   ISTATAPO2 101* 154* 110*   ISTATATCO2 30 31 31   XBRTVOR1GGS 98 99 99   ISTATARTHCO3 28.9* 29.7* 30.2*   ISTATARTBE 4* 6* 7*   ISTATTEMP 98.0 F 96.8 F 98.0 F   ISTATFIO2 60 60 50    ISTATSPEC Arterial Arterial Arterial   ISTATAPHTC 7.448 7.490 7.514*   BEELQPCL5RR 99* 148* 108*         Recent Labs     07/09/21  2339 07/09/21  2339 07/10/21  0446 07/10/21  0446 07/10/21  1514 07/11/21 0520 07/12/21  0335   SODIUM 135   < > 133*   < > 138 141 137   POTASSIUM 6.1*   < > 5.9*   < > 5.7* 4.6 4.3   CHLORIDE 102   < > 101   < > 103 108 105   CO2 27   < > 24   < > 25 27 26   BUN 30*   < > 38*   < > 36* 37* 31*   CREATININE 0.74   < > 0.81   < > 0.82 0.65 0.70   MAGNESIUM 2.2  --   --   --   --  2.1 2.0   PHOSPHORUS 6.5*   < > 5.0*  --   --  3.2 2.9   CALCIUM 8.7   < > 8.5   < > 8.4* 8.6 8.6    < > = values in this interval not displayed.     Recent Labs     07/09/21  0811 07/09/21  2339 07/10/21  1514 07/11/21  0520 07/12/21  0335   ALTSGPT 19 19  --   --   --  8   ASTSGOT 60*  59*  --   --   --  25   ALKPHOSPHAT 361*  362*  --   --   --  235*   TBILIRUBIN 1.7*  1.7*  --   --   --  1.3   DBILIRUBIN 1.1*  1.2*  --   --   --   --    GLUCOSE  --    < > 147* 132* 98    < > = values in this interval not displayed.     Recent Labs     07/09/21 0811 07/09/21 2339 07/11/21 0520 07/12/21  0335   WBC  --  15.3* 5.5 5.3   NEUTSPOLYS  --  97.40* 93.90* 84.20*   LYMPHOCYTES  --  0.00* 2.60* 7.00*   MONOCYTES  --  2.60 3.50 4.40   EOSINOPHILS  --  0.00 0.00 0.00   BASOPHILS  --  0.00 0.00 0.00   ASTSGOT 60*  59*  --   --  25   ALTSGPT 19  19  --   --  8   ALKPHOSPHAT 361*  362*  --   --  235*   TBILIRUBIN 1.7*  1.7*  --   --  1.3     Recent Labs     07/09/21  0811 07/09/21  2339 07/11/21  0520 07/12/21  0335   RBC  --  3.58* 2.85* 3.34*   HEMOGLOBIN  --  11.4* 9.0* 10.5*   HEMATOCRIT  --  36.1* 27.8* 32.2*   PLATELETCT  --  73* 57* 57*   PROTHROMBTM 17.4*  --   --   --    INR 1.48*  --   --   --        Imaging  X-Ray:  I have personally reviewed the images and compared with prior images.    Assessment/Plan  * Acute respiratory failure with hypercapnia (HCC)  Assessment & Plan  Intubated early a.m.  7/10  Significant hypoxemia and hypercarbia on initial evaluation  Likely etiology aspiration pneumonia in patient with probable abdominal sepsis as well  RT protocol/ventilator bundle  Serial ABG/chest x-ray/ventilator mechanics review  SAT/SBT to begin -not ready for liberation trial    Status post laparotomy enteritis/colitis noted with purulent secretions in the peritoneum  Assessment & Plan  Status post laparotomy 7/9 by Dr. Meza, case reviewed with him at length  Operative findings of enteritis/colitis but no ischemic bowel or bowel necrosis  Suspect ruptured cyst from liver?  Multiple abdominal cultures pending and broad-spectrum antibiotics ongoing-cultures nonrevealing so far  Ileus on exam of abdomen, hold on tube feeds, stop lactulose, mobilize if we can, limit narcotics if we can  Surgery following, will review with them daily    Abnormal neurological exam  Assessment & Plan  Neuro exam now nonfocal, right pupil now functioning normally  CT head noncontrast negative  No other focality on exam except for encephalopathy, sluggish brainstem reflexes and diminished response to tactile stimuli  Neurological consultation, case reviewed with Dr Burkett  Stat MRI brain with and without->no CVA  Stat EEG->no obvious NCSE on review with EEG tech at the bedside, formal interpretation pending  Serial neuro exam normalized  DC continuous EEG  Neurology signing off    Aspiration pneumonia (HCC)  Assessment & Plan  Intubated for respiratory failure and aspiration pneumonia early a.m. 7/10  Aspiration GI contents evident by intubation as well as bronchoscopy  BAL specimen sent early a.m. 7/10  Empiric antibiotic therapy ongoing with Zosyn/Flagyl  Serial chest x-ray  Aggressive pulmonary toilet  Repeat bronchoscopy as clinically prudent  De-escalate antibiotics as clinically prudent    Normochromic anemia  Assessment & Plan  Multifactorial etiology, not bleeding clinically but hemoglobin trending down  Serial CBC and  monitor closely  Transfuse per protocol    New onset seizure (HCC)  Assessment & Plan  Tonic-clonic generalized seizure activity identified by nursing team-no recurrence  Resolved with IV Ativan, repeat as needed  Keppra x7 days then stop?  Neurology consultation reviewed  Initial CT negative  MRI with and without contrast negative negative  EEG negative for NCSE  Continue to optimize electrolytes  Aspiration/seizure precautions  Query related to toxic metabolic state from sepsis    Encephalopathy acute  Assessment & Plan  Jmdrubxtnmhmas-sxenfxiv-ddfzlxelw toxic metabolic?  New onset seizure control with Ativan  CT head negative  MRI without stroke or mass  EEG negative  Optimize electrolytes  Sedation vacations ongoing  Negative work-up    Acute urinary retention  Assessment & Plan  Triana catheter  Triana/bladder protocols when clinically improved  Bladder scan as needed  Monitor urine output and renal function    Sepsis (HCC)  Assessment & Plan  This is Septic shock Not present on admission  SIRS criteria identified on my evaluation include: Tachycardia, with heart rate greater than 90 BPM, Tachypnea, with respirations greater than 20 per minute and Leukocytosis, with WBC greater than 12,000  Source is GI/pulmonary  Presentation includes: Severe sepsis present, persistent hypotension after 30 ml/kg completed, and initial lactate level result is >= 4 mmol/L.   Despite appropriate fluid resuscitation with crystalloid given per sepsis guidelines, the patient remains hypotensive with systolic blood pressure less than 90 or MAP less than 65  Hemodynamic support with additional fluids and IV vasopressors as needed to maintain a SBP of 90 or MAP of 65  IV antibiotics as appropriate for source of sepsis  Reassessment: I have reassessed the patient's hemodynamic status    Septic shock is present while in the ICU  Patient developed respiratory failure requiring intubation  New onset seizure and altered LOC requiring neuro  work-up possibly related to metabolic issues of sepsis  Actively titrating norepinephrine, add vasopressin as needed  Severe hypoalbuminemia status post fluid resuscitation we will add albumin to the regimen  Acidosis improving, lactic acid trending down, no bicarb at this time  Cefepime changed to Zosyn secondary to concerns of seizure threshold alteration by cefepime  Continuing Flagyl  Await cultures from BAL and abdomen and modify antibiotic regimen as appropriate-all cultures are still negative  Plan 5 to 7 days of antibiotics minimum  Hemodynamics improved, weaned off norepinephrine    Hypomagnesemia  Assessment & Plan  Replete    Lactic acidosis- (present on admission)  Assessment & Plan  Secondary to sepsis, improved  Trend as clinically prudent    Hyperkalemia- (present on admission)  Assessment & Plan  Normalized-continue to monitor  Etiology?  Avoid potassium-containing infusions  Serial BMP  Pharmacotherapy for elevated potassium as needed    Hyponatremia- (present on admission)  Assessment & Plan  Mild, improved, SIADH?  Trend BMP    Tobacco abuse- (present on admission)  Assessment & Plan  Smoking cessation to be encouraged when clinically appropriate if patient still actively smoking  RT protocols    Thrombocytopenia (HCC)- (present on admission)  Assessment & Plan  Trending down, now moderate to severe at 57  No bleeding clinically at this time, serial CBC  Chronic?  Multifactorial etiology?  Transfuse per protocols, if needs transfusion check TEG/mapping to evaluate platelet function    Hepatoma (HCC)- (present on admission)  Assessment & Plan  History of hepatocellular carcinoma, presented 7/2 with abdominal pain  Worsening abdominal process 7/9, suspected ruptured hepatic cyst/mass-taken for laparotomy based on exam and imaging  Enteritis/colitis identified at laparotomy cultures obtained and antibiotics initiated  Case reviewed with Dr. Meza at length at the bedside    Essential hypertension-  (present on admission)  Assessment & Plan  History of  Hypotension improved, weaned off norepinephrine  Antihypertensive medications as clinically prudent    Type 2 diabetes mellitus without complication, without long-term current use of insulin ([facility])- (present on admission)  Assessment & Plan  Serial blood sugar testing-glycemic control acceptable but not receiving normal nutrition at this time  SSI, add Lantus when on enteral feeding as clinically appropriate  Hypoglycemia protocols  Transition to insulin 180 protocol if needed    Updated plan:  Vent day #4, tolerating SAT/SBT, consider trial liberation  Continue antibiotics  Okay to start VTE PPx per surgery, continue to follow with thrombocytopenia  Ongoing ICU care as above, follow closely in ICU.    VTE:  Lovenox  Ulcer: H2 Antagonist  Lines: Central Line  Ongoing indication addressed    I have performed a physical exam and reviewed and updated ROS and Plan today (7/12/2021). In review of yesterday's note (7/11/2021), there are no changes except as documented above.     Discussed patient condition and risk of morbidity and/or mortality with Family, RN, RT, Pharmacy and QA team  The patient remains critically ill.  Critical care time = 35 minutes in directly providing and coordinating critical care and extensive data review.  No time overlap and excludes procedures.

## 2021-07-12 NOTE — DIETARY
Nutrition services: Day 10 of admit. 72 yo male admitted with abscess of abdominal cavity. Surgery 7/9 for washout and drain placement.     · Pt has been NPO/clear liquids x 10 days - inadequate nutrition.   · Pt transferred to SICU post op and intubated.   · Pt to be extubated today    Recommendations/Plan:  1. Dr. Carbajal recommending consideration for low rate enteral feeding. Pt with increased nutritional needs and would benefit from starting tube feeding.  2. Pt will be a refeeding risk secondary to 10 days without adequate nutrition. Recommend starting and advancing slowly.  3. If pt is safe for oral diet would recommend clear liquids and tube feedings until diet is able to advance and pt is able to take adequate po.     Discussed with RN

## 2021-07-12 NOTE — CARE PLAN
Problem: Bowel Elimination  Goal: Establish and maintain regular bowel function  Outcome: Progressing  Note: Active bowel sounds, no BM yet       Problem: Hemodynamics  Goal: Patient's hemodynamics, fluid balance and neurologic status will be stable or improve  Outcome: Progressing  Note: Remains off pressers, VSS, urine output adequate   The patient is Stable - Low risk of patient condition declining or worsening    Shift Goals  Clinical Goals: Wean ventilator, decrease abdominal distention  Patient Goals: LAILA  Family Goals: no family present

## 2021-07-12 NOTE — CARE PLAN
The patient is Watcher - Medium risk of patient condition declining or worsening    Shift Goals  Clinical Goals: Wean ventilator, decrease abdominal distention  Patient Goals: LAILA  Family Goals: no family present    Progress made toward(s) clinical / shift goals:  Pt was extubated. Pt had bowel movement last night, abdomen is less firm than yesterday's shift.

## 2021-07-12 NOTE — CARE PLAN
Ventilator Daily Summary    Vent Day #4    Ventilator settings: 26, 440, +8, 50%    Plan: Continue current ventilator settings and wean mechanical ventilation as tolerated per physician orders.

## 2021-07-12 NOTE — CARE PLAN
Problem: Nutritional:  Goal: Achieve adequate nutritional intake  Description: Advance diet beyond clears when medically feasible. Patient will consume >50% of meals  Outcome: Not Met    Pt has been intubated  He would benefit from tube feedings secondary to 10 days with NPO/Clear liquids

## 2021-07-12 NOTE — PROGRESS NOTES
Dr. Carbajal (surgery) at bedside. Okay for patient to have tube feeds if intubated, or clear liquids if patient extubated.

## 2021-07-12 NOTE — PROGRESS NOTES
Skin check  - upper back and buttock tunneling pore, no open skin or drainage  - MARIANA site on abdomen CDI, dressing changed  - roof of mouth scabbed, red  - large bruise to right forearm

## 2021-07-12 NOTE — FLOWSHEET NOTE
07/12/21 0750   Spontaneous Breathing Trial (SBT)   Spontaneous breathing trial (SBT) outcome Pt weaned for 1 hour and returned to rest settings per protocol - SBT Pass   Length of Weaning Trial (Hours) 1.5   Pt passed SBT but not ready to extubate Not ready - continue daily assessments for extubation   Weaning Parameters   RR (bpm) 19   $ FVC / Vital Capacity (liters)  0.8   NIF (cm H2O)  -24   Rapid Shallow Breathing Index (RR/VT) 37   Spontaneous VE 9   Spontaneous

## 2021-07-12 NOTE — THERAPY
Physical Therapy   Re-evaluation     Patient Name: Lorraine Bella  Age:  71 y.o., Sex:  male  Medical Record #: [id number]  Today's Date: 7/12/2021     Precautions: Fall Risk, Nasogastric Tube, Other (See Comments)  Comments: MARIANA drain to abdomen, NGT to suction, intubated    Assessment    Pt seen for physical therapy re-evaluation now intubated and s/p diagnostic laparotomy with washout and drain placement with abdominal compartment syndrome. Pt was lethargic but following single step commands. Pt required total assist for bed mobility and tolerated sitting EOB with min assist. BLE strength appears grossly 3/5. PT will cont while pt is in acute care setting to address strength, balance, activity tolerance, and pain.     Plan    Continue current treatment plan.    DC Equipment Recommendations: Unable to determine at this time  Discharge Recommendations: Recommend post-acute placement for additional physical therapy services prior to discharge home       07/12/21 0933   Vitals   O2 Delivery Device Ventilator   Cognition    Cognition / Consciousness X   Speech/ Communication Intubated / Trached;Delayed Responses   Level of Consciousness Responds to voice   New Learning Impaired   Attention Impaired   Comments lethargic but following commands   Strength Lower Body   Lower Body Strength  X   Comments grossly 3/5   Neuro-Muscular Treatments   Neuro-Muscular Treatments Weight Shift Left;Weight Shift Right;Verbal Cuing;Anterior weight shift;Compensatory Strategies   Comments EOB only   Balance   Sitting Balance (Static) Poor -   Sitting Balance (Dynamic) Trace +   Weight Shift Sitting Poor   Skilled Intervention Tactile Cuing;Verbal Cuing;Sequencing   Comments EOB only   Gait Analysis   Gait Level Of Assist Unable to Participate   Comments intubated now   Bed Mobility    Supine to Sit Total Assist   Sit to Supine Total Assist   Scooting Total Assist   Rolling Total Assist to Rt.;Total Assist to Lt.   Skilled  Intervention Verbal Cuing;Tactile Cuing   Functional Mobility   Sit to Stand Unable to Participate   Bed, Chair, Wheelchair Transfer Unable to Participate   Mobility EOB only   Skilled Intervention Verbal Cuing;Compensatory Strategies   Short Term Goals    Short Term Goal # 1 pt will be able to complete bed mobility from flat bed with mod assist in 6tx in order to progres with therapy   Short Term Goal # 2 pt will be able to complete functional transfers with mod assist in 6tx in order to increase OOB time   Short Term Goal # 3 pt will be able to ambulate 15ft with FWW and min assist in 6tx in order to progress back to baseline   Anticipated Discharge Equipment and Recommendations   DC Equipment Recommendations Unable to determine at this time   Discharge Recommendations Recommend post-acute placement for additional physical therapy services prior to discharge home

## 2021-07-12 NOTE — THERAPY
Occupational Therapy  Re-evaluation     Patient Name: Lorraine Bella  Age:  71 y.o., Sex:  male  Medical Record #: [id number]  Today's Date: 7/12/2021     Precautions  Precautions: Fall Risk, Nasogastric Tube, Other (See Comments)  Comments: MARIANA drain to abdomen; NGT to suction; intubated off sedation    Assessment    Pt is now s/p diagnostic laparotomy w/ washout and drain placement w/ abdominal compartment syndrome. Post op he had seizure activity and required intubation and sedation. Today he was seen intubated off sedation. He was able to participate in sitting EOB, able to hold self upright for short periods of time. He was able to follow directions appropriate. He fatigues quickly. LUE appears to be grossly weaker than RUE today. Limited by weakness, fatigue, impaired balance, impaired trunk control, and pain.    Plan    Continue current treatment plan.    DC Equipment Recommendations: Unable to determine at this time  Discharge Recommendations: Recommend post-acute placement for additional occupational therapy services prior to discharge home    Subjective    Cooperative for therapy, tolerated ET tube well sitting EOB      Objective       07/12/21 0934   Cognition    Cognition / Consciousness X   Speech/ Communication Delayed Responses;Intubated / Trached;Nods Appropriately   Level of Consciousness Responds to voice   New Learning Impaired   Attention Impaired   Comments pleasant and cooperative, intubated and off sedation; follows commands and attempting to mouth words however unable to read lips due to ET tube   Active ROM Upper Body   Active ROM Upper Body  X   Dominant Hand Right   Comments LUE grossly more limited than RUE   Strength Upper Body   Upper Body Strength  X   Comments LUE weaker 3/5 than RUE 4/5   Balance   Sitting Balance (Static) Poor -   Sitting Balance (Dynamic) Trace +   Weight Shift Sitting Poor   Skilled Intervention Verbal Cuing;Tactile Cuing;Sequencing   Comments attempting to  "hold self up w/ BUE support on EOB for short periods of time   Bed Mobility    Supine to Sit Total Assist   Sit to Supine Total Assist   Scooting Total Assist   Rolling Total Assist to Rt.;Total Assist to Lt.   Skilled Intervention Verbal Cuing;Tactile Cuing;Sequencing   Activities of Daily Living   Grooming Moderate Assist;Seated   Lower Body Dressing Total Assist  (socks)   Skilled Intervention Verbal Cuing;Tactile Cuing;Sequencing   How much help from another person does the patient currently need...   Putting on and taking off regular lower body clothing? 1   Bathing (including washing, rinsing, and drying)? 1   Toileting, which includes using a toilet, bedpan, or urinal? 1   Putting on and taking off regular upper body clothing? 2   Taking care of personal grooming such as brushing teeth? 2   Eating meals? 1   6 Clicks Daily Activity Score 8   Functional Mobility   Sit to Stand Unable to Participate   Bed, Chair, Wheelchair Transfer Unable to Participate   Mobility EOB only   Skilled Intervention Verbal Cuing;Tactile Cuing;Sequencing   ICU Target Mobility Level   ICU Mobility - Targeted Level Level 2   Patient / Family Goals   Patient / Family Goal #1 \"To get outta here\"   Goal #1 Outcome Goal not met   Short Term Goals   Short Term Goal # 1 pt will hold self EOB for 15 sec w/ fair sitting balance in prep for seated ADLs   Short Term Goal # 2 pt will complete grooming seated EOB w/ setup   Short Term Goal # 3 pt will demo stand pivot txf to BSC with modA         "

## 2021-07-12 NOTE — PROGRESS NOTES
"    DATE: 7/12/2021    Post Operative Day 3 Diagnostic laparoscopy, abdominal washout, and external drainage    PHYSICAL EXAMINATION:  Vital Signs: /73   Pulse (!) 110   Temp 36.7 °C (98.1 °F) (Temporal)   Resp (!) 24   Ht 1.803 m (5' 11\")   Wt 91.3 kg (201 lb 4.5 oz)   SpO2 96%     Trocar sites are clean and dry.  Abdominal drain with minimal seropurulent output.    ASSESSMENT AND PLAN:   Satisfactory progress postoperative day #3.  Recommend consideration for low rate enteral feeding.    Appreciate ICU and consulting physician care.       ____________________________________     Kulwant Carbajal M.D.    DD: 7/12/2021  1:40 PM      "

## 2021-07-13 ENCOUNTER — APPOINTMENT (OUTPATIENT)
Dept: RADIOLOGY | Facility: MEDICAL CENTER | Age: 71
DRG: 356 | End: 2021-07-13
Attending: HOSPITALIST
Payer: MEDICARE

## 2021-07-13 ENCOUNTER — APPOINTMENT (OUTPATIENT)
Dept: RADIOLOGY | Facility: MEDICAL CENTER | Age: 71
DRG: 356 | End: 2021-07-13
Attending: INTERNAL MEDICINE
Payer: MEDICARE

## 2021-07-13 PROBLEM — R10.9 ABDOMINAL PAIN: Status: RESOLVED | Noted: 2020-01-14 | Resolved: 2021-07-13

## 2021-07-13 LAB
ANION GAP SERPL CALC-SCNC: 7 MMOL/L (ref 7–16)
ANISOCYTOSIS BLD QL SMEAR: ABNORMAL
BASOPHILS # BLD AUTO: 0 % (ref 0–1.8)
BASOPHILS # BLD: 0 K/UL (ref 0–0.12)
BUN SERPL-MCNC: 24 MG/DL (ref 8–22)
CALCIUM SERPL-MCNC: 8.6 MG/DL (ref 8.5–10.5)
CHLORIDE SERPL-SCNC: 106 MMOL/L (ref 96–112)
CO2 SERPL-SCNC: 27 MMOL/L (ref 20–33)
CREAT SERPL-MCNC: 0.6 MG/DL (ref 0.5–1.4)
CRP SERPL HS-MCNC: 12.05 MG/DL (ref 0–0.75)
EOSINOPHIL # BLD AUTO: 0 K/UL (ref 0–0.51)
EOSINOPHIL NFR BLD: 0 % (ref 0–6.9)
ERYTHROCYTE [DISTWIDTH] IN BLOOD BY AUTOMATED COUNT: 58 FL (ref 35.9–50)
GLUCOSE BLD-MCNC: 83 MG/DL (ref 65–99)
GLUCOSE SERPL-MCNC: 115 MG/DL (ref 65–99)
HCT VFR BLD AUTO: 34.7 % (ref 42–52)
HGB BLD-MCNC: 11.2 G/DL (ref 14–18)
LYMPHOCYTES # BLD AUTO: 0.26 K/UL (ref 1–4.8)
LYMPHOCYTES NFR BLD: 4.4 % (ref 22–41)
MAGNESIUM SERPL-MCNC: 1.9 MG/DL (ref 1.5–2.5)
MANUAL DIFF BLD: NORMAL
MCH RBC QN AUTO: 31.5 PG (ref 27–33)
MCHC RBC AUTO-ENTMCNC: 32.3 G/DL (ref 33.7–35.3)
MCV RBC AUTO: 97.5 FL (ref 81.4–97.8)
MONOCYTES # BLD AUTO: 0.48 K/UL (ref 0–0.85)
MONOCYTES NFR BLD AUTO: 8 % (ref 0–13.4)
MORPHOLOGY BLD-IMP: NORMAL
NEUTROPHILS # BLD AUTO: 5.26 K/UL (ref 1.82–7.42)
NEUTROPHILS NFR BLD: 87.6 % (ref 44–72)
NRBC # BLD AUTO: 0 K/UL
NRBC BLD-RTO: 0 /100 WBC
PLATELET # BLD AUTO: 57 K/UL (ref 164–446)
PLATELET BLD QL SMEAR: NORMAL
PLATELETS.RETICULATED NFR BLD AUTO: 1.9 K/UL (ref 0.6–13.1)
PMV BLD AUTO: 8.9 FL (ref 9–12.9)
POTASSIUM SERPL-SCNC: 4.4 MMOL/L (ref 3.6–5.5)
PREALB SERPL-MCNC: 4.8 MG/DL (ref 18–38)
RBC # BLD AUTO: 3.56 M/UL (ref 4.7–6.1)
RBC BLD AUTO: PRESENT
SODIUM SERPL-SCNC: 140 MMOL/L (ref 135–145)
TRIGL SERPL-MCNC: 150 MG/DL (ref 0–149)
WBC # BLD AUTO: 6 K/UL (ref 4.8–10.8)

## 2021-07-13 PROCEDURE — 700105 HCHG RX REV CODE 258: Performed by: INTERNAL MEDICINE

## 2021-07-13 PROCEDURE — 99233 SBSQ HOSP IP/OBS HIGH 50: CPT | Performed by: INTERNAL MEDICINE

## 2021-07-13 PROCEDURE — 85055 RETICULATED PLATELET ASSAY: CPT

## 2021-07-13 PROCEDURE — A9270 NON-COVERED ITEM OR SERVICE: HCPCS | Performed by: STUDENT IN AN ORGANIZED HEALTH CARE EDUCATION/TRAINING PROGRAM

## 2021-07-13 PROCEDURE — 700102 HCHG RX REV CODE 250 W/ 637 OVERRIDE(OP): Performed by: INTERNAL MEDICINE

## 2021-07-13 PROCEDURE — 71045 X-RAY EXAM CHEST 1 VIEW: CPT

## 2021-07-13 PROCEDURE — 84478 ASSAY OF TRIGLYCERIDES: CPT

## 2021-07-13 PROCEDURE — 82962 GLUCOSE BLOOD TEST: CPT

## 2021-07-13 PROCEDURE — 85007 BL SMEAR W/DIFF WBC COUNT: CPT

## 2021-07-13 PROCEDURE — 86140 C-REACTIVE PROTEIN: CPT

## 2021-07-13 PROCEDURE — 700111 HCHG RX REV CODE 636 W/ 250 OVERRIDE (IP): Performed by: INTERNAL MEDICINE

## 2021-07-13 PROCEDURE — 770006 HCHG ROOM/CARE - MED/SURG/GYN SEMI*

## 2021-07-13 PROCEDURE — 700102 HCHG RX REV CODE 250 W/ 637 OVERRIDE(OP): Performed by: STUDENT IN AN ORGANIZED HEALTH CARE EDUCATION/TRAINING PROGRAM

## 2021-07-13 PROCEDURE — 80048 BASIC METABOLIC PNL TOTAL CA: CPT

## 2021-07-13 PROCEDURE — 85027 COMPLETE CBC AUTOMATED: CPT

## 2021-07-13 PROCEDURE — 99233 SBSQ HOSP IP/OBS HIGH 50: CPT | Performed by: HOSPITALIST

## 2021-07-13 PROCEDURE — 84134 ASSAY OF PREALBUMIN: CPT

## 2021-07-13 PROCEDURE — A9270 NON-COVERED ITEM OR SERVICE: HCPCS | Performed by: INTERNAL MEDICINE

## 2021-07-13 PROCEDURE — 83735 ASSAY OF MAGNESIUM: CPT

## 2021-07-13 RX ORDER — TRAMADOL HYDROCHLORIDE 50 MG/1
50 TABLET ORAL EVERY 6 HOURS PRN
Status: DISCONTINUED | OUTPATIENT
Start: 2021-07-13 | End: 2021-07-23 | Stop reason: HOSPADM

## 2021-07-13 RX ORDER — MAGNESIUM SULFATE HEPTAHYDRATE 40 MG/ML
2 INJECTION, SOLUTION INTRAVENOUS ONCE
Status: COMPLETED | OUTPATIENT
Start: 2021-07-13 | End: 2021-07-13

## 2021-07-13 RX ORDER — FUROSEMIDE 10 MG/ML
40 INJECTION INTRAMUSCULAR; INTRAVENOUS ONCE
Status: COMPLETED | OUTPATIENT
Start: 2021-07-13 | End: 2021-07-13

## 2021-07-13 RX ADMIN — NICOTINE TRANSDERMAL SYSTEM 21 MG: 21 PATCH, EXTENDED RELEASE TRANSDERMAL at 05:04

## 2021-07-13 RX ADMIN — ENOXAPARIN SODIUM 40 MG: 40 INJECTION SUBCUTANEOUS at 05:05

## 2021-07-13 RX ADMIN — MAGNESIUM SULFATE 2 G: 2 INJECTION INTRAVENOUS at 11:03

## 2021-07-13 RX ADMIN — FUROSEMIDE 40 MG: 10 INJECTION, SOLUTION INTRAMUSCULAR; INTRAVENOUS at 11:03

## 2021-07-13 RX ADMIN — PIPERACILLIN AND TAZOBACTAM 4.5 G: 4; .5 INJECTION, POWDER, LYOPHILIZED, FOR SOLUTION INTRAVENOUS; PARENTERAL at 13:28

## 2021-07-13 RX ADMIN — OXYCODONE 5 MG: 5 TABLET ORAL at 18:50

## 2021-07-13 RX ADMIN — FAMOTIDINE 20 MG: 10 INJECTION INTRAVENOUS at 17:12

## 2021-07-13 RX ADMIN — FAMOTIDINE 20 MG: 20 TABLET ORAL at 05:04

## 2021-07-13 RX ADMIN — PIPERACILLIN AND TAZOBACTAM 4.5 G: 4; .5 INJECTION, POWDER, LYOPHILIZED, FOR SOLUTION INTRAVENOUS; PARENTERAL at 21:53

## 2021-07-13 RX ADMIN — DOCUSATE SODIUM 50 MG AND SENNOSIDES 8.6 MG 2 TABLET: 8.6; 5 TABLET, FILM COATED ORAL at 05:04

## 2021-07-13 RX ADMIN — PIPERACILLIN AND TAZOBACTAM 4.5 G: 4; .5 INJECTION, POWDER, LYOPHILIZED, FOR SOLUTION INTRAVENOUS; PARENTERAL at 05:17

## 2021-07-13 RX ADMIN — FENTANYL CITRATE 100 MCG: 50 INJECTION, SOLUTION INTRAMUSCULAR; INTRAVENOUS at 02:47

## 2021-07-13 ASSESSMENT — PAIN DESCRIPTION - PAIN TYPE
TYPE: SURGICAL PAIN
TYPE: ACUTE PAIN
TYPE: SURGICAL PAIN

## 2021-07-13 ASSESSMENT — FIBROSIS 4 INDEX: FIB4 SCORE: 11.01

## 2021-07-13 NOTE — PROGRESS NOTES
Critical Care Progress Note    Date of admission  7/2/2021    Chief Complaint  71 y.o. male, h/o hepatoma admitted 7/2/2021 with abdominal Pain, found to have diffuse, enteritis/colitis, OR for diagnostic laparoscopy with washout and drain placement 7/9; sz in PACU and aspirated, intubated    Hospital Course  7/10 - Sz in early AM, ativan/keppra, CT neg, MRI w/wo neg, EEG no NCSE,  Titrating NE drip, Cefepime -> Zosyn, LA and K improved  7/12 -extubated today, core track, tube feeding, SLP eval, continue Zosyn  7/13 -extubated yesterday, no distress, mobilized, advancing tube feed, Zosyn through 7/15    Interval Problem Update  Reviewed last 24 hour events:  POD#4  Extubated yesterday  A/o x 4, NFE  SR/ST  normotensvie  3+ edema  I/O = 2554/900 (+ 7.9L total)  3-4 lpm n/c  Mobilized to EOB today  SOPHY connor at 70  Lg bm this am  Straight cath this am; f/u bladder scan  MARIANA drain 40 cc purulent o/p  Stop LR  PT/OT  Transfer to GSU    Review of Systems  Review of Systems   Unable to perform ROS: Acuity of condition        Vital Signs for last 24 hours   Temp:  [36.6 °C (97.8 °F)-37.2 °C (98.9 °F)] 37.1 °C (98.7 °F)  Pulse:  [102-113] 108  Resp:  [20-43] 35  BP: (114-153)/(65-74) 132/74  SpO2:  [93 %-99 %] 97 %    Hemodynamic parameters for last 24 hours       Respiratory Information for the last 24 hours  2 LPM oxygen via NC    Physical Exam   Physical Exam  Vitals and nursing note reviewed.   HENT:      Head: Normocephalic and atraumatic.      Nose: Nose normal.      Comments: Core track feeding tube in place     Mouth/Throat:      Mouth: Mucous membranes are moist.   Eyes:      Pupils: Pupils are equal, round, and reactive to light.   Cardiovascular:      Rate and Rhythm: Regular rhythm. Tachycardia present.      Pulses: Normal pulses.      Heart sounds: Normal heart sounds.   Pulmonary:      Effort: Pulmonary effort is normal.      Breath sounds: No stridor. Rhonchi present. No wheezing.   Abdominal:      General:  There is distension.      Palpations: Abdomen is soft.      Tenderness: There is no abdominal tenderness.      Comments: MARIANA in place, no rebound or guarding   Musculoskeletal:      Cervical back: Neck supple. No rigidity.      Right lower leg: Edema present.      Left lower leg: Edema present.   Neurological:      Mental Status: He is alert.         Medications  Current Facility-Administered Medications   Medication Dose Route Frequency Provider Last Rate Last Admin   • enoxaparin (LOVENOX) inj 40 mg  40 mg Subcutaneous DAILY Matt Heller M.D.   40 mg at 07/13/21 0505   • Pharmacy Consult: Enteral tube insertion - review meds/change route/product selection  1 Each Other PHARMACY TO DOSE Matt Heller M.D.       • guaiFENesin dextromethorphan (ROBITUSSIN DM) 100-10 MG/5ML syrup 5 mL  5 mL Enteral Tube Q6HRS PRN Matt Heller M.D.       • acetaminophen (Tylenol) tablet 650 mg  650 mg Enteral Tube Q6HRS PRN Matt Heller M.D.       • ondansetron (ZOFRAN ODT) dispertab 4 mg  4 mg Enteral Tube Q4HRS PRN Matt Heller M.D.       • calcium carbonate (TUMS) chewable tab 500 mg  500 mg Enteral Tube TID PRN Matt Heller M.D.       • LORazepam (ATIVAN) injection 2 mg  2 mg Intravenous Q HOUR PRN Stephen Chowdhury D.O.   2 mg at 07/10/21 0556   • Respiratory Therapy Consult   Nebulization Continuous RT Stephen Chowdhury D.O.       • famotidine (PEPCID) tablet 20 mg  20 mg Enteral Tube Q12HRS Stephen Chowdhury, D.O.   20 mg at 07/13/21 0504    Or   • famotidine (PEPCID) injection 20 mg  20 mg Intravenous Q12HRS Stephen Chowdhury D.O.   20 mg at 07/12/21 1814   • senna-docusate (PERICOLACE or SENOKOT S) 8.6-50 MG per tablet 2 tablet  2 tablet Enteral Tube BID Stephen Chowdhury D.O.   2 tablet at 07/13/21 0504    And   • polyethylene glycol/lytes (MIRALAX) PACKET 1 Packet  1 Packet Enteral Tube QDAY PRN BYRON SamO.        And   • magnesium hydroxide (MILK OF MAGNESIA) suspension 30 mL  30 mL Enteral Tube QDAY PRN  Stephen Chowdhury D.O.        And   • bisacodyl (DULCOLAX) suppository 10 mg  10 mg Rectal QDAY PRN Stephen Chowdhury D.O.       • MD Alert...ICU Electrolyte Replacement per Pharmacy   Other PHARMACY TO DOSE Stephen Chowdhury, D.O.       • fentaNYL (SUBLIMAZE) injection 100 mcg  100 mcg Intravenous Q15 MIN PRN Stephen Chowdhury D.O.   100 mcg at 07/13/21 0247    And   • fentaNYL (SUBLIMAZE) injection 200 mcg  200 mcg Intravenous Q15 MIN PRN Stephen Chowdhury D.O.        And   • fentaNYL (SUBLIMAZE) 50 mcg/mL in 50mL (Continuous Infusion)   Intravenous Continuous Stephen Chowdhury D.O.   Dose not Required at 07/10/21 0330    And   • propofol (DIPRIVAN) injection  0-80 mcg/kg/min Intravenous Continuous Stephen Chowdhury, D.O.   Stopped at 07/12/21 1436   • ipratropium-albuterol (DUONEB) nebulizer solution  3 mL Nebulization Q2HRS PRN (RT) Stephen Chowdhury D.O.       • vasopressin (VASOSTRICT) 20 Units in  mL Infusion  0.03 Units/min Intravenous Continuous Vimal Preciado M.D.   Stopped at 07/10/21 1523   • piperacillin-tazobactam (ZOSYN) 4.5 g in  mL IVPB  4.5 g Intravenous Q8HRS Vimal Preciado M.D. 25 mL/hr at 07/13/21 0517 4.5 g at 07/13/21 0517   • norepinephrine (Levophed) 8 mg in 250 mL NS infusion (premix)  0-30 mcg/min Intravenous Continuous Stephen Chowdhury, D.O.   Stopped at 07/10/21 1714   • lactated ringers infusion   Intravenous Continuous Koby Lange M.D. 83 mL/hr at 07/12/21 2021 New Bag at 07/12/21 2021   • [Held by provider] oxyCODONE immediate-release (ROXICODONE) tablet 5 mg  5 mg Oral Q3HRS PRN Koby Lange M.D.   5 mg at 07/06/21 2322    Or   • [Held by provider] oxyCODONE immediate release (ROXICODONE) tablet 10 mg  10 mg Oral Q3HRS PRN Koby Lange M.D.   10 mg at 07/08/21 0304    Or   • [Held by provider] HYDROmorphone (Dilaudid) injection 0.25 mg  0.25 mg Intravenous Q3HRS PRN Koby Lange M.D.   0.25 mg at 07/08/21 0550   • ondansetron (ZOFRAN) syringe/vial injection 4 mg  4 mg  Intravenous Q4HRS PRN Mel Howell M.D.       • nicotine (NICODERM) 21 MG/24HR 21 mg  21 mg Transdermal Daily-0600 Mel Howell M.D.   21 mg at 07/13/21 0504    And   • nicotine polacrilex (NICORETTE) 2 MG piece 2 mg  2 mg Oral Q HOUR PRN Mel Howell M.D.       • insulin regular (HumuLIN R,NovoLIN R) injection  1-6 Units Subcutaneous Q6HRS Mel Howell M.D.        And   • glucose 4 g chewable tablet 16 g  16 g Oral Q15 MIN PRN Mel oHwell M.D.        And   • dextrose 50% (D50W) injection 50 mL  50 mL Intravenous Q15 MIN PRN Mel Howell M.D.       • Pharmacy Consult Request ...Pain Management Review 1 Each  1 Each Other PHARMACY TO DOSE Mel Howell M.D.           Fluids    Intake/Output Summary (Last 24 hours) at 7/13/2021 0754  Last data filed at 7/13/2021 0600  Gross per 24 hour   Intake 2554.72 ml   Output 900 ml   Net 1654.72 ml       Laboratory  Recent Labs     07/10/21  1431 07/11/21  0450 07/12/21  0443   ISTATAPH 7.443 7.475 7.509*   ISTATAPCO2 42.2* 40.4* 37.9*   ISTATAPO2 101* 154* 110*   ISTATATCO2 30 31 31   [id number] 98 99 99   ISTATARTHCO3 28.9* 29.7* 30.2*   ISTATARTBE 4* 6* 7*   ISTATTEMP 98.0 F 96.8 F 98.0 F   ISTATFIO2 60 60 50   ISTATSPEC Arterial Arterial Arterial   ISTATAPHTC 7.448 7.490 7.514*   [id number] 99* 148* 108*         Recent Labs     07/11/21  0520 07/12/21  0335 07/13/21  0413   SODIUM 141 137 140   POTASSIUM 4.6 4.3 4.4   CHLORIDE 108 105 106   CO2 27 26 27   BUN 37* 31* 24*   CREATININE 0.65 0.70 0.60   MAGNESIUM 2.1 2.0 1.9   PHOSPHORUS 3.2 2.9  --    CALCIUM 8.6 8.6 8.6     Recent Labs     07/11/21 0520 07/12/21 0335 07/13/21 0413   ALTSGPT  --  8  --    ASTSGOT  --  25  --    ALKPHOSPHAT  --  235*  --    TBILIRUBIN  --  1.3  --    PREALBUMIN  --   --  4.8*   GLUCOSE 132* 98 115*     Recent Labs     07/11/21 0520 07/12/21 0335 07/13/21 0413   WBC 5.5 5.3 6.0   NEUTSPOLYS 93.90* 84.20* 87.60*   LYMPHOCYTES 2.60* 7.00* 4.40*   MONOCYTES 3.50 4.40 8.00   EOSINOPHILS 0.00 0.00 0.00   BASOPHILS 0.00  0.00 0.00   ASTSGOT  --  25  --    ALTSGPT  --  8  --    ALKPHOSPHAT  --  235*  --    TBILIRUBIN  --  1.3  --      Recent Labs     07/11/21  0520 07/12/21  0335 07/13/21  0413   RBC 2.85* 3.34* 3.56*   HEMOGLOBIN 9.0* 10.5* 11.2*   HEMATOCRIT 27.8* 32.2* 34.7*   PLATELETCT 57* 57* 57*       Imaging  X-Ray:  I have personally reviewed the images and compared with prior images.    Assessment/Plan  * Acute respiratory failure with hypercapnia (HCC)  Assessment & Plan  Intubated 7/10 - 7/12  aspiration    Status post laparotomy enteritis/colitis noted with purulent secretions in the peritoneum  Assessment & Plan  Status post laparoscopy 7/9 by Dr. Meza, case reviewed with him at length  Operative findings of enteritis/colitis but no ischemic bowel or bowel necrosis  Suspect ruptured cyst from liver?  Multiple abdominal cultures pending and broad-spectrum antibiotics ongoing  Ileus now improved and tolerating enteral nutrition    Aspiration pneumonia (HCC)  Assessment & Plan  Intubated for respiratory failure and aspiration pneumonia early a.m. 7/10  Aspiration GI contents evident by intubation as well as bronchoscopy  BAL specimen sent early a.m. 7/10  Empiric antibiotic therapy ongoing with Zosyn  Clinically improving    Normochromic anemia  Assessment & Plan  Multifactorial etiology, not bleeding clinically but hemoglobin trending down  Serial CBC and monitor closely  Transfuse per protocol    New onset seizure (HCC)  Assessment & Plan  Tonic-clonic generalized seizure activity identified by nursing team-no recurrence  Resolved with IV Ativan, repeat as needed  Initial CT negative  MRI with and without contrast negative negative  EEG negative for NCSE  Continue to optimize electrolytes  Aspiration/seizure precautions  Query related to toxic metabolic state from sepsis    Encephalopathy acute  Assessment & Plan  Multifactorial-suspect predominantly due to sepsis, improved  New onset seizure control with Ativan  CT  head negative  MRI without stroke or mass  EEG negative    Acute urinary retention  Assessment & Plan  Triana catheter  Triana/bladder protocols when clinically improved  Bladder scan as needed    Sepsis (HCC)  Assessment & Plan  GI source, post laparoscopy and washout with purulent material noted  Continue Zosyn, clinically improved  Hemodynamics improved, weaned off norepinephrine    Hypomagnesemia  Assessment & Plan  Replete    Tobacco abuse- (present on admission)  Assessment & Plan  Smoking cessation to be encouraged when clinically appropriate if patient still actively smoking  RT protocols    Thrombocytopenia (HCC)- (present on admission)  Assessment & Plan  No bleeding clinically at this time, serial CBC  Secondary to sepsis, question underlying chronic etiology, follow    Hepatoma (HCC)- (present on admission)  Assessment & Plan  History of hepatocellular carcinoma, presented 7/2 with abdominal pain  Worsening abdominal process 7/9, suspected ruptured hepatic cyst/mass-taken for laparotomy based on exam and imaging  Enteritis/colitis identified at laparotomy cultures obtained and antibiotics initiated  Empiric antibiotics -Zosyn through 7/15 for 7-day course, extend as needed    Type 2 diabetes mellitus without complication, without long-term current use of insulin (HCC)- (present on admission)  Assessment & Plan  Continue glycemic control      Updated plan:  Extubated 7/12, low-flow oxygen in no respiratory distress  Stop IV fluid, Lasix today  Continue antibiotics  Continue VTE PPx per surgery, follow thrombocytopenia  Okay to move to GSU today.  Discussed with hospitalist and surgery    VTE:  Lovenox  Ulcer: H2 Antagonist  Lines: Central Line  Ongoing indication addressed    I have performed a physical exam and reviewed and updated ROS and Plan today (7/13/2021). In review of yesterday's note (7/12/2021), there are no changes except as documented above.

## 2021-07-13 NOTE — DIETARY
"Nutrition Support Assessment     Nutrition services:   Day 11 of admit.  72 yo male with admitting diagnosis: abdominal pain    Current problem list:  1. Acute respiratory failure - extubated 7/12  2. Enteritis/colitis  3. Aspiration pneumonia  4. Normochromic anemia  5. Seizure  6. Acute encephalopathy  7. Urinary retention  8. Sepsis  9. Hypomagnesemia   10. Hepatoma  11. History of DM 2    Assessment:  Estimated Nutritional Needs: based on: height 5'11\", weight 89.2  kg, IBW 78.019 kg, BMI 27.43    Calculation/Equation MSJ x 1.2 = 2005 kcals  Calories/day: 2000 - 2200 kcals (22 - 25 kcals/kg)  Protein/day: 107 - 125 g pro (1.2 - 1.4 g/kg)    Evaluation:   1. diagnositic laparoscopy with washout and drain placement on 7/9.  2. Pt unable to safely/adequately take po diet  3. NPO/Clear liquids x 11 days.  Consult for TF to start received last night. Fibersource HN started per protocol @ 25 ml/hr.  4. Cortrak placed for enteral access - confirmation in pylorus, first portion of duodenum  5. Pt at risk for refeeding syndrome secondary to lengthy period without adequate nutrition. Monitor refeeding labs and replete as appropriate.     6. Prealbumin 4.8, CRP 12.05 - indicating inflammation.  7. Pt will benefit from moderate protein and omega 3 fatty acid containing TF formula.    Malnutrition Risk: pt with severe lack of energy intake for >/= 5 days. Does not meet any other malnutrition criteria.     Recommendations/Plan:  1. Change TF formula to Diabetisource @ 25 ml/hr and advance slowly by 10 ml q 12 hours to full goal  2. Diabetisource full goal 75 ml/hr will provide 2160 kcals, 108 g protein, 1462 ml H20/day  3. Po diet when appropriate    "

## 2021-07-13 NOTE — PROGRESS NOTES
Cortrak Placement    Tube Team verified patient name and medical record number prior to tube placement.  Cortrak tube (43 inches, 10 [place]) placed at 85 cm in right nare.  Per Cortrak picture, tube appears to be in the stomach.  Nursing Instructions: Awaiting KUB to confirm placement before use for medications or feeding. Once placement confirmed, flush tube with 30 ml of water, and then remove and save stylet, in patient medication drawer.

## 2021-07-13 NOTE — PROGRESS NOTES
Patient partially removed cortrak, readvanced tube to 70 cm and reverified with x-ray. Tube feeding started.

## 2021-07-13 NOTE — DISCHARGE PLANNING
Anticipated Discharge Disposition: SNF    Action:   Discussed in IDT rounds; pt is able to transfer to medical floor.  -Not medically cleared for transfer to SNF; anticipating a few more days.   -MARIANA drain in place; tolerating enteral nutrition.     SNF referral will need to be resent to [facility] with updated PT OT SLP evaluations.     Barriers to Discharge:   Medical clearance pending    Plan: HCM will continue to follow and coordinate transfer to SNF when medically cleared.

## 2021-07-13 NOTE — PROGRESS NOTES
"    DATE: 7/13/2021    Post Operative Day 4 Diagnostic laparoscopy with abdominal washout and external drainage    Interval Events:  Extubated.    PHYSICAL EXAMINATION:  Vital Signs: /74   Pulse (!) 108   Temp 37.1 °C (98.7 °F) (Temporal)   Resp (!) 35   Ht 1.803 m (5' 11\")   Wt 94 kg (207 lb 3.7 oz)   SpO2 97%     The abdomen is soft.  Drain with moderately purulent output.    ASSESSMENT AND PLAN:   Continue progress on postoperative day #4.  Recommend continued external drainage of infected peritoneal contents.    Appreciate ICU and consulting physician care.       ____________________________________     Kulwant Carbajal M.D.    DD: 7/13/2021  7:34 AM      "

## 2021-07-13 NOTE — ASSESSMENT & PLAN NOTE
EEG - Moderate background slowing suggestive of diffuse/multifocal cerebral dysfunction. No persistent focal asymmetries seen. No epileptiform discharges seen. No seizures. 7/10/2021

## 2021-07-14 PROBLEM — E46 PROTEIN-CALORIE MALNUTRITION (HCC): Status: ACTIVE | Noted: 2021-07-14

## 2021-07-14 LAB
BACTERIA BLD CULT: NORMAL
BACTERIA BLD CULT: NORMAL
MAGNESIUM SERPL-MCNC: 2.1 MG/DL (ref 1.5–2.5)
PHOSPHATE SERPL-MCNC: 2.3 MG/DL (ref 2.5–4.5)
POTASSIUM SERPL-SCNC: 4.2 MMOL/L (ref 3.6–5.5)
PROCALCITONIN SERPL-MCNC: 1.61 NG/ML
SIGNIFICANT IND 70042: NORMAL
SIGNIFICANT IND 70042: NORMAL
SITE SITE: NORMAL
SITE SITE: NORMAL
SOURCE SOURCE: NORMAL
SOURCE SOURCE: NORMAL

## 2021-07-14 PROCEDURE — 99233 SBSQ HOSP IP/OBS HIGH 50: CPT | Performed by: HOSPITALIST

## 2021-07-14 PROCEDURE — 700102 HCHG RX REV CODE 250 W/ 637 OVERRIDE(OP): Performed by: STUDENT IN AN ORGANIZED HEALTH CARE EDUCATION/TRAINING PROGRAM

## 2021-07-14 PROCEDURE — 97116 GAIT TRAINING THERAPY: CPT

## 2021-07-14 PROCEDURE — 700111 HCHG RX REV CODE 636 W/ 250 OVERRIDE (IP): Performed by: INTERNAL MEDICINE

## 2021-07-14 PROCEDURE — 700105 HCHG RX REV CODE 258: Performed by: INTERNAL MEDICINE

## 2021-07-14 PROCEDURE — 770006 HCHG ROOM/CARE - MED/SURG/GYN SEMI*

## 2021-07-14 PROCEDURE — 84100 ASSAY OF PHOSPHORUS: CPT

## 2021-07-14 PROCEDURE — 700102 HCHG RX REV CODE 250 W/ 637 OVERRIDE(OP): Performed by: HOSPITALIST

## 2021-07-14 PROCEDURE — 700102 HCHG RX REV CODE 250 W/ 637 OVERRIDE(OP): Performed by: INTERNAL MEDICINE

## 2021-07-14 PROCEDURE — 84145 PROCALCITONIN (PCT): CPT

## 2021-07-14 PROCEDURE — 92610 EVALUATE SWALLOWING FUNCTION: CPT

## 2021-07-14 PROCEDURE — A9270 NON-COVERED ITEM OR SERVICE: HCPCS | Performed by: HOSPITALIST

## 2021-07-14 PROCEDURE — A9270 NON-COVERED ITEM OR SERVICE: HCPCS | Performed by: STUDENT IN AN ORGANIZED HEALTH CARE EDUCATION/TRAINING PROGRAM

## 2021-07-14 PROCEDURE — 36415 COLL VENOUS BLD VENIPUNCTURE: CPT

## 2021-07-14 PROCEDURE — A9270 NON-COVERED ITEM OR SERVICE: HCPCS | Performed by: INTERNAL MEDICINE

## 2021-07-14 PROCEDURE — 97535 SELF CARE MNGMENT TRAINING: CPT

## 2021-07-14 PROCEDURE — 84132 ASSAY OF SERUM POTASSIUM: CPT

## 2021-07-14 PROCEDURE — 83735 ASSAY OF MAGNESIUM: CPT

## 2021-07-14 PROCEDURE — 97530 THERAPEUTIC ACTIVITIES: CPT

## 2021-07-14 RX ORDER — GAUZE BANDAGE 2" X 2"
100 BANDAGE TOPICAL DAILY
Status: DISCONTINUED | OUTPATIENT
Start: 2021-07-14 | End: 2021-07-23 | Stop reason: HOSPADM

## 2021-07-14 RX ADMIN — THIAMINE HCL TAB 100 MG 100 MG: 100 TAB at 13:16

## 2021-07-14 RX ADMIN — PIPERACILLIN AND TAZOBACTAM 4.5 G: 4; .5 INJECTION, POWDER, LYOPHILIZED, FOR SOLUTION INTRAVENOUS; PARENTERAL at 20:24

## 2021-07-14 RX ADMIN — DOCUSATE SODIUM 50 MG AND SENNOSIDES 8.6 MG 2 TABLET: 8.6; 5 TABLET, FILM COATED ORAL at 17:55

## 2021-07-14 RX ADMIN — PIPERACILLIN AND TAZOBACTAM 4.5 G: 4; .5 INJECTION, POWDER, LYOPHILIZED, FOR SOLUTION INTRAVENOUS; PARENTERAL at 05:00

## 2021-07-14 RX ADMIN — ENOXAPARIN SODIUM 40 MG: 40 INJECTION SUBCUTANEOUS at 05:14

## 2021-07-14 RX ADMIN — OXYCODONE HYDROCHLORIDE 10 MG: 10 TABLET ORAL at 14:55

## 2021-07-14 RX ADMIN — FAMOTIDINE 20 MG: 20 TABLET ORAL at 17:55

## 2021-07-14 RX ADMIN — PIPERACILLIN AND TAZOBACTAM 4.5 G: 4; .5 INJECTION, POWDER, LYOPHILIZED, FOR SOLUTION INTRAVENOUS; PARENTERAL at 13:17

## 2021-07-14 RX ADMIN — OXYCODONE 5 MG: 5 TABLET ORAL at 02:03

## 2021-07-14 RX ADMIN — FAMOTIDINE 20 MG: 10 INJECTION INTRAVENOUS at 05:14

## 2021-07-14 ASSESSMENT — COGNITIVE AND FUNCTIONAL STATUS - GENERAL
PERSONAL GROOMING: A LITTLE
DRESSING REGULAR UPPER BODY CLOTHING: A LITTLE
EATING MEALS: TOTAL
TOILETING: A LITTLE
CLIMB 3 TO 5 STEPS WITH RAILING: A LOT
SUGGESTED CMS G CODE MODIFIER MOBILITY: CK
MOVING FROM LYING ON BACK TO SITTING ON SIDE OF FLAT BED: A LITTLE
WALKING IN HOSPITAL ROOM: A LOT
WALKING IN HOSPITAL ROOM: A LITTLE
CLIMB 3 TO 5 STEPS WITH RAILING: A LOT
DRESSING REGULAR LOWER BODY CLOTHING: A LOT
DAILY ACTIVITIY SCORE: 20
STANDING UP FROM CHAIR USING ARMS: A LITTLE
DRESSING REGULAR LOWER BODY CLOTHING: A LOT
MOVING TO AND FROM BED TO CHAIR: A LITTLE
HELP NEEDED FOR BATHING: A LITTLE
SUGGESTED CMS G CODE MODIFIER MOBILITY: CK
SUGGESTED CMS G CODE MODIFIER DAILY ACTIVITY: CL
MOBILITY SCORE: 17
SUGGESTED CMS G CODE MODIFIER DAILY ACTIVITY: CJ
DAILY ACTIVITIY SCORE: 13
MOVING FROM LYING ON BACK TO SITTING ON SIDE OF FLAT BED: A LITTLE
TOILETING: A LOT
HELP NEEDED FOR BATHING: A LOT
STANDING UP FROM CHAIR USING ARMS: A LOT
MOVING TO AND FROM BED TO CHAIR: A LOT
MOBILITY SCORE: 16

## 2021-07-14 ASSESSMENT — PAIN DESCRIPTION - PAIN TYPE
TYPE: ACUTE PAIN

## 2021-07-14 ASSESSMENT — GAIT ASSESSMENTS
GAIT LEVEL OF ASSIST: MINIMAL ASSIST
DEVIATION: BRADYKINETIC
DISTANCE (FEET): 10
ASSISTIVE DEVICE: FRONT WHEEL WALKER

## 2021-07-14 NOTE — CARE PLAN
The patient is Stable - Low risk of patient condition declining or worsening    Shift Goals  Clinical Goals: pain control, rest, out of bed, speech eval  Patient Goals: Rest, speech eval      Progress made toward(s) clinical / shift goals:  FEES eval, pain control, rest       Problem: Pain - Standard  Goal: Alleviation of pain or a reduction in pain to the patient’s comfort goal  Outcome: Progressing     Problem: Fall Risk  Goal: Patient will remain free from falls  Outcome: Progressing     Problem: Skin Integrity  Goal: Skin integrity is maintained or improved  Outcome: Progressing

## 2021-07-14 NOTE — PROGRESS NOTES
Report received from RN, assumed care at 0700  Pt is A0X4, and responds appropriately   Pt declines any SOB, chest pain, new onset of numbness/ tingiling  Pt rates pain at 3/10, on a scale of 1-10, pt declines pain and pain medication needs at this time   Pt has condom catheter in place for incontinence   Pt has + flatus, + bowel sounds, + BM on 7/14/2021  Pt ambulates with a x1 assist and a FWW  Pt is NPO awaiting FEEs, pt denies any nausea/vomiting  Pt has midline abdominal MARIANA compressed to self suction, dressing changed this AM and is clean, dry and intact   Pt has LUQ abdomen, incision site, dressing is clean, dry and intact   Pt has right nare cortrak, running Diabetisource @ 45 ml/hr, goal is 75, orders to advance by 10 ml's every 12 hours next advancement will be at, 1800 to 55 ml/hr, pt tolerating tube feeding      Plan of care discussed, all questions answered. Explained importance of calling before getting OOB and pt verbalizes understanding. Explained importance of oral care. Call light is within reach, treaded slipper socks on, bed in lowest/ locked position, hourly rounding in place, all needs met at this time

## 2021-07-14 NOTE — PROGRESS NOTES
Bedside report received.  Assessment completed.  A&O x 4.   On 3L O2. Denies SOB.  Denies N/V and new numbness or tingling.  Patient ambulates with two assist. Bed alarm on.   Pain managed with prescribed medications.  Tolerating NPO diet.  Tube feed at 35ml/hr, will advance to 45ml/hr per order at 0600.  + void via condom cath, + flatus, last BM 7/13.  Surgical incisions: bre abd incision and MARIANA drain    Patient pulled out his cortrak around 10pm, but was accepting of having another put in.    Reviewed plan with of care with patient. Call light and personal belongings with in reach. Hourly rounding in place. All needs met at this time.

## 2021-07-14 NOTE — DISCHARGE PLANNING
Anticipated Discharge Disposition: SNF    Action: Patient discussed in IDT rounds. Per MD the patient has a MARIANA drain in place. The patient is not currently medically cleared.     RN LUDWIG received a call from patient's wife Sharlene to receive an update on discharge plan. Sharlene informed the patient is accepted by [facility].     Barriers to Discharge: medical clearance    Plan: Follow up with medical team.      Alert

## 2021-07-14 NOTE — THERAPY
"Speech Language Pathology   Clinical Swallow Evaluation     Patient Name: Lorraine Bella  AGE:  71 y.o., SEX:  male  Medical Record #: [id number]  Today's Date: 7/14/2021     Precautions  Precautions: (P) Fall Risk, Nasogastric Tube, Swallow Precautions ( See Comments)  Comments: (P) MARIANA drain to abdomen    Assessment    Patient is 71 y.o. male with a PMHx of hepatoma, on IV immunotherapy. Patient admitted with sudden onset of abdominal pain. CT of the abdomen revealed possible ruptured hepatic cyst. Underwent a laparoscopy with washout. Requried emergent intubation on 7/10 and had a reported seizure.  EEG negative for seizure but showed cerebral dysfunction.  MRI brain negative.  Patient was extubated 7/12. CXR 7/13 showed, \"Pulmonary edema and/or infiltrates are identified.\"     Patient seen for a clinical swallow evaluation on this date.  Patient pleasant and agreeable; oriented to self, location and has some understanding to reason for admission.  He was disoriented to temporal aspects and stated the date was September 6th.  The patient followed directives to the oral [facility] exam with xerostomia noted.  He demonstrated lingual and jaw weakness and reduced lingual coordination.  Inspection of the oral cavity revealed bright red to dried blood on the faucial arches bilaterally and soft palate.  Of note, the cortrak was re-inserted this morning and he was intubated x2 days.  Vocal quality was reduced in intensity but improved somewhat after initial ice chips.  Presentation of PO included ice chips, mildly thick liquids, purees, and thin liquids.  The patient presented with reflexive coughing with all consistencies consumed, excluding ice chips, multiple swallows (x3-5) per bite/sip and increased WOB which may be concerning for penetration/aspiration. He had delayed initiation of swallow trigger up to 5 seconds, weak laryngeal elevation upon palpation, and c/o odynophagia with all consistencies consumed.  " Patient reported purees to be very difficult to swallow and asked to discontinue PO trials of this texture.  Recommend the patient remain NPO with tube feeding.  Recommend a diagnostic evaluation to further assess pharyngeal swallowing function.  Okay for single ice chips x5-10 an hour to maintain the integrity of the swallow and reduce xerostomia.  Ice with nursing only and after oral care.  SLP following.     Plan    Recommend Speech Therapy 3 times per week until therapy goals are met for the following treatments:  Dysphagia Training and Patient / Family / Caregiver Education.    Discharge Recommendations: (P) Recommend post-acute placement for additional speech therapy services prior to discharge home    Subjective    Pleasant and agreeable     Objective       07/14/21 0855   Oral Motor Eval    Is Patient Able to Complete Oral Motor Eval Yes but Impaired   Labial Function   Labial Structure At Rest Within Functional Limits   Labial Vowel Production / I /, / U / Within Functional Limits   Labial Sequence / I /, / U / Within Functional Limits   Frown, Pucker Within Functional Limits   Lingual Function   Lingual Structure At Rest Moderate  (xerostomia)   Lingual Protrude Minimal   Elevate In Mouth Minimal   Elevate Outside Mouth Minimal   Lateralization No Impairment Right;No Impairment Left   Lick Lips (Circular) Minimal   / Pa / 5X's Within Functional Limits   / Ta / 5X's Within Functional Limits   / Ka / 5X's Within Functional Limits   / [place] / 5X's Within Functional Limits   Jaw   Jaw Structure At Rest Within Functional Limits   Jaw Open / Resist Minimal   Jaw Close / Resist Within Functional Limits   Velar Function   Velar Structure At Rest Within Functional Limits   / A / Prolonged Within Functional Limits   Laryngeal Function   Voice Quality Minimal   Volutional Cough Minimal   Excursion Upon Swallow Weak    Oral Food Presentation   Ice Chips Within Functional Limits   Single Swallow Mildly Thick (2) -  "(Nectar Thick)  Minimal   Single Swallow Thin (0) Minimal   Pureed (4) Minimal   Self Feeding Needs Assistance   Tracheostomy   Tracheostomy  No   Dysphagia Strategies / Recommendations   Strategies / Interventions Recommended (Yes / No) Yes   Compensatory Strategies To Be Assessed   Diet / Liquid Recommendation NPO;Pre-Feeding Trials with SLP Only   Medication Administration  Via Gastric Tube   Therapy Interventions Dysphagia Therapy By Speech Language Pathologist   Follow Up SLP Evaluation Diagnostic evaluation    Dysphagia Rating   Nutritional Liquid Intake Rating Scale Nothing by mouth   Nutritional Food Intake Rating Scale Nothing by mouth   Patient / Family Goals   Patient / Family Goal #1 \"I'm so thirsty\"   Short Term Goals   Short Term Goal # 1 The patient will consume prefeeding trials with SLP only, given no overt s/sx of aspiration         "

## 2021-07-14 NOTE — PROGRESS NOTES
Hospital Medicine Daily Progress Note    Date of Service  7/14/2021    Chief Complaint  Lorraine Bella is a 71 y.o. male admitted 7/2/2021 with abdominal pain    Hospital Course  This is a 71-year-old male patient with past medical history of hepatoma since 2018, on IV immunotherapy last dose 2 weeks ago follows with Dr. Birch presented on 7/2/2021 with sudden onset abdominal pain which woke him up from his sleep. Is associated with constipation and obstipation. In the ER her labs were significant for sodium 126, potassium 5.7, bilirubin of 2.3, lactate 2.7. Her CT of the abdomen showed possible ruptured hepatic cyst with hemoperitoneum. Surgery was consulted from the ER.   He underwent a laparoscopy with washout by Dr. Meza on 7/9 and was empirically initiated on IV Zosyn.   He required emergent intubation on 7/10 and had a reported seizure.    An EEG revealed cerebral dysfunction though no evidence of seizure activity. MRI brain was negative.  He was successfully extubated on 7/12.       Interval Problem Update  7/13: Mr. Bella was evaluated and examined in the ICU. He has been tolerating cortrak feeding.   His wife of 45 years Sharlene is at bedside and we discussed his condition. 40 ml out of MARIANA over night and 20 ml so far today. We discussed the likelihood of a SNF once he is medically stable.  Patient vital signs are stable.  Procalcitonin is trending downwards.  7/14: Patient was seen by me at bedside today.  He has no new complaints but states that he is generally weak.  Dietary suggested that patient may be developing refeeding syndrome and thiamine was started.  We will monitor potassium and phosphorus closely.  I have personally seen and examined the patient at bedside. I discussed the plan of care with bedside RN.    Consultants/Specialty  Surgery  Critical care. I discussed with Dr. Heller    Code Status  Full Code    Disposition  Patient is not medically cleared.   Anticipate discharge to to  skilled nursing facility.      Review of Systems  Review of Systems   Unable to perform ROS: Medical condition        Physical Exam  Temp:  [35.9 °C (96.7 °F)-37.2 °C (98.9 °F)] 36.1 °C (97 °F)  Pulse:  [] 98  Resp:  [14-19] 19  BP: (115-137)/(67-81) 123/80  SpO2:  [94 %-97 %] 97 %    Physical Exam  Vitals and nursing note reviewed.   Constitutional:       Appearance: He is ill-appearing.   HENT:      Head: Normocephalic and atraumatic.      Nose:      Comments: cortrak nares     Mouth/Throat:      Mouth: Mucous membranes are dry.      Pharynx: Oropharynx is clear.   Eyes:      General: No scleral icterus.     Conjunctiva/sclera: Conjunctivae normal.   Cardiovascular:      Rate and Rhythm: Normal rate and regular rhythm.      Heart sounds: No murmur heard.     Pulmonary:      Effort: Pulmonary effort is normal.      Breath sounds: Normal breath sounds.      Comments: 3 liters of oxygen  Abdominal:      General: There is distension.      Tenderness: There is no abdominal tenderness.      Comments: MARIANA drain    Musculoskeletal:      Right lower leg: Edema present.      Left lower leg: Edema present.      Comments: Bruise right arm   Skin:     General: Skin is warm.   Neurological:      General: No focal deficit present.      Mental Status: He is alert.   Psychiatric:      Comments: somnolent         Fluids    Intake/Output Summary (Last 24 hours) at 7/14/2021 1328  Last data filed at 7/14/2021 0800  Gross per 24 hour   Intake 1196.09 ml   Output 1795 ml   Net -598.91 ml       Laboratory  Recent Labs     07/12/21  0335 07/13/21 0413   WBC 5.3 6.0   RBC 3.34* 3.56*   HEMOGLOBIN 10.5* 11.2*   HEMATOCRIT 32.2* 34.7*   MCV 96.4 97.5   MCH 31.4 31.5   MCHC 32.6* 32.3*   RDW 57.4* 58.0*   PLATELETCT 57* 57*   MPV 9.2 8.9*     Recent Labs     07/12/21  0335 07/13/21  0413 07/14/21  0835   SODIUM 137 140  --    POTASSIUM 4.3 4.4 4.2   CHLORIDE 105 106  --    CO2 26 27  --    GLUCOSE 98 115*  --    BUN 31* 24*  --     CREATININE 0.70 0.60  --    CALCIUM 8.6 8.6  --              Recent Labs     07/13/21  0413   TRIGLYCERIDE 150*       Imaging  DX-ABDOMEN FOR TUBE PLACEMENT   Final Result         1.  Nonspecific bowel gas pattern.   2.  Dobbhoff tube tip overlying the expected location of the pylorus or first duodenal segment.   3.  Pulmonary edema and/or infiltrates      DX-CHEST-PORTABLE (1 VIEW)   Final Result         1.  Pulmonary edema and/or infiltrates are identified, which are somewhat decreased since the prior exam.      DX-ABDOMEN FOR TUBE PLACEMENT   Final Result         1.  Nonspecific bowel gas pattern.   2.  Dobbhoff tube tip overlying the expected location of the pylorus or first duodenal segment.      DX-ABDOMEN FOR TUBE PLACEMENT   Final Result      Enteric tube projects over the second portion of the duodenum.      DX-CHEST-PORTABLE (1 VIEW)   Final Result         1.  Pulmonary edema and/or infiltrates are identified, which are stable since the prior exam.      [id number] VIEW   Final Result      No dilated bowel      DX-CHEST-PORTABLE (1 VIEW)   Final Result         No significant interval change.         MR-BRAIN-WITH & W/O   Final Result      1.  No acute abnormality.   2.  Mild to moderate cerebral volume loss.   3.  Minimal chronic microvascular ischemic disease.      DX-CHEST-PORTABLE (1 VIEW)   Final Result      1.  Interval increasing bilateral airspace opacity suspicious for pneumonia most prominent in the right lower lobe.   2.  There is probably superimposed vascular congestion.      CT-HEAD W/O   Final Result         1. No acute intracranial abnormality. No evidence of acute intracranial hemorrhage or mass lesion.               DX-CHEST-PORTABLE (1 VIEW)   Final Result         Endotracheal tube with tip projecting over the mid thoracic trachea.      Gastric drainage tube courses below diaphragm, tip is in the stomach.      Right central venous catheter with tip projecting over the expected area of  the lower SVC.      CT-ABDOMEN-PELVIS W/O   Final Result      1.  New fluid distended small bowel loops which are nonspecific and could be related to enteritis or small bowel ileus. Developing obstruction is considered less likely though not excluded.   2.  There appears to be new wall thickening of the ascending colon likely representing infectious/inflammatory colitis.   3.  Mild ascites which is mildly increased from prior study. Omental and mesenteric infiltration which is new from prior and could be related to edema or inflammation.   4.  Small right pleural effusion. Bibasilar atelectasis.   5.   No other significant change.      DX-CHEST-PORTABLE (1 VIEW)   Final Result      1.  New right basilar airspace process consistent with atelectasis or pneumonia      2.  Mild left midlung zone atelectasis      [id number] VIEW   Final Result      1. Moderate stool mixed with contrast in the ascending colon.   2. Moderate stool in the transverse colon.   3. No bowel obstruction.   4. Other chronic degenerative changes.      CT-ABDOMEN-PELVIS W/O   Final Result      1.  There is no acute inflammatory process in the abdomen or pelvis.   2.  The treated lesions in the liver are again seen without interval change.   3.  Splenomegaly is again seen.   4.  Cystic pancreatic tail lesion is unchanged possibly a pseudocyst.   5.  There is colonic diverticulosis without diverticulitis.   6.  There is mild wall thickening of the distal esophagus.   7.  There is a stable small amount of ascites.   8.  There is moderate colonic stool.      DX-CHEST-PORTABLE (1 VIEW)   Final Result      Hypoinflation with bibasilar atelectasis. No focal consolidation or pleural effusions.      DX-CHEST-PORTABLE (1 VIEW)   Final Result      Left basilar atelectasis.      CT-ABDOMEN-PELVIS WITH   Final Result      1.  Primarily cystic mass in the medial segment LEFT lobe liver, likely treated hepatoma.   2.  Complex low-attenuation lesion in the  inferior aspect medial segment LEFT lobe liver which is new from prior exam and may represent cystic mass or focal hematoma related to rupture larger mass.  Minimal hyperdense components may indicate blood    products.  Abscess is felt less likely.   3.  Gallbladder is not visualized and may be compressed by or involved with liver mass.   4.  Small volume ascites with potential hemoperitoneum.   5.  Splenomegaly.      These findings were discussed with CARMEN OTTO on 7/2/2021 3:02 PM.      DX-CHEST-PORTABLE (1 VIEW)   Final Result      1.  Hypoinflation with minimal LEFT lung base atelectasis.   2.  No pneumonia or pulmonary edema.           Assessment/Plan  * Sepsis (HCC)  Assessment & Plan  This is Sepsis Present on admission  SIRS criteria identified on my evaluation include: Tachycardia, with heart rate greater than 90 BPM, Tachypnea, with respirations greater than 20 per minute and Leukocytosis, with WBC greater than 12,000  Source is unknown, possibly abdominal  Sepsis protocol initiated  Fluid resuscitation ordered per protocol  IV antibiotics as appropriate for source of sepsis  The organ system failure is the respiratory system as is evidenced by the need for mechanical ventilation     Protein-calorie malnutrition (HCC)  Assessment & Plan  Patient was NPO for 6 days  Start thiamine  Monitor for refeeding syndrome- watch potassium and phosphorus closely    Status post laparotomy enteritis/colitis noted with purulent secretions in the peritoneum- (present on admission)  Assessment & Plan  Status post laparoscopy by Dr. Meza on 7/9  IV zosyn  MARIANA drain remains in with purulent output  Fluid cultures grew Streptococcus anginosus       Acute respiratory failure with hypercapnia (HCC)  Assessment & Plan  On 3l of o2 above baseline  Encourage ISS and ambulation     Aspiration pneumonia (HCC)- (present on admission)  Assessment & Plan  IV Zosyn    New onset seizure (HCC)- (present on admission)  Assessment &  Plan  Witnessed seizure  MRI brain negative  EEG negative for epileptic activity    Encephalopathy acute  Assessment & Plan  Acute metabolic encephalopathy secondary to sepsis  Slowly improving    Acute urinary retention  Assessment & Plan  Triana was removed    Hypomagnesemia  Assessment & Plan  Patient's magnesium was 1.7. replacement given    Lactic acidosis- (present on admission)  Assessment & Plan  Lactic acid 2.7 at admission  Improved with IV fluids    Elevated bilirubin  Assessment & Plan  Bilirubin 2.3. Down to 1.7. improved  Continue to monitor  Trending down likely dehydration induced     Hyperkalemia- (present on admission)  Assessment & Plan  Potassium 5.7 at admission  Normalizing  IV fluids were given  resolved    Hyponatremia- (present on admission)  Assessment & Plan  Upon admit  resolved    Constipation  Assessment & Plan  Patient has had chronic issues with constipation.  Severe, not improving.  Reported last bowel movement was over a week ago.  Abdominal x-ray showed constipation in the large colon.    Order for CT abdomen pelvis without contrast, concern for increased distention, rule out pneumoperitoneum  Bowel regimen in place  Continue with suppository.  PRN enema.  If ineffective, digital rectal removal.    Tobacco abuse- (present on admission)  Assessment & Plan  Smoking cessation was given at bedside  Nicotine patch      Thrombocytopenia (HCC)- (present on admission)  Assessment & Plan   at admission and now down to 57  May be secondary to liver disease  Check in the next few days and possibly stop lovenox if <50        Hepatoma (HCC)- (present on admission)  Assessment & Plan  On immunotherapy by Dr. Birch.    Type 2 diabetes mellitus without complication, without long-term current use of insulin (HCC)- (present on admission)  Assessment & Plan  Borderline diabetes, not on medications  07/03/21 - HbA1c 6.0, controlled, prediabetes at this time.  ISS, accuchecks, hypoglycemia  protocol orders in place       VTE prophylaxis: enoxaparin ppx    I have performed a physical exam and reviewed and updated ROS and Plan today (7/14/2021).

## 2021-07-14 NOTE — PROGRESS NOTES
"    DATE: 7/14/2021    Post Operative Day  5 Diagnostic laparoscopy with abdominal washout and external drainage.    Interval Events:  Transferred out of the intensive care unit.    PHYSICAL EXAMINATION:  Vital Signs: /76   Pulse 95   Temp 36 °C (96.8 °F) (Temporal)   Resp 14   Ht 1.803 m (5' 11\")   Wt 94 kg (207 lb 3.7 oz)   SpO2 96%     The abdomen is slightly distended and nontender.  Drain remarkable for small volume output of purulent drainage.    Laboratory Values:   Recent Labs     07/12/21 0335 07/13/21 0413   WBC 5.3 6.0   RBC 3.34* 3.56*   HEMOGLOBIN 10.5* 11.2*   HEMATOCRIT 32.2* 34.7*   MCV 96.4 97.5   MCH 31.4 31.5   MCHC 32.6* 32.3*   RDW 57.4* 58.0*   PLATELETCT 57* 57*   MPV 9.2 8.9*     Recent Labs     07/12/21 0335 07/13/21 0413   SODIUM 137 140   POTASSIUM 4.3 4.4   CHLORIDE 105 106   CO2 26 27   GLUCOSE 98 115*   BUN 31* 24*   CREATININE 0.70 0.60   CALCIUM 8.6 8.6     Recent Labs     07/12/21 0335   ASTSGOT 25   ALTSGPT 8   TBILIRUBIN 1.3   ALKPHOSPHAT 235*   GLOBULIN 2.7       ASSESSMENT AND PLAN:     Satisfactory progress from a surgical perspective.  Continue external drainage.  Fees evaluation pending to assess safety of oral alimentation.     ____________________________________     Kulwant Carbajal M.D.    DD: 7/14/2021  7:57 AM      "

## 2021-07-14 NOTE — CARE PLAN
Problem: Pain - Standard  Goal: Alleviation of pain or a reduction in pain to the patient’s comfort goal  Outcome: Progressing     Problem: Bowel Elimination  Goal: Establish and maintain regular bowel function  Outcome: Progressing   The patient is Stable - Low risk of patient condition declining or worsening    Shift Goals  Clinical Goals: wean O2  Patient Goals: Rest  Family Goals: no family present    Progress made toward(s) clinical / shift goals:  patient was educated on pain interventions and had a BM within the last 24 hours

## 2021-07-14 NOTE — PROGRESS NOTES
Cortrak Placement    Tube Team verified patient name and medical record number prior to tube placement.  Cortrak tube (43 inches, 10 [place]) placed at 80 cm in right nare.  Per Cortrak picture, tube appears to be in the stomach.  Nursing Instructions: Awaiting KUB to confirm placement before use for medications or feeding. Once placement confirmed, flush tube with 30 ml of water, and then remove and save stylet, in patient medication drawer.

## 2021-07-14 NOTE — THERAPY
Physical Therapy   Daily Treatment     Patient Name: Lorraine Bella  Age:  71 y.o., Sex:  male  Medical Record #: [id number]  Today's Date: 7/14/2021     Precautions: Fall Risk, Swallow Precautions ( See Comments), Nasogastric Tube    Assessment    The pt demonstrated significant improvement in mobility today from the last session.  He was able to perform bed mobility Andrea for trunk upright, and STS from EOB Andrea w/ FWW in front.  He was also able to walk to the bathroom Andrea for stability using FWW, and then back to his chair for a total of about 20'.  Provided edu on LAQ's and ankle pumps to perform seated to maintain LE strength for gait stability.  Recommend placement given current functional limitations and inability to care for self.  Will follow.      Plan    Continue current treatment plan.    DC Equipment Recommendations: Front-Wheel Walker  Discharge Recommendations: Recommend post-acute placement for additional physical therapy services prior to discharge home      Subjective/Objective       07/14/21 1201   Cognition    Cognition / Consciousness WDL   Level of Consciousness Alert   Comments Pt pleasant and cooperative.   Active ROM Lower Body    Active ROM Lower Body  WDL   Strength Lower Body   Lower Body Strength  WDL   Sensation Lower Body   Lower Extremity Sensation   WDL   Comments reports no numbness/tingling in LE's   Sitting Lower Body Exercises   Sitting Lower Body Exercises Yes   Ankle Pumps 1 set of 10;Bilateral   Long Arc Quad 1 set of 10;Bilateral   Other Treatments   Other Treatments Provided Pt provided edu regarding pulmonary hygeine   Balance   Sitting Balance (Static) Fair   Sitting Balance (Dynamic) Fair -   Standing Balance (Static) Fair -   Standing Balance (Dynamic) Poor +   Weight Shift Sitting Fair   Weight Shift Standing Poor   Skilled Intervention Verbal Cuing;Tactile Cuing;Sequencing;Facilitation   Comments stand w/ FWW   Gait Analysis   Gait Level Of Assist Minimal  Assist   Assistive Device Front Wheel Walker   Distance (Feet) 10   # of Times Distance was Traveled 2   Deviation Bradykinetic   Weight Bearing Status FWB BLE   Skilled Intervention Verbal Cuing;Tactile Cuing;Sequencing;Facilitation   Comments EOB->toilet->chair   Bed Mobility    Supine to Sit Minimal Assist   Sit to Supine   (pt position seated in chair end of tx session)   Scooting Minimal Assist   Rolling Minimum Assist to Lt.   Skilled Intervention Verbal Cuing;Tactile Cuing;Sequencing;Facilitation   Comments HOB elevated   Functional Mobility   Sit to Stand Minimal Assist   Bed, Chair, Wheelchair Transfer Minimal Assist   Transfer Method Stand Step   Mobility EOB->toilet->chair   Skilled Intervention Verbal Cuing;Tactile Cuing;Sequencing;Facilitation   Comments FWW   Activity Tolerance   Sitting in Chair >20min   Sitting Edge of Bed 15min   Standing 8min   Patient / Family Goals    Patient / Family Goal #1 to go home   Goal #1 Outcome Goal not met   Short Term Goals    Short Term Goal # 1 pt will be able to complete bed mobility from flat bed with mod assist in 6tx in order to progres with therapy   Goal Outcome # 1 goal not met  (not assessed w/ HOB flat)   Short Term Goal # 2 pt will be able to complete functional transfers with mod assist in 6tx in order to increase OOB time   Goal Outcome # 2 Goal met   Short Term Goal # 3 pt will be able to ambulate 15ft with FWW and min assist in 6tx in order to progress back to baseline   Goal Outcome # 3 Goal not met   Education Group   Education Provided Role of Physical Therapist;Exercises - Seated   Role of Physical Therapist Patient Response Patient;Acceptance;Explanation;Demonstration;Verbal Demonstration;Action Demonstration   Use of Assistive Device Patient Response Patient;Acceptance;Explanation;Demonstration;Verbal Demonstration;Action Demonstration   Exercises - Seated Patient Response Patient;Acceptance;Explanation;Demonstration;Verbal Demonstration;Action  Demonstration   Additional Comments pt receptive of edu provided

## 2021-07-14 NOTE — PROGRESS NOTES
2 RN skin check completed with YASMIN Ulrich    Surgical incision sites: midline abdominal MARIANA drain, abd incison, subclavian IV removal site  Abnormalities noted: tunneled holes to coccyx and L shoulder, bilateral lower extremity edema, diffuse bruising and swelling to LUE    Devices in place: SpO2 probe, PIV x2, silicone NC, SCDs, R cortrak  Skin assessed under all devices.    Preventative measures in place: pillows to support repositioning, mobilization, mepilex to sacrum and L shoulder

## 2021-07-15 LAB
ALBUMIN SERPL BCP-MCNC: 2.2 G/DL (ref 3.2–4.9)
ALBUMIN SERPL BCP-MCNC: 2.3 G/DL (ref 3.2–4.9)
ALBUMIN/GLOB SERPL: 0.6 G/DL
ALBUMIN/GLOB SERPL: 0.6 G/DL
ALP SERPL-CCNC: 223 U/L (ref 30–99)
ALP SERPL-CCNC: 256 U/L (ref 30–99)
ALT SERPL-CCNC: 7 U/L (ref 2–50)
ALT SERPL-CCNC: 8 U/L (ref 2–50)
ANION GAP SERPL CALC-SCNC: 6 MMOL/L (ref 7–16)
ANION GAP SERPL CALC-SCNC: 7 MMOL/L (ref 7–16)
AST SERPL-CCNC: 21 U/L (ref 12–45)
AST SERPL-CCNC: 22 U/L (ref 12–45)
BILIRUB SERPL-MCNC: 1.2 MG/DL (ref 0.1–1.5)
BILIRUB SERPL-MCNC: 1.6 MG/DL (ref 0.1–1.5)
BUN SERPL-MCNC: 21 MG/DL (ref 8–22)
BUN SERPL-MCNC: 23 MG/DL (ref 8–22)
CALCIUM SERPL-MCNC: 8.4 MG/DL (ref 8.5–10.5)
CALCIUM SERPL-MCNC: 8.5 MG/DL (ref 8.5–10.5)
CHLORIDE SERPL-SCNC: 105 MMOL/L (ref 96–112)
CHLORIDE SERPL-SCNC: 106 MMOL/L (ref 96–112)
CO2 SERPL-SCNC: 29 MMOL/L (ref 20–33)
CO2 SERPL-SCNC: 30 MMOL/L (ref 20–33)
CREAT SERPL-MCNC: 0.64 MG/DL (ref 0.5–1.4)
CREAT SERPL-MCNC: 0.74 MG/DL (ref 0.5–1.4)
GLOBULIN SER CALC-MCNC: 3.7 G/DL (ref 1.9–3.5)
GLOBULIN SER CALC-MCNC: 3.8 G/DL (ref 1.9–3.5)
GLUCOSE SERPL-MCNC: 111 MG/DL (ref 65–99)
GLUCOSE SERPL-MCNC: 148 MG/DL (ref 65–99)
MAGNESIUM SERPL-MCNC: 2.1 MG/DL (ref 1.5–2.5)
PHOSPHATE SERPL-MCNC: 2.3 MG/DL (ref 2.5–4.5)
PHOSPHATE SERPL-MCNC: 2.5 MG/DL (ref 2.5–4.5)
POTASSIUM SERPL-SCNC: 4.2 MMOL/L (ref 3.6–5.5)
POTASSIUM SERPL-SCNC: 4.4 MMOL/L (ref 3.6–5.5)
PROT SERPL-MCNC: 6 G/DL (ref 6–8.2)
PROT SERPL-MCNC: 6 G/DL (ref 6–8.2)
SODIUM SERPL-SCNC: 141 MMOL/L (ref 135–145)
SODIUM SERPL-SCNC: 142 MMOL/L (ref 135–145)

## 2021-07-15 PROCEDURE — A9270 NON-COVERED ITEM OR SERVICE: HCPCS | Performed by: HOSPITALIST

## 2021-07-15 PROCEDURE — 36415 COLL VENOUS BLD VENIPUNCTURE: CPT

## 2021-07-15 PROCEDURE — 80053 COMPREHEN METABOLIC PANEL: CPT

## 2021-07-15 PROCEDURE — 700102 HCHG RX REV CODE 250 W/ 637 OVERRIDE(OP): Performed by: INTERNAL MEDICINE

## 2021-07-15 PROCEDURE — 99233 SBSQ HOSP IP/OBS HIGH 50: CPT | Performed by: HOSPITALIST

## 2021-07-15 PROCEDURE — A9270 NON-COVERED ITEM OR SERVICE: HCPCS | Performed by: INTERNAL MEDICINE

## 2021-07-15 PROCEDURE — 700102 HCHG RX REV CODE 250 W/ 637 OVERRIDE(OP): Performed by: HOSPITALIST

## 2021-07-15 PROCEDURE — 83735 ASSAY OF MAGNESIUM: CPT

## 2021-07-15 PROCEDURE — 700101 HCHG RX REV CODE 250: Performed by: HOSPITALIST

## 2021-07-15 PROCEDURE — 700111 HCHG RX REV CODE 636 W/ 250 OVERRIDE (IP): Performed by: INTERNAL MEDICINE

## 2021-07-15 PROCEDURE — 92612 ENDOSCOPY SWALLOW (FEES) VID: CPT

## 2021-07-15 PROCEDURE — 770006 HCHG ROOM/CARE - MED/SURG/GYN SEMI*

## 2021-07-15 PROCEDURE — 700105 HCHG RX REV CODE 258: Performed by: INTERNAL MEDICINE

## 2021-07-15 PROCEDURE — 84100 ASSAY OF PHOSPHORUS: CPT

## 2021-07-15 PROCEDURE — 700105 HCHG RX REV CODE 258: Performed by: HOSPITALIST

## 2021-07-15 RX ADMIN — THIAMINE HCL TAB 100 MG 100 MG: 100 TAB at 05:35

## 2021-07-15 RX ADMIN — FAMOTIDINE 20 MG: 20 TABLET ORAL at 05:35

## 2021-07-15 RX ADMIN — PIPERACILLIN AND TAZOBACTAM 4.5 G: 4; .5 INJECTION, POWDER, LYOPHILIZED, FOR SOLUTION INTRAVENOUS; PARENTERAL at 05:35

## 2021-07-15 RX ADMIN — FAMOTIDINE 20 MG: 10 INJECTION INTRAVENOUS at 19:16

## 2021-07-15 RX ADMIN — POTASSIUM PHOSPHATE, MONOBASIC AND POTASSIUM PHOSPHATE, DIBASIC 15 MMOL: 224; 236 INJECTION, SOLUTION, CONCENTRATE INTRAVENOUS at 15:42

## 2021-07-15 NOTE — PROGRESS NOTES
Bedside report received.  Assessment completed.  A&O x 4.   On 1L O2. Denies SOB.  Denies N/V and new numbness or tingling.  Patient ambulates with one assist. Bed alarm on.   Pain managed with prescribed medications.  Tolerating NPO diet.  Tube feed at 45ml/hr, will advance to 55ml/hr per order at 0600.  + void, + flatus, last BM 7/13.  Surgical incisions: miline abd incision and MARIANA drain     Reviewed plan with of care with patient. Call light and personal belongings with in reach. Hourly rounding in place. All needs met at this time.

## 2021-07-15 NOTE — DISCHARGE PLANNING
Care Transition Team Discharge Planning    Anticipates discharge disposition:  • SNF    Action:  • This RN CM is following the case. Met with Pt and his wife, Sharlene Bella at bedside.   • Pt and his wife Sharlene live together in a one story house in [place]. Pt denies use of any DME prior to this hospitalization. Pt still has NG tube for tube feeds, on NPO for now, Pt to be seen by Speech Therapy again tomorrow.    Pt still on IV antibiotics. Pt is still coughing a lot.   Informed Dahiana Greene at [facility] of this update .    Barriers to Discharge:  • Pending medical clearance  • Pt still has NG Tube  • Transport to SNF    Plan:  • CM to continue to assist Pt with discharge as needed

## 2021-07-15 NOTE — CARE PLAN
Problem: Fluid Volume  Goal: Fluid volume balance will be maintained  Outcome: Progressing     Problem: Knowledge Deficit - Standard  Goal: Patient and family/care givers will demonstrate understanding of plan of care, disease process/condition, diagnostic tests and medications  Outcome: Progressing   The patient is Stable - Low risk of patient condition declining or worsening    Shift Goals  Clinical Goals: wean O2  Patient Goals: Rest  Family Goals: no family present    Progress made toward(s) clinical / shift goals:  patient is on 1L O2 and verbalizes plan of care

## 2021-07-15 NOTE — PROGRESS NOTES
Speech notified of patient coughing consistantly since assessment with FEES. Per speech hold PO food at this time, continue tube feed, they will see him tomorrow.     1435 RT asked to round on patient due to persistent coughing.     MD notified of patients new cough and nursing interventions and speech therapy's opinion.

## 2021-07-15 NOTE — THERAPY
Speech Language Pathology   Fiberoptic Endoscopic Evaluation of Swallow     Patient Name: Lorraine Bella  AGE:  71 y.o., SEX:  male  Medical Record #: [id number]  Today's Date: 7/15/2021     Precautions  Precautions: (P) Fall Risk, Swallow Precautions ( See Comments), Nasogastric Tube  Comments: MARIANA drain    Assessment    Patient seen for a FEES on this date.  Discussed with the patient the risks, benefits, and alternatives of the FEES procedure. Patient acknowledges and agreeable to proceed with the procedure and tolerated well.  Upon insertion of the scope, the vocal folds were symmetrical and complete vocal fold adduction was achieved during cough and breath hold.  Bilateral space occupying lesions noted on the posterior vocal folds which were red in color.  A white space occupying lesion noted in the trachea on the back tracheal wall.  Suspect both lesions to be granulation tissue from the ET tube.  Presentation of PO included ice chips, mildly thick liquids, puree, minced and moist, soft solids, and thin liquids.   The patient presented with mild-moderate pharyngeal dysphagia as seen by reduced sensation, impaired tongue base retraction, and weak pharyngeal constriction.  Patient with premature spillage to the valleculae with all consistencies consumed.  Penetration was suspected during the swallow as seen by blue in the laryngeal vestibule with thin liquids.  Patient with high penetration to the tip of the epiglottis with minced and moist textures and had penetration between the arytenoids with thins.  Unable to r/o aspiration of thins.  Severe vallecular residue noted with purees, soft solids, and oatmeal but residue was reduced with sips of thickened liquids. Recommend initiation of a Liquidised diet with Mildly Thickened Liquids.  Patient to be >70 degrees for all meals.  Alternate bites and sips to reduce pharyngeal residue.  1:1 feeding assistance as needed but DIRECT supervision during meals. Crush  meds in puree.  Follow with a thickened liquid wash.  There are concerns that the patient may not meet his nutritional needs.  Please do not pull the Cortak until the patient is consistently consuming adequate PO intake as determined by the Dietitian.  SLP following for continued assessment of swallow function and dysphagia exercises targeting tongue base retraction and pharyngeal constriction.        Plan    Recommend Speech Therapy 3 times per week until therapy goals are met for the following treatments:  Dysphagia Training and Patient / Family / Caregiver Education.    Discharge Recommendations: (P) Recommend post-acute placement for additional speech therapy services prior to discharge home    Subjective    Pleasant and agreeable     Objective       07/15/21 1150   FEES Evaluation Prior To Procedure   Onset Date Of Dysphagia 07/14/21   Dysphagia Symptoms Warranting Video Swallow Prolonged NPO status, intubated, r/o aspiration   Procedure Performed While Patient Sitting In Bed   Seated at (Degrees) 80   A Flexible Endoscope Was Introduced Transnasally In The Left Nare   FEES  Anatomy Assessment   Anatomy For A Functional Swallow: Other (Comments)  (Functional)   FEES Laryngeal Adduction Assessment   Breath Holding Adequate   Clearing Throat Adequate   Cough Adequate   Phonation Adequate   Onset Of Swallow Adequate   FEES Velopharyngeal Assessment    Velopharyngeal Closure: Adequate   FEES Voice Assessment    Voice: Other (Comments)  (Clear)   FEES Sensation Assessment    Presence of Scope Adequate   Presence of Residue Inadequate   Presence of Penetration   (Inconsistent response)   Presence of Aspiration No Aspiration   FEES Swallow Function Assessment   Swallow Trigger Level of the Valleculae   Base of Tongue Retraction Impaired   Pharyngeal Constrictor Movement Impaired   White Out Phase Complete White Out   Cricopharyngeal Function Functional   Swallow Observations / Symptoms   Swallow Observations /  Symptoms Yes   Vallecular Residue Mildly Thick (2) - (Nectar Thick);Thin (0);Pureed (4);Minced & Moist (5) - (Dysphagia II), Soft Solids (Dysphagia III)   Mildly Thick (2) - (Nectar Thick) Teaspoon;Cup;Straw   Thin (0) Teaspoon;Cup   Pyriform Sinus Residue Minced & Moist (5) - (Dysphagia II);Pureed (4)   Posterior Pharyngeal Wall Residue Thin (0);Pureed (4);Minced & Moist (5) - (Dysphagia II)   Thin (0) Teaspoon;Cup   Penetration Suspected During the Swallow Thin (0)   Thin (0) Teaspoon;Cup   Penetration After the Swallow Minced & Moist (5) - (Dysphagia II), Thin (0)   Compensatory Strategies Attempted   Compensatory Strategies Attempted Yes   Multiple Swallows Minimally reduced pharyngeal residue    Liquid Wash After Swallow Thickened liquid wash reduced puree, soft solid and MM residue   Patient Ability To Protect Airway   Patient Ability To Protect Airway  Adequate   Penetration Aspiration Scale   Penetration Aspiration Scale 3 - Material remains above vocal folds, visible stasis remains   Denae Pharyngeal Residue Severity   Vallecular Residue Severe, filled to epiglottic rim   Pyriform Sinus Residue  Mild, up wall to ¼ full   Dysphagia Rating   Nutritional Liquid Intake Rating Scale Thickened beverages (mildly thick unless otherwise specified)   Nutritional Food Intake Rating Scale Total oral diet of a single consistency   Problem List   Problem List Dysphagia   Evaluation Recommendations   Swallow Evaluations Recommendations (Yes / No) Yes   Diet / Liquid Recommendation Liquidised (3) - (Nectar Thick Full Liquid);Mildly Thick (2) - (Nectar Thick)   Medication Administration  Crush all Medications in Puree  (follow with a thickened liquid wash)   Evaluation Recommended Techniques   Swallow Techniques Yes   Techniques Pharyngeal Phase Thicken Liquids For Better Control Of Bolus To Consistency Of Nectar;Repeat Swallow 2-3 Times On Each Bolus To Clear Residue;Alternate Liquids / Solids Consistency To Wash Foods  "Through Pharynx   Patient / Family Goals   Patient / Family Goal #1 \"I'm so thirsty\"   Goal #1 Outcome Progressing as expected   Short Term Goals   Short Term Goal # 1 The patient will consume prefeeding trials with SLP only, given no overt s/sx of aspiration   Goal Outcome # 1 Goal met, new goal added   Short Term Goal # 1 B  Pt will consume LQ3/MT2 diet with no overt s/sx of aspiration, given min cues to swallowing stregies.   Short Term Goal # 2 Patient will complete effortful swallow x30, given min cues   Short Term Goal # 3 Pt will complete TBR exercises x30, given min cues          "

## 2021-07-15 NOTE — PROGRESS NOTES
Hospital Medicine Daily Progress Note    Date of Service  7/15/2021    Chief Complaint  Lorraine Bella is a 71 y.o. male admitted 7/2/2021 with abdominal pain    Hospital Course  This is a 71-year-old male patient with past medical history of hepatoma since 2018, on IV immunotherapy last dose 2 weeks ago follows with Dr. Birch presented on 7/2/2021 with sudden onset abdominal pain which woke him up from his sleep. Is associated with constipation and obstipation. In the ER her labs were significant for sodium 126, potassium 5.7, bilirubin of 2.3, lactate 2.7. Her CT of the abdomen showed possible ruptured hepatic cyst with hemoperitoneum. Surgery was consulted from the ER.   He underwent a laparoscopy with washout by Dr. Meza on 7/9 and was empirically initiated on IV Zosyn.   He required emergent intubation on 7/10 and had a reported seizure.    An EEG revealed cerebral dysfunction though no evidence of seizure activity. MRI brain was negative.  He was successfully extubated on 7/12.       Interval Problem Update  7/13: Mr. Bella was evaluated and examined in the ICU. He has been tolerating cortrak feeding.   His wife of 45 years Sharlene is at bedside and we discussed his condition. 40 ml out of MARIANA over night and 20 ml so far today. We discussed the likelihood of a SNF once he is medically stable.  Patient vital signs are stable.  Procalcitonin is trending downwards.  7/14: Patient was seen by me at bedside today.  He has no new complaints but states that he is generally weak.  Dietary suggested that patient may be developing refeeding syndrome and thiamine was started.  We will monitor potassium and phosphorus closely.  7/15: Patient underwent a fees and dietary recommended to start a thickened liquid diet.  Surgery recommended continue external drain.  SNF placement is pending.  I have personally seen and examined the patient at bedside. I discussed the plan of care with bedside  RN.    Consultants/Specialty  Surgery  Critical care. I discussed with Dr. Heller    Code Status  Full Code    Disposition  Patient is not medically cleared.   Anticipate discharge to to skilled nursing facility.      Review of Systems  Review of Systems   Unable to perform ROS: Medical condition        Physical Exam  Temp:  [36 °C (96.8 °F)-36.4 °C (97.6 °F)] 36.4 °C (97.6 °F)  Pulse:  [91-98] 98  Resp:  [16-18] 17  BP: (123-128)/(72-88) 123/72  SpO2:  [93 %-97 %] 93 %    Physical Exam  Vitals and nursing note reviewed.   Constitutional:       Appearance: He is ill-appearing.   HENT:      Head: Normocephalic and atraumatic.      Nose:      Comments: cortrak nares     Mouth/Throat:      Mouth: Mucous membranes are dry.      Pharynx: Oropharynx is clear.   Eyes:      General: No scleral icterus.     Conjunctiva/sclera: Conjunctivae normal.   Cardiovascular:      Rate and Rhythm: Normal rate and regular rhythm.      Heart sounds: No murmur heard.     Pulmonary:      Effort: Pulmonary effort is normal.      Breath sounds: Normal breath sounds.      Comments: 3 liters of oxygen  Abdominal:      General: There is distension.      Tenderness: There is no abdominal tenderness.      Comments: MARIANA drain    Musculoskeletal:      Right lower leg: Edema present.      Left lower leg: Edema present.      Comments: Bruise right arm   Skin:     General: Skin is warm.   Neurological:      General: No focal deficit present.      Mental Status: He is alert.   Psychiatric:      Comments: somnolent         Fluids    Intake/Output Summary (Last 24 hours) at 7/15/2021 1214  Last data filed at 7/15/2021 0728  Gross per 24 hour   Intake 270 ml   Output 565 ml   Net -295 ml       Laboratory  Recent Labs     07/13/21 0413   WBC 6.0   RBC 3.56*   HEMOGLOBIN 11.2*   HEMATOCRIT 34.7*   MCV 97.5   MCH 31.5   MCHC 32.3*   RDW 58.0*   PLATELETCT 57*   MPV 8.9*     Recent Labs     07/13/21  0413 07/13/21  0413 07/14/21  0835 07/15/21  0336  07/15/21  0903   SODIUM 140  --   --  142 141   POTASSIUM 4.4   < > 4.2 4.4 4.2   CHLORIDE 106  --   --  106 105   CO2 27  --   --  30 29   GLUCOSE 115*  --   --  111* 148*   BUN 24*  --   --  21 23*   CREATININE 0.60  --   --  0.74 0.64   CALCIUM 8.6  --   --  8.4* 8.5    < > = values in this interval not displayed.             Recent Labs     07/13/21  0413   TRIGLYCERIDE 150*       Imaging  DX-ABDOMEN FOR TUBE PLACEMENT   Final Result         1.  Nonspecific bowel gas pattern.   2.  Dobbhoff tube tip overlying the expected location of the pylorus or first duodenal segment.   3.  Pulmonary edema and/or infiltrates      DX-CHEST-PORTABLE (1 VIEW)   Final Result         1.  Pulmonary edema and/or infiltrates are identified, which are somewhat decreased since the prior exam.      DX-ABDOMEN FOR TUBE PLACEMENT   Final Result         1.  Nonspecific bowel gas pattern.   2.  Dobbhoff tube tip overlying the expected location of the pylorus or first duodenal segment.      DX-ABDOMEN FOR TUBE PLACEMENT   Final Result      Enteric tube projects over the second portion of the duodenum.      DX-CHEST-PORTABLE (1 VIEW)   Final Result         1.  Pulmonary edema and/or infiltrates are identified, which are stable since the prior exam.      [id number] VIEW   Final Result      No dilated bowel      DX-CHEST-PORTABLE (1 VIEW)   Final Result         No significant interval change.         MR-BRAIN-WITH & W/O   Final Result      1.  No acute abnormality.   2.  Mild to moderate cerebral volume loss.   3.  Minimal chronic microvascular ischemic disease.      DX-CHEST-PORTABLE (1 VIEW)   Final Result      1.  Interval increasing bilateral airspace opacity suspicious for pneumonia most prominent in the right lower lobe.   2.  There is probably superimposed vascular congestion.      CT-HEAD W/O   Final Result         1. No acute intracranial abnormality. No evidence of acute intracranial hemorrhage or mass lesion.                DX-CHEST-PORTABLE (1 VIEW)   Final Result         Endotracheal tube with tip projecting over the mid thoracic trachea.      Gastric drainage tube courses below diaphragm, tip is in the stomach.      Right central venous catheter with tip projecting over the expected area of the lower SVC.      CT-ABDOMEN-PELVIS W/O   Final Result      1.  New fluid distended small bowel loops which are nonspecific and could be related to enteritis or small bowel ileus. Developing obstruction is considered less likely though not excluded.   2.  There appears to be new wall thickening of the ascending colon likely representing infectious/inflammatory colitis.   3.  Mild ascites which is mildly increased from prior study. Omental and mesenteric infiltration which is new from prior and could be related to edema or inflammation.   4.  Small right pleural effusion. Bibasilar atelectasis.   5.   No other significant change.      DX-CHEST-PORTABLE (1 VIEW)   Final Result      1.  New right basilar airspace process consistent with atelectasis or pneumonia      2.  Mild left midlung zone atelectasis      [id number] VIEW   Final Result      1. Moderate stool mixed with contrast in the ascending colon.   2. Moderate stool in the transverse colon.   3. No bowel obstruction.   4. Other chronic degenerative changes.      CT-ABDOMEN-PELVIS W/O   Final Result      1.  There is no acute inflammatory process in the abdomen or pelvis.   2.  The treated lesions in the liver are again seen without interval change.   3.  Splenomegaly is again seen.   4.  Cystic pancreatic tail lesion is unchanged possibly a pseudocyst.   5.  There is colonic diverticulosis without diverticulitis.   6.  There is mild wall thickening of the distal esophagus.   7.  There is a stable small amount of ascites.   8.  There is moderate colonic stool.      DX-CHEST-PORTABLE (1 VIEW)   Final Result      Hypoinflation with bibasilar atelectasis. No focal consolidation or pleural  effusions.      DX-CHEST-PORTABLE (1 VIEW)   Final Result      Left basilar atelectasis.      CT-ABDOMEN-PELVIS WITH   Final Result      1.  Primarily cystic mass in the medial segment LEFT lobe liver, likely treated hepatoma.   2.  Complex low-attenuation lesion in the inferior aspect medial segment LEFT lobe liver which is new from prior exam and may represent cystic mass or focal hematoma related to rupture larger mass.  Minimal hyperdense components may indicate blood    products.  Abscess is felt less likely.   3.  Gallbladder is not visualized and may be compressed by or involved with liver mass.   4.  Small volume ascites with potential hemoperitoneum.   5.  Splenomegaly.      These findings were discussed with CARMEN OTTO on 7/2/2021 3:02 PM.      DX-CHEST-PORTABLE (1 VIEW)   Final Result      1.  Hypoinflation with minimal LEFT lung base atelectasis.   2.  No pneumonia or pulmonary edema.           Assessment/Plan  * Sepsis (HCC)  Assessment & Plan  This is Sepsis Present on admission  SIRS criteria identified on my evaluation include: Tachycardia, with heart rate greater than 90 BPM, Tachypnea, with respirations greater than 20 per minute and Leukocytosis, with WBC greater than 12,000  Source is unknown, possibly abdominal  Sepsis protocol initiated  Fluid resuscitation ordered per protocol  IV antibiotics as appropriate for source of sepsis  The organ system failure is the respiratory system as is evidenced by the need for mechanical ventilation     Protein-calorie malnutrition (HCC)  Assessment & Plan  Patient was NPO for 6 days  Start thiamine  Monitor for refeeding syndrome- watch potassium and phosphorus closely    Status post laparotomy enteritis/colitis noted with purulent secretions in the peritoneum- (present on admission)  Assessment & Plan  Status post laparoscopy by Dr. Meza on 7/9  IV zosyn  MARIANA drain remains in with purulent output  Fluid cultures grew Streptococcus anginosus       Acute  respiratory failure with hypercapnia (HCC)  Assessment & Plan  On 3l of o2 above baseline  Encourage ISS and ambulation     Aspiration pneumonia (HCC)- (present on admission)  Assessment & Plan  IV Zosyn    New onset seizure (HCC)- (present on admission)  Assessment & Plan  Witnessed seizure  MRI brain negative  EEG negative for epileptic activity    Encephalopathy acute  Assessment & Plan  Acute metabolic encephalopathy secondary to sepsis  Slowly improving    Acute urinary retention  Assessment & Plan  Triana was removed    Hypomagnesemia  Assessment & Plan  Patient's magnesium was 1.7. replacement given    Lactic acidosis- (present on admission)  Assessment & Plan  Lactic acid 2.7 at admission  Improved with IV fluids    Elevated bilirubin  Assessment & Plan  Bilirubin 2.3. Down to 1.7. improved  Continue to monitor  Trending down likely dehydration induced     Hyperkalemia- (present on admission)  Assessment & Plan  Potassium 5.7 at admission  Normalizing  IV fluids were given  resolved    Hyponatremia- (present on admission)  Assessment & Plan  Upon admit  resolved    Constipation  Assessment & Plan  Patient has had chronic issues with constipation.  Severe, not improving.  Reported last bowel movement was over a week ago.  Abdominal x-ray showed constipation in the large colon.    Order for CT abdomen pelvis without contrast, concern for increased distention, rule out pneumoperitoneum  Bowel regimen in place  Continue with suppository.  PRN enema.  If ineffective, digital rectal removal.    Tobacco abuse- (present on admission)  Assessment & Plan  Smoking cessation was given at bedside  Nicotine patch      Thrombocytopenia (HCC)- (present on admission)  Assessment & Plan   at admission and now down to 57  May be secondary to liver disease  Check in the next few days and possibly stop lovenox if <50        Hepatoma (HCC)- (present on admission)  Assessment & Plan  On immunotherapy by Dr. Birch.    Type 2  diabetes mellitus without complication, without long-term current use of insulin (HCC)- (present on admission)  Assessment & Plan  Borderline diabetes, not on medications  07/03/21 - HbA1c 6.0, controlled, prediabetes at this time.  ISS, accuchecks, hypoglycemia protocol orders in place       VTE prophylaxis: enoxaparin ppx    I have performed a physical exam and reviewed and updated ROS and Plan today (7/15/2021).

## 2021-07-15 NOTE — THERAPY
"Occupational Therapy  Daily Treatment     Patient Name: Lorraine Bella  Age:  71 y.o., Sex:  male  Medical Record #: [id number]  Today's Date: 7/14/2021       Precautions: Fall Risk, Swallow Precautions ( See Comments), Nasogastric Tube  Comments: MARIANA drain    Assessment    Pt seen for OT tx.  Pt making progress towards goals.  Pt able to amb with FWW to bathroom with Min A & extra time.  Pt had difficulty with toilet transfer as it was too low.  Pt will benefit from being OOB daily.  Pt would benefit from Post Acute OT services.    Plan    Continue current treatment plan.    DC Equipment Recommendations: Unable to determine at this time  Discharge Recommendations: Recommend post-acute placement for additional occupational therapy services prior to discharge home    Subjective    \"I can't believe how weak I am\"     Objective       07/14/21 1127   Cognition    Cognition / Consciousness X   Speech/ Communication Delayed Responses   Level of Consciousness Alert   Comments pleasant, appeared overwhelmed with his current condition given he was indep PTA & now is so weak   Balance   Sitting Balance (Static) Fair   Sitting Balance (Dynamic) Fair -   Standing Balance (Static) Fair -   Standing Balance (Dynamic) Poor +   Weight Shift Sitting Fair   Weight Shift Standing Fair   Bed Mobility    Supine to Sit Minimal Assist   Sit to Supine   (pt left up in chair)   Scooting Minimal Assist   Rolling Moderate Assist to Lt.   Activities of Daily Living   Eating Total Assist  (NG tube)   Grooming Minimal Assist;Seated   Upper Body Dressing Minimal Assist   Lower Body Dressing Maximal Assist   Toileting Moderate Assist   Functional Mobility   Sit to Stand Minimal Assist   Bed, Chair, Wheelchair Transfer Minimal Assist   Toilet Transfers Maximal Assist  (due to low toilet)   Mobility pt amb with FWW to bathroom with Min A & extra time   Patient / Family Goals   Patient / Family Goal #1 \"To get outta here\"   Goal #1 Outcome Goal " not met   Short Term Goals   Short Term Goal # 2 pt will complete grooming seated EOB w/ setup   Goal Outcome # 2 Progressing as expected   Short Term Goal # 3 B Pt will be Min A for toilet transfer   Goal Outcome # 3 B Progressing as expected

## 2021-07-16 LAB
ALBUMIN SERPL BCP-MCNC: 2.1 G/DL (ref 3.2–4.9)
ALBUMIN/GLOB SERPL: 0.5 G/DL
ALP SERPL-CCNC: 265 U/L (ref 30–99)
ALT SERPL-CCNC: 7 U/L (ref 2–50)
ANION GAP SERPL CALC-SCNC: 8 MMOL/L (ref 7–16)
AST SERPL-CCNC: 25 U/L (ref 12–45)
BASOPHILS # BLD AUTO: 0.4 % (ref 0–1.8)
BASOPHILS # BLD: 0.04 K/UL (ref 0–0.12)
BILIRUB SERPL-MCNC: 1 MG/DL (ref 0.1–1.5)
BUN SERPL-MCNC: 23 MG/DL (ref 8–22)
CALCIUM SERPL-MCNC: 8.1 MG/DL (ref 8.5–10.5)
CHLORIDE SERPL-SCNC: 109 MMOL/L (ref 96–112)
CO2 SERPL-SCNC: 27 MMOL/L (ref 20–33)
CREAT SERPL-MCNC: 0.64 MG/DL (ref 0.5–1.4)
EOSINOPHIL # BLD AUTO: 0.03 K/UL (ref 0–0.51)
EOSINOPHIL NFR BLD: 0.3 % (ref 0–6.9)
ERYTHROCYTE [DISTWIDTH] IN BLOOD BY AUTOMATED COUNT: 61.1 FL (ref 35.9–50)
GLOBULIN SER CALC-MCNC: 3.9 G/DL (ref 1.9–3.5)
GLUCOSE SERPL-MCNC: 153 MG/DL (ref 65–99)
HCT VFR BLD AUTO: 36.2 % (ref 42–52)
HGB BLD-MCNC: 11.4 G/DL (ref 14–18)
IMM GRANULOCYTES # BLD AUTO: 0.14 K/UL (ref 0–0.11)
IMM GRANULOCYTES NFR BLD AUTO: 1.3 % (ref 0–0.9)
LYMPHOCYTES # BLD AUTO: 0.59 K/UL (ref 1–4.8)
LYMPHOCYTES NFR BLD: 5.6 % (ref 22–41)
MAGNESIUM SERPL-MCNC: 2.1 MG/DL (ref 1.5–2.5)
MCH RBC QN AUTO: 31.1 PG (ref 27–33)
MCHC RBC AUTO-ENTMCNC: 31.5 G/DL (ref 33.7–35.3)
MCV RBC AUTO: 98.6 FL (ref 81.4–97.8)
MONOCYTES # BLD AUTO: 0.91 K/UL (ref 0–0.85)
MONOCYTES NFR BLD AUTO: 8.7 % (ref 0–13.4)
NEUTROPHILS # BLD AUTO: 8.8 K/UL (ref 1.82–7.42)
NEUTROPHILS NFR BLD: 83.7 % (ref 44–72)
NRBC # BLD AUTO: 0 K/UL
NRBC BLD-RTO: 0 /100 WBC
PHOSPHATE SERPL-MCNC: 2 MG/DL (ref 2.5–4.5)
PHOSPHATE SERPL-MCNC: 2.1 MG/DL (ref 2.5–4.5)
PLATELET # BLD AUTO: 55 K/UL (ref 164–446)
PMV BLD AUTO: 10.2 FL (ref 9–12.9)
POTASSIUM SERPL-SCNC: 4.1 MMOL/L (ref 3.6–5.5)
PROT SERPL-MCNC: 6 G/DL (ref 6–8.2)
RBC # BLD AUTO: 3.67 M/UL (ref 4.7–6.1)
SODIUM SERPL-SCNC: 144 MMOL/L (ref 135–145)
WBC # BLD AUTO: 10.5 K/UL (ref 4.8–10.8)

## 2021-07-16 PROCEDURE — 99233 SBSQ HOSP IP/OBS HIGH 50: CPT | Performed by: HOSPITALIST

## 2021-07-16 PROCEDURE — 85025 COMPLETE CBC W/AUTO DIFF WBC: CPT

## 2021-07-16 PROCEDURE — 700102 HCHG RX REV CODE 250 W/ 637 OVERRIDE(OP): Performed by: INTERNAL MEDICINE

## 2021-07-16 PROCEDURE — 700111 HCHG RX REV CODE 636 W/ 250 OVERRIDE (IP): Performed by: INTERNAL MEDICINE

## 2021-07-16 PROCEDURE — 770006 HCHG ROOM/CARE - MED/SURG/GYN SEMI*

## 2021-07-16 PROCEDURE — 92526 ORAL FUNCTION THERAPY: CPT

## 2021-07-16 PROCEDURE — 80053 COMPREHEN METABOLIC PANEL: CPT

## 2021-07-16 PROCEDURE — 700101 HCHG RX REV CODE 250: Performed by: HOSPITALIST

## 2021-07-16 PROCEDURE — 36415 COLL VENOUS BLD VENIPUNCTURE: CPT

## 2021-07-16 PROCEDURE — 700105 HCHG RX REV CODE 258: Performed by: HOSPITALIST

## 2021-07-16 PROCEDURE — A9270 NON-COVERED ITEM OR SERVICE: HCPCS | Performed by: HOSPITALIST

## 2021-07-16 PROCEDURE — 83735 ASSAY OF MAGNESIUM: CPT

## 2021-07-16 PROCEDURE — 700102 HCHG RX REV CODE 250 W/ 637 OVERRIDE(OP): Performed by: HOSPITALIST

## 2021-07-16 PROCEDURE — A9270 NON-COVERED ITEM OR SERVICE: HCPCS | Performed by: INTERNAL MEDICINE

## 2021-07-16 PROCEDURE — 84100 ASSAY OF PHOSPHORUS: CPT

## 2021-07-16 RX ADMIN — SODIUM PHOSPHATE, MONOBASIC, MONOHYDRATE AND SODIUM PHOSPHATE, DIBASIC, ANHYDROUS 30 MMOL: 276; 142 INJECTION, SOLUTION INTRAVENOUS at 13:05

## 2021-07-16 RX ADMIN — FAMOTIDINE 20 MG: 10 INJECTION INTRAVENOUS at 17:56

## 2021-07-16 RX ADMIN — THIAMINE HCL TAB 100 MG 100 MG: 100 TAB at 05:39

## 2021-07-16 RX ADMIN — FAMOTIDINE 20 MG: 20 TABLET ORAL at 05:38

## 2021-07-16 RX ADMIN — GUAIFENESIN AND DEXTROMETHORPHAN 5 ML: 100; 10 SYRUP ORAL at 05:38

## 2021-07-16 ASSESSMENT — PAIN DESCRIPTION - PAIN TYPE: TYPE: ACUTE PAIN

## 2021-07-16 NOTE — PROGRESS NOTES
Bedside report received.  Assessment completed.  A&O x 4.   On 3L O2. Denies SOB, but has a thick, weak, moist cough and lung sounds are wet.  Denies N/V and new numbness or tingling.  Patient ambulates with one assist. Bed alarm on.   Edema +1 to both legs and +2 to both arms.  Pain managed with prescribed medications.  Tolerating NPO diet.  Tube feed at 70ml/hr (goal).  + void, + flatus, last BM 7/15.  Surgical incisions: miline abd incision and MARIANA drain     Reviewed plan with of care with patient. Call light and personal belongings with in reach. Hourly rounding in place. All needs met at this time.

## 2021-07-16 NOTE — CARE PLAN
Problem: Pain - Standard  Goal: Alleviation of pain or a reduction in pain to the patient’s comfort goal  Outcome: Progressing     Problem: Fall Risk  Goal: Patient will remain free from falls  Outcome: Progressing   The patient is Stable - Low risk of patient condition declining or worsening    Shift Goals  Clinical Goals: pulmonary hygiene  Patient Goals: Rest  Family Goals: no family present    Progress made toward(s) clinical / shift goals:  patient educated on pulmonary hygiene skills, has not requested pain interventions, and remains free of falls

## 2021-07-16 NOTE — THERAPY
"Speech Language Pathology  Daily Treatment     Patient Name: Lorraine Bella  Age:  71 y.o., Sex:  male  Medical Record #: [id number]  Today's Date: 7/16/2021     Precautions  Precautions: (P) Fall Risk, Swallow Precautions ( See Comments), Nasogastric Tube  Comments: MARIANA drain    Assessment    Patient seen for dysphagia therapy on this date.  Per RN, patient coughing with PO intake yesterday and made the patient NPO pending reassessment by SLP.  Patient was repositioned and HOB elevated to 90 degrees.  Presentation of PO included mildly thick liquids and single ice chips.  He declined applesauce.  The patient required consistent cues to reduce bolus size.  He took large drinks of mildly thick liquids and swished around his oral cavity.  Patient with facial grimacing and took 2-3 swallows with large drinks to clear the oral cavity.  Patient stated, \"I'm just so thirsty I forget that it's hard to swallow.\"  With cues to small sips, the oral phase was much improved.  Initiation of swallow trigger was timely, he only required 1 swallow to clear the oral cavity but laryngeal elevation was palpated as weak.  Provided instruction and training to effortful swallow to strengthen pharyngeal constrictors.  Patient completed x20 with \"good\" accuracy but stated he felt fatigued.  Recommend initiation of a Liquidised diet with Mildly Thick Liquids, given DIRECT supervision.  HOB to be elevated to 90 degrees for ALL PO intake.  Alternate bites and sips to clear/reduce pharyngeal residue.  Please encourage the patient to perform dysphagia exercises on his own.  Okay for single ice chips x5-10/hour with nursing only.  Please do not pull the Cortrak until the patient is consistently consuming adequate PO intake.  Hold PO intake with any difficulty.  SLP following closely.      Plan    Continue current treatment plan.    Discharge Recommendations: (P) Recommend post-acute placement for additional speech therapy services prior to " "discharge home    Subjective    \"I'm so thirsty.\"      Objective       07/16/21 0858   Dysphagia    Dysphagia X   Positioning / Behavior Modification Self Monitoring;Modulate Rate or Bite Size;Alternate Solids and Liquids;Other (see Comments)  (HOB to 90 for all PO intake)   Oral / Pharyngeal / Laryngeal Exercises Pharyngeal Constriction Exercises   Diet / Liquid Recommendation Liquidised (3) - (Nectar Thick Full Liquid);Mildly Thick (2) - (Nectar Thick)   Nutritional Liquid Intake Rating Scale Thickened beverages (mildly thick unless otherwise specified)   Nutritional Food Intake Rating Scale Total oral diet of a single consistency   Nursing Communication Swallow Precaution Sign Posted at Head of Bed   Skilled Intervention Verbal Cueing;Tactile Cueing;Compensatory Strategies   Recommended Route of Medication Administration   Medication Administration  Crush all Medications in Puree  (follow with a thickened liquid wash)   Patient / Family Goals   Patient / Family Goal #1 \"I'm so thirsty\"   Goal #1 Outcome Progressing as expected   Short Term Goals   Short Term Goal # 1 B  Pt will consume LQ3/MT2 diet with no overt s/sx of aspiration, given min cues to swallowing stregies.   Goal Outcome  # 1 B Progressing as expected   Short Term Goal # 2 Patient will complete effortful swallow x30, given min cues   Goal Outcome # 2  Progressing as expected         "

## 2021-07-16 NOTE — PROGRESS NOTES
Hospital Medicine Daily Progress Note    Date of Service  7/16/2021    Chief Complaint  Lorraine Bella is a 71 y.o. male admitted 7/2/2021 with abdominal pain    Hospital Course  This is a 71-year-old male patient with past medical history of hepatoma since 2018, on IV immunotherapy last dose 2 weeks ago follows with Dr. Birch presented on 7/2/2021 with sudden onset abdominal pain which woke him up from his sleep. Is associated with constipation and obstipation. In the ER her labs were significant for sodium 126, potassium 5.7, bilirubin of 2.3, lactate 2.7. Her CT of the abdomen showed possible ruptured hepatic cyst with hemoperitoneum. Surgery was consulted from the ER.   He underwent a laparoscopy with washout by Dr. Meza on 7/9 and was empirically initiated on IV Zosyn.   He required emergent intubation on 7/10 and had a reported seizure.    An EEG revealed cerebral dysfunction though no evidence of seizure activity. MRI brain was negative.  He was successfully extubated on 7/12.       Interval Problem Update  7/13: Mr. Bella was evaluated and examined in the ICU. He has been tolerating cortrak feeding.   His wife of 45 years Sharlene is at bedside and we discussed his condition. 40 ml out of MARIANA over night and 20 ml so far today. We discussed the likelihood of a SNF once he is medically stable.  Patient vital signs are stable.  Procalcitonin is trending downwards.  7/14: Patient was seen by me at bedside today.  He has no new complaints but states that he is generally weak.  Dietary suggested that patient may be developing refeeding syndrome and thiamine was started.  We will monitor potassium and phosphorus closely.  7/15: Patient underwent a fees and dietary recommended to start a thickened liquid diet.  Surgery recommended continue external drain.  SNF placement is pending.  I have personally seen and examined the patient at bedside. I discussed the plan of care  7/16: Patients drain was removed  today. Speech evaluated the patient and determined he could have a diet.  He was found to be hypophosphatemic and that was replaced. We will need surgery clearance before discharge. SNF placement is pending.       Consultants/Specialty  Surgery  Critical care. I discussed with Dr. Heller    Code Status  Full Code    Disposition  Patient is not medically cleared.   Anticipate discharge to to skilled nursing facility.      Review of Systems  Review of Systems   Unable to perform ROS: Medical condition        Physical Exam  Temp:  [36 °C (96.8 °F)-37.7 °C (99.8 °F)] 36.8 °C (98.2 °F)  Pulse:  [] 98  Resp:  [16-18] 16  BP: (119-142)/(66-81) 139/69  SpO2:  [91 %-96 %] 96 %    Physical Exam  Vitals and nursing note reviewed.   Constitutional:       Appearance: He is ill-appearing.   HENT:      Head: Normocephalic and atraumatic.      Nose:      Comments: cortrak nares     Mouth/Throat:      Mouth: Mucous membranes are dry.      Pharynx: Oropharynx is clear.   Eyes:      General: No scleral icterus.     Conjunctiva/sclera: Conjunctivae normal.   Cardiovascular:      Rate and Rhythm: Normal rate and regular rhythm.      Heart sounds: No murmur heard.     Pulmonary:      Effort: Pulmonary effort is normal.      Breath sounds: Normal breath sounds.      Comments: 3 liters of oxygen  Abdominal:      General: There is distension.      Tenderness: There is no abdominal tenderness.      Comments: MARIANA drain    Musculoskeletal:      Right lower leg: Edema present.      Left lower leg: Edema present.      Comments: Bruise right arm   Skin:     General: Skin is warm.   Neurological:      General: No focal deficit present.      Mental Status: He is alert.   Psychiatric:      Comments: somnolent         Fluids    Intake/Output Summary (Last 24 hours) at 7/16/2021 1421  Last data filed at 7/16/2021 1200  Gross per 24 hour   Intake 700 ml   Output 760 ml   Net -60 ml       Laboratory  Recent Labs     07/16/21  0430   WBC 10.5   RBC  3.67*   HEMOGLOBIN 11.4*   HEMATOCRIT 36.2*   MCV 98.6*   MCH 31.1   MCHC 31.5*   RDW 61.1*   PLATELETCT 55*   MPV 10.2     Recent Labs     07/15/21  0336 07/15/21  0903 07/16/21  0430   SODIUM 142 141 144   POTASSIUM 4.4 4.2 4.1   CHLORIDE 106 105 109   CO2 30 29 27   GLUCOSE 111* 148* 153*   BUN 21 23* 23*   CREATININE 0.74 0.64 0.64   CALCIUM 8.4* 8.5 8.1*                   Imaging  DX-ABDOMEN FOR TUBE PLACEMENT   Final Result         1.  Nonspecific bowel gas pattern.   2.  Dobbhoff tube tip overlying the expected location of the pylorus or first duodenal segment.   3.  Pulmonary edema and/or infiltrates      DX-CHEST-PORTABLE (1 VIEW)   Final Result         1.  Pulmonary edema and/or infiltrates are identified, which are somewhat decreased since the prior exam.      DX-ABDOMEN FOR TUBE PLACEMENT   Final Result         1.  Nonspecific bowel gas pattern.   2.  Dobbhoff tube tip overlying the expected location of the pylorus or first duodenal segment.      DX-ABDOMEN FOR TUBE PLACEMENT   Final Result      Enteric tube projects over the second portion of the duodenum.      DX-CHEST-PORTABLE (1 VIEW)   Final Result         1.  Pulmonary edema and/or infiltrates are identified, which are stable since the prior exam.      [id number] VIEW   Final Result      No dilated bowel      DX-CHEST-PORTABLE (1 VIEW)   Final Result         No significant interval change.         MR-BRAIN-WITH & W/O   Final Result      1.  No acute abnormality.   2.  Mild to moderate cerebral volume loss.   3.  Minimal chronic microvascular ischemic disease.      DX-CHEST-PORTABLE (1 VIEW)   Final Result      1.  Interval increasing bilateral airspace opacity suspicious for pneumonia most prominent in the right lower lobe.   2.  There is probably superimposed vascular congestion.      CT-HEAD W/O   Final Result         1. No acute intracranial abnormality. No evidence of acute intracranial hemorrhage or mass lesion.                DX-CHEST-PORTABLE (1 VIEW)   Final Result         Endotracheal tube with tip projecting over the mid thoracic trachea.      Gastric drainage tube courses below diaphragm, tip is in the stomach.      Right central venous catheter with tip projecting over the expected area of the lower SVC.      CT-ABDOMEN-PELVIS W/O   Final Result      1.  New fluid distended small bowel loops which are nonspecific and could be related to enteritis or small bowel ileus. Developing obstruction is considered less likely though not excluded.   2.  There appears to be new wall thickening of the ascending colon likely representing infectious/inflammatory colitis.   3.  Mild ascites which is mildly increased from prior study. Omental and mesenteric infiltration which is new from prior and could be related to edema or inflammation.   4.  Small right pleural effusion. Bibasilar atelectasis.   5.   No other significant change.      DX-CHEST-PORTABLE (1 VIEW)   Final Result      1.  New right basilar airspace process consistent with atelectasis or pneumonia      2.  Mild left midlung zone atelectasis      [id number] VIEW   Final Result      1. Moderate stool mixed with contrast in the ascending colon.   2. Moderate stool in the transverse colon.   3. No bowel obstruction.   4. Other chronic degenerative changes.      CT-ABDOMEN-PELVIS W/O   Final Result      1.  There is no acute inflammatory process in the abdomen or pelvis.   2.  The treated lesions in the liver are again seen without interval change.   3.  Splenomegaly is again seen.   4.  Cystic pancreatic tail lesion is unchanged possibly a pseudocyst.   5.  There is colonic diverticulosis without diverticulitis.   6.  There is mild wall thickening of the distal esophagus.   7.  There is a stable small amount of ascites.   8.  There is moderate colonic stool.      DX-CHEST-PORTABLE (1 VIEW)   Final Result      Hypoinflation with bibasilar atelectasis. No focal consolidation or pleural  effusions.      DX-CHEST-PORTABLE (1 VIEW)   Final Result      Left basilar atelectasis.      CT-ABDOMEN-PELVIS WITH   Final Result      1.  Primarily cystic mass in the medial segment LEFT lobe liver, likely treated hepatoma.   2.  Complex low-attenuation lesion in the inferior aspect medial segment LEFT lobe liver which is new from prior exam and may represent cystic mass or focal hematoma related to rupture larger mass.  Minimal hyperdense components may indicate blood    products.  Abscess is felt less likely.   3.  Gallbladder is not visualized and may be compressed by or involved with liver mass.   4.  Small volume ascites with potential hemoperitoneum.   5.  Splenomegaly.      These findings were discussed with CARMEN OTTO on 7/2/2021 3:02 PM.      DX-CHEST-PORTABLE (1 VIEW)   Final Result      1.  Hypoinflation with minimal LEFT lung base atelectasis.   2.  No pneumonia or pulmonary edema.           Assessment/Plan  * Sepsis (HCC)  Assessment & Plan  This is Sepsis Present on admission  SIRS criteria identified on my evaluation include: Tachycardia, with heart rate greater than 90 BPM, Tachypnea, with respirations greater than 20 per minute and Leukocytosis, with WBC greater than 12,000  Source is unknown, possibly abdominal  Sepsis protocol initiated  Fluid resuscitation ordered per protocol  IV antibiotics as appropriate for source of sepsis  The organ system failure is the respiratory system as is evidenced by the need for mechanical ventilation     Protein-calorie malnutrition (HCC)  Assessment & Plan  Patient was NPO for 6 days  Start thiamine  Monitor for refeeding syndrome- watch potassium and phosphorus closely    Status post laparotomy enteritis/colitis noted with purulent secretions in the peritoneum- (present on admission)  Assessment & Plan  Status post laparoscopy by Dr. Meza on 7/9  IV zosyn  MARIANA drain remains in with purulent output  Fluid cultures grew Streptococcus anginosus       Acute  respiratory failure with hypercapnia (HCC)  Assessment & Plan  On 3l of o2 above baseline  Encourage ISS and ambulation     Aspiration pneumonia (HCC)- (present on admission)  Assessment & Plan  IV Zosyn    New onset seizure (HCC)- (present on admission)  Assessment & Plan  Witnessed seizure  MRI brain negative  EEG negative for epileptic activity    Encephalopathy acute  Assessment & Plan  Acute metabolic encephalopathy secondary to sepsis  Slowly improving    Acute urinary retention  Assessment & Plan  Triana was removed    Hypomagnesemia  Assessment & Plan  Patient's magnesium was 1.7. replacement given    Lactic acidosis- (present on admission)  Assessment & Plan  Lactic acid 2.7 at admission  Improved with IV fluids    Elevated bilirubin  Assessment & Plan  Bilirubin 2.3. Down to 1.7. improved  Continue to monitor  Trending down likely dehydration induced     Hyperkalemia- (present on admission)  Assessment & Plan  Potassium 5.7 at admission  Normalizing  IV fluids were given  resolved    Hyponatremia- (present on admission)  Assessment & Plan  Upon admit  resolved    Constipation  Assessment & Plan  Patient has had chronic issues with constipation.  Severe, not improving.  Reported last bowel movement was over a week ago.  Abdominal x-ray showed constipation in the large colon.    Order for CT abdomen pelvis without contrast, concern for increased distention, rule out pneumoperitoneum  Bowel regimen in place  Continue with suppository.  PRN enema.  If ineffective, digital rectal removal.    Tobacco abuse- (present on admission)  Assessment & Plan  Smoking cessation was given at bedside  Nicotine patch      Thrombocytopenia (HCC)- (present on admission)  Assessment & Plan   at admission and now down to 57  May be secondary to liver disease  Check in the next few days and possibly stop lovenox if <50        Hepatoma (HCC)- (present on admission)  Assessment & Plan  On immunotherapy by Dr. Birch.    Type 2  diabetes mellitus without complication, without long-term current use of insulin (HCC)- (present on admission)  Assessment & Plan  Borderline diabetes, not on medications  07/03/21 - HbA1c 6.0, controlled, prediabetes at this time.  ISS, accuchecks, hypoglycemia protocol orders in place       VTE prophylaxis: enoxaparin ppx    I have performed a physical exam and reviewed and updated ROS and Plan today (7/16/2021).

## 2021-07-16 NOTE — CARE PLAN
[place], [place]    OPERATIVE REPORT    NAME:            WENDY BAUM                      AGE:  57  ACCT#:           [id number]                              :  1960  MR#:             [id number]                             ROOM:  [id number]  ADMIT DATE:      2017                        DIS DATE:  PT TYPE:         I                                       DP:    ATTENDING:       AMANDA HENRY MD    DICTATING PHYSICIAN:  SANTOS OMALLEY MD    DATE OF OPERATION:  2017      SURGEON:  Santos Omalley MD    ASSISTANT(S):  SHILOH Hart        PREOPERATIVE DIAGNOSES:  Lumbar spondylosis, spondylolisthesis, spinal stenosis,  L4-L5.    POSTOPERATIVE DIAGNOSES:  Lumbar spondylosis, spondylolisthesis, spinal  stenosis, L4-L5.    PROCEDURE:  L4-L5 bilateral laminectomy, bilateral facetectomy, foraminotomy,  microdiskectomy, interbody fusion using expandable cage, fusion instrumentation  using transpedicular screws between L4 and L5 bilaterally, correction of lumbar  spondylolisthesis, fusion with autologous bone and MagniFuse with the use of  intraoperative microscope, fluoroscope, O-Arm, motor-evoked potential, and  somatosensory evoked-potentials of the upper and lower extremities through the  whole case.    MONITORING:  [facility].    ESTIMATED BLOOD LOSS:  200 mL.    COMPLICATION:  None.    INDICATION:  The patient with lumbar spondylosis, spondylolisthesis, spinal  stenosis, had failed medical treatment.  All the risks and complications of the  surgery were explained in detail to the patient and to the family.    DESCRIPTION:  After informed consent was obtained, the patient was taken to the  operating room.  General endotracheal anesthesia was obtained.  ELYSE compression  devices were placed.  A Batista catheter was inserted.  The patient was laid in  prone position on the Hi table.  All the pressure points were well padded.  The surgical area    Problem: Nutritional:  Goal: Nutrition support tolerated and meeting greater than 85% of estimated needs  Outcome: Not Met     Problem: Nutritional:  Goal: Achieve adequate nutritional intake  Description: Patient will consume >50% of meals/supplements  Outcome: Not Met     TF advanced to Diabetisource AC @75 ml/hr (goal) this morning, however, PO diet initiated this morning and now recommend holding TF at this time to stimulate hunger. See RD note for details.    was prepped and draped in the usual sterile fashion.  A  midline incision was performed, deepening with Bovie electrocautery all the way  down to the fascia.  The fascia was opened and the paraspinal muscle dissected  laterally until we were able to identify the transverse process of L4 and L5.  Fluoroscopic confirmation of the level was performed.  There was significant  spondylolisthesis, and using the Leksell and Kerrison rongeur and drill, we  performed bilateral laminectomy, facetectomy, foraminotomy.  Identified the  disk, then mobilized the nerve root, and opened the disk and prepared the  endplates.  After preparing the endplates, we placed 7 expandable cage under  direct fluoroscopic visualization.  After this was done, we did run the O-Arm.  Decorticated the lateral gutters and placed an autologous bone and the  MagniFuse, placed the pedicle screws bilaterally in L4 and L5.  Then using  sequential reducers and the rena, we reduced the spondylolisthesis.  We had  really good correction of spondylolisthesis.  We did another run of the O-Arm,  which showed a good placement of the screws, the graft, and the  spondylolisthesis correction.  We irrigated multiple times during the procedure.  Hemostasis was obtained with bipolar coagulation.  We placed more of the  autologous bone.  We had tested the screws before the placement of the rena and  there were no current passing through the screws.  Tunneled out a 7 flat ALONZO  drain, closed the muscle with 0 Vicryl, fascia with 0 Vicryl, 3-0 for  subcutaneous, 4-0 Rapide for the skin.  There were no complications.          MD EDUARDO Cox/Carlota  DP:  [id number]  DD:  12/12/2017 15:16:11  DT:  12/12/2017 17:43:39  Job #:  [id number]           Electronically Signed On 12/15/2017 16:49  __________________________________________________   CRUZ SHERIFF

## 2021-07-16 NOTE — DIETARY
NUTRITION SERVICES: UPDATE    Pt transitioned from TF to PO diet on 7/16. Current diet: liquidised w/ thick liquids. No PO intake per chart review. Per SLP has concern that pt may not be able to meet nutrition needs via PO diet.     RD spoke with pt at bedside. Pt reports poor appetite, early satiety - RD discussed holding TF for now to aide in stimulating hunger. Pt notes he mostly feels thirsty, feels ice chips help with this. Pt agreed to adding high protein Boost drinks with meals to supplement nutritional intake and help pt meet nutrition needs via PO diet in order to have Cortrak removed. Pt agreeable.     - Recommend holding TF for now x48 hr to stimulate hunger in pt. RD will monitor for need to restart TF v remove Cortrak.   · If pt unable to take >/=50% of meals and supplements recommend initiation of nocturnal TF to meet ~65% of pt's estimated nutrition needs:  Diabetisource AC @ 115 ml/hr x10 hr (2000 - 0600), providing 1380 kcal, 69 g protein, and 943 ml free water per day.   - RD will add supplement order for thick Boost Plus TID w/ meals. Pt with hx of T2DM, however, accuchecks have been generally <100 mg/dl, recent A1c 6.0%.       RD following

## 2021-07-16 NOTE — PROGRESS NOTES
"    DATE: 7/16/2021    Post Operative Day 7 Diagnostic laparoscopy with abdominal washout and external drainage.    PHYSICAL EXAMINATION:  Vital Signs: /81   Pulse 99   Temp 37 °C (98.6 °F) (Temporal)   Resp 16   Ht 1.803 m (5' 11\")   Wt 94 kg (207 lb 3.7 oz)   SpO2 95%     The abdomen is soft, somewhat distended, minimally tender.  Scant purulent output from right upper quadrant drain.    Laboratory Values:   Recent Labs     07/16/21  0430   WBC 10.5   RBC 3.67*   HEMOGLOBIN 11.4*   HEMATOCRIT 36.2*   MCV 98.6*   MCH 31.1   MCHC 31.5*   RDW 61.1*   PLATELETCT 55*   MPV 10.2      ASSESSMENT AND PLAN:     Steady progress following diagnostic laparoscopy, abdominal washout, and drainage.   His drain is putting out minimal amounts of fluid at this time.  We will remove.     ____________________________________     Kulwant Carbajal M.D.    DD: 7/16/2021  7:59 AM      "

## 2021-07-16 NOTE — PROGRESS NOTES
MARIANA drain removed per order. Pt tolerated well. Gauze teg dressing applied. Education offered.

## 2021-07-17 LAB
ALBUMIN SERPL BCP-MCNC: 2.3 G/DL (ref 3.2–4.9)
ALBUMIN/GLOB SERPL: 0.6 G/DL
ALP SERPL-CCNC: 205 U/L (ref 30–99)
ALT SERPL-CCNC: 9 U/L (ref 2–50)
ANION GAP SERPL CALC-SCNC: 9 MMOL/L (ref 7–16)
AST SERPL-CCNC: 21 U/L (ref 12–45)
BASOPHILS # BLD AUTO: 0.5 % (ref 0–1.8)
BASOPHILS # BLD: 0.06 K/UL (ref 0–0.12)
BILIRUB SERPL-MCNC: 1.3 MG/DL (ref 0.1–1.5)
BUN SERPL-MCNC: 21 MG/DL (ref 8–22)
CALCIUM SERPL-MCNC: 8.2 MG/DL (ref 8.5–10.5)
CHLORIDE SERPL-SCNC: 101 MMOL/L (ref 96–112)
CO2 SERPL-SCNC: 27 MMOL/L (ref 20–33)
CREAT SERPL-MCNC: 0.62 MG/DL (ref 0.5–1.4)
EOSINOPHIL # BLD AUTO: 0.02 K/UL (ref 0–0.51)
EOSINOPHIL NFR BLD: 0.2 % (ref 0–6.9)
ERYTHROCYTE [DISTWIDTH] IN BLOOD BY AUTOMATED COUNT: 59.7 FL (ref 35.9–50)
GLOBULIN SER CALC-MCNC: 4.1 G/DL (ref 1.9–3.5)
GLUCOSE SERPL-MCNC: 100 MG/DL (ref 65–99)
HCT VFR BLD AUTO: 34.8 % (ref 42–52)
HGB BLD-MCNC: 11.2 G/DL (ref 14–18)
IMM GRANULOCYTES # BLD AUTO: 0.09 K/UL (ref 0–0.11)
IMM GRANULOCYTES NFR BLD AUTO: 0.7 % (ref 0–0.9)
LYMPHOCYTES # BLD AUTO: 0.6 K/UL (ref 1–4.8)
LYMPHOCYTES NFR BLD: 4.5 % (ref 22–41)
MAGNESIUM SERPL-MCNC: 2 MG/DL (ref 1.5–2.5)
MCH RBC QN AUTO: 30.9 PG (ref 27–33)
MCHC RBC AUTO-ENTMCNC: 32.2 G/DL (ref 33.7–35.3)
MCV RBC AUTO: 96.1 FL (ref 81.4–97.8)
MONOCYTES # BLD AUTO: 0.83 K/UL (ref 0–0.85)
MONOCYTES NFR BLD AUTO: 6.3 % (ref 0–13.4)
NEUTROPHILS # BLD AUTO: 11.64 K/UL (ref 1.82–7.42)
NEUTROPHILS NFR BLD: 87.8 % (ref 44–72)
NRBC # BLD AUTO: 0 K/UL
NRBC BLD-RTO: 0 /100 WBC
PHOSPHATE SERPL-MCNC: 3.4 MG/DL (ref 2.5–4.5)
PLATELET # BLD AUTO: 67 K/UL (ref 164–446)
PMV BLD AUTO: 9.5 FL (ref 9–12.9)
POTASSIUM SERPL-SCNC: 4 MMOL/L (ref 3.6–5.5)
PROCALCITONIN SERPL-MCNC: 0.68 NG/ML
PROT SERPL-MCNC: 6.4 G/DL (ref 6–8.2)
RBC # BLD AUTO: 3.62 M/UL (ref 4.7–6.1)
SODIUM SERPL-SCNC: 137 MMOL/L (ref 135–145)
WBC # BLD AUTO: 13.2 K/UL (ref 4.8–10.8)

## 2021-07-17 PROCEDURE — 700111 HCHG RX REV CODE 636 W/ 250 OVERRIDE (IP): Performed by: INTERNAL MEDICINE

## 2021-07-17 PROCEDURE — 700102 HCHG RX REV CODE 250 W/ 637 OVERRIDE(OP): Performed by: HOSPITALIST

## 2021-07-17 PROCEDURE — 85025 COMPLETE CBC W/AUTO DIFF WBC: CPT

## 2021-07-17 PROCEDURE — 84100 ASSAY OF PHOSPHORUS: CPT

## 2021-07-17 PROCEDURE — 770006 HCHG ROOM/CARE - MED/SURG/GYN SEMI*

## 2021-07-17 PROCEDURE — 80053 COMPREHEN METABOLIC PANEL: CPT

## 2021-07-17 PROCEDURE — 700102 HCHG RX REV CODE 250 W/ 637 OVERRIDE(OP): Performed by: INTERNAL MEDICINE

## 2021-07-17 PROCEDURE — 700102 HCHG RX REV CODE 250 W/ 637 OVERRIDE(OP): Performed by: STUDENT IN AN ORGANIZED HEALTH CARE EDUCATION/TRAINING PROGRAM

## 2021-07-17 PROCEDURE — A9270 NON-COVERED ITEM OR SERVICE: HCPCS | Performed by: INTERNAL MEDICINE

## 2021-07-17 PROCEDURE — A9270 NON-COVERED ITEM OR SERVICE: HCPCS | Performed by: HOSPITALIST

## 2021-07-17 PROCEDURE — 36415 COLL VENOUS BLD VENIPUNCTURE: CPT

## 2021-07-17 PROCEDURE — 99233 SBSQ HOSP IP/OBS HIGH 50: CPT | Performed by: HOSPITALIST

## 2021-07-17 PROCEDURE — A9270 NON-COVERED ITEM OR SERVICE: HCPCS | Performed by: STUDENT IN AN ORGANIZED HEALTH CARE EDUCATION/TRAINING PROGRAM

## 2021-07-17 PROCEDURE — 83735 ASSAY OF MAGNESIUM: CPT

## 2021-07-17 PROCEDURE — 84145 PROCALCITONIN (PCT): CPT

## 2021-07-17 RX ADMIN — DOCUSATE SODIUM 50 MG AND SENNOSIDES 8.6 MG 2 TABLET: 8.6; 5 TABLET, FILM COATED ORAL at 12:17

## 2021-07-17 RX ADMIN — THIAMINE HCL TAB 100 MG 100 MG: 100 TAB at 04:36

## 2021-07-17 RX ADMIN — TRAMADOL HYDROCHLORIDE 50 MG: 50 TABLET, FILM COATED ORAL at 12:16

## 2021-07-17 RX ADMIN — FAMOTIDINE 20 MG: 10 INJECTION INTRAVENOUS at 04:36

## 2021-07-17 RX ADMIN — ACETAMINOPHEN 650 MG: 325 TABLET, FILM COATED ORAL at 08:33

## 2021-07-17 RX ADMIN — OXYCODONE 5 MG: 5 TABLET ORAL at 13:53

## 2021-07-17 RX ADMIN — OXYCODONE 5 MG: 5 TABLET ORAL at 08:33

## 2021-07-17 RX ADMIN — OXYCODONE 5 MG: 5 TABLET ORAL at 16:57

## 2021-07-17 RX ADMIN — OXYCODONE HYDROCHLORIDE 10 MG: 10 TABLET ORAL at 20:23

## 2021-07-17 RX ADMIN — FAMOTIDINE 20 MG: 20 TABLET ORAL at 16:56

## 2021-07-17 RX ADMIN — DOCUSATE SODIUM 50 MG AND SENNOSIDES 8.6 MG 2 TABLET: 8.6; 5 TABLET, FILM COATED ORAL at 20:23

## 2021-07-17 ASSESSMENT — PAIN DESCRIPTION - PAIN TYPE
TYPE: ACUTE PAIN

## 2021-07-17 NOTE — CARE PLAN
Problem: Pain - Standard  Goal: Alleviation of pain or a reduction in pain to the patient’s comfort goal  Outcome: Progressing    Problem: Knowledge Deficit - Standard  Goal: Patient and family/care givers will demonstrate understanding of plan of care, disease process/condition, diagnostic tests and medications  Outcome: Progressing     Problem: Fall Risk  Goal: Patient will remain free from falls  Outcome: Progressing     Problem: Bowel Elimination  Goal: Establish and maintain regular bowel function  Outcome: Progressing

## 2021-07-17 NOTE — DISCHARGE PLANNING
Anticipated Discharge Disposition:   SNF    Action:   This RN CM requested Jericho REVELES to follow up with Admissions at [facility] on Pt's acceptance. Per Jericho, he is waiting for a return call from [facility].    Barriers to Discharge:   Pending bed availability at [facility]  Transport to SNF    Plan:   CM to continue to assist Pt with discharge as needed

## 2021-07-17 NOTE — PROGRESS NOTES
Assessment complete.  A&O x 4. Patient calls appropriately.  Patient ambulates with x2 assist.   Patient has 0/10 pain. Pain managed with prescribed medications.  Denies N&V. Tolerating liquidized, mildly thickened liquid diet.  RLQ dressing CDI, lap site x1 CDI. R clavicle previous subclavian dressing changed.  + void, + flatus, - BM.  Patient denies SOB.    Review plan with of care with patient. Call light and personal belongings within reach. Hourly rounding in place. All needs met at this time.

## 2021-07-17 NOTE — PROGRESS NOTES
4 Eyes Skin Assessment Completed by YASMIN Man and YASMIN Brown.    Head Redness  Ears Redness blanching  Nose WDL  Mouth WDL  Neck WDL  Breast/Chest old access site  Shoulder Blades small skin tear under mepilex  Spine WDL  (R) Arm/Elbow/Hand Redness blanching eddi  (L) Arm/Elbow/Hand Edema  Abdomen Incision  Groin Redness dry  Scrotum/Coccyx/Buttocks Blanching, very red, dry  (R) Leg Edema  (L) Leg Edema  (R) Heel/Foot/Toe Edema  (L) Heel/Foot/Toe Boggy, blanching, red      Sacrum red. Blanching, mepilex not in use due to intermitent Incont.   Kwasi oberlakady ordered stat.  Devices In Places Coretrack, PIV, O2 tubing      Interventions In Place Waffle Overlay turns, education, dietary consult, dry flow, josephine, up to chair, z gia, silicone o2 tubing     Possible Skin Injury No

## 2021-07-17 NOTE — PROGRESS NOTES
Hospital Medicine Daily Progress Note    Date of Service  7/17/2021    Chief Complaint  Lorraine Bella is a 71 y.o. male admitted 7/2/2021 with abdominal pain    Hospital Course  This is a 71-year-old male patient with past medical history of hepatoma since 2018, on IV immunotherapy last dose 2 weeks ago follows with Dr. Birch presented on 7/2/2021 with sudden onset abdominal pain which woke him up from his sleep. Is associated with constipation and obstipation. In the ER her labs were significant for sodium 126, potassium 5.7, bilirubin of 2.3, lactate 2.7. Her CT of the abdomen showed possible ruptured hepatic cyst with hemoperitoneum. Surgery was consulted from the ER.   He underwent a laparoscopy with washout by Dr. Meza on 7/9 and was empirically initiated on IV Zosyn.   He required emergent intubation on 7/10 and had a reported seizure.    An EEG revealed cerebral dysfunction though no evidence of seizure activity. MRI brain was negative.  He was successfully extubated on 7/12.       Interval Problem Update  7/13: Mr. Bella was evaluated and examined in the ICU. He has been tolerating cortrak feeding.   His wife of 45 years Sharlene is at bedside and we discussed his condition. 40 ml out of MARIANA over night and 20 ml so far today. We discussed the likelihood of a SNF once he is medically stable.  Patient vital signs are stable.  Procalcitonin is trending downwards.  7/14: Patient was seen by me at bedside today.  He has no new complaints but states that he is generally weak.  Dietary suggested that patient may be developing refeeding syndrome and thiamine was started.  We will monitor potassium and phosphorus closely.  7/15: Patient underwent a fees and dietary recommended to start a thickened liquid diet.  Surgery recommended continue external drain.  SNF placement is pending.  I have personally seen and examined the patient at bedside. I discussed the plan of care  7/16: Patients drain was removed  today. Speech evaluated the patient and determined he could have a diet.  He was found to be hypophosphatemic and that was replaced. We will need surgery clearance before discharge. SNF placement is pending.   7/17: Patient had no new complaints today. He was found to be more lucid today.  Currently he is tolerating a diet.  Patient is having bowel movements.  Abdomen was noted to be more distended today.  We will continue to monitor his white count.  Procalcitonin is trending downwards.    Consultants/Specialty  Surgery  Critical care. I discussed with Dr. Heller    Code Status  Full Code    Disposition  Patient is not medically cleared.   Anticipate discharge to to skilled nursing facility.      Review of Systems  Review of Systems   Unable to perform ROS: Medical condition        Physical Exam  Temp:  [36 °C (96.8 °F)-36.6 °C (97.9 °F)] 36.1 °C (97 °F)  Pulse:  [91-96] 96  Resp:  [16-18] 17  BP: (110-137)/(75-80) 110/75  SpO2:  [95 %-100 %] 95 %    Physical Exam  Vitals and nursing note reviewed.   Constitutional:       Appearance: He is ill-appearing.   HENT:      Head: Normocephalic and atraumatic.      Nose:      Comments: cortrak nares     Mouth/Throat:      Mouth: Mucous membranes are dry.      Pharynx: Oropharynx is clear.   Eyes:      General: No scleral icterus.     Conjunctiva/sclera: Conjunctivae normal.   Cardiovascular:      Rate and Rhythm: Normal rate and regular rhythm.      Heart sounds: No murmur heard.     Pulmonary:      Effort: Pulmonary effort is normal.      Breath sounds: Normal breath sounds.      Comments: 3 liters of oxygen  Abdominal:      General: There is distension.      Tenderness: There is no abdominal tenderness.      Comments: MARIANA drain    Musculoskeletal:      Right lower leg: Edema present.      Left lower leg: Edema present.      Comments: Bruise right arm   Skin:     General: Skin is warm.   Neurological:      General: No focal deficit present.      Mental Status: He is alert.    Psychiatric:      Comments: somnolent         Fluids    Intake/Output Summary (Last 24 hours) at 7/17/2021 1632  Last data filed at 7/17/2021 1200  Gross per 24 hour   Intake 180 ml   Output 600 ml   Net -420 ml       Laboratory  Recent Labs     07/16/21  0430 07/17/21  1018   WBC 10.5 13.2*   RBC 3.67* 3.62*   HEMOGLOBIN 11.4* 11.2*   HEMATOCRIT 36.2* 34.8*   MCV 98.6* 96.1   MCH 31.1 30.9   MCHC 31.5* 32.2*   RDW 61.1* 59.7*   PLATELETCT 55* 67*   MPV 10.2 9.5     Recent Labs     07/15/21  0903 07/16/21  0430 07/17/21  1018   SODIUM 141 144 137   POTASSIUM 4.2 4.1 4.0   CHLORIDE 105 109 101   CO2 29 27 27   GLUCOSE 148* 153* 100*   BUN 23* 23* 21   CREATININE 0.64 0.64 0.62   CALCIUM 8.5 8.1* 8.2*                   Imaging  DX-ABDOMEN FOR TUBE PLACEMENT   Final Result         1.  Nonspecific bowel gas pattern.   2.  Dobbhoff tube tip overlying the expected location of the pylorus or first duodenal segment.   3.  Pulmonary edema and/or infiltrates      DX-CHEST-PORTABLE (1 VIEW)   Final Result         1.  Pulmonary edema and/or infiltrates are identified, which are somewhat decreased since the prior exam.      DX-ABDOMEN FOR TUBE PLACEMENT   Final Result         1.  Nonspecific bowel gas pattern.   2.  Dobbhoff tube tip overlying the expected location of the pylorus or first duodenal segment.      DX-ABDOMEN FOR TUBE PLACEMENT   Final Result      Enteric tube projects over the second portion of the duodenum.      DX-CHEST-PORTABLE (1 VIEW)   Final Result         1.  Pulmonary edema and/or infiltrates are identified, which are stable since the prior exam.      GU-[id number]-7 VIEW   Final Result      No dilated bowel      DX-CHEST-PORTABLE (1 VIEW)   Final Result         No significant interval change.         MR-BRAIN-WITH & W/O   Final Result      1.  No acute abnormality.   2.  Mild to moderate cerebral volume loss.   3.  Minimal chronic microvascular ischemic disease.      DX-CHEST-PORTABLE (1 VIEW)   Final  Result      1.  Interval increasing bilateral airspace opacity suspicious for pneumonia most prominent in the right lower lobe.   2.  There is probably superimposed vascular congestion.      CT-HEAD W/O   Final Result         1. No acute intracranial abnormality. No evidence of acute intracranial hemorrhage or mass lesion.               DX-CHEST-PORTABLE (1 VIEW)   Final Result         Endotracheal tube with tip projecting over the mid thoracic trachea.      Gastric drainage tube courses below diaphragm, tip is in the stomach.      Right central venous catheter with tip projecting over the expected area of the lower SVC.      CT-ABDOMEN-PELVIS W/O   Final Result      1.  New fluid distended small bowel loops which are nonspecific and could be related to enteritis or small bowel ileus. Developing obstruction is considered less likely though not excluded.   2.  There appears to be new wall thickening of the ascending colon likely representing infectious/inflammatory colitis.   3.  Mild ascites which is mildly increased from prior study. Omental and mesenteric infiltration which is new from prior and could be related to edema or inflammation.   4.  Small right pleural effusion. Bibasilar atelectasis.   5.   No other significant change.      DX-CHEST-PORTABLE (1 VIEW)   Final Result      1.  New right basilar airspace process consistent with atelectasis or pneumonia      2.  Mild left midlung zone atelectasis      SF-DOISSRS-0 VIEW   Final Result      1. Moderate stool mixed with contrast in the ascending colon.   2. Moderate stool in the transverse colon.   3. No bowel obstruction.   4. Other chronic degenerative changes.      CT-ABDOMEN-PELVIS W/O   Final Result      1.  There is no acute inflammatory process in the abdomen or pelvis.   2.  The treated lesions in the liver are again seen without interval change.   3.  Splenomegaly is again seen.   4.  Cystic pancreatic tail lesion is unchanged possibly a pseudocyst.    5.  There is colonic diverticulosis without diverticulitis.   6.  There is mild wall thickening of the distal esophagus.   7.  There is a stable small amount of ascites.   8.  There is moderate colonic stool.      DX-CHEST-PORTABLE (1 VIEW)   Final Result      Hypoinflation with bibasilar atelectasis. No focal consolidation or pleural effusions.      DX-CHEST-PORTABLE (1 VIEW)   Final Result      Left basilar atelectasis.      CT-ABDOMEN-PELVIS WITH   Final Result      1.  Primarily cystic mass in the medial segment LEFT lobe liver, likely treated hepatoma.   2.  Complex low-attenuation lesion in the inferior aspect medial segment LEFT lobe liver which is new from prior exam and may represent cystic mass or focal hematoma related to rupture larger mass.  Minimal hyperdense components may indicate blood    products.  Abscess is felt less likely.   3.  Gallbladder is not visualized and may be compressed by or involved with liver mass.   4.  Small volume ascites with potential hemoperitoneum.   5.  Splenomegaly.      These findings were discussed with CARMEN OTTO on 7/2/2021 3:02 PM.      DX-CHEST-PORTABLE (1 VIEW)   Final Result      1.  Hypoinflation with minimal LEFT lung base atelectasis.   2.  No pneumonia or pulmonary edema.           Assessment/Plan  * Sepsis (HCC)  Assessment & Plan  This is Sepsis Present on admission  SIRS criteria identified on my evaluation include: Tachycardia, with heart rate greater than 90 BPM, Tachypnea, with respirations greater than 20 per minute and Leukocytosis, with WBC greater than 12,000  Source is unknown, possibly abdominal  Sepsis protocol initiated  Fluid resuscitation ordered per protocol  IV antibiotics as appropriate for source of sepsis  The organ system failure is the respiratory system as is evidenced by the need for mechanical ventilation     Protein-calorie malnutrition (HCC)  Assessment & Plan  Patient was NPO for 6 days  Start thiamine  Monitor for refeeding  syndrome- watch potassium and phosphorus closely    Status post laparotomy enteritis/colitis noted with purulent secretions in the peritoneum- (present on admission)  Assessment & Plan  Status post laparoscopy by Dr. Meza on 7/9  IV zosyn  MARIANA drain remains in with purulent output  Fluid cultures grew Streptococcus anginosus       Acute respiratory failure with hypercapnia (HCC)  Assessment & Plan  On 3l of o2 above baseline  Encourage ISS and ambulation     Aspiration pneumonia (HCC)- (present on admission)  Assessment & Plan  IV Zosyn    New onset seizure (HCC)- (present on admission)  Assessment & Plan  Witnessed seizure  MRI brain negative  EEG negative for epileptic activity    Encephalopathy acute  Assessment & Plan  Acute metabolic encephalopathy secondary to sepsis  Slowly improving    Acute urinary retention  Assessment & Plan  Triana was removed    Hypomagnesemia  Assessment & Plan  Patient's magnesium was 1.7. replacement given    Lactic acidosis- (present on admission)  Assessment & Plan  Lactic acid 2.7 at admission  Improved with IV fluids    Elevated bilirubin  Assessment & Plan  Bilirubin 2.3. Down to 1.7. improved  Continue to monitor  Trending down likely dehydration induced     Hyperkalemia- (present on admission)  Assessment & Plan  Potassium 5.7 at admission  Normalizing  IV fluids were given  resolved    Hyponatremia- (present on admission)  Assessment & Plan  Upon admit  resolved    Constipation  Assessment & Plan  Patient has had chronic issues with constipation.  Severe, not improving.  Reported last bowel movement was over a week ago.  Abdominal x-ray showed constipation in the large colon.    Order for CT abdomen pelvis without contrast, concern for increased distention, rule out pneumoperitoneum  Bowel regimen in place  Continue with suppository.  PRN enema.  If ineffective, digital rectal removal.    Tobacco abuse- (present on admission)  Assessment & Plan  Smoking cessation was given at  bedside  Nicotine patch      Thrombocytopenia (HCC)- (present on admission)  Assessment & Plan   at admission and now down to 57  May be secondary to liver disease  Check in the next few days and possibly stop lovenox if <50        Hepatoma (HCC)- (present on admission)  Assessment & Plan  On immunotherapy by Dr. Birch.    Type 2 diabetes mellitus without complication, without long-term current use of insulin (HCC)- (present on admission)  Assessment & Plan  Borderline diabetes, not on medications  07/03/21 - HbA1c 6.0, controlled, prediabetes at this time.  ISS, accuchecks, hypoglycemia protocol orders in place       VTE prophylaxis: enoxaparin ppx    I have performed a physical exam and reviewed and updated ROS and Plan today (7/17/2021).

## 2021-07-17 NOTE — DISCHARGE PLANNING
Agency/Facility Name: [facility]  Outcome: Left vmail for Ned requesting an update on bed availability    @[id number]  Agency/Facility Name: [facility]  Outcome: left vmail regarding bed for pt    Agency/Facility Name: [facility]  Spoke To: Vickie  Outcome: Vickie stated that [facility] does not have a bed today and cannot say for certainty when a bed would be available

## 2021-07-17 NOTE — CARE PLAN
The patient is Stable - Low risk of patient condition declining or worsening    Shift Goals  Clinical Goals: Increase appetite  Patient Goals: Food  Family Goals: no family present    Progress made toward(s) clinical / shift goals: Tube feed being held at this time. Patient tolerating ice chips without signs of aspiration.    Problem: Pain - Standard  Goal: Alleviation of pain or a reduction in pain to the patient’s comfort goal  Outcome: Progressing     Problem: Knowledge Deficit - Standard  Goal: Patient and family/care givers will demonstrate understanding of plan of care, disease process/condition, diagnostic tests and medications  Outcome: Progressing     Problem: Fall Risk  Goal: Patient will remain free from falls  Outcome: Progressing     Problem: Bowel Elimination  Goal: Establish and maintain regular bowel function  Outcome: Progressing

## 2021-07-17 NOTE — CARE PLAN
Problem: Pain - Standard  Goal: Alleviation of pain or a reduction in pain to the patient’s comfort goal  Outcome: Progressing     Problem: Knowledge Deficit - Standard  Goal: Patient and family/care givers will demonstrate understanding of plan of care, disease process/condition, diagnostic tests and medications  Outcome: Progressing   The patient is Stable - Low risk of patient condition declining or worsening    Shift Goals  Clinical Goals: Increase appetite  Patient Goals: Food  Family Goals: no family present    Progress made toward(s) clinical / shift goals:  PT asks appropriate questions, pt c/o pain relieved with PRNs    Patient is not progressing towards the following goals:

## 2021-07-17 NOTE — PROGRESS NOTES
Assessment complete.  A&O x 4. Patient calls appropriately.  Patient ambulates with x2 assist.   Patient has 4/10 pain. Pain managed with prescribed medications.  Denies N&V. Tolerating liquidized, mildly thickened liquid diet. PT abdomen very distended.  RLQ dressing CDI, lap site x1 CDI. R clavicle previous subclavian dressing CDI.  PT has R upper extremity swelling and redness, heat packs applied.  + void, + flatus, - BM.  Patient denies SOB.     Review plan with of care with patient. Call light and personal belongings within reach. Hourly rounding in place. All needs met at this time.

## 2021-07-18 PROBLEM — K76.6 PORTAL HYPERTENSION (HCC): Status: ACTIVE | Noted: 2021-07-18

## 2021-07-18 LAB
ALBUMIN SERPL BCP-MCNC: 2.4 G/DL (ref 3.2–4.9)
ALBUMIN/GLOB SERPL: 0.6 G/DL
ALP SERPL-CCNC: 209 U/L (ref 30–99)
ALT SERPL-CCNC: 9 U/L (ref 2–50)
ANION GAP SERPL CALC-SCNC: 9 MMOL/L (ref 7–16)
AST SERPL-CCNC: 23 U/L (ref 12–45)
BASOPHILS # BLD AUTO: 0.5 % (ref 0–1.8)
BASOPHILS # BLD: 0.08 K/UL (ref 0–0.12)
BILIRUB SERPL-MCNC: 1.5 MG/DL (ref 0.1–1.5)
BUN SERPL-MCNC: 21 MG/DL (ref 8–22)
CALCIUM SERPL-MCNC: 8.6 MG/DL (ref 8.5–10.5)
CHLORIDE SERPL-SCNC: 97 MMOL/L (ref 96–112)
CO2 SERPL-SCNC: 29 MMOL/L (ref 20–33)
CREAT SERPL-MCNC: 0.69 MG/DL (ref 0.5–1.4)
EOSINOPHIL # BLD AUTO: 0.02 K/UL (ref 0–0.51)
EOSINOPHIL NFR BLD: 0.1 % (ref 0–6.9)
ERYTHROCYTE [DISTWIDTH] IN BLOOD BY AUTOMATED COUNT: 61.8 FL (ref 35.9–50)
GLOBULIN SER CALC-MCNC: 4.3 G/DL (ref 1.9–3.5)
GLUCOSE SERPL-MCNC: 99 MG/DL (ref 65–99)
HCT VFR BLD AUTO: 37.8 % (ref 42–52)
HGB BLD-MCNC: 12.1 G/DL (ref 14–18)
IMM GRANULOCYTES # BLD AUTO: 0.13 K/UL (ref 0–0.11)
IMM GRANULOCYTES NFR BLD AUTO: 0.8 % (ref 0–0.9)
LYMPHOCYTES # BLD AUTO: 0.69 K/UL (ref 1–4.8)
LYMPHOCYTES NFR BLD: 4.2 % (ref 22–41)
MCH RBC QN AUTO: 31.6 PG (ref 27–33)
MCHC RBC AUTO-ENTMCNC: 32 G/DL (ref 33.7–35.3)
MCV RBC AUTO: 98.7 FL (ref 81.4–97.8)
MONOCYTES # BLD AUTO: 0.95 K/UL (ref 0–0.85)
MONOCYTES NFR BLD AUTO: 5.8 % (ref 0–13.4)
NEUTROPHILS # BLD AUTO: 14.45 K/UL (ref 1.82–7.42)
NEUTROPHILS NFR BLD: 88.6 % (ref 44–72)
NRBC # BLD AUTO: 0 K/UL
NRBC BLD-RTO: 0 /100 WBC
PHOSPHATE SERPL-MCNC: 3.4 MG/DL (ref 2.5–4.5)
PLATELET # BLD AUTO: 82 K/UL (ref 164–446)
PMV BLD AUTO: 9.5 FL (ref 9–12.9)
POTASSIUM SERPL-SCNC: 4.3 MMOL/L (ref 3.6–5.5)
PROT SERPL-MCNC: 6.7 G/DL (ref 6–8.2)
RBC # BLD AUTO: 3.83 M/UL (ref 4.7–6.1)
SODIUM SERPL-SCNC: 135 MMOL/L (ref 135–145)
WBC # BLD AUTO: 16.3 K/UL (ref 4.8–10.8)

## 2021-07-18 PROCEDURE — 700111 HCHG RX REV CODE 636 W/ 250 OVERRIDE (IP): Performed by: HOSPITALIST

## 2021-07-18 PROCEDURE — 36415 COLL VENOUS BLD VENIPUNCTURE: CPT

## 2021-07-18 PROCEDURE — 770006 HCHG ROOM/CARE - MED/SURG/GYN SEMI*

## 2021-07-18 PROCEDURE — 99233 SBSQ HOSP IP/OBS HIGH 50: CPT | Performed by: HOSPITALIST

## 2021-07-18 PROCEDURE — 700102 HCHG RX REV CODE 250 W/ 637 OVERRIDE(OP): Performed by: HOSPITALIST

## 2021-07-18 PROCEDURE — 85025 COMPLETE CBC W/AUTO DIFF WBC: CPT

## 2021-07-18 PROCEDURE — 700102 HCHG RX REV CODE 250 W/ 637 OVERRIDE(OP): Performed by: INTERNAL MEDICINE

## 2021-07-18 PROCEDURE — A9270 NON-COVERED ITEM OR SERVICE: HCPCS | Performed by: INTERNAL MEDICINE

## 2021-07-18 PROCEDURE — A9270 NON-COVERED ITEM OR SERVICE: HCPCS | Performed by: HOSPITALIST

## 2021-07-18 PROCEDURE — 80053 COMPREHEN METABOLIC PANEL: CPT

## 2021-07-18 PROCEDURE — 84100 ASSAY OF PHOSPHORUS: CPT

## 2021-07-18 RX ORDER — FUROSEMIDE 10 MG/ML
20 INJECTION INTRAMUSCULAR; INTRAVENOUS ONCE
Status: COMPLETED | OUTPATIENT
Start: 2021-07-18 | End: 2021-07-18

## 2021-07-18 RX ORDER — FUROSEMIDE 20 MG/1
20 TABLET ORAL
Status: DISCONTINUED | OUTPATIENT
Start: 2021-07-18 | End: 2021-07-19

## 2021-07-18 RX ORDER — SPIRONOLACTONE 50 MG/1
25 TABLET, FILM COATED ORAL
Status: DISCONTINUED | OUTPATIENT
Start: 2021-07-18 | End: 2021-07-18

## 2021-07-18 RX ADMIN — THIAMINE HCL TAB 100 MG 100 MG: 100 TAB at 04:50

## 2021-07-18 RX ADMIN — FUROSEMIDE 20 MG: 10 INJECTION, SOLUTION INTRAMUSCULAR; INTRAVENOUS at 11:32

## 2021-07-18 RX ADMIN — BISACODYL 10 MG: 10 SUPPOSITORY RECTAL at 11:38

## 2021-07-18 RX ADMIN — FUROSEMIDE 20 MG: 20 TABLET ORAL at 14:08

## 2021-07-18 RX ADMIN — FAMOTIDINE 20 MG: 20 TABLET ORAL at 04:50

## 2021-07-18 RX ADMIN — FAMOTIDINE 20 MG: 20 TABLET ORAL at 16:50

## 2021-07-18 RX ADMIN — DOCUSATE SODIUM 50 MG AND SENNOSIDES 8.6 MG 2 TABLET: 8.6; 5 TABLET, FILM COATED ORAL at 16:51

## 2021-07-18 RX ADMIN — DOCUSATE SODIUM 50 MG AND SENNOSIDES 8.6 MG 2 TABLET: 8.6; 5 TABLET, FILM COATED ORAL at 04:50

## 2021-07-18 RX ADMIN — POLYETHYLENE GLYCOL 3350 1 PACKET: 17 POWDER, FOR SOLUTION ORAL at 04:50

## 2021-07-18 ASSESSMENT — PAIN DESCRIPTION - PAIN TYPE
TYPE: CHRONIC PAIN
TYPE: CHRONIC PAIN

## 2021-07-18 NOTE — CARE PLAN
Problem: Pain - Standard  Goal: Alleviation of pain or a reduction in pain to the patient’s comfort goal  Flowsheets (Taken 7/17/2021 2015)  Non Verbal Scale: Calm  Pain Rating Scale (NPRS): 6  Note: Pt calls appropriately for pain medication and reports understanding     Problem: Knowledge Deficit - Standard  Goal: Patient and family/care givers will demonstrate understanding of plan of care, disease process/condition, diagnostic tests and medications  Outcome: Progressing  Note: Pt reports understanding of plan of care and has no questions at this time   The patient is Stable - Low risk of patient condition declining or worsening    Shift Goals  Clinical Goals: Increase appetite  Patient Goals: Food  Family Goals: no family present    Progress made toward(s) clinical / shift goals:  Progressing as expected    Patient is not progressing towards the following goals:

## 2021-07-18 NOTE — CARE PLAN
Problem: Nutritional:  Goal: Achieve adequate nutritional intake  Description: PO and TF to meet 100% of pt's estimated needs.  7/18/2021 1435 by Reyna Rascon R.D.  Outcome: Not Met

## 2021-07-18 NOTE — DISCHARGE PLANNING
Agency/Facility Name: [facility]  Outcome: Left a message for admissions.  Awaiting a call back.

## 2021-07-18 NOTE — DIETARY
Brief Nutrition Services PO intake follow up:    · TF held on 7/16 to help stimulate hunger, was tolerating goal TF of Diabetisource AC @ 75 ml/hr. TF started on 7/13, after lengthy period of without adequate nutrition.   · Diet= Liquidized, Mildly Thick with 1:1 supervision. Thick Boost Plus sent with all meals.   · Per ADL, pt eating < 25 % of five out of past six meals with only one Boost supplement intake of 25-50% .  · Met with pt who reports appetite yesterday was very poor but appetite improved this am,  ate bites of everything on his breakfast meal (~ 10% of meal per pt) and only sipping on supplement drinks. Pt states he may have overdid it this morning and feels he overate as he is coughing more etc.   · Checked back this afternoon, pt not wanting lunch yet per CNA.  · PO intake does not appear adequate, will start with nocturnal feeds and advance slow to monitor tolerance with new goal rate. Will trial more concentrated formula as pt appears to feel full quickly per discussion with him this morning.  · Skin: reddened area to buttocks, small, scabbed area to upper back.  · Labs Reviewed, pt being monitored for refeeding, K+ and Phos within normal limits. No Mg to assess.   ·       Plan:   1. Resume TF with Nocturnal TF to meet ~ 65% of pt's estimated kcal needs and 75% of his protein needs: Impact Peptide 1.5 @ 85 ml/hr X 10 hours to provide 1275 kcals/day, 80 g pro/day, and 609 ml free water/day.   2. Advanced diet as determined safe per SLP.  Encourage  PO intake and document all intake  as % taken in ADL's to provide interdisciplinary communication across all shifts.   Monitor weight.  Nutrition rep will continue to see patient for ongoing meal and snack preferences as appropriate with diet advancement.  Will monitor refeeding labs (K+, Phos, and Mg) for a couple more days.     RD following.

## 2021-07-18 NOTE — PROGRESS NOTES
Hospital Medicine Daily Progress Note    Date of Service  7/18/2021    Chief Complaint  Lorraine Bella is a 71 y.o. male admitted 7/2/2021 with abdominal pain    Hospital Course  This is a 71-year-old male patient with past medical history of hepatoma since 2018, on IV immunotherapy last dose 2 weeks ago follows with Dr. Birch presented on 7/2/2021 with sudden onset abdominal pain which woke him up from his sleep. Is associated with constipation and obstipation. In the ER her labs were significant for sodium 126, potassium 5.7, bilirubin of 2.3, lactate 2.7. Her CT of the abdomen showed possible ruptured hepatic cyst with hemoperitoneum. Surgery was consulted from the ER.   He underwent a laparoscopy with washout by Dr. Meza on 7/9 and was empirically initiated on IV Zosyn.   He required emergent intubation on 7/10 and had a reported seizure.    An EEG revealed cerebral dysfunction though no evidence of seizure activity. MRI brain was negative.  He was successfully extubated on 7/12.       Interval Problem Update  7/13: Mr. Bella was evaluated and examined in the ICU. He has been tolerating cortrak feeding.   His wife of 45 years Sharlene is at bedside and we discussed his condition. 40 ml out of MARIANA over night and 20 ml so far today. We discussed the likelihood of a SNF once he is medically stable.  Patient vital signs are stable.  Procalcitonin is trending downwards.  7/14: Patient was seen by me at bedside today.  He has no new complaints but states that he is generally weak.  Dietary suggested that patient may be developing refeeding syndrome and thiamine was started.  We will monitor potassium and phosphorus closely.  7/15: Patient underwent a fees and dietary recommended to start a thickened liquid diet.  Surgery recommended continue external drain.  SNF placement is pending.  I have personally seen and examined the patient at bedside. I discussed the plan of care  7/16: Patients drain was removed  today. Speech evaluated the patient and determined he could have a diet.  He was found to be hypophosphatemic and that was replaced. We will need surgery clearance before discharge. SNF placement is pending.   7/17: Patient had no new complaints today. He was found to be more lucid today.  Currently he is tolerating a diet.  Patient is having bowel movements.  Abdomen was noted to be more distended today.  We will continue to monitor his white count.  Procalcitonin is trending downwards.  7/18: Patient was found to be volume overloaded on examination today.  I will start with IV Lasix and oral Lasix since he has a history of portal hypertension.  We will continue to wean him off oxygen.  SNF placement is pending.    Consultants/Specialty  Surgery  Critical care. I discussed with Dr. Heller    Code Status  Full Code    Disposition  Patient is not medically cleared.   Anticipate discharge to to skilled nursing facility.      Review of Systems  Review of Systems   Unable to perform ROS: Medical condition        Physical Exam  Temp:  [36.1 °C (97 °F)-37.1 °C (98.7 °F)] 36.6 °C (97.9 °F)  Pulse:  [] 101  Resp:  [16-17] 17  BP: (110-121)/(75-80) 119/77  SpO2:  [93 %-96 %] 93 %    Physical Exam  Vitals and nursing note reviewed.   Constitutional:       Appearance: He is ill-appearing.   HENT:      Head: Normocephalic and atraumatic.      Nose:      Comments: cortrak nares     Mouth/Throat:      Mouth: Mucous membranes are dry.      Pharynx: Oropharynx is clear.   Eyes:      General: No scleral icterus.     Conjunctiva/sclera: Conjunctivae normal.   Cardiovascular:      Rate and Rhythm: Normal rate and regular rhythm.      Heart sounds: No murmur heard.     Pulmonary:      Effort: Pulmonary effort is normal.      Breath sounds: Normal breath sounds.      Comments: 3 liters of oxygen  Abdominal:      General: There is distension.      Tenderness: There is no abdominal tenderness.      Comments: MARIANA drain     Musculoskeletal:      Right lower leg: Edema present.      Left lower leg: Edema present.      Comments: Bruise right arm   Skin:     General: Skin is warm.   Neurological:      General: No focal deficit present.      Mental Status: He is alert.   Psychiatric:      Comments: somnolent         Fluids    Intake/Output Summary (Last 24 hours) at 7/18/2021 1309  Last data filed at 7/18/2021 1252  Gross per 24 hour   Intake 480 ml   Output 450 ml   Net 30 ml       Laboratory  Recent Labs     07/16/21  0430 07/17/21  1018 07/18/21  0832   WBC 10.5 13.2* 16.3*   RBC 3.67* 3.62* 3.83*   HEMOGLOBIN 11.4* 11.2* 12.1*   HEMATOCRIT 36.2* 34.8* 37.8*   MCV 98.6* 96.1 98.7*   MCH 31.1 30.9 31.6   MCHC 31.5* 32.2* 32.0*   RDW 61.1* 59.7* 61.8*   PLATELETCT 55* 67* 82*   MPV 10.2 9.5 9.5     Recent Labs     07/16/21  0430 07/17/21  1018 07/18/21  0832   SODIUM 144 137 135   POTASSIUM 4.1 4.0 4.3   CHLORIDE 109 101 97   CO2 27 27 29   GLUCOSE 153* 100* 99   BUN 23* 21 21   CREATININE 0.64 0.62 0.69   CALCIUM 8.1* 8.2* 8.6                   Imaging  DX-ABDOMEN FOR TUBE PLACEMENT   Final Result         1.  Nonspecific bowel gas pattern.   2.  Dobbhoff tube tip overlying the expected location of the pylorus or first duodenal segment.   3.  Pulmonary edema and/or infiltrates      DX-CHEST-PORTABLE (1 VIEW)   Final Result         1.  Pulmonary edema and/or infiltrates are identified, which are somewhat decreased since the prior exam.      DX-ABDOMEN FOR TUBE PLACEMENT   Final Result         1.  Nonspecific bowel gas pattern.   2.  Dobbhoff tube tip overlying the expected location of the pylorus or first duodenal segment.      DX-ABDOMEN FOR TUBE PLACEMENT   Final Result      Enteric tube projects over the second portion of the duodenum.      DX-CHEST-PORTABLE (1 VIEW)   Final Result         1.  Pulmonary edema and/or infiltrates are identified, which are stable since the prior exam.      MP-RMVJEEN-0 VIEW   Final Result      No dilated  bowel      DX-CHEST-PORTABLE (1 VIEW)   Final Result         No significant interval change.         MR-BRAIN-WITH & W/O   Final Result      1.  No acute abnormality.   2.  Mild to moderate cerebral volume loss.   3.  Minimal chronic microvascular ischemic disease.      DX-CHEST-PORTABLE (1 VIEW)   Final Result      1.  Interval increasing bilateral airspace opacity suspicious for pneumonia most prominent in the right lower lobe.   2.  There is probably superimposed vascular congestion.      CT-HEAD W/O   Final Result         1. No acute intracranial abnormality. No evidence of acute intracranial hemorrhage or mass lesion.               DX-CHEST-PORTABLE (1 VIEW)   Final Result         Endotracheal tube with tip projecting over the mid thoracic trachea.      Gastric drainage tube courses below diaphragm, tip is in the stomach.      Right central venous catheter with tip projecting over the expected area of the lower SVC.      CT-ABDOMEN-PELVIS W/O   Final Result      1.  New fluid distended small bowel loops which are nonspecific and could be related to enteritis or small bowel ileus. Developing obstruction is considered less likely though not excluded.   2.  There appears to be new wall thickening of the ascending colon likely representing infectious/inflammatory colitis.   3.  Mild ascites which is mildly increased from prior study. Omental and mesenteric infiltration which is new from prior and could be related to edema or inflammation.   4.  Small right pleural effusion. Bibasilar atelectasis.   5.   No other significant change.      DX-CHEST-PORTABLE (1 VIEW)   Final Result      1.  New right basilar airspace process consistent with atelectasis or pneumonia      2.  Mild left midlung zone atelectasis      NJ-[id number]-0 VIEW   Final Result      1. Moderate stool mixed with contrast in the ascending colon.   2. Moderate stool in the transverse colon.   3. No bowel obstruction.   4. Other chronic degenerative  changes.      CT-ABDOMEN-PELVIS W/O   Final Result      1.  There is no acute inflammatory process in the abdomen or pelvis.   2.  The treated lesions in the liver are again seen without interval change.   3.  Splenomegaly is again seen.   4.  Cystic pancreatic tail lesion is unchanged possibly a pseudocyst.   5.  There is colonic diverticulosis without diverticulitis.   6.  There is mild wall thickening of the distal esophagus.   7.  There is a stable small amount of ascites.   8.  There is moderate colonic stool.      DX-CHEST-PORTABLE (1 VIEW)   Final Result      Hypoinflation with bibasilar atelectasis. No focal consolidation or pleural effusions.      DX-CHEST-PORTABLE (1 VIEW)   Final Result      Left basilar atelectasis.      CT-ABDOMEN-PELVIS WITH   Final Result      1.  Primarily cystic mass in the medial segment LEFT lobe liver, likely treated hepatoma.   2.  Complex low-attenuation lesion in the inferior aspect medial segment LEFT lobe liver which is new from prior exam and may represent cystic mass or focal hematoma related to rupture larger mass.  Minimal hyperdense components may indicate blood    products.  Abscess is felt less likely.   3.  Gallbladder is not visualized and may be compressed by or involved with liver mass.   4.  Small volume ascites with potential hemoperitoneum.   5.  Splenomegaly.      These findings were discussed with CARMEN OTTO on 7/2/2021 3:02 PM.      DX-CHEST-PORTABLE (1 VIEW)   Final Result      1.  Hypoinflation with minimal LEFT lung base atelectasis.   2.  No pneumonia or pulmonary edema.           Assessment/Plan  * Sepsis (HCC)  Assessment & Plan  This is Sepsis Present on admission  SIRS criteria identified on my evaluation include: Tachycardia, with heart rate greater than 90 BPM, Tachypnea, with respirations greater than 20 per minute and Leukocytosis, with WBC greater than 12,000  Source is unknown, possibly abdominal  Sepsis protocol initiated  Fluid  resuscitation ordered per protocol  IV antibiotics as appropriate for source of sepsis  The organ system failure is the respiratory system as is evidenced by the need for mechanical ventilation     Portal hypertension (HCC)  Assessment & Plan  Patient is have enlarged spleen, ascites and lower extremity edema.  Portal hypertension secondary from hepatic mass  Start Lasix and Aldactone.    Protein-calorie malnutrition (HCC)  Assessment & Plan  Patient was NPO for 6 days  Start thiamine  Monitor for refeeding syndrome- watch potassium and phosphorus closely    Status post laparotomy enteritis/colitis noted with purulent secretions in the peritoneum- (present on admission)  Assessment & Plan  Status post laparoscopy by Dr. Meza on 7/9  IV zosyn  MARIANA drain remains in with purulent output  Fluid cultures grew Streptococcus anginosus       Acute respiratory failure with hypercapnia (HCC)  Assessment & Plan  On 3l of o2 above baseline  Encourage ISS and ambulation     Aspiration pneumonia (HCC)- (present on admission)  Assessment & Plan  IV Zosyn    New onset seizure (HCC)- (present on admission)  Assessment & Plan  Witnessed seizure  MRI brain negative  EEG negative for epileptic activity    Encephalopathy acute  Assessment & Plan  Acute metabolic encephalopathy secondary to sepsis  Slowly improving    Acute urinary retention  Assessment & Plan  Triana was removed    Hypomagnesemia  Assessment & Plan  Patient's magnesium was 1.7. replacement given    Lactic acidosis- (present on admission)  Assessment & Plan  Lactic acid 2.7 at admission  Improved with IV fluids    Elevated bilirubin  Assessment & Plan  Bilirubin 2.3. Down to 1.7. improved  Continue to monitor  Trending down likely dehydration induced     Hyperkalemia- (present on admission)  Assessment & Plan  Potassium 5.7 at admission  Normalizing  IV fluids were given  resolved    Hyponatremia- (present on admission)  Assessment & Plan  Upon  admit  resolved    Constipation  Assessment & Plan  Patient has had chronic issues with constipation.  Severe, not improving.  Reported last bowel movement was over a week ago.  Abdominal x-ray showed constipation in the large colon.    Order for CT abdomen pelvis without contrast, concern for increased distention, rule out pneumoperitoneum  Bowel regimen in place  Continue with suppository.  PRN enema.  If ineffective, digital rectal removal.    Tobacco abuse- (present on admission)  Assessment & Plan  Smoking cessation was given at bedside  Nicotine patch      Thrombocytopenia (HCC)- (present on admission)  Assessment & Plan   at admission and now down to 57  May be secondary to liver disease  Check in the next few days and possibly stop lovenox if <50        Hepatoma (HCC)- (present on admission)  Assessment & Plan  On immunotherapy by Dr. Birch.    Type 2 diabetes mellitus without complication, without long-term current use of insulin (HCC)- (present on admission)  Assessment & Plan  Borderline diabetes, not on medications  07/03/21 - HbA1c 6.0, controlled, prediabetes at this time.  ISS, accuchecks, hypoglycemia protocol orders in place       VTE prophylaxis: enoxaparin ppx    I have performed a physical exam and reviewed and updated ROS and Plan today (7/18/2021).

## 2021-07-18 NOTE — PROGRESS NOTES
Pt is laying in bed, call light within reach, bed lowered and locked, fall education reinforced. Pt is A&Ox4 and on 1L of oxygen via nasal cannula. Pt lung sounds are diminished in all lobes, bowel sounds are hypoactive in all four quadrants, heart sounds are within defined limits. PT IV is clean,dry,intact,and patent and saline locked. Pt has a cortrak of the right puente that is clamped. Pt has an old midline MARIANA drain site with gauze and tegaderm CDI. Pt has a LLQ lap site with guaze and tegaderm CDI. Pt has a dressing of the right subclavian CDI. Pt is up x2 with a FWW.

## 2021-07-19 ENCOUNTER — APPOINTMENT (OUTPATIENT)
Dept: RADIOLOGY | Facility: MEDICAL CENTER | Age: 71
DRG: 356 | End: 2021-07-19
Attending: HOSPITALIST
Payer: MEDICARE

## 2021-07-19 LAB
ALBUMIN FLD-MCNC: 0.9 G/DL
ALBUMIN SERPL BCP-MCNC: 2 G/DL (ref 3.2–4.9)
ALBUMIN/GLOB SERPL: 0.5 G/DL
ALP SERPL-CCNC: 196 U/L (ref 30–99)
ALT SERPL-CCNC: 9 U/L (ref 2–50)
ANION GAP SERPL CALC-SCNC: 9 MMOL/L (ref 7–16)
APPEARANCE FLD: NORMAL
AST SERPL-CCNC: 24 U/L (ref 12–45)
BASOPHILS # BLD AUTO: 0.2 % (ref 0–1.8)
BASOPHILS # BLD: 0.03 K/UL (ref 0–0.12)
BILIRUB SERPL-MCNC: 1 MG/DL (ref 0.1–1.5)
BODY FLD TYPE: NORMAL
BODY FLD TYPE: NORMAL
BUN SERPL-MCNC: 26 MG/DL (ref 8–22)
CALCIUM SERPL-MCNC: 8.2 MG/DL (ref 8.5–10.5)
CHLORIDE SERPL-SCNC: 98 MMOL/L (ref 96–112)
CO2 SERPL-SCNC: 26 MMOL/L (ref 20–33)
COLOR FLD: YELLOW
CREAT SERPL-MCNC: 0.61 MG/DL (ref 0.5–1.4)
CRP SERPL HS-MCNC: 12.29 MG/DL (ref 0–0.75)
CYTOLOGY REG CYTOL: NORMAL
EOSINOPHIL # BLD AUTO: 0.01 K/UL (ref 0–0.51)
EOSINOPHIL NFR BLD: 0.1 % (ref 0–6.9)
ERYTHROCYTE [DISTWIDTH] IN BLOOD BY AUTOMATED COUNT: 60.2 FL (ref 35.9–50)
GLOBULIN SER CALC-MCNC: 4.2 G/DL (ref 1.9–3.5)
GLUCOSE SERPL-MCNC: 158 MG/DL (ref 65–99)
HCT VFR BLD AUTO: 34.5 % (ref 42–52)
HGB BLD-MCNC: 11 G/DL (ref 14–18)
IMM GRANULOCYTES # BLD AUTO: 0.12 K/UL (ref 0–0.11)
IMM GRANULOCYTES NFR BLD AUTO: 0.9 % (ref 0–0.9)
LYMPHOCYTES # BLD AUTO: 0.65 K/UL (ref 1–4.8)
LYMPHOCYTES NFR BLD: 4.9 % (ref 22–41)
LYMPHOCYTES NFR FLD: 3 %
MAGNESIUM SERPL-MCNC: 1.9 MG/DL (ref 1.5–2.5)
MCH RBC QN AUTO: 30.9 PG (ref 27–33)
MCHC RBC AUTO-ENTMCNC: 31.9 G/DL (ref 33.7–35.3)
MCV RBC AUTO: 96.9 FL (ref 81.4–97.8)
MONOCYTES # BLD AUTO: 0.81 K/UL (ref 0–0.85)
MONOCYTES NFR BLD AUTO: 6.2 % (ref 0–13.4)
MONONUC CELLS NFR FLD: 5 %
NEUTROPHILS # BLD AUTO: 11.54 K/UL (ref 1.82–7.42)
NEUTROPHILS NFR BLD: 87.7 % (ref 44–72)
NEUTROPHILS NFR FLD: 92 %
NRBC # BLD AUTO: 0 K/UL
NRBC BLD-RTO: 0 /100 WBC
PHOSPHATE SERPL-MCNC: 3.1 MG/DL (ref 2.5–4.5)
PLATELET # BLD AUTO: 87 K/UL (ref 164–446)
PMV BLD AUTO: 9.6 FL (ref 9–12.9)
POTASSIUM SERPL-SCNC: 3.7 MMOL/L (ref 3.6–5.5)
PREALB SERPL-MCNC: 4.9 MG/DL (ref 18–38)
PROT FLD-MCNC: 1.8 G/DL
PROT SERPL-MCNC: 6.2 G/DL (ref 6–8.2)
RBC # BLD AUTO: 3.56 M/UL (ref 4.7–6.1)
RBC # FLD: <2000 CELLS/UL
SODIUM SERPL-SCNC: 133 MMOL/L (ref 135–145)
WBC # BLD AUTO: 13.2 K/UL (ref 4.8–10.8)
WBC # FLD: 2141 CELLS/UL

## 2021-07-19 PROCEDURE — 88112 CYTOPATH CELL ENHANCE TECH: CPT

## 2021-07-19 PROCEDURE — 80053 COMPREHEN METABOLIC PANEL: CPT

## 2021-07-19 PROCEDURE — P9047 ALBUMIN (HUMAN), 25%, 50ML: HCPCS | Mod: JG | Performed by: HOSPITALIST

## 2021-07-19 PROCEDURE — 87015 SPECIMEN INFECT AGNT CONCNTJ: CPT

## 2021-07-19 PROCEDURE — 83735 ASSAY OF MAGNESIUM: CPT

## 2021-07-19 PROCEDURE — 770006 HCHG ROOM/CARE - MED/SURG/GYN SEMI*

## 2021-07-19 PROCEDURE — 700111 HCHG RX REV CODE 636 W/ 250 OVERRIDE (IP): Mod: JG | Performed by: HOSPITALIST

## 2021-07-19 PROCEDURE — 84157 ASSAY OF PROTEIN OTHER: CPT

## 2021-07-19 PROCEDURE — A9270 NON-COVERED ITEM OR SERVICE: HCPCS | Performed by: INTERNAL MEDICINE

## 2021-07-19 PROCEDURE — 0W9G3ZZ DRAINAGE OF PERITONEAL CAVITY, PERCUTANEOUS APPROACH: ICD-10-PCS | Performed by: RADIOLOGY

## 2021-07-19 PROCEDURE — 700102 HCHG RX REV CODE 250 W/ 637 OVERRIDE(OP): Performed by: INTERNAL MEDICINE

## 2021-07-19 PROCEDURE — 36415 COLL VENOUS BLD VENIPUNCTURE: CPT

## 2021-07-19 PROCEDURE — A9270 NON-COVERED ITEM OR SERVICE: HCPCS | Performed by: HOSPITALIST

## 2021-07-19 PROCEDURE — 85025 COMPLETE CBC W/AUTO DIFF WBC: CPT

## 2021-07-19 PROCEDURE — 84134 ASSAY OF PREALBUMIN: CPT

## 2021-07-19 PROCEDURE — 700102 HCHG RX REV CODE 250 W/ 637 OVERRIDE(OP): Performed by: HOSPITALIST

## 2021-07-19 PROCEDURE — 88305 TISSUE EXAM BY PATHOLOGIST: CPT

## 2021-07-19 PROCEDURE — 4410569 US-PARACENTESIS, ABD WITH IMAGING

## 2021-07-19 PROCEDURE — 87205 SMEAR GRAM STAIN: CPT

## 2021-07-19 PROCEDURE — 92526 ORAL FUNCTION THERAPY: CPT

## 2021-07-19 PROCEDURE — 82042 OTHER SOURCE ALBUMIN QUAN EA: CPT

## 2021-07-19 PROCEDURE — 84100 ASSAY OF PHOSPHORUS: CPT

## 2021-07-19 PROCEDURE — 86140 C-REACTIVE PROTEIN: CPT

## 2021-07-19 PROCEDURE — 87070 CULTURE OTHR SPECIMN AEROBIC: CPT

## 2021-07-19 PROCEDURE — 89051 BODY FLUID CELL COUNT: CPT

## 2021-07-19 PROCEDURE — 99233 SBSQ HOSP IP/OBS HIGH 50: CPT | Performed by: HOSPITALIST

## 2021-07-19 RX ORDER — SPIRONOLACTONE 50 MG/1
25 TABLET, FILM COATED ORAL
Status: DISCONTINUED | OUTPATIENT
Start: 2021-07-19 | End: 2021-07-23 | Stop reason: HOSPADM

## 2021-07-19 RX ORDER — FAMOTIDINE 20 MG/1
20 TABLET, FILM COATED ORAL EVERY 12 HOURS
Status: DISCONTINUED | OUTPATIENT
Start: 2021-07-19 | End: 2021-07-23 | Stop reason: HOSPADM

## 2021-07-19 RX ORDER — AMOXICILLIN 250 MG
2 CAPSULE ORAL 2 TIMES DAILY
Status: DISCONTINUED | OUTPATIENT
Start: 2021-07-19 | End: 2021-07-21

## 2021-07-19 RX ORDER — BISACODYL 10 MG
10 SUPPOSITORY, RECTAL RECTAL
Status: DISCONTINUED | OUTPATIENT
Start: 2021-07-19 | End: 2021-07-21

## 2021-07-19 RX ORDER — ACETAMINOPHEN 325 MG/1
650 TABLET ORAL EVERY 6 HOURS PRN
Status: DISCONTINUED | OUTPATIENT
Start: 2021-07-19 | End: 2021-07-23 | Stop reason: HOSPADM

## 2021-07-19 RX ORDER — FUROSEMIDE 10 MG/ML
40 INJECTION INTRAMUSCULAR; INTRAVENOUS
Status: DISCONTINUED | OUTPATIENT
Start: 2021-07-19 | End: 2021-07-23 | Stop reason: HOSPADM

## 2021-07-19 RX ORDER — POLYETHYLENE GLYCOL 3350 17 G/17G
1 POWDER, FOR SOLUTION ORAL
Status: DISCONTINUED | OUTPATIENT
Start: 2021-07-19 | End: 2021-07-21

## 2021-07-19 RX ORDER — ALBUMIN (HUMAN) 12.5 G/50ML
25 SOLUTION INTRAVENOUS ONCE
Status: COMPLETED | OUTPATIENT
Start: 2021-07-19 | End: 2021-07-19

## 2021-07-19 RX ORDER — ONDANSETRON 4 MG/1
4 TABLET, ORALLY DISINTEGRATING ORAL EVERY 4 HOURS PRN
Status: DISCONTINUED | OUTPATIENT
Start: 2021-07-19 | End: 2021-07-23 | Stop reason: HOSPADM

## 2021-07-19 RX ORDER — GUAIFENESIN/DEXTROMETHORPHAN 100-10MG/5
5 SYRUP ORAL EVERY 6 HOURS PRN
Status: DISCONTINUED | OUTPATIENT
Start: 2021-07-19 | End: 2021-07-23 | Stop reason: HOSPADM

## 2021-07-19 RX ADMIN — FAMOTIDINE 20 MG: 20 TABLET ORAL at 05:03

## 2021-07-19 RX ADMIN — THIAMINE HCL TAB 100 MG 100 MG: 100 TAB at 05:03

## 2021-07-19 RX ADMIN — FUROSEMIDE 20 MG: 20 TABLET ORAL at 05:03

## 2021-07-19 RX ADMIN — SPIRONOLACTONE 25 MG: 50 TABLET ORAL at 16:53

## 2021-07-19 RX ADMIN — FAMOTIDINE 20 MG: 10 INJECTION INTRAVENOUS at 17:37

## 2021-07-19 RX ADMIN — FUROSEMIDE 40 MG: 10 INJECTION, SOLUTION INTRAMUSCULAR; INTRAVENOUS at 13:15

## 2021-07-19 RX ADMIN — FUROSEMIDE 40 MG: 10 INJECTION, SOLUTION INTRAMUSCULAR; INTRAVENOUS at 17:38

## 2021-07-19 RX ADMIN — ALBUMIN (HUMAN) 25 G: 5 SOLUTION INTRAVENOUS at 16:30

## 2021-07-19 RX ADMIN — TRAMADOL HYDROCHLORIDE 50 MG: 50 TABLET, FILM COATED ORAL at 16:47

## 2021-07-19 ASSESSMENT — PAIN DESCRIPTION - PAIN TYPE
TYPE: ACUTE PAIN
TYPE: CHRONIC PAIN
TYPE: ACUTE PAIN

## 2021-07-19 NOTE — PROGRESS NOTES
Hospital Medicine Daily Progress Note    Date of Service  7/19/2021    Chief Complaint  Lorraine Bella is a 71 y.o. male admitted 7/2/2021 with abdominal pain    Hospital Course  This is a 71-year-old male patient with past medical history of hepatoma since 2018, on IV immunotherapy last dose 2 weeks ago follows with Dr. Birch presented on 7/2/2021 with sudden onset abdominal pain which woke him up from his sleep. Is associated with constipation and obstipation. In the ER her labs were significant for sodium 126, potassium 5.7, bilirubin of 2.3, lactate 2.7. Her CT of the abdomen showed possible ruptured hepatic cyst with hemoperitoneum. Surgery was consulted from the ER.   He underwent a laparoscopy with washout by Dr. Meza on 7/9 and was empirically initiated on IV Zosyn.   He required emergent intubation on 7/10 and had a reported seizure.    An EEG revealed cerebral dysfunction though no evidence of seizure activity. MRI brain was negative.  He was successfully extubated on 7/12.       Interval Problem Update  7/13: Mr. Bella was evaluated and examined in the ICU. He has been tolerating cortrak feeding.   His wife of 45 years Sharlene is at bedside and we discussed his condition. 40 ml out of MARIANA over night and 20 ml so far today. We discussed the likelihood of a SNF once he is medically stable.  Patient vital signs are stable.  Procalcitonin is trending downwards.  7/14: Patient was seen by me at bedside today.  He has no new complaints but states that he is generally weak.  Dietary suggested that patient may be developing refeeding syndrome and thiamine was started.  We will monitor potassium and phosphorus closely.  7/15: Patient underwent a fees and dietary recommended to start a thickened liquid diet.  Surgery recommended continue external drain.  SNF placement is pending.  I have personally seen and examined the patient at bedside. I discussed the plan of care  7/16: Patients drain was removed  today. Speech evaluated the patient and determined he could have a diet.  He was found to be hypophosphatemic and that was replaced. We will need surgery clearance before discharge. SNF placement is pending.   7/17: Patient had no new complaints today. He was found to be more lucid today.  Currently he is tolerating a diet.  Patient is having bowel movements.  Abdomen was noted to be more distended today.  We will continue to monitor his white count.  Procalcitonin is trending downwards.  7/18: Patient was found to be volume overloaded on examination today.  I will start with IV Lasix and oral Lasix since he has a history of portal hypertension.  We will continue to wean him off oxygen.  SNF placement is pending.  7/19: Patient has decompensated liver cirrhosis and have ordered 40 twice daily of IV Lasix and paracentesis.  Consultants/Specialty  Surgery  Critical care. I discussed with Dr. Heller    Code Status  Full Code    Disposition  Patient is not medically cleared.   Anticipate discharge to to skilled nursing facility.      Review of Systems  Review of Systems   Unable to perform ROS: Medical condition        Physical Exam  Temp:  [36.3 °C (97.3 °F)-36.6 °C (97.9 °F)] 36.6 °C (97.9 °F)  Pulse:  [] 106  Resp:  [17-20] 20  BP: (110-129)/(66-79) 129/77  SpO2:  [94 %-98 %] 95 %    Physical Exam  Vitals and nursing note reviewed.   Constitutional:       Appearance: He is ill-appearing.   HENT:      Head: Normocephalic and atraumatic.      Nose:      Comments: cortrak nares     Mouth/Throat:      Mouth: Mucous membranes are dry.      Pharynx: Oropharynx is clear.   Eyes:      General: No scleral icterus.     Conjunctiva/sclera: Conjunctivae normal.   Cardiovascular:      Rate and Rhythm: Normal rate and regular rhythm.      Heart sounds: No murmur heard.     Pulmonary:      Effort: Pulmonary effort is normal.      Breath sounds: Normal breath sounds.      Comments: 3 liters of oxygen  Abdominal:      General:  There is distension.      Tenderness: There is no abdominal tenderness.      Comments: MARIANA drain    Musculoskeletal:      Right lower leg: Edema present.      Left lower leg: Edema present.      Comments: Bruise right arm   Skin:     General: Skin is warm.   Neurological:      General: No focal deficit present.      Mental Status: He is alert.   Psychiatric:      Comments: somnolent         Fluids    Intake/Output Summary (Last 24 hours) at 7/19/2021 1345  Last data filed at 7/19/2021 0942  Gross per 24 hour   Intake 360 ml   Output 1100 ml   Net -740 ml       Laboratory  Recent Labs     07/17/21  1018 07/18/21  0832 07/19/21  0135   WBC 13.2* 16.3* 13.2*   RBC 3.62* 3.83* 3.56*   HEMOGLOBIN 11.2* 12.1* 11.0*   HEMATOCRIT 34.8* 37.8* 34.5*   MCV 96.1 98.7* 96.9   MCH 30.9 31.6 30.9   MCHC 32.2* 32.0* 31.9*   RDW 59.7* 61.8* 60.2*   PLATELETCT 67* 82* 87*   MPV 9.5 9.5 9.6     Recent Labs     07/17/21  1018 07/18/21  0832 07/19/21  0135   SODIUM 137 135 133*   POTASSIUM 4.0 4.3 3.7   CHLORIDE 101 97 98   CO2 27 29 26   GLUCOSE 100* 99 158*   BUN 21 21 26*   CREATININE 0.62 0.69 0.61   CALCIUM 8.2* 8.6 8.2*                   Imaging  DX-ABDOMEN FOR TUBE PLACEMENT   Final Result         1.  Nonspecific bowel gas pattern.   2.  Dobbhoff tube tip overlying the expected location of the pylorus or first duodenal segment.   3.  Pulmonary edema and/or infiltrates      DX-CHEST-PORTABLE (1 VIEW)   Final Result         1.  Pulmonary edema and/or infiltrates are identified, which are somewhat decreased since the prior exam.      DX-ABDOMEN FOR TUBE PLACEMENT   Final Result         1.  Nonspecific bowel gas pattern.   2.  Dobbhoff tube tip overlying the expected location of the pylorus or first duodenal segment.      DX-ABDOMEN FOR TUBE PLACEMENT   Final Result      Enteric tube projects over the second portion of the duodenum.      DX-CHEST-PORTABLE (1 VIEW)   Final Result         1.  Pulmonary edema and/or infiltrates are  identified, which are stable since the prior exam.      XB-YILNURE-7 VIEW   Final Result      No dilated bowel      DX-CHEST-PORTABLE (1 VIEW)   Final Result         No significant interval change.         MR-BRAIN-WITH & W/O   Final Result      1.  No acute abnormality.   2.  Mild to moderate cerebral volume loss.   3.  Minimal chronic microvascular ischemic disease.      DX-CHEST-PORTABLE (1 VIEW)   Final Result      1.  Interval increasing bilateral airspace opacity suspicious for pneumonia most prominent in the right lower lobe.   2.  There is probably superimposed vascular congestion.      CT-HEAD W/O   Final Result         1. No acute intracranial abnormality. No evidence of acute intracranial hemorrhage or mass lesion.               DX-CHEST-PORTABLE (1 VIEW)   Final Result         Endotracheal tube with tip projecting over the mid thoracic trachea.      Gastric drainage tube courses below diaphragm, tip is in the stomach.      Right central venous catheter with tip projecting over the expected area of the lower SVC.      CT-ABDOMEN-PELVIS W/O   Final Result      1.  New fluid distended small bowel loops which are nonspecific and could be related to enteritis or small bowel ileus. Developing obstruction is considered less likely though not excluded.   2.  There appears to be new wall thickening of the ascending colon likely representing infectious/inflammatory colitis.   3.  Mild ascites which is mildly increased from prior study. Omental and mesenteric infiltration which is new from prior and could be related to edema or inflammation.   4.  Small right pleural effusion. Bibasilar atelectasis.   5.   No other significant change.      DX-CHEST-PORTABLE (1 VIEW)   Final Result      1.  New right basilar airspace process consistent with atelectasis or pneumonia      2.  Mild left midlung zone atelectasis      AM-SRQRLNM-1 VIEW   Final Result      1. Moderate stool mixed with contrast in the ascending colon.   2.  Moderate stool in the transverse colon.   3. No bowel obstruction.   4. Other chronic degenerative changes.      CT-ABDOMEN-PELVIS W/O   Final Result      1.  There is no acute inflammatory process in the abdomen or pelvis.   2.  The treated lesions in the liver are again seen without interval change.   3.  Splenomegaly is again seen.   4.  Cystic pancreatic tail lesion is unchanged possibly a pseudocyst.   5.  There is colonic diverticulosis without diverticulitis.   6.  There is mild wall thickening of the distal esophagus.   7.  There is a stable small amount of ascites.   8.  There is moderate colonic stool.      DX-CHEST-PORTABLE (1 VIEW)   Final Result      Hypoinflation with bibasilar atelectasis. No focal consolidation or pleural effusions.      DX-CHEST-PORTABLE (1 VIEW)   Final Result      Left basilar atelectasis.      CT-ABDOMEN-PELVIS WITH   Final Result      1.  Primarily cystic mass in the medial segment LEFT lobe liver, likely treated hepatoma.   2.  Complex low-attenuation lesion in the inferior aspect medial segment LEFT lobe liver which is new from prior exam and may represent cystic mass or focal hematoma related to rupture larger mass.  Minimal hyperdense components may indicate blood    products.  Abscess is felt less likely.   3.  Gallbladder is not visualized and may be compressed by or involved with liver mass.   4.  Small volume ascites with potential hemoperitoneum.   5.  Splenomegaly.      These findings were discussed with CARMEN OTTO on 7/2/2021 3:02 PM.      DX-CHEST-PORTABLE (1 VIEW)   Final Result      1.  Hypoinflation with minimal LEFT lung base atelectasis.   2.  No pneumonia or pulmonary edema.      US-PARACENTESIS, ABD WITH IMAGING    (Results Pending)        Assessment/Plan  * Sepsis (HCC)  Assessment & Plan  This is Sepsis Present on admission  SIRS criteria identified on my evaluation include: Tachycardia, with heart rate greater than 90 BPM, Tachypnea, with respirations  greater than 20 per minute and Leukocytosis, with WBC greater than 12,000  Source is unknown, possibly abdominal  Sepsis protocol initiated  Fluid resuscitation ordered per protocol  IV antibiotics as appropriate for source of sepsis  The organ system failure is the respiratory system as is evidenced by the need for mechanical ventilation     Portal hypertension (HCC)  Assessment & Plan  Patient is have enlarged spleen, ascites and lower extremity edema.  Portal hypertension secondary from hepatic mass  Start Lasix and Aldactone.  Monitor daily weights and strict in and out  Ordered a paracentesis     Protein-calorie malnutrition (HCC)  Assessment & Plan  Patient was NPO for 6 days  Start thiamine  Monitor for refeeding syndrome- watch potassium and phosphorus closely    Status post laparotomy enteritis/colitis noted with purulent secretions in the peritoneum- (present on admission)  Assessment & Plan  Status post laparoscopy by Dr. Meza on 7/9  IV zosyn  MARIANA drain remains in with purulent output  Fluid cultures grew Streptococcus anginosus       Acute respiratory failure with hypercapnia (HCC)  Assessment & Plan  On 3l of o2 above baseline  Encourage ISS and ambulation     Aspiration pneumonia (HCC)- (present on admission)  Assessment & Plan  IV Zosyn    New onset seizure (HCC)- (present on admission)  Assessment & Plan  Witnessed seizure  MRI brain negative  EEG negative for epileptic activity    Encephalopathy acute  Assessment & Plan  Acute metabolic encephalopathy secondary to sepsis  Slowly improving    Acute urinary retention  Assessment & Plan  Triana was removed    Hypomagnesemia  Assessment & Plan  Patient's magnesium was 1.7. replacement given    Lactic acidosis- (present on admission)  Assessment & Plan  Lactic acid 2.7 at admission  Improved with IV fluids    Elevated bilirubin  Assessment & Plan  Bilirubin 2.3. Down to 1.7. improved  Continue to monitor  Trending down likely dehydration induced      Hyperkalemia- (present on admission)  Assessment & Plan  Potassium 5.7 at admission  Normalizing  IV fluids were given  resolved    Hyponatremia- (present on admission)  Assessment & Plan  Upon admit  resolved    Constipation  Assessment & Plan  Patient has had chronic issues with constipation.  Severe, not improving.  Reported last bowel movement was over a week ago.  Abdominal x-ray showed constipation in the large colon.    Order for CT abdomen pelvis without contrast, concern for increased distention, rule out pneumoperitoneum  Bowel regimen in place  Continue with suppository.  PRN enema.  If ineffective, digital rectal removal.    Tobacco abuse- (present on admission)  Assessment & Plan  Smoking cessation was given at bedside  Nicotine patch      Thrombocytopenia (HCC)- (present on admission)  Assessment & Plan   at admission and now down to 57  May be secondary to liver disease  Check in the next few days and possibly stop lovenox if <50        Hepatoma (HCC)- (present on admission)  Assessment & Plan  On immunotherapy by Dr. Birch.    Type 2 diabetes mellitus without complication, without long-term current use of insulin (HCC)- (present on admission)  Assessment & Plan  Borderline diabetes, not on medications  07/03/21 - HbA1c 6.0, controlled, prediabetes at this time.  ISS, accuchecks, hypoglycemia protocol orders in place       VTE prophylaxis: enoxaparin ppx    I have performed a physical exam and reviewed and updated ROS and Plan today (7/19/2021).

## 2021-07-19 NOTE — CARE PLAN
Clinical Goals: ambulate  Patient Goals: rest  Family Goals: no family present    Progress made toward(s) clinical / shift goals: pt teaching back ambulation strategies and moving in bed safely.    Patient is not progressing towards the following goals:      Problem: Pain - Standard  Goal: Alleviation of pain or a reduction in pain to the patient’s comfort goal  7/19/2021 1043 by Karyn Rodriguez R.N.  Outcome: Progressing  7/19/2021 0914 by Karyn Rodriguez R.N.  Outcome: Progressing     Problem: Knowledge Deficit - Standard  Goal: Patient and family/care givers will demonstrate understanding of plan of care, disease process/condition, diagnostic tests and medications  7/19/2021 1043 by Karyn Rodriguez R.N.  Outcome: Progressing  7/19/2021 0914 by Karyn Rodriguez, R.N.  Outcome: Progressing     Problem: Fall Risk  Goal: Patient will remain free from falls  7/19/2021 1043 by Karyn Rodriguez R.N.  Outcome: Progressing  7/19/2021 0914 by Karyn Rodriguez R.N.  Outcome: Progressing

## 2021-07-19 NOTE — CARE PLAN
The patient is stable    Problem: Pain - Standard  Goal: Alleviation of pain or a reduction in pain to the patient’s comfort goal  Outcome: Progressing     Problem: Knowledge Deficit - Standard  Goal: Patient and family/care givers will demonstrate understanding of plan of care, disease process/condition, diagnostic tests and medications  Outcome: Progressing    Shift Goals  Clinical Goals: ambulate  Patient Goals: rest  Family Goals: no family present    Progress made toward(s) clinical / shift goals:  patient refusing to ambulate at this time - resting.

## 2021-07-19 NOTE — PROGRESS NOTES
Doctor Pearce consented patient for a paracentesis.  Patient tolerated procedure well and left in a stable condition.  Vitals were monitored and logged in epic.  4000ml of fluid was removed.  1000ml was sent to lab.  Karyn HOFFMAN was given report.

## 2021-07-19 NOTE — PROGRESS NOTES
Report received from day shift RN, assumed Care.   Patient is AOx4, responds appropriately.      Pain controlled at this time.  Patient is tolerating level 3 diet along with nocturnal tube feedings goal 85ml/hr, denies nausea/vomiting. + flatus.   Up with max assist steady gait.    Plan of care discussed, all questions answered.    Educated on use of call light and importance of calling before getting out of bed. Pt verbalizes understanding.    Call light and belongings within reach, treaded slipper socks on, bed alarm in use, bed in lowest locked position.  All needs met at this time.

## 2021-07-19 NOTE — DISCHARGE PLANNING
Care Transition Team Discharge Planning    Anticipated Discharge Information  Discharge Disposition: D/T to SNF with Medicare cert in anticipation of skilled care (03)              Discharge Plan:   SNF    Pt has a completed PASRR and the number is [id number].

## 2021-07-19 NOTE — THERAPY
PT Contact Note    PT tx attempted. Pt scheduled for Paracentesis this afternoon. He prefers to defer therapy tx until after paracentesis. Will follow-up tomorrow.    Madyson Frankel, PT, DPT  Ext. [phone]

## 2021-07-19 NOTE — DISCHARGE PLANNING
Care Transition Team Discharge Planning    Anticipates discharge disposition:  • SNF    Action:  • This RN CM is following the case. Per rounds today with Dr Vergara, Pt is scheduled for Paracentesis today.    Per Trevor REVELES, Pt is still pending insurance auth with [facility].  Will submit PASRR    Barriers to Discharge:  • Pending medical clearance  • Pending SNF acceptance    Plan:  • CM to continue to assist Pt with discharge as needed

## 2021-07-19 NOTE — CARE PLAN
Problem: Pain - Standard  Goal: Alleviation of pain or a reduction in pain to the patient’s comfort goal  Outcome: Progressing     Problem: Knowledge Deficit - Standard  Goal: Patient and family/care givers will demonstrate understanding of plan of care, disease process/condition, diagnostic tests and medications  Outcome: Progressing     Problem: Fall Risk  Goal: Patient will remain free from falls  Outcome: Progressing     Problem: Bowel Elimination  Goal: Establish and maintain regular bowel function  Outcome: Progressing     Problem: Gastrointestinal Irritability  Goal: Diarrhea will be absent or improved  Outcome: Progressing

## 2021-07-19 NOTE — OR SURGEON
EXAMINATION:  7/19/2021 3:24 PM    HISTORY/REASON FOR EXAM:  Ascites         TECHNIQUE/EXAM DESCRIPTION:  Ultrasound-guided paracentesis.    COMPARISON:  None    PROCEDURE:  Informed consent was obtained. A timeout was taken. Ascites was localized with real-time ultrasound. The left lower quadrant of the abdominal wall was prepared and draped in a sterile manner. Following local anesthesia with 1% lidocaine, a 5 Hungarian Yueh pigtail catheter was advanced into the peritoneal cavity with trocar technique. Ascites was drained. The patient tolerated the procedure well with no evidence of complication.    FINDINGS:  Ascites is present.    Fluid was not sent to the laboratory. No blood loss. No specimen.    Fluid character: straw colored    IMPRESSION:  1. Ultrasound-guided therapeutic paracentesis of the left lower quadrant of the abdominal wall.    2. 4000 mL of fluid withdrawn.

## 2021-07-19 NOTE — PROGRESS NOTES
Pt back to floor, VSS, reports mild pain, medicating per MAR/. Site CDI with band aid, no drainage.

## 2021-07-19 NOTE — DISCHARGE PLANNING
"Agency/Facility Name: [facility]  Outcome: Left vmail for admissions regarding referral     Agency/Facility Name: [facility]   Spoke To: Ned  Outcome: Per Ned, if this Pt is not vaccinated, there are beds avail.  If he is, there is a waiting list of 3 Pts before this Pt.  Ned states this Pt's insurance requires auth, and will be submitted today 07/19.  Ned hopes to place this Pt either today or tomorrow, and transport on their end is not confirmed    -0928  Agency/Facility Name: [facility]   Spoke To: Ned  Outcome: Per Ned, this Pt will need a PASSR.  Ned states the PASSR must state the name \"Lorraine Bella\".  Per RN CM, Pt is not vaccinated.  DPA informed Ned.    RN CM notified   "

## 2021-07-19 NOTE — CARE PLAN
Problem: Pain - Standard  Goal: Alleviation of pain or a reduction in pain to the patient’s comfort goal  Outcome: Progressing     Problem: Knowledge Deficit - Standard  Goal: Patient and family/care givers will demonstrate understanding of plan of care, disease process/condition, diagnostic tests and medications  Outcome: Progressing     Problem: Fall Risk  Goal: Patient will remain free from falls  Outcome: Progressing     Problem: Bowel Elimination  Goal: Establish and maintain regular bowel function  Outcome: Progressing     Clinical Goals: ambulate  Patient Goals: BM  Family Goals: no family present    Progress made toward(s) clinical / shift goals:  up to chair, multiple BMS, working with dietary      Patient is not progressing towards the following goals:

## 2021-07-19 NOTE — THERAPY
Missed Therapy     Patient Name: Lorraine Bella  Age:  71 y.o., Sex:  male  Medical Record #: [id number]  Today's Date: 7/19/2021    Discussed missed therapy with Tori       07/19/21 1522   Interdisciplinary Plan of Care Collaboration   Collaboration Comments OT tx attemtped, pt has a parcentesis this afternoon & is feeling very uncomfortable, pt requesting to hold therapy until tomorrow.

## 2021-07-20 ENCOUNTER — APPOINTMENT (OUTPATIENT)
Dept: RADIOLOGY | Facility: MEDICAL CENTER | Age: 71
DRG: 356 | End: 2021-07-20
Attending: HOSPITALIST
Payer: MEDICARE

## 2021-07-20 PROBLEM — R73.03 PREDIABETES: Status: ACTIVE | Noted: 2018-08-15

## 2021-07-20 PROBLEM — R18.8 ASCITES: Status: ACTIVE | Noted: 2021-07-18

## 2021-07-20 PROBLEM — K65.9 PERITONITIS (HCC): Status: ACTIVE | Noted: 2021-07-10

## 2021-07-20 PROBLEM — E83.39 HYPOPHOSPHATEMIA: Status: ACTIVE | Noted: 2021-07-20

## 2021-07-20 PROBLEM — R13.10 DYSPHAGIA: Status: ACTIVE | Noted: 2021-07-20

## 2021-07-20 PROBLEM — E87.6 HYPOKALEMIA: Status: ACTIVE | Noted: 2021-07-20

## 2021-07-20 LAB
ALBUMIN SERPL BCP-MCNC: 2.4 G/DL (ref 3.2–4.9)
ALBUMIN/GLOB SERPL: 0.6 G/DL
ALP SERPL-CCNC: 179 U/L (ref 30–99)
ALT SERPL-CCNC: 8 U/L (ref 2–50)
ANION GAP SERPL CALC-SCNC: 10 MMOL/L (ref 7–16)
AST SERPL-CCNC: 23 U/L (ref 12–45)
BASOPHILS # BLD AUTO: 0.3 % (ref 0–1.8)
BASOPHILS # BLD: 0.03 K/UL (ref 0–0.12)
BILIRUB SERPL-MCNC: 1 MG/DL (ref 0.1–1.5)
BUN SERPL-MCNC: 25 MG/DL (ref 8–22)
CALCIUM SERPL-MCNC: 8.1 MG/DL (ref 8.5–10.5)
CHLORIDE SERPL-SCNC: 99 MMOL/L (ref 96–112)
CO2 SERPL-SCNC: 27 MMOL/L (ref 20–33)
CREAT SERPL-MCNC: 0.58 MG/DL (ref 0.5–1.4)
EOSINOPHIL # BLD AUTO: 0.01 K/UL (ref 0–0.51)
EOSINOPHIL NFR BLD: 0.1 % (ref 0–6.9)
ERYTHROCYTE [DISTWIDTH] IN BLOOD BY AUTOMATED COUNT: 62.4 FL (ref 35.9–50)
GLOBULIN SER CALC-MCNC: 3.8 G/DL (ref 1.9–3.5)
GLUCOSE SERPL-MCNC: 173 MG/DL (ref 65–99)
GRAM STN SPEC: NORMAL
HCT VFR BLD AUTO: 34.2 % (ref 42–52)
HGB BLD-MCNC: 11.1 G/DL (ref 14–18)
IMM GRANULOCYTES # BLD AUTO: 0.09 K/UL (ref 0–0.11)
IMM GRANULOCYTES NFR BLD AUTO: 0.8 % (ref 0–0.9)
LYMPHOCYTES # BLD AUTO: 0.49 K/UL (ref 1–4.8)
LYMPHOCYTES NFR BLD: 4.3 % (ref 22–41)
MAGNESIUM SERPL-MCNC: 1.9 MG/DL (ref 1.5–2.5)
MCH RBC QN AUTO: 31.4 PG (ref 27–33)
MCHC RBC AUTO-ENTMCNC: 32.5 G/DL (ref 33.7–35.3)
MCV RBC AUTO: 96.9 FL (ref 81.4–97.8)
MONOCYTES # BLD AUTO: 0.7 K/UL (ref 0–0.85)
MONOCYTES NFR BLD AUTO: 6.1 % (ref 0–13.4)
NEUTROPHILS # BLD AUTO: 10.14 K/UL (ref 1.82–7.42)
NEUTROPHILS NFR BLD: 88.4 % (ref 44–72)
NRBC # BLD AUTO: 0 K/UL
NRBC BLD-RTO: 0 /100 WBC
PHOSPHATE SERPL-MCNC: 3 MG/DL (ref 2.5–4.5)
PLATELET # BLD AUTO: 76 K/UL (ref 164–446)
PMV BLD AUTO: 9.6 FL (ref 9–12.9)
POTASSIUM SERPL-SCNC: 3.5 MMOL/L (ref 3.6–5.5)
PROT SERPL-MCNC: 6.2 G/DL (ref 6–8.2)
RBC # BLD AUTO: 3.53 M/UL (ref 4.7–6.1)
SIGNIFICANT IND 70042: NORMAL
SITE SITE: NORMAL
SODIUM SERPL-SCNC: 136 MMOL/L (ref 135–145)
SOURCE SOURCE: NORMAL
WBC # BLD AUTO: 11.5 K/UL (ref 4.8–10.8)

## 2021-07-20 PROCEDURE — A9270 NON-COVERED ITEM OR SERVICE: HCPCS | Performed by: FAMILY MEDICINE

## 2021-07-20 PROCEDURE — 36415 COLL VENOUS BLD VENIPUNCTURE: CPT

## 2021-07-20 PROCEDURE — A9270 NON-COVERED ITEM OR SERVICE: HCPCS | Performed by: STUDENT IN AN ORGANIZED HEALTH CARE EDUCATION/TRAINING PROGRAM

## 2021-07-20 PROCEDURE — A9270 NON-COVERED ITEM OR SERVICE: HCPCS | Performed by: HOSPITALIST

## 2021-07-20 PROCEDURE — 770006 HCHG ROOM/CARE - MED/SURG/GYN SEMI*

## 2021-07-20 PROCEDURE — 97530 THERAPEUTIC ACTIVITIES: CPT

## 2021-07-20 PROCEDURE — 84100 ASSAY OF PHOSPHORUS: CPT

## 2021-07-20 PROCEDURE — 85025 COMPLETE CBC W/AUTO DIFF WBC: CPT

## 2021-07-20 PROCEDURE — 83735 ASSAY OF MAGNESIUM: CPT

## 2021-07-20 PROCEDURE — 99233 SBSQ HOSP IP/OBS HIGH 50: CPT | Performed by: FAMILY MEDICINE

## 2021-07-20 PROCEDURE — 80053 COMPREHEN METABOLIC PANEL: CPT

## 2021-07-20 PROCEDURE — 700111 HCHG RX REV CODE 636 W/ 250 OVERRIDE (IP): Performed by: FAMILY MEDICINE

## 2021-07-20 PROCEDURE — 700102 HCHG RX REV CODE 250 W/ 637 OVERRIDE(OP): Performed by: STUDENT IN AN ORGANIZED HEALTH CARE EDUCATION/TRAINING PROGRAM

## 2021-07-20 PROCEDURE — 700102 HCHG RX REV CODE 250 W/ 637 OVERRIDE(OP): Performed by: HOSPITALIST

## 2021-07-20 PROCEDURE — 700102 HCHG RX REV CODE 250 W/ 637 OVERRIDE(OP): Performed by: FAMILY MEDICINE

## 2021-07-20 PROCEDURE — 700111 HCHG RX REV CODE 636 W/ 250 OVERRIDE (IP): Performed by: HOSPITALIST

## 2021-07-20 RX ORDER — MAGNESIUM SULFATE HEPTAHYDRATE 40 MG/ML
2 INJECTION, SOLUTION INTRAVENOUS ONCE
Status: COMPLETED | OUTPATIENT
Start: 2021-07-20 | End: 2021-07-20

## 2021-07-20 RX ORDER — POTASSIUM CHLORIDE 20 MEQ/1
20 TABLET, EXTENDED RELEASE ORAL 2 TIMES DAILY
Status: DISCONTINUED | OUTPATIENT
Start: 2021-07-20 | End: 2021-07-23 | Stop reason: HOSPADM

## 2021-07-20 RX ORDER — POTASSIUM CHLORIDE 20 MEQ/1
20 TABLET, EXTENDED RELEASE ORAL DAILY
Status: DISCONTINUED | OUTPATIENT
Start: 2021-07-20 | End: 2021-07-20

## 2021-07-20 RX ADMIN — OXYCODONE 5 MG: 5 TABLET ORAL at 14:50

## 2021-07-20 RX ADMIN — THIAMINE HCL TAB 100 MG 100 MG: 100 TAB at 04:28

## 2021-07-20 RX ADMIN — SPIRONOLACTONE 25 MG: 50 TABLET ORAL at 06:00

## 2021-07-20 RX ADMIN — POTASSIUM CHLORIDE 20 MEQ: 1500 TABLET, EXTENDED RELEASE ORAL at 10:09

## 2021-07-20 RX ADMIN — FAMOTIDINE 20 MG: 10 INJECTION INTRAVENOUS at 04:27

## 2021-07-20 RX ADMIN — FUROSEMIDE 40 MG: 10 INJECTION, SOLUTION INTRAMUSCULAR; INTRAVENOUS at 04:28

## 2021-07-20 RX ADMIN — POTASSIUM CHLORIDE 20 MEQ: 1500 TABLET, EXTENDED RELEASE ORAL at 18:21

## 2021-07-20 RX ADMIN — FUROSEMIDE 40 MG: 10 INJECTION, SOLUTION INTRAMUSCULAR; INTRAVENOUS at 18:20

## 2021-07-20 RX ADMIN — GUAIFENESIN AND DEXTROMETHORPHAN 5 ML: 100; 10 SYRUP ORAL at 23:59

## 2021-07-20 RX ADMIN — MAGNESIUM SULFATE IN WATER 2 G: 40 INJECTION, SOLUTION INTRAVENOUS at 10:09

## 2021-07-20 RX ADMIN — DOCUSATE SODIUM 50 MG AND SENNOSIDES 8.6 MG 2 TABLET: 8.6; 5 TABLET, FILM COATED ORAL at 04:28

## 2021-07-20 RX ADMIN — FAMOTIDINE 20 MG: 10 INJECTION INTRAVENOUS at 18:21

## 2021-07-20 ASSESSMENT — ENCOUNTER SYMPTOMS
HEARTBURN: 0
DIAPHORESIS: 0
SHORTNESS OF BREATH: 0
DIARRHEA: 0
WHEEZING: 0
SORE THROAT: 0
FLANK PAIN: 0
VOMITING: 0
HEADACHES: 0
CHILLS: 0
FOCAL WEAKNESS: 0
DIZZINESS: 0
PALPITATIONS: 0
BLURRED VISION: 0
NAUSEA: 0
SENSORY CHANGE: 0
MYALGIAS: 0
NECK PAIN: 0
ABDOMINAL PAIN: 1
BACK PAIN: 0
WEAKNESS: 1
NERVOUS/ANXIOUS: 0
SPEECH CHANGE: 0
COUGH: 0
FEVER: 0

## 2021-07-20 ASSESSMENT — PAIN DESCRIPTION - PAIN TYPE: TYPE: ACUTE PAIN

## 2021-07-20 ASSESSMENT — COGNITIVE AND FUNCTIONAL STATUS - GENERAL
CLIMB 3 TO 5 STEPS WITH RAILING: A LOT
MOBILITY SCORE: 14
STANDING UP FROM CHAIR USING ARMS: A LITTLE
SUGGESTED CMS G CODE MODIFIER MOBILITY: CL
WALKING IN HOSPITAL ROOM: A LITTLE
MOVING FROM LYING ON BACK TO SITTING ON SIDE OF FLAT BED: UNABLE
MOVING TO AND FROM BED TO CHAIR: UNABLE

## 2021-07-20 ASSESSMENT — GAIT ASSESSMENTS: GAIT LEVEL OF ASSIST: UNABLE TO PARTICIPATE

## 2021-07-20 ASSESSMENT — FIBROSIS 4 INDEX: FIB4 SCORE: 7.6

## 2021-07-20 NOTE — DISCHARGE PLANNING
"[facility]  Spoke To: [facility]  Outcome: DPA resent updated referral, [facility] noted Pt will be accepted \"no later than tomorrow\" pending insurance auth.    "

## 2021-07-20 NOTE — DIETARY
Nutrition support weekly update:  Day 18 of admit.  Lorraine Bella is a 71 y.o. male with admitting DX of abdominal pain, abscess of abdominal cavity..      Tube feeding initiated 7/13. Current TF via gastric cortrak: nocturnal tube feedings of Impact Peptide 1.5 (85 mL/hr x 10 hours total), providing 1275 kcals, 80 grams of protein and 609 mL of free water per day.     Assessment:  Weight: 94 kg via bed scale 7/13. Wt has been stable since admit. Wt increased from admit wt of 89 kg. Per I/O flow sheet, pt is currently +2.8 L of fluid.   - pt noted with 2+ generalized edema to right and left upper extremities, 2+ and 3+ pitting edema noted to right and left lower extremities. Abdomen noted to be distended and firm.  Re-estimate of nutritional needs is not indicated at this time.    Evaluation:   1. Pt is on concurrent PO diet (L3/L2: liquidised solids with mildly thick liquids) with 1:1 supervision.  2. Current TF rate is providing ~65% of pt's estimated nutrition needs and ~75% of pt's estimated protein needs.  3. Per ADL flow sheet, PO has been ~25% of meals and ~25% of Boost Plus oral nutrition supplement.  4. Spoke with pt at bedside. Pt reported a fair appetite, stated improvement today. Per visual assessment, pt noted with distended abdomen.  5. Swallow evaluation by SLP 7/19; recommended continuation of current diet (L3/L2).  6. Paracentesis 7/19 with 4L of fluid removal.  7. Lasix, Kdur, Spironolactone, Thiamine, Bowel protocol on the MAR.  8. Current, low volume tube feeding formula remains appropriate to meet estimated nutrition needs in pt with edema/distention.     Malnutrition risk: No new risk identified.    Recommendations/Plan:  1. Continue TF formula and rate.  2. PO diet per SLP.  3. Fluids per MD.    RD will continue to follow.

## 2021-07-20 NOTE — CARE PLAN
Problem: Nutritional:  Goal: Nutrition support tolerated and meeting greater than 85% of estimated needs  Outcome: Progressing  Nocturnal tube feeding to meet ~65% of estimated energy needs with concurrent PO diet.     Problem: Nutritional:  Goal: Achieve adequate nutritional intake  Description: PO and TF to meet 100% of pt's estimated needs.  Outcome: Progressing   PO ~25% of meals and supplements per ADL flow sheet.

## 2021-07-20 NOTE — PROGRESS NOTES
[facility] Medicine Daily Progress Note    Date of Service  7/20/2021    Chief Complaint  Lorraine Bella is a 71 y.o. male admitted 7/2/2021 with ruptured hepatic cyst.    Hospital Course  Admitted with ruptured hepatic cyst with hemoperitoneum.  Surgery was consulted in case.  Patient was initially treated conservatively with medical management only.  However he developed increasing abdominal pain, and abdominal compartment syndrome.  Surgery was reconsulted on the case, and patient underwent Diagnostic laparoscopy with washout and drain placement on 7/9/2021.  Postoperatively he required intubation for respiratory failure.  He required pressors for pressure support, he was also treated empirically for possible aspiration pneumonia with IV Zosyn.  His peritoneal fluid cultures showed Streptococcus anginosus.  He had seizure-like activity, EEG was noted to be negative.  He was eventually extubated on 7/12/2021.  He was also noted to have ascites, and had ultrasound-guided syntheses on 7/19/2021 with removal of 4 L of fluid    Interval Problem Update  Peritonitis- pain controlled  Resp failure - O2 1 lpm NC  Ascites - paracentesis done yesterday  Low potassium and magnesium    I have personally seen and examined the patient at bedside. I discussed the plan of care with patient, bedside RN, charge RN and .    Consultants/Specialty  critical care and general surgery    Code Status  Full Code    Disposition  Patient is not medically cleared.   Anticipate discharge to to skilled nursing facility.  I have placed the appropriate orders for post-discharge needs.    Review of Systems  Review of Systems   Constitutional: Positive for malaise/fatigue. Negative for chills, diaphoresis and fever.   HENT: Negative for congestion, hearing loss and sore throat.    Eyes: Negative for blurred vision.   Respiratory: Negative for cough, shortness of breath and wheezing.    Cardiovascular: Positive for leg swelling.  Negative for chest pain and palpitations.   Gastrointestinal: Positive for abdominal pain. Negative for diarrhea, heartburn, nausea and vomiting.   Genitourinary: Negative for dysuria, flank pain and hematuria.   Musculoskeletal: Negative for back pain, joint pain, myalgias and neck pain.   Skin: Negative for rash.   Neurological: Positive for weakness. Negative for dizziness, sensory change, speech change, focal weakness and headaches.   Psychiatric/Behavioral: The patient is not nervous/anxious.         Physical Exam  Temp:  [36.2 °C (97.1 °F)-36.9 °C (98.5 °F)] 36.8 °C (98.2 °F)  Pulse:  [] 104  Resp:  [16-18] 18  BP: (113-126)/(51-74) 123/74  SpO2:  [94 %-97 %] 95 %    Physical Exam  Vitals and nursing note reviewed.   HENT:      Head: Normocephalic and atraumatic.      Nose: No congestion.      Mouth/Throat:      Mouth: Mucous membranes are moist.   Eyes:      Extraocular Movements: Extraocular movements intact.      Conjunctiva/sclera: Conjunctivae normal.   Cardiovascular:      Rate and Rhythm: Normal rate and regular rhythm.   Abdominal:      General: There is distension.      Tenderness: There is no abdominal tenderness. There is no guarding or rebound.   Musculoskeletal:      Cervical back: No tenderness.      Right lower leg: Edema present.      Left lower leg: Edema present.   Skin:     General: Skin is warm and dry.   Neurological:      General: No focal deficit present.      Mental Status: He is alert and oriented to person, place, and time.      Cranial Nerves: No cranial nerve deficit.         Fluids    Intake/Output Summary (Last 24 hours) at 7/20/2021 1701  Last data filed at 7/20/2021 1132  Gross per 24 hour   Intake 450 ml   Output 1175 ml   Net -725 ml       Laboratory  Recent Labs     07/18/21  0832 07/19/21  0135 07/20/21  0350   WBC 16.3* 13.2* 11.5*   RBC 3.83* 3.56* 3.53*   HEMOGLOBIN 12.1* 11.0* 11.1*   HEMATOCRIT 37.8* 34.5* 34.2*   MCV 98.7* 96.9 96.9   MCH 31.6 30.9 31.4   MCHC  32.0* 31.9* 32.5*   RDW 61.8* 60.2* 62.4*   PLATELETCT 82* 87* 76*   MPV 9.5 9.6 9.6     Recent Labs     07/18/21  0832 07/19/21  0135 07/20/21  0350   SODIUM 135 133* 136   POTASSIUM 4.3 3.7 3.5*   CHLORIDE 97 98 99   CO2 29 26 27   GLUCOSE 99 158* 173*   BUN 21 26* 25*   CREATININE 0.69 0.61 0.58   CALCIUM 8.6 8.2* 8.1*                   Imaging  US-PARACENTESIS, ABD WITH IMAGING   Final Result      1. Ultrasound-guided therapeutic paracentesis of the left lower quadrant of the abdominal wall.      2. 4000 mL of fluid withdrawn.      DX-ABDOMEN FOR TUBE PLACEMENT   Final Result         1.  Nonspecific bowel gas pattern.   2.  Dobbhoff tube tip overlying the expected location of the pylorus or first duodenal segment.   3.  Pulmonary edema and/or infiltrates      DX-CHEST-PORTABLE (1 VIEW)   Final Result         1.  Pulmonary edema and/or infiltrates are identified, which are somewhat decreased since the prior exam.      DX-ABDOMEN FOR TUBE PLACEMENT   Final Result         1.  Nonspecific bowel gas pattern.   2.  Dobbhoff tube tip overlying the expected location of the pylorus or first duodenal segment.      DX-ABDOMEN FOR TUBE PLACEMENT   Final Result      Enteric tube projects over the second portion of the duodenum.      DX-CHEST-PORTABLE (1 VIEW)   Final Result         1.  Pulmonary edema and/or infiltrates are identified, which are stable since the prior exam.      [id number] VIEW   Final Result      No dilated bowel      DX-CHEST-PORTABLE (1 VIEW)   Final Result         No significant interval change.         MR-BRAIN-WITH & W/O   Final Result      1.  No acute abnormality.   2.  Mild to moderate cerebral volume loss.   3.  Minimal chronic microvascular ischemic disease.      DX-CHEST-PORTABLE (1 VIEW)   Final Result      1.  Interval increasing bilateral airspace opacity suspicious for pneumonia most prominent in the right lower lobe.   2.  There is probably superimposed vascular congestion.      CT-HEAD  W/O   Final Result         1. No acute intracranial abnormality. No evidence of acute intracranial hemorrhage or mass lesion.               DX-CHEST-PORTABLE (1 VIEW)   Final Result         Endotracheal tube with tip projecting over the mid thoracic trachea.      Gastric drainage tube courses below diaphragm, tip is in the stomach.      Right central venous catheter with tip projecting over the expected area of the lower SVC.      CT-ABDOMEN-PELVIS W/O   Final Result      1.  New fluid distended small bowel loops which are nonspecific and could be related to enteritis or small bowel ileus. Developing obstruction is considered less likely though not excluded.   2.  There appears to be new wall thickening of the ascending colon likely representing infectious/inflammatory colitis.   3.  Mild ascites which is mildly increased from prior study. Omental and mesenteric infiltration which is new from prior and could be related to edema or inflammation.   4.  Small right pleural effusion. Bibasilar atelectasis.   5.   No other significant change.      DX-CHEST-PORTABLE (1 VIEW)   Final Result      1.  New right basilar airspace process consistent with atelectasis or pneumonia      2.  Mild left midlung zone atelectasis      [id number]-4 VIEW   Final Result      1. Moderate stool mixed with contrast in the ascending colon.   2. Moderate stool in the transverse colon.   3. No bowel obstruction.   4. Other chronic degenerative changes.      CT-ABDOMEN-PELVIS W/O   Final Result      1.  There is no acute inflammatory process in the abdomen or pelvis.   2.  The treated lesions in the liver are again seen without interval change.   3.  Splenomegaly is again seen.   4.  Cystic pancreatic tail lesion is unchanged possibly a pseudocyst.   5.  There is colonic diverticulosis without diverticulitis.   6.  There is mild wall thickening of the distal esophagus.   7.  There is a stable small amount of ascites.   8.  There is moderate  colonic stool.      DX-CHEST-PORTABLE (1 VIEW)   Final Result      Hypoinflation with bibasilar atelectasis. No focal consolidation or pleural effusions.      DX-CHEST-PORTABLE (1 VIEW)   Final Result      Left basilar atelectasis.      CT-ABDOMEN-PELVIS WITH   Final Result      1.  Primarily cystic mass in the medial segment LEFT lobe liver, likely treated hepatoma.   2.  Complex low-attenuation lesion in the inferior aspect medial segment LEFT lobe liver which is new from prior exam and may represent cystic mass or focal hematoma related to rupture larger mass.  Minimal hyperdense components may indicate blood    products.  Abscess is felt less likely.   3.  Gallbladder is not visualized and may be compressed by or involved with liver mass.   4.  Small volume ascites with potential hemoperitoneum.   5.  Splenomegaly.      These findings were discussed with CARMEN OTTO on 7/2/2021 3:02 PM.      DX-CHEST-PORTABLE (1 VIEW)   Final Result      1.  Hypoinflation with minimal LEFT lung base atelectasis.   2.  No pneumonia or pulmonary edema.           Assessment/Plan  * Peritonitis (HCC)- (present on admission)  Assessment & Plan  Diagnostic laparoscopy with washout and drain placement 7/9/2021      Ascites- (present on admission)  Assessment & Plan  Ultrasound-guided therapeutic paracentesis 7/19/2021  IV Lasix, Aldactone    Acute respiratory failure with hypercapnia (HCC)- (present on admission)  Assessment & Plan  Intubated 7/10/2021,  Extubated 7/12/2021  RT protocol    Encephalopathy acute  Assessment & Plan  Acute metabolic encephalopathy   Avoid benzodiazepines and anticholinergics  Frequent orientation  Avoid early morning labs  Avoid vital signs during sleep  Ambulate if possible    Sepsis (HCC)  Assessment & Plan  This is Sepsis Present on admission  SIRS criteria identified on my evaluation include: Tachycardia, with heart rate greater than 90 BPM, Tachypnea, with respirations greater than 20 per minute  and Leukocytosis, with WBC greater than 12,000  Source - Pneumonia  Sepsis protocol initiated  Fluid resuscitation ordered per protocol  IV antibiotics as appropriate for source of sepsis      Hepatoma (HCC)- (present on admission)  Assessment & Plan  Follow up with Oncology as outpatient    Dysphagia- (present on admission)  Assessment & Plan  Level 3, Liquid Level 2  SLP to follow    Hypophosphatemia- (present on admission)  Assessment & Plan  Follow level    Hypokalemia- (present on admission)  Assessment & Plan  Start Kdur, follow bmp    Aspiration pneumonia (HCC)- (present on admission)  Assessment & Plan  Finished course of IV Zosyn    Normochromic anemia- (present on admission)  Assessment & Plan  Follow cbc    Seizure-like activity (HCC)- (present on admission)  Assessment & Plan  EEG - Moderate background slowing suggestive of diffuse/multifocal cerebral dysfunction. No persistent focal asymmetries seen. No epileptiform discharges seen. No seizures. 7/10/2021    Hypomagnesemia  Assessment & Plan  IV Mg 2 g  Follow level    Lactic acidosis- (present on admission)  Assessment & Plan  resolved    Hyperkalemia- (present on admission)  Assessment & Plan  Follow bmp    Hyponatremia- (present on admission)  Assessment & Plan  Follow bmp    Thrombocytopenia (HCC)- (present on admission)  Assessment & Plan  Follow cbc      Protein-calorie malnutrition (HCC)  Assessment & Plan  Nutrition consult    Acute urinary retention  Assessment & Plan  resolved    Tobacco abuse- (present on admission)  Assessment & Plan  Nicotine replacement protocol      Prediabetes- (present on admission)  Assessment & Plan  Hgba1c 6.0       VTE prophylaxis: SCDs/TEDs    I have performed a physical exam and reviewed and updated ROS and Plan today (7/20/2021). In review of yesterday's note (7/19/2021), there are no changes except as documented above.

## 2021-07-20 NOTE — CARE PLAN
The patient is Stable - Low risk of patient condition declining or worsening    Shift Goals  Clinical Goals: ambulate  Patient Goals: rest  Family Goals: no family present    Progress made toward(s) clinical / shift goals:    Problem: Pain - Standard  Goal: Alleviation of pain or a reduction in pain to the patient’s comfort goal  Outcome: Progressing     Problem: Knowledge Deficit - Standard  Goal: Patient and family/care givers will demonstrate understanding of plan of care, disease process/condition, diagnostic tests and medications  Outcome: Progressing     Problem: Fall Risk  Goal: Patient will remain free from falls  Outcome: Progressing     Problem: Bowel Elimination  Goal: Establish and maintain regular bowel function  Outcome: Progressing     Problem: Gastrointestinal Irritability  Goal: Diarrhea will be absent or improved  Outcome: Progressing     Problem: Skin Integrity  Goal: Skin integrity is maintained or improved  Outcome: Progressing     Problem: Hemodynamics  Goal: Patient's hemodynamics, fluid balance and neurologic status will be stable or improve  Outcome: Progressing     Problem: Fluid Volume  Goal: Fluid volume balance will be maintained  Outcome: Progressing     Problem: Urinary - Renal Perfusion  Goal: Ability to achieve and maintain adequate renal perfusion and functioning will improve  Outcome: Progressing     Problem: Respiratory  Goal: Patient will achieve/maintain optimum respiratory ventilation and gas exchange  Outcome: Progressing     Problem: Mechanical Ventilation  Goal: Safe management of artificial airway and ventilation  Outcome: Progressing  Goal: Successful weaning off mechanical ventilator, spontaneously maintains adequate gas exchange  Outcome: Progressing  Goal: Patient will be able to express needs and understand communication  Outcome: Progressing     Problem: Physical Regulation  Goal: Diagnostic test results will improve  Outcome: Progressing  Goal: Signs and symptoms of  infection will decrease  Outcome: Progressing     Problem: Safety - Medical Restraint  Goal: Remains free of injury from restraints (Restraint for Interference with Medical Device)  Outcome: Progressing  Goal: Free from restraint(s) (Restraint for Interference with Medical Device)  Outcome: Progressing       Patient is not progressing towards the following goals:

## 2021-07-20 NOTE — THERAPY
Missed Therapy     Patient Name: Lorraine Bella  Age:  71 y.o., Sex:  male  Medical Record #: [id number]  Today's Date: 7/20/2021    Discussed missed therapy with Tori       07/20/21 1512   Other Treatments   Other Treatments Provided OT tx attempted 3 separate times.  Pt refused in pm due to fatigue despite resting in bed for past 4 hours.  Pt's wife present & tried to educate them both on the importance of being OOB & participting in ADL's.  Pt stated he was in too much pain & needed to rest.   Interdisciplinary Plan of Care Collaboration   Collaboration Comments Nsg notifed pt refused

## 2021-07-20 NOTE — THERAPY
Physical Therapy   Daily Treatment     Patient Name: Lorraine Bella  Age:  71 y.o., Sex:  male  Medical Record #: [id number]  Today's Date: 7/20/2021     Precautions: Fall Risk, Swallow Precautions, Nasogastric Tube    Assessment    Pts fxnl mob limited today by bowel incontinence. He required min A to negotiate bed mob and transfers to commode today. He was able to amb a short distances ~3-4' to bedside chair to sit up for breakfast with FWW and min A. Anticipate pt is capable of amb today. Would benefit from donning briefs prior to mob to limit barriers of mobility progression. [place] [facility] [place] pt use commode/amb to bathroom vs using bedpan to increase mobility. PT to cont to follow.    Plan    Continue current treatment plan.    DC Equipment Recommendations: Unable to determine at this time (plan to determine if DCing home)  Discharge Recommendations: Recommend post-acute placement for additional physical therapy services prior to discharge home     Objective     07/20/21 0944   Strength Lower Body   Gross Strength Generalized Weakness, Equal Bilaterally   Balance   Sitting Balance (Static) Fair   Sitting Balance (Dynamic) Fair   Standing Balance (Static) Fair -   Standing Balance (Dynamic) Poor +   Gait Analysis   Gait Level Of Assist Unable to Participate (2/2 bowel incontinence)   Weight Bearing Status FWB BLE   Bed Mobility    Supine to Sit Minimal Assist   Sit to Supine (up in chair at end of session)   Functional Mobility   Sit to Stand Minimal Assist   Bed, Chair, Wheelchair Transfer Minimal Assist   Short Term Goals    Short Term Goal # 1 pt will be able to complete bed mobility from flat bed with mod assist in 6tx in order to progres with therapy   Goal Outcome # 1 Goal met, new goal added   Short Term Goal # 1 B  Pt will perform supine<>sit with HOB flat requiring SPV within 6 visits to improve bed mob for DC.   Short Term Goal # 2 pt will be able to complete functional transfers with  mod assist in 6tx in order to increase OOB time   Goal Outcome # 2 Goal met, new goal added   Short Term Goal # 3 pt will be able to ambulate 15ft with FWW and min assist in 6tx in order to progress back to baseline   Goal Outcome # 3 (not addressed today 2/2 bowel incontinence)

## 2021-07-20 NOTE — DISCHARGE PLANNING
Anticipated Discharge Disposition:   SNF    Action:   Per rounds with Dr Davis today , Pt is not medically clear as Pt is still distended. Pt had paracentesis yesterday.     Requested PT and OT to see Pt for eval/ notes.    Barriers to Discharge:   Pending medical clearance  Pending insurance auth with [facility]    Plan:   CM to continue to assist Pt with discharge as needed

## 2021-07-21 LAB
ALBUMIN SERPL BCP-MCNC: 2.3 G/DL (ref 3.2–4.9)
ALBUMIN/GLOB SERPL: 0.6 G/DL
ALP SERPL-CCNC: 191 U/L (ref 30–99)
ALT SERPL-CCNC: 9 U/L (ref 2–50)
ANION GAP SERPL CALC-SCNC: 10 MMOL/L (ref 7–16)
ANISOCYTOSIS BLD QL SMEAR: ABNORMAL
AST SERPL-CCNC: 21 U/L (ref 12–45)
BASOPHILS # BLD AUTO: 0.9 % (ref 0–1.8)
BASOPHILS # BLD: 0.08 K/UL (ref 0–0.12)
BILIRUB SERPL-MCNC: 1 MG/DL (ref 0.1–1.5)
BUN SERPL-MCNC: 34 MG/DL (ref 8–22)
C DIFF DNA SPEC QL NAA+PROBE: NEGATIVE
C DIFF TOX GENS STL QL NAA+PROBE: NEGATIVE
CALCIUM SERPL-MCNC: 8.1 MG/DL (ref 8.5–10.5)
CHLORIDE SERPL-SCNC: 99 MMOL/L (ref 96–112)
CO2 SERPL-SCNC: 28 MMOL/L (ref 20–33)
CREAT SERPL-MCNC: 0.82 MG/DL (ref 0.5–1.4)
EOSINOPHIL # BLD AUTO: 0.08 K/UL (ref 0–0.51)
EOSINOPHIL NFR BLD: 0.9 % (ref 0–6.9)
ERYTHROCYTE [DISTWIDTH] IN BLOOD BY AUTOMATED COUNT: 64.5 FL (ref 35.9–50)
GLOBULIN SER CALC-MCNC: 4.1 G/DL (ref 1.9–3.5)
GLUCOSE SERPL-MCNC: 131 MG/DL (ref 65–99)
HCT VFR BLD AUTO: 37.6 % (ref 42–52)
HGB BLD-MCNC: 12.1 G/DL (ref 14–18)
LYMPHOCYTES # BLD AUTO: 0.15 K/UL (ref 1–4.8)
LYMPHOCYTES NFR BLD: 1.7 % (ref 22–41)
MACROCYTES BLD QL SMEAR: ABNORMAL
MAGNESIUM SERPL-MCNC: 2 MG/DL (ref 1.5–2.5)
MANUAL DIFF BLD: NORMAL
MCH RBC QN AUTO: 31.8 PG (ref 27–33)
MCHC RBC AUTO-ENTMCNC: 32.2 G/DL (ref 33.7–35.3)
MCV RBC AUTO: 98.7 FL (ref 81.4–97.8)
METAMYELOCYTES NFR BLD MANUAL: 0.9 %
MONOCYTES # BLD AUTO: 0.15 K/UL (ref 0–0.85)
MONOCYTES NFR BLD AUTO: 1.7 % (ref 0–13.4)
MORPHOLOGY BLD-IMP: NORMAL
NEUTROPHILS # BLD AUTO: 8.36 K/UL (ref 1.82–7.42)
NEUTROPHILS NFR BLD: 90.5 % (ref 44–72)
NEUTS BAND NFR BLD MANUAL: 3.4 % (ref 0–10)
NRBC # BLD AUTO: 0 K/UL
NRBC BLD-RTO: 0 /100 WBC
PHOSPHATE SERPL-MCNC: 3.1 MG/DL (ref 2.5–4.5)
PLATELET # BLD AUTO: 105 K/UL (ref 164–446)
PLATELET BLD QL SMEAR: NORMAL
PMV BLD AUTO: 9.9 FL (ref 9–12.9)
POTASSIUM SERPL-SCNC: 4.1 MMOL/L (ref 3.6–5.5)
PROT SERPL-MCNC: 6.4 G/DL (ref 6–8.2)
RBC # BLD AUTO: 3.81 M/UL (ref 4.7–6.1)
RBC BLD AUTO: PRESENT
SODIUM SERPL-SCNC: 137 MMOL/L (ref 135–145)
WBC # BLD AUTO: 8.9 K/UL (ref 4.8–10.8)

## 2021-07-21 PROCEDURE — A9270 NON-COVERED ITEM OR SERVICE: HCPCS | Performed by: FAMILY MEDICINE

## 2021-07-21 PROCEDURE — 87493 C DIFF AMPLIFIED PROBE: CPT

## 2021-07-21 PROCEDURE — 700111 HCHG RX REV CODE 636 W/ 250 OVERRIDE (IP): Performed by: HOSPITALIST

## 2021-07-21 PROCEDURE — 36415 COLL VENOUS BLD VENIPUNCTURE: CPT

## 2021-07-21 PROCEDURE — 84100 ASSAY OF PHOSPHORUS: CPT

## 2021-07-21 PROCEDURE — 80053 COMPREHEN METABOLIC PANEL: CPT

## 2021-07-21 PROCEDURE — 700111 HCHG RX REV CODE 636 W/ 250 OVERRIDE (IP): Performed by: STUDENT IN AN ORGANIZED HEALTH CARE EDUCATION/TRAINING PROGRAM

## 2021-07-21 PROCEDURE — 302146: Performed by: FAMILY MEDICINE

## 2021-07-21 PROCEDURE — 770006 HCHG ROOM/CARE - MED/SURG/GYN SEMI*

## 2021-07-21 PROCEDURE — 700102 HCHG RX REV CODE 250 W/ 637 OVERRIDE(OP): Performed by: HOSPITALIST

## 2021-07-21 PROCEDURE — A9270 NON-COVERED ITEM OR SERVICE: HCPCS | Performed by: INTERNAL MEDICINE

## 2021-07-21 PROCEDURE — 700102 HCHG RX REV CODE 250 W/ 637 OVERRIDE(OP): Performed by: INTERNAL MEDICINE

## 2021-07-21 PROCEDURE — 85007 BL SMEAR W/DIFF WBC COUNT: CPT

## 2021-07-21 PROCEDURE — 97535 SELF CARE MNGMENT TRAINING: CPT

## 2021-07-21 PROCEDURE — A9270 NON-COVERED ITEM OR SERVICE: HCPCS | Performed by: HOSPITALIST

## 2021-07-21 PROCEDURE — 99233 SBSQ HOSP IP/OBS HIGH 50: CPT | Performed by: FAMILY MEDICINE

## 2021-07-21 PROCEDURE — 85027 COMPLETE CBC AUTOMATED: CPT

## 2021-07-21 PROCEDURE — 83735 ASSAY OF MAGNESIUM: CPT

## 2021-07-21 PROCEDURE — 700102 HCHG RX REV CODE 250 W/ 637 OVERRIDE(OP): Performed by: FAMILY MEDICINE

## 2021-07-21 RX ADMIN — TRAMADOL HYDROCHLORIDE 50 MG: 50 TABLET, FILM COATED ORAL at 19:43

## 2021-07-21 RX ADMIN — ONDANSETRON 4 MG: 2 INJECTION INTRAMUSCULAR; INTRAVENOUS at 04:29

## 2021-07-21 RX ADMIN — FAMOTIDINE 20 MG: 10 INJECTION INTRAVENOUS at 16:47

## 2021-07-21 RX ADMIN — FAMOTIDINE 20 MG: 10 INJECTION INTRAVENOUS at 04:52

## 2021-07-21 RX ADMIN — FUROSEMIDE 40 MG: 10 INJECTION, SOLUTION INTRAMUSCULAR; INTRAVENOUS at 04:53

## 2021-07-21 RX ADMIN — ONDANSETRON 4 MG: 2 INJECTION INTRAMUSCULAR; INTRAVENOUS at 22:59

## 2021-07-21 RX ADMIN — POTASSIUM CHLORIDE 20 MEQ: 1500 TABLET, EXTENDED RELEASE ORAL at 16:47

## 2021-07-21 RX ADMIN — FUROSEMIDE 40 MG: 10 INJECTION, SOLUTION INTRAMUSCULAR; INTRAVENOUS at 16:47

## 2021-07-21 ASSESSMENT — COGNITIVE AND FUNCTIONAL STATUS - GENERAL
DAILY ACTIVITIY SCORE: 15
PERSONAL GROOMING: A LITTLE
HELP NEEDED FOR BATHING: A LOT
EATING MEALS: A LITTLE
DRESSING REGULAR UPPER BODY CLOTHING: A LITTLE
SUGGESTED CMS G CODE MODIFIER DAILY ACTIVITY: CK
DRESSING REGULAR LOWER BODY CLOTHING: A LOT
TOILETING: A LOT

## 2021-07-21 ASSESSMENT — ENCOUNTER SYMPTOMS
NAUSEA: 0
NERVOUS/ANXIOUS: 0
DIZZINESS: 0
SENSORY CHANGE: 0
BACK PAIN: 0
WEAKNESS: 1
SPEECH CHANGE: 0
BLURRED VISION: 0
CHILLS: 0
FLANK PAIN: 0
NECK PAIN: 0
VOMITING: 1
SHORTNESS OF BREATH: 0
DIAPHORESIS: 0
MYALGIAS: 0
COUGH: 0
HEARTBURN: 0
SORE THROAT: 0
ABDOMINAL PAIN: 1
PALPITATIONS: 0
WHEEZING: 0
FEVER: 0
HEADACHES: 0
DIARRHEA: 1
FOCAL WEAKNESS: 0

## 2021-07-21 ASSESSMENT — PAIN DESCRIPTION - PAIN TYPE: TYPE: ACUTE PAIN

## 2021-07-21 NOTE — PROGRESS NOTES
Pt vomited 50ml dark brown. Stopped tube feed. Paged Dr. Zayas to update. Ok to stop noctural TF for now. Pt denies feeling nauseous. Will continue to monitor.

## 2021-07-21 NOTE — THERAPY
"Occupational Therapy  Daily Treatment     Patient Name: Lorraine Bella  Age:  71 y.o., Sex:  male  Medical Record #: [id number]  Today's Date: 7/21/2021     Precautions: Fall Risk, Nasogastric Tube    Assessment  Pt seen for OT Tx Today. Pt demonstrated an increase in partcipation in ADLs, activty tolerance, and functinoal mobility, howerver still limited by fatigue. Pt had BM upon standing near end of session, utilized bed pan. OT will continue in this setting to maximize independence and participation in ADLs. Continue to recommend post acute services before returning to home setting.     Plan  Continue current treatment plan.  DC Equipment Recommendations: Unable to determine at this time  Discharge Recommendations: Recommend post-acute placement for additional occupational therapy services prior to discharge home    Subjective  \"I feel tired still but better now that I shaved\"  \"I had a sceduled hair cut before coming in here\"     Objective   07/21/21 1149   Vitals   O2 Delivery Device Silicone Nasal Cannula   Pain   Pain Scales 0 to 10 Scale    Intervention Ambulation / Increased Activity;Repositioned   Pain 0 - 10 Group   Location Abdomen   Pain Rating Scale (NPRS) 7   Therapist Pain Assessment During Activity;7;Nurse Notified  (pt stated hurts more to cough)   Non Verbal Descriptors   Non Verbal Scale  Calm   Cognition    Cognition / Consciousness X   Speech/ Communication Delayed Responses   Level of Consciousness Alert   Passive ROM Upper Body   Passive ROM Upper Body WDL   Active ROM Upper Body   Active ROM Upper Body  Not Tested   Strength Upper Body   Upper Body Strength  Not Tested   Sensation Upper Body   Upper Extremity Sensation  WDL   Other Treatments   Other Treatments Provided Focused on G/H at EOB    Balance   Sitting Balance (Static) Fair   Sitting Balance (Dynamic) Fair   Standing Balance (Static) Fair -   Standing Balance (Dynamic) Fair -  (pt demonstrated side steps )   Weight Shift " "Sitting Good   Weight Shift Standing Fair   Skilled Intervention Verbal Cuing;Postural Facilitation   Comments w/FWW   Bed Mobility    Supine to Sit Moderate Assist   Sit to Supine Minimal Assist   Scooting Supervised   Rolling Minimum Assist to Lt.   Skilled Intervention Verbal Cuing;Sequencing   Comments HOB flat   Activities of Daily Living   Eating Supervision   Grooming Seated;Minimal Assist  (Alessandro for brushing hair, spv for shaving. Pt requesitng hair )   Upper Body Dressing Supervision   Lower Body Dressing Maximal Assist   Toileting Maximal Assist  (bed pan utilized after incotnence of BM at end of session)   Skilled Intervention Verbal Cuing;Postural Facilitation;Sequencing   Comments Pt had incontence at end of session   How much help from another person does the patient currently need...   6 Clicks Daily Activity Score 15   Functional Mobility   Sit to Stand Minimal Assist   Transfer Method Stand Step   Mobility EOB> stand >side step> BTB    Skilled Intervention Verbal Cuing;Sequencing;Postural Facilitation   Comments w/FWW   Visual Perception   Visual Perception  Not Tested   Activity Tolerance   Comments limited by fatigue   Patient / Family Goals   Patient / Family Goal #1 \"To get outta here\"   Goal #1 Outcome Goal not met   Short Term Goals   Short Term Goal # 2 pt will complete grooming seated EOB w/ setup   Goal Outcome # 2 Goal met   Short Term Goal # 3 B Pt will be Min A for toilet transfer   Goal Outcome # 3 B Progressing as expected   Education Group   Education Provided Role of Occupational Therapist;Activities of Daily Living   Role of Occupational Therapist Patient Response Patient;Acceptance;Explanation;Verbal Demonstration   ADL Patient Response Patient;Family;Acceptance;Explanation;Verbal Demonstration;Action Demonstration   Anticipated Discharge Equipment and Recommendations   DC Equipment Recommendations Unable to determine at this time   Discharge Recommendations Recommend post-acute " placement for additional occupational therapy services prior to discharge home   Interdisciplinary Plan of Care Collaboration   IDT Collaboration with  Nursing   Patient Position at End of Therapy In Bed;Bed Alarm On;Tray Table within Reach;Family / Friend in Room;Phone within Reach  (wife at bedside)   Collaboration Comments nsg notified

## 2021-07-21 NOTE — DISCHARGE PLANNING
Anticipated Discharge Disposition:   SNF    Action:   This RN CM is following the case.  Faxed Pt's PASRR to [facility] as requested - F  [phone].    Informed Ramona of [facility] that Pt is not yet medically clear and might need another paracentesis tomorrow,     Barriers to Discharge:   Pending medical clearance    Plan:   CM to continue to assist Pt with discharge as needed

## 2021-07-21 NOTE — CARE PLAN
The patient is Stable - Low risk of patient condition declining or worsening    Shift Goals  Clinical Goals: mobility  Patient Goals: rest  Family Goals: no family present    Progress made toward(s) clinical / shift goals:    Problem: Pain - Standard  Goal: Alleviation of pain or a reduction in pain to the patient’s comfort goal  Outcome: Progressing     Problem: Knowledge Deficit - Standard  Goal: Patient and family/care givers will demonstrate understanding of plan of care, disease process/condition, diagnostic tests and medications  Outcome: Progressing     Problem: Fall Risk  Goal: Patient will remain free from falls  Outcome: Progressing     Problem: Bowel Elimination  Goal: Establish and maintain regular bowel function  Outcome: Progressing     Problem: Gastrointestinal Irritability  Goal: Diarrhea will be absent or improved  Outcome: Progressing     Problem: Skin Integrity  Goal: Skin integrity is maintained or improved  Outcome: Progressing     Problem: Hemodynamics  Goal: Patient's hemodynamics, fluid balance and neurologic status will be stable or improve  Outcome: Progressing     Problem: Fluid Volume  Goal: Fluid volume balance will be maintained  Outcome: Progressing     Problem: Urinary - Renal Perfusion  Goal: Ability to achieve and maintain adequate renal perfusion and functioning will improve  Outcome: Progressing     Problem: Respiratory  Goal: Patient will achieve/maintain optimum respiratory ventilation and gas exchange  Outcome: Progressing     Problem: Mechanical Ventilation  Goal: Safe management of artificial airway and ventilation  Outcome: Progressing  Goal: Successful weaning off mechanical ventilator, spontaneously maintains adequate gas exchange  Outcome: Progressing  Goal: Patient will be able to express needs and understand communication  Outcome: Progressing     Problem: Physical Regulation  Goal: Diagnostic test results will improve  Outcome: Progressing  Goal: Signs and symptoms of  infection will decrease  Outcome: Progressing     Problem: Safety - Medical Restraint  Goal: Remains free of injury from restraints (Restraint for Interference with Medical Device)  Outcome: Progressing  Goal: Free from restraint(s) (Restraint for Interference with Medical Device)  Outcome: Progressing       Patient is not progressing towards the following goals:

## 2021-07-21 NOTE — PROGRESS NOTES
Report received. Assessment completed.  Pt is A&O x4. Pt on O2 overnight, now on room air.   Medicating for pain PRN per MAR  States some nausea and emesis last night, denies intervention  Cortrak clampled-flushed per order   Frequent watery stools   +voiding with some incontinence-condom cath in place.  Has a diet and noc TF, TF was turned off due to emesis last night.   Pt up with 1-2 assist and FWW.  Call light and belongings within reach. All needs met at this time. Fall Precautions and hourly rounding in place.

## 2021-07-21 NOTE — PROGRESS NOTES
[facility] Medicine Daily Progress Note    Date of Service  7/21/2021    Chief Complaint  Lorraine Bella is a 71 y.o. male admitted 7/2/2021 with ruptured hepatic cyst.    Hospital Course  Admitted with ruptured hepatic cyst with hemoperitoneum.  Surgery was consulted on the case.  Patient was initially treated conservatively with medical management only.  However he developed increasing abdominal pain, and abdominal compartment syndrome.  Surgery was reconsulted on the case, and patient underwent Diagnostic laparoscopy with washout and drain placement on 7/9/2021.  Postoperatively he required intubation for respiratory failure.  He required pressors for pressure support, he was also treated empirically for possible aspiration pneumonia with IV Zosyn.  His peritoneal fluid cultures showed Streptococcus anginosus.  He had seizure-like activity, EEG was noted to be negative.  He was eventually extubated on 7/12/2021.  He was also noted to have ascites, and had ultrasound-guided paracentesis on 7/19/2021 with removal of 4 L of fluid. Fluid cultures are negative    Interval Problem Update  Peritonitis- pain controlled  Resp failure - O2 1 lpm NC  Ascites - fluid culture negative  Dysphagia - had one episode of vomiting last night when TF was started    Updates given and plan of care discussed with patient's spouse.     I have personally seen and examined the patient at bedside. I discussed the plan of care with patient, family, bedside RN, charge RN and .    Consultants/Specialty  critical care and general surgery    Code Status  Full Code    Disposition  Patient is not medically cleared.   Anticipate discharge to to skilled nursing facility.  I have placed the appropriate orders for post-discharge needs.    Review of Systems  Review of Systems   Constitutional: Positive for malaise/fatigue. Negative for chills, diaphoresis and fever.   HENT: Negative for congestion, hearing loss and sore throat.     Eyes: Negative for blurred vision.   Respiratory: Negative for cough, shortness of breath and wheezing.    Cardiovascular: Positive for leg swelling. Negative for chest pain and palpitations.   Gastrointestinal: Positive for abdominal pain, diarrhea and vomiting. Negative for heartburn and nausea.   Genitourinary: Negative for dysuria, flank pain and hematuria.   Musculoskeletal: Negative for back pain, joint pain, myalgias and neck pain.   Skin: Negative for rash.   Neurological: Positive for weakness. Negative for dizziness, sensory change, speech change, focal weakness and headaches.   Psychiatric/Behavioral: The patient is not nervous/anxious.         Physical Exam  Temp:  [36.4 °C (97.6 °F)-36.8 °C (98.2 °F)] 36.4 °C (97.6 °F)  Pulse:  [102-115] 115  Resp:  [17-18] 17  BP: (106-123)/(64-74) 110/64  SpO2:  [95 %-96 %] 96 %    Physical Exam  Vitals and nursing note reviewed.   HENT:      Head: Normocephalic and atraumatic.      Nose: No congestion.      Mouth/Throat:      Mouth: Mucous membranes are moist.   Eyes:      Extraocular Movements: Extraocular movements intact.      Conjunctiva/sclera: Conjunctivae normal.   Cardiovascular:      Rate and Rhythm: Normal rate and regular rhythm.   Abdominal:      General: There is distension.      Tenderness: There is no abdominal tenderness. There is no guarding or rebound.   Musculoskeletal:      Cervical back: No tenderness.      Right lower leg: Edema present.      Left lower leg: Edema present.   Skin:     General: Skin is warm and dry.   Neurological:      General: No focal deficit present.      Mental Status: He is alert and oriented to person, place, and time.      Cranial Nerves: No cranial nerve deficit.         Fluids    Intake/Output Summary (Last 24 hours) at 7/21/2021 1524  Last data filed at 7/21/2021 0800  Gross per 24 hour   Intake 120 ml   Output 700 ml   Net -580 ml       Laboratory  Recent Labs     07/19/21  0135 07/20/21  0350 07/21/21  0756   WBC  13.2* 11.5* 8.9   RBC 3.56* 3.53* 3.81*   HEMOGLOBIN 11.0* 11.1* 12.1*   HEMATOCRIT 34.5* 34.2* 37.6*   MCV 96.9 96.9 98.7*   MCH 30.9 31.4 31.8   MCHC 31.9* 32.5* 32.2*   RDW 60.2* 62.4* 64.5*   PLATELETCT 87* 76* 105*   MPV 9.6 9.6 9.9     Recent Labs     07/19/21  0135 07/20/21  0350 07/21/21  0756   SODIUM 133* 136 137   POTASSIUM 3.7 3.5* 4.1   CHLORIDE 98 99 99   CO2 26 27 28   GLUCOSE 158* 173* 131*   BUN 26* 25* 34*   CREATININE 0.61 0.58 0.82   CALCIUM 8.2* 8.1* 8.1*                   Imaging  US-PARACENTESIS, ABD WITH IMAGING   Final Result      1. Ultrasound-guided therapeutic paracentesis of the left lower quadrant of the abdominal wall.      2. 4000 mL of fluid withdrawn.      DX-ABDOMEN FOR TUBE PLACEMENT   Final Result         1.  Nonspecific bowel gas pattern.   2.  Dobbhoff tube tip overlying the expected location of the pylorus or first duodenal segment.   3.  Pulmonary edema and/or infiltrates      DX-CHEST-PORTABLE (1 VIEW)   Final Result         1.  Pulmonary edema and/or infiltrates are identified, which are somewhat decreased since the prior exam.      DX-ABDOMEN FOR TUBE PLACEMENT   Final Result         1.  Nonspecific bowel gas pattern.   2.  Dobbhoff tube tip overlying the expected location of the pylorus or first duodenal segment.      DX-ABDOMEN FOR TUBE PLACEMENT   Final Result      Enteric tube projects over the second portion of the duodenum.      DX-CHEST-PORTABLE (1 VIEW)   Final Result         1.  Pulmonary edema and/or infiltrates are identified, which are stable since the prior exam.      [id number] VIEW   Final Result      No dilated bowel      DX-CHEST-PORTABLE (1 VIEW)   Final Result         No significant interval change.         MR-BRAIN-WITH & W/O   Final Result      1.  No acute abnormality.   2.  Mild to moderate cerebral volume loss.   3.  Minimal chronic microvascular ischemic disease.      DX-CHEST-PORTABLE (1 VIEW)   Final Result      1.  Interval increasing  bilateral airspace opacity suspicious for pneumonia most prominent in the right lower lobe.   2.  There is probably superimposed vascular congestion.      CT-HEAD W/O   Final Result         1. No acute intracranial abnormality. No evidence of acute intracranial hemorrhage or mass lesion.               DX-CHEST-PORTABLE (1 VIEW)   Final Result         Endotracheal tube with tip projecting over the mid thoracic trachea.      Gastric drainage tube courses below diaphragm, tip is in the stomach.      Right central venous catheter with tip projecting over the expected area of the lower SVC.      CT-ABDOMEN-PELVIS W/O   Final Result      1.  New fluid distended small bowel loops which are nonspecific and could be related to enteritis or small bowel ileus. Developing obstruction is considered less likely though not excluded.   2.  There appears to be new wall thickening of the ascending colon likely representing infectious/inflammatory colitis.   3.  Mild ascites which is mildly increased from prior study. Omental and mesenteric infiltration which is new from prior and could be related to edema or inflammation.   4.  Small right pleural effusion. Bibasilar atelectasis.   5.   No other significant change.      DX-CHEST-PORTABLE (1 VIEW)   Final Result      1.  New right basilar airspace process consistent with atelectasis or pneumonia      2.  Mild left midlung zone atelectasis      [id number] VIEW   Final Result      1. Moderate stool mixed with contrast in the ascending colon.   2. Moderate stool in the transverse colon.   3. No bowel obstruction.   4. Other chronic degenerative changes.      CT-ABDOMEN-PELVIS W/O   Final Result      1.  There is no acute inflammatory process in the abdomen or pelvis.   2.  The treated lesions in the liver are again seen without interval change.   3.  Splenomegaly is again seen.   4.  Cystic pancreatic tail lesion is unchanged possibly a pseudocyst.   5.  There is colonic diverticulosis  without diverticulitis.   6.  There is mild wall thickening of the distal esophagus.   7.  There is a stable small amount of ascites.   8.  There is moderate colonic stool.      DX-CHEST-PORTABLE (1 VIEW)   Final Result      Hypoinflation with bibasilar atelectasis. No focal consolidation or pleural effusions.      DX-CHEST-PORTABLE (1 VIEW)   Final Result      Left basilar atelectasis.      CT-ABDOMEN-PELVIS WITH   Final Result      1.  Primarily cystic mass in the medial segment LEFT lobe liver, likely treated hepatoma.   2.  Complex low-attenuation lesion in the inferior aspect medial segment LEFT lobe liver which is new from prior exam and may represent cystic mass or focal hematoma related to rupture larger mass.  Minimal hyperdense components may indicate blood    products.  Abscess is felt less likely.   3.  Gallbladder is not visualized and may be compressed by or involved with liver mass.   4.  Small volume ascites with potential hemoperitoneum.   5.  Splenomegaly.      These findings were discussed with CARMEN OTTO on 7/2/2021 3:02 PM.      DX-CHEST-PORTABLE (1 VIEW)   Final Result      1.  Hypoinflation with minimal LEFT lung base atelectasis.   2.  No pneumonia or pulmonary edema.           Assessment/Plan  * Peritonitis (HCC)- (present on admission)  Assessment & Plan  Diagnostic laparoscopy with washout and drain placement 7/9/2021      Ascites- (present on admission)  Assessment & Plan  Ultrasound-guided therapeutic paracentesis 7/19/2021  IV Lasix, Aldactone  Fluid restriction    Acute respiratory failure with hypercapnia (HCC)- (present on admission)  Assessment & Plan  Intubated 7/10/2021,  Extubated 7/12/2021  RT protocol    Encephalopathy acute  Assessment & Plan  Acute metabolic encephalopathy   Avoid benzodiazepines and anticholinergics  Frequent orientation  Avoid early morning labs  Avoid vital signs during sleep  Ambulate if possible    Sepsis (HCC)  Assessment & Plan  This is Sepsis  Present on admission  SIRS criteria identified on my evaluation include: Tachycardia, with heart rate greater than 90 BPM, Tachypnea, with respirations greater than 20 per minute and Leukocytosis, with WBC greater than 12,000  Source - Pneumonia  Sepsis protocol initiated  Fluid resuscitation ordered per protocol  IV antibiotics as appropriate for source of sepsis      Hepatoma (HCC)- (present on admission)  Assessment & Plan  Follow up with Oncology as outpatient    Dysphagia- (present on admission)  Assessment & Plan  Level 3, Liquid Level 2  SLP to follow    Hypophosphatemia- (present on admission)  Assessment & Plan  Follow level    Hypokalemia- (present on admission)  Assessment & Plan  Kdur, follow bmp    Aspiration pneumonia (HCC)- (present on admission)  Assessment & Plan  Finished course of IV Zosyn    Normochromic anemia- (present on admission)  Assessment & Plan  Follow cbc    Seizure-like activity (HCC)- (present on admission)  Assessment & Plan  EEG - Moderate background slowing suggestive of diffuse/multifocal cerebral dysfunction. No persistent focal asymmetries seen. No epileptiform discharges seen. No seizures. 7/10/2021    Hypomagnesemia  Assessment & Plan  IV Mg given  Follow level    Lactic acidosis- (present on admission)  Assessment & Plan  resolved    Hyperkalemia- (present on admission)  Assessment & Plan  Follow bmp    Hyponatremia- (present on admission)  Assessment & Plan  Follow bmp    Thrombocytopenia (HCC)- (present on admission)  Assessment & Plan  Follow cbc      Protein-calorie malnutrition (HCC)  Assessment & Plan  Nutrition consult    Acute urinary retention  Assessment & Plan  resolved    Tobacco abuse- (present on admission)  Assessment & Plan  Nicotine replacement protocol      Prediabetes- (present on admission)  Assessment & Plan  Hgba1c 6.0       VTE prophylaxis: SCDs/TEDs    I have performed a physical exam and reviewed and updated ROS and Plan today (7/21/2021). In review  of yesterday's note (7/20/2021), there are no changes except as documented above.

## 2021-07-21 NOTE — CARE PLAN
Problem: Pain - Standard  Goal: Alleviation of pain or a reduction in pain to the patient’s comfort goal  Outcome: Progressing     Problem: Knowledge Deficit - Standard  Goal: Patient and family/care givers will demonstrate understanding of plan of care, disease process/condition, diagnostic tests and medications  Outcome: Progressing     Problem: Fall Risk  Goal: Patient will remain free from falls  Outcome: Progressing     Problem: Bowel Elimination  Goal: Establish and maintain regular bowel function  Outcome: Not Progressing   The patient is Watcher - Medium risk of patient condition declining or worsening    Shift Goals  Clinical Goals: Mobility  Patient Goals: rest  Family Goals: no family present    Progress made toward(s) clinical / shift goals:  pt agreeable to ambulating today    Patient is not progressing towards the following goals:      Problem: Bowel Elimination  Goal: Establish and maintain regular bowel function  Outcome: Not Progressing   Frequent watery stool, MD aware

## 2021-07-22 LAB
ALBUMIN SERPL BCP-MCNC: 2 G/DL (ref 3.2–4.9)
ALBUMIN/GLOB SERPL: 0.5 G/DL
ALP SERPL-CCNC: 157 U/L (ref 30–99)
ALT SERPL-CCNC: 8 U/L (ref 2–50)
ANION GAP SERPL CALC-SCNC: 7 MMOL/L (ref 7–16)
ANISOCYTOSIS BLD QL SMEAR: ABNORMAL
AST SERPL-CCNC: 18 U/L (ref 12–45)
BASOPHILS # BLD AUTO: 0 % (ref 0–1.8)
BASOPHILS # BLD: 0 K/UL (ref 0–0.12)
BILIRUB SERPL-MCNC: 0.9 MG/DL (ref 0.1–1.5)
BUN SERPL-MCNC: 35 MG/DL (ref 8–22)
CALCIUM SERPL-MCNC: 7.7 MG/DL (ref 8.5–10.5)
CHLORIDE SERPL-SCNC: 96 MMOL/L (ref 96–112)
CO2 SERPL-SCNC: 29 MMOL/L (ref 20–33)
CREAT SERPL-MCNC: 0.73 MG/DL (ref 0.5–1.4)
EOSINOPHIL # BLD AUTO: 0 K/UL (ref 0–0.51)
EOSINOPHIL NFR BLD: 0 % (ref 0–6.9)
ERYTHROCYTE [DISTWIDTH] IN BLOOD BY AUTOMATED COUNT: 65.4 FL (ref 35.9–50)
GLOBULIN SER CALC-MCNC: 3.9 G/DL (ref 1.9–3.5)
GLUCOSE SERPL-MCNC: 110 MG/DL (ref 65–99)
HCT VFR BLD AUTO: 32.3 % (ref 42–52)
HGB BLD-MCNC: 10.5 G/DL (ref 14–18)
LYMPHOCYTES # BLD AUTO: 0.18 K/UL (ref 1–4.8)
LYMPHOCYTES NFR BLD: 3.5 % (ref 22–41)
MACROCYTES BLD QL SMEAR: ABNORMAL
MAGNESIUM SERPL-MCNC: 1.9 MG/DL (ref 1.5–2.5)
MANUAL DIFF BLD: NORMAL
MCH RBC QN AUTO: 31.6 PG (ref 27–33)
MCHC RBC AUTO-ENTMCNC: 32.5 G/DL (ref 33.7–35.3)
MCV RBC AUTO: 97.3 FL (ref 81.4–97.8)
MONOCYTES # BLD AUTO: 0.41 K/UL (ref 0–0.85)
MONOCYTES NFR BLD AUTO: 7.8 % (ref 0–13.4)
MORPHOLOGY BLD-IMP: NORMAL
NEUTROPHILS # BLD AUTO: 4.61 K/UL (ref 1.82–7.42)
NEUTROPHILS NFR BLD: 82.6 % (ref 44–72)
NEUTS BAND NFR BLD MANUAL: 6.1 % (ref 0–10)
NRBC # BLD AUTO: 0 K/UL
NRBC BLD-RTO: 0 /100 WBC
PLATELET # BLD AUTO: 71 K/UL (ref 164–446)
PLATELET BLD QL SMEAR: NORMAL
PMV BLD AUTO: 9.2 FL (ref 9–12.9)
POTASSIUM SERPL-SCNC: 3.9 MMOL/L (ref 3.6–5.5)
PROT SERPL-MCNC: 5.9 G/DL (ref 6–8.2)
RBC # BLD AUTO: 3.32 M/UL (ref 4.7–6.1)
RBC BLD AUTO: PRESENT
SODIUM SERPL-SCNC: 132 MMOL/L (ref 135–145)
WBC # BLD AUTO: 5.2 K/UL (ref 4.8–10.8)

## 2021-07-22 PROCEDURE — 85027 COMPLETE CBC AUTOMATED: CPT

## 2021-07-22 PROCEDURE — A9270 NON-COVERED ITEM OR SERVICE: HCPCS | Performed by: HOSPITALIST

## 2021-07-22 PROCEDURE — 83735 ASSAY OF MAGNESIUM: CPT

## 2021-07-22 PROCEDURE — 700111 HCHG RX REV CODE 636 W/ 250 OVERRIDE (IP): Performed by: FAMILY MEDICINE

## 2021-07-22 PROCEDURE — 700102 HCHG RX REV CODE 250 W/ 637 OVERRIDE(OP): Performed by: HOSPITALIST

## 2021-07-22 PROCEDURE — A9270 NON-COVERED ITEM OR SERVICE: HCPCS | Performed by: STUDENT IN AN ORGANIZED HEALTH CARE EDUCATION/TRAINING PROGRAM

## 2021-07-22 PROCEDURE — 700111 HCHG RX REV CODE 636 W/ 250 OVERRIDE (IP): Performed by: HOSPITALIST

## 2021-07-22 PROCEDURE — A9270 NON-COVERED ITEM OR SERVICE: HCPCS | Performed by: FAMILY MEDICINE

## 2021-07-22 PROCEDURE — 99233 SBSQ HOSP IP/OBS HIGH 50: CPT | Performed by: FAMILY MEDICINE

## 2021-07-22 PROCEDURE — 85007 BL SMEAR W/DIFF WBC COUNT: CPT

## 2021-07-22 PROCEDURE — 700102 HCHG RX REV CODE 250 W/ 637 OVERRIDE(OP): Performed by: STUDENT IN AN ORGANIZED HEALTH CARE EDUCATION/TRAINING PROGRAM

## 2021-07-22 PROCEDURE — 80053 COMPREHEN METABOLIC PANEL: CPT

## 2021-07-22 PROCEDURE — 770006 HCHG ROOM/CARE - MED/SURG/GYN SEMI*

## 2021-07-22 PROCEDURE — 36415 COLL VENOUS BLD VENIPUNCTURE: CPT

## 2021-07-22 PROCEDURE — 700102 HCHG RX REV CODE 250 W/ 637 OVERRIDE(OP): Performed by: FAMILY MEDICINE

## 2021-07-22 RX ORDER — MAGNESIUM SULFATE HEPTAHYDRATE 40 MG/ML
2 INJECTION, SOLUTION INTRAVENOUS ONCE
Status: COMPLETED | OUTPATIENT
Start: 2021-07-22 | End: 2021-07-22

## 2021-07-22 RX ADMIN — POTASSIUM CHLORIDE 20 MEQ: 1500 TABLET, EXTENDED RELEASE ORAL at 04:35

## 2021-07-22 RX ADMIN — MAGNESIUM SULFATE IN WATER 2 G: 40 INJECTION, SOLUTION INTRAVENOUS at 09:01

## 2021-07-22 RX ADMIN — FUROSEMIDE 40 MG: 10 INJECTION, SOLUTION INTRAMUSCULAR; INTRAVENOUS at 15:50

## 2021-07-22 RX ADMIN — POTASSIUM CHLORIDE 20 MEQ: 1500 TABLET, EXTENDED RELEASE ORAL at 17:55

## 2021-07-22 RX ADMIN — THIAMINE HCL TAB 100 MG 100 MG: 100 TAB at 04:35

## 2021-07-22 RX ADMIN — FAMOTIDINE 20 MG: 10 INJECTION INTRAVENOUS at 17:55

## 2021-07-22 RX ADMIN — FAMOTIDINE 20 MG: 10 INJECTION INTRAVENOUS at 04:35

## 2021-07-22 RX ADMIN — ACETAMINOPHEN 650 MG: 325 TABLET, FILM COATED ORAL at 18:15

## 2021-07-22 RX ADMIN — OXYCODONE 5 MG: 5 TABLET ORAL at 21:14

## 2021-07-22 RX ADMIN — FUROSEMIDE 40 MG: 10 INJECTION, SOLUTION INTRAMUSCULAR; INTRAVENOUS at 04:35

## 2021-07-22 ASSESSMENT — PAIN DESCRIPTION - PAIN TYPE
TYPE: ACUTE PAIN

## 2021-07-22 ASSESSMENT — ENCOUNTER SYMPTOMS
NERVOUS/ANXIOUS: 0
HEARTBURN: 0
MYALGIAS: 0
SORE THROAT: 0
NAUSEA: 0
FOCAL WEAKNESS: 0
DIZZINESS: 0
FLANK PAIN: 0
SHORTNESS OF BREATH: 0
SPEECH CHANGE: 0
PALPITATIONS: 0
CHILLS: 0
DIAPHORESIS: 0
NECK PAIN: 0
FEVER: 0
BLURRED VISION: 0
VOMITING: 0
SENSORY CHANGE: 0
HEADACHES: 0
WHEEZING: 0
DIARRHEA: 1
BACK PAIN: 0
COUGH: 0
ABDOMINAL PAIN: 1
WEAKNESS: 1

## 2021-07-22 NOTE — CARE PLAN
The patient is Stable - Low risk of patient condition declining or worsening    Shift Goals  Clinical Goals: rest  Patient Goals: rest  Family Goals: no family present    Progress made toward(s) clinical / shift goals: Patient resting in bed, denies pain, encouraged to use call light for assistance.    Problem: Pain - Standard  Goal: Alleviation of pain or a reduction in pain to the patient’s comfort goal  Outcome: Progressing     Problem: Fall Risk  Goal: Patient will remain free from falls  Outcome: Progressing     Patient is not progressing towards the following goals:

## 2021-07-22 NOTE — PROGRESS NOTES
Bedside report received.  Assessment complete.  A&O x 4. Patient calls appropriately.  Patient ambulates with x2 assist with FWW.   Patient denies pain at this time.  Cortrak to right nare.  + void, last BM 7/21.  Review plan with of care with patient. Call light and personal belongings within reach. Hourly rounding in place. All needs met at this time.

## 2021-07-22 NOTE — PROGRESS NOTES
[facility] Medicine Daily Progress Note    Date of Service  7/22/2021    Chief Complaint  Lorraine Bella is a 71 y.o. male admitted 7/2/2021 with ruptured hepatic cyst.    Hospital Course  Admitted with ruptured hepatic cyst with hemoperitoneum.  Surgery was consulted on the case.  Patient was initially treated conservatively with medical management only.  However he developed increasing abdominal pain, and abdominal compartment syndrome.  Surgery was reconsulted on the case, and patient underwent Diagnostic laparoscopy with washout and drain placement on 7/9/2021.  Postoperatively he required intubation for respiratory failure.  He required pressors for pressure support, he was also treated empirically for possible aspiration pneumonia with IV Zosyn.  His peritoneal fluid cultures showed Streptococcus anginosus.  He had seizure-like activity, EEG was noted to be negative.  He was eventually extubated on 7/12/2021.  He was also noted to have ascites, and had ultrasound-guided paracentesis on 7/19/2021 with removal of 4 L of fluid. Fluid cultures are negative    Interval Problem Update  Peritonitis- pain controlled  Resp failure - O2 1 lpm NC  Ascites - fluid culture negative, less distended  Diarrhea - c diff negative  Low magnesium    I have personally seen and examined the patient at bedside. I discussed the plan of care with patient, bedside RN, charge RN and .    Consultants/Specialty  critical care and general surgery    Code Status  Full Code    Disposition  Patient is not medically cleared.   Anticipate discharge to to skilled nursing facility.  I have placed the appropriate orders for post-discharge needs.    Review of Systems  Review of Systems   Constitutional: Positive for malaise/fatigue. Negative for chills, diaphoresis and fever.   HENT: Negative for congestion, hearing loss and sore throat.    Eyes: Negative for blurred vision.   Respiratory: Negative for cough, shortness of breath  and wheezing.    Cardiovascular: Positive for leg swelling. Negative for chest pain and palpitations.   Gastrointestinal: Positive for abdominal pain and diarrhea. Negative for heartburn, nausea and vomiting.   Genitourinary: Negative for dysuria, flank pain and hematuria.   Musculoskeletal: Negative for back pain, joint pain, myalgias and neck pain.   Skin: Negative for rash.   Neurological: Positive for weakness. Negative for dizziness, sensory change, speech change, focal weakness and headaches.   Psychiatric/Behavioral: The patient is not nervous/anxious.         Physical Exam  Temp:  [36.3 °C (97.3 °F)-37.1 °C (98.7 °F)] 36.3 °C (97.3 °F)  Pulse:  [] 94  Resp:  [16-17] 16  BP: (101-107)/(56-64) 103/60  SpO2:  [93 %-95 %] 94 %    Physical Exam  Vitals and nursing note reviewed.   HENT:      Head: Normocephalic and atraumatic.      Nose: No congestion.      Mouth/Throat:      Mouth: Mucous membranes are moist.   Eyes:      Extraocular Movements: Extraocular movements intact.      Conjunctiva/sclera: Conjunctivae normal.   Cardiovascular:      Rate and Rhythm: Normal rate and regular rhythm.   Abdominal:      General: There is distension.      Tenderness: There is no abdominal tenderness. There is no guarding or rebound.   Musculoskeletal:      Cervical back: No tenderness.      Right lower leg: No edema.      Left lower leg: No edema.   Skin:     General: Skin is warm and dry.   Neurological:      General: No focal deficit present.      Mental Status: He is alert and oriented to person, place, and time.      Cranial Nerves: No cranial nerve deficit.         Fluids    Intake/Output Summary (Last 24 hours) at 7/22/2021 1007  Last data filed at 7/22/2021 0738  Gross per 24 hour   Intake 90 ml   Output 1020 ml   Net -930 ml       Laboratory  Recent Labs     07/20/21  0350 07/21/21  0756 07/22/21  0435   WBC 11.5* 8.9 5.2   RBC 3.53* 3.81* 3.32*   HEMOGLOBIN 11.1* 12.1* 10.5*   HEMATOCRIT 34.2* 37.6* 32.3*   MCV  96.9 98.7* 97.3   MCH 31.4 31.8 31.6   MCHC 32.5* 32.2* 32.5*   RDW 62.4* 64.5* 65.4*   PLATELETCT 76* 105* 71*   MPV 9.6 9.9 9.2     Recent Labs     07/20/21  0350 07/21/21  0756 07/22/21  0435   SODIUM 136 137 132*   POTASSIUM 3.5* 4.1 3.9   CHLORIDE 99 99 96   CO2 27 28 29   GLUCOSE 173* 131* 110*   BUN 25* 34* 35*   CREATININE 0.58 0.82 0.73   CALCIUM 8.1* 8.1* 7.7*                   Imaging  US-PARACENTESIS, ABD WITH IMAGING   Final Result      1. Ultrasound-guided therapeutic paracentesis of the left lower quadrant of the abdominal wall.      2. 4000 mL of fluid withdrawn.      DX-ABDOMEN FOR TUBE PLACEMENT   Final Result         1.  Nonspecific bowel gas pattern.   2.  Dobbhoff tube tip overlying the expected location of the pylorus or first duodenal segment.   3.  Pulmonary edema and/or infiltrates      DX-CHEST-PORTABLE (1 VIEW)   Final Result         1.  Pulmonary edema and/or infiltrates are identified, which are somewhat decreased since the prior exam.      DX-ABDOMEN FOR TUBE PLACEMENT   Final Result         1.  Nonspecific bowel gas pattern.   2.  Dobbhoff tube tip overlying the expected location of the pylorus or first duodenal segment.      DX-ABDOMEN FOR TUBE PLACEMENT   Final Result      Enteric tube projects over the second portion of the duodenum.      DX-CHEST-PORTABLE (1 VIEW)   Final Result         1.  Pulmonary edema and/or infiltrates are identified, which are stable since the prior exam.      [id number] VIEW   Final Result      No dilated bowel      DX-CHEST-PORTABLE (1 VIEW)   Final Result         No significant interval change.         MR-BRAIN-WITH & W/O   Final Result      1.  No acute abnormality.   2.  Mild to moderate cerebral volume loss.   3.  Minimal chronic microvascular ischemic disease.      DX-CHEST-PORTABLE (1 VIEW)   Final Result      1.  Interval increasing bilateral airspace opacity suspicious for pneumonia most prominent in the right lower lobe.   2.  There is probably  superimposed vascular congestion.      CT-HEAD W/O   Final Result         1. No acute intracranial abnormality. No evidence of acute intracranial hemorrhage or mass lesion.               DX-CHEST-PORTABLE (1 VIEW)   Final Result         Endotracheal tube with tip projecting over the mid thoracic trachea.      Gastric drainage tube courses below diaphragm, tip is in the stomach.      Right central venous catheter with tip projecting over the expected area of the lower SVC.      CT-ABDOMEN-PELVIS W/O   Final Result      1.  New fluid distended small bowel loops which are nonspecific and could be related to enteritis or small bowel ileus. Developing obstruction is considered less likely though not excluded.   2.  There appears to be new wall thickening of the ascending colon likely representing infectious/inflammatory colitis.   3.  Mild ascites which is mildly increased from prior study. Omental and mesenteric infiltration which is new from prior and could be related to edema or inflammation.   4.  Small right pleural effusion. Bibasilar atelectasis.   5.   No other significant change.      DX-CHEST-PORTABLE (1 VIEW)   Final Result      1.  New right basilar airspace process consistent with atelectasis or pneumonia      2.  Mild left midlung zone atelectasis      [id number] VIEW   Final Result      1. Moderate stool mixed with contrast in the ascending colon.   2. Moderate stool in the transverse colon.   3. No bowel obstruction.   4. Other chronic degenerative changes.      CT-ABDOMEN-PELVIS W/O   Final Result      1.  There is no acute inflammatory process in the abdomen or pelvis.   2.  The treated lesions in the liver are again seen without interval change.   3.  Splenomegaly is again seen.   4.  Cystic pancreatic tail lesion is unchanged possibly a pseudocyst.   5.  There is colonic diverticulosis without diverticulitis.   6.  There is mild wall thickening of the distal esophagus.   7.  There is a stable small  amount of ascites.   8.  There is moderate colonic stool.      DX-CHEST-PORTABLE (1 VIEW)   Final Result      Hypoinflation with bibasilar atelectasis. No focal consolidation or pleural effusions.      DX-CHEST-PORTABLE (1 VIEW)   Final Result      Left basilar atelectasis.      CT-ABDOMEN-PELVIS WITH   Final Result      1.  Primarily cystic mass in the medial segment LEFT lobe liver, likely treated hepatoma.   2.  Complex low-attenuation lesion in the inferior aspect medial segment LEFT lobe liver which is new from prior exam and may represent cystic mass or focal hematoma related to rupture larger mass.  Minimal hyperdense components may indicate blood    products.  Abscess is felt less likely.   3.  Gallbladder is not visualized and may be compressed by or involved with liver mass.   4.  Small volume ascites with potential hemoperitoneum.   5.  Splenomegaly.      These findings were discussed with CARMEN OTTO on 7/2/2021 3:02 PM.      DX-CHEST-PORTABLE (1 VIEW)   Final Result      1.  Hypoinflation with minimal LEFT lung base atelectasis.   2.  No pneumonia or pulmonary edema.           Assessment/Plan  * Peritonitis (HCC)- (present on admission)  Assessment & Plan  Diagnostic laparoscopy with washout and drain placement 7/9/2021      Ascites- (present on admission)  Assessment & Plan  Ultrasound-guided therapeutic paracentesis 7/19/2021  IV Lasix, Aldactone  Fluid restriction    Acute respiratory failure with hypercapnia (HCC)- (present on admission)  Assessment & Plan  Intubated 7/10/2021,  Extubated 7/12/2021  RT protocol    Encephalopathy acute  Assessment & Plan  Acute metabolic encephalopathy   Avoid benzodiazepines and anticholinergics  Frequent orientation  Avoid early morning labs  Avoid vital signs during sleep  Ambulate if possible    Sepsis (HCC)  Assessment & Plan  This is Sepsis Present on admission  SIRS criteria identified on my evaluation include: Tachycardia, with heart rate greater than 90  BPM, Tachypnea, with respirations greater than 20 per minute and Leukocytosis, with WBC greater than 12,000  Source - Pneumonia  Sepsis protocol initiated  Fluid resuscitation ordered per protocol      Hepatoma (HCC)- (present on admission)  Assessment & Plan  Follow up with Oncology as outpatient    Dysphagia- (present on admission)  Assessment & Plan  Level 3, Liquid Level 2  SLP to follow    Hypophosphatemia- (present on admission)  Assessment & Plan  Follow level    Hypokalemia- (present on admission)  Assessment & Plan  Kdur, follow bmp    Aspiration pneumonia (HCC)- (present on admission)  Assessment & Plan  Finished course of IV Zosyn    Normochromic anemia- (present on admission)  Assessment & Plan  Follow cbc    Seizure-like activity (HCC)- (present on admission)  Assessment & Plan  EEG - Moderate background slowing suggestive of diffuse/multifocal cerebral dysfunction. No persistent focal asymmetries seen. No epileptiform discharges seen. No seizures. 7/10/2021    Hypomagnesemia  Assessment & Plan  IV Mg 2 g  Follow level    Lactic acidosis- (present on admission)  Assessment & Plan  resolved    Hyperkalemia- (present on admission)  Assessment & Plan  Follow bmp    Hyponatremia- (present on admission)  Assessment & Plan  Follow bmp    Thrombocytopenia (HCC)- (present on admission)  Assessment & Plan  Follow cbc      Protein-calorie malnutrition (HCC)  Assessment & Plan  Nutrition consult    Acute urinary retention  Assessment & Plan  resolved    Tobacco abuse- (present on admission)  Assessment & Plan  Nicotine replacement protocol      Prediabetes- (present on admission)  Assessment & Plan  Hgba1c 6.0       VTE prophylaxis: SCDs/TEDs    I have performed a physical exam and reviewed and updated ROS and Plan today (7/22/2021). In review of yesterday's note (7/21/2021), there are no changes except as documented above.

## 2021-07-23 VITALS
WEIGHT: 205.47 LBS | SYSTOLIC BLOOD PRESSURE: 115 MMHG | TEMPERATURE: 96.7 F | BODY MASS INDEX: 28.77 KG/M2 | OXYGEN SATURATION: 96 % | HEIGHT: 71 IN | RESPIRATION RATE: 20 BRPM | HEART RATE: 113 BPM | DIASTOLIC BLOOD PRESSURE: 70 MMHG

## 2021-07-23 LAB
ALBUMIN SERPL BCP-MCNC: 2.1 G/DL (ref 3.2–4.9)
ALBUMIN/GLOB SERPL: 0.5 G/DL
ALP SERPL-CCNC: 200 U/L (ref 30–99)
ALT SERPL-CCNC: 7 U/L (ref 2–50)
ANION GAP SERPL CALC-SCNC: 10 MMOL/L (ref 7–16)
ANISOCYTOSIS BLD QL SMEAR: ABNORMAL
AST SERPL-CCNC: 23 U/L (ref 12–45)
BASOPHILS # BLD AUTO: 0 % (ref 0–1.8)
BASOPHILS # BLD: 0 K/UL (ref 0–0.12)
BILIRUB SERPL-MCNC: 0.8 MG/DL (ref 0.1–1.5)
BUN SERPL-MCNC: 38 MG/DL (ref 8–22)
BURR CELLS BLD QL SMEAR: NORMAL
CALCIUM SERPL-MCNC: 7.9 MG/DL (ref 8.5–10.5)
CHLORIDE SERPL-SCNC: 99 MMOL/L (ref 96–112)
CO2 SERPL-SCNC: 27 MMOL/L (ref 20–33)
CREAT SERPL-MCNC: 0.78 MG/DL (ref 0.5–1.4)
EOSINOPHIL # BLD AUTO: 0 K/UL (ref 0–0.51)
EOSINOPHIL NFR BLD: 0 % (ref 0–6.9)
ERYTHROCYTE [DISTWIDTH] IN BLOOD BY AUTOMATED COUNT: 67.6 FL (ref 35.9–50)
GLOBULIN SER CALC-MCNC: 4.3 G/DL (ref 1.9–3.5)
GLUCOSE SERPL-MCNC: 152 MG/DL (ref 65–99)
HCT VFR BLD AUTO: 36.2 % (ref 42–52)
HGB BLD-MCNC: 11.3 G/DL (ref 14–18)
LYMPHOCYTES # BLD AUTO: 0.37 K/UL (ref 1–4.8)
LYMPHOCYTES NFR BLD: 6.1 % (ref 22–41)
MACROCYTES BLD QL SMEAR: ABNORMAL
MAGNESIUM SERPL-MCNC: 2.1 MG/DL (ref 1.5–2.5)
MANUAL DIFF BLD: NORMAL
MCH RBC QN AUTO: 31 PG (ref 27–33)
MCHC RBC AUTO-ENTMCNC: 31.2 G/DL (ref 33.7–35.3)
MCV RBC AUTO: 99.5 FL (ref 81.4–97.8)
MICROCYTES BLD QL SMEAR: ABNORMAL
MONOCYTES # BLD AUTO: 0.48 K/UL (ref 0–0.85)
MONOCYTES NFR BLD AUTO: 7.9 % (ref 0–13.4)
MORPHOLOGY BLD-IMP: NORMAL
NEUTROPHILS # BLD AUTO: 5.25 K/UL (ref 1.82–7.42)
NEUTROPHILS NFR BLD: 86 % (ref 44–72)
NRBC # BLD AUTO: 0 K/UL
NRBC BLD-RTO: 0 /100 WBC
PLATELET # BLD AUTO: 84 K/UL (ref 164–446)
PLATELET BLD QL SMEAR: NORMAL
PMV BLD AUTO: 9.5 FL (ref 9–12.9)
POIKILOCYTOSIS BLD QL SMEAR: NORMAL
POTASSIUM SERPL-SCNC: 3.9 MMOL/L (ref 3.6–5.5)
PROT SERPL-MCNC: 6.4 G/DL (ref 6–8.2)
RBC # BLD AUTO: 3.64 M/UL (ref 4.7–6.1)
RBC BLD AUTO: PRESENT
SODIUM SERPL-SCNC: 136 MMOL/L (ref 135–145)
WBC # BLD AUTO: 6.1 K/UL (ref 4.8–10.8)

## 2021-07-23 PROCEDURE — 36415 COLL VENOUS BLD VENIPUNCTURE: CPT

## 2021-07-23 PROCEDURE — 99239 HOSP IP/OBS DSCHRG MGMT >30: CPT | Performed by: FAMILY MEDICINE

## 2021-07-23 PROCEDURE — 85007 BL SMEAR W/DIFF WBC COUNT: CPT

## 2021-07-23 PROCEDURE — 700102 HCHG RX REV CODE 250 W/ 637 OVERRIDE(OP): Performed by: HOSPITALIST

## 2021-07-23 PROCEDURE — A9270 NON-COVERED ITEM OR SERVICE: HCPCS | Performed by: HOSPITALIST

## 2021-07-23 PROCEDURE — 97535 SELF CARE MNGMENT TRAINING: CPT

## 2021-07-23 PROCEDURE — 85027 COMPLETE CBC AUTOMATED: CPT

## 2021-07-23 PROCEDURE — 80053 COMPREHEN METABOLIC PANEL: CPT

## 2021-07-23 PROCEDURE — 700111 HCHG RX REV CODE 636 W/ 250 OVERRIDE (IP): Performed by: HOSPITALIST

## 2021-07-23 PROCEDURE — A9270 NON-COVERED ITEM OR SERVICE: HCPCS | Performed by: FAMILY MEDICINE

## 2021-07-23 PROCEDURE — 700102 HCHG RX REV CODE 250 W/ 637 OVERRIDE(OP): Performed by: FAMILY MEDICINE

## 2021-07-23 PROCEDURE — 83735 ASSAY OF MAGNESIUM: CPT

## 2021-07-23 RX ORDER — ONDANSETRON 4 MG/1
4 TABLET, ORALLY DISINTEGRATING ORAL EVERY 6 HOURS PRN
Status: ON HOLD
Start: 2021-07-23 | End: 2021-09-17

## 2021-07-23 RX ORDER — SPIRONOLACTONE 25 MG/1
25 TABLET ORAL DAILY
Qty: 30 TABLET | Refills: 3 | Status: SHIPPED
Start: 2021-07-24 | End: 2021-08-26

## 2021-07-23 RX ORDER — FAMOTIDINE 20 MG/1
20 TABLET, FILM COATED ORAL EVERY 12 HOURS
Qty: 60 TABLET | Status: ON HOLD
Start: 2021-07-23 | End: 2021-09-17

## 2021-07-23 RX ORDER — NICOTINE 21 MG/24HR
1 PATCH, TRANSDERMAL 24 HOURS TRANSDERMAL EVERY 24 HOURS
Qty: 30 PATCH | Status: SHIPPED
Start: 2021-07-23 | End: 2021-08-26

## 2021-07-23 RX ORDER — LANOLIN ALCOHOL/MO/W.PET/CERES
100 CREAM (GRAM) TOPICAL DAILY
Qty: 30 TABLET | Status: SHIPPED
Start: 2021-07-24 | End: 2021-08-27

## 2021-07-23 RX ORDER — OXYCODONE HYDROCHLORIDE 5 MG/1
5 TABLET ORAL EVERY 6 HOURS PRN
Qty: 12 TABLET | Refills: 0 | Status: SHIPPED | OUTPATIENT
Start: 2021-07-23 | End: 2021-07-26

## 2021-07-23 RX ORDER — FUROSEMIDE 40 MG/1
40 TABLET ORAL 2 TIMES DAILY
Status: ON HOLD
Start: 2021-07-23 | End: 2021-09-17

## 2021-07-23 RX ORDER — POTASSIUM CHLORIDE 20 MEQ/1
20 TABLET, EXTENDED RELEASE ORAL 2 TIMES DAILY
Qty: 60 TABLET | Refills: 11 | Status: ON HOLD
Start: 2021-07-23 | End: 2021-09-17

## 2021-07-23 RX ORDER — ACETAMINOPHEN 500 MG
500 TABLET ORAL EVERY 6 HOURS PRN
Status: SHIPPED
Start: 2021-07-23 | End: 2021-08-27

## 2021-07-23 RX ADMIN — THIAMINE HCL TAB 100 MG 100 MG: 100 TAB at 05:45

## 2021-07-23 RX ADMIN — FAMOTIDINE 20 MG: 10 INJECTION INTRAVENOUS at 05:44

## 2021-07-23 RX ADMIN — SPIRONOLACTONE 25 MG: 50 TABLET ORAL at 05:45

## 2021-07-23 RX ADMIN — POTASSIUM CHLORIDE 20 MEQ: 1500 TABLET, EXTENDED RELEASE ORAL at 05:45

## 2021-07-23 RX ADMIN — FUROSEMIDE 40 MG: 10 INJECTION, SOLUTION INTRAMUSCULAR; INTRAVENOUS at 05:45

## 2021-07-23 ASSESSMENT — COGNITIVE AND FUNCTIONAL STATUS - GENERAL
TOILETING: A LOT
SUGGESTED CMS G CODE MODIFIER DAILY ACTIVITY: CK
DRESSING REGULAR UPPER BODY CLOTHING: A LITTLE
PERSONAL GROOMING: A LITTLE
HELP NEEDED FOR BATHING: A LOT
DAILY ACTIVITIY SCORE: 15
DRESSING REGULAR LOWER BODY CLOTHING: A LOT
EATING MEALS: A LITTLE

## 2021-07-23 NOTE — THERAPY
"Occupational Therapy  Daily Treatment     Patient Name: Lorraine Bella  Age:  71 y.o., Sex:  male  Medical Record #: [id number]  Today's Date: 7/23/2021     Precautions: Fall Risk, Nasogastric Tube  Comments: bowel incontinence     Assessment  Pt seen for OT tx, as he was up on BSC and waiting for nsg assist. OT failicated gwen care and stand pivot back to EOB. Pt has improving strength and balance and less c/o fatigue w/ADL activities.     Plan  Continue current treatment plan.    DC Equipment Recommendations: Unable to determine at this time  Discharge Recommendations: Recommend post-acute placement for additional occupational therapy services prior to discharge home    Subjective  \"I am ready to go to rehab\"      Objective     07/23/21 1131   Pain 0 - 10 Group   Therapist Pain Assessment 0;Nurse Notified   Cognition    Cognition / Consciousness WDL   Comments Soft spoken but alert and cooperative    Other Treatments   Other Treatments Provided Pt was up on BSC faciliated gwen care and then BTB    Balance   Sitting Balance (Static) Fair   Sitting Balance (Dynamic) Fair   Standing Balance (Static) Fair   Standing Balance (Dynamic) Fair -   Weight Shift Sitting Fair   Weight Shift Standing Poor   Skilled Intervention Verbal Cuing;Tactile Cuing;Facilitation;Compensatory Strategies   Comments w/fww    Bed Mobility    Sit to Supine Minimal Assist   Skilled Intervention Verbal Cuing;Tactile Cuing   Activities of Daily Living   Grooming Seated;Minimal Assist   Upper Body Dressing Minimal Assist   Lower Body Dressing Maximal Assist   Toileting Maximal Assist   Skilled Intervention Verbal Cuing;Tactile Cuing;Facilitation;Compensatory Strategies   How much help from another person does the patient currently need...   6 Clicks Daily Activity Score 15   Functional Mobility   Sit to Stand Moderate Assist   Bed, Chair, Wheelchair Transfer Minimal Assist   Toilet Transfers Moderate Assist   Mobility BSC>BTB    Skilled " "Intervention Verbal Cuing;Tactile Cuing;Facilitation   Visual Perception   Visual Perception  Not Tested   Activity Tolerance   Comments reports less fatigue    Patient / Family Goals   Patient / Family Goal #1 \"To get outta here\"   Goal #1 Outcome Goal met   Short Term Goals   Short Term Goal # 2 pt will complete grooming seated EOB w/ setup   Goal Outcome # 2 Progressing as expected   Short Term Goal # 3 B Pt will be Min A for toilet transfer   Goal Outcome # 3 B Progressing as expected   Short Term Goal # 5 pt will complete LB Dressing w/mod A    Goal Outcome # 5 Progressing as expected   Education Group   Role of Occupational Therapist Patient Response Patient;Acceptance;Explanation;Verbal Demonstration   Transfers Patient Response Patient;Acceptance;Explanation;Demonstration;Action Demonstration;Reinforcement Needed   ADL Patient Response Patient;Family;Acceptance;Explanation;Verbal Demonstration;Action Demonstration   Anticipated Discharge Equipment and Recommendations   DC Equipment Recommendations Unable to determine at this time   Discharge Recommendations Recommend post-acute placement for additional occupational therapy services prior to discharge home   Interdisciplinary Plan of Care Collaboration   IDT Collaboration with  Nursing   Patient Position at End of Therapy In Bed;Call Light within Reach;Tray Table within Reach;Phone within Reach   Collaboration Comments RN aware of OT tx and pts efforts    Session Information   Date / Session Number  7/23 #5    Priority 1         "

## 2021-07-23 NOTE — DISCHARGE PLANNING
Anticipated Discharge Disposition:   SNF    Action:   This RN CM is following the case. Pt has been accepted at [facility] today at 13:00 today.  Dr Singh signed the Cobra and this RN CM obtained Pt's verbal consent.     [facility] to provide transport via  [facility] or [facility].     Prepared Cobra/transfer packet.    Informed Pt's wife, Sharlene of this discharge.    Informed Sharlene, Pt 's wife of this discharge update via .     Barriers to Discharge:   None    Plan:   CM to continue to assist Pt with discharge as needed

## 2021-07-23 NOTE — PROGRESS NOTES
Patient discharged to [facility] with [facility] transport. Prescriptions for oxycodone sent in COBRA,  verbalized understanding, consent signed and in chart. Education provided, patient asked questions and verbalized understanding. Discharge paperwork signed and in chart.2 Copies of AVS placed in COBRA. Copy of COBRA placed in chart. Patient is tolerating diet, and pain is well controlled. All belongings collected. No further questions or concerns at this time.    Report called to YASMIN Thompson. No questions or concerns at this time.

## 2021-07-23 NOTE — DISCHARGE SUMMARY
Discharge Summary    CHIEF COMPLAINT ON ADMISSION  Chief Complaint   Patient presents with   • Abdominal Pain   • Shoulder Pain       Reason for Admission  sent by MD     CODE STATUS  Full Code    HPI & HOSPITAL COURSE  This is a 71 y.o. male here with ruptured hepatic cyst with hemoperitoneum.  Surgery was consulted on the case.  Patient was initially treated conservatively with medical management only.  However he developed increasing abdominal pain, and abdominal compartment syndrome. Surgery was reconsulted on the case, and patient underwent Diagnostic laparoscopy with washout and drain placement on 7/9/2021.  Postoperatively he required intubation for respiratory failure.  He required pressors for pressure support, he was also treated empirically for possible aspiration pneumonia with IV Zosyn.  His peritoneal fluid cultures showed Streptococcus anginosus.  He had seizure-like activity, EEG was noted to be negative.  He was eventually extubated on 7/12/2021.  He was also noted to have ascites, and had ultrasound-guided paracentesis on 7/19/2021 with removal of 4 L of fluid. Fluid cultures are negative.  Third on diuresis with Lasix and Aldactone and he responded well.  He will need continued rehabitation in a skilled nursing facility.      Therefore, he is discharged in good and stable condition to skilled nursing facility.    The patient met 2-midnight criteria for an inpatient stay at the time of discharge.      FOLLOW UP ITEMS POST DISCHARGE  None    DISCHARGE DIAGNOSES  Principal Problem:    Peritonitis (HCC) POA: Yes  Active Problems:    Hepatoma (HCC) POA: Yes    Sepsis (HCC) POA: Clinically Undetermined    Encephalopathy acute POA: Clinically Undetermined    Acute respiratory failure with hypercapnia (HCC) POA: Yes    Ascites POA: Yes    Thrombocytopenia (HCC) POA: Yes    Hyponatremia POA: Yes    Hyperkalemia POA: Yes    Lactic acidosis POA: Yes    Hypomagnesemia POA: No    Seizure-like activity (HCC)  POA: Yes    Normochromic anemia POA: Yes    Aspiration pneumonia (HCC) POA: Yes    Hypokalemia POA: Yes    Hypophosphatemia POA: Yes    Dysphagia POA: Yes    Prediabetes POA: Yes    Tobacco abuse POA: Yes    Acute urinary retention POA: Clinically Undetermined    Protein-calorie malnutrition (HCC) POA: Clinically Undetermined  Resolved Problems:    Abdominal pain POA: Yes      FOLLOW UP  No future appointments.  Cole Alonso D.O.  [address]  [facility] [address]  [phone]    Call  Please call and schedule a hospital follow up appointment with your primary care provider as needed. Thank you    Cody Meza M.D.  [address]  [facility] [id number] [phone]      As needed      MEDICATIONS ON DISCHARGE     Medication List      START taking these medications      Instructions   acetaminophen 500 MG Tabs  Commonly known as: TYLENOL   Take 1 tablet by mouth every 6 hours as needed for Mild Pain.  Dose: 500 mg     famotidine 20 MG Tabs  Commonly known as: PEPCID   Take 1 tablet by mouth every 12 hours.  Dose: 20 mg     furosemide 40 MG Tabs  Commonly known as: LASIX   Take 1 tablet by mouth 2 times a day.  Dose: 40 mg     nicotine 21 MG/24HR Pt24  Commonly known as: NICODERM   Place 1 Patch on the skin every 24 hours.  Dose: 1 Patch     ondansetron 4 MG Tbdp  Commonly known as: ZOFRAN ODT   Take 1 tablet by mouth every 6 hours as needed for Nausea.  Dose: 4 mg     oxyCODONE immediate-release 5 MG Tabs  Commonly known as: ROXICODONE   Take 1 tablet by mouth every 6 hours as needed for Severe Pain for up to 3 days.  Dose: 5 mg     potassium chloride SA 20 MEQ Tbcr  Commonly known as: Kdur   Take 1 tablet by mouth 2 times a day.  Dose: 20 mEq     spironolactone 25 MG Tabs  Start taking on: July 24, 2021  Commonly known as: ALDACTONE   Take 1 tablet by mouth every day.  Dose: 25 mg     thiamine 100 MG tablet  Start taking on: July 24, 2021  Commonly known as: THIAMINE   1 tablet by Enteral Tube  route every day.  Dose: 100 mg        STOP taking these medications    hydrOXYzine HCl 25 MG Tabs  Commonly known as: ATARAX            Allergies  Allergies   Allergen Reactions   • Sulfa Drugs      Reaction unknown       DIET  Orders Placed This Encounter   Procedures   • Diet Order Diet: Level 3 - Liquidised (1500 ml Fluid restriction - free water only); Liquid level: Level 2 - Mildly Thick; Tray Modifications (optional): SLP - 1:1 Supervision by Nursing, SLP - Deliver to Nursing Station     Standing Status:   Standing     Number of Occurrences:   1     Order Specific Question:   Diet:     Answer:   Level 3 - Liquidised [26]     Comments:   1500 ml Fluid restriction - free water only     Order Specific Question:   Liquid level     Answer:   Level 2 - Mildly Thick     Order Specific Question:   Tray Modifications (optional)     Answer:   SLP - 1:1 Supervision by Nursing     Order Specific Question:   Tray Modifications (optional)     Answer:   SLP - Deliver to Nursing Station       ACTIVITY  As tolerated and directed by skilled nursing.  Weight bearing as tolerated    LINES, DRAINS, AND WOUNDS  This is an automated list. Peripheral IVs will be removed prior to discharge.  Peripheral IV 07/15/21 20 G Left Forearm (Active)   Site Assessment Clean;Dry;Intact 07/22/21 2100   Dressing Type Transparent 07/22/21 2100   Line Status Infusing;Scrubbed the hub prior to access 07/22/21 2100   Dressing Status Clean;Dry;Intact 07/22/21 2100   Dressing Intervention N/A 07/22/21 2100   Dressing Change Due 07/22/21 07/22/21 2100   Date Primary Tubing Changed 07/17/21 07/17/21 2015   Date Secondary Tubing Changed 07/22/21 07/22/21 2100   NEXT Primary Tubing Change  07/20/21 07/17/21 2015   NEXT Secondary Tubing Change  07/23/21 07/22/21 2100   Infiltration Grading ([facility], [facility]) 0 07/22/21 2100   Phlebitis Scale ([facility] Only) 0 07/22/21 2100      ETT (Active)     Wound 07/09/21 Incision Abdomen 4X4, MEDIPORE TAPE  (Active)   Site  Assessment LAILA 07/22/21 2100   Periwound Assessment LAILA 07/22/21 2100   Margins LAILA 07/22/21 2100   Closure LAILA 07/22/21 2100   Drainage Amount None 07/22/21 2100   Drainage Description Serous;Yellow 07/13/21 0800   Wound Cleansing Not Applicable 07/13/21 0800   Periwound Protectant Not Applicable 07/13/21 0800   Dressing Cleansing/Solutions Not Applicable 07/13/21 0800   Dressing Options Dry Gauze;Transparent Film 07/22/21 2100   Dressing Changed Observed 07/22/21 2100   Dressing Status Clean;Dry;Intact 07/22/21 2100   Dressing Change/Treatment Frequency As Needed 07/22/21 2100       Wound 07/13/21 Back;Shoulder Upper Left (Active)   Wound Image   07/13/21 2141   Site Assessment LAILA 07/22/21 2100   Periwound Assessment LAILA 07/22/21 2100   Margins LAILA 07/22/21 2100   Closure LAILA 07/22/21 2100   Drainage Amount None 07/22/21 2100   Dressing Options Mepilex 07/16/21 2000   Dressing Changed Observed 07/22/21 2100   Dressing Status Clean;Dry;Intact 07/22/21 2100   Dressing Change/Treatment Frequency As Needed 07/22/21 2100       Peripheral IV 07/15/21 20 G Left Forearm (Active)   Site Assessment Clean;Dry;Intact 07/22/21 2100   Dressing Type Transparent 07/22/21 2100   Line Status Infusing;Scrubbed the hub prior to access 07/22/21 2100   Dressing Status Clean;Dry;Intact 07/22/21 2100   Dressing Intervention N/A 07/22/21 2100   Dressing Change Due 07/22/21 07/22/21 2100   Date Primary Tubing Changed 07/17/21 07/17/21 2015   Date Secondary Tubing Changed 07/22/21 07/22/21 2100   NEXT Primary Tubing Change  07/20/21 07/17/21 2015   NEXT Secondary Tubing Change  07/23/21 07/22/21 2100   Infiltration Grading ([facility], [facility]) 0 07/22/21 2100   Phlebitis Scale ([facility] Only) 0 07/22/21 2100               MENTAL STATUS ON TRANSFER  Alert and oriented x3          CONSULTATIONS  Critical care  Surgery - Derrick/Muñoz  Neurology - Madelaine    PROCEDURES  Diagnostic laparoscopy with washout and drain placement  7/9/2021    Ultrasound-guided therapeutic paracentesis 7/19/2021    EEG - Moderate background slowing suggestive of diffuse/multifocal cerebral dysfunction. No persistent focal asymmetries seen. No epileptiform discharges seen. No seizures. 7/10/2021    LABORATORY  Lab Results   Component Value Date    SODIUM 136 07/23/2021    POTASSIUM 3.9 07/23/2021    CHLORIDE 99 07/23/2021    CO2 27 07/23/2021    GLUCOSE 152 (H) 07/23/2021    BUN 38 (H) 07/23/2021    CREATININE 0.78 07/23/2021        Lab Results   Component Value Date    WBC 6.1 07/23/2021    HEMOGLOBIN 11.3 (L) 07/23/2021    HEMATOCRIT 36.2 (L) 07/23/2021    PLATELETCT 84 (L) 07/23/2021        Total time of the discharge process exceeds 35 minutes.

## 2021-07-23 NOTE — DISCHARGE PLANNING
Agency/Facility Name: [facility]  Outcome: Left vmail for admissions, confirming 1300  time     Agency/Facility Name: [facility]   Outcome: Per vmail response, 1300 transport is good to go for today

## 2021-07-23 NOTE — PROGRESS NOTES
Bedside report received.  Assessment complete.  A&O x 4. Patient calls appropriately.  Patient ambulates with max assist. Bed alarm on.   Patient has 0/10 pain. Pain managed with prescribed medications.  Denies N&V. Tolerating liquidized thick liquid diet with 1:1 feeding. Right nare cortrak at 83 cm, CDI. Receiving nocturnal feeds of impact peptide @ 85 ml/h (goal).   Dressing to abdomen, CDI. Dressing to right shoulder, CDI.   + void, + flatus, + BM.  Patient denies SOB.  SCD's off  Review plan with of care with patient. Call light and personal belongings within reach. Hourly rounding in place. All needs met at this time.

## 2021-07-23 NOTE — DISCHARGE INSTRUCTIONS
Discharge Instructions    Discharged to other by medical transportation with escort. Discharged via wheelchair, hospital escort: Yes.  Special equipment needed: Not Applicable    Be sure to schedule a follow-up appointment with your primary care doctor or any specialists as instructed.     Discharge Plan:        I understand that a diet low in cholesterol, fat, and sodium is recommended for good health. Unless I have been given specific instructions below for another diet, I accept this instruction as my diet prescription.       Special Instructions: None    · Is patient discharged on Warfarin / Coumadin?   No       Peritonitis    Peritonitis is inflammation of the tissue that lines the abdomen and covers the internal organs (peritoneum). Certain conditions or injuries can cause organs to leak stool, bacteria, fungi, blood, or chemicals, such as bile or other digestive fluids, into the abdomen. When these substances come into contact with the peritoneum, they may cause irritation or infection.  Peritonitis can be a life-threatening infection if not treated promptly. The infection can spread through the bloodstream and affect the whole body (sepsis).  What are the causes?  This condition may be caused by:  · Infection of:  ? The appendix (appendicitis).  ? The pancreas (pancreatitis).  ? Diverticula (diverticulitis). These are small pouches that form in the lining of the digestive tract.  ? The lungs (tuberculosis).  · Ulcers.  · Crohn's disease or ulcerative colitis.  · Cancer.  · Liver disease.  Other possible causes are:  · Injury, such as injury to:  ? The abdomen.  ? The stomach.  ? The esophagus.  · Other infections inside the abdomen or pelvis.  · Pregnancy outside the uterus (tubal pregnancy).  · A procedure that is used to cleanse the blood when the kidneys have stopped working correctly (peritoneal dialysis).  What are the signs or symptoms?  Symptoms of this condition include:  · Severe pain in the  abdomen.  · Hard-feeling abdomen.  · Swelling in the abdomen.  · Fever and chills.  · Nausea and vomiting.  · Poor appetite or no appetite.  · Being unable to pass gas or stool (constipation).  · Diarrhea.  · Passing urine less often.  How is this diagnosed?  This condition may be diagnosed based on the results of a physical exam and medical history. Your health care provider may also do:  · A test on fluid removed from the infected area (paracentesis).  · Lab tests such as blood, urine, or stool tests.  · Imaging tests, such as X-ray, ultrasound, CT scan.  How is this treated?  Treatment for this condition includes treating the symptoms and treating the underlying cause. Usually this condition is treated in the hospital. Treatment may include:  · Antibiotic medicines.  · Surgery to remove infected fluid and tissue.  · Surgery to treat the condition that caused peritonitis, such as removing the appendix to treat appendicitis (appendectomy).  Follow these instructions at home:  Medicines  · Take over-the-counter and prescription medicines only as told by your health care provider.  · If you were prescribed an antibiotic medicine, take it as told by your health care provider. Do not stop taking the antibiotic even if you start to feel better.  · If you were taking prescription medicines for other problems before you developed peritonitis, ask your health care provider when you should start taking these medicines again.  · If told by your health care provider, use a stool softener or laxative.  General instructions  · Do not use any products that contain nicotine or tobacco, such as cigarettes and e-cigarettes. If you need help quitting, ask your health care provider.  · Do not drink alcohol.  · Follow your health care provider's instructions for diet and activity.  · Drink enough fluid to keep your urine pale yellow.  · Rest as much as possible.  · Keep all follow-up visits as told by your health care provider. This  is important.  Contact a health care provider if:  · You develop a fever or chills.  · You are constipated.  · You have nausea, vomiting, or diarrhea.  · Your symptoms change or get worse.  Get help right away if you:  · Have new or worse pain in your abdomen.  · Have new problems with passing urine.  · Develop chest pains or shortness of breath.  · Become very confused or drowsy.  · Have jerky movements that you cannot control (seizures).  Summary  · Peritonitis is inflammation of the tissue that lines the abdomen and covers the internal organs (peritoneum).  · Symptoms include pain and swelling in the abdomen, fever and chills, nausea and vomiting, poor appetite, constipation, diarrhea, or passing urine less often.  · Treatment includes treating the symptoms that you have and treating the underlying cause with medicine or surgery.  This information is not intended to replace advice given to you by your health care provider. Make sure you discuss any questions you have with your health care provider.  Document Released: 11/30/2009 Document Revised: 02/22/2019 Document Reviewed: 01/22/2019  [employer] Patient Education © 2020 [employer]    Depression / Suicide Risk    As you are discharged from this [facility] facility, it is important to learn how to keep safe from harming yourself.    Recognize the warning signs:  · Abrupt changes in personality, positive or negative- including increase in energy   · Giving away possessions  · Change in eating patterns- significant weight changes-  positive or negative  · Change in sleeping patterns- unable to sleep or sleeping all the time   · Unwillingness or inability to communicate  · Depression  · Unusual sadness, discouragement and loneliness  · Talk of wanting to die  · Neglect of personal appearance   · Rebelliousness- reckless behavior  · Withdrawal from people/activities they love  · Confusion- inability to concentrate     If you or a loved one observes any of these  behaviors or has concerns about self-harm, here's what you can do:  · Talk about it- your feelings and reasons for harming yourself  · Remove any means that you might use to hurt yourself (examples: pills, rope, extension cords, firearm)  · Get professional help from the community (Mental Health, Substance Abuse, psychological counseling)  · Do not be alone:Call your Safe Contact- someone whom you trust who will be there for you.  · Call your local CRISIS HOTLINE 774-1857 or 982-983-8413  · Call your local Children's Mobile Crisis Response Team [place] (000) 549-9476 or www.[employer]  · Call the toll free National Suicide Prevention Hotlines   · National Suicide Prevention Lifeline 636-844-NQHQ (5433)  · National Hope Line Network 800-SUICIDE (171-2176)

## 2021-07-23 NOTE — CARE PLAN
Problem: Pain - Standard  Goal: Alleviation of pain or a reduction in pain to the patient’s comfort goal  Outcome: Progressing     Problem: Knowledge Deficit - Standard  Goal: Patient and family/care givers will demonstrate understanding of plan of care, disease process/condition, diagnostic tests and medications  Outcome: Progressing     Problem: Fall Risk  Goal: Patient will remain free from falls  Outcome: Progressing     Problem: Bowel Elimination  Goal: Establish and maintain regular bowel function  Outcome: Progressing     The patient is Stable - Low risk of patient condition declining or worsening    Shift Goals  Clinical Goals: rest  Patient Goals: rest, pain control   Family Goals: no family present    Progress made toward(s) clinical / shift goals: cluster care provided. Medicated per MAR.     Patient is not progressing towards the following goals:

## 2021-07-23 NOTE — PROGRESS NOTES
Received report from previous shift RN  Assessment complete.  A&O x 4. Patient calls appropriately.  Patient ambulates with x2 assist and FWW. Bed alarm on.   Patient has 6/10 pain. Pain managed with prescribed medications.  Denies N&V. Tolerating level 3 Liquidised (1500 ml fluid restrictions - free water only); Liquid level 2 Mildly Thick diet.  Cortrac to R nare impact peptide 1.5 @ 85ml/hr. HOB elevated >30 degrees.   + void, + flatus, lsat BM 7/21.  Patient denies SOB.      Patient sitting in bed. Review plan with of care with patient. Call light and personal belongings with in reach. Hourly rounding in place. All needs met at this time.

## 2021-07-23 NOTE — CARE PLAN
The patient is Stable - Low risk of patient condition declining or worsening    Shift Goals  Clinical Goals: remain free from falls  Patient Goals:    Family Goals:     Progress made toward(s) clinical / shift goals:  bed alarm on, call light within reach, patient calls appropriately     Patient is not progressing towards the following goals:      Problem: Bowel Elimination  Goal: Establish and maintain regular bowel function  Outcome: Not Progressing    Problem: Fall Risk  Goal: Patient will remain free from falls  Outcome: Progressing

## 2021-07-24 LAB
BACTERIA FLD AEROBE CULT: NORMAL
GRAM STN SPEC: NORMAL
SIGNIFICANT IND 70042: NORMAL
SITE SITE: NORMAL
SOURCE SOURCE: NORMAL

## 2021-07-28 ENCOUNTER — HOSPITAL ENCOUNTER (EMERGENCY)
Facility: MEDICAL CENTER | Age: 71
End: 2021-07-28
Attending: EMERGENCY MEDICINE
Payer: MEDICARE

## 2021-07-28 VITALS
OXYGEN SATURATION: 95 % | HEIGHT: 71 IN | SYSTOLIC BLOOD PRESSURE: 124 MMHG | DIASTOLIC BLOOD PRESSURE: 68 MMHG | RESPIRATION RATE: 18 BRPM | WEIGHT: 205 LBS | BODY MASS INDEX: 28.7 KG/M2 | TEMPERATURE: 97.7 F | HEART RATE: 97 BPM

## 2021-07-28 DIAGNOSIS — R18.8 OTHER ASCITES: ICD-10-CM

## 2021-07-28 DIAGNOSIS — C22.0 HEPATOMA (HCC): ICD-10-CM

## 2021-07-28 LAB
ALBUMIN SERPL BCP-MCNC: 2.1 G/DL (ref 3.2–4.9)
ALBUMIN/GLOB SERPL: 0.4 G/DL
ALP SERPL-CCNC: 287 U/L (ref 30–99)
ALT SERPL-CCNC: 10 U/L (ref 2–50)
ANION GAP SERPL CALC-SCNC: 10 MMOL/L (ref 7–16)
APPEARANCE FLD: CLEAR
AST SERPL-CCNC: 31 U/L (ref 12–45)
BASOPHILS # BLD AUTO: 0.7 % (ref 0–1.8)
BASOPHILS # BLD: 0.07 K/UL (ref 0–0.12)
BILIRUB SERPL-MCNC: 0.8 MG/DL (ref 0.1–1.5)
BODY FLD TYPE: NORMAL
BUN SERPL-MCNC: 16 MG/DL (ref 8–22)
CALCIUM SERPL-MCNC: 8.4 MG/DL (ref 8.5–10.5)
CHLORIDE SERPL-SCNC: 98 MMOL/L (ref 96–112)
CO2 SERPL-SCNC: 24 MMOL/L (ref 20–33)
COLOR FLD: YELLOW
CREAT SERPL-MCNC: 0.79 MG/DL (ref 0.5–1.4)
EOSINOPHIL # BLD AUTO: 0.04 K/UL (ref 0–0.51)
EOSINOPHIL NFR BLD: 0.4 % (ref 0–6.9)
ERYTHROCYTE [DISTWIDTH] IN BLOOD BY AUTOMATED COUNT: 67.9 FL (ref 35.9–50)
GLOBULIN SER CALC-MCNC: 4.7 G/DL (ref 1.9–3.5)
GLUCOSE SERPL-MCNC: 124 MG/DL (ref 65–99)
HCT VFR BLD AUTO: 36.8 % (ref 42–52)
HGB BLD-MCNC: 11.9 G/DL (ref 14–18)
IMM GRANULOCYTES # BLD AUTO: 0.05 K/UL (ref 0–0.11)
IMM GRANULOCYTES NFR BLD AUTO: 0.5 % (ref 0–0.9)
INR PPP: 1.32 (ref 0.87–1.13)
LYMPHOCYTES # BLD AUTO: 0.85 K/UL (ref 1–4.8)
LYMPHOCYTES NFR BLD: 8.9 % (ref 22–41)
LYMPHOCYTES NFR FLD: 69 %
MCH RBC QN AUTO: 31.6 PG (ref 27–33)
MCHC RBC AUTO-ENTMCNC: 32.3 G/DL (ref 33.7–35.3)
MCV RBC AUTO: 97.9 FL (ref 81.4–97.8)
MONOCYTES # BLD AUTO: 1.02 K/UL (ref 0–0.85)
MONOCYTES NFR BLD AUTO: 10.7 % (ref 0–13.4)
MONONUC CELLS NFR FLD: 19 %
NEUTROPHILS # BLD AUTO: 7.51 K/UL (ref 1.82–7.42)
NEUTROPHILS NFR BLD: 78.8 % (ref 44–72)
NEUTROPHILS NFR FLD: 12 %
NRBC # BLD AUTO: 0 K/UL
NRBC BLD-RTO: 0 /100 WBC
PLATELET # BLD AUTO: 91 K/UL (ref 164–446)
PMV BLD AUTO: 8.7 FL (ref 9–12.9)
POTASSIUM SERPL-SCNC: 4.8 MMOL/L (ref 3.6–5.5)
PROT SERPL-MCNC: 6.8 G/DL (ref 6–8.2)
PROTHROMBIN TIME: 16 SEC (ref 12–14.6)
RBC # BLD AUTO: 3.76 M/UL (ref 4.7–6.1)
RBC # FLD: <2000 CELLS/UL
SODIUM SERPL-SCNC: 132 MMOL/L (ref 135–145)
WBC # BLD AUTO: 9.5 K/UL (ref 4.8–10.8)
WBC # FLD: 27 CELLS/UL

## 2021-07-28 PROCEDURE — 85025 COMPLETE CBC W/AUTO DIFF WBC: CPT

## 2021-07-28 PROCEDURE — 89051 BODY FLUID CELL COUNT: CPT

## 2021-07-28 PROCEDURE — 80053 COMPREHEN METABOLIC PANEL: CPT

## 2021-07-28 PROCEDURE — 85610 PROTHROMBIN TIME: CPT

## 2021-07-28 PROCEDURE — 87070 CULTURE OTHR SPECIMN AEROBIC: CPT

## 2021-07-28 PROCEDURE — 99285 EMERGENCY DEPT VISIT HI MDM: CPT

## 2021-07-28 PROCEDURE — 36415 COLL VENOUS BLD VENIPUNCTURE: CPT

## 2021-07-28 PROCEDURE — 87205 SMEAR GRAM STAIN: CPT

## 2021-07-28 PROCEDURE — 96365 THER/PROPH/DIAG IV INF INIT: CPT

## 2021-07-28 PROCEDURE — 700111 HCHG RX REV CODE 636 W/ 250 OVERRIDE (IP): Mod: JG | Performed by: EMERGENCY MEDICINE

## 2021-07-28 PROCEDURE — P9047 ALBUMIN (HUMAN), 25%, 50ML: HCPCS | Mod: JG | Performed by: EMERGENCY MEDICINE

## 2021-07-28 RX ORDER — ALBUMIN (HUMAN) 12.5 G/50ML
25 SOLUTION INTRAVENOUS ONCE
Status: COMPLETED | OUTPATIENT
Start: 2021-07-28 | End: 2021-07-28

## 2021-07-28 RX ADMIN — ALBUMIN (HUMAN) 25 G: 5 SOLUTION INTRAVENOUS at 16:49

## 2021-07-28 ASSESSMENT — FIBROSIS 4 INDEX: FIB4 SCORE: 7.35

## 2021-07-28 NOTE — DISCHARGE PLANNING
Pt discharged to [facility] on 7/23/21. He is back in ER today for a paracentesis. Pt will likely need another one in 7-10 days per Dr Chen.    LUDWIG spoke with pt's nurse (Jay) at [facility] and stated the nurse practitioner (Julian Moscoso) has written for pt to have them weekly and their  there is working on out-pt scheduling. Dr Chen updated.    Pt will have paracentesis here and discharge back to SNF. Jay updated.

## 2021-07-28 NOTE — ED NOTES
Call received form Julian DICKSON for [facility] facility called prior to sending patient and asked to be contacted directly if needed for readmission to facility.

## 2021-07-28 NOTE — ED PROVIDER NOTES
ED Provider Note    CHIEF COMPLAINT  Chief Complaint   Patient presents with   • Abdominal Pain     BIB [facility] from [facility] for abd distention and sob. last para was about 1-2 weeks ago and removed about 4L of fluid        HPI  Lorraine Bella is a 71 y.o. male who presents with progressive swelling of the abdomen, abdominal pain 8 or 9 out of 10 and abdominal distention causing dyspnea.  The patient has a history of hepatoma and approximately 30 years of former alcohol use.  He was admitted on July 2 for 21 days for hepatic cyst rupture with hemoperitoneum, abdominal compartment syndrome, ex lap, respiratory failure and aspiration pneumonia.  He developed ascites subsequently and had a paracentesis removing 4 L on the 19th.  He was then sent to skilled nursing.  Where his abdominal fluid is apparently reaccumulated.  Denies fever confusion.  He receives ongoing chemotherapy every 3 weeks.    REVIEW OF SYSTEMS  Pertinent positives include: Abdominal pain, abdominal swelling, shortness of breath .  Pertinent negatives include: Fever, vomiting, hematemesis, diarrhea, swelling, rash, headache, cough.  10+ systems reviewed and negative.      PAST MEDICAL HISTORY  Past Medical History:   Diagnosis Date   • Alcohol use 3/31/2016    2-3 per day   • Borderline diabetes    • Cancer (HCC) 2019    liver    • Cataract     itzel IOL    • Dental disorder     needs dentures upper   • Diabetes (HCC)     diet controlled    • Disorder of thyroid     not on medication   • Glaucoma     left eye    • High cholesterol     not on medication   • Hypertension        FAMILY HISTORY  Family History   Problem Relation Age of Onset   • Cancer Mother         unknown cancer       SOCIAL HISTORY  Social History     Tobacco Use   • Smoking status: Current Every Day Smoker     Packs/day: 0.50     Years: 30.00     Pack years: 15.00     Types: Cigarettes     Start date: 1/1/1990   • Smokeless tobacco: Never Used   • Tobacco comment: quit  "couple times of the years   Vaping Use   • Vaping Use: Never used   Substance Use Topics   • Alcohol use: Not Currently     Alcohol/week: 2.4 oz     Types: 4 Shots of liquor per week     Comment: pt quit drinking 2 months ago   • Drug use: No     Social History     Substance and Sexual Activity   Drug Use No       SURGICAL HISTORY  Past Surgical History:   Procedure Laterality Date   • PB LAP,DIAGNOSTIC ABDOMEN  7/9/2021    Procedure: DIAGNOSITIC LAPAROSCOPY WITH WASH OUT AND DRAIN PLACEMENT;  Surgeon: Cody Meza M.D.;  Location: SURGERY [facility];  Service: Gastroenterology   • OTHER  12/2019    \"IR embolization\"    • PARATHYROIDECTOMY N/A 5/13/2016    Procedure: PARATHYROIDECTOMY;  Surgeon: Kale Lord M.D.;  Location: SURGERY SAME DAY [facility];  Service:    • CATARACT PHACO WITH IOL Left 4/7/2016    Procedure: CATARACT PHACO WITH IOL;  Surgeon: Ephraim Jamison M.D.;  Location: SURGERY [facility];  Service:    • BONE GRAFT Right 1960's    right wrist   • OTHER      chemoemolozation x2   • WRIST ORIF Right 1960's       CURRENT MEDICATIONS  No current facility-administered medications for this encounter.     Current Outpatient Medications   Medication Sig Dispense Refill   • acetaminophen (TYLENOL) 500 MG Tab Take 1 tablet by mouth every 6 hours as needed for Mild Pain.     • famotidine (PEPCID) 20 MG Tab Take 1 tablet by mouth every 12 hours. 60 tablet    • nicotine (NICODERM) 21 MG/24HR PATCH 24 HR Place 1 Patch on the skin every 24 hours. 30 Patch    • ondansetron (ZOFRAN ODT) 4 MG TABLET DISPERSIBLE Take 1 tablet by mouth every 6 hours as needed for Nausea.     • potassium chloride SA (KDUR) 20 MEQ Tab CR Take 1 tablet by mouth 2 times a day. 60 tablet 11   • spironolactone (ALDACTONE) 25 MG Tab Take 1 tablet by mouth every day. 30 tablet 3   • thiamine (THIAMINE) 100 MG tablet 1 tablet by Enteral Tube route every day. 30 tablet    • furosemide (LASIX) 40 MG Tab Take 1 tablet by mouth 2 " "times a day.         ALLERGIES  Allergies   Allergen Reactions   • Sulfa Drugs      Reaction unknown       PHYSICAL EXAM  VITAL SIGNS: /84   Pulse (!) 111   Temp 36.5 °C (97.7 °F) (Tympanic)   Resp (!) 22   Ht 1.803 m (5' 11\")   Wt 93 kg (205 lb)   SpO2 95%   BMI 28.59 kg/m²   Reviewed and tachycardic, high normal blood pressure, afebrile  Constitutional: Well developed, Well nourished, well-appearing.  HENT: Normocephalic, atraumatic, bilateral external ears normal, Wearing mask.   Eyes: PERRLA, conjunctiva pink, no scleral icterus.   Cardiovascular: Regular S1-S2 without murmur, rub, gallop.  No dependent edema or calf tenderness.  Respiratory: No rales, rhonchi, wheeze or cough.  Gastrointestinal: Soft, very distended with fluid wave with no objective tenderness.  No rebound or guarding.  No masses., nondistended, no organomegaly.  Skin: No erythema, no rash.   Genitourinary:  No costovertebral angle tenderness.   Neurologic: Alert & oriented x 3, cranial nerves 2-12 intact by passive exam.  No focal deficit noted.  Psychiatric: Affect normal, Judgment normal, Mood normal.     DIFFERENTIAL DIAGNOSIS:  Ascites, spontaneous bacterial peritonitis, low albumin, cirrhosis    RADIOLOGY/PROCEDURES  ED goal-directed ultrasound demonstrated large 8 cm fluid collection in left lower quadrant.  There was much less fluid in the right lower quadrant.    LABORATORY:  Results for orders placed or performed during the hospital encounter of 07/28/21   CBC WITH DIFFERENTIAL   Result Value Ref Range    WBC 9.5 4.8 - 10.8 K/uL    RBC 3.76 (L) 4.70 - 6.10 M/uL    Hemoglobin 11.9 (L) 14.0 - 18.0 g/dL    Hematocrit 36.8 (L) 42.0 - 52.0 %    MCV 97.9 (H) 81.4 - 97.8 fL    MCH 31.6 27.0 - 33.0 pg    MCHC 32.3 (L) 33.7 - 35.3 g/dL    RDW 67.9 (H) 35.9 - 50.0 fL    Platelet Count 91 (L) 164 - 446 K/uL    MPV 8.7 (L) 9.0 - 12.9 fL    Neutrophils-Polys 78.80 (H) 44.00 - 72.00 %    Lymphocytes 8.90 (L) 22.00 - 41.00 %    " Monocytes 10.70 0.00 - 13.40 %    Eosinophils 0.40 0.00 - 6.90 %    Basophils 0.70 0.00 - 1.80 %    Immature Granulocytes 0.50 0.00 - 0.90 %    Nucleated RBC 0.00 /100 WBC    Neutrophils (Absolute) 7.51 (H) 1.82 - 7.42 K/uL    Lymphs (Absolute) 0.85 (L) 1.00 - 4.80 K/uL    Monos (Absolute) 1.02 (H) 0.00 - 0.85 K/uL    Eos (Absolute) 0.04 0.00 - 0.51 K/uL    Baso (Absolute) 0.07 0.00 - 0.12 K/uL    Immature Granulocytes (abs) 0.05 0.00 - 0.11 K/uL    NRBC (Absolute) 0.00 K/uL   Comp Metabolic Panel   Result Value Ref Range    Sodium 132 (L) 135 - 145 mmol/L    Potassium 4.8 3.6 - 5.5 mmol/L    Chloride 98 96 - 112 mmol/L    Co2 24 20 - 33 mmol/L    Anion Gap 10.0 7.0 - 16.0    Glucose 124 (H) 65 - 99 mg/dL    Bun 16 8 - 22 mg/dL    Creatinine 0.79 0.50 - 1.40 mg/dL    Calcium 8.4 (L) 8.5 - 10.5 mg/dL    AST(SGOT) 31 12 - 45 U/L    ALT(SGPT) 10 2 - 50 U/L    Alkaline Phosphatase 287 (H) 30 - 99 U/L    Total Bilirubin 0.8 0.1 - 1.5 mg/dL    Albumin 2.1 (L) 3.2 - 4.9 g/dL    Total Protein 6.8 6.0 - 8.2 g/dL    Globulin 4.7 (H) 1.9 - 3.5 g/dL    A-G Ratio 0.4 g/dL   Prothrombin Time   Result Value Ref Range    PT 16.0 (H) 12.0 - 14.6 sec    INR 1.32 (H) 0.87 - 1.13   ESTIMATED GFR   Result Value Ref Range    GFR If African American >60 >60 mL/min/1.73 m 2    GFR If Non African American >60 >60 mL/min/1.73 m 2   Fluid Cell Count   Result Value Ref Range    Fluid Type Ascites     Color-Body Fluid Yellow     Character-Body Fluid Clear     Total RBC Count <2000 cells/uL    Total WBC 27 cells/uL    Polys 12 %    Lymphs 69 %    Mononuclear Cells - Fluid 19 %   FLUID CULTURE W/GRAM STAIN    Specimen: Body Fluid   Result Value Ref Range    Significant Indicator NEG     Source BF     Site Ascites Fluid     Culture Result -     Gram Stain Result -        INTERVENTIONS:  Large 8 cm collection of ascites identified in the left lower quadrant by ultrasound.  Skin was prepped with povidine and sterilely draped.  Standard pelvis  technique utilized.  I supervised medical student Sandra Porras throughout the procedure which lasted longer than 5 minutes.  The peritoneal cavity was entered with a pigtail catheter over a needle on a 50 cc syringe.  I Z tracked the skin as the needle was advanced.  Clear yellow ascites was obtained and sent to the lab.  2 L were taken off.  There was no bleeding.  Patient tolerated the procedure well.    COURSE & MEDICAL DECISION MAKING  This patient with hepatoma and history of alcohol use presents with ascites 1 week after discharge from hospitalization for ruptured hepatic cyst and hemoperitoneum complicated by respiratory failure and aspiration pneumonia.  He has symptomatic ascites with dyspnea and tachycardia.  He also has hypoxia.  His mental status is normal today.  There is no evidence of hepatic encephalopathy.  He has recurrent ascites without spontaneous bacterial peritonitis or sepsis.    PLAN:  Increase Aldactone to 100 mg daily  Continue Lasix at 40 mg daily  Discussed with  who confirmed that nurse practitioner at skilled nursing facility is arranging scheduled paracenteses  Ascites handout given  Return for fever, confusion, shortness of breath    Cole Alonso D.O.  [address] [place] [id number]  [phone]    Schedule an appointment as soon as possible for a visit   As needed      CONDITION: Stable    FINAL IMPRESSION  1. Other ascites    2. Hepatoma (HCC)          Electronically signed by: Cristian Chen M.D., 7/28/2021 1:21 PM

## 2021-07-28 NOTE — ED TRIAGE NOTES
"Lorraine Bright [place]  Chief Complaint   Patient presents with   • Abdominal Pain     BIB [facility] from [facility] for abd distention and sob. last para was about 1-2 weeks ago and removed about 4L of fluid    /84   Pulse (!) 111   Temp 36.5 °C (97.7 °F) (Tympanic)   Resp (!) 22   Ht 1.803 m (5' 11\")   Wt 93 kg (205 lb)   SpO2 95%   BMI 28.59 kg/m²   On 2L NC - baseline   Patient changed to hospital gown  Placed on cardiac monitor.   V/s stable. Afebrile  Pending MD palmer.     "

## 2021-07-28 NOTE — DISCHARGE PLANNING
LUDWIG spoke with Saba CAT and requested she follow up with APRN and scheduling to make sure future paracentesis are scheduled as out-pt. She will follow up.

## 2021-07-29 NOTE — ED NOTES
9x9 padded wound guard (Mepelix) placed onto patients  lower back/sacral (area red, nonblanchable ) per MD orders

## 2021-07-29 NOTE — ED NOTES
Iv d/c, pt picked up by transport back to [facility], nurse tried to call facility to call report, nurse was transferred to an automated line, tried to call back no answer at facility to take report on pt

## 2021-07-29 NOTE — DISCHARGE INSTRUCTIONS
Increase spironolactone to 100 mg a day.  Continue Lasix 40 mg a day.  Schedule outpatient paracenteses for the next 7 to 10 days in case is necessary.  Return for shortness of breath, fever, confusion or abdominal pain.  We were able to take 2 L of ascites today.

## 2021-07-29 NOTE — ED NOTES
Called hematology for update on cell count results delay    Requisition formed needed in order to run tests at this time and did not inform us of this requirement upon delivery of this specimen to the lab     Requisition form tubed to [phone]

## 2021-07-29 NOTE — ED NOTES
Discharge instructions given to pt, all questions answered, pt explained he has four liters of fluid taken out, all results negative, pt was given discharge paperwork

## 2021-07-29 NOTE — DISCHARGE PLANNING
Received call from Damir HOFFMAN, states pt is ready to go back to [facility].  Called [facility] for W/C  transportation back to [facility]. Spoke with Nelson at [facility], cost will be 118.45, approved services completed. Ride #[id number].

## 2021-07-30 LAB
GRAM STN SPEC: NORMAL
SIGNIFICANT IND 70042: NORMAL
SITE SITE: NORMAL
SOURCE SOURCE: NORMAL

## 2021-08-04 LAB
FUNGUS SPEC CULT: NORMAL
FUNGUS SPEC FUNGUS STN: NORMAL
SIGNIFICANT IND 70042: NORMAL
SITE SITE: NORMAL
SOURCE SOURCE: NORMAL

## 2021-08-12 NOTE — DOCUMENTATION QUERY
[facility]                                                                       Query Response Note      PATIENT:               SANDRO DIAZ  ACCT #:                  [id number]  MRN:                     [id number]  :                      1950  ADMIT DATE:       2021 11:25 AM  DISCH DATE:        2021 1:08 PM  RESPONDING  PROVIDER #:        [id number]           QUERY TEXT:    It is unclear whether sepsis was present on admission as this condition was documented by the ED provider and then not again until .  Your help is needed to please clarify the POA status of sepsis.    NOTE:  If an appropriate response is not listed below, please respond with a new note.        The patient's Clinical Indicators include:  Clinical indicators    Laboratory  Recent Labs    21 1150    21 0338 21 0121    2021 2339            WBC 11.2*  6.6                    7.1                 7.0                    15.3      Physical Exam  Temp:  [36 °C (96.8 °F)-36.5 °C (97.7 °F)] 36 °C (96.8 °F)  Pulse:  [] 102  Resp:  [14-38] 18  BP: ()/(26-81) 118/74  SpO2:  [90 %-96 %] 93 %    surgical consult :  ASSESSMENT/PLAN     71-year-old male with history of known hepatocellular carcinoma.  It appears that one of the cystic lesions in his liver has ruptured with its contents his abdominal cavity.     7/10 PN: Assessment/Plan  * Acute respiratory failure with hypercapnia (HCC)  Assessment & Plan  Intubated early a.m. 7/10  Significant hypoxemia and hypercarbia on initial evaluation  Likely etiology aspiration pneumonia in patient with probable abdominal sepsis as well    His peritoneal fluid cultures showed Streptococcus anginosus.     Treatment or Monitoring  Diagnostic laparoscopy with washout and drain placement on 2021.  Postoperatively he required intubation for respiratory failure.  He required  pressors for pressure support, he was also treated empirically for possible aspiration pneumonia with IV Zosyn    Risk Factors  hepatic carcinoma with ascites currently on immunosuppressive therapy; Patient admitted with hemoperitoneum due to rupture hepatic cyst; 7/11: spontaneous bacterial peritonitis     Coders contact information  Henna Gee CCS  Coding   [email or web address]  Options provided:   -- The condition of sepsis was present at the time of inpatient admission   -- The condition of sepsis was not present at the time of inpatient admission   -- The provider is unable to clinically determine whether condition of sepsis was present on admission or not      Query created by: Henna Gee on 8/3/2021 12:57 PM    RESPONSE TEXT:    The condition of sepsis was not present at the time of inpatient admission          Electronically signed by:  JAZMYN ALBA MD 8/11/2021 9:35 PM

## 2021-08-24 ENCOUNTER — APPOINTMENT (OUTPATIENT)
Dept: RADIOLOGY | Facility: MEDICAL CENTER | Age: 71
End: 2021-08-24
Attending: EMERGENCY MEDICINE
Payer: MEDICARE

## 2021-08-24 ENCOUNTER — HOSPITAL ENCOUNTER (EMERGENCY)
Facility: MEDICAL CENTER | Age: 71
End: 2021-08-24
Attending: EMERGENCY MEDICINE
Payer: MEDICARE

## 2021-08-24 VITALS
DIASTOLIC BLOOD PRESSURE: 62 MMHG | WEIGHT: 175 LBS | HEIGHT: 71 IN | BODY MASS INDEX: 24.5 KG/M2 | SYSTOLIC BLOOD PRESSURE: 108 MMHG | HEART RATE: 99 BPM | RESPIRATION RATE: 13 BRPM | OXYGEN SATURATION: 92 % | TEMPERATURE: 97.4 F

## 2021-08-24 DIAGNOSIS — R18.0 MALIGNANT ASCITES: ICD-10-CM

## 2021-08-24 DIAGNOSIS — R14.0 ABDOMINAL DISTENSION: ICD-10-CM

## 2021-08-24 LAB
ALBUMIN SERPL BCP-MCNC: 2.2 G/DL (ref 3.2–4.9)
ALBUMIN/GLOB SERPL: 0.4 G/DL
ALP SERPL-CCNC: 276 U/L (ref 30–99)
ALT SERPL-CCNC: 9 U/L (ref 2–50)
ANION GAP SERPL CALC-SCNC: 11 MMOL/L (ref 7–16)
APTT PPP: 36.7 SEC (ref 24.7–36)
AST SERPL-CCNC: 36 U/L (ref 12–45)
BASOPHILS # BLD AUTO: 0.4 % (ref 0–1.8)
BASOPHILS # BLD: 0.03 K/UL (ref 0–0.12)
BILIRUB SERPL-MCNC: 0.7 MG/DL (ref 0.1–1.5)
BUN SERPL-MCNC: 14 MG/DL (ref 8–22)
CALCIUM SERPL-MCNC: 8.7 MG/DL (ref 8.5–10.5)
CHLORIDE SERPL-SCNC: 92 MMOL/L (ref 96–112)
CO2 SERPL-SCNC: 23 MMOL/L (ref 20–33)
CREAT SERPL-MCNC: 0.6 MG/DL (ref 0.5–1.4)
EOSINOPHIL # BLD AUTO: 0.02 K/UL (ref 0–0.51)
EOSINOPHIL NFR BLD: 0.3 % (ref 0–6.9)
ERYTHROCYTE [DISTWIDTH] IN BLOOD BY AUTOMATED COUNT: 62.9 FL (ref 35.9–50)
GLOBULIN SER CALC-MCNC: 5 G/DL (ref 1.9–3.5)
GLUCOSE SERPL-MCNC: 147 MG/DL (ref 65–99)
HCT VFR BLD AUTO: 31.4 % (ref 42–52)
HGB BLD-MCNC: 10 G/DL (ref 14–18)
IMM GRANULOCYTES # BLD AUTO: 0.04 K/UL (ref 0–0.11)
IMM GRANULOCYTES NFR BLD AUTO: 0.5 % (ref 0–0.9)
INR PPP: 1.25 (ref 0.87–1.13)
LIPASE SERPL-CCNC: 8 U/L (ref 11–82)
LYMPHOCYTES # BLD AUTO: 0.8 K/UL (ref 1–4.8)
LYMPHOCYTES NFR BLD: 10.8 % (ref 22–41)
MCH RBC QN AUTO: 31 PG (ref 27–33)
MCHC RBC AUTO-ENTMCNC: 31.8 G/DL (ref 33.7–35.3)
MCV RBC AUTO: 97.2 FL (ref 81.4–97.8)
MONOCYTES # BLD AUTO: 0.67 K/UL (ref 0–0.85)
MONOCYTES NFR BLD AUTO: 9.1 % (ref 0–13.4)
NEUTROPHILS # BLD AUTO: 5.82 K/UL (ref 1.82–7.42)
NEUTROPHILS NFR BLD: 78.9 % (ref 44–72)
NRBC # BLD AUTO: 0 K/UL
NRBC BLD-RTO: 0 /100 WBC
PLATELET # BLD AUTO: 73 K/UL (ref 164–446)
PMV BLD AUTO: 8.6 FL (ref 9–12.9)
POTASSIUM SERPL-SCNC: 4.6 MMOL/L (ref 3.6–5.5)
PROT SERPL-MCNC: 7.2 G/DL (ref 6–8.2)
PROTHROMBIN TIME: 15.3 SEC (ref 12–14.6)
RBC # BLD AUTO: 3.23 M/UL (ref 4.7–6.1)
SODIUM SERPL-SCNC: 126 MMOL/L (ref 135–145)
WBC # BLD AUTO: 7.4 K/UL (ref 4.8–10.8)

## 2021-08-24 PROCEDURE — 96374 THER/PROPH/DIAG INJ IV PUSH: CPT

## 2021-08-24 PROCEDURE — 99285 EMERGENCY DEPT VISIT HI MDM: CPT

## 2021-08-24 PROCEDURE — 4410569 US-PARACENTESIS, ABD WITH IMAGING

## 2021-08-24 PROCEDURE — 83690 ASSAY OF LIPASE: CPT

## 2021-08-24 PROCEDURE — 80053 COMPREHEN METABOLIC PANEL: CPT

## 2021-08-24 PROCEDURE — 36415 COLL VENOUS BLD VENIPUNCTURE: CPT

## 2021-08-24 PROCEDURE — 85025 COMPLETE CBC W/AUTO DIFF WBC: CPT

## 2021-08-24 PROCEDURE — 700105 HCHG RX REV CODE 258: Performed by: EMERGENCY MEDICINE

## 2021-08-24 PROCEDURE — 96375 TX/PRO/DX INJ NEW DRUG ADDON: CPT

## 2021-08-24 PROCEDURE — 700111 HCHG RX REV CODE 636 W/ 250 OVERRIDE (IP): Performed by: EMERGENCY MEDICINE

## 2021-08-24 PROCEDURE — 85730 THROMBOPLASTIN TIME PARTIAL: CPT

## 2021-08-24 PROCEDURE — 85610 PROTHROMBIN TIME: CPT

## 2021-08-24 RX ORDER — ONDANSETRON 2 MG/ML
4 INJECTION INTRAMUSCULAR; INTRAVENOUS ONCE
Status: COMPLETED | OUTPATIENT
Start: 2021-08-24 | End: 2021-08-24

## 2021-08-24 RX ORDER — MORPHINE SULFATE 4 MG/ML
4 INJECTION, SOLUTION INTRAMUSCULAR; INTRAVENOUS ONCE
Status: COMPLETED | OUTPATIENT
Start: 2021-08-24 | End: 2021-08-24

## 2021-08-24 RX ORDER — SODIUM CHLORIDE 9 MG/ML
INJECTION, SOLUTION INTRAVENOUS CONTINUOUS
Status: DISCONTINUED | OUTPATIENT
Start: 2021-08-24 | End: 2021-08-25 | Stop reason: HOSPADM

## 2021-08-24 RX ADMIN — ONDANSETRON 4 MG: 2 INJECTION INTRAMUSCULAR; INTRAVENOUS at 19:14

## 2021-08-24 RX ADMIN — SODIUM CHLORIDE: 9 INJECTION, SOLUTION INTRAVENOUS at 18:04

## 2021-08-24 RX ADMIN — MORPHINE SULFATE 4 MG: 4 INJECTION INTRAVENOUS at 19:14

## 2021-08-24 ASSESSMENT — LIFESTYLE VARIABLES: DO YOU DRINK ALCOHOL: NO

## 2021-08-24 ASSESSMENT — FIBROSIS 4 INDEX: FIB4 SCORE: 7.65

## 2021-08-24 NOTE — ED TRIAGE NOTES
"Chief Complaint   Patient presents with   • Abdominal Swelling     Hx of cirrhosis, recently began paracentesis several months ago. Pt states last paracentesis was approximately 2-3 weeks ago.      /63   Pulse 99   Temp 36.3 °C (97.4 °F) (Temporal)   Resp 18   Ht 1.803 m (5' 11\")   Wt 79.4 kg (175 lb)   SpO2 97%   BMI 24.41 kg/m²     "

## 2021-08-25 NOTE — DISCHARGE PLANNING
Pt is medically cleared and needs transportation back to [facility].     KIRBY has made several attempt to call [facility] regarding sending Pt back and if they have transportation arranged. Per Pt [facility] said they would get him back.     KIRBY contacted Pt wife. She is unable to transport due to WC needs.     KIRBY arranged for [facility]. Approved Service $118.45 Reservation Number [id number]. Transport will be here [phone].    COBRA completed with ED Encounter Summary.     RN updated.

## 2021-08-25 NOTE — ED NOTES
[facility] arrived. Facesheet provided. [facility] stated they have taken pt to [facility] before and know the code to get the pt in. Pt stable for discharge.

## 2021-08-25 NOTE — PROGRESS NOTES
Dr. Cisse consented patient and performed a paracentesis  in U/S room 2  .   1000  ml of fluid removed,     1000 ml of fluid sent to lab .  Vitals monitored during procedure and documented in EPIC.  Patient tolerated procedure well.. Report given to Nicolasa

## 2021-08-25 NOTE — ED PROVIDER NOTES
ED Provider Note     Scribed for Mojgan Eckert D.O. by Cody Rubin. 8/24/2021, 5:12 PM.     Primary care provider: Cole Alonso D.O.  Means of arrival: EMS         History obtained from: patient  History limited by: none    CHIEF COMPLAINT  Chief Complaint   Patient presents with   • Abdominal Swelling     Hx of cirrhosis, recently began paracentesis several months ago. Pt states last paracentesis was approximately 2-3 weeks ago.        HPI  Lorraine Bella is a 71 y.o. male with a history of cirrhosis who presents to the emergency Department for abdominal swelling for the past 3 weeks. He last received paracentesis 3-4 weeks ago and would like to get one today. He was advised to have weekly paracentesis but has not gotten that established yet. He is seen by Dr. Birch, oncology, every 3 weeks for chemotherapy; he was diagnosed with liver cancer 2 years ago. He denies any history of hepatitis C. He reports additionally loss of strength for the past 2 weeks and has been receiving physical therapy for this at [facility]. Prior to that, he was hospitalized for 4 weeks due to an acute abdomen on 7/9/21. He denies any fever.    REVIEW OF SYSTEMS  Pertinent positives include abdominal swelling, generalized weakness. Pertinent negatives include no fever.   See HPI for further details. All other systems are negative.    PAST MEDICAL HISTORY  Past Medical History:   Diagnosis Date   • Alcohol use 3/31/2016    2-3 per day   • Borderline diabetes    • Cancer (HCC) 2019    liver    • Cataract     itzel IOL    • Dental disorder     needs dentures upper   • Diabetes (HCC)     diet controlled    • Disorder of thyroid     not on medication   • Glaucoma     left eye    • High cholesterol     not on medication   • Hypertension        FAMILY HISTORY  Family History   Problem Relation Age of Onset   • Cancer Mother         unknown cancer       SOCIAL HISTORY  Social History     Tobacco Use   • Smoking status: Current  "Every Day Smoker     Packs/day: 0.50     Years: 30.00     Pack years: 15.00     Types: Cigarettes     Start date: 1/1/1990   • Smokeless tobacco: Never Used   • Tobacco comment: quit couple times of the years   Vaping Use   • Vaping Use: Never used   Substance Use Topics   • Alcohol use: Not Currently     Alcohol/week: 2.4 oz     Types: 4 Shots of liquor per week     Comment: pt quit drinking 2 months ago   • Drug use: No      Social History     Substance and Sexual Activity   Drug Use No       SURGICAL HISTORY  Past Surgical History:   Procedure Laterality Date   • PB LAP,DIAGNOSTIC ABDOMEN  7/9/2021    Procedure: DIAGNOSITIC LAPAROSCOPY WITH WASH OUT AND DRAIN PLACEMENT;  Surgeon: Cody Meza M.D.;  Location: SURGERY [facility];  Service: Gastroenterology   • OTHER  12/2019    \"IR embolization\"    • PARATHYROIDECTOMY N/A 5/13/2016    Procedure: PARATHYROIDECTOMY;  Surgeon: Kale Lord M.D.;  Location: SURGERY SAME DAY [facility];  Service:    • CATARACT PHACO WITH IOL Left 4/7/2016    Procedure: CATARACT PHACO WITH IOL;  Surgeon: Ephraim Jamison M.D.;  Location: SURGERY SURGICAL [facility] ORS;  Service:    • BONE GRAFT Right 1960's    right wrist   • OTHER      chemoemolozation x2   • WRIST ORIF Right 1960's       CURRENT MEDICATIONS  Current Outpatient Medications:   •  acetaminophen (TYLENOL) 500 MG Tab, Take 1 tablet by mouth every 6 hours as needed for Mild Pain., Disp: , Rfl:   •  famotidine (PEPCID) 20 MG Tab, Take 1 tablet by mouth every 12 hours., Disp: 60 tablet, Rfl:   •  nicotine (NICODERM) 21 MG/24HR PATCH 24 HR, Place 1 Patch on the skin every 24 hours., Disp: 30 Patch, Rfl:   •  ondansetron (ZOFRAN ODT) 4 MG TABLET DISPERSIBLE, Take 1 tablet by mouth every 6 hours as needed for Nausea., Disp: , Rfl:   •  potassium chloride SA (KDUR) 20 MEQ Tab CR, Take 1 tablet by mouth 2 times a day., Disp: 60 tablet, Rfl: 11  •  spironolactone (ALDACTONE) 25 MG Tab, Take 1 tablet by mouth every day., Disp: " "30 tablet, Rfl: 3  •  thiamine (THIAMINE) 100 MG tablet, 1 tablet by Enteral Tube route every day., Disp: 30 tablet, Rfl:   •  furosemide (LASIX) 40 MG Tab, Take 1 tablet by mouth 2 times a day., Disp: , Rfl:     ALLERGIES  Allergies   Allergen Reactions   • Sulfa Drugs      Reaction unknown       PHYSICAL EXAM  VITAL SIGNS: /66   Pulse 99   Temp 36.3 °C (97.4 °F) (Temporal)   Resp 13   Ht 1.803 m (5' 11\")   Wt 79.4 kg (175 lb)   SpO2 91%   BMI 24.41 kg/m²   Constitutional: Patient is ill appearing and pale. No acute distress.   HENT: Normocephalic, atraumatic.  Dry oral mucosa.  Cardiovascular: Normal heart rate and Regular rhythm. No murmur.  Thorax & Lungs: Clear and equal breath sounds with good excursion. Lungs clear to auscultation. No respiratory distress.  Abdomen: Moderate abdominal distention from his ascites, he has tympanic bowel sounds with generalized tenderness.  Skin: Pale. Warm, Dry, No erythema, No petechial rashes.  Extremities: Peripheral pulses 4/4 No edema, No tenderness.  Musculoskeletal: Normal range of motion in all major joints. No tenderness to palpation or major deformities noted.   Neurologic: Alert & oriented x 3, Normal motor function, Normal sensory function  Psychiatric: Affect normal, Judgment normal, Mood normal.     DIAGNOSTICS/PROCEDURES    LABS  Results for orders placed or performed during the hospital encounter of 08/24/21   CBC WITH DIFFERENTIAL   Result Value Ref Range    WBC 7.4 4.8 - 10.8 K/uL    RBC 3.23 (L) 4.70 - 6.10 M/uL    Hemoglobin 10.0 (L) 14.0 - 18.0 g/dL    Hematocrit 31.4 (L) 42.0 - 52.0 %    MCV 97.2 81.4 - 97.8 fL    MCH 31.0 27.0 - 33.0 pg    MCHC 31.8 (L) 33.7 - 35.3 g/dL    RDW 62.9 (H) 35.9 - 50.0 fL    Platelet Count 73 (L) 164 - 446 K/uL    MPV 8.6 (L) 9.0 - 12.9 fL    Neutrophils-Polys 78.90 (H) 44.00 - 72.00 %    Lymphocytes 10.80 (L) 22.00 - 41.00 %    Monocytes 9.10 0.00 - 13.40 %    Eosinophils 0.30 0.00 - 6.90 %    Basophils 0.40 0.00 " - 1.80 %    Immature Granulocytes 0.50 0.00 - 0.90 %    Nucleated RBC 0.00 /100 WBC    Neutrophils (Absolute) 5.82 1.82 - 7.42 K/uL    Lymphs (Absolute) 0.80 (L) 1.00 - 4.80 K/uL    Monos (Absolute) 0.67 0.00 - 0.85 K/uL    Eos (Absolute) 0.02 0.00 - 0.51 K/uL    Baso (Absolute) 0.03 0.00 - 0.12 K/uL    Immature Granulocytes (abs) 0.04 0.00 - 0.11 K/uL    NRBC (Absolute) 0.00 K/uL   COMP METABOLIC PANEL   Result Value Ref Range    Sodium 126 (L) 135 - 145 mmol/L    Potassium 4.6 3.6 - 5.5 mmol/L    Chloride 92 (L) 96 - 112 mmol/L    Co2 23 20 - 33 mmol/L    Anion Gap 11.0 7.0 - 16.0    Glucose 147 (H) 65 - 99 mg/dL    Bun 14 8 - 22 mg/dL    Creatinine 0.60 0.50 - 1.40 mg/dL    Calcium 8.7 8.5 - 10.5 mg/dL    AST(SGOT) 36 12 - 45 U/L    ALT(SGPT) 9 2 - 50 U/L    Alkaline Phosphatase 276 (H) 30 - 99 U/L    Total Bilirubin 0.7 0.1 - 1.5 mg/dL    Albumin 2.2 (L) 3.2 - 4.9 g/dL    Total Protein 7.2 6.0 - 8.2 g/dL    Globulin 5.0 (H) 1.9 - 3.5 g/dL    A-G Ratio 0.4 g/dL   LIPASE   Result Value Ref Range    Lipase 8 (L) 11 - 82 U/L   PROTHROMBIN TIME (INR)   Result Value Ref Range    PT 15.3 (H) 12.0 - 14.6 sec    INR 1.25 (H) 0.87 - 1.13   APTT   Result Value Ref Range    APTT 36.7 (H) 24.7 - 36.0 sec   ESTIMATED GFR   Result Value Ref Range    GFR If African American >60 >60 mL/min/1.73 m 2    GFR If Non African American >60 >60 mL/min/1.73 m 2       Labs reviewed by me    RADIOLOGY/PROCEDURES  US-PARACENTESIS, ABD WITH IMAGING   Final Result      1. Ultrasound-guided therapeutic paracentesis of the left lower quadrant of the abdominal wall.      2. 1000 mL of fluid withdrawn.        Results and radiologist interpretation reviewed by me.     COURSE & MEDICAL DECISION MAKING  Pertinent Labs & Imaging studies reviewed. (See chart for details)    5:12 PM - Patient seen and evaluated at bedside. Ordered for US parentesis abd with imaging, CBC w/, CMP, lipase, prothrombin time (INR), APTT, eGFR to evaluate. Patient will be  treated with NS infusion continuous for his symptoms. Differential diagnoses include, but are not limited to, liver cancer, ascites, bowel obstruction.    HYDRATION: Based on the patient's presentation of Hypotension the patient was given IV fluids. IV Hydration was used because oral hydration was not adequate alone. Upon recheck following hydration, the patient was improved.  Patient went to radiology for paracentesis by ultrasound and they were able to take off a liter of fluid.  Patient feels much better upon recheck.  I urged him to speak with his current nursing home where he is residing to see if they can schedule him outpatient at least once a week for his paracentesis so that he can avoid emergency visits which is very uncomfortable and lengthy for him.  6:59 PM - Ordered Zofran 4 mg, morphine 4 mg/mL 4 mg for treatment of the patient's symptoms.    7:38 PM - Patient seen at bedside. Discussed lab and imaging results with the patient and updated them on the plan of care, including discharge with outpatient follow up. I answered all questions regarding their care and discussed strict return precautions for new or changing symptoms. Patient verbalizes understanding and agreement to this plan of care.   Patient is very tired right now and would like to go home, he will be discharged in stable and improved condition to follow-up as needed with his oncologist and for outpatient paracentesis as needed.  He is stable and improved.      DISPOSITION:  Patient will be discharged home in stable condition.    FOLLOW UP:  Cole Alonso D.O.  [address] [facility] [address]  [phone]            OUTPATIENT MEDICATIONS:  Discharge Medication List as of 8/24/2021  8:58 PM            FINAL IMPRESSION  1. Abdominal distension    2. Malignant ascites    3.  History of liver cancer     I, Cody Rubin (Scribcan), am scribing for, and in the presence of, Mojgan Eckert D.O..    Electronically signed by: Cody Rubin  (Scribe), 8/24/2021    IMojgan D.O. personally performed the services described in this documentation, as scribed by Cody Rubin in my presence, and it is both accurate and complete. C    The note accurately reflects work and decisions made by me.  Mojgan Eckert D.O.  8/24/2021  11:34 PM

## 2021-08-25 NOTE — DISCHARGE INSTRUCTIONS
Follow-up with your primary care provider within the week for recheck, see if they can schedule you at least weekly as needed for your paracentesis.  Return to the ER if fever greater than 101 or worsening symptoms.

## 2021-08-26 ENCOUNTER — APPOINTMENT (OUTPATIENT)
Dept: RADIOLOGY | Facility: MEDICAL CENTER | Age: 71
DRG: 853 | End: 2021-08-26
Attending: STUDENT IN AN ORGANIZED HEALTH CARE EDUCATION/TRAINING PROGRAM
Payer: MEDICARE

## 2021-08-26 ENCOUNTER — APPOINTMENT (OUTPATIENT)
Dept: RADIOLOGY | Facility: MEDICAL CENTER | Age: 71
DRG: 853 | End: 2021-08-26
Attending: EMERGENCY MEDICINE
Payer: MEDICARE

## 2021-08-26 ENCOUNTER — HOSPITAL ENCOUNTER (INPATIENT)
Facility: MEDICAL CENTER | Age: 71
LOS: 22 days | DRG: 853 | End: 2021-09-17
Attending: EMERGENCY MEDICINE | Admitting: STUDENT IN AN ORGANIZED HEALTH CARE EDUCATION/TRAINING PROGRAM
Payer: MEDICARE

## 2021-08-26 DIAGNOSIS — R18.8 CIRRHOSIS OF LIVER WITH ASCITES, UNSPECIFIED HEPATIC CIRRHOSIS TYPE (HCC): ICD-10-CM

## 2021-08-26 DIAGNOSIS — R65.21 SEPTIC SHOCK (HCC): ICD-10-CM

## 2021-08-26 DIAGNOSIS — A41.9 SEPTIC SHOCK (HCC): ICD-10-CM

## 2021-08-26 DIAGNOSIS — R19.8 PERFORATED VISCUS: ICD-10-CM

## 2021-08-26 DIAGNOSIS — K74.60 CIRRHOSIS OF LIVER WITH ASCITES, UNSPECIFIED HEPATIC CIRRHOSIS TYPE (HCC): ICD-10-CM

## 2021-08-26 DIAGNOSIS — E43 PROTEIN-CALORIE MALNUTRITION, SEVERE (HCC): ICD-10-CM

## 2021-08-26 LAB
ALBUMIN SERPL BCP-MCNC: 2.2 G/DL (ref 3.2–4.9)
ALBUMIN/GLOB SERPL: 0.4 G/DL
ALP SERPL-CCNC: 256 U/L (ref 30–99)
ALT SERPL-CCNC: 10 U/L (ref 2–50)
ANION GAP SERPL CALC-SCNC: 13 MMOL/L (ref 7–16)
APTT PPP: 32.8 SEC (ref 24.7–36)
AST SERPL-CCNC: 27 U/L (ref 12–45)
BASOPHILS # BLD AUTO: 0.3 % (ref 0–1.8)
BASOPHILS # BLD: 0.03 K/UL (ref 0–0.12)
BILIRUB SERPL-MCNC: 1 MG/DL (ref 0.1–1.5)
BUN SERPL-MCNC: 17 MG/DL (ref 8–22)
CALCIUM SERPL-MCNC: 8.6 MG/DL (ref 8.5–10.5)
CHLORIDE SERPL-SCNC: 92 MMOL/L (ref 96–112)
CO2 SERPL-SCNC: 25 MMOL/L (ref 20–33)
CREAT SERPL-MCNC: 0.58 MG/DL (ref 0.5–1.4)
EOSINOPHIL # BLD AUTO: 0.01 K/UL (ref 0–0.51)
EOSINOPHIL NFR BLD: 0.1 % (ref 0–6.9)
ERYTHROCYTE [DISTWIDTH] IN BLOOD BY AUTOMATED COUNT: 61.3 FL (ref 35.9–50)
GLOBULIN SER CALC-MCNC: 4.9 G/DL (ref 1.9–3.5)
GLUCOSE SERPL-MCNC: 134 MG/DL (ref 65–99)
HCT VFR BLD AUTO: 31.8 % (ref 42–52)
HGB BLD-MCNC: 10.2 G/DL (ref 14–18)
IMM GRANULOCYTES # BLD AUTO: 0.05 K/UL (ref 0–0.11)
IMM GRANULOCYTES NFR BLD AUTO: 0.5 % (ref 0–0.9)
INR PPP: 1.3 (ref 0.87–1.13)
LIPASE SERPL-CCNC: 10 U/L (ref 11–82)
LYMPHOCYTES # BLD AUTO: 1.39 K/UL (ref 1–4.8)
LYMPHOCYTES NFR BLD: 14.5 % (ref 22–41)
MCH RBC QN AUTO: 30.8 PG (ref 27–33)
MCHC RBC AUTO-ENTMCNC: 32.1 G/DL (ref 33.7–35.3)
MCV RBC AUTO: 96.1 FL (ref 81.4–97.8)
MONOCYTES # BLD AUTO: 0.86 K/UL (ref 0–0.85)
MONOCYTES NFR BLD AUTO: 9 % (ref 0–13.4)
NEUTROPHILS # BLD AUTO: 7.25 K/UL (ref 1.82–7.42)
NEUTROPHILS NFR BLD: 75.6 % (ref 44–72)
NRBC # BLD AUTO: 0 K/UL
NRBC BLD-RTO: 0 /100 WBC
PLATELET # BLD AUTO: 93 K/UL (ref 164–446)
PMV BLD AUTO: 9.1 FL (ref 9–12.9)
POTASSIUM SERPL-SCNC: 5 MMOL/L (ref 3.6–5.5)
PROT SERPL-MCNC: 7.1 G/DL (ref 6–8.2)
PROTHROMBIN TIME: 15.8 SEC (ref 12–14.6)
RBC # BLD AUTO: 3.31 M/UL (ref 4.7–6.1)
SODIUM SERPL-SCNC: 130 MMOL/L (ref 135–145)
WBC # BLD AUTO: 9.6 K/UL (ref 4.8–10.8)

## 2021-08-26 PROCEDURE — 85610 PROTHROMBIN TIME: CPT

## 2021-08-26 PROCEDURE — 80053 COMPREHEN METABOLIC PANEL: CPT

## 2021-08-26 PROCEDURE — 83690 ASSAY OF LIPASE: CPT

## 2021-08-26 PROCEDURE — 85730 THROMBOPLASTIN TIME PARTIAL: CPT

## 2021-08-26 PROCEDURE — 99285 EMERGENCY DEPT VISIT HI MDM: CPT

## 2021-08-26 PROCEDURE — 74018 RADEX ABDOMEN 1 VIEW: CPT

## 2021-08-26 PROCEDURE — 700111 HCHG RX REV CODE 636 W/ 250 OVERRIDE (IP): Performed by: EMERGENCY MEDICINE

## 2021-08-26 PROCEDURE — 99223 1ST HOSP IP/OBS HIGH 75: CPT | Mod: AI | Performed by: STUDENT IN AN ORGANIZED HEALTH CARE EDUCATION/TRAINING PROGRAM

## 2021-08-26 PROCEDURE — 96367 TX/PROPH/DG ADDL SEQ IV INF: CPT

## 2021-08-26 PROCEDURE — 770006 HCHG ROOM/CARE - MED/SURG/GYN SEMI*

## 2021-08-26 PROCEDURE — 96375 TX/PRO/DX INJ NEW DRUG ADDON: CPT

## 2021-08-26 PROCEDURE — 700105 HCHG RX REV CODE 258: Performed by: STUDENT IN AN ORGANIZED HEALTH CARE EDUCATION/TRAINING PROGRAM

## 2021-08-26 PROCEDURE — 700105 HCHG RX REV CODE 258: Performed by: EMERGENCY MEDICINE

## 2021-08-26 PROCEDURE — 85025 COMPLETE CBC W/AUTO DIFF WBC: CPT

## 2021-08-26 PROCEDURE — 700111 HCHG RX REV CODE 636 W/ 250 OVERRIDE (IP): Performed by: STUDENT IN AN ORGANIZED HEALTH CARE EDUCATION/TRAINING PROGRAM

## 2021-08-26 PROCEDURE — 96365 THER/PROPH/DIAG IV INF INIT: CPT

## 2021-08-26 PROCEDURE — 700117 HCHG RX CONTRAST REV CODE 255: Performed by: EMERGENCY MEDICINE

## 2021-08-26 PROCEDURE — 700101 HCHG RX REV CODE 250: Performed by: EMERGENCY MEDICINE

## 2021-08-26 PROCEDURE — 74177 CT ABD & PELVIS W/CONTRAST: CPT | Mod: ME

## 2021-08-26 RX ORDER — SODIUM CHLORIDE, SODIUM LACTATE, POTASSIUM CHLORIDE, CALCIUM CHLORIDE 600; 310; 30; 20 MG/100ML; MG/100ML; MG/100ML; MG/100ML
INJECTION, SOLUTION INTRAVENOUS CONTINUOUS
Status: DISCONTINUED | OUTPATIENT
Start: 2021-08-26 | End: 2021-09-08

## 2021-08-26 RX ORDER — OXYCODONE HYDROCHLORIDE 5 MG/1
5 TABLET ORAL EVERY 6 HOURS PRN
Status: ON HOLD | COMMUNITY
End: 2021-09-17

## 2021-08-26 RX ORDER — ONDANSETRON 2 MG/ML
4 INJECTION INTRAMUSCULAR; INTRAVENOUS EVERY 4 HOURS PRN
Status: DISCONTINUED | OUTPATIENT
Start: 2021-08-26 | End: 2021-08-27

## 2021-08-26 RX ADMIN — METRONIDAZOLE 500 MG: 500 INJECTION, SOLUTION INTRAVENOUS at 22:36

## 2021-08-26 RX ADMIN — CEFTRIAXONE SODIUM 1 G: 1 INJECTION, POWDER, FOR SOLUTION INTRAMUSCULAR; INTRAVENOUS at 21:40

## 2021-08-26 RX ADMIN — ONDANSETRON 4 MG: 2 INJECTION INTRAMUSCULAR; INTRAVENOUS at 19:19

## 2021-08-26 RX ADMIN — SODIUM CHLORIDE, POTASSIUM CHLORIDE, SODIUM LACTATE AND CALCIUM CHLORIDE: 600; 310; 30; 20 INJECTION, SOLUTION INTRAVENOUS at 19:30

## 2021-08-26 RX ADMIN — IOHEXOL 100 ML: 350 INJECTION, SOLUTION INTRAVENOUS at 21:15

## 2021-08-26 ASSESSMENT — ENCOUNTER SYMPTOMS
COUGH: 0
VOMITING: 1
SHORTNESS OF BREATH: 0
PALPITATIONS: 0
DIZZINESS: 0
ABDOMINAL PAIN: 0
WEIGHT LOSS: 1
DIARRHEA: 1
NAUSEA: 0
FEVER: 0
HEARTBURN: 0
HEADACHES: 0
CHILLS: 0

## 2021-08-26 ASSESSMENT — FIBROSIS 4 INDEX: FIB4 SCORE: 11.67

## 2021-08-26 ASSESSMENT — LIFESTYLE VARIABLES
DO YOU DRINK ALCOHOL: NO
DOES PATIENT WANT TO STOP DRINKING: NO

## 2021-08-27 ENCOUNTER — APPOINTMENT (OUTPATIENT)
Dept: RADIOLOGY | Facility: MEDICAL CENTER | Age: 71
DRG: 853 | End: 2021-08-27
Attending: SURGERY
Payer: MEDICARE

## 2021-08-27 ENCOUNTER — ANESTHESIA EVENT (OUTPATIENT)
Dept: SURGERY | Facility: MEDICAL CENTER | Age: 71
DRG: 853 | End: 2021-08-27
Payer: MEDICARE

## 2021-08-27 ENCOUNTER — ANESTHESIA (OUTPATIENT)
Dept: SURGERY | Facility: MEDICAL CENTER | Age: 71
DRG: 853 | End: 2021-08-27
Payer: MEDICARE

## 2021-08-27 PROBLEM — R19.8 PERFORATED VISCUS: Status: ACTIVE | Noted: 2021-08-27

## 2021-08-27 PROBLEM — K74.60 CIRRHOSIS OF LIVER (HCC): Status: ACTIVE | Noted: 2021-08-27

## 2021-08-27 PROBLEM — C22.0 HEPATOCELLULAR CARCINOMA (HCC): Status: ACTIVE | Noted: 2020-01-14

## 2021-08-27 PROBLEM — R11.2 NAUSEA AND VOMITING: Status: ACTIVE | Noted: 2021-08-27

## 2021-08-27 LAB
ABO GROUP BLD: NORMAL
ANION GAP SERPL CALC-SCNC: 8 MMOL/L (ref 7–16)
ANISOCYTOSIS BLD QL SMEAR: ABNORMAL
BARCODED ABORH UBTYP: 5100
BARCODED ABORH UBTYP: 5100
BARCODED PRD CODE UBPRD: NORMAL
BARCODED PRD CODE UBPRD: NORMAL
BARCODED UNIT NUM UBUNT: NORMAL
BARCODED UNIT NUM UBUNT: NORMAL
BASE EXCESS BLDA CALC-SCNC: 0 MMOL/L (ref -4–3)
BASE EXCESS BLDA CALC-SCNC: 1 MMOL/L (ref -4–3)
BASOPHILS # BLD AUTO: 0.9 % (ref 0–1.8)
BASOPHILS # BLD: 0.09 K/UL (ref 0–0.12)
BLD GP AB SCN SERPL QL: NORMAL
BODY TEMPERATURE: ABNORMAL DEGREES
BODY TEMPERATURE: ABNORMAL DEGREES
BREATHS SETTING VENT: 18
BUN SERPL-MCNC: 17 MG/DL (ref 8–22)
BURR CELLS BLD QL SMEAR: NORMAL
CA-I BLD ISE-SCNC: 1.11 MMOL/L (ref 1.1–1.3)
CALCIUM SERPL-MCNC: 7.9 MG/DL (ref 8.5–10.5)
CHLORIDE SERPL-SCNC: 97 MMOL/L (ref 96–112)
CO2 BLDA-SCNC: 27 MMOL/L (ref 20–33)
CO2 BLDA-SCNC: 27 MMOL/L (ref 20–33)
CO2 SERPL-SCNC: 23 MMOL/L (ref 20–33)
COMPONENT R 8504R: NORMAL
COMPONENT R 8504R: NORMAL
CREAT SERPL-MCNC: 0.45 MG/DL (ref 0.5–1.4)
DELSYS IDSYS: ABNORMAL
END TIDAL CARBON DIOXIDE IECO2: 26 MMHG
EOSINOPHIL # BLD AUTO: 0 K/UL (ref 0–0.51)
EOSINOPHIL NFR BLD: 0 % (ref 0–6.9)
ERYTHROCYTE [DISTWIDTH] IN BLOOD BY AUTOMATED COUNT: 56.5 FL (ref 35.9–50)
FERRITIN SERPL-MCNC: 1051 NG/ML (ref 22–322)
GLUCOSE BLD-MCNC: 125 MG/DL (ref 65–99)
GLUCOSE SERPL-MCNC: 124 MG/DL (ref 65–99)
HCO3 BLDA-SCNC: 25.5 MMOL/L (ref 17–25)
HCO3 BLDA-SCNC: 25.6 MMOL/L (ref 17–25)
HCT VFR BLD AUTO: 33.5 % (ref 42–52)
HCT VFR BLD CALC: 19 % (ref 42–52)
HGB BLD-MCNC: 11 G/DL (ref 14–18)
HGB BLD-MCNC: 6.5 G/DL (ref 14–18)
HGB RETIC QN AUTO: 37.5 PG/CELL (ref 29–35)
HOROWITZ INDEX BLDA+IHG-RTO: 410 MM[HG]
IMM RETICS NFR: 17.9 % (ref 9.3–17.4)
IRON SATN MFR SERPL: 22 % (ref 15–55)
IRON SERPL-MCNC: 26 UG/DL (ref 50–180)
LACTATE BLD-SCNC: 2.4 MMOL/L (ref 0.5–2)
LYMPHOCYTES # BLD AUTO: 0.62 K/UL (ref 1–4.8)
LYMPHOCYTES NFR BLD: 6 % (ref 22–41)
MACROCYTES BLD QL SMEAR: ABNORMAL
MAGNESIUM SERPL-MCNC: 1.9 MG/DL (ref 1.5–2.5)
MANUAL DIFF BLD: NORMAL
MCH RBC QN AUTO: 31.3 PG (ref 27–33)
MCHC RBC AUTO-ENTMCNC: 32.8 G/DL (ref 33.7–35.3)
MCV RBC AUTO: 95.4 FL (ref 81.4–97.8)
MICROCYTES BLD QL SMEAR: ABNORMAL
MODE IMODE: ABNORMAL
MONOCYTES # BLD AUTO: 0.54 K/UL (ref 0–0.85)
MONOCYTES NFR BLD AUTO: 5.2 % (ref 0–13.4)
MORPHOLOGY BLD-IMP: NORMAL
NEUTROPHILS # BLD AUTO: 9.05 K/UL (ref 1.82–7.42)
NEUTROPHILS NFR BLD: 81.9 % (ref 44–72)
NEUTS BAND NFR BLD MANUAL: 6 % (ref 0–10)
NRBC # BLD AUTO: 0 K/UL
NRBC BLD-RTO: 0 /100 WBC
O2/TOTAL GAS SETTING VFR VENT: 60 %
OVALOCYTES BLD QL SMEAR: NORMAL
PCO2 BLDA: 37.2 MMHG (ref 26–37)
PCO2 BLDA: 44.2 MMHG (ref 26–37)
PCO2 TEMP ADJ BLDA: 37 MMHG (ref 26–37)
PCO2 TEMP ADJ BLDA: 44.2 MMHG (ref 26–37)
PEEP END EXPIRATORY PRESSURE IPEEP: 8 CMH20
PH BLDA: 7.37 [PH] (ref 7.4–7.5)
PH BLDA: 7.44 [PH] (ref 7.4–7.5)
PH TEMP ADJ BLDA: 7.37 [PH] (ref 7.4–7.5)
PH TEMP ADJ BLDA: 7.45 [PH] (ref 7.4–7.5)
PLATELET # BLD AUTO: 101 K/UL (ref 164–446)
PLATELET BLD QL SMEAR: NORMAL
PMV BLD AUTO: 8.9 FL (ref 9–12.9)
PO2 BLDA: 246 MMHG (ref 64–87)
PO2 BLDA: 335 MMHG (ref 64–87)
PO2 TEMP ADJ BLDA: 246 MMHG (ref 64–87)
PO2 TEMP ADJ BLDA: 335 MMHG (ref 64–87)
POIKILOCYTOSIS BLD QL SMEAR: NORMAL
POTASSIUM BLD-SCNC: 3.9 MMOL/L (ref 3.6–5.5)
POTASSIUM SERPL-SCNC: 4.5 MMOL/L (ref 3.6–5.5)
PRODUCT TYPE UPROD: NORMAL
PRODUCT TYPE UPROD: NORMAL
RBC # BLD AUTO: 3.51 M/UL (ref 4.7–6.1)
RBC BLD AUTO: PRESENT
RETICS # AUTO: 0.11 M/UL (ref 0.04–0.06)
RETICS/RBC NFR: 3.1 % (ref 0.8–2.1)
RH BLD: NORMAL
SAO2 % BLDA: 100 % (ref 93–99)
SAO2 % BLDA: 100 % (ref 93–99)
SARS-COV+SARS-COV-2 AG RESP QL IA.RAPID: NOTDETECTED
SODIUM BLD-SCNC: 136 MMOL/L (ref 135–145)
SODIUM SERPL-SCNC: 128 MMOL/L (ref 135–145)
SPECIMEN DRAWN FROM PATIENT: ABNORMAL
SPECIMEN DRAWN FROM PATIENT: ABNORMAL
SPECIMEN SOURCE: NORMAL
TIBC SERPL-MCNC: 117 UG/DL (ref 250–450)
TIDAL VOLUME IVT: 450 ML
TOXIC GRANULES BLD QL SMEAR: SLIGHT
UIBC SERPL-MCNC: 91 UG/DL (ref 110–370)
UNIT STATUS USTAT: NORMAL
UNIT STATUS USTAT: NORMAL
VIT B12 SERPL-MCNC: 1425 PG/ML (ref 211–911)
WBC # BLD AUTO: 10.3 K/UL (ref 4.8–10.8)

## 2021-08-27 PROCEDURE — 82330 ASSAY OF CALCIUM: CPT

## 2021-08-27 PROCEDURE — 86900 BLOOD TYPING SEROLOGIC ABO: CPT

## 2021-08-27 PROCEDURE — 87070 CULTURE OTHR SPECIMN AEROBIC: CPT

## 2021-08-27 PROCEDURE — 0FQ04ZZ REPAIR LIVER, PERCUTANEOUS ENDOSCOPIC APPROACH: ICD-10-PCS | Performed by: SURGERY

## 2021-08-27 PROCEDURE — 84295 ASSAY OF SERUM SODIUM: CPT

## 2021-08-27 PROCEDURE — 36600 WITHDRAWAL OF ARTERIAL BLOOD: CPT

## 2021-08-27 PROCEDURE — 700101 HCHG RX REV CODE 250: Performed by: STUDENT IN AN ORGANIZED HEALTH CARE EDUCATION/TRAINING PROGRAM

## 2021-08-27 PROCEDURE — 80048 BASIC METABOLIC PNL TOTAL CA: CPT

## 2021-08-27 PROCEDURE — 82803 BLOOD GASES ANY COMBINATION: CPT | Mod: 91

## 2021-08-27 PROCEDURE — 87426 SARSCOV CORONAVIRUS AG IA: CPT

## 2021-08-27 PROCEDURE — 30243N1 TRANSFUSION OF NONAUTOLOGOUS RED BLOOD CELLS INTO CENTRAL VEIN, PERCUTANEOUS APPROACH: ICD-10-PCS | Performed by: ANESTHESIOLOGY

## 2021-08-27 PROCEDURE — 83540 ASSAY OF IRON: CPT

## 2021-08-27 PROCEDURE — 160048 HCHG OR STATISTICAL LEVEL 1-5: Performed by: SURGERY

## 2021-08-27 PROCEDURE — 85046 RETICYTE/HGB CONCENTRATE: CPT

## 2021-08-27 PROCEDURE — 82962 GLUCOSE BLOOD TEST: CPT

## 2021-08-27 PROCEDURE — 700101 HCHG RX REV CODE 250: Performed by: ANESTHESIOLOGY

## 2021-08-27 PROCEDURE — 86923 COMPATIBILITY TEST ELECTRIC: CPT

## 2021-08-27 PROCEDURE — 71045 X-RAY EXAM CHEST 1 VIEW: CPT

## 2021-08-27 PROCEDURE — 700105 HCHG RX REV CODE 258: Performed by: SURGERY

## 2021-08-27 PROCEDURE — 160031 HCHG SURGERY MINUTES - 1ST 30 MINS LEVEL 5: Performed by: SURGERY

## 2021-08-27 PROCEDURE — 700111 HCHG RX REV CODE 636 W/ 250 OVERRIDE (IP): Performed by: ANESTHESIOLOGY

## 2021-08-27 PROCEDURE — 94002 VENT MGMT INPAT INIT DAY: CPT

## 2021-08-27 PROCEDURE — 502704 HCHG DEVICE, LIGASURE IMPACT: Performed by: SURGERY

## 2021-08-27 PROCEDURE — 36430 TRANSFUSION BLD/BLD COMPNT: CPT

## 2021-08-27 PROCEDURE — 86850 RBC ANTIBODY SCREEN: CPT

## 2021-08-27 PROCEDURE — A6402 STERILE GAUZE <= 16 SQ IN: HCPCS | Performed by: SURGERY

## 2021-08-27 PROCEDURE — 87102 FUNGUS ISOLATION CULTURE: CPT

## 2021-08-27 PROCEDURE — 82607 VITAMIN B-12: CPT

## 2021-08-27 PROCEDURE — 96372 THER/PROPH/DIAG INJ SC/IM: CPT

## 2021-08-27 PROCEDURE — 700102 HCHG RX REV CODE 250 W/ 637 OVERRIDE(OP): Performed by: STUDENT IN AN ORGANIZED HEALTH CARE EDUCATION/TRAINING PROGRAM

## 2021-08-27 PROCEDURE — 83735 ASSAY OF MAGNESIUM: CPT

## 2021-08-27 PROCEDURE — 83550 IRON BINDING TEST: CPT

## 2021-08-27 PROCEDURE — 85027 COMPLETE CBC AUTOMATED: CPT

## 2021-08-27 PROCEDURE — 501570 HCHG TROCAR, SEPARATOR: Performed by: SURGERY

## 2021-08-27 PROCEDURE — 99233 SBSQ HOSP IP/OBS HIGH 50: CPT | Performed by: HOSPITALIST

## 2021-08-27 PROCEDURE — 0DNF4ZZ RELEASE RIGHT LARGE INTESTINE, PERCUTANEOUS ENDOSCOPIC APPROACH: ICD-10-PCS | Performed by: SURGERY

## 2021-08-27 PROCEDURE — 700101 HCHG RX REV CODE 250: Performed by: SURGERY

## 2021-08-27 PROCEDURE — 0DNU4ZZ RELEASE OMENTUM, PERCUTANEOUS ENDOSCOPIC APPROACH: ICD-10-PCS | Performed by: SURGERY

## 2021-08-27 PROCEDURE — 82728 ASSAY OF FERRITIN: CPT

## 2021-08-27 PROCEDURE — 87205 SMEAR GRAM STAIN: CPT | Mod: 91

## 2021-08-27 PROCEDURE — 501583 HCHG TROCAR, THRD CAN&SEAL 5X100: Performed by: SURGERY

## 2021-08-27 PROCEDURE — 700111 HCHG RX REV CODE 636 W/ 250 OVERRIDE (IP): Performed by: SURGERY

## 2021-08-27 PROCEDURE — 94799 UNLISTED PULMONARY SVC/PX: CPT

## 2021-08-27 PROCEDURE — 160042 HCHG SURGERY MINUTES - EA ADDL 1 MIN LEVEL 5: Performed by: SURGERY

## 2021-08-27 PROCEDURE — 85007 BL SMEAR W/DIFF WBC COUNT: CPT

## 2021-08-27 PROCEDURE — 96366 THER/PROPH/DIAG IV INF ADDON: CPT

## 2021-08-27 PROCEDURE — 83605 ASSAY OF LACTIC ACID: CPT

## 2021-08-27 PROCEDURE — 700105 HCHG RX REV CODE 258: Performed by: STUDENT IN AN ORGANIZED HEALTH CARE EDUCATION/TRAINING PROGRAM

## 2021-08-27 PROCEDURE — 500800 HCHG LAPAROSCOPIC J/L HOOK: Performed by: SURGERY

## 2021-08-27 PROCEDURE — 110454 HCHG SHELL REV 250: Performed by: SURGERY

## 2021-08-27 PROCEDURE — A9270 NON-COVERED ITEM OR SERVICE: HCPCS | Performed by: STUDENT IN AN ORGANIZED HEALTH CARE EDUCATION/TRAINING PROGRAM

## 2021-08-27 PROCEDURE — 500868 HCHG NEEDLE, SURGI(VARES): Performed by: SURGERY

## 2021-08-27 PROCEDURE — 770022 HCHG ROOM/CARE - ICU (200)

## 2021-08-27 PROCEDURE — 0W3G4ZZ CONTROL BLEEDING IN PERITONEAL CAVITY, PERCUTANEOUS ENDOSCOPIC APPROACH: ICD-10-PCS | Performed by: SURGERY

## 2021-08-27 PROCEDURE — 700111 HCHG RX REV CODE 636 W/ 250 OVERRIDE (IP): Performed by: STUDENT IN AN ORGANIZED HEALTH CARE EDUCATION/TRAINING PROGRAM

## 2021-08-27 PROCEDURE — 85014 HEMATOCRIT: CPT

## 2021-08-27 PROCEDURE — 5A1955Z RESPIRATORY VENTILATION, GREATER THAN 96 CONSECUTIVE HOURS: ICD-10-PCS | Performed by: CHIROPRACTOR

## 2021-08-27 PROCEDURE — 501838 HCHG SUTURE GENERAL: Performed by: SURGERY

## 2021-08-27 PROCEDURE — 84132 ASSAY OF SERUM POTASSIUM: CPT

## 2021-08-27 PROCEDURE — 160009 HCHG ANES TIME/MIN: Performed by: SURGERY

## 2021-08-27 PROCEDURE — 87075 CULTR BACTERIA EXCEPT BLOOD: CPT

## 2021-08-27 PROCEDURE — P9016 RBC LEUKOCYTES REDUCED: HCPCS

## 2021-08-27 PROCEDURE — 86901 BLOOD TYPING SEROLOGIC RH(D): CPT

## 2021-08-27 PROCEDURE — 0F9140Z DRAINAGE OF RIGHT LOBE LIVER WITH DRAINAGE DEVICE, PERCUTANEOUS ENDOSCOPIC APPROACH: ICD-10-PCS | Performed by: SURGERY

## 2021-08-27 PROCEDURE — 0FN14ZZ RELEASE RIGHT LOBE LIVER, PERCUTANEOUS ENDOSCOPIC APPROACH: ICD-10-PCS | Performed by: SURGERY

## 2021-08-27 RX ORDER — SPIRONOLACTONE 100 MG/1
100 TABLET, FILM COATED ORAL DAILY
Status: DISCONTINUED | OUTPATIENT
Start: 2021-08-27 | End: 2021-08-27

## 2021-08-27 RX ORDER — HEPARIN SODIUM 5000 [USP'U]/ML
5000 INJECTION, SOLUTION INTRAVENOUS; SUBCUTANEOUS EVERY 8 HOURS
Status: DISCONTINUED | OUTPATIENT
Start: 2021-08-27 | End: 2021-08-27

## 2021-08-27 RX ORDER — BISACODYL 10 MG
10 SUPPOSITORY, RECTAL RECTAL
Status: DISCONTINUED | OUTPATIENT
Start: 2021-08-27 | End: 2021-09-12

## 2021-08-27 RX ORDER — ONDANSETRON 2 MG/ML
4 INJECTION INTRAMUSCULAR; INTRAVENOUS EVERY 4 HOURS PRN
Status: DISCONTINUED | OUTPATIENT
Start: 2021-08-27 | End: 2021-09-17 | Stop reason: HOSPADM

## 2021-08-27 RX ORDER — BISACODYL 10 MG
10 SUPPOSITORY, RECTAL RECTAL
Status: DISCONTINUED | OUTPATIENT
Start: 2021-08-27 | End: 2021-08-27

## 2021-08-27 RX ORDER — GAUZE BANDAGE 2" X 2"
100 BANDAGE TOPICAL DAILY
Status: DISCONTINUED | OUTPATIENT
Start: 2021-08-27 | End: 2021-08-28

## 2021-08-27 RX ORDER — BUPIVACAINE HYDROCHLORIDE AND EPINEPHRINE 5; 5 MG/ML; UG/ML
INJECTION, SOLUTION EPIDURAL; INTRACAUDAL; PERINEURAL
Status: DISCONTINUED | OUTPATIENT
Start: 2021-08-27 | End: 2021-08-27 | Stop reason: HOSPADM

## 2021-08-27 RX ORDER — HYDROMORPHONE HYDROCHLORIDE 1 MG/ML
0.5 INJECTION, SOLUTION INTRAMUSCULAR; INTRAVENOUS; SUBCUTANEOUS
Status: DISCONTINUED | OUTPATIENT
Start: 2021-08-27 | End: 2021-08-27

## 2021-08-27 RX ORDER — POLYETHYLENE GLYCOL 3350 17 G/17G
1 POWDER, FOR SOLUTION ORAL
Status: DISCONTINUED | OUTPATIENT
Start: 2021-08-27 | End: 2021-08-27

## 2021-08-27 RX ORDER — OXYCODONE HYDROCHLORIDE 5 MG/1
5 TABLET ORAL
Status: DISCONTINUED | OUTPATIENT
Start: 2021-08-27 | End: 2021-08-27

## 2021-08-27 RX ORDER — ENALAPRILAT 1.25 MG/ML
1.25 INJECTION INTRAVENOUS EVERY 6 HOURS PRN
Status: DISCONTINUED | OUTPATIENT
Start: 2021-08-27 | End: 2021-09-11

## 2021-08-27 RX ORDER — ONDANSETRON 4 MG/1
4 TABLET, ORALLY DISINTEGRATING ORAL EVERY 4 HOURS PRN
Status: DISCONTINUED | OUTPATIENT
Start: 2021-08-27 | End: 2021-08-27

## 2021-08-27 RX ORDER — POLYETHYLENE GLYCOL 3350 17 G/17G
1 POWDER, FOR SOLUTION ORAL
Status: DISCONTINUED | OUTPATIENT
Start: 2021-08-27 | End: 2021-09-12

## 2021-08-27 RX ORDER — ROCURONIUM BROMIDE 10 MG/ML
INJECTION, SOLUTION INTRAVENOUS PRN
Status: DISCONTINUED | OUTPATIENT
Start: 2021-08-27 | End: 2021-08-27 | Stop reason: SURG

## 2021-08-27 RX ORDER — HYDROMORPHONE HYDROCHLORIDE 1 MG/ML
0.5 INJECTION, SOLUTION INTRAMUSCULAR; INTRAVENOUS; SUBCUTANEOUS
Status: DISCONTINUED | OUTPATIENT
Start: 2021-08-27 | End: 2021-08-30

## 2021-08-27 RX ORDER — SPIRONOLACTONE 100 MG/1
100 TABLET, FILM COATED ORAL DAILY
Status: ON HOLD | COMMUNITY
End: 2021-09-17

## 2021-08-27 RX ORDER — FAMOTIDINE 20 MG/1
20 TABLET, FILM COATED ORAL 2 TIMES DAILY
Status: DISCONTINUED | OUTPATIENT
Start: 2021-08-27 | End: 2021-09-01

## 2021-08-27 RX ORDER — PHENYLEPHRINE HCL IN 0.9% NACL 0.5 MG/5ML
SYRINGE (ML) INTRAVENOUS PRN
Status: DISCONTINUED | OUTPATIENT
Start: 2021-08-27 | End: 2021-08-27 | Stop reason: SURG

## 2021-08-27 RX ORDER — FUROSEMIDE 20 MG/1
40 TABLET ORAL 2 TIMES DAILY
Status: DISCONTINUED | OUTPATIENT
Start: 2021-08-27 | End: 2021-08-27

## 2021-08-27 RX ORDER — LACTULOSE 20 G/30ML
10 SOLUTION ORAL 2 TIMES DAILY
Status: DISCONTINUED | OUTPATIENT
Start: 2021-08-28 | End: 2021-09-12

## 2021-08-27 RX ORDER — LACTULOSE 20 G/30ML
10 SOLUTION ORAL 2 TIMES DAILY
Status: DISCONTINUED | OUTPATIENT
Start: 2021-08-27 | End: 2021-08-27

## 2021-08-27 RX ORDER — ACETAMINOPHEN 325 MG/1
650 TABLET ORAL EVERY 6 HOURS PRN
Status: DISCONTINUED | OUTPATIENT
Start: 2021-08-27 | End: 2021-09-13

## 2021-08-27 RX ORDER — OXYCODONE HYDROCHLORIDE 10 MG/1
10 TABLET ORAL
Status: DISCONTINUED | OUTPATIENT
Start: 2021-08-27 | End: 2021-08-30

## 2021-08-27 RX ORDER — OXYCODONE HYDROCHLORIDE 10 MG/1
10 TABLET ORAL
Status: DISCONTINUED | OUTPATIENT
Start: 2021-08-27 | End: 2021-08-27

## 2021-08-27 RX ORDER — AMOXICILLIN 250 MG
2 CAPSULE ORAL 2 TIMES DAILY
Status: DISCONTINUED | OUTPATIENT
Start: 2021-08-27 | End: 2021-08-27

## 2021-08-27 RX ORDER — LABETALOL HYDROCHLORIDE 5 MG/ML
10 INJECTION, SOLUTION INTRAVENOUS EVERY 4 HOURS PRN
Status: DISCONTINUED | OUTPATIENT
Start: 2021-08-27 | End: 2021-09-11

## 2021-08-27 RX ORDER — SODIUM CHLORIDE, SODIUM LACTATE, POTASSIUM CHLORIDE, AND CALCIUM CHLORIDE .6; .31; .03; .02 G/100ML; G/100ML; G/100ML; G/100ML
1000 INJECTION, SOLUTION INTRAVENOUS ONCE
Status: COMPLETED | OUTPATIENT
Start: 2021-08-27 | End: 2021-08-28

## 2021-08-27 RX ORDER — ACETAMINOPHEN 325 MG/1
650 TABLET ORAL EVERY 6 HOURS PRN
Status: DISCONTINUED | OUTPATIENT
Start: 2021-08-27 | End: 2021-08-27

## 2021-08-27 RX ORDER — OXYCODONE HYDROCHLORIDE 5 MG/1
5 TABLET ORAL
Status: DISCONTINUED | OUTPATIENT
Start: 2021-08-27 | End: 2021-08-30

## 2021-08-27 RX ORDER — MIDAZOLAM HYDROCHLORIDE 1 MG/ML
INJECTION INTRAMUSCULAR; INTRAVENOUS PRN
Status: DISCONTINUED | OUTPATIENT
Start: 2021-08-27 | End: 2021-08-27 | Stop reason: SURG

## 2021-08-27 RX ORDER — ONDANSETRON 4 MG/1
4 TABLET, ORALLY DISINTEGRATING ORAL EVERY 4 HOURS PRN
Status: DISCONTINUED | OUTPATIENT
Start: 2021-08-27 | End: 2021-09-13

## 2021-08-27 RX ORDER — AMOXICILLIN 250 MG
2 CAPSULE ORAL 2 TIMES DAILY
Status: DISCONTINUED | OUTPATIENT
Start: 2021-08-28 | End: 2021-09-12

## 2021-08-27 RX ORDER — LACTULOSE 10 G/15ML
20 SOLUTION ORAL 2 TIMES DAILY
Status: ON HOLD | COMMUNITY
End: 2021-09-17

## 2021-08-27 RX ORDER — LANOLIN ALCOHOL/MO/W.PET/CERES
100 CREAM (GRAM) TOPICAL DAILY
Status: ON HOLD | COMMUNITY
End: 2021-09-17

## 2021-08-27 RX ADMIN — SODIUM CHLORIDE, POTASSIUM CHLORIDE, SODIUM LACTATE AND CALCIUM CHLORIDE: 600; 310; 30; 20 INJECTION, SOLUTION INTRAVENOUS at 11:19

## 2021-08-27 RX ADMIN — FENTANYL CITRATE 50 MCG: 50 INJECTION, SOLUTION INTRAMUSCULAR; INTRAVENOUS at 19:10

## 2021-08-27 RX ADMIN — METRONIDAZOLE 500 MG: 500 INJECTION, SOLUTION INTRAVENOUS at 16:07

## 2021-08-27 RX ADMIN — FUROSEMIDE 40 MG: 40 TABLET ORAL at 06:00

## 2021-08-27 RX ADMIN — FENTANYL CITRATE 100 MCG: 50 INJECTION, SOLUTION INTRAMUSCULAR; INTRAVENOUS at 19:04

## 2021-08-27 RX ADMIN — FENTANYL CITRATE 100 MCG: 50 INJECTION, SOLUTION INTRAMUSCULAR; INTRAVENOUS at 18:02

## 2021-08-27 RX ADMIN — FENTANYL CITRATE 150 MCG: 50 INJECTION, SOLUTION INTRAMUSCULAR; INTRAVENOUS at 18:41

## 2021-08-27 RX ADMIN — METRONIDAZOLE 500 MG: 500 INJECTION, SOLUTION INTRAVENOUS at 23:18

## 2021-08-27 RX ADMIN — HEPARIN SODIUM 5000 UNITS: 5000 INJECTION, SOLUTION INTRAVENOUS; SUBCUTANEOUS at 06:00

## 2021-08-27 RX ADMIN — Medication 100 MG: at 06:00

## 2021-08-27 RX ADMIN — PROPOFOL 100 MG: 10 INJECTION, EMULSION INTRAVENOUS at 17:50

## 2021-08-27 RX ADMIN — METRONIDAZOLE 500 MG: 500 INJECTION, SOLUTION INTRAVENOUS at 06:00

## 2021-08-27 RX ADMIN — SODIUM CHLORIDE, POTASSIUM CHLORIDE, SODIUM LACTATE AND CALCIUM CHLORIDE 1000 ML: 600; 310; 30; 20 INJECTION, SOLUTION INTRAVENOUS at 22:18

## 2021-08-27 RX ADMIN — SODIUM CHLORIDE, POTASSIUM CHLORIDE, SODIUM LACTATE AND CALCIUM CHLORIDE: 600; 310; 30; 20 INJECTION, SOLUTION INTRAVENOUS at 18:16

## 2021-08-27 RX ADMIN — FENTANYL CITRATE 100 MCG: 50 INJECTION, SOLUTION INTRAMUSCULAR; INTRAVENOUS at 18:16

## 2021-08-27 RX ADMIN — FENTANYL CITRATE 50 MCG: 50 INJECTION, SOLUTION INTRAMUSCULAR; INTRAVENOUS at 17:50

## 2021-08-27 RX ADMIN — FENTANYL CITRATE 100 MCG: 50 INJECTION, SOLUTION INTRAMUSCULAR; INTRAVENOUS at 18:59

## 2021-08-27 RX ADMIN — FENTANYL CITRATE 100 MCG: 50 INJECTION, SOLUTION INTRAMUSCULAR; INTRAVENOUS at 18:50

## 2021-08-27 RX ADMIN — LACTULOSE 10 ML: 20 SOLUTION ORAL at 06:00

## 2021-08-27 RX ADMIN — SODIUM CHLORIDE, POTASSIUM CHLORIDE, SODIUM LACTATE AND CALCIUM CHLORIDE 1000 ML: 600; 310; 30; 20 INJECTION, SOLUTION INTRAVENOUS at 23:07

## 2021-08-27 RX ADMIN — FAMOTIDINE 20 MG: 10 INJECTION INTRAVENOUS at 23:18

## 2021-08-27 RX ADMIN — SODIUM CHLORIDE, POTASSIUM CHLORIDE, SODIUM LACTATE AND CALCIUM CHLORIDE 1000 ML: 600; 310; 30; 20 INJECTION, SOLUTION INTRAVENOUS at 21:40

## 2021-08-27 RX ADMIN — ROCURONIUM BROMIDE 70 MG: 10 INJECTION, SOLUTION INTRAVENOUS at 17:50

## 2021-08-27 RX ADMIN — EPHEDRINE SULFATE 10 MG: 50 INJECTION, SOLUTION INTRAVENOUS at 18:57

## 2021-08-27 RX ADMIN — SODIUM CHLORIDE, POTASSIUM CHLORIDE, SODIUM LACTATE AND CALCIUM CHLORIDE: 600; 310; 30; 20 INJECTION, SOLUTION INTRAVENOUS at 17:39

## 2021-08-27 RX ADMIN — Medication 200 MCG: at 18:31

## 2021-08-27 RX ADMIN — MIDAZOLAM HYDROCHLORIDE 1 MG: 1 INJECTION, SOLUTION INTRAMUSCULAR; INTRAVENOUS at 17:50

## 2021-08-27 RX ADMIN — OXYCODONE HYDROCHLORIDE 10 MG: 10 TABLET ORAL at 06:48

## 2021-08-27 RX ADMIN — FENTANYL CITRATE 250 MCG: 50 INJECTION, SOLUTION INTRAMUSCULAR; INTRAVENOUS at 18:30

## 2021-08-27 ASSESSMENT — ENCOUNTER SYMPTOMS
VOMITING: 1
DEPRESSION: 0
EYE PAIN: 0
WEAKNESS: 0
HEADACHES: 0
SPUTUM PRODUCTION: 0
VOMITING: 0
COUGH: 0
NECK PAIN: 0
HEARTBURN: 0
SORE THROAT: 0
CHILLS: 0
BLOOD IN STOOL: 0
PALPITATIONS: 0
DIAPHORESIS: 0
BLURRED VISION: 0
DOUBLE VISION: 0
SENSORY CHANGE: 0
SHORTNESS OF BREATH: 0
BRUISES/BLEEDS EASILY: 0
FOCAL WEAKNESS: 0
ABDOMINAL PAIN: 0
DIZZINESS: 0
NAUSEA: 0
NAUSEA: 1
HEMOPTYSIS: 0
FEVER: 0
CLAUDICATION: 0
WEAKNESS: 1
SPEECH CHANGE: 0
INSOMNIA: 0
BACK PAIN: 0
LOSS OF CONSCIOUSNESS: 0
ABDOMINAL PAIN: 1
CONSTIPATION: 0
EYE DISCHARGE: 0
WHEEZING: 0
DIARRHEA: 0
MYALGIAS: 0

## 2021-08-27 ASSESSMENT — COGNITIVE AND FUNCTIONAL STATUS - GENERAL
SUGGESTED CMS G CODE MODIFIER MOBILITY: CL
SUGGESTED CMS G CODE MODIFIER DAILY ACTIVITY: CK
TURNING FROM BACK TO SIDE WHILE IN FLAT BAD: A LITTLE
DRESSING REGULAR UPPER BODY CLOTHING: A LITTLE
CLIMB 3 TO 5 STEPS WITH RAILING: A LOT
PERSONAL GROOMING: A LITTLE
HELP NEEDED FOR BATHING: A LOT
DAILY ACTIVITIY SCORE: 17
MOBILITY SCORE: 13
DRESSING REGULAR LOWER BODY CLOTHING: A LITTLE
TOILETING: A LITTLE
EATING MEALS: A LITTLE
STANDING UP FROM CHAIR USING ARMS: A LOT
MOVING TO AND FROM BED TO CHAIR: A LOT
MOVING FROM LYING ON BACK TO SITTING ON SIDE OF FLAT BED: A LOT
WALKING IN HOSPITAL ROOM: A LOT

## 2021-08-27 ASSESSMENT — LIFESTYLE VARIABLES
CONSUMPTION TOTAL: NEGATIVE
DOES PATIENT WANT TO STOP DRINKING: NO
HOW MANY TIMES IN THE PAST YEAR HAVE YOU HAD 5 OR MORE DRINKS IN A DAY: 0
TOTAL SCORE: 0
HAVE PEOPLE ANNOYED YOU BY CRITICIZING YOUR DRINKING: NO
TOTAL SCORE: 0
EVER HAD A DRINK FIRST THING IN THE MORNING TO STEADY YOUR NERVES TO GET RID OF A HANGOVER: NO
SUBSTANCE_ABUSE: 0
TOTAL SCORE: 0
HAVE YOU EVER FELT YOU SHOULD CUT DOWN ON YOUR DRINKING: NO
EVER FELT BAD OR GUILTY ABOUT YOUR DRINKING: NO
ALCOHOL_USE: NO
AVERAGE NUMBER OF DAYS PER WEEK YOU HAVE A DRINK CONTAINING ALCOHOL: 0
ON A TYPICAL DAY WHEN YOU DRINK ALCOHOL HOW MANY DRINKS DO YOU HAVE: 0

## 2021-08-27 ASSESSMENT — FIBROSIS 4 INDEX
FIB4 SCORE: 6.52
FIB4 SCORE: 6.52

## 2021-08-27 ASSESSMENT — PAIN DESCRIPTION - PAIN TYPE
TYPE: ACUTE PAIN
TYPE: ACUTE PAIN
TYPE: ACUTE PAIN;SURGICAL PAIN

## 2021-08-27 NOTE — ED NOTES
Pt resting comfortably in bed, updated on POC, no additional needs at this time. Call light in reach

## 2021-08-27 NOTE — H&P
Hospital Medicine History & Physical Note    Date of Service  8/26/2021    Primary Care Physician  Cole Alonso D.O.    Consultants  General Surgery: Jaime Daniel MD    Code Status  Full Code    Chief Complaint  Chief Complaint   Patient presents with   • N/V       History of Presenting Illness  Lorraine Bella is a 71 y.o. male who presented 8/26/2021 with complaints of a few day history of nausea and vomiting. He has a history of known hepatocellular carcinoma with cirrhosis treated by Dr. Birch with immunotherapy. He has been complaining of mild abdominal fullness, nausea vomiting without hematemesis. He did present a few days ago with associated nausea vomiting abdominal pain require paracentesis which improved some of his abdominal distention and pain. He otherwise states he has been fully vaccinated for COVID-19 and denies the following ROS: Substernal chest pain, cough with sputum production, anosmia, lesion, melena, hematochezia, dysuria, hematuria, incoordination or vertigo, syncope, visual changes.    Vital signs at time of presentation were as follows: 97.1, 107, 16, 111/62, 96% room air.    Chest x-ray was performed and reveals multiple mildly dilated loops of small and large intestine possibly representing ileus. CT abdomen pelvis reveals large collection of free intraperitoneal gas and fluid in the right abdomen extending from the hepatic dome to the pelvis. Gallbladder is also involved but may not be the site of origin, perforated bowel is suspected, additional free fluid in the abdomen, large right hepatic mass unchanged in size, splenomegaly, 1.6 x 1.8 cystic pericardiac tail lesion unchanged, small bowel ileus and distal colonic diverticulosis appreciated.    Labs obtained on admission reveal the following: Normocytic anemia and thrombocytopenia on CBC with hemoglobin 10.2, hematocrit 31.8 with MCV of 96.1 and platelet count 93 otherwise unremarkable. Lipase was measured at 10  and CMP reveals mild hyponatremia of 130, hypochloremia of 92 and elevated ALP at 256 otherwise relatively unremarkable. Albumin noted to be minimally decreased at 2.2. PT/INR minimally elevated at 15.8 and 1.3 respectively. APTT within normal limits. GFR greater than 60.    Patient treated with IV antibiotics and general surgery consulted and has seen patient bedside. He is offered surgical intervention and patient declines at time of evaluation and wishes for conservative management. Patient will remain n.p.o. he agrees to full CODE STATUS at time of evaluation and alert and oriented x4. He will be optimized in ED and subsequently transferred to medical [place] floor for further optimization of medical management.    I discussed the plan of care with patient.    Review of Systems  Review of Systems   Constitutional: Negative for chills and fever.   HENT: Negative for congestion and sore throat.    Eyes: Negative for blurred vision and double vision.   Respiratory: Negative for cough, shortness of breath and wheezing.    Cardiovascular: Negative for chest pain and palpitations.   Gastrointestinal: Positive for abdominal pain, nausea and vomiting. Negative for blood in stool, constipation, diarrhea, heartburn and melena.   Genitourinary: Negative for dysuria and frequency.   Musculoskeletal: Negative for back pain and neck pain.   Skin: Negative for itching and rash.   Neurological: Negative for focal weakness, loss of consciousness, weakness and headaches.   Endo/Heme/Allergies: Negative for environmental allergies. Does not bruise/bleed easily.   Psychiatric/Behavioral: Negative for depression. The patient does not have insomnia.        Past Medical History   has a past medical history of Alcohol use (3/31/2016), Borderline diabetes, Cancer (HCC) (2019), Cataract, Dental disorder, Diabetes (HCC), Disorder of thyroid, Glaucoma, High cholesterol, and Hypertension.    Surgical History   has a past surgical history that  includes bone graft (Right, 's); wrist orif (Right, 's); cataract phaco with iol (Left, 2016); parathyroidectomy (N/A, 2016); other (2019); other; and pr lap,diagnostic abdomen (2021).     Family History  family history includes Cancer in his mother.   Family history reviewed with patient. There is no family history that is pertinent to the chief complaint.     Social History   reports that he has been smoking cigarettes. He started smoking about 31 years ago. He has a 15.00 pack-year smoking history. He has never used smokeless tobacco. He reports previous alcohol use of about 2.4 oz of alcohol per week. He reports that he does not use drugs.    Allergies  Allergies   Allergen Reactions   • Sulfa Drugs      Reaction unknown       Medications  Prior to Admission Medications   Prescriptions Last Dose Informant Patient Reported? Taking?   acetaminophen (TYLENOL) 500 MG Tab   No No   Sig: Take 1 tablet by mouth every 6 hours as needed for Mild Pain.   famotidine (PEPCID) 20 MG Tab   No No   Sig: Take 1 tablet by mouth every 12 hours.   furosemide (LASIX) 40 MG Tab   No No   Sig: Take 1 tablet by mouth 2 times a day.   nicotine (NICODERM) 21 MG/24HR PATCH 24 HR   No No   Sig: Place 1 Patch on the skin every 24 hours.   ondansetron (ZOFRAN ODT) 4 MG TABLET DISPERSIBLE   No No   Sig: Take 1 tablet by mouth every 6 hours as needed for Nausea.   potassium chloride SA (KDUR) 20 MEQ Tab CR   No No   Sig: Take 1 tablet by mouth 2 times a day.   spironolactone (ALDACTONE) 25 MG Tab   No No   Sig: Take 1 tablet by mouth every day.   thiamine (THIAMINE) 100 MG tablet   No No   Si tablet by Enteral Tube route every day.      Facility-Administered Medications: None       Physical Exam  Temp:  [36.2 °C (97.1 °F)] 36.2 °C (97.1 °F)  Pulse:  [] 96  Resp:  [16] 16  BP: ()/(57-68) 109/66  SpO2:  [94 %-100 %] 98 %    Physical Exam  Vitals reviewed.   Constitutional:       Appearance: He is not  toxic-appearing or diaphoretic.      Comments: Appears malnourished, pleasant cooperative, appears age as stated.   HENT:      Head: Normocephalic and atraumatic.      Mouth/Throat:      Mouth: Mucous membranes are dry.      Pharynx: Oropharynx is clear. No oropharyngeal exudate or posterior oropharyngeal erythema.   Eyes:      General: No scleral icterus.     Extraocular Movements: Extraocular movements intact.      Pupils: Pupils are equal, round, and reactive to light.      Comments: Pale conjunctiva   Neck:      Vascular: No carotid bruit.   Cardiovascular:      Rate and Rhythm: Normal rate and regular rhythm.      Pulses: Normal pulses.      Heart sounds: Murmur heard.   No friction rub. No gallop.    Pulmonary:      Effort: Pulmonary effort is normal.      Breath sounds: Normal breath sounds. No wheezing, rhonchi or rales.   Abdominal:      General: Bowel sounds are normal. There is distension.      Palpations: Abdomen is soft. There is no mass.      Tenderness: There is abdominal tenderness (RUQ). There is no guarding or rebound.      Hernia: No hernia is present.      Comments: Organomegaly appreciated.   Musculoskeletal:         General: Normal range of motion.      Cervical back: Normal range of motion and neck supple. No muscular tenderness.      Right lower leg: No edema.      Left lower leg: No edema.   Lymphadenopathy:      Cervical: No cervical adenopathy.   Skin:     General: Skin is warm and dry.      Capillary Refill: Capillary refill takes less than 2 seconds.      Coloration: Skin is not pale.      Findings: No erythema or rash.   Neurological:      General: No focal deficit present.      Mental Status: He is alert and oriented to person, place, and time. Mental status is at baseline.      Cranial Nerves: No cranial nerve deficit.      Sensory: No sensory deficit.      Motor: No weakness.   Psychiatric:         Mood and Affect: Mood normal.         Behavior: Behavior normal.         Thought  Content: Thought content normal.         Judgment: Judgment normal.         Laboratory:  Recent Labs     08/24/21  1603 08/26/21  1751   WBC 7.4 9.6   RBC 3.23* 3.31*   HEMOGLOBIN 10.0* 10.2*   HEMATOCRIT 31.4* 31.8*   MCV 97.2 96.1   MCH 31.0 30.8   MCHC 31.8* 32.1*   RDW 62.9* 61.3*   PLATELETCT 73* 93*   MPV 8.6* 9.1     Recent Labs     08/24/21  1603 08/26/21  1751   SODIUM 126* 130*   POTASSIUM 4.6 5.0   CHLORIDE 92* 92*   CO2 23 25   GLUCOSE 147* 134*   BUN 14 17   CREATININE 0.60 0.58   CALCIUM 8.7 8.6     Recent Labs     08/24/21  1603 08/26/21  1751   ALTSGPT 9 10   ASTSGOT 36 27   ALKPHOSPHAT 276* 256*   TBILIRUBIN 0.7 1.0   LIPASE 8* 10*   GLUCOSE 147* 134*     Recent Labs     08/24/21  1743 08/26/21  1915   APTT 36.7* 32.8   INR 1.25* 1.30*     No results for input(s): NTPROBNP in the last 72 hours.      No results for input(s): TROPONINT in the last 72 hours.    Imaging:  CT-ABDOMEN-PELVIS WITH   Final Result      1.  Large collection of free intraperitoneal fluid and gas in the RIGHT abdomen extending from the hepatic dome to the pelvis. The gallbladder is also involved but may not be the site of origin. Perforated bowel is suspected.   2.  Additional free fluid in the abdomen   3.  Large RIGHT hepatic mass, unchanged in size   4.  Splenomegaly   5.  1.6 x 1.8 cm cystic pancreatic tail lesion, unchanged   6.  Small bowel ileus   7.  Distal colonic diverticulosis      Findings were discussed with CHANTAL VELEZ on 8/26/2021 9:05 PM.      [id number] VIEW   Final Result      Multiple mildly dilated loops of small and large intestine possibly representing ileus.          I have reviewed and interpreted the above CT abdomen and pelvis with contrast and do appreciate right-sided hepatic mass as seen above, intraperitoneal free fluid and air as seen above.      Assessment/Plan:  I anticipate this patient will require at least two midnights for appropriate medical management, necessitating inpatient  admission.    * Perforated viscus- (present on admission)  Assessment & Plan  General surgery consulted and will follow with patient  Patient declines surgical intervention at this time and will be treated conservatively  Analgesics for pain control  N.p.o. and recommend transition to clear liquid diet as tolerated  IV antibiotics initiated in ED and we will continue onwards  CBC in a.m.      Nausea and vomiting- (present on admission)  Assessment & Plan  Antiemetics ordered    Protein-calorie malnutrition (HCC)- (present on admission)  Assessment & Plan  Consider nutritional consultation for supplements with meals.    Normochromic anemia- (present on admission)  Assessment & Plan  Iron/TIBC/ferritin/B12/reticulocyte count  CBC in a.m.  Transfuse hemoglobin less than 7    Hyponatremia- (present on admission)  Assessment & Plan  Asymptomatic and recommend chemistry in a.m.  Hypervolemic hypotonic hyponatremia secondary to liver cirrhosis.    Liver cirrhosis (HCC)  Assessment & Plan  Continue Lasix 40 mg p.o. twice daily  Continue spironolactone 100 mg p.o. daily  Follow-up outpatient PCP and hepatologist after discharge.  Manolo B    Thrombocytopenia (HCC)- (present on admission)  Assessment & Plan  No indication of active bleed at this time and no indication for platelet transfusion  Secondary to chronic alcohol intake  No indication for platelet transfusion at this time  CBC in a.m.      VTE prophylaxis: heparin ppx

## 2021-08-27 NOTE — ED NOTES
PT medicated as ordered and awaiting admit.  PT reported pain of 6/10 and was medicated as ordered.  PT positioned for comfort and is asleep with VSS

## 2021-08-27 NOTE — PROGRESS NOTES
Acute Care Surgery Progress Note    I met with Mr. Bella and his wife at bedside.  This is a very difficult situation for him.  I discussed that he certainly may have a perforated viscus, but this may also be related to his recent paracentesis or some other cause.  I would like to try to start with a laparoscopy to get more information.  He understands I may not be able to perform a laparoscopy given the difficulty of his last laparoscopy and at the pus that was in his abdomen just 2 months ago.  In that case I would need to do an open laparotomy, which would take much more recovery time on his part afterwards.  We discussed that even on open laparotomy I may not find a source for this problem.  We discussed that this could be an ulcer or colon perforation or small bowel perforation.  He understands he may need a bowel resection and possible ostomy which would almost certainly be permanent for him.  I plan to admit him to the ICU after surgery given his aspiration events after his last surgery and his high risk of deterioration.  We discussed he could also have difficulty with his liver function after surgery, even to the point of a fatal liver injury.  The patient and his wife understood and agreed to proceed.  We will proceed with surgery this afternoon.

## 2021-08-27 NOTE — PROGRESS NOTES
[facility] Medicine Daily Progress Note    Date of Service  8/27/2021    Chief Complaint  Lorraine Bella is a 71 y.o. male admitted 8/26/2021 with abdominal pain and distention.    Hospital Course  Lorraine Bella is a 71 y.o. male who presented 8/26/2021 with complaints of a few day history of nausea and vomiting. He has a history of known hepatocellular carcinoma with cirrhosis treated by Dr. Birch with immunotherapy. He has been complaining of mild abdominal fullness, nausea vomiting without hematemesis. He did presented to the ER on 7/28 and 8/24 and received ERP bedside paracentesis of 2 and 4 L total removal. He has been fully vaccinated for COVID.  CT abdomen pelvis reveals large collection of free intraperitoneal gas and fluid in the right abdomen extending from the hepatic dome to the pelvis. Gallbladder is also involved but may not be the site of origin, perforated bowel is suspected, additional free fluid in the abdomen, large right hepatic mass unchanged in size, splenomegaly, 1.6 x 1.8 cystic pericardiac tail lesion unchanged, small bowel ileus and distal colonic diverticulosis appreciated.  He had recent open lap 7/9/21 for colectomy by Dr. Meza and found to have 1.8 L purulent fluid, drain placed.  He had aspiration pneumonia and seizures as complication of last OR.      Interval Problem Update  8/27:  I spoke to patient about his refusal of surgery.  He did not understand that if he has a perforation that it will not heal on its own or with antibiotics.  He has agreed to surgery on my exam.  He did not remember speaking to the surgeon earlier today or yesterday or refusing surgery.  Therefore, I have left a message with his wife as well as Dr. You.  They are to meet in patient's room to discuss risks and surgical options.  The patient states he is not ready for comfort care.  He has been NPO all day.    I have personally seen and examined the patient at bedside. I discussed  the plan of care with patient, bedside RN and general surgery.    Consultants/Specialty  general surgery    Code Status  Full Code    Disposition  Patient is not medically cleared.   Anticipate discharge to to home with close outpatient follow-up.  I have placed the appropriate orders for post-discharge needs.    Review of Systems  Review of Systems   Constitutional: Positive for malaise/fatigue. Negative for chills, diaphoresis and fever.   HENT: Negative for congestion and sore throat.    Eyes: Negative for pain and discharge.   Respiratory: Negative for cough, hemoptysis, sputum production, shortness of breath and wheezing.    Cardiovascular: Negative for chest pain, palpitations, claudication and leg swelling.   Gastrointestinal: Positive for abdominal pain. Negative for constipation, diarrhea, melena, nausea and vomiting.   Genitourinary: Negative for dysuria, frequency and urgency.   Musculoskeletal: Negative for back pain, joint pain, myalgias and neck pain.   Skin: Negative for itching and rash.   Neurological: Positive for weakness. Negative for dizziness, sensory change, speech change, focal weakness, loss of consciousness and headaches.   Endo/Heme/Allergies: Does not bruise/bleed easily.   Psychiatric/Behavioral: Negative for depression, substance abuse and suicidal ideas.        Physical Exam  Temp:  [36 °C (96.8 °F)-36.2 °C (97.1 °F)] 36 °C (96.8 °F)  Pulse:  [] 89  Resp:  [16-18] 18  BP: ()/(53-68) 116/61  SpO2:  [92 %-100 %] 99 %    Physical Exam  Constitutional:       General: He is not in acute distress.     Appearance: He is ill-appearing and toxic-appearing. He is not diaphoretic.   HENT:      Head: Normocephalic and atraumatic.      Mouth/Throat:      Pharynx: No oropharyngeal exudate.   Eyes:      General: No scleral icterus.        Right eye: No discharge.         Left eye: No discharge.      Conjunctiva/sclera: Conjunctivae normal.      Pupils: Pupils are equal, round, and  reactive to light.   Neck:      Thyroid: No thyromegaly.      Vascular: No JVD.      Trachea: No tracheal deviation.   Cardiovascular:      Rate and Rhythm: Normal rate and regular rhythm.      Heart sounds: Normal heart sounds. No murmur heard.   No friction rub. No gallop.    Pulmonary:      Effort: Pulmonary effort is normal. No respiratory distress.      Breath sounds: Normal breath sounds. No wheezing or rales.   Chest:      Chest wall: No tenderness.   Abdominal:      General: There is distension.      Palpations: Abdomen is soft. There is no mass.      Tenderness: There is abdominal tenderness. There is no guarding or rebound.      Comments: Diffuse distention, but not tight.  Diffuse tenderness with palpation, well-healed surgical scars.   Musculoskeletal:         General: Tenderness present. Normal range of motion.      Cervical back: Normal range of motion and neck supple.   Lymphadenopathy:      Cervical: No cervical adenopathy.   Skin:     General: Skin is warm and dry.      Findings: No erythema or rash.   Neurological:      Mental Status: He is alert and oriented to person, place, and time.      Cranial Nerves: No cranial nerve deficit.      Motor: No abnormal muscle tone.   Psychiatric:         Behavior: Behavior normal.         Judgment: Judgment normal.      Comments: Poor memory, did not know reason why he was in hospital.  Did not recall conversations with surgeon.         Fluids  No intake or output data in the 24 hours ending 08/27/21 1551    Laboratory  Recent Labs     08/24/21  1603 08/26/21  1751   WBC 7.4 9.6   RBC 3.23* 3.31*   HEMOGLOBIN 10.0* 10.2*   HEMATOCRIT 31.4* 31.8*   MCV 97.2 96.1   MCH 31.0 30.8   MCHC 31.8* 32.1*   RDW 62.9* 61.3*   PLATELETCT 73* 93*   MPV 8.6* 9.1     Recent Labs     08/24/21  1603 08/26/21  1751   SODIUM 126* 130*   POTASSIUM 4.6 5.0   CHLORIDE 92* 92*   CO2 23 25   GLUCOSE 147* 134*   BUN 14 17   CREATININE 0.60 0.58   CALCIUM 8.7 8.6     Recent Labs      08/24/21  1743 08/26/21  1915   APTT 36.7* 32.8   INR 1.25* 1.30*               Imaging  CT-ABDOMEN-PELVIS WITH   Final Result      1.  Large collection of free intraperitoneal fluid and gas in the RIGHT abdomen extending from the hepatic dome to the pelvis. The gallbladder is also involved but may not be the site of origin. Perforated bowel is suspected.   2.  Additional free fluid in the abdomen   3.  Large RIGHT hepatic mass, unchanged in size   4.  Splenomegaly   5.  1.6 x 1.8 cm cystic pancreatic tail lesion, unchanged   6.  Small bowel ileus   7.  Distal colonic diverticulosis      Findings were discussed with CHANTAL VELEZ on 8/26/2021 9:05 PM.      [id number] VIEW   Final Result      Multiple mildly dilated loops of small and large intestine possibly representing ileus.           Assessment/Plan  * Perforated viscus- (present on admission)  Assessment & Plan  Prior 2 paracentesis on 7/28 and 8/24 by ERP at bedside.  Last OR 7/9/21 for infected abdomen, drain placement originally thought to be a hepatoma rupture.  General surgery consulted this admit.  Patient appears to have short term memory loss, he did not recall any conversations with surgeons or why he was in the hospital.  Analgesics for pain control  N.p.o   IV antibiotics initiated in ED and we will continue.  CBC daily.  8/27:   I spoke to patient about his refusal of surgery.  He did not understand that if he has a perforation that it will not heal on its own or with antibiotics.  He has agreed to surgery on my exam.  He did not remember speaking to the surgeon earlier today or yesterday or refusing surgery.  Therefore, I have left a message with his wife as well as Dr. You.  They are to meet in patient's room to discuss risks and surgical options.  The patient states he is not ready for comfort care.  He has been NPO all day.        Cirrhosis of liver (HCC)  Assessment & Plan  Unclear if alcohol involved or primarily hepatocellular  carcinoma etiology.  On spironolactone and lasix.  Holding spironolactone and lasix since NPO on IVFs for OR.    Nausea and vomiting- (present on admission)  Assessment & Plan  Antiemetics ordered    Protein-calorie malnutrition (HCC)- (present on admission)  Assessment & Plan  Remains NPO at this point.    Normochromic anemia- (present on admission)  Assessment & Plan  Iron/TIBC/ferritin/B12/reticulocyte count  CBC in a.m.  Transfuse hemoglobin less than 7    Hyponatremia- (present on admission)  Assessment & Plan  Asymptomatic and recommend chemistry in a.m.  Hypervolemic hypotonic hyponatremia secondary to liver cirrhosis.    Hepatocellular carcinoma (HCC)  Assessment & Plan  Continue Lasix 40 mg p.o. twice daily  Continue spironolactone 100 mg p.o. daily  On immunotherapy with Dr. Birch.  Ordered palliative care consult since he is very high risk for surgery and no advanced care directive discussion noted to date.  His HCC may be decompensating and he may need discussions towards hospice.    Thrombocytopenia (HCC)- (present on admission)  Assessment & Plan  No indication of active bleed at this time and no indication for platelet transfusion  Secondary to liver dysfunction/ HCC.  No indication for platelet transfusion at this time  CBC in a.m.       VTE prophylaxis: SCDs/TEDs    I have performed a physical exam and reviewed and updated ROS and Plan today (8/27/2021). In review of yesterday's note (8/26/2021), there are no changes except as documented above.

## 2021-08-27 NOTE — CONSULTS
General Surgery Consult    CHIEF COMPLAINT: nausea and vomiting    HISTORY OF PRESENT ILLNESS: The patient is a 71 y.o. male, with known history of hepatocellular carcinoma and cirrhosis (Manolo B) being treated by Dr. Birch with immunotherapy, though he cannot remember the medication. He states he last took this 8 weeks ago. He recently had hospital admission for ascites and ruptured cyst with hemoperitoneum. He was ultimately taken to the OR by Dr. Meza for possible compartment syndrome and sepsis with planned colectomy, however 1.8L of pus was suctioned and a laparoscopic drain placement was performed. It was noted to be very difficult to navigate the abdomen during the procedure, but no concern other then the purulent fluid was identified. His hospital stay was complicated by aspiration PNA, jimres    He states that he has developed increased RUQ pain over the past 3-4 days associated with heartburn and nausea/vomiting, and presented to ED 2 days ago for paracentesis which was performed and did improve his pain. He states the feels like the same pain he has had in his RUQ for many months, but that it has slowly progressed to be more severe now than usual. He also says that he has intermittent abdominal pain that migrates around his abdomen and then resolves. His current major complaint is that he cannot sleep in the ED because people come in and he would like to be left alone, he also complains of reflux.     Dr. De La Fuente was called earlier this evening but declined consultation and requested on-call general surgeon be called because he felt that the patients recent surgery was not related to his current condition.    PAST MEDICAL HISTORY:  has a past medical history of Alcohol use (3/31/2016), Borderline diabetes, Cancer (HCC) (2019), Cataract, Dental disorder, Diabetes (HCC), Disorder of thyroid, Glaucoma, High cholesterol, and Hypertension.     PAST SURGICAL HISTORY:  has a past surgical history that  "includes bone graft (Right, 1960's); wrist orif (Right, 1960's); cataract phaco with iol (Left, 4/7/2016); parathyroidectomy (N/A, 5/13/2016); other (12/2019); other; and lap,diagnostic abdomen (7/9/2021).     ALLERGIES:   Allergies   Allergen Reactions   • Sulfa Drugs      Reaction unknown        CURRENT MEDICATIONS:   Home Medications     Reviewed by Fady Rushing (Pharmacy Tech) on 08/27/21 at 0000  Med List Status: Complete   Medication Last Dose Status   famotidine (PEPCID) 20 MG Tab 8/26/2021 Active   furosemide (LASIX) 40 MG Tab 8/26/2021 Active   lactulose 10 GM/15ML Solution 8/23/2021 Active   ondansetron (ZOFRAN ODT) 4 MG TABLET DISPERSIBLE 8/26/2021 Active   oxyCODONE immediate-release (ROXICODONE) 5 MG Tab 8/26/2021 Active   potassium chloride SA (KDUR) 20 MEQ Tab CR 8/26/2021 Active   spironolactone (ALDACTONE) 100 MG Tab 8/26/2021 Active   thiamine (THIAMINE) 100 MG tablet 8/26/2021 Active                FAMILY HISTORY:   Family History   Problem Relation Age of Onset   • Cancer Mother         unknown cancer        SOCIAL HISTORY:   Social History     Tobacco Use   • Smoking status: Current Every Day Smoker     Packs/day: 0.50     Years: 30.00     Pack years: 15.00     Types: Cigarettes     Start date: 1/1/1990   • Smokeless tobacco: Never Used   • Tobacco comment: quit couple times of the years   Vaping Use   • Vaping Use: Never used   Substance and Sexual Activity   • Alcohol use: Not Currently     Alcohol/week: 2.4 oz     Types: 4 Shots of liquor per week     Comment: pt quit drinking 2 months ago   • Drug use: No   • Sexual activity: Not on file       REVIEW OF SYSTEMS: Comprehensive review of systems was negative aside from RUQ abdominal pain, nausea/vomiting and reflux.     PHYSICAL EXAMINATION:     VITAL SIGNS: /62   Pulse 96   Temp 36.2 °C (97.1 °F) (Temporal)   Resp 16   Ht 1.803 m (5' 11\")   Wt 68 kg (150 lb)   SpO2 99%   GENERAL: The patient is alert, does not appear ill "   HEAD AND NECK: neck supple, no jaundice  CHEST:No respiratory distress.    CARDIOVASCULAR: regular heart rate  ABDOMEN: distended, abdominal incisions scabbed and healing well without signs of infection. There is tenderness in the right upper quadrant, but only mild. Completely nontender in LUQ, LLQ and RLQ, no rebound or gurading  EXTREMITIES: Examination of the upper and lower extremities demonstrates no cyanosis edema or clubbing.  NEUROLOGIC: Alert & oriented to person place and time    LABORATORY VALUES:   Recent Labs     08/24/21  1603 08/26/21  1751   WBC 7.4 9.6   RBC 3.23* 3.31*   HEMOGLOBIN 10.0* 10.2*   HEMATOCRIT 31.4* 31.8*   MCV 97.2 96.1   MCH 31.0 30.8   MCHC 31.8* 32.1*   RDW 62.9* 61.3*   PLATELETCT 73* 93*   MPV 8.6* 9.1     Recent Labs     08/24/21  1603 08/26/21  1751   SODIUM 126* 130*   POTASSIUM 4.6 5.0   CHLORIDE 92* 92*   CO2 23 25   GLUCOSE 147* 134*   BUN 14 17   CREATININE 0.60 0.58   CALCIUM 8.7 8.6     Recent Labs     08/24/21  1603 08/24/21  1743 08/26/21  1751 08/26/21 1915   ASTSGOT 36  --  27  --    ALTSGPT 9  --  10  --    TBILIRUBIN 0.7  --  1.0  --    ALKPHOSPHAT 276*  --  256*  --    GLOBULIN 5.0*  --  4.9*  --    INR  --  1.25*  --  1.30*     Recent Labs     08/24/21  1743 08/26/21  1915   APTT 36.7* 32.8   INR 1.25* 1.30*        IMAGING:   CT-ABDOMEN-PELVIS WITH   Final Result      1.  Large collection of free intraperitoneal fluid and gas in the RIGHT abdomen extending from the hepatic dome to the pelvis. The gallbladder is also involved but may not be the site of origin. Perforated bowel is suspected.   2.  Additional free fluid in the abdomen   3.  Large RIGHT hepatic mass, unchanged in size   4.  Splenomegaly   5.  1.6 x 1.8 cm cystic pancreatic tail lesion, unchanged   6.  Small bowel ileus   7.  Distal colonic diverticulosis      Findings were discussed with CHANTAL VELEZ on 8/26/2021 9:05 PM.      [id number] VIEW   Final Result      Multiple mildly dilated loops  of small and large intestine possibly representing ileus.                IMPRESSION AND PLAN:   72 y/o male with known hepatocellular carcinoma previously on immunotherapy but withheld for past 8 weeks, s/p laparoscopic drain placement for purulent ascites in July by Dr. Meza and complicated SICU stay with aspiration PNA, respiratory failure and seizure activity who now appears to have focal area of free air in RUQ and ascites by CT. He has known ascites and s/p paracentesis 2 days ago. Upon evaluation in ER, he has very mild, if any, abdominal tenderness on exam, and has normal WBC and vitals, and was indeed able to mount leukocytosis during his hospital admission last month. He is a Manolo Class B cirrhotic with 30% mortality with intra-abdominal procedures, and I discussed his CT findings with him and offered him surgical exploration, though I told him that many perforated viscus patients with normal lab and vitals can be at least initially managed successfully with conservative treatment options. He is in favor of this and declines surgical exploration tonight in favor of more conservative approach given his operative mortality rate and high likelihood of complications.   1. Patient already admitted to hospitalist service, appreciate medical care  2. NPO  3. NGT would likely help alleviate his reflux symptoms and would limit the air/fluid potentially leaking from a perforated viscus  4. IV abx  5. Surgery team will continue to follow patient   ___________________________________   Jaime Daniel M.D.    DD: 8/27/2021 DT: 12:05 AM

## 2021-08-27 NOTE — ED TRIAGE NOTES
"Chief Complaint   Patient presents with   • N/V       BIB EMS to [address], pt on monitor and in gown, labs drawn and sent.  Pt reports N/V starting 4 hrs ago.  Pt arrived from [facility] ([place] EMS reports he received 4 Mg zofran at [facility].  Pt arrives AOx4, active vomiting with orange emesis.  Hx of cirrhosis, receiving chemo for liver cancer,  abd is round and distended.  Pt denies pain.  Denies blood in vomit.      /62   Pulse (!) 107   Temp 36.2 °C (97.1 °F) (Temporal)   Resp 16   Ht 1.803 m (5' 11\")   Wt 68 kg (150 lb)   SpO2 96%   BMI 20.92 kg/m²   "

## 2021-08-27 NOTE — ED PROVIDER NOTES
ED Provider Note    CHIEF COMPLAINT  Chief Complaint   Patient presents with   • N/V       HPI  Lorraine Bella is a 71 y.o. male who presents with 3 day history of vomiting. PMH includes cirrhosis and metastatic hepatocellular carcinoma on chemotherapy, s/p radiation therapy. Patient was previously seen here on 8/24/21 due to abdominal swelling wherein paracentesis was done and 1L of fluid was removed. Also notable, he was previously admitted July 2021 due to ruptured hepatic cyst with hemoperitoneum wherein he underwent diagnostic laparoscopy with washout and drain placement. He is currently at [facility]. Per patient, after he had his BP taken, he felt a sudden flash of heat prior to him vomiting. Within the last 3 days he had about 10-12 episodes of orange, nonbloody emesis. He said it is not related to food intake and has not had prior episodes of such symptoms while on chemotherapy. He was given Zofran 4mg PO prior to coming to ED, which patient states did not provide relief. Associated symptoms include fatigue, weakness, diarrhea, loss of appetite with 7-8 lbs of weight loss in 1 week. He denies fever, chills, shortness of breath, sore throat, nausea, abdominal pain, constipation, dizziness, headache, or heart burn.     REVIEW OF SYSTEMS  Review of Systems   Constitutional: Positive for malaise/fatigue and weight loss. Negative for chills and fever.   HENT: Positive for hearing loss.    Respiratory: Negative for cough and shortness of breath.    Cardiovascular: Negative for chest pain and palpitations.   Gastrointestinal: Positive for diarrhea and vomiting. Negative for abdominal pain, heartburn and nausea.   Genitourinary: Negative for dysuria.   Neurological: Negative for dizziness and headaches.       See HPI for further details. All other systems are negative.     PAST MEDICAL HISTORY   has a past medical history of Alcohol use (3/31/2016), Borderline diabetes, Cancer (HCC)  (2019), Cataract, Dental disorder, Diabetes (HCC), Disorder of thyroid, Glaucoma, High cholesterol, and Hypertension.    SOCIAL HISTORY  Social History     Tobacco Use   • Smoking status: Current Every Day Smoker     Packs/day: 0.50     Years: 30.00     Pack years: 15.00     Types: Cigarettes     Start date: 1/1/1990   • Smokeless tobacco: Never Used   • Tobacco comment: quit couple times of the years   Vaping Use   • Vaping Use: Never used   Substance and Sexual Activity   • Alcohol use: Not Currently     Alcohol/week: 2.4 oz     Types: 4 Shots of liquor per week     Comment: pt quit drinking 2 months ago   • Drug use: No   • Sexual activity: Not on file       SURGICAL HISTORY   has a past surgical history that includes bone graft (Right, 1960's); wrist orif (Right, 1960's); cataract phaco with iol (Left, 4/7/2016); parathyroidectomy (N/A, 5/13/2016); other (12/2019); other; and lap,diagnostic abdomen (7/9/2021).    CURRENT MEDICATIONS  Home Medications     Reviewed by Fady Rushing (Pharmacy Tech) on 08/26/21 at 2218  Med List Status: Unable to Obtain   Medication Last Dose Status   acetaminophen (TYLENOL) 500 MG Tab  Active   famotidine (PEPCID) 20 MG Tab  Active   furosemide (LASIX) 40 MG Tab  Active   nicotine (NICODERM) 21 MG/24HR PATCH 24 HR  Active   ondansetron (ZOFRAN ODT) 4 MG TABLET DISPERSIBLE  Active   potassium chloride SA (KDUR) 20 MEQ Tab CR  Active   spironolactone (ALDACTONE) 25 MG Tab  Active   thiamine (THIAMINE) 100 MG tablet  Active                ALLERGIES  Allergies   Allergen Reactions   • Sulfa Drugs      Reaction unknown       PHYSICAL EXAM  Vitals:    08/26/21 2201   BP: (!) 95/66   Pulse: 99   Resp:    Temp:    SpO2: 97%       Physical Exam  Constitutional:       General: He is not in acute distress.     Appearance: He is not toxic-appearing.   HENT:      Head: Normocephalic and atraumatic.      Right Ear: External ear normal.      Left Ear: External ear normal.      Nose: Nose  normal. No congestion.      Mouth/Throat:      Mouth: Mucous membranes are dry.      Pharynx: Oropharynx is clear. No oropharyngeal exudate or posterior oropharyngeal erythema.   Eyes:      Extraocular Movements: Extraocular movements intact.      Conjunctiva/sclera: Conjunctivae normal.      Pupils: Pupils are equal, round, and reactive to light.   Cardiovascular:      Rate and Rhythm: Normal rate and regular rhythm.      Pulses: Normal pulses.      Heart sounds: No murmur heard.     Pulmonary:      Effort: Pulmonary effort is normal. No respiratory distress.      Breath sounds: No wheezing or rhonchi.   Abdominal:      General: There is distension.      Tenderness: There is abdominal tenderness (to palpation of umbilical hernia). There is no guarding or rebound.      Hernia: A hernia (above umbilicus) is present.      Comments: Abdomen is hard, warm to touch; multiple scars seen from previous paracentesis   Musculoskeletal:         General: No swelling or tenderness.      Cervical back: Normal range of motion and neck supple.   Skin:     General: Skin is warm and dry.      Capillary Refill: Capillary refill takes 2 to 3 seconds.      Comments: Skin tenting present   Neurological:      Mental Status: He is alert and oriented to person, place, and time.      Motor: Weakness present.       DIAGNOSTIC STUDIES / PROCEDURES    LABS  Results for orders placed or performed during the hospital encounter of 08/26/21   CBC WITH DIFFERENTIAL   Result Value Ref Range    WBC 9.6 4.8 - 10.8 K/uL    RBC 3.31 (L) 4.70 - 6.10 M/uL    Hemoglobin 10.2 (L) 14.0 - 18.0 g/dL    Hematocrit 31.8 (L) 42.0 - 52.0 %    MCV 96.1 81.4 - 97.8 fL    MCH 30.8 27.0 - 33.0 pg    MCHC 32.1 (L) 33.7 - 35.3 g/dL    RDW 61.3 (H) 35.9 - 50.0 fL    Platelet Count 93 (L) 164 - 446 K/uL    MPV 9.1 9.0 - 12.9 fL    Neutrophils-Polys 75.60 (H) 44.00 - 72.00 %    Lymphocytes 14.50 (L) 22.00 - 41.00 %    Monocytes 9.00 0.00 - 13.40 %    Eosinophils 0.10 0.00 -  6.90 %    Basophils 0.30 0.00 - 1.80 %    Immature Granulocytes 0.50 0.00 - 0.90 %    Nucleated RBC 0.00 /100 WBC    Neutrophils (Absolute) 7.25 1.82 - 7.42 K/uL    Lymphs (Absolute) 1.39 1.00 - 4.80 K/uL    Monos (Absolute) 0.86 (H) 0.00 - 0.85 K/uL    Eos (Absolute) 0.01 0.00 - 0.51 K/uL    Baso (Absolute) 0.03 0.00 - 0.12 K/uL    Immature Granulocytes (abs) 0.05 0.00 - 0.11 K/uL    NRBC (Absolute) 0.00 K/uL   Comp Metabolic Panel   Result Value Ref Range    Sodium 130 (L) 135 - 145 mmol/L    Potassium 5.0 3.6 - 5.5 mmol/L    Chloride 92 (L) 96 - 112 mmol/L    Co2 25 20 - 33 mmol/L    Anion Gap 13.0 7.0 - 16.0    Glucose 134 (H) 65 - 99 mg/dL    Bun 17 8 - 22 mg/dL    Creatinine 0.58 0.50 - 1.40 mg/dL    Calcium 8.6 8.5 - 10.5 mg/dL    AST(SGOT) 27 12 - 45 U/L    ALT(SGPT) 10 2 - 50 U/L    Alkaline Phosphatase 256 (H) 30 - 99 U/L    Total Bilirubin 1.0 0.1 - 1.5 mg/dL    Albumin 2.2 (L) 3.2 - 4.9 g/dL    Total Protein 7.1 6.0 - 8.2 g/dL    Globulin 4.9 (H) 1.9 - 3.5 g/dL    A-G Ratio 0.4 g/dL   APTT   Result Value Ref Range    APTT 32.8 24.7 - 36.0 sec   Prothrombin Time   Result Value Ref Range    PT 15.8 (H) 12.0 - 14.6 sec    INR 1.30 (H) 0.87 - 1.13   LIPASE   Result Value Ref Range    Lipase 10 (L) 11 - 82 U/L   ESTIMATED GFR   Result Value Ref Range    GFR If African American >60 >60 mL/min/1.73 m 2    GFR If Non African American >60 >60 mL/min/1.73 m 2       RADIOLOGY  CT-ABDOMEN-PELVIS WITH   Final Result      1.  Large collection of free intraperitoneal fluid and gas in the RIGHT abdomen extending from the hepatic dome to the pelvis. The gallbladder is also involved but may not be the site of origin. Perforated bowel is suspected.   2.  Additional free fluid in the abdomen   3.  Large RIGHT hepatic mass, unchanged in size   4.  Splenomegaly   5.  1.6 x 1.8 cm cystic pancreatic tail lesion, unchanged   6.  Small bowel ileus   7.  Distal colonic diverticulosis      Findings were discussed with CHANTAL LANGFORD  AGUSTIN on 8/26/2021 9:05 PM.      [id number] VIEW   Final Result      Multiple mildly dilated loops of small and large intestine possibly representing ileus.          COURSE & MEDICAL DECISION MAKING  Pertinent Labs & Imaging studies reviewed. (See chart for details)    Upon presentation to ED, patient had another notable episode of orange, nonbloody emesis. CBC, CMP, Lipase, KUB ordered to further evaluate patient. IV Zofran 4mg given and IVF started. Labs showed normocytic anemia, thrombocytopenia, hyponatremia, elevated alkaline phosphatase, low albumin, and elevated PT/INR. All in the setting of cirrhosis and hepatocellular carcinoma. KUB showed possible ileus. CT abdomen/pelvis with contrast ordered for further evaluation revealing large collection of free intraperitoneal fluid and gas in the R abdomen extending from the hepatic dome to the pelvis.     Disposition: Patient will be admitted and surgery consulted for further evaluation and management of the patient.    FINAL IMPRESSION  1. Perforated bowel  2. Ileus  3. Hepatocellular carcinoma     Electronically signed by: Nelson Menjivar M.D., 8/26/2021 7:49 PM    I independently evaluated patient.  Given his highly complicated medical history, CT was checked, this revealed likely bowel perforation, patient given IV antibiotics and admitted to hospitalist with general surgery consulting.  Dr. Daniel will see patient at bedside.

## 2021-08-27 NOTE — PROGRESS NOTES
4 Eyes Skin Assessment Completed by YASMIN Santos and YASMIN Veras.    Head WDL  Ears WDL  Nose WDL  Mouth WDL  Neck WDL  Breast/Chest WDL  Shoulder Blades WDL  Spine WDL  (R) Arm/Elbow/Hand WDL  (L) Arm/Elbow/Hand WDL  Abdomen Scab, distended  Groin WDL  Scrotum/Coccyx/Buttocks Sacrum and surrounding area pink/red and blanching  (R) Leg WDL  (L) Leg WDL  (R) Heel/Foot/Toe WDL  (L) Heel/Foot/Toe WDL          Devices In Places Blood Pressure Cuff, Pulse Ox and Nasal Cannula      Interventions In Place NC W/Ear Foams, Q2 turns  Possible Skin Injury No    Pictures Uploaded Into Epic N/A  Wound Consult Placed N/A  RN Wound Prevention Protocol Ordered No

## 2021-08-27 NOTE — ED NOTES
Med Rec completed: per pt at bedside and MAR from transferring facility.     No noted ORAL antibiotics in last 30 days    Preferred Pharmacy: Unknown    Pt confirmed following allergies:  Allergies   Allergen Reactions   • Sulfa Drugs      Reaction unknown        Pt's home medications:   No current facility-administered medications on file prior to encounter.     Medication Sig   • lactulose 10 GM/15ML Solution Take 20 g by mouth 2 times a day.   • spironolactone (ALDACTONE) 100 MG Tab Take 100 mg by mouth every day.   • thiamine (THIAMINE) 100 MG tablet Take 100 mg by mouth every day.   • oxyCODONE immediate-release (ROXICODONE) 5 MG Tab Take 5 mg by mouth every 6 hours as needed for Severe Pain.   • famotidine (PEPCID) 20 MG Tab Take 1 tablet by mouth every 12 hours.   • ondansetron (ZOFRAN ODT) 4 MG TABLET DISPERSIBLE Take 1 tablet by mouth every 6 hours as needed for Nausea.   • potassium chloride SA (KDUR) 20 MEQ Tab CR Take 1 tablet by mouth 2 times a day.   • furosemide (LASIX) 40 MG Tab Take 1 tablet by mouth 2 times a day.       Removed medications:   Medication Removal Reason   • [DISCONTINUED] acetaminophen (TYLENOL) 500 MG Tab Not recorded on MAR    • [DISCONTINUED] thiamine (THIAMINE) 100 MG tablet Reconciled dosing, 100 mg daily

## 2021-08-27 NOTE — ANESTHESIA PREPROCEDURE EVALUATION
Relevant Problems   PULMONARY   (positive) Aspiration pneumonia (HCC)      NEURO   (positive) History of alcohol abuse         (positive) Cirrhosis of liver (HCC)   (positive) Hepatic rupture   (positive) Hepatocellular carcinoma (HCC)   (positive) Hepatoma (HCC)       Physical Exam    Airway   Mallampati: II  TM distance: >3 FB  Neck ROM: full       Cardiovascular - normal exam  Rhythm: regular  Rate: normal  (-) murmur     Dental - normal exam           Pulmonary - normal exam  Breath sounds clear to auscultation     Abdominal    Neurological - normal exam         Other findings: Cachectic, distended abdomen            Anesthesia Plan    ASA 3- EMERGENT   ASA physical status emergent criteria: acute deteriorating condition due to infection and acute peritonitis    Plan - general       Airway plan will be ETT        Plan Factors:   Patient was previously instructed to abstain from smoking on day of procedure.  Patient did not smoke on day of procedure.      Induction: intravenous          Informed Consent:    Anesthetic plan and risks discussed with patient.    Use of blood products discussed with: patient whom.

## 2021-08-27 NOTE — ASSESSMENT & PLAN NOTE
Malignant perforation secondary to HCC  S/p Ex Lap x 2 with creation of loop ileostomy  S/p completed Abx's   LUQ drain removed 9/16  Will plan to remove RUQ drain in 1-2 days  Remove wound vac prior to dc to hospice

## 2021-08-27 NOTE — ASSESSMENT & PLAN NOTE
No indication of active bleed at this time and no indication for platelet transfusion  Secondary to liver dysfunction/ HCC.

## 2021-08-27 NOTE — PROGRESS NOTES
Attending Hospitalist today is Dr Epstein starting at 0700. Please call this Physician for orders, updates and questions.

## 2021-08-27 NOTE — PROGRESS NOTES
Acute Care Surgery Progress Note               Author: Priya You M.D. Date & Time created: 8/27/2021  9:30 AM     Interval History:  No current complaints, states he is not having abdominal pain right now.     Review of Systems:  Review of Systems   Constitutional: Negative for chills and fever.   Gastrointestinal: Negative for abdominal pain, nausea and vomiting.       Physical Exam:  Physical Exam  Constitutional:       General: He is not in acute distress.     Appearance: Normal appearance. He is not ill-appearing.      Comments: Sleeping comfortably on approach, moderately difficult to wake up and falls asleep easily during conversation   HENT:      Head: Normocephalic and atraumatic.      Right Ear: External ear normal.      Left Ear: External ear normal.      Mouth/Throat:      Mouth: Mucous membranes are moist.   Eyes:      Extraocular Movements: Extraocular movements intact.   Cardiovascular:      Rate and Rhythm: Normal rate and regular rhythm.   Pulmonary:      Effort: Pulmonary effort is normal.   Abdominal:      General: There is distension.      Tenderness: There is abdominal tenderness.      Comments: Mild tenderness in the right abdomen diffusely. Lump supraumbilically, not reducible, no overlying skin changes.    Neurological:      Mental Status: He is alert.         Labs:          Recent Labs     08/24/21  1603 08/26/21  1751   SODIUM 126* 130*   POTASSIUM 4.6 5.0   CHLORIDE 92* 92*   CO2 23 25   BUN 14 17   CREATININE 0.60 0.58   CALCIUM 8.7 8.6     Recent Labs     08/24/21  1603 08/26/21  1751   ALTSGPT 9 10   ASTSGOT 36 27   ALKPHOSPHAT 276* 256*   TBILIRUBIN 0.7 1.0   LIPASE 8* 10*   GLUCOSE 147* 134*     Recent Labs     08/24/21  1603 08/24/21  1743 08/26/21  1751 08/26/21  1915   RBC 3.23*  --  3.31*  --    HEMOGLOBIN 10.0*  --  10.2*  --    HEMATOCRIT 31.4*  --  31.8*  --    PLATELETCT 73*  --  93*  --    PROTHROMBTM  --  15.3*  --  15.8*   APTT  --  36.7*  --  32.8   INR  --   1.25*  --  1.30*     Recent Labs     21  1603 21  1751   WBC 7.4 9.6   NEUTSPOLYS 78.90* 75.60*   LYMPHOCYTES 10.80* 14.50*   MONOCYTES 9.10 9.00   EOSINOPHILS 0.30 0.10   BASOPHILS 0.40 0.30   ASTSGOT 36 27   ALTSGPT 9 10   ALKPHOSPHAT 276* 256*   TBILIRUBIN 0.7 1.0     Hemodynamics:  Temp (24hrs), Av.2 °C (97.1 °F), Min:36.2 °C (97.1 °F), Max:36.2 °C (97.1 °F)  Temperature: 36.2 °C (97.1 °F)  Pulse  Av.8  Min: 90  Max: 111   Blood Pressure : 102/57     Respiratory:    Respiration: 16, Pulse Oximetry: 94 %           Fluids:  No intake or output data in the 24 hours ending 21 0930  Weight: 68 kg (150 lb)  GI/Nutrition:  Orders Placed This Encounter   Procedures   • Diet NPO     Standing Status:   Standing     Number of Occurrences:   1     Order Specific Question:   Restrict to:     Answer:   Sips with Medications [3]     Medical Decision Making, by Problem:  Active Hospital Problems    Diagnosis    • *Perforated viscus [R19.8]    • Nausea and vomiting [R11.2]    • Protein-calorie malnutrition (HCC) [E46]    • Normochromic anemia [D64.9]    • Hyponatremia [E87.1]    • Liver cirrhosis (HCC) [K74.60]    • Thrombocytopenia (HCC) [D69.6]        Plan:  Patient still declining surgical intervention.   Continue NPO and antibiotics.   May benefit from palliative care consultation.     Quality-Core Measures

## 2021-08-28 PROBLEM — J95.821 RESPIRATORY FAILURE FOLLOWING TRAUMA AND SURGERY (HCC): Status: ACTIVE | Noted: 2021-08-28

## 2021-08-28 LAB
ALBUMIN SERPL BCP-MCNC: 1.5 G/DL (ref 3.2–4.9)
ALBUMIN/GLOB SERPL: 0.4 G/DL
ALP SERPL-CCNC: 170 U/L (ref 30–99)
ALT SERPL-CCNC: 12 U/L (ref 2–50)
AMMONIA PLAS-SCNC: 78 UMOL/L (ref 11–45)
ANION GAP SERPL CALC-SCNC: 9 MMOL/L (ref 7–16)
ANISOCYTOSIS BLD QL SMEAR: ABNORMAL
AST SERPL-CCNC: 70 U/L (ref 12–45)
BASOPHILS # BLD AUTO: 0 % (ref 0–1.8)
BASOPHILS # BLD: 0 K/UL (ref 0–0.12)
BILIRUB SERPL-MCNC: 1.3 MG/DL (ref 0.1–1.5)
BUN SERPL-MCNC: 17 MG/DL (ref 8–22)
CALCIUM SERPL-MCNC: 7.6 MG/DL (ref 8.5–10.5)
CFT BLD TEG: 5 MIN (ref 4.6–9.1)
CFT P HPASE BLD TEG: 4.9 MIN (ref 4.3–8.3)
CHLORIDE SERPL-SCNC: 101 MMOL/L (ref 96–112)
CLOT ANGLE BLD TEG: 78 DEGREES (ref 63–78)
CLOT LYSIS 30M P MA LENFR BLD TEG: 0.3 % (ref 0–2.6)
CO2 SERPL-SCNC: 21 MMOL/L (ref 20–33)
CORTIS SERPL-MCNC: 16.1 UG/DL (ref 0–23)
CREAT SERPL-MCNC: 0.51 MG/DL (ref 0.5–1.4)
CT.EXTRINSIC BLD ROTEM: 0.8 MIN (ref 0.8–2.1)
EOSINOPHIL # BLD AUTO: 0 K/UL (ref 0–0.51)
EOSINOPHIL NFR BLD: 0 % (ref 0–6.9)
ERYTHROCYTE [DISTWIDTH] IN BLOOD BY AUTOMATED COUNT: 58.2 FL (ref 35.9–50)
GLOBULIN SER CALC-MCNC: 3.8 G/DL (ref 1.9–3.5)
GLUCOSE SERPL-MCNC: 135 MG/DL (ref 65–99)
GRAM STN SPEC: NORMAL
HCT VFR BLD AUTO: 29.3 % (ref 42–52)
HCT VFR BLD AUTO: 29.5 % (ref 42–52)
HGB BLD-MCNC: 10.1 G/DL (ref 14–18)
HGB BLD-MCNC: 9.9 G/DL (ref 14–18)
LACTATE BLD-SCNC: 2.4 MMOL/L (ref 0.5–2)
LACTATE BLD-SCNC: 2.7 MMOL/L (ref 0.5–2)
LACTATE BLD-SCNC: 3.2 MMOL/L (ref 0.5–2)
LYMPHOCYTES # BLD AUTO: 0.25 K/UL (ref 1–4.8)
LYMPHOCYTES NFR BLD: 2.6 % (ref 22–41)
MACROCYTES BLD QL SMEAR: ABNORMAL
MANUAL DIFF BLD: NORMAL
MCF BLD TEG: 60.7 MM (ref 52–69)
MCF.PLATELET INHIB BLD ROTEM: 24.5 MM (ref 15–32)
MCH RBC QN AUTO: 31.7 PG (ref 27–33)
MCHC RBC AUTO-ENTMCNC: 33.8 G/DL (ref 33.7–35.3)
MCV RBC AUTO: 93.9 FL (ref 81.4–97.8)
MONOCYTES # BLD AUTO: 0.16 K/UL (ref 0–0.85)
MONOCYTES NFR BLD AUTO: 1.7 % (ref 0–13.4)
MORPHOLOGY BLD-IMP: NORMAL
NEUTROPHILS # BLD AUTO: 9.09 K/UL (ref 1.82–7.42)
NEUTROPHILS NFR BLD: 92.2 % (ref 44–72)
NEUTS BAND NFR BLD MANUAL: 3.5 % (ref 0–10)
NRBC # BLD AUTO: 0 K/UL
NRBC BLD-RTO: 0 /100 WBC
PA AA BLD-ACNC: 25.1 % (ref 0–11)
PA ADP BLD-ACNC: 54.9 % (ref 0–17)
PLATELET # BLD AUTO: 88 K/UL (ref 164–446)
PLATELET BLD QL SMEAR: NORMAL
PMV BLD AUTO: 8.9 FL (ref 9–12.9)
POTASSIUM SERPL-SCNC: 4.5 MMOL/L (ref 3.6–5.5)
PROT SERPL-MCNC: 5.3 G/DL (ref 6–8.2)
RBC # BLD AUTO: 3.12 M/UL (ref 4.7–6.1)
RBC BLD AUTO: PRESENT
SIGNIFICANT IND 70042: NORMAL
SITE SITE: NORMAL
SODIUM SERPL-SCNC: 131 MMOL/L (ref 135–145)
SOURCE SOURCE: NORMAL
TEG ALGORITHM TGALG: ABNORMAL
TRIGL SERPL-MCNC: 62 MG/DL (ref 0–149)
WBC # BLD AUTO: 9.5 K/UL (ref 4.8–10.8)

## 2021-08-28 PROCEDURE — 700111 HCHG RX REV CODE 636 W/ 250 OVERRIDE (IP): Performed by: STUDENT IN AN ORGANIZED HEALTH CARE EDUCATION/TRAINING PROGRAM

## 2021-08-28 PROCEDURE — 700105 HCHG RX REV CODE 258: Performed by: SURGERY

## 2021-08-28 PROCEDURE — 85014 HEMATOCRIT: CPT

## 2021-08-28 PROCEDURE — 700101 HCHG RX REV CODE 250: Performed by: STUDENT IN AN ORGANIZED HEALTH CARE EDUCATION/TRAINING PROGRAM

## 2021-08-28 PROCEDURE — 85576 BLOOD PLATELET AGGREGATION: CPT | Mod: 91

## 2021-08-28 PROCEDURE — 94150 VITAL CAPACITY TEST: CPT

## 2021-08-28 PROCEDURE — 700105 HCHG RX REV CODE 258: Performed by: STUDENT IN AN ORGANIZED HEALTH CARE EDUCATION/TRAINING PROGRAM

## 2021-08-28 PROCEDURE — 700101 HCHG RX REV CODE 250: Performed by: SURGERY

## 2021-08-28 PROCEDURE — 770022 HCHG ROOM/CARE - ICU (200)

## 2021-08-28 PROCEDURE — 84478 ASSAY OF TRIGLYCERIDES: CPT

## 2021-08-28 PROCEDURE — 94799 UNLISTED PULMONARY SVC/PX: CPT

## 2021-08-28 PROCEDURE — 700111 HCHG RX REV CODE 636 W/ 250 OVERRIDE (IP): Performed by: SURGERY

## 2021-08-28 PROCEDURE — 85027 COMPLETE CBC AUTOMATED: CPT

## 2021-08-28 PROCEDURE — 85018 HEMOGLOBIN: CPT

## 2021-08-28 PROCEDURE — 80053 COMPREHEN METABOLIC PANEL: CPT

## 2021-08-28 PROCEDURE — 85007 BL SMEAR W/DIFF WBC COUNT: CPT

## 2021-08-28 PROCEDURE — 83605 ASSAY OF LACTIC ACID: CPT | Mod: 91

## 2021-08-28 PROCEDURE — 82140 ASSAY OF AMMONIA: CPT

## 2021-08-28 PROCEDURE — 85384 FIBRINOGEN ACTIVITY: CPT

## 2021-08-28 PROCEDURE — 99291 CRITICAL CARE FIRST HOUR: CPT | Performed by: SURGERY

## 2021-08-28 PROCEDURE — 85347 COAGULATION TIME ACTIVATED: CPT

## 2021-08-28 PROCEDURE — 94003 VENT MGMT INPAT SUBQ DAY: CPT

## 2021-08-28 PROCEDURE — 82533 TOTAL CORTISOL: CPT

## 2021-08-28 RX ORDER — SODIUM CHLORIDE, SODIUM LACTATE, POTASSIUM CHLORIDE, AND CALCIUM CHLORIDE .6; .31; .03; .02 G/100ML; G/100ML; G/100ML; G/100ML
1000 INJECTION, SOLUTION INTRAVENOUS ONCE
Status: COMPLETED | OUTPATIENT
Start: 2021-08-28 | End: 2021-08-28

## 2021-08-28 RX ORDER — NOREPINEPHRINE BITARTRATE 0.03 MG/ML
0-30 INJECTION, SOLUTION INTRAVENOUS CONTINUOUS
Status: DISCONTINUED | OUTPATIENT
Start: 2021-08-28 | End: 2021-09-07

## 2021-08-28 RX ORDER — GAUZE BANDAGE 2" X 2"
100 BANDAGE TOPICAL DAILY
Status: DISCONTINUED | OUTPATIENT
Start: 2021-08-29 | End: 2021-09-12

## 2021-08-28 RX ORDER — SODIUM CHLORIDE 9 MG/ML
1000 INJECTION, SOLUTION INTRAVENOUS ONCE
Status: COMPLETED | OUTPATIENT
Start: 2021-08-28 | End: 2021-08-28

## 2021-08-28 RX ADMIN — HYDROCORTISONE SODIUM SUCCINATE 100 MG: 100 INJECTION, POWDER, FOR SOLUTION INTRAMUSCULAR; INTRAVENOUS at 21:21

## 2021-08-28 RX ADMIN — SODIUM CHLORIDE, POTASSIUM CHLORIDE, SODIUM LACTATE AND CALCIUM CHLORIDE: 600; 310; 30; 20 INJECTION, SOLUTION INTRAVENOUS at 21:21

## 2021-08-28 RX ADMIN — SODIUM CHLORIDE, POTASSIUM CHLORIDE, SODIUM LACTATE AND CALCIUM CHLORIDE 1000 ML: 600; 310; 30; 20 INJECTION, SOLUTION INTRAVENOUS at 03:42

## 2021-08-28 RX ADMIN — HYDROCORTISONE SODIUM SUCCINATE 100 MG: 100 INJECTION, POWDER, FOR SOLUTION INTRAMUSCULAR; INTRAVENOUS at 06:39

## 2021-08-28 RX ADMIN — HYDROMORPHONE HYDROCHLORIDE 0.5 MG: 1 INJECTION, SOLUTION INTRAMUSCULAR; INTRAVENOUS; SUBCUTANEOUS at 04:16

## 2021-08-28 RX ADMIN — METRONIDAZOLE 500 MG: 500 INJECTION, SOLUTION INTRAVENOUS at 05:41

## 2021-08-28 RX ADMIN — FAMOTIDINE 20 MG: 10 INJECTION INTRAVENOUS at 05:41

## 2021-08-28 RX ADMIN — NOREPINEPHRINE BITARTRATE 5 MCG/MIN: 1 INJECTION, SOLUTION, CONCENTRATE INTRAVENOUS at 10:52

## 2021-08-28 RX ADMIN — HYDROCORTISONE SODIUM SUCCINATE 100 MG: 100 INJECTION, POWDER, FOR SOLUTION INTRAMUSCULAR; INTRAVENOUS at 13:49

## 2021-08-28 RX ADMIN — CEFTRIAXONE SODIUM 1 G: 1 INJECTION, POWDER, FOR SOLUTION INTRAMUSCULAR; INTRAVENOUS at 17:19

## 2021-08-28 RX ADMIN — METRONIDAZOLE 500 MG: 500 INJECTION, SOLUTION INTRAVENOUS at 13:48

## 2021-08-28 RX ADMIN — HYDROCORTISONE SODIUM SUCCINATE 100 MG: 100 INJECTION, POWDER, FOR SOLUTION INTRAMUSCULAR; INTRAVENOUS at 01:55

## 2021-08-28 RX ADMIN — HYDROMORPHONE HYDROCHLORIDE 0.5 MG: 1 INJECTION, SOLUTION INTRAMUSCULAR; INTRAVENOUS; SUBCUTANEOUS at 21:20

## 2021-08-28 RX ADMIN — METRONIDAZOLE 500 MG: 500 INJECTION, SOLUTION INTRAVENOUS at 21:21

## 2021-08-28 RX ADMIN — FAMOTIDINE 20 MG: 10 INJECTION INTRAVENOUS at 17:18

## 2021-08-28 RX ADMIN — HYDROMORPHONE HYDROCHLORIDE 0.5 MG: 1 INJECTION, SOLUTION INTRAMUSCULAR; INTRAVENOUS; SUBCUTANEOUS at 12:22

## 2021-08-28 RX ADMIN — SODIUM CHLORIDE, POTASSIUM CHLORIDE, SODIUM LACTATE AND CALCIUM CHLORIDE 1000 ML: 600; 310; 30; 20 INJECTION, SOLUTION INTRAVENOUS at 10:10

## 2021-08-28 RX ADMIN — SODIUM CHLORIDE, POTASSIUM CHLORIDE, SODIUM LACTATE AND CALCIUM CHLORIDE 1000 ML: 600; 310; 30; 20 INJECTION, SOLUTION INTRAVENOUS at 21:21

## 2021-08-28 RX ADMIN — PROPOFOL 5 MCG/KG/MIN: 10 INJECTION, EMULSION INTRAVENOUS at 06:09

## 2021-08-28 RX ADMIN — SODIUM CHLORIDE 1000 ML: 9 INJECTION, SOLUTION INTRAVENOUS at 01:33

## 2021-08-28 RX ADMIN — SODIUM CHLORIDE, POTASSIUM CHLORIDE, SODIUM LACTATE AND CALCIUM CHLORIDE 1000 ML: 600; 310; 30; 20 INJECTION, SOLUTION INTRAVENOUS at 00:33

## 2021-08-28 RX ADMIN — SODIUM CHLORIDE, POTASSIUM CHLORIDE, SODIUM LACTATE AND CALCIUM CHLORIDE: 600; 310; 30; 20 INJECTION, SOLUTION INTRAVENOUS at 10:37

## 2021-08-28 RX ADMIN — HYDROMORPHONE HYDROCHLORIDE 0.5 MG: 1 INJECTION, SOLUTION INTRAMUSCULAR; INTRAVENOUS; SUBCUTANEOUS at 15:55

## 2021-08-28 ASSESSMENT — PAIN DESCRIPTION - PAIN TYPE
TYPE: ACUTE PAIN

## 2021-08-28 ASSESSMENT — PULMONARY FUNCTION TESTS: FVC: 1

## 2021-08-28 ASSESSMENT — FIBROSIS 4 INDEX: FIB4 SCORE: 16.3

## 2021-08-28 NOTE — OP REPORT
OP Note    PreOp Diagnosis: Perforated viscus, hepatocellular carcinoma, cirrhosis, portal hypertension, history of purulent peritonitis of unclear origin, ascites      PostOp Diagnosis: Right upper quadrant abscess, no clear evidence of perforated viscus,hepatocellular carcinoma, cirrhosis, portal hypertension, history of purulent peritonitis of unclear origin, ascites      Procedure(s):  LAPAROSCOPY - Wound Class: Dirty or Infected  LAPAROTOMY, EXPLORATORY - Wound Class: Dirty or Infected  IRRIGATION AND DEBRIDEMENT, INTRA-ABDOMINAL ABCESS - Wound Class: Dirty or Infected  CONTROL OF LIVER BLEED - Wound Class: Dirty or Infected    Surgeon(s):  BRADLEY Jennings M.D.    Anesthesiologist/Type of Anesthesia:  Anesthesiologist: Kip Gao M.D.; Nelson Morfin M.D./General    Surgical Staff:  Circulator: Elaine Payne R.N.; Rae Rivers R.N.  Relief Scrub: Leopold von C Garcia  Scrub Person: Radha Anthony    Specimens removed if any:  ID Type Source Tests Collected by Time Destination   1 : Abdominal Wall Fluid Other Other FUNGAL CULTURE, AEROBIC/ANAEROBIC CULTURE (SURGERY) Priya You M.D. 8/27/2021  6:17 PM        Estimated Blood Loss: 500mL    Findings: Hostile abdomen with significant adhesions of the omentum to the anterior abdominal wall and of small bowel loops each other.  Dense adhesions of the colon and omentum to the right abdominal wall, immobility of the right liver.  Large abscess above the right hepatic lobe consistent with the area of free air on the CT scan.  I was unable to explore the right paracolic gutter due to severe adhesions and bleeding.    Complications: None apparent    Indications: This is a 71-year-old man with a history of hepatocellular carcinoma and recent history of purulent peritonitis of unclear origin who presented with increased abdominal pain and free air of unclear origin, likely perforated viscus.  He initially declined surgery but  after more discussion decided to proceed.  We discussed risk benefits alternatives with risks including, but not limited to, bleeding, infection, damage surrounding structures, need for ICU stay, hepatic failure, nonlocalization of the source of the problem, need for further surgeries, prolonged ICU stay.  The patient understood and agreed to proceed.    Description of procedure: The patient was brought to the operating room and placed in comfortable supine position on the operating room table with all appropriate points padded.  General anesthesia was induced without complication.  Timeout was held and completed confirming correct patient, correct site, correct procedure and SCDs on and functioning.  He received antibiotics prior to incision.  Dr. Morfin placed a left radial arterial line and a right internal jugular central line without difficulties.  A Triana catheter was placed without difficulty.  His abdomen was clipped, prepped with ChloraPrep and draped in the usual sterile fashion.  I started by entering the previous supraumbilical incision and immediately had return of pus.  This was sent for culture.  I decided to make an incision in the left upper quadrant instead.  The incision was made after infiltration of Esau percent Marcaine with epi and a Veress needle was placed into the peritoneum.  A saline drop test showed no evidence of injury to bowel or vessel.  The abdomen is insufflated to pressure 15 mmHg with CO2.  A 5 mm trocar was placed and a camera is introduced confirming no evidence of injury to bowel or vessel.  There is a large amount of adhesions of the omentum to the anterior abdominal wall but I was able to make a window to the right upper quadrant.  The right colon and omentum as well as the right liver were plastered up against the anterior abdominal wall.  I placed an additional 5 mm trocar in the left upper quadrant under laparoscopic visualization and try to mobilize some of the  adhesions.  The patient had widespread inflammatory bleeding as well as some venous bleeding consistent with his portal hypertension and so I decided to convert to open.    A vertical midline incision was made with a 10 blade and this was taken down to fascia using Bovie electrocautery.  The fascia was opened using Bovie electrocautery protecting the underlying tissue with my finger.  Again there is significant portal hypertensive bleeding that was able to be controlled with the Bovie.  I was able to control some of the bleeding of the right upper quadrant with 3-0 Vicryl's but at this point I continue to work towards the right upper quadrant where the large fluid collection and the free air was.  There is no evidence of bilious fluid.  I finally reached the right hepatic edge and had return of a large amount of pus.  This was suctioned and comprised about 600 cc of pus.  A MARIANA drain was placed into this cavity for future drainage.    At this point I appreciated that the liver had lacerated upon mobilization at the junction between the left lobe of the liver and the right lobe of the liver.  I did attempt to close this with a chromic liver stitch but the suture just pulled through.  I placed pressure and call Dr. Persaud for an intraoperative consult.  In the meantime I made sure the remainder of the abdomen was hemostatic.  Dr. Persaud came in and took down the falciform ligament.  He also attempted to use a liver stitch to close the liver around some Gelfoam pads soaked in thrombin.  Again the liver stitch just came straight out of the liver without assisting us in any way.  Instead we placed Gelfoam and thrombin and Surgi-Quirino in the liver laceration and held pressure for about 10 minutes.  The laparotomy pads were then removed and the area was hemostatic.  We then placed a 10 flat fluted MARIANA drain through the left upper quadrant over the liver laceration to help monitor blood loss.    The laparoscopic incisions were  closed with 2-0 nylon in a figure-of-eight fashion.  The drains were sutured in using interrupted 2-0 nylon.  The midline fascia was then closed using oh looped PDS, starting from either side and meeting in the middle.  The midline incision was irrigated well and the skin was closed using running locking sutures using nylon to help prevent an ascites leak.    Dr. Gao obtained an i-STAT which showed a hemoglobin of 6.2.  The patient was given 2 units of PRBCs.  The patient was then taken to the ICU, hemodynamically stable at this time but intubated.    The findings of the surgery were discussed in detail with the patient's wife.        8/27/2021 7:54 PM Priya You M.D.

## 2021-08-28 NOTE — PROGRESS NOTES
Trauma / Surgical Daily Progress Note    Date of Service  8/28/2021    Chief Complaint  71 y.o. male admitted 8/26/2021 with abscess and advanced hepatocellular cancer. Underwent laparotomy    Interval Events  Hospital day #3  Critically ill  Requires continued ICU and hospital admission  Seen on rounds and discussed with multidisciplinary team  Injuries and physiologic derangements pose a significant risk of mortality and long term morbidity  Critical care interventions include:  integration of multiple data points and associated complex medical decisions   Mgt of ventilator-full support at this time  Ongoing treatment for sepsis with fluids, pressors, and abx  Pain control  Prognosis poor in light of current clinical state and presence of advanced hepatocellular carcinoma  Discussed at length with wife at bedside-wishes continued aggressive care    Review of Systems  Review of Systems   Unable to perform ROS: Intubated        Vital Signs for last 24 hours  Temp:  [34.8 °C (94.6 °F)-36.2 °C (97.1 °F)] 34.8 °C (94.6 °F)  Pulse:  [] 103  Resp:  [8-22] 9  BP: ()/(56-69) 125/56  SpO2:  [93 %-100 %] 96 %    Hemodynamic parameters for last 24 hours  CVP:  [2 MM HG-14 MM HG] 7 MM HG    Respiratory Data     Respiration: (!) 9, Pulse Oximetry: 96 %     Work Of Breathing / Effort: Vented  RUL Breath Sounds: Clear;Diminished, RML Breath Sounds: Clear;Diminished, RLL Breath Sounds: Diminished, ASHLEY Breath Sounds: Clear;Diminished, LLL Breath Sounds: Diminished    Physical Exam  Physical Exam  Constitutional:       Appearance: He is ill-appearing and toxic-appearing.   HENT:      Head: Normocephalic.      Right Ear: External ear normal.      Left Ear: External ear normal.      Nose: Congestion present.      Mouth/Throat:      Pharynx: No oropharyngeal exudate.   Eyes:      General:         Right eye: No discharge.         Left eye: Discharge present.     Pupils: Pupils are equal, round, and reactive to light.    Cardiovascular:      Rate and Rhythm: Normal rate and regular rhythm.      Pulses: Normal pulses.      Comments: On pressors  Pulmonary:      Effort: No respiratory distress.      Comments: On vent  Abdominal:      General: There is distension.      Palpations: Abdomen is soft.      Tenderness: There is no guarding.   Musculoskeletal:      Cervical back: Normal range of motion.         Laboratory  Recent Results (from the past 24 hour(s))   Magnesium    Collection Time: 08/27/21  3:22 PM   Result Value Ref Range    Magnesium 1.9 1.5 - 2.5 mg/dL   IRON/TOTAL IRON BIND    Collection Time: 08/27/21  3:22 PM   Result Value Ref Range    Iron 26 (L) 50 - 180 ug/dL    Total Iron Binding 117 (L) 250 - 450 ug/dL    Unsat Iron Binding 91 (L) 110 - 370 ug/dL    % Saturation 22 15 - 55 %   FERRITIN    Collection Time: 08/27/21  3:22 PM   Result Value Ref Range    Ferritin 1051.0 (H) 22.0 - 322.0 ng/mL   VITAMIN B12    Collection Time: 08/27/21  3:22 PM   Result Value Ref Range    Vitamin B12 -True Cobalamin 1425 (H) 211 - 911 pg/mL   RETICULOCYTES COUNT    Collection Time: 08/27/21  3:22 PM   Result Value Ref Range    Reticulocyte Count 3.1 (H) 0.8 - 2.1 %    Retic, Absolute 0.11 (H) 0.04 - 0.06 M/uL    Imm. Reticulocyte Fraction 17.9 (H) 9.3 - 17.4 %    Retic Hgb Equivalent 37.5 (H) 29.0 - 35.0 pg/cell   SARS-COV Antigen ROSA: Collect dry nasal swab    Collection Time: 08/27/21  4:20 PM   Result Value Ref Range    SARS-CoV-2 Source Nasal Swab     SARS-COV ANTIGEN ROSA NotDetected Not-Detected   CULTURE WOUND W/ GRAM STAIN    Collection Time: 08/27/21  6:17 PM    Specimen: Wound   Result Value Ref Range    Significant Indicator NEG     Source WND     Site Abdominal Wall Fluid     Culture Result No growth at 24 hours.     Gram Stain Result Moderate WBCs.  Rare Gram positive cocci.   (A)    Anaerobic Culture    Collection Time: 08/27/21  6:17 PM    Specimen: Wound   Result Value Ref Range    Significant Indicator NEG     Source  WND     Site Abdominal Wall Fluid     Culture Result Culture in progress.    Fungal Culture    Collection Time: 08/27/21  6:17 PM    Specimen: Wound   Result Value Ref Range    Significant Indicator NEG     Source WND     Site Abdominal Wall Fluid     Culture Result Culture in progress.     Fungal Smear Results -    GRAM STAIN    Collection Time: 08/27/21  6:17 PM    Specimen: Wound   Result Value Ref Range    Significant Indicator .     Source WND     Site Abdominal Wall Fluid     Gram Stain Result Moderate WBCs.  Rare Gram positive cocci.      COD - Adult (Type and Screen)    Collection Time: 08/27/21  6:20 PM   Result Value Ref Range    ABO Grouping Only O     Rh Grouping Only POS     Antibody Screen-Cod NEG     Component R       R99                 Red Cells, LR       [id number]   transfused   08/27/21   19:20      Product Type R99     Dispense Status transfused     Unit Number (Barcoded) [id number]     Product Code (Barcoded) H8634Y15     Blood Type (Barcoded) 5100     Component R       R99                 Red Cells, LR       [id number]   transfused   08/27/21   19:20      Product Type R99     Dispense Status transfused     Unit Number (Barcoded) [id number]     Product Code (Barcoded) Q3027G75     Blood Type (Barcoded) 5100    POCT arterial blood gas device results    Collection Time: 08/27/21  7:10 PM   Result Value Ref Range    Ph 7.370 (L) 7.400 - 7.500    Pco2 44.2 (H) 26.0 - 37.0 mmHg    Po2 335 (H) 64 - 87 mmHg    Tco2 27 20 - 33 mmol/L    S02 100 (H) 93 - 99 %    Hco3 25.6 (H) 17.0 - 25.0 mmol/L    BE 0 -4 - 3 mmol/L    Body Temp 37.0 C degrees    Ph Temp Kyrie 7.370 (L) 7.400 - 7.500    Pco2 Temp Co 44.2 (H) 26.0 - 37.0 mmHg    Po2 Temp Cor 335 (H) 64 - 87 mmHg    Specimen Arterial    POCT sodium device results    Collection Time: 08/27/21  7:10 PM   Result Value Ref Range    Istat Sodium 136 135 - 145 mmol/L   POCT potassium device results    Collection Time: 08/27/21  7:10 PM   Result Value  Ref Range    Istat Potassium 3.9 3.6 - 5.5 mmol/L   POCT ionized CA device results    Collection Time: 08/27/21  7:10 PM   Result Value Ref Range    Istat Ionized Calcium 1.11 1.10 - 1.30 mmol/L   POCT hematocrit and hemoglobin device results    Collection Time: 08/27/21  7:10 PM   Result Value Ref Range    Istat Hematocrit 19 (L) 42 - 52 %    Istat Hemoglobin 6.5 (L) 14.0 - 18.0 g/dL   LACTIC ACID    Collection Time: 08/27/21  9:28 PM   Result Value Ref Range    Lactic Acid 2.4 (H) 0.5 - 2.0 mmol/L   CBC WITH DIFFERENTIAL    Collection Time: 08/27/21  9:28 PM   Result Value Ref Range    WBC 10.3 4.8 - 10.8 K/uL    RBC 3.51 (L) 4.70 - 6.10 M/uL    Hemoglobin 11.0 (L) 14.0 - 18.0 g/dL    Hematocrit 33.5 (L) 42.0 - 52.0 %    MCV 95.4 81.4 - 97.8 fL    MCH 31.3 27.0 - 33.0 pg    MCHC 32.8 (L) 33.7 - 35.3 g/dL    RDW 56.5 (H) 35.9 - 50.0 fL    Platelet Count 101 (L) 164 - 446 K/uL    MPV 8.9 (L) 9.0 - 12.9 fL    Neutrophils-Polys 81.90 (H) 44.00 - 72.00 %    Lymphocytes 6.00 (L) 22.00 - 41.00 %    Monocytes 5.20 0.00 - 13.40 %    Eosinophils 0.00 0.00 - 6.90 %    Basophils 0.90 0.00 - 1.80 %    Nucleated RBC 0.00 /100 WBC    Neutrophils (Absolute) 9.05 (H) 1.82 - 7.42 K/uL    Lymphs (Absolute) 0.62 (L) 1.00 - 4.80 K/uL    Monos (Absolute) 0.54 0.00 - 0.85 K/uL    Eos (Absolute) 0.00 0.00 - 0.51 K/uL    Baso (Absolute) 0.09 0.00 - 0.12 K/uL    NRBC (Absolute) 0.00 K/uL    Anisocytosis 1+     Macrocytosis 1+     Microcytosis 1+    Basic Metabolic Panel    Collection Time: 08/27/21  9:28 PM   Result Value Ref Range    Sodium 128 (L) 135 - 145 mmol/L    Potassium 4.5 3.6 - 5.5 mmol/L    Chloride 97 96 - 112 mmol/L    Co2 23 20 - 33 mmol/L    Glucose 124 (H) 65 - 99 mg/dL    Bun 17 8 - 22 mg/dL    Creatinine 0.45 (L) 0.50 - 1.40 mg/dL    Calcium 7.9 (L) 8.5 - 10.5 mg/dL    Anion Gap 8.0 7.0 - 16.0   ESTIMATED GFR    Collection Time: 08/27/21  9:28 PM   Result Value Ref Range    GFR If African American >60 >60 mL/min/1.73 m 2     GFR If Non African American >60 >60 mL/min/1.73 m 2   DIFFERENTIAL MANUAL    Collection Time: 08/27/21  9:28 PM   Result Value Ref Range    Bands-Stabs 6.00 0.00 - 10.00 %    Manual Diff Status PERFORMED    PERIPHERAL SMEAR REVIEW    Collection Time: 08/27/21  9:28 PM   Result Value Ref Range    Peripheral Smear Review see below    PLATELET ESTIMATE    Collection Time: 08/27/21  9:28 PM   Result Value Ref Range    Plt Estimation Decreased    MORPHOLOGY    Collection Time: 08/27/21  9:28 PM   Result Value Ref Range    RBC Morphology Present     Poikilocytosis 1+     Ovalocytes 1+     Echinocytes 1+     Toxic Gran Slight    POCT arterial blood gas device results    Collection Time: 08/27/21  9:33 PM   Result Value Ref Range    Ph 7.444 7.400 - 7.500    Pco2 37.2 (H) 26.0 - 37.0 mmHg    Po2 246 (H) 64 - 87 mmHg    Tco2 27 20 - 33 mmol/L    S02 100 (H) 93 - 99 %    Hco3 25.5 (H) 17.0 - 25.0 mmol/L    BE 1 -4 - 3 mmol/L    Body Temp 98.4 F degrees    O2 Therapy 60 %    iPF Ratio 410     Ph Temp Kyrie 7.445 7.400 - 7.500    Pco2 Temp Co 37.0 26.0 - 37.0 mmHg    Po2 Temp Cor 246 (H) 64 - 87 mmHg    Specimen Arterial     DelSys Vent     End Tidal Carbon Dioxide 26 mmhg    Tidal Volume 450 mL    Peep End Expiratory Pressure 8 cmh20    Set Rate 18     Mode APV-CMV    POCT glucose device results    Collection Time: 08/27/21  9:42 PM   Result Value Ref Range    Glucose - Accu-Ck 125 (H) 65 - 99 mg/dL   HEMOGLOBIN AND HEMATOCRIT    Collection Time: 08/28/21 12:13 AM   Result Value Ref Range    Hemoglobin 10.1 (L) 14.0 - 18.0 g/dL    Hematocrit 29.5 (L) 42.0 - 52.0 %   PLATELET MAPPING WITH BASIC TEG    Collection Time: 08/28/21 12:22 AM   Result Value Ref Range    Reaction Time Initial-R 5.0 4.6 - 9.1 min    React Time Initial Hep 4.9 4.3 - 8.3 min    Clot Kinetics-K 0.8 0.8 - 2.1 min    Clot Angle-Angle 78.0 63.0 - 78.0 degrees    Maximum Clot Strength-MA 60.7 52.0 - 69.0 mm    TEG Functional Fibrinogen(MA) 24.5 15.0 - 32.0  mm    Lysis 30 minutes-LY30 0.3 0.0 - 2.6 %    % Inhibition ADP 54.9 (H) 0.0 - 17.0 %    % Inhibition AA 25.1 (H) 0.0 - 11.0 %    TEG Algorithm Link Algorithm    CORTISOL    Collection Time: 08/28/21 12:22 AM   Result Value Ref Range    Cortisol 16.1 0.0 - 23.0 ug/dL   LACTIC ACID    Collection Time: 08/28/21  2:00 AM   Result Value Ref Range    Lactic Acid 2.7 (H) 0.5 - 2.0 mmol/L   Triglyceride    Collection Time: 08/28/21  2:00 AM   Result Value Ref Range    Triglycerides 62 0 - 149 mg/dL   CBC WITH DIFFERENTIAL    Collection Time: 08/28/21  4:33 AM   Result Value Ref Range    WBC 9.5 4.8 - 10.8 K/uL    RBC 3.12 (L) 4.70 - 6.10 M/uL    Hemoglobin 9.9 (L) 14.0 - 18.0 g/dL    Hematocrit 29.3 (L) 42.0 - 52.0 %    MCV 93.9 81.4 - 97.8 fL    MCH 31.7 27.0 - 33.0 pg    MCHC 33.8 33.7 - 35.3 g/dL    RDW 58.2 (H) 35.9 - 50.0 fL    Platelet Count 88 (L) 164 - 446 K/uL    MPV 8.9 (L) 9.0 - 12.9 fL    Neutrophils-Polys 92.20 (H) 44.00 - 72.00 %    Lymphocytes 2.60 (L) 22.00 - 41.00 %    Monocytes 1.70 0.00 - 13.40 %    Eosinophils 0.00 0.00 - 6.90 %    Basophils 0.00 0.00 - 1.80 %    Nucleated RBC 0.00 /100 WBC    Neutrophils (Absolute) 9.09 (H) 1.82 - 7.42 K/uL    Lymphs (Absolute) 0.25 (L) 1.00 - 4.80 K/uL    Monos (Absolute) 0.16 0.00 - 0.85 K/uL    Eos (Absolute) 0.00 0.00 - 0.51 K/uL    Baso (Absolute) 0.00 0.00 - 0.12 K/uL    NRBC (Absolute) 0.00 K/uL    Anisocytosis 1+     Macrocytosis 1+    Comp Metabolic Panel    Collection Time: 08/28/21  4:33 AM   Result Value Ref Range    Sodium 131 (L) 135 - 145 mmol/L    Potassium 4.5 3.6 - 5.5 mmol/L    Chloride 101 96 - 112 mmol/L    Co2 21 20 - 33 mmol/L    Anion Gap 9.0 7.0 - 16.0    Glucose 135 (H) 65 - 99 mg/dL    Bun 17 8 - 22 mg/dL    Creatinine 0.51 0.50 - 1.40 mg/dL    Calcium 7.6 (L) 8.5 - 10.5 mg/dL    AST(SGOT) 70 (H) 12 - 45 U/L    ALT(SGPT) 12 2 - 50 U/L    Alkaline Phosphatase 170 (H) 30 - 99 U/L    Total Bilirubin 1.3 0.1 - 1.5 mg/dL    Albumin 1.5 (L) 3.2 -  4.9 g/dL    Total Protein 5.3 (L) 6.0 - 8.2 g/dL    Globulin 3.8 (H) 1.9 - 3.5 g/dL    A-G Ratio 0.4 g/dL   LACTIC ACID    Collection Time: 08/28/21  4:33 AM   Result Value Ref Range    Lactic Acid 2.4 (H) 0.5 - 2.0 mmol/L   ESTIMATED GFR    Collection Time: 08/28/21  4:33 AM   Result Value Ref Range    GFR If African American >60 >60 mL/min/1.73 m 2    GFR If Non African American >60 >60 mL/min/1.73 m 2   DIFFERENTIAL MANUAL    Collection Time: 08/28/21  4:33 AM   Result Value Ref Range    Bands-Stabs 3.50 0.00 - 10.00 %    Manual Diff Status PERFORMED    PERIPHERAL SMEAR REVIEW    Collection Time: 08/28/21  4:33 AM   Result Value Ref Range    Peripheral Smear Review see below    PLATELET ESTIMATE    Collection Time: 08/28/21  4:33 AM   Result Value Ref Range    Plt Estimation Decreased    MORPHOLOGY    Collection Time: 08/28/21  4:33 AM   Result Value Ref Range    RBC Morphology Present    AMMONIA    Collection Time: 08/28/21  4:53 AM   Result Value Ref Range    Ammonia 78 (H) 11 - 45 umol/L   LACTIC ACID    Collection Time: 08/28/21  9:11 AM   Result Value Ref Range    Lactic Acid 3.2 (H) 0.5 - 2.0 mmol/L       Fluids    Intake/Output Summary (Last 24 hours) at 8/28/2021 1339  Last data filed at 8/28/2021 1200  Gross per 24 hour   Intake 80429.94 ml   Output 3130 ml   Net 73873.94 ml       Core Measures & Quality Metrics  Labs reviewed, Radiology images reviewed and Medications reviewed  Triana catheter: Critically Ill - Requiring Accurate Measurement of Urinary Output      DVT Prophylaxis: Contraindicated - High bleeding risk  DVT prophylaxis - mechanical: SCDs  Ulcer prophylaxis: Yes  Antibiotics: Treating active infection/contamination beyond 24 hours perioperative coverage      CHELI Score  ETOH Screening    Assessment/Plan  Sepsis (HCC)- (present on admission)  Assessment & Plan  Secondary to liver abscess  Ongoing volume resuscitation and pressors support  abx day #2 rocephin and flagyl    Hepatocellular  carcinoma (HCC)  Assessment & Plan  Advanced  Associated abscess        Discussed patient condition with RN, RT and Pharmacy. And Dr. You  CRITICAL CARE TIME EXCLUDING PROCEDURES: 35    minutes

## 2021-08-28 NOTE — ANESTHESIA POSTPROCEDURE EVALUATION
Patient: Lorraine Bella    Procedure Summary     Date: 08/27/21 Room / Location: [facility] OR 09 / SURGERY [facility]    Anesthesia Start: 1739 Anesthesia Stop: 1959    Procedures:       LAPAROSCOPY (N/A Abdomen)      LAPAROTOMY, EXPLORATORY (N/A Abdomen)      IRRIGATION AND DEBRIDEMENT, INTRA-ABDOMINAL ABCESS (Abdomen)      CONTROL OF LIVER BLEED (Abdomen) Diagnosis: (INTRA-ABDOMINAL ABCESS )    Surgeons: Priya You M.D. Responsible Provider: Kip Gao M.D.    Anesthesia Type: general ASA Status: 3 - Emergent          Final Anesthesia Type: general  Last vitals  BP   Blood Pressure : 114/60    Temp   36.1 °C (97 °F)    Pulse   89   Resp   16    SpO2   93 %      Anesthesia Post Evaluation    Patient location during evaluation: PACU  Patient participation: complete - patient cannot participate  Level of consciousness: awake and alert    Airway patency: patent  Anesthetic complications: no  Cardiovascular status: hemodynamically stable  Respiratory status: acceptable and ETT  Hydration status: euvolemic    PONV: none          No complications documented.     Nurse Pain Score: 0 (NPRS)

## 2021-08-28 NOTE — PROGRESS NOTES
2032: Dr. Lord paged to ask about sedation orders for patient     2047: Dr Lord paged second time     2109: premier Sx paged    2130: Premier paged second time.  was confused by physician list so call was ended without page being sent.    2138:  contacted on his cell phone. New orders received and initiated.

## 2021-08-28 NOTE — CARE PLAN
The patient is Unstable - High likelihood or risk of patient condition declining or worsening    Shift Goals  Clinical Goals: hemodynamic stability, clear OR meds, fluid resusitation   Patient Goals: n/a patient unable to communicate needs   Family Goals: no family present     Progress made toward(s) clinical / shift goals:  clear OR meds     Patient is not progressing towards the following goals: hemodynamic stability       Problem: Pain - Standard  Goal: Alleviation of pain or a reduction in pain to the patient’s comfort goal  Outcome: Progressing     Problem: Fall Risk  Goal: Patient will remain free from falls  Outcome: Progressing     Problem: Safety - Medical Restraint  Goal: Remains free of injury from restraints (Restraint for Interference with Medical Device)  Outcome: Met  Goal: Free from restraint(s) (Restraint for Interference with Medical Device)  Outcome: Progressing

## 2021-08-28 NOTE — CARE PLAN
The patient is Watcher - Medium risk of patient condition declining or worsening    Shift Goals  Clinical Goals:  Hemodynamic stability, Pain Control   Patient Goals: LAILA  Family Goals: Not present     Progress made toward(s) clinical / shift goals:    Problem: Pain - Standard  Goal: Alleviation of pain or a reduction in pain to the patient’s comfort goal  Outcome: Progressing     Problem: Knowledge Deficit - Standard  Goal: Patient and family/care givers will demonstrate understanding of plan of care, disease process/condition, diagnostic tests and medications  Outcome: Progressing     Problem: Fall Risk  Goal: Patient will remain free from falls  Outcome: Progressing     Problem: Skin Integrity  Goal: Skin integrity is maintained or improved  Outcome: Progressing     Problem: Safety - Medical Restraint  Goal: Free from restraint(s) (Restraint for Interference with Medical Device)  Outcome: Progressing

## 2021-08-28 NOTE — CARE PLAN
Problem: Ventilation  Goal: Ability to achieve and maintain unassisted ventilation or tolerate decreased levels of ventilator support  Description: Document on Vent flowsheet    1.  Support and monitor invasive and noninvasive mechanical ventilation  2.  Monitor ventilator weaning response  3.  Perform ventilator associated pneumonia prevention interventions  4.  Manage ventilation therapy by monitoring diagnostic test results  Outcome: Progressing        Ventilator Daily Summary    Vent Day # 2    Ventilator settings changed this shift: titrated fio2 and placed on %    Weaning trials: No    Respiratory Procedures: Intubated     Plan: Continue current ventilator settings and wean mechanical ventilation as tolerated per physician orders.

## 2021-08-28 NOTE — ASSESSMENT & PLAN NOTE
Secondary to liver abscess  Ongoing volume resuscitation and pressors support  abx rocephin and flagyl  8/30-feculent drain output in late evening  8/31- return to OR for washout, drainage of feculent fluid and diverting ileostomy.  9/3: Remains on small doses of norepinephrine.  Vasopressin has been discontinued.  Remains on every 8 hydrocortisone.  Rapid taper as appropriate.  9/4 wean pressors to off.

## 2021-08-28 NOTE — ANESTHESIA PROCEDURE NOTES
Arterial Line  Performed by: Nelson Morfin M.D.  Authorized by: Nelson Morfin M.D.     Start Time:  8/27/2021 5:45 PM  Localization: surface landmarks    Patient Location:  OR  Indication: continuous blood pressure monitoring        Catheter Size:  20 G  Seldinger Technique?: Yes    Laterality:  Left  Site:  Radial artery  Line Secured:  Antimicrobial disc, tape and transparent dressing  Events: patient tolerated procedure well with no complications

## 2021-08-28 NOTE — PROGRESS NOTES
4 Eyes Skin Assessment Completed by YASMIN Romo and YASMIN Cordoba.    Head WDL  Ears WDL  Nose WDL  Mouth WDL, Dry Mucosa, lips dry and cracked     Neck WDL  Breast/Chest WDL  Shoulder Blades WDL  Spine WDL  (R) Arm/Elbow/Hand WDL  (L) Arm/Elbow/Hand WDL  Abdomen Incision, multiple incisions, MARIANA on L and R Flank   Groin WDL  Scrotum/Coccyx/Buttocks Redness, Blanching and Scar  (R) Leg WDL  (L) Leg WDL  (R) Heel/Foot/Toe WDL  (L) Heel/Foot/Toe WDL          Devices In Places ECG, Blood Pressure Cuff, Pulse Ox, Triana, Arterial Line, SCD's, ET Tube, OG/NG and Central Line      Interventions In Place NC Cheek Stickers, Pillows, Q2 Turns, Low Air Loss Mattress, Heels Loaded W/Pillows and Pressure Redistribution Mattress    Possible Skin Injury No    Pictures Uploaded Into Epic N/A  Wound Consult Placed N/A  RN Wound Prevention Protocol Ordered No

## 2021-08-28 NOTE — PROGRESS NOTES
Pt arrived to the ICU accompanied by OR staff and anesthesia at 1952. RT at bedside to place pt on ICU vent. IVF running though CVC line placed in OR. X ray order place for line verification.     Vitals on arrival    BP 89/61 (71)  Temp: 94.5 F esophageal temp probe in place from OR. Pt placed on ursula hugger  Weight 76.7kg  O2: 99% on vent      2 RN skin check completed with YASMIN Murray:  - left radial art line,  -underwood catheter  Bilateral elbows pink, blanching  -Right  IJ 3 lumen CVC.  - Bilateral ears dry, intact.  - midline surgical incision, dressing guaze with tear hypafix tape, clean dry intact  -Left upper & right midline MARIANA drains to bulb suction with bloody drainage in each bulb  - sacrum red, blanching. Mepilex placed  -Bilateral soft wrist restraints placed   -bilateral calloused feet.    No belongings with pt on arrival to ICU. No wallet. No phone.

## 2021-08-28 NOTE — PROGRESS NOTES
Acute Care Surgery Note    POD1 exploratory laparotomy, drainage of intra-abdominal abscess    Multiple boluses overnight. CVP 6-8. Low vent settings.   Solucortef dose overnight.     Comfortable, on vent  Drainage sanguinous, about 300 from RUQ and 150 from LUQ    POD1 exploratory laparotomy, drainage of intra-abdominal abscess  Would plan to start trickle feeds tomorrow  Appreciate critical care assistance

## 2021-08-28 NOTE — ANESTHESIA PROCEDURE NOTES
Airway    Date/Time: 8/27/2021 5:52 PM  Performed by: Nelson Morfin M.D.  Authorized by: Nelson Morfin M.D.     Location:  OR  Urgency:  Elective  Indications for Airway Management:  Anesthesia      Spontaneous Ventilation: absent    Sedation Level:  Deep  Preoxygenated: Yes    Patient Position:  Sniffing  Final Airway Type:  Endotracheal airway  Final Endotracheal Airway:  ETT  Cuffed: Yes    Technique Used for Successful ETT Placement:  Direct laryngoscopy    Insertion Site:  Oral  Blade Type:  Xavier  Laryngoscope Blade/Videolaryngoscope Blade Size:  3  ETT Size (mm):  8.0  Measured from:  Teeth  ETT to Teeth (cm):  24  Placement Verified by: auscultation and capnometry    Cormack-Lehane Classification:  Grade I - full view of glottis  Number of Attempts at Approach:  1

## 2021-08-28 NOTE — ASSESSMENT & PLAN NOTE
Advanced tumor involving most of the right lobe. Immunotherapy/chemo prior to presentation with bowel perforation.  Associated abscess with apparent involvement of hepatic flexure.  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  Diverting ileostomy.

## 2021-08-28 NOTE — ANESTHESIA TIME REPORT
Anesthesia Start and Stop Event Times     Date Time Event    8/27/2021 1720 Ready for Procedure     1739 Anesthesia Start     1959 Anesthesia Stop        Responsible Staff  08/27/21    Name Role Begin End    Nelson Morfin M.D. Anesth 1739 1849    Kip Gao M.D. Anesth 1849 1959        Preop Diagnosis (Free Text):  Pre-op Diagnosis     perforated viscus        Preop Diagnosis (Codes):    Post op Diagnosis  Postprocedural intraabdominal abscess      Premium Reason  A. 3PM - 7AM    Comments:

## 2021-08-29 ENCOUNTER — APPOINTMENT (OUTPATIENT)
Dept: RADIOLOGY | Facility: MEDICAL CENTER | Age: 71
DRG: 853 | End: 2021-08-29
Attending: SURGERY
Payer: MEDICARE

## 2021-08-29 LAB
ALBUMIN SERPL BCP-MCNC: 1.8 G/DL (ref 3.2–4.9)
ALBUMIN/GLOB SERPL: 0.6 G/DL
ALP SERPL-CCNC: 137 U/L (ref 30–99)
ALT SERPL-CCNC: 12 U/L (ref 2–50)
AMMONIA PLAS-SCNC: 39 UMOL/L (ref 11–45)
ANION GAP SERPL CALC-SCNC: 9 MMOL/L (ref 7–16)
AST SERPL-CCNC: 26 U/L (ref 12–45)
BACTERIA WND AEROBE CULT: ABNORMAL
BACTERIA WND AEROBE CULT: ABNORMAL
BASOPHILS # BLD AUTO: 0.2 % (ref 0–1.8)
BASOPHILS # BLD: 0.01 K/UL (ref 0–0.12)
BILIRUB SERPL-MCNC: 0.6 MG/DL (ref 0.1–1.5)
BUN SERPL-MCNC: 19 MG/DL (ref 8–22)
CA-I SERPL-SCNC: 1.1 MMOL/L (ref 1.1–1.3)
CALCIUM SERPL-MCNC: 8.1 MG/DL (ref 8.5–10.5)
CHLORIDE SERPL-SCNC: 101 MMOL/L (ref 96–112)
CO2 SERPL-SCNC: 23 MMOL/L (ref 20–33)
CREAT SERPL-MCNC: 0.59 MG/DL (ref 0.5–1.4)
EOSINOPHIL # BLD AUTO: 0 K/UL (ref 0–0.51)
EOSINOPHIL NFR BLD: 0 % (ref 0–6.9)
ERYTHROCYTE [DISTWIDTH] IN BLOOD BY AUTOMATED COUNT: 58.4 FL (ref 35.9–50)
GLOBULIN SER CALC-MCNC: 3.1 G/DL (ref 1.9–3.5)
GLUCOSE SERPL-MCNC: 153 MG/DL (ref 65–99)
GRAM STN SPEC: ABNORMAL
HCT VFR BLD AUTO: 24.1 % (ref 42–52)
HGB BLD-MCNC: 7.8 G/DL (ref 14–18)
IMM GRANULOCYTES # BLD AUTO: 0.03 K/UL (ref 0–0.11)
IMM GRANULOCYTES NFR BLD AUTO: 0.6 % (ref 0–0.9)
LACTATE BLD-SCNC: 1.6 MMOL/L (ref 0.5–2)
LACTATE BLD-SCNC: 2 MMOL/L (ref 0.5–2)
LYMPHOCYTES # BLD AUTO: 0.81 K/UL (ref 1–4.8)
LYMPHOCYTES NFR BLD: 15.9 % (ref 22–41)
MCH RBC QN AUTO: 30.8 PG (ref 27–33)
MCHC RBC AUTO-ENTMCNC: 32.4 G/DL (ref 33.7–35.3)
MCV RBC AUTO: 95.3 FL (ref 81.4–97.8)
MONOCYTES # BLD AUTO: 0.36 K/UL (ref 0–0.85)
MONOCYTES NFR BLD AUTO: 7.1 % (ref 0–13.4)
NEUTROPHILS # BLD AUTO: 3.87 K/UL (ref 1.82–7.42)
NEUTROPHILS NFR BLD: 76.2 % (ref 44–72)
NRBC # BLD AUTO: 0 K/UL
NRBC BLD-RTO: 0 /100 WBC
PLATELET # BLD AUTO: 65 K/UL (ref 164–446)
PMV BLD AUTO: 8.7 FL (ref 9–12.9)
POTASSIUM SERPL-SCNC: 4.1 MMOL/L (ref 3.6–5.5)
PROT SERPL-MCNC: 4.9 G/DL (ref 6–8.2)
RBC # BLD AUTO: 2.53 M/UL (ref 4.7–6.1)
SIGNIFICANT IND 70042: ABNORMAL
SITE SITE: ABNORMAL
SODIUM SERPL-SCNC: 133 MMOL/L (ref 135–145)
SOURCE SOURCE: ABNORMAL
TRIGL SERPL-MCNC: 81 MG/DL (ref 0–149)
WBC # BLD AUTO: 5.1 K/UL (ref 4.8–10.8)

## 2021-08-29 PROCEDURE — 94799 UNLISTED PULMONARY SVC/PX: CPT

## 2021-08-29 PROCEDURE — 700111 HCHG RX REV CODE 636 W/ 250 OVERRIDE (IP): Performed by: STUDENT IN AN ORGANIZED HEALTH CARE EDUCATION/TRAINING PROGRAM

## 2021-08-29 PROCEDURE — 84478 ASSAY OF TRIGLYCERIDES: CPT

## 2021-08-29 PROCEDURE — 700105 HCHG RX REV CODE 258: Performed by: SURGERY

## 2021-08-29 PROCEDURE — 700111 HCHG RX REV CODE 636 W/ 250 OVERRIDE (IP): Performed by: SURGERY

## 2021-08-29 PROCEDURE — A9270 NON-COVERED ITEM OR SERVICE: HCPCS | Performed by: SURGERY

## 2021-08-29 PROCEDURE — 700102 HCHG RX REV CODE 250 W/ 637 OVERRIDE(OP): Performed by: SURGERY

## 2021-08-29 PROCEDURE — 700101 HCHG RX REV CODE 250: Performed by: STUDENT IN AN ORGANIZED HEALTH CARE EDUCATION/TRAINING PROGRAM

## 2021-08-29 PROCEDURE — 302136 NUTRITION PUMP: Performed by: SURGERY

## 2021-08-29 PROCEDURE — 94150 VITAL CAPACITY TEST: CPT

## 2021-08-29 PROCEDURE — 80053 COMPREHEN METABOLIC PANEL: CPT

## 2021-08-29 PROCEDURE — 94003 VENT MGMT INPAT SUBQ DAY: CPT

## 2021-08-29 PROCEDURE — 82330 ASSAY OF CALCIUM: CPT

## 2021-08-29 PROCEDURE — 83605 ASSAY OF LACTIC ACID: CPT | Mod: 91

## 2021-08-29 PROCEDURE — 85025 COMPLETE CBC W/AUTO DIFF WBC: CPT

## 2021-08-29 PROCEDURE — 99291 CRITICAL CARE FIRST HOUR: CPT | Performed by: SURGERY

## 2021-08-29 PROCEDURE — 82140 ASSAY OF AMMONIA: CPT

## 2021-08-29 PROCEDURE — 770022 HCHG ROOM/CARE - ICU (200)

## 2021-08-29 PROCEDURE — 700101 HCHG RX REV CODE 250: Performed by: SURGERY

## 2021-08-29 PROCEDURE — 700105 HCHG RX REV CODE 258: Performed by: STUDENT IN AN ORGANIZED HEALTH CARE EDUCATION/TRAINING PROGRAM

## 2021-08-29 RX ORDER — SODIUM CHLORIDE, SODIUM LACTATE, POTASSIUM CHLORIDE, AND CALCIUM CHLORIDE .6; .31; .03; .02 G/100ML; G/100ML; G/100ML; G/100ML
1000 INJECTION, SOLUTION INTRAVENOUS ONCE
Status: COMPLETED | OUTPATIENT
Start: 2021-08-29 | End: 2021-08-29

## 2021-08-29 RX ORDER — CALCIUM GLUCONATE 20 MG/ML
1 INJECTION, SOLUTION INTRAVENOUS ONCE
Status: COMPLETED | OUTPATIENT
Start: 2021-08-29 | End: 2021-08-29

## 2021-08-29 RX ORDER — FUROSEMIDE 10 MG/ML
40 INJECTION INTRAMUSCULAR; INTRAVENOUS ONCE
Status: COMPLETED | OUTPATIENT
Start: 2021-08-29 | End: 2021-08-29

## 2021-08-29 RX ADMIN — NOREPINEPHRINE BITARTRATE 5 MCG/MIN: 1 INJECTION, SOLUTION, CONCENTRATE INTRAVENOUS at 20:14

## 2021-08-29 RX ADMIN — HYDROMORPHONE HYDROCHLORIDE 0.5 MG: 1 INJECTION, SOLUTION INTRAMUSCULAR; INTRAVENOUS; SUBCUTANEOUS at 12:42

## 2021-08-29 RX ADMIN — OXYCODONE 5 MG: 5 TABLET ORAL at 21:22

## 2021-08-29 RX ADMIN — METRONIDAZOLE 500 MG: 500 INJECTION, SOLUTION INTRAVENOUS at 21:20

## 2021-08-29 RX ADMIN — FAMOTIDINE 20 MG: 10 INJECTION INTRAVENOUS at 05:01

## 2021-08-29 RX ADMIN — PROPOFOL 15 MCG/KG/MIN: 10 INJECTION, EMULSION INTRAVENOUS at 03:17

## 2021-08-29 RX ADMIN — SODIUM CHLORIDE, POTASSIUM CHLORIDE, SODIUM LACTATE AND CALCIUM CHLORIDE: 600; 310; 30; 20 INJECTION, SOLUTION INTRAVENOUS at 19:07

## 2021-08-29 RX ADMIN — CALCIUM GLUCONATE 1 G: 20 INJECTION, SOLUTION INTRAVENOUS at 13:54

## 2021-08-29 RX ADMIN — METRONIDAZOLE 500 MG: 500 INJECTION, SOLUTION INTRAVENOUS at 05:01

## 2021-08-29 RX ADMIN — DOCUSATE SODIUM 50 MG AND SENNOSIDES 8.6 MG 2 TABLET: 8.6; 5 TABLET, FILM COATED ORAL at 17:08

## 2021-08-29 RX ADMIN — PROPOFOL 10 MCG/KG/MIN: 10 INJECTION, EMULSION INTRAVENOUS at 20:15

## 2021-08-29 RX ADMIN — HYDROCORTISONE SODIUM SUCCINATE 100 MG: 100 INJECTION, POWDER, FOR SOLUTION INTRAMUSCULAR; INTRAVENOUS at 05:01

## 2021-08-29 RX ADMIN — CEFTRIAXONE SODIUM 1 G: 1 INJECTION, POWDER, FOR SOLUTION INTRAMUSCULAR; INTRAVENOUS at 17:09

## 2021-08-29 RX ADMIN — HYDROCORTISONE SODIUM SUCCINATE 100 MG: 100 INJECTION, POWDER, FOR SOLUTION INTRAMUSCULAR; INTRAVENOUS at 13:54

## 2021-08-29 RX ADMIN — HYDROMORPHONE HYDROCHLORIDE 0.5 MG: 1 INJECTION, SOLUTION INTRAMUSCULAR; INTRAVENOUS; SUBCUTANEOUS at 04:44

## 2021-08-29 RX ADMIN — FUROSEMIDE 40 MG: 10 INJECTION, SOLUTION INTRAMUSCULAR; INTRAVENOUS at 17:32

## 2021-08-29 RX ADMIN — METRONIDAZOLE 500 MG: 500 INJECTION, SOLUTION INTRAVENOUS at 14:02

## 2021-08-29 RX ADMIN — FAMOTIDINE 20 MG: 20 TABLET ORAL at 17:09

## 2021-08-29 RX ADMIN — SODIUM CHLORIDE, POTASSIUM CHLORIDE, SODIUM LACTATE AND CALCIUM CHLORIDE: 600; 310; 30; 20 INJECTION, SOLUTION INTRAVENOUS at 10:58

## 2021-08-29 RX ADMIN — HYDROCORTISONE SODIUM SUCCINATE 100 MG: 100 INJECTION, POWDER, FOR SOLUTION INTRAMUSCULAR; INTRAVENOUS at 21:20

## 2021-08-29 RX ADMIN — SODIUM CHLORIDE, POTASSIUM CHLORIDE, SODIUM LACTATE AND CALCIUM CHLORIDE 1000 ML: 600; 310; 30; 20 INJECTION, SOLUTION INTRAVENOUS at 03:17

## 2021-08-29 RX ADMIN — LACTULOSE 10 ML: 20 SOLUTION ORAL at 17:08

## 2021-08-29 RX ADMIN — OXYCODONE 5 MG: 5 TABLET ORAL at 15:54

## 2021-08-29 ASSESSMENT — FIBROSIS 4 INDEX: FIB4 SCORE: 16.3

## 2021-08-29 ASSESSMENT — PAIN DESCRIPTION - PAIN TYPE
TYPE: ACUTE PAIN

## 2021-08-29 ASSESSMENT — PULMONARY FUNCTION TESTS
FVC: 1
FVC: 1

## 2021-08-29 NOTE — CARE PLAN
Problem: Ventilation  Goal: Ability to achieve and maintain unassisted ventilation or tolerate decreased levels of ventilator support  Description: Document on Vent flowsheet    1.  Support and monitor invasive and noninvasive mechanical ventilation  2.  Monitor ventilator weaning response  3.  Perform ventilator associated pneumonia prevention interventions  4.  Manage ventilation therapy by monitoring diagnostic test results  Outcome: Progressing      Ventilator Daily Summary    Vent Day # 3  8 @ 21    ASV: 100%/+8/30%    Weaning trials: SBT x 2    Plan: Continue current ventilator settings and wean mechanical ventilation as tolerated per physician orders.

## 2021-08-29 NOTE — PROGRESS NOTES
4 Eyes Skin Assessment Completed by YASMIN Romo and YASMIN Cordoba.    Head WDL  Ears WDL  Nose WDL  Mouth Dry   Neck WDL  Breast/Chest WDL  Shoulder Blades WDL  Spine WDL  (R) Arm/Elbow/Hand Redness, Blanching, Discoloration and Edema, Bruising   (L) Arm/Elbow/Hand Redness, Blanching, Bruising and Edema  Abdomen Incision, swelling   Groin Swelling  Scrotum/Coccyx/Buttocks WDL  (R) Leg WDL  (L) Leg WDL  (R) Heel/Foot/Toe Redness and Blanching  (L) Heel/Foot/Toe Redness and Blanching          Devices In Places ECG, Blood Pressure Cuff, Pulse Ox, Triana, Arterial Line, SCD's, ET Tube, Cortrak, OG/NG and Central Line      Interventions In Place NC Cheek Stickers, Heel Mepilex, Sacral Mepilex, TAP System, Pillows, Q2 Turns, Low Air Loss Mattress, ZFlo Pillow, Heels Loaded W/Pillows and Pressure Redistribution Mattress    Possible Skin Injury No    Pictures Uploaded Into Epic N/A  Wound Consult Placed N/A  RN Wound Prevention Protocol Ordered No

## 2021-08-29 NOTE — PROGRESS NOTES
4 Eyes Skin Assessment Completed by Johanna RN and YASMIN Valenzuela.     Head Blanching  Ears Redness and Blanching  Nose Blanching   Mouth WDL  Neck WDL  Breast/Chest WDL  Shoulder Blades WDL  Spine WDL  (R) Arm/Elbow/Hand Redness, Blanching and Bruising  (L) Arm/Elbow/Hand Redness, Blanching and Bruising  Abdomen Incision  Groin Redness and Blanching  Scrotum/Coccyx/Buttocks Redness and Blanching  (R) Leg Redness, Blanching and Bruising  (L) Leg Redness, Blanching and Bruising  (R) Heel/Foot/Toe Redness, Blanching, Discoloration and Boggy  (L) Heel/Foot/Toe Redness, Blanching, Discoloration and Boggy              Devices In Places ECG, Blood Pressure Cuff, Pulse Ox, Triana, Arterial Line, SCD's, ET Tube, OG/NG and Central Line        Interventions In Place Heel Mepilex, Sacral Mepilex, Heel Float Boots, TAP System, Pillows, Q2 Turns, Low Air Loss Mattress and Barrier Cream     Possible Skin Injury No     Pictures Uploaded Into Epic N/A  Wound Consult Placed N/A  RN Wound Prevention Protocol Ordered Yes

## 2021-08-29 NOTE — ASSESSMENT & PLAN NOTE
Chronic anemia, leukopenia, thrombocytopenia  9/2: Transfused pheresis pack for platelet count 40 in the setting of hemoglobin below 7  Likely mixed etiology advanced liver disease and resolving sepsis.  Trend, transfuse for clinical bleeding.  9/9 ongoing low cell counts  We will discuss with hematology

## 2021-08-29 NOTE — DIETARY
"Nutrition Support/TF Assessment:  Day 3 of admit.  Lorraine Bella is a 71 y.o. male with admitting DX of Bowel perforation, perforated intestine.      Current problem list:  1. Respiratory failure following trauma and surgery   2. Perforated viscus   3. Nausea and vomiting   4. Cirrhosis of liver  5. Protein-calorie malnutrition  6. Normochromic anemia   7. Hyponatremia   8. Hepatocellular carcinoma    9. Thrombocytopenia         Assessment:  Estimated Nutritional Needs based on:   Height: 180.3 cm (5' 10.98\")  Weight: 79.1 kg (174 lb 6.1 oz)  Weight to Use in Calculations: 74.7 kg (164 lb 10.9 oz) (per bedscale on )  Ideal Body Weight: 78 kg (172 lb)  Percent Ideal Body Weight: 95.7  Body mass index is 24.33 kg/m²., BMI classification: Normal    Calculation/Equation: MSJ X 1.2= 1830; [employer] [place] [employer] Equation (PSU) 2003b= 1531  Total Calories / day: 3801-0622 (Calories / k-25)  Total Grams Protein / day:  (Grams Protein / k.2 - 1.4)  Total Free Water/day: 7842-2672 ml/day (25-30 ml/kg)     Evaluation:   1. TF consult received, appropriate due to mechanical ventilation.  2. Enteral access pending; RN says plan is to place Cortrak. Pt currently with OG tube to low continuous suction.   3. Per surgery note: POD 2 exploratory laparotomy, drainage of intra-abdominal abscess;  okay to start trickle feeds.   4. Pertinent Labs: Na+ 133, am Glu 153.  5. Pertinent Meds: Rocephin, Pepcid, Solucortef, LR @ 150 ml/hr, Lactulose, Flagyl, Levophed (held this am), Zofran PRN, Propofol @ 2.3 ml/hr (61 kcals/day), Bowel protocol, Thiamine 100 mg daily (currently held). RN notes propofol currently being weaned and pt alert, possible extubation today but still monitoring need for pressor support.   6. Nutrition Focused Physical Exam limited as pt on vent and sleeping with covers over him; hollowing at temple observed.   7. Per Doc Flow Sheet: pt with generalized 2+ edema to abd, 1+ edema to bilateral lower " and upper extremities. Pt noted to have moderate fluid accumulation.    8. Wt per chart review:  · 93.2 kg/205 lbs per stand up scale on 7/20/2021  · 87.3 kg/192 per bedscale on 7/2/2021  · Based on Current wt, pt with 20% wt loss over past month (severe).     Malnutrition Risk: Pt meets criteria for severe acute malnutrition related to recent intra abd abscess and  Hepatocellular carcinoma as evidenced by severe wt loss of 20% in ~ 1 month per chart review and moderate fluid accumulation.      Recommendations/Plan:  1. Once cortrak placement confirmed begin Trickle feeds: Impact Peptide 1.5 @ 10 ml/hr, do not advance unless okay per MD.   2. When okay to advance TF to goal, RD recommends advancing Impact Peptide 1.5 slowly by 10 ml/hr every 12 hours to goal rate of @ 45 ml/hr to provide 1620 kcals/day, 102 g pro/day, and 832 ml free water/day.  3. Monitor for refeeding: Order BMP w/ Mg and Phos x 7 days. Replete K, Phos and Mg prn. Supplement 100 mg Thiamine x 7 days to reduce risk of refeeding  4. Fluids per MD.   5. RD following.

## 2021-08-29 NOTE — PROGRESS NOTES
Cortrak Placement    Tube Team verified patient name and medical record number prior to tube placement.  Cortrak tube (55 inches, 10 [place]uran) placed at 95 cm in right nare.  Per Cortrak picture, tube appears to be in the small bowel.  Nursing Instructions: Awaiting KUB to confirm placement before use for medications or feeding. Once placement confirmed, flush tube with 30 ml of water, and then remove and save stylet, in patient medication drawer.

## 2021-08-29 NOTE — CARE PLAN
The patient is Watcher - Medium risk of patient condition declining or worsening    Shift Goals  Clinical Goals: Pain Control, Hemodynamic stability  Patient Goals: Unable to assess  Family Goals: To be able to get home     Progress made toward(s) clinical / shift goals:   Problem: Knowledge Deficit - Standard  Goal: Patient and family/care givers will demonstrate understanding of plan of care, disease process/condition, diagnostic tests and medications  Outcome: Progressing     Problem: Safety - Medical Restraint  Goal: Free from restraint(s) (Restraint for Interference with Medical Device)  Outcome: Progressing     Problem: Pain - Standard  Goal: Alleviation of pain or a reduction in pain to the patient’s comfort goal  Outcome: Progressing

## 2021-08-29 NOTE — PROGRESS NOTES
Late entry:    1000: Patient presented during interdisciplinary.  New orders received. No family at bedside.    1100: Urine output total since 1000, 20cc/hr. Dr Jacobs updated on urine output.  No new orders received.

## 2021-08-29 NOTE — CARE PLAN
The patient is Watcher - Medium risk of patient condition declining or worsening    Shift Goals  Clinical Goals:  Hemodynamic stability, Pain Control, adequate UOP  Patient Goals: LAILA  Family Goals: Not present     Progress made toward(s) clinical / shift goals: Pain controlled with PRN Dilaudid. Pt tolerated 10 min mobility to EOB.    Patient is not progressing towards the following goals: Pt requiring small support of Levophed gtt for hemodynamic stability. 1 L bolus given for inadequate UOP.       Problem: Knowledge Deficit - Standard  Goal: Patient and family/care givers will demonstrate understanding of plan of care, disease process/condition, diagnostic tests and medications  Outcome: Not Met     Problem: Pain - Standard  Goal: Alleviation of pain or a reduction in pain to the patient’s comfort goal  Outcome: Met     Problem: Fall Risk  Goal: Patient will remain free from falls  Outcome: Met     Problem: Skin Integrity  Goal: Skin integrity is maintained or improved  Outcome: Met     Problem: Safety - Medical Restraint  Goal: Free from restraint(s) (Restraint for Interference with Medical Device)  Outcome: Progressing

## 2021-08-29 NOTE — PROGRESS NOTES
Trauma / Surgical Daily Progress Note    Date of Service  8/29/2021    Chief Complaint  71 y.o. male admitted 8/26/2021 with abscess and advanced hepatocellular cancer. Underwent laparotomy and abscess drainage    Interval Events  HD #4  Patient is critically ill.   The patient continues to have: sepsis and shock requiring pressor support  The vital organ system that is affected is the: lungs, liver, in shock  If untreated there is a high chance of deterioration into: worsening shock, hypotension  And eventually death.   The critical care that I am providing today is: vent weaning- possible extubation if passes SBT.  Treating sepsis with abx, pressors and fluid resuscitation. Will have oncology involved as well for overall prognostic info.   The critical that has been undertaken is medically complex.   There has been no overlap in critical care time.   Critical Care Time not including procedures: 40      Review of Systems  Review of Systems   Unable to perform ROS: Intubated        Vital Signs for last 24 hours  Pulse:  [] 109  Resp:  [10-83] 14  BP: ()/(54-88) 107/77  SpO2:  [96 %-100 %] 100 %    Hemodynamic parameters for last 24 hours  CVP:  [5 MM HG-50 MM HG] 8 MM HG    Respiratory Data     Respiration: 14, Pulse Oximetry: 100 %     Work Of Breathing / Effort: Vented  RUL Breath Sounds: Clear, RML Breath Sounds: Clear, RLL Breath Sounds: Diminished, ASHLEY Breath Sounds: Clear, LLL Breath Sounds: Diminished    Physical Exam  Physical Exam  Constitutional:       Appearance: He is ill-appearing and toxic-appearing.   HENT:      Head: Normocephalic.      Right Ear: Tympanic membrane normal.      Left Ear: Tympanic membrane normal.      Nose: Nose normal.      Mouth/Throat:      Mouth: Mucous membranes are dry.   Eyes:      Extraocular Movements: Extraocular movements intact.      Pupils: Pupils are equal, round, and reactive to light.   Cardiovascular:      Rate and Rhythm: Normal rate and regular rhythm.       Pulses: Normal pulses.   Pulmonary:      Comments: Intubated on ventilator  Abdominal:      General: There is distension.      Palpations: There is no mass.   Musculoskeletal:         General: Normal range of motion.      Cervical back: Normal range of motion and neck supple.   Skin:     General: Skin is warm and dry.   Neurological:      Mental Status: He is alert.   Psychiatric:      Comments: Cannot fully assess         Laboratory  Recent Results (from the past 24 hour(s))   CBC WITH DIFFERENTIAL    Collection Time: 08/29/21  4:50 AM   Result Value Ref Range    WBC 5.1 4.8 - 10.8 K/uL    RBC 2.53 (L) 4.70 - 6.10 M/uL    Hemoglobin 7.8 (L) 14.0 - 18.0 g/dL    Hematocrit 24.1 (L) 42.0 - 52.0 %    MCV 95.3 81.4 - 97.8 fL    MCH 30.8 27.0 - 33.0 pg    MCHC 32.4 (L) 33.7 - 35.3 g/dL    RDW 58.4 (H) 35.9 - 50.0 fL    Platelet Count 65 (L) 164 - 446 K/uL    MPV 8.7 (L) 9.0 - 12.9 fL    Neutrophils-Polys 76.20 (H) 44.00 - 72.00 %    Lymphocytes 15.90 (L) 22.00 - 41.00 %    Monocytes 7.10 0.00 - 13.40 %    Eosinophils 0.00 0.00 - 6.90 %    Basophils 0.20 0.00 - 1.80 %    Immature Granulocytes 0.60 0.00 - 0.90 %    Nucleated RBC 0.00 /100 WBC    Neutrophils (Absolute) 3.87 1.82 - 7.42 K/uL    Lymphs (Absolute) 0.81 (L) 1.00 - 4.80 K/uL    Monos (Absolute) 0.36 0.00 - 0.85 K/uL    Eos (Absolute) 0.00 0.00 - 0.51 K/uL    Baso (Absolute) 0.01 0.00 - 0.12 K/uL    Immature Granulocytes (abs) 0.03 0.00 - 0.11 K/uL    NRBC (Absolute) 0.00 K/uL   Comp Metabolic Panel    Collection Time: 08/29/21  4:50 AM   Result Value Ref Range    Sodium 133 (L) 135 - 145 mmol/L    Potassium 4.1 3.6 - 5.5 mmol/L    Chloride 101 96 - 112 mmol/L    Co2 23 20 - 33 mmol/L    Anion Gap 9.0 7.0 - 16.0    Glucose 153 (H) 65 - 99 mg/dL    Bun 19 8 - 22 mg/dL    Creatinine 0.59 0.50 - 1.40 mg/dL    Calcium 8.1 (L) 8.5 - 10.5 mg/dL    AST(SGOT) 26 12 - 45 U/L    ALT(SGPT) 12 2 - 50 U/L    Alkaline Phosphatase 137 (H) 30 - 99 U/L    Total Bilirubin 0.6  0.1 - 1.5 mg/dL    Albumin 1.8 (L) 3.2 - 4.9 g/dL    Total Protein 4.9 (L) 6.0 - 8.2 g/dL    Globulin 3.1 1.9 - 3.5 g/dL    A-G Ratio 0.6 g/dL   IONIZED CALCIUM    Collection Time: 08/29/21  4:50 AM   Result Value Ref Range    Ionized Calcium 1.1 1.1 - 1.3 mmol/L   Triglyceride    Collection Time: 08/29/21  4:50 AM   Result Value Ref Range    Triglycerides 81 0 - 149 mg/dL   ESTIMATED GFR    Collection Time: 08/29/21  4:50 AM   Result Value Ref Range    GFR If African American >60 >60 mL/min/1.73 m 2    GFR If Non African American >60 >60 mL/min/1.73 m 2   LACTIC ACID    Collection Time: 08/29/21 10:30 AM   Result Value Ref Range    Lactic Acid 1.6 0.5 - 2.0 mmol/L   AMMONIA    Collection Time: 08/29/21 10:30 AM   Result Value Ref Range    Ammonia 39 11 - 45 umol/L   LACTIC ACID    Collection Time: 08/29/21  2:50 PM   Result Value Ref Range    Lactic Acid 2.0 0.5 - 2.0 mmol/L       Fluids    Intake/Output Summary (Last 24 hours) at 8/29/2021 1639  Last data filed at 8/29/2021 1600  Gross per 24 hour   Intake 5959.28 ml   Output 1415 ml   Net 4544.28 ml       Core Measures & Quality Metrics  Labs reviewed, EKG reviewed, Radiology images reviewed and Medications reviewed  Triana catheter: Critically Ill - Requiring Accurate Measurement of Urinary Output  Central line in place: Need for access    DVT Prophylaxis: Contraindicated - High bleeding risk  DVT prophylaxis - mechanical: SCDs  Ulcer prophylaxis: Yes  Antibiotics: Treating active infection/contamination beyond 24 hours perioperative coverage      CHELI Score  ETOH Screening    Assessment/Plan  Sepsis (HCC)- (present on admission)  Assessment & Plan  Secondary to liver abscess  Ongoing volume resuscitation and pressors support  abx day #3 rocephin and flagyl    Hepatocellular carcinoma (HCC)  Assessment & Plan  Advanced  Associated abscess    Respiratory failure following trauma and surgery (HCC)  Assessment & Plan  On vent, weaning per protocol      Normochromic  anemia- (present on admission)  Assessment & Plan  Trend and transfuse for hgb <7.  Hgb today is 7.8    Thrombocytopenia (HCC)- (present on admission)  Assessment & Plan  Trend    Replace calcium  Lactate improved.   Monitor MARIANA output    Discussed patient condition with RN, Pharmacy and Patient.  CRITICAL CARE TIME EXCLUDING PROCEDURES: 40    minutes

## 2021-08-29 NOTE — PROGRESS NOTES
Acute Care Surgery Note    POD 2 exploratory laparotomy, drainage of intra-abdominal abscess    Two boluses overnight for inadequate UOP. Still requiring vent.   Alert and mouthing words around vent today.   Drainage: RUQ 245cc, sanguinous; LUQ 55cc serosanguinous  NGT 700cc, 250 in the last 12 hours.   Midline incision c/d/i    Hb 9.9 to 7.8 this morning    POD 2 exploratory laparotomy, drainage of intra-abdominal abscess  Ok to start trickles feeds from my standpoint  May need pRBCs at some point today  Family declining consideration of comfort care/hospice  Appreciate critical care assistance.

## 2021-08-30 ENCOUNTER — APPOINTMENT (OUTPATIENT)
Dept: RADIOLOGY | Facility: MEDICAL CENTER | Age: 71
DRG: 853 | End: 2021-08-30
Attending: SURGERY
Payer: MEDICARE

## 2021-08-30 PROBLEM — I48.91 ATRIAL FIBRILLATION (HCC): Status: ACTIVE | Noted: 2021-08-30

## 2021-08-30 LAB
ALBUMIN SERPL BCP-MCNC: 1.8 G/DL (ref 3.2–4.9)
ALBUMIN/GLOB SERPL: 0.6 G/DL
ALP SERPL-CCNC: 171 U/L (ref 30–99)
ALT SERPL-CCNC: 12 U/L (ref 2–50)
ANION GAP SERPL CALC-SCNC: 9 MMOL/L (ref 7–16)
ANION GAP SERPL CALC-SCNC: 9 MMOL/L (ref 7–16)
AST SERPL-CCNC: 45 U/L (ref 12–45)
BACTERIA SPEC ANAEROBE CULT: NORMAL
BASOPHILS # BLD AUTO: 0.2 % (ref 0–1.8)
BASOPHILS # BLD AUTO: 0.2 % (ref 0–1.8)
BASOPHILS # BLD: 0.02 K/UL (ref 0–0.12)
BASOPHILS # BLD: 0.04 K/UL (ref 0–0.12)
BILIRUB SERPL-MCNC: 0.6 MG/DL (ref 0.1–1.5)
BUN SERPL-MCNC: 22 MG/DL (ref 8–22)
BUN SERPL-MCNC: 22 MG/DL (ref 8–22)
CA-I SERPL-SCNC: 1.2 MMOL/L (ref 1.1–1.3)
CALCIUM SERPL-MCNC: 8.4 MG/DL (ref 8.5–10.5)
CALCIUM SERPL-MCNC: 8.4 MG/DL (ref 8.5–10.5)
CHLORIDE SERPL-SCNC: 100 MMOL/L (ref 96–112)
CHLORIDE SERPL-SCNC: 103 MMOL/L (ref 96–112)
CO2 SERPL-SCNC: 21 MMOL/L (ref 20–33)
CO2 SERPL-SCNC: 24 MMOL/L (ref 20–33)
CREAT SERPL-MCNC: 0.62 MG/DL (ref 0.5–1.4)
CREAT SERPL-MCNC: 0.67 MG/DL (ref 0.5–1.4)
CRP SERPL HS-MCNC: 5.69 MG/DL (ref 0–0.75)
EKG IMPRESSION: NORMAL
EOSINOPHIL # BLD AUTO: 0 K/UL (ref 0–0.51)
EOSINOPHIL # BLD AUTO: 0 K/UL (ref 0–0.51)
EOSINOPHIL NFR BLD: 0 % (ref 0–6.9)
EOSINOPHIL NFR BLD: 0 % (ref 0–6.9)
ERYTHROCYTE [DISTWIDTH] IN BLOOD BY AUTOMATED COUNT: 58.4 FL (ref 35.9–50)
ERYTHROCYTE [DISTWIDTH] IN BLOOD BY AUTOMATED COUNT: 59.8 FL (ref 35.9–50)
FUNGUS SPEC FUNGUS STN: NORMAL
GLOBULIN SER CALC-MCNC: 3.1 G/DL (ref 1.9–3.5)
GLUCOSE SERPL-MCNC: 161 MG/DL (ref 65–99)
GLUCOSE SERPL-MCNC: 164 MG/DL (ref 65–99)
HCT VFR BLD AUTO: 27.6 % (ref 42–52)
HCT VFR BLD AUTO: 28.2 % (ref 42–52)
HGB BLD-MCNC: 9 G/DL (ref 14–18)
HGB BLD-MCNC: 9.1 G/DL (ref 14–18)
IMM GRANULOCYTES # BLD AUTO: 0.07 K/UL (ref 0–0.11)
IMM GRANULOCYTES # BLD AUTO: 0.17 K/UL (ref 0–0.11)
IMM GRANULOCYTES NFR BLD AUTO: 0.8 % (ref 0–0.9)
IMM GRANULOCYTES NFR BLD AUTO: 1 % (ref 0–0.9)
LYMPHOCYTES # BLD AUTO: 0.96 K/UL (ref 1–4.8)
LYMPHOCYTES # BLD AUTO: 1.31 K/UL (ref 1–4.8)
LYMPHOCYTES NFR BLD: 14.1 % (ref 22–41)
LYMPHOCYTES NFR BLD: 5.6 % (ref 22–41)
MAGNESIUM SERPL-MCNC: 1.6 MG/DL (ref 1.5–2.5)
MCH RBC QN AUTO: 31.4 PG (ref 27–33)
MCH RBC QN AUTO: 31.7 PG (ref 27–33)
MCHC RBC AUTO-ENTMCNC: 32.3 G/DL (ref 33.7–35.3)
MCHC RBC AUTO-ENTMCNC: 32.6 G/DL (ref 33.7–35.3)
MCV RBC AUTO: 96.2 FL (ref 81.4–97.8)
MCV RBC AUTO: 98.3 FL (ref 81.4–97.8)
MONOCYTES # BLD AUTO: 0.65 K/UL (ref 0–0.85)
MONOCYTES # BLD AUTO: 1.64 K/UL (ref 0–0.85)
MONOCYTES NFR BLD AUTO: 7 % (ref 0–13.4)
MONOCYTES NFR BLD AUTO: 9.6 % (ref 0–13.4)
NEUTROPHILS # BLD AUTO: 14.23 K/UL (ref 1.82–7.42)
NEUTROPHILS # BLD AUTO: 7.25 K/UL (ref 1.82–7.42)
NEUTROPHILS NFR BLD: 77.9 % (ref 44–72)
NEUTROPHILS NFR BLD: 83.6 % (ref 44–72)
NRBC # BLD AUTO: 0 K/UL
NRBC # BLD AUTO: 0.11 K/UL
NRBC BLD-RTO: 0 /100 WBC
NRBC BLD-RTO: 0.6 /100 WBC
PHOSPHATE SERPL-MCNC: 3.3 MG/DL (ref 2.5–4.5)
PLATELET # BLD AUTO: 178 K/UL (ref 164–446)
PLATELET # BLD AUTO: ABNORMAL K/UL (ref 164–446)
PMV BLD AUTO: 8.6 FL (ref 9–12.9)
PMV BLD AUTO: 8.8 FL (ref 9–12.9)
POTASSIUM SERPL-SCNC: 3.7 MMOL/L (ref 3.6–5.5)
POTASSIUM SERPL-SCNC: 3.8 MMOL/L (ref 3.6–5.5)
PREALB SERPL-MCNC: 6.6 MG/DL (ref 18–38)
PREALB SERPL-MCNC: 6.8 MG/DL (ref 18–38)
PROT SERPL-MCNC: 4.9 G/DL (ref 6–8.2)
RBC # BLD AUTO: 2.87 M/UL (ref 4.7–6.1)
RBC # BLD AUTO: 2.87 M/UL (ref 4.7–6.1)
SIGNIFICANT IND 70042: NORMAL
SIGNIFICANT IND 70042: NORMAL
SITE SITE: NORMAL
SITE SITE: NORMAL
SODIUM SERPL-SCNC: 133 MMOL/L (ref 135–145)
SODIUM SERPL-SCNC: 133 MMOL/L (ref 135–145)
SOURCE SOURCE: NORMAL
SOURCE SOURCE: NORMAL
WBC # BLD AUTO: 17 K/UL (ref 4.8–10.8)
WBC # BLD AUTO: 9.3 K/UL (ref 4.8–10.8)

## 2021-08-30 PROCEDURE — 84100 ASSAY OF PHOSPHORUS: CPT

## 2021-08-30 PROCEDURE — 700101 HCHG RX REV CODE 250: Performed by: SURGERY

## 2021-08-30 PROCEDURE — 83735 ASSAY OF MAGNESIUM: CPT

## 2021-08-30 PROCEDURE — 99291 CRITICAL CARE FIRST HOUR: CPT | Performed by: SURGERY

## 2021-08-30 PROCEDURE — 700111 HCHG RX REV CODE 636 W/ 250 OVERRIDE (IP): Performed by: SURGERY

## 2021-08-30 PROCEDURE — 700102 HCHG RX REV CODE 250 W/ 637 OVERRIDE(OP): Performed by: SURGERY

## 2021-08-30 PROCEDURE — 86140 C-REACTIVE PROTEIN: CPT

## 2021-08-30 PROCEDURE — 700105 HCHG RX REV CODE 258: Performed by: SURGERY

## 2021-08-30 PROCEDURE — 84134 ASSAY OF PREALBUMIN: CPT | Mod: 91

## 2021-08-30 PROCEDURE — 80053 COMPREHEN METABOLIC PANEL: CPT

## 2021-08-30 PROCEDURE — 94799 UNLISTED PULMONARY SVC/PX: CPT

## 2021-08-30 PROCEDURE — A9270 NON-COVERED ITEM OR SERVICE: HCPCS | Performed by: SURGERY

## 2021-08-30 PROCEDURE — 770022 HCHG ROOM/CARE - ICU (200)

## 2021-08-30 PROCEDURE — 85025 COMPLETE CBC W/AUTO DIFF WBC: CPT

## 2021-08-30 PROCEDURE — 700111 HCHG RX REV CODE 636 W/ 250 OVERRIDE (IP): Performed by: STUDENT IN AN ORGANIZED HEALTH CARE EDUCATION/TRAINING PROGRAM

## 2021-08-30 PROCEDURE — 700101 HCHG RX REV CODE 250: Performed by: STUDENT IN AN ORGANIZED HEALTH CARE EDUCATION/TRAINING PROGRAM

## 2021-08-30 PROCEDURE — 700105 HCHG RX REV CODE 258: Performed by: STUDENT IN AN ORGANIZED HEALTH CARE EDUCATION/TRAINING PROGRAM

## 2021-08-30 PROCEDURE — 93005 ELECTROCARDIOGRAM TRACING: CPT | Performed by: SURGERY

## 2021-08-30 PROCEDURE — 80048 BASIC METABOLIC PNL TOTAL CA: CPT

## 2021-08-30 PROCEDURE — 99292 CRITICAL CARE ADDL 30 MIN: CPT | Performed by: SURGERY

## 2021-08-30 PROCEDURE — 93010 ELECTROCARDIOGRAM REPORT: CPT | Performed by: INTERNAL MEDICINE

## 2021-08-30 PROCEDURE — 94003 VENT MGMT INPAT SUBQ DAY: CPT

## 2021-08-30 PROCEDURE — 94150 VITAL CAPACITY TEST: CPT

## 2021-08-30 PROCEDURE — 82330 ASSAY OF CALCIUM: CPT

## 2021-08-30 RX ORDER — POTASSIUM CHLORIDE 14.9 MG/ML
20 INJECTION INTRAVENOUS ONCE
Status: COMPLETED | OUTPATIENT
Start: 2021-08-30 | End: 2021-08-30

## 2021-08-30 RX ORDER — SPIRONOLACTONE 50 MG/1
100 TABLET, FILM COATED ORAL
Status: DISCONTINUED | OUTPATIENT
Start: 2021-08-30 | End: 2021-09-02

## 2021-08-30 RX ORDER — FUROSEMIDE 10 MG/ML
40 INJECTION INTRAMUSCULAR; INTRAVENOUS
Status: DISCONTINUED | OUTPATIENT
Start: 2021-08-30 | End: 2021-08-30

## 2021-08-30 RX ORDER — METOPROLOL TARTRATE 1 MG/ML
5 INJECTION, SOLUTION INTRAVENOUS
Status: DISCONTINUED | OUTPATIENT
Start: 2021-08-30 | End: 2021-08-30

## 2021-08-30 RX ORDER — MAGNESIUM SULFATE HEPTAHYDRATE 40 MG/ML
2 INJECTION, SOLUTION INTRAVENOUS ONCE
Status: COMPLETED | OUTPATIENT
Start: 2021-08-30 | End: 2021-08-30

## 2021-08-30 RX ORDER — SODIUM CHLORIDE, SODIUM LACTATE, POTASSIUM CHLORIDE, AND CALCIUM CHLORIDE .6; .31; .03; .02 G/100ML; G/100ML; G/100ML; G/100ML
1000 INJECTION, SOLUTION INTRAVENOUS ONCE
Status: COMPLETED | OUTPATIENT
Start: 2021-08-30 | End: 2021-08-30

## 2021-08-30 RX ORDER — HYDROMORPHONE HYDROCHLORIDE 1 MG/ML
.25-.5 INJECTION, SOLUTION INTRAMUSCULAR; INTRAVENOUS; SUBCUTANEOUS
Status: DISCONTINUED | OUTPATIENT
Start: 2021-08-30 | End: 2021-09-11

## 2021-08-30 RX ADMIN — LACTULOSE 10 ML: 20 SOLUTION ORAL at 05:16

## 2021-08-30 RX ADMIN — HYDROCORTISONE SODIUM SUCCINATE 100 MG: 100 INJECTION, POWDER, FOR SOLUTION INTRAMUSCULAR; INTRAVENOUS at 05:16

## 2021-08-30 RX ADMIN — AMIODARONE HYDROCHLORIDE 150 MG: 1.5 INJECTION, SOLUTION INTRAVENOUS at 15:49

## 2021-08-30 RX ADMIN — HYDROCORTISONE SODIUM SUCCINATE 100 MG: 100 INJECTION, POWDER, FOR SOLUTION INTRAMUSCULAR; INTRAVENOUS at 22:48

## 2021-08-30 RX ADMIN — FAMOTIDINE 20 MG: 20 TABLET ORAL at 17:43

## 2021-08-30 RX ADMIN — AMIODARONE HYDROCHLORIDE 1 MG/MIN: 1.8 INJECTION, SOLUTION INTRAVENOUS at 16:07

## 2021-08-30 RX ADMIN — SODIUM CHLORIDE, POTASSIUM CHLORIDE, SODIUM LACTATE AND CALCIUM CHLORIDE: 600; 310; 30; 20 INJECTION, SOLUTION INTRAVENOUS at 11:02

## 2021-08-30 RX ADMIN — AMIODARONE HYDROCHLORIDE 1 MG/MIN: 1.8 INJECTION, SOLUTION INTRAVENOUS at 22:46

## 2021-08-30 RX ADMIN — DOCUSATE SODIUM 50 MG AND SENNOSIDES 8.6 MG 2 TABLET: 8.6; 5 TABLET, FILM COATED ORAL at 17:44

## 2021-08-30 RX ADMIN — NOREPINEPHRINE BITARTRATE 20 MCG/MIN: 1 INJECTION, SOLUTION, CONCENTRATE INTRAVENOUS at 18:00

## 2021-08-30 RX ADMIN — OXYCODONE HYDROCHLORIDE 10 MG: 10 TABLET ORAL at 08:10

## 2021-08-30 RX ADMIN — DOCUSATE SODIUM 50 MG AND SENNOSIDES 8.6 MG 2 TABLET: 8.6; 5 TABLET, FILM COATED ORAL at 05:18

## 2021-08-30 RX ADMIN — OXYCODONE HYDROCHLORIDE 10 MG: 10 TABLET ORAL at 11:37

## 2021-08-30 RX ADMIN — HYDROCORTISONE SODIUM SUCCINATE 100 MG: 100 INJECTION, POWDER, FOR SOLUTION INTRAMUSCULAR; INTRAVENOUS at 14:11

## 2021-08-30 RX ADMIN — LACTULOSE 10 ML: 20 SOLUTION ORAL at 17:43

## 2021-08-30 RX ADMIN — METRONIDAZOLE 500 MG: 500 INJECTION, SOLUTION INTRAVENOUS at 22:47

## 2021-08-30 RX ADMIN — METRONIDAZOLE 500 MG: 500 INJECTION, SOLUTION INTRAVENOUS at 05:16

## 2021-08-30 RX ADMIN — POLYETHYLENE GLYCOL 3350 1 PACKET: 17 POWDER, FOR SOLUTION ORAL at 17:43

## 2021-08-30 RX ADMIN — MAGNESIUM SULFATE 2 G: 2 INJECTION INTRAVENOUS at 11:02

## 2021-08-30 RX ADMIN — POTASSIUM CHLORIDE 20 MEQ: 14.9 INJECTION, SOLUTION INTRAVENOUS at 11:02

## 2021-08-30 RX ADMIN — METOPROLOL TARTRATE 5 MG: 5 INJECTION INTRAVENOUS at 14:51

## 2021-08-30 RX ADMIN — SPIRONOLACTONE 100 MG: 50 TABLET ORAL at 16:03

## 2021-08-30 RX ADMIN — HYDROMORPHONE HYDROCHLORIDE 0.25 MG: 1 INJECTION, SOLUTION INTRAMUSCULAR; INTRAVENOUS; SUBCUTANEOUS at 23:29

## 2021-08-30 RX ADMIN — SODIUM CHLORIDE, POTASSIUM CHLORIDE, SODIUM LACTATE AND CALCIUM CHLORIDE: 600; 310; 30; 20 INJECTION, SOLUTION INTRAVENOUS at 22:48

## 2021-08-30 RX ADMIN — THIAMINE HCL TAB 100 MG 100 MG: 100 TAB at 05:16

## 2021-08-30 RX ADMIN — AMIODARONE HYDROCHLORIDE 150 MG: 1.5 INJECTION, SOLUTION INTRAVENOUS at 17:44

## 2021-08-30 RX ADMIN — FUROSEMIDE 40 MG: 10 INJECTION, SOLUTION INTRAMUSCULAR; INTRAVENOUS at 11:01

## 2021-08-30 RX ADMIN — FAMOTIDINE 20 MG: 20 TABLET ORAL at 05:16

## 2021-08-30 RX ADMIN — METRONIDAZOLE 500 MG: 500 INJECTION, SOLUTION INTRAVENOUS at 14:11

## 2021-08-30 RX ADMIN — CEFTRIAXONE SODIUM 1 G: 1 INJECTION, POWDER, FOR SOLUTION INTRAMUSCULAR; INTRAVENOUS at 17:44

## 2021-08-30 RX ADMIN — SODIUM CHLORIDE, POTASSIUM CHLORIDE, SODIUM LACTATE AND CALCIUM CHLORIDE 1000 ML: 600; 310; 30; 20 INJECTION, SOLUTION INTRAVENOUS at 21:30

## 2021-08-30 RX ADMIN — FUROSEMIDE 40 MG: 10 INJECTION, SOLUTION INTRAMUSCULAR; INTRAVENOUS at 15:49

## 2021-08-30 ASSESSMENT — COPD QUESTIONNAIRES
DURING THE PAST 4 WEEKS HOW MUCH DID YOU FEEL SHORT OF BREATH: NONE/LITTLE OF THE TIME
COPD SCREENING SCORE: 6
DO YOU EVER COUGH UP ANY MUCUS OR PHLEGM?: YES, EVERY DAY
HAVE YOU SMOKED AT LEAST 100 CIGARETTES IN YOUR ENTIRE LIFE: YES

## 2021-08-30 ASSESSMENT — PAIN DESCRIPTION - PAIN TYPE
TYPE: ACUTE PAIN
TYPE: ACUTE PAIN;SURGICAL PAIN
TYPE: ACUTE PAIN
TYPE: ACUTE PAIN;SURGICAL PAIN
TYPE: ACUTE PAIN
TYPE: ACUTE PAIN;SURGICAL PAIN
TYPE: ACUTE PAIN
TYPE: ACUTE PAIN

## 2021-08-30 ASSESSMENT — FIBROSIS 4 INDEX: FIB4 SCORE: 14.19

## 2021-08-30 ASSESSMENT — PULMONARY FUNCTION TESTS: FVC: 955

## 2021-08-30 NOTE — CARE PLAN
Problem: Knowledge Deficit - Standard  Goal: Patient and family/care givers will demonstrate understanding of plan of care, disease process/condition, diagnostic tests and medications  Outcome: Progressing     Problem: Pain - Standard  Goal: Alleviation of pain or a reduction in pain to the patient’s comfort goal  Outcome: Progressing     The patient is Watcher - Medium risk of patient condition declining or worsening    Shift Goals  Clinical Goals: pain control, mobilize, maintain MAP >65  Patient Goals: unable to assess  Family Goals: no family present    Progress made toward(s) clinical / shift goals:  Patient's pain appears to be well controlled, oxycodone available PRN. Patient was able to mobilize to edge of bed this shift. Levophed infusing per MAR to achieve MAP >65.

## 2021-08-30 NOTE — PROGRESS NOTES
"Surgery    71-year-old male cancer metastatic to liver status post drainage of abscess likely secondary to necrotic tumor.  Second time is occurred in the last 2 months.    Postop day 3 after drainage of perihepatic abscess  Remains on vent  Good SBT  Drain output decreasing    BP (!) 94/66   Pulse 99   Temp 36 °C (96.8 °F) (Temporal)   Resp 15   Ht 1.803 m (5' 10.98\")   Wt 91.8 kg (202 lb 6.1 oz) Comment: subtracted 12.7kg for bed extender  SpO2 100%   BMI 28.24 kg/m²     Asleep on vent  Abdomen softly, distended  Fluid wave  Drains a serosanguineous    Recent Labs     08/27/21 2128 08/27/21 2128 08/28/21 0013 08/28/21 0433 08/29/21 0450   WBC 10.3  --   --  9.5 5.1   RBC 3.51*  --   --  3.12* 2.53*   HEMOGLOBIN 11.0*   < > 10.1* 9.9* 7.8*   HEMATOCRIT 33.5*   < > 29.5* 29.3* 24.1*   MCV 95.4  --   --  93.9 95.3   MCH 31.3  --   --  31.7 30.8   RDW 56.5*  --   --  58.2* 58.4*   PLATELETCT 101*  --   --  88* 65*   MPV 8.9*  --   --  8.9* 8.7*   NEUTSPOLYS 81.90*  --   --  92.20* 76.20*   LYMPHOCYTES 6.00*  --   --  2.60* 15.90*   MONOCYTES 5.20  --   --  1.70 7.10   EOSINOPHILS 0.00  --   --  0.00 0.00   BASOPHILS 0.90  --   --  0.00 0.20   RBCMORPHOLO Present  --   --  Present  --     < > = values in this interval not displayed.       Recent Labs     08/28/21 0433 08/28/21 0453 08/29/21 0450 08/29/21  1030 08/30/21  0455   ASTSGOT 70*  --  26  --  45   ALTSGPT 12  --  12  --  12   TBILIRUBIN 1.3  --  0.6  --  0.6   ALKPHOSPHAT 170*  --  137*  --  171*   GLOBULIN 3.8*  --  3.1  --  3.1   AMMONIA  --  78*  --  39  --      Recent Labs     08/28/21  0433 08/29/21  0450 08/30/21  0455   SODIUM 131* 133* 133*   POTASSIUM 4.5 4.1 3.7   CHLORIDE 101 101 103   CO2 21 23 21   GLUCOSE 135* 153* 161*   BUN 17 19 22   CREATININE 0.51 0.59 0.62     Assessment and plan:  Postop day 3 after drainage of perihepatic abscess  Slow progress on the ventilator  Some ascites: Trend abdominal exam.  If increasing will need " ultrasound and paracentesis as needed.  Once stable should restart diuretics  Remove left-sided abdominal drain  Possible extubation today  Trending cultures  Appreciate ICU management

## 2021-08-30 NOTE — CARE PLAN
Problem: Ventilation  Goal: Ability to achieve and maintain unassisted ventilation or tolerate decreased levels of ventilator support  Description: Document on Vent flowsheet    1.  Support and monitor invasive and noninvasive mechanical ventilation  2.  Monitor ventilator weaning response  3.  Perform ventilator associated pneumonia prevention interventions  4.  Manage ventilation therapy by monitoring diagnostic test results  Outcome: Progressing        Ventilator Daily Summary    Vent Day # 4    Ventilator settings changed this shift: titrated fio2 and remains on %    Weaning trials: No    Respiratory Procedures: Intubated     Plan: Continue current ventilator settings and wean mechanical ventilation as tolerated per physician orders.

## 2021-08-30 NOTE — ASSESSMENT & PLAN NOTE
Diuresis  Aggressive electrolyte replacement  Metoprolol and amiodarone   8/31- converted out in OR, continues on amiodarone gtt  9/3: Amiodarone drip  9/5 transition to enteral amiodarone

## 2021-08-30 NOTE — CARE PLAN
The patient is Watcher - Medium risk of patient condition declining or worsening    Shift Goals  Clinical Goals: Pain Control, Extuabation, maintain MAP >65  Patient Goals: Unable to assess  Family Goals: No family present     Progress made toward(s) clinical / shift goals:       Problem: Knowledge Deficit - Standard  Goal: Patient and family/care givers will demonstrate understanding of plan of care, disease process/condition, diagnostic tests and medications  Outcome: Progressing     Problem: Safety - Medical Restraint  Goal: Free from restraint(s) (Restraint for Interference with Medical Device)  Outcome: Progressing     Problem: Pain - Standard  Goal: Alleviation of pain or a reduction in pain to the patient’s comfort goal  Outcome: Progressing     Problem: Skin Integrity - Diabetes  Goal: Patient's skin on legs and feet will remain intact while hospitalized  Outcome: Progressing

## 2021-08-30 NOTE — PROGRESS NOTES
Late Entry:     1115: RT and RN at bedside extubating patient. Patient tolerated well and is now on 6L oxy mask.     1330: Patient converted to Afib w/RVR, ordered stat ECG to confirm. notified Dr. Jacobs of change. Orders received and implemented

## 2021-08-30 NOTE — PROGRESS NOTES
Trauma / Surgical Daily Progress Note    Date of Service  8/30/2021    Chief Complaint  71 y.o. male admitted 8/26/2021 with abscess and advanced hepatocellular cancer. Underwent laparotomy and abscess drainage    Interval Events  Psych patient passed a spontaneous breathing trial this morning.  He was subsequently extubated successfully but still has somewhat of a weak cough.  He is awake and alert.  He is still on Levophed.  This afternoon despite aggressive electrolyte replacement this morning and ongoing diuresis he went into atrial fibrillation.  He does not have home atrial fibrillation.  He did get metoprolol pushes which were unsuccessful in converting him although they did help with the rate control.  He is also going to start an amiodarone drip with bolus for the atrial fibrillation.  He remains on trickle tube feeds which we will continue.  His MARIANA output still looks little bit purulent.  Ongoing antibiotics.  Patient is critically ill.   The patient continues to have: Hepatic abscess drainage, shock, septic shock, atrial fibrillation, hypoxic respiratory failure  If untreated there is a high chance of deterioration and eventually death.   The critical care that I am providing today is: Ventilator management, sedation management, subsequent extubation, atrial for bandage meant, shock management  The critical that has been undertaken is medically complex.   There has been no overlap in critical care time.   Critical Care Time not including procedures: 80      Review of Systems  Review of Systems   Unable to perform ROS: Intubated        Vital Signs for last 24 hours  Temp:  [36 °C (96.8 °F)-36.7 °C (98 °F)] 36.7 °C (98 °F)  Pulse:  [] 103  Resp:  [0-36] 13  BP: ()/(61-75) 87/61  SpO2:  [98 %-100 %] 100 %    Hemodynamic parameters for last 24 hours  CVP:  [8 MM HG-250 MM HG] 9 MM HG    Respiratory Data     Respiration: 13, Pulse Oximetry: 100 %     Work Of Breathing / Effort: Vented  RUL Breath  Sounds: Diminished;Fine Crackles, RML Breath Sounds: Diminished, RLL Breath Sounds: Diminished, ASHLEY Breath Sounds: Diminished;Fine Crackles, LLL Breath Sounds: Diminished    Physical Exam  Physical Exam  Constitutional:       Appearance: He is ill-appearing.   HENT:      Head: Normocephalic and atraumatic.      Right Ear: Tympanic membrane normal.      Left Ear: Tympanic membrane normal.      Nose: Nose normal.      Mouth/Throat:      Mouth: Mucous membranes are dry.   Eyes:      Extraocular Movements: Extraocular movements intact.      Pupils: Pupils are equal, round, and reactive to light.   Cardiovascular:      Rate and Rhythm: Tachycardia present. Rhythm irregular.      Pulses: Normal pulses.   Pulmonary:      Breath sounds: Normal breath sounds.   Abdominal:      Palpations: Abdomen is soft.      Tenderness: There is abdominal tenderness.      Comments: MARIANA drain w sang output   Genitourinary:     Comments: Kong present  Musculoskeletal:         General: Normal range of motion.      Cervical back: Normal range of motion.   Skin:     General: Skin is warm and dry.   Neurological:      General: No focal deficit present.      Mental Status: He is alert and oriented to person, place, and time.   Psychiatric:         Mood and Affect: Mood normal.         Laboratory  Recent Results (from the past 24 hour(s))   Prealbumin    Collection Time: 08/30/21  4:55 AM   Result Value Ref Range    Pre-Albumin 6.6 (L) 18.0 - 38.0 mg/dL   CBC WITH DIFFERENTIAL    Collection Time: 08/30/21  4:55 AM   Result Value Ref Range    WBC 9.3 4.8 - 10.8 K/uL    RBC 2.87 (L) 4.70 - 6.10 M/uL    Hemoglobin 9.0 (L) 14.0 - 18.0 g/dL    Hematocrit 27.6 (L) 42.0 - 52.0 %    MCV 96.2 81.4 - 97.8 fL    MCH 31.4 27.0 - 33.0 pg    MCHC 32.6 (L) 33.7 - 35.3 g/dL    RDW 58.4 (H) 35.9 - 50.0 fL    Platelet Count SEE NOTE 164 - 446 K/uL    MPV 8.6 (L) 9.0 - 12.9 fL    Neutrophils-Polys 77.90 (H) 44.00 - 72.00 %    Lymphocytes 14.10 (L) 22.00 - 41.00 %     Monocytes 7.00 0.00 - 13.40 %    Eosinophils 0.00 0.00 - 6.90 %    Basophils 0.20 0.00 - 1.80 %    Immature Granulocytes 0.80 0.00 - 0.90 %    Nucleated RBC 0.00 /100 WBC    Neutrophils (Absolute) 7.25 1.82 - 7.42 K/uL    Lymphs (Absolute) 1.31 1.00 - 4.80 K/uL    Monos (Absolute) 0.65 0.00 - 0.85 K/uL    Eos (Absolute) 0.00 0.00 - 0.51 K/uL    Baso (Absolute) 0.02 0.00 - 0.12 K/uL    Immature Granulocytes (abs) 0.07 0.00 - 0.11 K/uL    NRBC (Absolute) 0.00 K/uL   Comp Metabolic Panel    Collection Time: 08/30/21  4:55 AM   Result Value Ref Range    Sodium 133 (L) 135 - 145 mmol/L    Potassium 3.7 3.6 - 5.5 mmol/L    Chloride 103 96 - 112 mmol/L    Co2 21 20 - 33 mmol/L    Anion Gap 9.0 7.0 - 16.0    Glucose 161 (H) 65 - 99 mg/dL    Bun 22 8 - 22 mg/dL    Creatinine 0.62 0.50 - 1.40 mg/dL    Calcium 8.4 (L) 8.5 - 10.5 mg/dL    AST(SGOT) 45 12 - 45 U/L    ALT(SGPT) 12 2 - 50 U/L    Alkaline Phosphatase 171 (H) 30 - 99 U/L    Total Bilirubin 0.6 0.1 - 1.5 mg/dL    Albumin 1.8 (L) 3.2 - 4.9 g/dL    Total Protein 4.9 (L) 6.0 - 8.2 g/dL    Globulin 3.1 1.9 - 3.5 g/dL    A-G Ratio 0.6 g/dL   IONIZED CALCIUM    Collection Time: 08/30/21  4:55 AM   Result Value Ref Range    Ionized Calcium 1.2 1.1 - 1.3 mmol/L   CRP QUANTITIVE (NON-CARDIAC)    Collection Time: 08/30/21  4:55 AM   Result Value Ref Range    Stat C-Reactive Protein 5.69 (H) 0.00 - 0.75 mg/dL   MAGNESIUM    Collection Time: 08/30/21  4:55 AM   Result Value Ref Range    Magnesium 1.6 1.5 - 2.5 mg/dL   PHOSPHORUS    Collection Time: 08/30/21  4:55 AM   Result Value Ref Range    Phosphorus 3.3 2.5 - 4.5 mg/dL   ESTIMATED GFR    Collection Time: 08/30/21  4:55 AM   Result Value Ref Range    GFR If African American >60 >60 mL/min/1.73 m 2    GFR If Non African American >60 >60 mL/min/1.73 m 2   Prealbumin    Collection Time: 08/30/21 11:27 AM   Result Value Ref Range    Pre-Albumin 6.8 (L) 18.0 - 38.0 mg/dL   EKG    Collection Time: 08/30/21  2:18 PM   Result Value  Ref Range    Report       [facility]    Test Date:  2021  Pt Name:    SANDRO DIAZ             Department: 19  MRN:        [id number]                      Room:       S111  Gender:     Male                         Technician: PEP  :        [id number]                   Requested By:RACHAEL LAI  Order #:    [id number]                    Reading MD:    Measurements  Intervals                                Axis  Rate:       137                          P:  CT:                                      QRS:        35  QRSD:       94                           T:          224  QT:         324  QTc:        490    Interpretive Statements  ATRIAL FIBRILLATION  LOW VOLTAGE THROUGHOUT  BORDERLINE R WAVE PROGRESSION, ANTERIOR LEADS  NONSPECIFIC T ABNORMALITIES, DIFFUSE LEADS  BORDERLINE PROLONGED QT INTERVAL  Compared to ECG 2021 08:34:44  Low QRS voltage now present  T-wave abnormality now present  Sinus tachycardia no longer present  Myocardial infarct finding no longer present         Fluids    Intake/Output Summary (Last 24 hours) at 2021 1603  Last data filed at 2021 1400  Gross per 24 hour   Intake 4350.1 ml   Output 2315 ml   Net 2035.1 ml       Core Measures & Quality Metrics  Labs reviewed, EKG reviewed, Radiology images reviewed and Medications reviewed  Triana catheter: Critically Ill - Requiring Accurate Measurement of Urinary Output  Central line in place: Need for access, Shock and Vasopressors    DVT Prophylaxis: Contraindicated - High bleeding risk  DVT prophylaxis - mechanical: SCDs  Ulcer prophylaxis: Yes  Antibiotics: Treating active infection/contamination beyond 24 hours perioperative coverage      CHELI Score  ETOH Screening    Assessment/Plan  Sepsis (HCC)- (present on admission)  Assessment & Plan  Secondary to liver abscess  Ongoing volume resuscitation and pressors support  abx day #3 rocephin and flagyl    Hepatocellular carcinoma (HCC)  Assessment &  Plan  Advanced  Associated abscess    Atrial fibrillation (HCC)  Assessment & Plan  Diuresis  Aggressive electrolyte replacement  Metoprolol and amiodarone       Respiratory failure following trauma and surgery (HCC)  Assessment & Plan  On vent, weaning per protocol      Normochromic anemia- (present on admission)  Assessment & Plan  Trend and transfuse for hgb <7.  Hgb today is 7.8    Thrombocytopenia (HCC)- (present on admission)  Assessment & Plan  Trend        Discussed patient condition with Family, RN, RT, Pharmacy, Dietary and Patient.  CRITICAL CARE TIME EXCLUDING PROCEDURES: 80    minutes

## 2021-08-30 NOTE — PROGRESS NOTES
2 RN skin check completed with YASMIN Bear    Areas of concern/Skin observations:  · Scattered bruising to BUE  · abd incision sites covered with gauze and hypafix tape which is CDI  · Heels red and blanching-mepilex in place  · Small area of blanching redness to sacrum-mepilex in place  · Bilateral upper quadrant MARIANA drains, site not assessed, dressing CDI, draining appropriately    Devices in use, assessed under and interventions (as appropriate) for skin protection:  · SCDs, BP cuff, SaO2 monitor, ETT    Interventions in place such as:   · q2 hour turns  · Keeping skin clean and dry  · Use of products such as barrier wipes/cream  · Waffle cushion while OOB: n/a  · Bed Type: pressure redistribution mattress  · Mobility: EOB/stand  Rotating ET tube q2h (in coordination with RTs): yes

## 2021-08-31 ENCOUNTER — ANESTHESIA EVENT (OUTPATIENT)
Dept: SURGERY | Facility: MEDICAL CENTER | Age: 71
DRG: 853 | End: 2021-08-31
Payer: MEDICARE

## 2021-08-31 ENCOUNTER — HOSPITAL ENCOUNTER (OUTPATIENT)
Dept: RADIOLOGY | Facility: MEDICAL CENTER | Age: 71
End: 2021-08-31
Attending: SURGERY
Payer: MEDICARE

## 2021-08-31 ENCOUNTER — APPOINTMENT (OUTPATIENT)
Dept: RADIOLOGY | Facility: MEDICAL CENTER | Age: 71
DRG: 853 | End: 2021-08-31
Attending: SURGERY
Payer: MEDICARE

## 2021-08-31 ENCOUNTER — ANESTHESIA (OUTPATIENT)
Dept: SURGERY | Facility: MEDICAL CENTER | Age: 71
DRG: 853 | End: 2021-08-31
Payer: MEDICARE

## 2021-08-31 LAB
ABO GROUP BLD: NORMAL
ALBUMIN SERPL BCP-MCNC: 1.8 G/DL (ref 3.2–4.9)
ALBUMIN/GLOB SERPL: 0.5 G/DL
ALP SERPL-CCNC: 199 U/L (ref 30–99)
ALT SERPL-CCNC: 14 U/L (ref 2–50)
ANION GAP SERPL CALC-SCNC: 10 MMOL/L (ref 7–16)
AST SERPL-CCNC: 53 U/L (ref 12–45)
BARCODED ABORH UBTYP: 5100
BARCODED PRD CODE UBPRD: NORMAL
BARCODED UNIT NUM UBUNT: NORMAL
BASE EXCESS BLDA CALC-SCNC: -5 MMOL/L (ref -4–3)
BASOPHILS # BLD AUTO: 0.2 % (ref 0–1.8)
BASOPHILS # BLD: 0.04 K/UL (ref 0–0.12)
BILIRUB SERPL-MCNC: 0.7 MG/DL (ref 0.1–1.5)
BLD GP AB SCN SERPL QL: NORMAL
BODY TEMPERATURE: ABNORMAL DEGREES
BUN SERPL-MCNC: 21 MG/DL (ref 8–22)
CA-I BLD ISE-SCNC: 1.23 MMOL/L (ref 1.1–1.3)
CA-I SERPL-SCNC: 1.2 MMOL/L (ref 1.1–1.3)
CALCIUM SERPL-MCNC: 8.3 MG/DL (ref 8.5–10.5)
CHLORIDE SERPL-SCNC: 100 MMOL/L (ref 96–112)
CO2 BLDA-SCNC: 24 MMOL/L (ref 20–33)
CO2 SERPL-SCNC: 23 MMOL/L (ref 20–33)
COMPONENT R 8504R: NORMAL
CREAT SERPL-MCNC: 0.76 MG/DL (ref 0.5–1.4)
EOSINOPHIL # BLD AUTO: 0.02 K/UL (ref 0–0.51)
EOSINOPHIL NFR BLD: 0.1 % (ref 0–6.9)
ERYTHROCYTE [DISTWIDTH] IN BLOOD BY AUTOMATED COUNT: 61 FL (ref 35.9–50)
FUNGUS SPEC FUNGUS STN: NORMAL
FUNGUS SPEC FUNGUS STN: NORMAL
GLOBULIN SER CALC-MCNC: 3.3 G/DL (ref 1.9–3.5)
GLUCOSE BLD-MCNC: 148 MG/DL (ref 65–99)
GLUCOSE SERPL-MCNC: 160 MG/DL (ref 65–99)
HCO3 BLDA-SCNC: 22.8 MMOL/L (ref 17–25)
HCT VFR BLD AUTO: 30.2 % (ref 42–52)
HCT VFR BLD AUTO: 31.8 % (ref 42–52)
HCT VFR BLD CALC: 25 % (ref 42–52)
HGB BLD-MCNC: 10.7 G/DL (ref 14–18)
HGB BLD-MCNC: 11 G/DL (ref 14–18)
HGB BLD-MCNC: 8.5 G/DL (ref 14–18)
HGB BLD-MCNC: 9.5 G/DL (ref 14–18)
IMM GRANULOCYTES # BLD AUTO: 0.13 K/UL (ref 0–0.11)
IMM GRANULOCYTES NFR BLD AUTO: 0.7 % (ref 0–0.9)
LACTATE BLD-SCNC: 3 MMOL/L (ref 0.5–2)
LACTATE BLD-SCNC: 3.5 MMOL/L (ref 0.5–2)
LACTATE BLD-SCNC: 3.6 MMOL/L (ref 0.5–2)
LYMPHOCYTES # BLD AUTO: 0.89 K/UL (ref 1–4.8)
LYMPHOCYTES NFR BLD: 5 % (ref 22–41)
MAGNESIUM SERPL-MCNC: 1.8 MG/DL (ref 1.5–2.5)
MAGNESIUM SERPL-MCNC: 1.8 MG/DL (ref 1.5–2.5)
MCH RBC QN AUTO: 31.3 PG (ref 27–33)
MCHC RBC AUTO-ENTMCNC: 31.5 G/DL (ref 33.7–35.3)
MCV RBC AUTO: 99.3 FL (ref 81.4–97.8)
MONOCYTES # BLD AUTO: 1.58 K/UL (ref 0–0.85)
MONOCYTES NFR BLD AUTO: 9 % (ref 0–13.4)
NEUTROPHILS # BLD AUTO: 14.98 K/UL (ref 1.82–7.42)
NEUTROPHILS NFR BLD: 85 % (ref 44–72)
NRBC # BLD AUTO: 0.17 K/UL
NRBC BLD-RTO: 1 /100 WBC
PATHOLOGY CONSULT NOTE: NORMAL
PCO2 BLDA: 54.4 MMHG (ref 26–37)
PH BLDA: 7.23 [PH] (ref 7.4–7.5)
PHOSPHATE SERPL-MCNC: 4 MG/DL (ref 2.5–4.5)
PHOSPHATE SERPL-MCNC: 4 MG/DL (ref 2.5–4.5)
PLATELET # BLD AUTO: 167 K/UL (ref 164–446)
PMV BLD AUTO: 9 FL (ref 9–12.9)
PO2 BLDA: 80 MMHG (ref 64–87)
POTASSIUM BLD-SCNC: 4.6 MMOL/L (ref 3.6–5.5)
POTASSIUM SERPL-SCNC: 3.5 MMOL/L (ref 3.6–5.5)
PRODUCT TYPE UPROD: NORMAL
PROT SERPL-MCNC: 5.1 G/DL (ref 6–8.2)
RBC # BLD AUTO: 3.04 M/UL (ref 4.7–6.1)
RH BLD: NORMAL
SAO2 % BLDA: 93 % (ref 93–99)
SIGNIFICANT IND 70042: NORMAL
SIGNIFICANT IND 70042: NORMAL
SITE SITE: NORMAL
SITE SITE: NORMAL
SODIUM BLD-SCNC: 137 MMOL/L (ref 135–145)
SODIUM SERPL-SCNC: 133 MMOL/L (ref 135–145)
SOURCE SOURCE: NORMAL
SOURCE SOURCE: NORMAL
SPECIMEN DRAWN FROM PATIENT: ABNORMAL
UNIT STATUS USTAT: NORMAL
WBC # BLD AUTO: 17.6 K/UL (ref 4.8–10.8)

## 2021-08-31 PROCEDURE — 700111 HCHG RX REV CODE 636 W/ 250 OVERRIDE (IP): Performed by: SURGERY

## 2021-08-31 PROCEDURE — 700111 HCHG RX REV CODE 636 W/ 250 OVERRIDE (IP): Performed by: INTERNAL MEDICINE

## 2021-08-31 PROCEDURE — 74177 CT ABD & PELVIS W/CONTRAST: CPT | Mod: MG

## 2021-08-31 PROCEDURE — 94799 UNLISTED PULMONARY SVC/PX: CPT

## 2021-08-31 PROCEDURE — 700101 HCHG RX REV CODE 250: Performed by: INTERNAL MEDICINE

## 2021-08-31 PROCEDURE — 84132 ASSAY OF SERUM POTASSIUM: CPT

## 2021-08-31 PROCEDURE — 71045 X-RAY EXAM CHEST 1 VIEW: CPT

## 2021-08-31 PROCEDURE — 0D1B0Z4 BYPASS ILEUM TO CUTANEOUS, OPEN APPROACH: ICD-10-PCS | Performed by: SURGERY

## 2021-08-31 PROCEDURE — 36430 TRANSFUSION BLD/BLD COMPNT: CPT

## 2021-08-31 PROCEDURE — 700101 HCHG RX REV CODE 250: Performed by: SURGERY

## 2021-08-31 PROCEDURE — 770022 HCHG ROOM/CARE - ICU (200)

## 2021-08-31 PROCEDURE — 700101 HCHG RX REV CODE 250: Performed by: STUDENT IN AN ORGANIZED HEALTH CARE EDUCATION/TRAINING PROGRAM

## 2021-08-31 PROCEDURE — 160048 HCHG OR STATISTICAL LEVEL 1-5: Performed by: SURGERY

## 2021-08-31 PROCEDURE — 700111 HCHG RX REV CODE 636 W/ 250 OVERRIDE (IP): Performed by: STUDENT IN AN ORGANIZED HEALTH CARE EDUCATION/TRAINING PROGRAM

## 2021-08-31 PROCEDURE — 85025 COMPLETE CBC W/AUTO DIFF WBC: CPT

## 2021-08-31 PROCEDURE — 82962 GLUCOSE BLOOD TEST: CPT

## 2021-08-31 PROCEDURE — 85014 HEMATOCRIT: CPT

## 2021-08-31 PROCEDURE — 160042 HCHG SURGERY MINUTES - EA ADDL 1 MIN LEVEL 5: Performed by: SURGERY

## 2021-08-31 PROCEDURE — 86900 BLOOD TYPING SEROLOGIC ABO: CPT

## 2021-08-31 PROCEDURE — 0DNU0ZZ RELEASE OMENTUM, OPEN APPROACH: ICD-10-PCS | Performed by: SURGERY

## 2021-08-31 PROCEDURE — 88307 TISSUE EXAM BY PATHOLOGIST: CPT

## 2021-08-31 PROCEDURE — 83605 ASSAY OF LACTIC ACID: CPT | Mod: 91

## 2021-08-31 PROCEDURE — 700117 HCHG RX CONTRAST REV CODE 255: Performed by: SURGERY

## 2021-08-31 PROCEDURE — 99291 CRITICAL CARE FIRST HOUR: CPT | Performed by: SURGERY

## 2021-08-31 PROCEDURE — 0D20XUZ CHANGE FEEDING DEVICE IN UPPER INTESTINAL TRACT, EXTERNAL APPROACH: ICD-10-PCS | Performed by: SURGERY

## 2021-08-31 PROCEDURE — P9016 RBC LEUKOCYTES REDUCED: HCPCS

## 2021-08-31 PROCEDURE — 700105 HCHG RX REV CODE 258: Performed by: INTERNAL MEDICINE

## 2021-08-31 PROCEDURE — 88305 TISSUE EXAM BY PATHOLOGIST: CPT

## 2021-08-31 PROCEDURE — 160009 HCHG ANES TIME/MIN: Performed by: SURGERY

## 2021-08-31 PROCEDURE — 94002 VENT MGMT INPAT INIT DAY: CPT

## 2021-08-31 PROCEDURE — 86901 BLOOD TYPING SEROLOGIC RH(D): CPT

## 2021-08-31 PROCEDURE — 700105 HCHG RX REV CODE 258: Performed by: SURGERY

## 2021-08-31 PROCEDURE — 83735 ASSAY OF MAGNESIUM: CPT

## 2021-08-31 PROCEDURE — 82803 BLOOD GASES ANY COMBINATION: CPT

## 2021-08-31 PROCEDURE — 502240 HCHG MISC OR SUPPLY RC 0272: Performed by: SURGERY

## 2021-08-31 PROCEDURE — 501838 HCHG SUTURE GENERAL: Performed by: SURGERY

## 2021-08-31 PROCEDURE — 87102 FUNGUS ISOLATION CULTURE: CPT

## 2021-08-31 PROCEDURE — 0DNW0ZZ RELEASE PERITONEUM, OPEN APPROACH: ICD-10-PCS | Performed by: SURGERY

## 2021-08-31 PROCEDURE — 84295 ASSAY OF SERUM SODIUM: CPT

## 2021-08-31 PROCEDURE — 0FB10ZX EXCISION OF RIGHT LOBE LIVER, OPEN APPROACH, DIAGNOSTIC: ICD-10-PCS | Performed by: SURGERY

## 2021-08-31 PROCEDURE — 700111 HCHG RX REV CODE 636 W/ 250 OVERRIDE (IP)

## 2021-08-31 PROCEDURE — 85018 HEMOGLOBIN: CPT

## 2021-08-31 PROCEDURE — 94760 N-INVAS EAR/PLS OXIMETRY 1: CPT

## 2021-08-31 PROCEDURE — 160031 HCHG SURGERY MINUTES - 1ST 30 MINS LEVEL 5: Performed by: SURGERY

## 2021-08-31 PROCEDURE — 87070 CULTURE OTHR SPECIMN AEROBIC: CPT

## 2021-08-31 PROCEDURE — 87075 CULTR BACTERIA EXCEPT BLOOD: CPT | Mod: 91

## 2021-08-31 PROCEDURE — 88304 TISSUE EXAM BY PATHOLOGIST: CPT

## 2021-08-31 PROCEDURE — 86850 RBC ANTIBODY SCREEN: CPT

## 2021-08-31 PROCEDURE — 502704 HCHG DEVICE, LIGASURE IMPACT: Performed by: SURGERY

## 2021-08-31 PROCEDURE — 87205 SMEAR GRAM STAIN: CPT | Mod: 91

## 2021-08-31 PROCEDURE — 82330 ASSAY OF CALCIUM: CPT

## 2021-08-31 PROCEDURE — 84100 ASSAY OF PHOSPHORUS: CPT

## 2021-08-31 PROCEDURE — 80053 COMPREHEN METABOLIC PANEL: CPT

## 2021-08-31 RX ORDER — SODIUM CHLORIDE, SODIUM LACTATE, POTASSIUM CHLORIDE, CALCIUM CHLORIDE 600; 310; 30; 20 MG/100ML; MG/100ML; MG/100ML; MG/100ML
1000 INJECTION, SOLUTION INTRAVENOUS ONCE
Status: COMPLETED | OUTPATIENT
Start: 2021-08-31 | End: 2021-08-31

## 2021-08-31 RX ORDER — ETOMIDATE 2 MG/ML
INJECTION INTRAVENOUS PRN
Status: DISCONTINUED | OUTPATIENT
Start: 2021-08-31 | End: 2021-08-31 | Stop reason: SURG

## 2021-08-31 RX ORDER — SODIUM CHLORIDE, SODIUM GLUCONATE, SODIUM ACETATE, POTASSIUM CHLORIDE AND MAGNESIUM CHLORIDE 526; 502; 368; 37; 30 MG/100ML; MG/100ML; MG/100ML; MG/100ML; MG/100ML
INJECTION, SOLUTION INTRAVENOUS
Status: DISCONTINUED | OUTPATIENT
Start: 2021-08-31 | End: 2021-08-31 | Stop reason: SURG

## 2021-08-31 RX ORDER — KETAMINE HYDROCHLORIDE 50 MG/ML
INJECTION, SOLUTION INTRAMUSCULAR; INTRAVENOUS PRN
Status: DISCONTINUED | OUTPATIENT
Start: 2021-08-31 | End: 2021-08-31 | Stop reason: SURG

## 2021-08-31 RX ORDER — SODIUM CHLORIDE, SODIUM LACTATE, POTASSIUM CHLORIDE, AND CALCIUM CHLORIDE .6; .31; .03; .02 G/100ML; G/100ML; G/100ML; G/100ML
1000 INJECTION, SOLUTION INTRAVENOUS ONCE
Status: COMPLETED | OUTPATIENT
Start: 2021-08-31 | End: 2021-08-31

## 2021-08-31 RX ORDER — SODIUM CHLORIDE, SODIUM LACTATE, POTASSIUM CHLORIDE, AND CALCIUM CHLORIDE .6; .31; .03; .02 G/100ML; G/100ML; G/100ML; G/100ML
500 INJECTION, SOLUTION INTRAVENOUS ONCE
Status: COMPLETED | OUTPATIENT
Start: 2021-08-31 | End: 2021-08-31

## 2021-08-31 RX ORDER — CALCIUM CHLORIDE 100 MG/ML
INJECTION INTRAVENOUS; INTRAVENTRICULAR PRN
Status: DISCONTINUED | OUTPATIENT
Start: 2021-08-31 | End: 2021-08-31 | Stop reason: SURG

## 2021-08-31 RX ORDER — VASOPRESSIN 20 U/ML
INJECTION PARENTERAL PRN
Status: DISCONTINUED | OUTPATIENT
Start: 2021-08-31 | End: 2021-08-31 | Stop reason: SURG

## 2021-08-31 RX ORDER — ROCURONIUM BROMIDE 10 MG/ML
INJECTION, SOLUTION INTRAVENOUS PRN
Status: DISCONTINUED | OUTPATIENT
Start: 2021-08-31 | End: 2021-08-31 | Stop reason: SURG

## 2021-08-31 RX ORDER — PIPERACILLIN SODIUM, TAZOBACTAM SODIUM 3; .375 G/15ML; G/15ML
INJECTION, POWDER, LYOPHILIZED, FOR SOLUTION INTRAVENOUS PRN
Status: DISCONTINUED | OUTPATIENT
Start: 2021-08-31 | End: 2021-08-31 | Stop reason: SURG

## 2021-08-31 RX ORDER — POTASSIUM CHLORIDE 29.8 MG/ML
40 INJECTION INTRAVENOUS ONCE
Status: COMPLETED | OUTPATIENT
Start: 2021-08-31 | End: 2021-08-31

## 2021-08-31 RX ADMIN — VASOPRESSIN 2 UNITS: 20 INJECTION INTRAVENOUS at 09:46

## 2021-08-31 RX ADMIN — KETAMINE HYDROCHLORIDE 50 MG: 50 INJECTION INTRAMUSCULAR; INTRAVENOUS at 09:43

## 2021-08-31 RX ADMIN — SODIUM CHLORIDE, POTASSIUM CHLORIDE, SODIUM LACTATE AND CALCIUM CHLORIDE: 600; 310; 30; 20 INJECTION, SOLUTION INTRAVENOUS at 11:53

## 2021-08-31 RX ADMIN — FAMOTIDINE 20 MG: 10 INJECTION INTRAVENOUS at 17:32

## 2021-08-31 RX ADMIN — KETAMINE HYDROCHLORIDE 100 MG: 50 INJECTION INTRAMUSCULAR; INTRAVENOUS at 08:58

## 2021-08-31 RX ADMIN — IOHEXOL 50 ML: 240 INJECTION, SOLUTION INTRATHECAL; INTRAVASCULAR; INTRAVENOUS; ORAL at 02:55

## 2021-08-31 RX ADMIN — HYDROCORTISONE SODIUM SUCCINATE 100 MG: 100 INJECTION, POWDER, FOR SOLUTION INTRAMUSCULAR; INTRAVENOUS at 05:49

## 2021-08-31 RX ADMIN — SODIUM CHLORIDE, POTASSIUM CHLORIDE, SODIUM LACTATE AND CALCIUM CHLORIDE 500 ML: 600; 310; 30; 20 INJECTION, SOLUTION INTRAVENOUS at 05:00

## 2021-08-31 RX ADMIN — IOHEXOL 25 ML: 240 INJECTION, SOLUTION INTRATHECAL; INTRAVASCULAR; INTRAVENOUS; ORAL at 02:55

## 2021-08-31 RX ADMIN — FAMOTIDINE 20 MG: 10 INJECTION INTRAVENOUS at 05:49

## 2021-08-31 RX ADMIN — SODIUM CHLORIDE, POTASSIUM CHLORIDE, SODIUM LACTATE AND CALCIUM CHLORIDE: 600; 310; 30; 20 INJECTION, SOLUTION INTRAVENOUS at 17:15

## 2021-08-31 RX ADMIN — HYDROCORTISONE SODIUM SUCCINATE 100 MG: 100 INJECTION, POWDER, FOR SOLUTION INTRAMUSCULAR; INTRAVENOUS at 21:42

## 2021-08-31 RX ADMIN — ROCURONIUM BROMIDE 100 MG: 10 INJECTION, SOLUTION INTRAVENOUS at 08:58

## 2021-08-31 RX ADMIN — SODIUM CHLORIDE, POTASSIUM CHLORIDE, SODIUM LACTATE AND CALCIUM CHLORIDE 1000 ML: 600; 310; 30; 20 INJECTION, SOLUTION INTRAVENOUS at 08:45

## 2021-08-31 RX ADMIN — CALCIUM CHLORIDE 500 MG: 100 INJECTION INTRAVENOUS; INTRAVENTRICULAR at 10:22

## 2021-08-31 RX ADMIN — HYDROCORTISONE SODIUM SUCCINATE 100 MG: 100 INJECTION, POWDER, FOR SOLUTION INTRAMUSCULAR; INTRAVENOUS at 14:02

## 2021-08-31 RX ADMIN — HYDROMORPHONE HYDROCHLORIDE 0.25 MG: 1 INJECTION, SOLUTION INTRAMUSCULAR; INTRAVENOUS; SUBCUTANEOUS at 03:26

## 2021-08-31 RX ADMIN — PIPERACILLIN AND TAZOBACTAM 4.5 G: 4; .5 INJECTION, POWDER, LYOPHILIZED, FOR SOLUTION INTRAVENOUS; PARENTERAL at 21:43

## 2021-08-31 RX ADMIN — VASOPRESSIN 1 UNITS: 20 INJECTION INTRAVENOUS at 08:58

## 2021-08-31 RX ADMIN — NOREPINEPHRINE BITARTRATE 22 MCG/MIN: 1 INJECTION, SOLUTION, CONCENTRATE INTRAVENOUS at 16:25

## 2021-08-31 RX ADMIN — PIPERACILLIN AND TAZOBACTAM 4.5 G: 4; .5 INJECTION, POWDER, LYOPHILIZED, FOR SOLUTION INTRAVENOUS; PARENTERAL at 12:25

## 2021-08-31 RX ADMIN — VASOPRESSIN 1 UNITS: 20 INJECTION INTRAVENOUS at 09:42

## 2021-08-31 RX ADMIN — PROPOFOL 30 MCG/KG/MIN: 10 INJECTION, EMULSION INTRAVENOUS at 10:30

## 2021-08-31 RX ADMIN — SODIUM CHLORIDE, POTASSIUM CHLORIDE, SODIUM LACTATE AND CALCIUM CHLORIDE 1000 ML: 600; 310; 30; 20 INJECTION, SOLUTION INTRAVENOUS at 18:35

## 2021-08-31 RX ADMIN — ETOMIDATE 20 MG: 2 INJECTION INTRAVENOUS at 08:58

## 2021-08-31 RX ADMIN — AMIODARONE HYDROCHLORIDE 0.5 MG/MIN: 1.8 INJECTION, SOLUTION INTRAVENOUS at 21:20

## 2021-08-31 RX ADMIN — NOREPINEPHRINE BITARTRATE 15 MCG/MIN: 1 INJECTION, SOLUTION, CONCENTRATE INTRAVENOUS at 01:15

## 2021-08-31 RX ADMIN — POTASSIUM CHLORIDE 40 MEQ: 29.8 INJECTION, SOLUTION INTRAVENOUS at 08:02

## 2021-08-31 RX ADMIN — PIPERACILLIN SODIUM AND TAZOBACTAM SODIUM 3.38 G: 3; .375 INJECTION, POWDER, FOR SOLUTION INTRAVENOUS at 09:25

## 2021-08-31 RX ADMIN — ONDANSETRON 4 MG: 2 INJECTION INTRAMUSCULAR; INTRAVENOUS at 00:11

## 2021-08-31 RX ADMIN — VASOPRESSIN 0.04 UNITS/MIN: 20 INJECTION INTRAVENOUS at 22:00

## 2021-08-31 RX ADMIN — SODIUM CHLORIDE, SODIUM GLUCONATE, SODIUM ACETATE, POTASSIUM CHLORIDE AND MAGNESIUM CHLORIDE: 526; 502; 368; 37; 30 INJECTION, SOLUTION INTRAVENOUS at 08:50

## 2021-08-31 RX ADMIN — NOREPINEPHRINE BITARTRATE 30 MCG/MIN: 1 INJECTION, SOLUTION, CONCENTRATE INTRAVENOUS at 21:21

## 2021-08-31 RX ADMIN — IOHEXOL 100 ML: 350 INJECTION, SOLUTION INTRAVENOUS at 02:54

## 2021-08-31 RX ADMIN — PROPOFOL 5 MCG/KG/MIN: 10 INJECTION, EMULSION INTRAVENOUS at 16:26

## 2021-08-31 RX ADMIN — SODIUM CHLORIDE, POTASSIUM CHLORIDE, SODIUM LACTATE AND CALCIUM CHLORIDE: 600; 310; 30; 20 INJECTION, SOLUTION INTRAVENOUS at 08:01

## 2021-08-31 RX ADMIN — ROCURONIUM BROMIDE 50 MG: 10 INJECTION, SOLUTION INTRAVENOUS at 10:28

## 2021-08-31 RX ADMIN — SODIUM CHLORIDE, POTASSIUM CHLORIDE, SODIUM LACTATE AND CALCIUM CHLORIDE 1000 ML: 600; 310; 30; 20 INJECTION, SOLUTION INTRAVENOUS at 21:42

## 2021-08-31 RX ADMIN — SODIUM CHLORIDE, POTASSIUM CHLORIDE, SODIUM LACTATE AND CALCIUM CHLORIDE: 600; 310; 30; 20 INJECTION, SOLUTION INTRAVENOUS at 21:20

## 2021-08-31 RX ADMIN — CALCIUM CHLORIDE 500 MG: 100 INJECTION INTRAVENOUS; INTRAVENTRICULAR at 09:55

## 2021-08-31 RX ADMIN — SODIUM BICARBONATE 50 MEQ: 84 INJECTION, SOLUTION INTRAVENOUS at 09:55

## 2021-08-31 RX ADMIN — SODIUM CHLORIDE, POTASSIUM CHLORIDE, SODIUM LACTATE AND CALCIUM CHLORIDE 500 ML: 600; 310; 30; 20 INJECTION, SOLUTION INTRAVENOUS at 17:32

## 2021-08-31 RX ADMIN — METRONIDAZOLE 500 MG: 500 INJECTION, SOLUTION INTRAVENOUS at 05:49

## 2021-08-31 RX ADMIN — FENTANYL CITRATE 100 MCG: 50 INJECTION, SOLUTION INTRAMUSCULAR; INTRAVENOUS at 09:58

## 2021-08-31 RX ADMIN — KETAMINE HYDROCHLORIDE 50 MG: 50 INJECTION INTRAMUSCULAR; INTRAVENOUS at 10:04

## 2021-08-31 ASSESSMENT — PAIN DESCRIPTION - PAIN TYPE
TYPE: ACUTE PAIN;SURGICAL PAIN
TYPE: ACUTE PAIN
TYPE: ACUTE PAIN
TYPE: ACUTE PAIN;SURGICAL PAIN
TYPE: ACUTE PAIN
TYPE: ACUTE PAIN
TYPE: ACUTE PAIN;SURGICAL PAIN
TYPE: ACUTE PAIN
TYPE: ACUTE PAIN;SURGICAL PAIN
TYPE: ACUTE PAIN;SURGICAL PAIN

## 2021-08-31 ASSESSMENT — FIBROSIS 4 INDEX: FIB4 SCORE: 6.02

## 2021-08-31 NOTE — ANESTHESIA POSTPROCEDURE EVALUATION
Patient: Lorraine Bella    Procedure Summary     Date: 08/31/21 Room / Location: [place] [address] / SURGERY [facility]    Anesthesia Start: 0850 Anesthesia Stop: 1101    Procedure: LAPAROTOMY, EXPLORATORY ,drainage of peritoneal abcess,  creation of loop ileostomy, and trucut liver biopsy (N/A Abdomen) Diagnosis: (colon perforation)    Surgeons: Kevin Thornton D.O. Responsible Provider: Armando Saavedra M.D.    Anesthesia Type: general ASA Status: 4 - Emergent          Final Anesthesia Type: general  Last vitals  BP   Blood Pressure : 106/55, Arterial BP: 111/62    Temp   36.3 °C (97.3 °F)    Pulse   (!) 113   Resp   16    SpO2   94 %      Anesthesia Post Evaluation    Patient location during evaluation: ICU  Patient participation: complete - patient cannot participate  Post-procedure mental status: Sedated.  Pain scale: N/A.    Airway patency: patent  Anesthetic complications: no  Cardiovascular status: hemodynamically stable  Respiratory status: acceptable, ventilator and ETT  Hydration status: euvolemic  Comments: Patient transferred to the ICU in Stable condition intubated and sedated. Handoff given to bedside RN and Dr. Jacobs.     PONV: none          No complications documented.     Nurse Pain Score: 1 (NPRS)

## 2021-08-31 NOTE — PROGRESS NOTES
Late entry:    0745: Dr Casillas at bedside.  Patient will be going to the OR.  Wife called and informed of plan of care.  Asked if she can come to bedside.  Wife agreed.     0830: Patient to Preop on a monitor with ACLS RN.  Following gtts running: Levo, amiodarone, LR bolus, and potassium chloride (see MAR).  Report given to Preop RN.  This RN stayed at bedside because patient is on a pressor.      0845: Report given to anesthesiologist.  Wife at bedside.

## 2021-08-31 NOTE — PROGRESS NOTES
"Surgery    Remains on vasopressors  Liquid stool from drain  More pain  CT scan inconclusive      /72   Pulse (!) 108   Temp 36.7 °C (98.1 °F) (Temporal)   Resp 13   Ht 1.803 m (5' 10.98\")   Wt 94.2 kg (207 lb 10.8 oz)   SpO2 99%   BMI 28.98 kg/m²       DX-CHEST-PORTABLE (1 VIEW)   Final Result      Increasing basilar atelectasis, pleural effusions and pulmonary edema      CT-ABDOMEN-PELVIS WITH   Final Result      No contrast extravasation identified but the colon is not contrast opacified as the patient had insufficient rectal tone and NG tube contrast had not yet reached the cecum      Interval laparotomy with surgical drains in place. No bowel obstruction identified. There is improved small free intraperitoneal air      Anasarca with worsening moderate effusions, stable to slight worsening abdominal pelvic ascites and mesenteric edema      Stable large right hepatic mass      Severe colonic diverticulosis        Recent Labs     08/30/21  0455 08/30/21  2205 08/31/21  0525   WBC 9.3 17.0* 17.6*   RBC 2.87* 2.87* 3.04*   HEMOGLOBIN 9.0* 9.1* 9.5*   HEMATOCRIT 27.6* 28.2* 30.2*   MCV 96.2 98.3* 99.3*   MCH 31.4 31.7 31.3   RDW 58.4* 59.8* 61.0*   PLATELETCT SEE NOTE 178 167   MPV 8.6* 8.8* 9.0   NEUTSPOLYS 77.90* 83.60* 85.00*   LYMPHOCYTES 14.10* 5.60* 5.00*   MONOCYTES 7.00 9.60 9.00   EOSINOPHILS 0.00 0.00 0.10   BASOPHILS 0.20 0.20 0.20       Recent Labs     08/30/21  0455 08/30/21  1605 08/31/21  0525   SODIUM 133* 133* 133*   POTASSIUM 3.7 3.8 3.5*   CHLORIDE 103 100 100   CO2 21 24 23   GLUCOSE 161* 164* 160*   BUN 22 22 21   CREATININE 0.62 0.67 0.76       Assessment and plan:  71-year-old male status post dr rama ainasylvie of perihepatic abscess, now with apparent liquid stool from drain.     Given his deterioration in general status, new onset A. fib, worsening hypotension, and worsening exam, decision made to take patient for reexploration.  Discussed with the ICU attending, patient and will " discuss with wife when she arrives. Uncertain what her findings will be but given developments over the last 12 hours, I think the likelihood is that there is an occult large bowel injury versus missed injury last laparotomy.      Risks of procedure including cardiopulmonary complications, returning from the operating room on the ventilator, inability to identify the leak, need for bowel resection, possible ostomy and risks of hemorrhage and liver injury.  Also plan to biopsy liver mass, which I do not think has been done.    Patient returned to the SICU postop for aggressive supportive care

## 2021-08-31 NOTE — PROGRESS NOTES
Called Dr. Jacobs about pts RUQ MARIANA drain putting out brown thin stool. This was a change from previous shift. MD called Dr. Thornton to have him come in and look at drain. Need for possible OR. MD also ordered 1 L LR bolus and to stop tube feeds.     Dr Thornton at bedside at 2130. Agrees that MARIANA drainage has changed. Ordered CT with oral and IV contrast. Send CBC. Continue blood pressure management. Okay to give 500cc bolus of LR if drop in pressure or urine output. MD will reassess patient in AM.

## 2021-08-31 NOTE — ANESTHESIA PREPROCEDURE EVALUATION
71yom with a history of liver cirrhosis with septic shock in the setting of a-fib, worsening hypotension on vasopressors with concern for perforated viscus or abscess now for emergent ex-lap reexploration. Patient is in the ICU.     Relevant Problems   PULMONARY   (positive) Aspiration pneumonia (HCC)      NEURO   (positive) History of alcohol abuse      CARDIAC   (positive) Atrial fibrillation (HCC)         (positive) Cirrhosis of liver (HCC)   (positive) Hepatic rupture   (positive) Hepatocellular carcinoma (HCC)   (positive) Hepatoma (HCC)      Other   (positive) Normochromic anemia   (positive) Perforated viscus   (positive) Prediabetes   (positive) Respiratory failure following trauma and surgery (HCC)   (positive) Sepsis (HCC)   (positive) Thrombocytopenia (HCC)       Physical Exam    Airway   Mallampati: II  TM distance: <3 FB  Neck ROM: full       Cardiovascular   Rhythm: irregular  Comments: A-fib   Dental       Very poor dentition   Pulmonary    Abdominal    Neurological            Anesthesia Plan    ASA 4- EMERGENT   ASA physical status 4 criteria: sepsis and respiratory failureASA physical status emergent criteria: acute peritonitis and compromised vital organ, limb or tissue    Plan - general         (Patient has pre-existing  a-line and central line. )      Induction: intravenous    Postoperative Plan: Postoperative administration of opioids is intended.    Pertinent diagnostic labs and testing reviewed    Informed Consent:  Emergent - Consent given by clinician    Use of blood products discussed with: spouse whom consented to blood products.

## 2021-08-31 NOTE — THERAPY
Missed Therapy Evaluation     Patient Name: Lorraine Bella  Age:  71 y.o., Sex:  male  Medical Record #: [id number]  Today's Date: 8/31/2021 08/31/21 0913   Interdisciplinary Plan of Care Collaboration   IDT Collaboration with  Nursing   Collaboration Comments PT order received, plan for back to OR this AM. Will attempt as appropriate

## 2021-08-31 NOTE — PROGRESS NOTES
4 Eyes Skin Assessment Completed by YASMIN Romo and YASMIN Cordoba.    Head WDL  Ears WDL  Nose WDL, R nare coretrack   Mouth WDL  Neck WDL  Breast/Chest WDL  Shoulder Blades WDL  Spine WDL  (R) Arm/Elbow/Hand Redness, Blanching and Edema  (L) Arm/Elbow/Hand Redness, Blanching and Edema  Abdomen Redness and Incision, Swelling   Groin WDL  Scrotum/Coccyx/Buttocks Edema   (R) Leg WDL  (L) Leg WDL  (R) Heel/Foot/Toe Redness and Blanching  (L) Heel/Foot/Toe Redness and Blanching          Devices In Places ECG, Blood Pressure Cuff, Pulse Ox, Triana, Arterial Line, SCD's, ET Tube, Cortrak and Central Line      Interventions In Place NC Cheek Stickers, Heel Mepilex, Sacral Mepilex, TAP System, Pillows, Q2 Turns, Low Air Loss Mattress, ZFlo Pillow, Heels Loaded W/Pillows and Pressure Redistribution Mattress    Possible Skin Injury No    Pictures Uploaded Into Epic N/A  Wound Consult Placed N/A  RN Wound Prevention Protocol Ordered No

## 2021-08-31 NOTE — ANESTHESIA TIME REPORT
Anesthesia Start and Stop Event Times     Date Time Event    8/31/2021 0827 Ready for Procedure     0850 Anesthesia Start     1101 Anesthesia Stop        Responsible Staff  08/31/21    Name Role Begin End    Armando Saavedra M.D. Anesth 0850 1101        Preop Diagnosis (Free Text):  Pre-op Diagnosis     colon perforation        Preop Diagnosis (Codes):    Post op Diagnosis  Colon perforation (HCC)      Premium Reason  Non-Premium    Comments:

## 2021-08-31 NOTE — PALLIATIVE CARE
Palliative Care follow-up  Met with patient's wife, Sharlene, who brought DPOA/living will paperwork with her. Original documents copied and scanned into Epic. Patient remained on the phone during this encounter.      Plan: Continue to follow clinical picture and support wife.    Thank you for allowing Palliative Care to support this patient and family. Contact [phone] for additional assistance, change in patient status, or with any questions/concerns.

## 2021-08-31 NOTE — PROGRESS NOTES
"Surgery    Afib  BP worse  Stool from drain    BP (!) 98/67   Pulse (!) 115   Temp 36.2 °C (97.1 °F) (Temporal)   Resp 16   Ht 1.803 m (5' 10.98\")   Wt 91.8 kg (202 lb 6.1 oz) Comment: subtracted 12.7kg for bed extender  SpO2 99%   BMI 28.24 kg/m²     NAD  Abd remains soft  Min distended  Inc ttp, no drainage  MRAIANA w brown/bloody fluid      A/P  Suspect perforation vs liquifying tumor/abscess  CT abd w PO/IV contrast Hold diuretics  Fluid resucitation  Hold B Blocker since pt on norepi  Amio for afib    Plan depends on CT findings and clinical course over night.  Will reassess in AM  "

## 2021-08-31 NOTE — THERAPY
Missed Therapy     Patient Name: Lorraine Bella  Age:  71 y.o., Sex:  male  Medical Record #: [id number]  Today's Date: 8/31/2021    Discussed missed therapy with RN       08/31/21 0818   Treatment Variance   Reason For Missed Therapy Medical - Other (Please Comment)   Interdisciplinary Plan of Care Collaboration   Collaboration Comments Order received and acknowledged for a clinical swallow evaluation.  Per RN, patient putting out stool to the MARIANA drain.  Patient to go to surgery today.  SLP will hold and reattempt as the patient is appropriate.

## 2021-08-31 NOTE — THERAPY
Occupational Therapy Contact Note    Hold OT eval, pt back to OR this AM. Will attempt as appropriate.    Elle Jorgensen, OTR/L

## 2021-08-31 NOTE — PROGRESS NOTES
Late entry:    1056: Patient arrived from the OR intubated with all gtts running in addition to propofol.  Patient placed on bedside monitor.  Linen change and skin check complete.

## 2021-08-31 NOTE — CONSULTS
"Reason for PC Consult: Advance Care Planning    Consulted by: Karyn Epstein MD    Assessment:  General: 70yo gentleman BIB EMS from [facility] and admitted through ED  with concern for bowel perforation.  explor lap - abscess drained (likely 2/2 necrotic tumor), no clear perforated viscus. On vent post-op, extubated today. Remains on abx and pressors. Developed Afib with RVR this late this afternoon.    PMH: - admission for ruptured hepatic cyst s/p washout and drain with d/c to SNF. Hepatocellular cancer mets to lung - diagnosed 10/2019, progressing on chemo and  xrt in  (per wife), currently responding to immunotherapy (per wife), being managed by Dr. Birch. HTN, DM, smoker, ETOH    Dyspnea: No-  Just extubated this morning.  Last BM:  (PTA)-    Pain: No-    Depression: Unable to determine- could not engage pt in deeper conversation    Spiritual:  Is Restoration or spirituality important for coping with this illness? Unable to determine-    Has a  or spiritual provider visit been requested?      Palliative Performance Scale: 40    Advanced Directive: None- Wife stated that they have AD which she recently provided to [facility] but will bring in tomorrow so that a copy can be scanned into pt chart.  DPOA:  -    POLST:No-      Code Status: Full- Addressed at this encounter with pt's wife, including measures employed in attemtping resuscitation and survival statistics. Sharlene stated understanding the violence with CPR as she is certified. She shared that she and pt have discussed code status d/t the multiple family members who have  recently. She supports pt being full code \"because I would do that for him too.\"     Social:   Pt lives in [place] with his wife of 45 years. Their dtr Yesica lives locally. Pt is retired from [employer]. He enjoys fly fishing, reading and feeding/watching the many wild birds    Outcome:  Consult received and EMR reviewed; case discussed with ICU RN " "Clarissa.    12:00 - met with pt who was recently extubated and fixated on \"my breathing hasn't been good,\" was unable to engage in deeper conversation of his hospital course or cancer interventions.    15:30 -  Along with PC RN Sandra, met pt's wife, Sharlene, at bedside and escorted her to ICU conference room. Introduced ourselves and role of Palliative Care.  Assessed wife's understanding of pt's current medical status, overall health picture, and options for future care. Sharlene was focused on the positives - pt not having perforation, Dr. Birch being \"happy\" with tumor shrinkage from 18cm to 10cm with immunotherapy, pt not being able to smoke, and pt being extubated. Attempted to re-direct discussion to pt's cancer and acute issues complicating pt's already fragile state, including pt unable to be at home since .    Explored wife's/pt's expressed values, beliefs, and preferences in order to identify GOC. See code discussion above. Sharlene stated that there was a line where interventions would be too much - \"like the irreversible coma\" and/or \"he wouldn't be able to ever come home.\" Sharlene acknowledged William would d/c to SNF but she does not want him returning to [facility] - she is aware there is a placement issue with unvaccinated pts.    Active listening, reflection, reminiscing, validation & normalization, empathic support and therapeutic touch utilized throughout this encounter.  All questions answered.  PC contact information given.     Plan:  Obtain AD from wife. Palliative care to continue to follow, provide support, and help facilitate decision-making as clinical picture evolves.    Wife does not want pt to return to [facility].    Consideration for hospice:  Pt is desiring full treatment at this time. Pt's wife is well aware of hospice support, having had a peaceful experience when her mother  at home under care of [facility].  *Hospice consideration does not provide clinical appropriateness for " hospice nor does it provide that the patient would or would not qualify for hospice services.*    Thank you for allowing Palliative Care to participate in this patient's care. Please feel free to call [phone] with any questions or concerns.

## 2021-08-31 NOTE — OP REPORT
Operative Report    PreOp Diagnosis: Peritoneal abscess, large bowel perforation, liver mass      PostOp Diagnosis: Same      Procedure(s):  LAPAROTOMY, EXPLORATORY ,drainage of peritoneal abcess,  creation of loop ileostomy, and trucut liver biopsy - Wound Class: Dirty or Infected    Surgeon(s):  Kevin Thornton D.O.    Anesthesiologist/Type of Anesthesia:  Anesthesiologist: Armando Saavedra M.D./General    Surgical Staff:  Circulator: Sabrina Cantu, R.N.; Tiffani Byers R.N.  Scrub Person: Ephraim Vogel  First Assist: CALIN Mora    Specimens removed if any:  ID Type Source Tests Collected by Time Destination   1 : abdominal wound hematoma Body Fluid Abdominal AFB CULTURE, FUNGAL CULTURE, AEROBIC/ANAEROBIC CULTURE (SURGERY) Kevin Thornton D.O. 8/31/2021  9:17 AM    2 : peritoneal abscess Body Fluid Peritoneal Fluid AFB CULTURE, FUNGAL CULTURE, AEROBIC/ANAEROBIC CULTURE (SURGERY) Kevin Thornton D.O. 8/31/2021  9:21 AM    A : core liver biopsy Tissue Liver PATHOLOGY SPECIMEN Kevin Thornton D.O. 8/31/2021 10:14 AM    B : peritoneal mass Tissue Abdominal PATHOLOGY SPECIMEN Kevin Thornton D.O. 8/31/2021 10:23 AM        Estimated Blood Loss: 500 cc    Findings: Dense right upper quadrant adhesions with clear spillage of stool from the colon.  Large bowel cannot be mobilized due to extensive retroperitoneal process with adhesions and inflammation.  Palpable right lobe liver mass that was very large.  Prior liver injury without bleeding.  Pelvic abscess.  Dense but soft infracolic adhesions.    Complications: None noted    Drains: Right abdominal drain to the right paracolic gutter extending up lateral to the liver  Left abdominal drain extending down to the pelvis and the right lateral sidewall    Indications: 71-year-old male who has undergone drainage of right upper quadrant abscess back in June and more recently on 27 August.  He had been draining sanguinous purulent fluid from his right upper quadrant  drain but it became feculent last night and was accompanied by new onset atrial fibrillation and worsening hemodynamic status requiring increased vasopressor support.  Imaging was inconclusive but due to the patient's status, decision was made to resuscitate him overnight with fluids and he was scheduled for surgery this morning.    Operative report: The patient was brought to the operating room and placed in the supine position on the operating table.  He was placed under general anesthesia and intubated by the anesthesiologist.  Core track feeding tube was removed and nasogastric tube was placed.  500 cc of bilious fluid immediately drained.  The abdomen was then prepped and draped in the standard fashion.  Appropriate timeout was performed.  Zosyn was given.  The midline sutures were then removed and the wound was cultured.  Fascial sutures were removed and we gained abscess to the peritoneal cavity.  Omentum was stretched over the viscera and appeared to be adherent down into the right pelvis.  The right upper quadrant had extensive adhesions which were lysed with finger dissection as well as the Bovie cautery.  This allowed us access to the area where the drain was on the lateral side of the right lobe of the liver.  Cultures of fluid were sent.  There was a large dense mass in the right lobe and the liver medial to that appear to be denuded with torn capsule.  Prior surgeon and noted this at the prior case.  There was not much bleeding but we decided to place some Nu-Knit gauze and packing over it to be sure.  We then tried to mobilize the hepatic flexure but it was densely adherent to the liver mass.  There appeared to be stool spilling out of the corner but because of the inflammatory process we are unable to mobilize the hepatic flexure or the upper ascending colon.  We irrigated the area and replaced the right upper quadrant drain to extend along the paracolic gutter and up behind the liver.  Because we  are unable to address the area of perforation, decision was made to try and perform a loop ileostomy.  The omentum was carefully mobilized off of the bowel and we divided it closer to the pelvis using the LigaSure device.  We then used blunt dissection as well as Bovie cautery and sharp dissection to mobilize the small bowel.  Adherent loop in the right lower quadrant was mobilized bluntly and there was a small serosal injury but no perforation.  We then fully mobilized the small bowel to the ileocecal valve.  There appeared to be an abscess in the pelvis inferior to the small bowel loop.  This was irrigated thoroughly.  The left abdominal drain was replaced with a 19 Australian Jacques which extended down into the right pelvis and up along the lateral sidewall toward the cecum and appendix.  We then ensured we had enough loop of small bowel to bring up for an ileostomy.  We irrigated the abdomen.  We then performed a Joe-Cut biopsies of the liver mass as none are on our system and we wanted to have pathology report on it.  The denuded area of liver was covered in Heema blast and another sheet of Nu-Knit gauze.  There was no obvious bleeding noted.  Some oozing down in the pelvis appeared to stop with packing.  Which shows an area lateral and just inferior to the umbilicus in the rectus sheath for ostomy.  A disc of skin was cut away about 2.5 cm in diameter.  We dissected down to the fascia and removed a plug of subcutaneous fat.  The fascia was opened in a cruciate fashion and the rectus muscles were split along their fibers.  The posterior sheath was opened and then dilated with 2 fingers.  We used a Buffalo clamp to deliver the terminal ileum up into the ileostomy site, intending to utilize our serosal injury as the opening for the ileostomy.  We then closed the midline fascia using #1 PDS stitch in a running fashion.  The wound was irrigated and the skin was closed intermittently with staples.  The area between the  staples was packed with 4 x 8 gauze.  The ileostomy was then matured using 3-0 Vicryl stitch interrupted fashion.  The area around the ileostomy was then covered with an appliance.  The abdominal wall was then cleaned and dried and the drain sites in the midline wound were then dressed with 4 x 4 gauze.  The patient was then returned to the surgical intensive care unit in critical condition on the ventilator.  The sponge, and needle and instrument counts were correct at the end of the case.        8/31/2021 10:53 AM Kevin Thornton D.O.

## 2021-08-31 NOTE — ANESTHESIA PROCEDURE NOTES
Airway    Date/Time: 8/31/2021 8:59 AM  Performed by: Armando Saavedra M.D.  Authorized by: Armando Saavedra M.D.     Location:  OR  Urgency:  Elective  Indications for Airway Management:  Anesthesia      Spontaneous Ventilation: absent    Sedation Level:  Deep  Preoxygenated: Yes    Patient Position:  Sniffing  Mask Difficulty Assessment:  0 - not attempted  Final Airway Type:  Endotracheal airway  Final Endotracheal Airway:  ETT  Cuffed: Yes    Technique Used for Successful ETT Placement:  Video laryngoscopy    Insertion Site:  Oral  Blade Type:  Glide  Laryngoscope Blade/Videolaryngoscope Blade Size:  3  ETT Size (mm):  8.0  Measured from:  Teeth  ETT to Teeth (cm):  24  Placement Verified by: auscultation and capnometry    Cormack-Lehane Classification:  Grade I - full view of glottis  Number of Attempts at Approach:  1

## 2021-08-31 NOTE — PROGRESS NOTES
Trauma / Surgical Daily Progress Note    Date of Service  8/31/2021    Chief Complaint  71 y.o. male admitted 8/26/2021 with abscess and advanced hepatocellular cancer. Underwent laparotomy and abscess drainage    Interval Events  Overnight drain became feculent, started to resuscitate further, CT scan obtained and back to the OR w Dr Thornton.  Returned intubated on vent.  Converted out of afib in the OR.  Remains on pressor support     Patient is critically ill.   The patient continues to have: sepsis, respiratory failure, atrial fibrillation with RVR  If untreated there is a high chance of deterioration and eventually death.   The critical care that I am providing today is: ventilator management, management of atrial fibrillation, resuscitation for sepsis  The critical that has been undertaken is medically complex.   There has been no overlap in critical care time.   Critical Care Time not including procedures: 60 min      Review of Systems  Review of Systems   Unable to perform ROS: Intubated        Vital Signs for last 24 hours  Temp:  [35.9 °C (96.6 °F)-36.7 °C (98.1 °F)] 36.2 °C (97.2 °F)  Pulse:  [] 89  Resp:  [10-46] 18  BP: ()/(54-73) 91/57  SpO2:  [44 %-100 %] 100 %    Hemodynamic parameters for last 24 hours  CVP:  [5 MM HG-240 MM HG] 5 MM HG    Respiratory Data     Respiration: 18, Pulse Oximetry: 100 %     Work Of Breathing / Effort: Vented  RUL Breath Sounds: Clear, RML Breath Sounds: Clear;Diminished, RLL Breath Sounds: Diminished, ASHLEY Breath Sounds: Clear;Diminished, LLL Breath Sounds: Diminished    Physical Exam  Physical Exam  Constitutional:       Appearance: He is ill-appearing and toxic-appearing.   HENT:      Head: Normocephalic and atraumatic.      Right Ear: Tympanic membrane normal.      Left Ear: Tympanic membrane normal.      Nose: Nose normal.      Mouth/Throat:      Mouth: Mucous membranes are dry.   Eyes:      Extraocular Movements: Extraocular movements intact.      Pupils:  Pupils are equal, round, and reactive to light.   Cardiovascular:      Rate and Rhythm: Tachycardia present. Rhythm irregular.      Pulses: Normal pulses.   Pulmonary:      Comments: On ventilator  Abdominal:      General: There is distension.      Comments: MARIANA drain with feculent output   Musculoskeletal:         General: Normal range of motion.      Cervical back: Normal range of motion and neck supple.   Skin:     General: Skin is warm and dry.   Neurological:      General: No focal deficit present.      Mental Status: He is oriented to person, place, and time.   Psychiatric:         Thought Content: Thought content normal.         Laboratory  Recent Results (from the past 24 hour(s))   CBC WITH DIFFERENTIAL    Collection Time: 08/30/21 10:05 PM   Result Value Ref Range    WBC 17.0 (H) 4.8 - 10.8 K/uL    RBC 2.87 (L) 4.70 - 6.10 M/uL    Hemoglobin 9.1 (L) 14.0 - 18.0 g/dL    Hematocrit 28.2 (L) 42.0 - 52.0 %    MCV 98.3 (H) 81.4 - 97.8 fL    MCH 31.7 27.0 - 33.0 pg    MCHC 32.3 (L) 33.7 - 35.3 g/dL    RDW 59.8 (H) 35.9 - 50.0 fL    Platelet Count 178 164 - 446 K/uL    MPV 8.8 (L) 9.0 - 12.9 fL    Neutrophils-Polys 83.60 (H) 44.00 - 72.00 %    Lymphocytes 5.60 (L) 22.00 - 41.00 %    Monocytes 9.60 0.00 - 13.40 %    Eosinophils 0.00 0.00 - 6.90 %    Basophils 0.20 0.00 - 1.80 %    Immature Granulocytes 1.00 (H) 0.00 - 0.90 %    Nucleated RBC 0.60 /100 WBC    Neutrophils (Absolute) 14.23 (H) 1.82 - 7.42 K/uL    Lymphs (Absolute) 0.96 (L) 1.00 - 4.80 K/uL    Monos (Absolute) 1.64 (H) 0.00 - 0.85 K/uL    Eos (Absolute) 0.00 0.00 - 0.51 K/uL    Baso (Absolute) 0.04 0.00 - 0.12 K/uL    Immature Granulocytes (abs) 0.17 (H) 0.00 - 0.11 K/uL    NRBC (Absolute) 0.11 K/uL   CBC WITH DIFFERENTIAL    Collection Time: 08/31/21  5:25 AM   Result Value Ref Range    WBC 17.6 (H) 4.8 - 10.8 K/uL    RBC 3.04 (L) 4.70 - 6.10 M/uL    Hemoglobin 9.5 (L) 14.0 - 18.0 g/dL    Hematocrit 30.2 (L) 42.0 - 52.0 %    MCV 99.3 (H) 81.4 - 97.8  fL    MCH 31.3 27.0 - 33.0 pg    MCHC 31.5 (L) 33.7 - 35.3 g/dL    RDW 61.0 (H) 35.9 - 50.0 fL    Platelet Count 167 164 - 446 K/uL    MPV 9.0 9.0 - 12.9 fL    Neutrophils-Polys 85.00 (H) 44.00 - 72.00 %    Lymphocytes 5.00 (L) 22.00 - 41.00 %    Monocytes 9.00 0.00 - 13.40 %    Eosinophils 0.10 0.00 - 6.90 %    Basophils 0.20 0.00 - 1.80 %    Immature Granulocytes 0.70 0.00 - 0.90 %    Nucleated RBC 1.00 /100 WBC    Neutrophils (Absolute) 14.98 (H) 1.82 - 7.42 K/uL    Lymphs (Absolute) 0.89 (L) 1.00 - 4.80 K/uL    Monos (Absolute) 1.58 (H) 0.00 - 0.85 K/uL    Eos (Absolute) 0.02 0.00 - 0.51 K/uL    Baso (Absolute) 0.04 0.00 - 0.12 K/uL    Immature Granulocytes (abs) 0.13 (H) 0.00 - 0.11 K/uL    NRBC (Absolute) 0.17 K/uL   Comp Metabolic Panel    Collection Time: 08/31/21  5:25 AM   Result Value Ref Range    Sodium 133 (L) 135 - 145 mmol/L    Potassium 3.5 (L) 3.6 - 5.5 mmol/L    Chloride 100 96 - 112 mmol/L    Co2 23 20 - 33 mmol/L    Anion Gap 10.0 7.0 - 16.0    Glucose 160 (H) 65 - 99 mg/dL    Bun 21 8 - 22 mg/dL    Creatinine 0.76 0.50 - 1.40 mg/dL    Calcium 8.3 (L) 8.5 - 10.5 mg/dL    AST(SGOT) 53 (H) 12 - 45 U/L    ALT(SGPT) 14 2 - 50 U/L    Alkaline Phosphatase 199 (H) 30 - 99 U/L    Total Bilirubin 0.7 0.1 - 1.5 mg/dL    Albumin 1.8 (L) 3.2 - 4.9 g/dL    Total Protein 5.1 (L) 6.0 - 8.2 g/dL    Globulin 3.3 1.9 - 3.5 g/dL    A-G Ratio 0.5 g/dL   IONIZED CALCIUM    Collection Time: 08/31/21  5:25 AM   Result Value Ref Range    Ionized Calcium 1.2 1.1 - 1.3 mmol/L   MAGNESIUM    Collection Time: 08/31/21  5:25 AM   Result Value Ref Range    Magnesium 1.8 1.5 - 2.5 mg/dL   PHOSPHORUS    Collection Time: 08/31/21  5:25 AM   Result Value Ref Range    Phosphorus 4.0 2.5 - 4.5 mg/dL   ESTIMATED GFR    Collection Time: 08/31/21  5:25 AM   Result Value Ref Range    GFR If African American >60 >60 mL/min/1.73 m 2    GFR If Non African American >60 >60 mL/min/1.73 m 2   POCT glucose device results    Collection Time:  08/31/21  8:48 AM   Result Value Ref Range    Glucose - Accu-Ck 148 (H) 65 - 99 mg/dL   CULTURE WOUND W/ GRAM STAIN    Collection Time: 08/31/21  9:17 AM    Specimen: Wound   Result Value Ref Range    Significant Indicator NEG     Source WND     Site Abdominal Hematoma     Culture Result -     Gram Stain Result -    Anaerobic Culture    Collection Time: 08/31/21  9:17 AM    Specimen: Wound   Result Value Ref Range    Significant Indicator NEG     Source WND     Site Abdominal Hematoma     Culture Result -    Fungal Culture    Collection Time: 08/31/21  9:17 AM    Specimen: Wound   Result Value Ref Range    Significant Indicator NEG     Source WND     Site Abdominal Hematoma     Culture Result Culture in progress.     Fungal Smear Results -    CULTURE WOUND W/ GRAM STAIN    Collection Time: 08/31/21  9:21 AM    Specimen: Wound   Result Value Ref Range    Significant Indicator NEG     Source WND     Site Peritoneal Abscess     Culture Result -     Gram Stain Result -    Anaerobic Culture    Collection Time: 08/31/21  9:21 AM    Specimen: Wound   Result Value Ref Range    Significant Indicator NEG     Source WND     Site Peritoneal Abscess     Culture Result -    Fungal Culture    Collection Time: 08/31/21  9:21 AM    Specimen: Wound   Result Value Ref Range    Significant Indicator NEG     Source WND     Site Peritoneal Abscess     Culture Result -     Fungal Smear Results -    POCT arterial blood gas device results    Collection Time: 08/31/21  9:50 AM   Result Value Ref Range    Ph 7.230 (LL) 7.400 - 7.500    Pco2 54.4 (HH) 26.0 - 37.0 mmHg    Po2 80 64 - 87 mmHg    Tco2 24 20 - 33 mmol/L    S02 93 93 - 99 %    Hco3 22.8 17.0 - 25.0 mmol/L    BE -5 (L) -4 - 3 mmol/L    Body Temp see below degrees    Specimen Arterial    POCT sodium device results    Collection Time: 08/31/21  9:50 AM   Result Value Ref Range    Istat Sodium 137 135 - 145 mmol/L   POCT potassium device results    Collection Time: 08/31/21  9:50 AM    Result Value Ref Range    Istat Potassium 4.6 3.6 - 5.5 mmol/L   POCT ionized CA device results    Collection Time: 21  9:50 AM   Result Value Ref Range    Istat Ionized Calcium 1.23 1.10 - 1.30 mmol/L   POCT hematocrit and hemoglobin device results    Collection Time: 21  9:50 AM   Result Value Ref Range    Istat Hematocrit 25 (L) 42 - 52 %    Istat Hemoglobin 8.5 (L) 14.0 - 18.0 g/dL   COD - Adult (Type and Screen)    Collection Time: 21 10:00 AM   Result Value Ref Range    ABO Grouping Only O     Rh Grouping Only POS     Antibody Screen-Cod NEG    UN-XM'D RBC    Collection Time: 21 10:11 AM   Result Value Ref Range    Component R       R99                 Red Cells, LR       [id number]   transfused   21   10:06      Product Type R99     Dispense Status transfused     Unit Number (Barcoded) [id number]     Product Code (Barcoded) B2739V23     Blood Type (Barcoded) 5100     Component R       R99                 Red Cells, LR       [id number]   released     21   10:15      Product Type R99     Dispense Status /Released     Unit Number (Barcoded) [id number]     Product Code (Barcoded) [id number]     Blood Type (Barcoded) 5100    Histology Request    Collection Time: 21 11:11 AM   Result Value Ref Range    Pathology Request Sent to [facility]    PHOSPHORUS    Collection Time: 21 12:00 PM   Result Value Ref Range    Phosphorus 4.0 2.5 - 4.5 mg/dL   MAGNESIUM    Collection Time: 21 12:00 PM   Result Value Ref Range    Magnesium 1.8 1.5 - 2.5 mg/dL   LACTIC ACID    Collection Time: 21 12:00 PM   Result Value Ref Range    Lactic Acid 3.0 (H) 0.5 - 2.0 mmol/L   HGB    Collection Time: 21 12:00 PM   Result Value Ref Range    Hemoglobin 11.0 (L) 14.0 - 18.0 g/dL   LACTIC ACID    Collection Time: 21  4:28 PM   Result Value Ref Range    Lactic Acid 3.5 (H) 0.5 - 2.0 mmol/L       Fluids    Intake/Output Summary (Last 24 hours) at 2021  2014  Last data filed at 8/31/2021 1930  Gross per 24 hour   Intake 81657.64 ml   Output 3425 ml   Net 7429.64 ml       Core Measures & Quality Metrics  Radiology images reviewed, EKG reviewed, Labs reviewed and Medications reviewed  Triana catheter: Critically Ill - Requiring Accurate Measurement of Urinary Output  Central line in place: Need for access and Vasopressors    DVT Prophylaxis: Contraindicated - High bleeding risk  DVT prophylaxis - mechanical: SCDs  Ulcer prophylaxis: Yes  Antibiotics: Treating active infection/contamination beyond 24 hours perioperative coverage      CHELI Score  ETOH Screening    Assessment/Plan  * Perforated viscus- (present on admission)  Assessment & Plan  8/31- diverting ileostomy with MARIANA drainage of feculent material.  Abdomen too frozen for formal resection    Sepsis (HCC)- (present on admission)  Assessment & Plan  Secondary to liver abscess  Ongoing volume resuscitation and pressors support  abx rocephin and flagyl  8/30-feculent drain output in late evening  8/31- return to OR for washout, drainage of feculent fluid and diverting ileostomy.    Hepatocellular carcinoma (HCC)  Assessment & Plan  Advanced  Associated abscess    Atrial fibrillation (HCC)  Assessment & Plan  Diuresis  Aggressive electrolyte replacement  Metoprolol and amiodarone   8/31- converted out in OR, continues on amiodarone gtt    Respiratory failure following trauma and surgery (HCC)  Assessment & Plan  On vent, weaning per protocol      Normochromic anemia- (present on admission)  Assessment & Plan  Trend and transfuse for hgb <7.  Hgb today is 7.8    Thrombocytopenia (HCC)- (present on admission)  Assessment & Plan  Trend        Discussed patient condition with RN, RT, Pharmacy, Patient and general surgery.  CRITICAL CARE TIME EXCLUDING PROCEDURES: 60    minutes

## 2021-08-31 NOTE — CARE PLAN
The patient is Watcher - Medium risk of patient condition declining or worsening    Shift Goals  Clinical Goals: Participate in care, Hemodynamically Stable   Patient Goals: Wants to go home and rest   Family Goals: Same as patien, wants him to go home     Progress made toward(s) clinical / shift goals:        Problem: Knowledge Deficit - Standard  Goal: Patient and family/care givers will demonstrate understanding of plan of care, disease process/condition, diagnostic tests and medications  8/31/2021 1027 by MAIKEL AyalaN.  Outcome: Progressing     Problem: Pain - Standard  Goal: Alleviation of pain or a reduction in pain to the patient’s comfort goal  8/31/2021 1027 by Clarissa Jean-Baptiste R.N.  Outcome: Progressing       Problem: Cardiac - Atrial Fibrillation  Goal: Patient will achieve & maintain adequate cardiac output and rate control  8/31/2021 1027 by Clarissa Jean-Baptiste R.N.  Outcome: Progressing            :

## 2021-09-01 ENCOUNTER — APPOINTMENT (OUTPATIENT)
Dept: RADIOLOGY | Facility: MEDICAL CENTER | Age: 71
DRG: 853 | End: 2021-09-01
Attending: SURGERY
Payer: MEDICARE

## 2021-09-01 LAB
ALBUMIN SERPL BCP-MCNC: 1.6 G/DL (ref 3.2–4.9)
ALBUMIN/GLOB SERPL: 0.6 G/DL
ALP SERPL-CCNC: 154 U/L (ref 30–99)
ALT SERPL-CCNC: 18 U/L (ref 2–50)
ANION GAP SERPL CALC-SCNC: 9 MMOL/L (ref 7–16)
ANISOCYTOSIS BLD QL SMEAR: ABNORMAL
AST SERPL-CCNC: 51 U/L (ref 12–45)
BASE EXCESS BLDV CALC-SCNC: 0 MMOL/L (ref -4–3)
BASOPHILS # BLD AUTO: 0 % (ref 0–1.8)
BASOPHILS # BLD: 0 K/UL (ref 0–0.12)
BILIRUB SERPL-MCNC: 1 MG/DL (ref 0.1–1.5)
BODY TEMPERATURE: ABNORMAL DEGREES
BREATHS SETTING VENT: 18
BUN SERPL-MCNC: 19 MG/DL (ref 8–22)
BURR CELLS BLD QL SMEAR: NORMAL
CA-I SERPL-SCNC: 1.2 MMOL/L (ref 1.1–1.3)
CALCIUM SERPL-MCNC: 8 MG/DL (ref 8.5–10.5)
CHLORIDE SERPL-SCNC: 104 MMOL/L (ref 96–112)
CO2 BLDV-SCNC: 26 MMOL/L (ref 20–33)
CO2 SERPL-SCNC: 22 MMOL/L (ref 20–33)
CREAT SERPL-MCNC: 0.77 MG/DL (ref 0.5–1.4)
DELSYS IDSYS: ABNORMAL
END TIDAL CARBON DIOXIDE IECO2: 27 MMHG
EOSINOPHIL # BLD AUTO: 0 K/UL (ref 0–0.51)
EOSINOPHIL NFR BLD: 0 % (ref 0–6.9)
ERYTHROCYTE [DISTWIDTH] IN BLOOD BY AUTOMATED COUNT: 54.8 FL (ref 35.9–50)
GLOBULIN SER CALC-MCNC: 2.8 G/DL (ref 1.9–3.5)
GLUCOSE SERPL-MCNC: 189 MG/DL (ref 65–99)
GRAM STN SPEC: NORMAL
GRAM STN SPEC: NORMAL
HCO3 BLDV-SCNC: 24.6 MMOL/L (ref 24–28)
HCT VFR BLD AUTO: 32.4 % (ref 42–52)
HGB BLD-MCNC: 10.7 G/DL (ref 14–18)
HOROWITZ INDEX BLDV+IHG-RTO: 88 MM[HG]
LACTATE BLD-SCNC: 2.5 MMOL/L (ref 0.5–2)
LACTATE BLD-SCNC: 3.6 MMOL/L (ref 0.5–2)
LYMPHOCYTES # BLD AUTO: 0.12 K/UL (ref 1–4.8)
LYMPHOCYTES NFR BLD: 0.9 % (ref 22–41)
MACROCYTES BLD QL SMEAR: ABNORMAL
MAGNESIUM SERPL-MCNC: 1.7 MG/DL (ref 1.5–2.5)
MANUAL DIFF BLD: NORMAL
MCH RBC QN AUTO: 32.4 PG (ref 27–33)
MCHC RBC AUTO-ENTMCNC: 33 G/DL (ref 33.7–35.3)
MCV RBC AUTO: 98.2 FL (ref 81.4–97.8)
MODE IMODE: ABNORMAL
MONOCYTES # BLD AUTO: 0.33 K/UL (ref 0–0.85)
MONOCYTES NFR BLD AUTO: 2.6 % (ref 0–13.4)
MORPHOLOGY BLD-IMP: NORMAL
NEUTROPHILS # BLD AUTO: 12.35 K/UL (ref 1.82–7.42)
NEUTROPHILS NFR BLD: 96.5 % (ref 44–72)
NRBC # BLD AUTO: 0.44 K/UL
NRBC BLD-RTO: 3.4 /100 WBC
O2/TOTAL GAS SETTING VFR VENT: 40 %
OVALOCYTES BLD QL SMEAR: NORMAL
PCO2 BLDV: 38.2 MMHG (ref 41–51)
PCO2 TEMP ADJ BLDV: 36.8 MMHG (ref 41–51)
PEEP END EXPIRATORY PRESSURE IPEEP: 8 CMH20
PH BLDV: 7.42 [PH] (ref 7.31–7.45)
PH TEMP ADJ BLDV: 7.43 [PH] (ref 7.31–7.45)
PHOSPHATE SERPL-MCNC: 3 MG/DL (ref 2.5–4.5)
PLATELET # BLD AUTO: 84 K/UL (ref 164–446)
PLATELET BLD QL SMEAR: NORMAL
PMV BLD AUTO: 9.6 FL (ref 9–12.9)
PO2 BLDV: 35 MMHG (ref 25–40)
PO2 TEMP ADJ BLDV: 32 MMHG (ref 25–40)
POIKILOCYTOSIS BLD QL SMEAR: NORMAL
POLYCHROMASIA BLD QL SMEAR: NORMAL
POTASSIUM SERPL-SCNC: 4.2 MMOL/L (ref 3.6–5.5)
PROT SERPL-MCNC: 4.4 G/DL (ref 6–8.2)
RBC # BLD AUTO: 3.3 M/UL (ref 4.7–6.1)
RBC BLD AUTO: PRESENT
SAO2 % BLDV: 68 %
SIGNIFICANT IND 70042: NORMAL
SIGNIFICANT IND 70042: NORMAL
SITE SITE: NORMAL
SITE SITE: NORMAL
SODIUM SERPL-SCNC: 135 MMOL/L (ref 135–145)
SOURCE SOURCE: NORMAL
SOURCE SOURCE: NORMAL
SPECIMEN DRAWN FROM PATIENT: ABNORMAL
TIDAL VOLUME IVT: 450 ML
TRIGL SERPL-MCNC: 100 MG/DL (ref 0–149)
WBC # BLD AUTO: 12.8 K/UL (ref 4.8–10.8)

## 2021-09-01 PROCEDURE — 700101 HCHG RX REV CODE 250: Performed by: SURGERY

## 2021-09-01 PROCEDURE — 83605 ASSAY OF LACTIC ACID: CPT

## 2021-09-01 PROCEDURE — 770022 HCHG ROOM/CARE - ICU (200)

## 2021-09-01 PROCEDURE — 700111 HCHG RX REV CODE 636 W/ 250 OVERRIDE (IP): Performed by: SURGERY

## 2021-09-01 PROCEDURE — 94003 VENT MGMT INPAT SUBQ DAY: CPT

## 2021-09-01 PROCEDURE — 94799 UNLISTED PULMONARY SVC/PX: CPT

## 2021-09-01 PROCEDURE — 84100 ASSAY OF PHOSPHORUS: CPT

## 2021-09-01 PROCEDURE — 97605 NEG PRS WND THER DME<=50SQCM: CPT

## 2021-09-01 PROCEDURE — 700105 HCHG RX REV CODE 258: Performed by: SURGERY

## 2021-09-01 PROCEDURE — 82803 BLOOD GASES ANY COMBINATION: CPT

## 2021-09-01 PROCEDURE — 94150 VITAL CAPACITY TEST: CPT

## 2021-09-01 PROCEDURE — 85027 COMPLETE CBC AUTOMATED: CPT

## 2021-09-01 PROCEDURE — 97167 OT EVAL HIGH COMPLEX 60 MIN: CPT

## 2021-09-01 PROCEDURE — 99291 CRITICAL CARE FIRST HOUR: CPT | Performed by: SURGERY

## 2021-09-01 PROCEDURE — 83735 ASSAY OF MAGNESIUM: CPT

## 2021-09-01 PROCEDURE — 82330 ASSAY OF CALCIUM: CPT

## 2021-09-01 PROCEDURE — 36600 WITHDRAWAL OF ARTERIAL BLOOD: CPT

## 2021-09-01 PROCEDURE — 99292 CRITICAL CARE ADDL 30 MIN: CPT | Performed by: SURGERY

## 2021-09-01 PROCEDURE — 85007 BL SMEAR W/DIFF WBC COUNT: CPT

## 2021-09-01 PROCEDURE — 84478 ASSAY OF TRIGLYCERIDES: CPT

## 2021-09-01 PROCEDURE — 97162 PT EVAL MOD COMPLEX 30 MIN: CPT

## 2021-09-01 PROCEDURE — 80053 COMPREHEN METABOLIC PANEL: CPT

## 2021-09-01 PROCEDURE — 71045 X-RAY EXAM CHEST 1 VIEW: CPT

## 2021-09-01 PROCEDURE — P9047 ALBUMIN (HUMAN), 25%, 50ML: HCPCS | Mod: JG | Performed by: SURGERY

## 2021-09-01 RX ORDER — ALBUMIN (HUMAN) 12.5 G/50ML
25 SOLUTION INTRAVENOUS EVERY 6 HOURS
Status: COMPLETED | OUTPATIENT
Start: 2021-09-01 | End: 2021-09-02

## 2021-09-01 RX ORDER — SODIUM CHLORIDE 9 MG/ML
250 INJECTION, SOLUTION INTRAVENOUS ONCE
Status: COMPLETED | OUTPATIENT
Start: 2021-09-01 | End: 2021-09-01

## 2021-09-01 RX ORDER — ALBUMIN (HUMAN) 12.5 G/50ML
25 SOLUTION INTRAVENOUS ONCE
Status: COMPLETED | OUTPATIENT
Start: 2021-09-01 | End: 2021-09-01

## 2021-09-01 RX ADMIN — VASOPRESSIN 0.04 UNITS/MIN: 20 INJECTION INTRAVENOUS at 22:15

## 2021-09-01 RX ADMIN — AMIODARONE HYDROCHLORIDE 0.5 MG/MIN: 1.8 INJECTION, SOLUTION INTRAVENOUS at 10:19

## 2021-09-01 RX ADMIN — HYDROCORTISONE SODIUM SUCCINATE 100 MG: 100 INJECTION, POWDER, FOR SOLUTION INTRAMUSCULAR; INTRAVENOUS at 14:36

## 2021-09-01 RX ADMIN — FAMOTIDINE 20 MG: 10 INJECTION INTRAVENOUS at 06:01

## 2021-09-01 RX ADMIN — HYDROCORTISONE SODIUM SUCCINATE 100 MG: 100 INJECTION, POWDER, FOR SOLUTION INTRAMUSCULAR; INTRAVENOUS at 22:17

## 2021-09-01 RX ADMIN — PIPERACILLIN AND TAZOBACTAM 4.5 G: 4; .5 INJECTION, POWDER, LYOPHILIZED, FOR SOLUTION INTRAVENOUS; PARENTERAL at 06:01

## 2021-09-01 RX ADMIN — HYDROMORPHONE HYDROCHLORIDE 0.5 MG: 1 INJECTION, SOLUTION INTRAMUSCULAR; INTRAVENOUS; SUBCUTANEOUS at 10:48

## 2021-09-01 RX ADMIN — ALBUMIN (HUMAN) 25 G: 5 SOLUTION INTRAVENOUS at 17:47

## 2021-09-01 RX ADMIN — HYDROCORTISONE SODIUM SUCCINATE 100 MG: 100 INJECTION, POWDER, FOR SOLUTION INTRAMUSCULAR; INTRAVENOUS at 06:01

## 2021-09-01 RX ADMIN — NOREPINEPHRINE BITARTRATE 10 MCG/MIN: 1 INJECTION, SOLUTION, CONCENTRATE INTRAVENOUS at 10:55

## 2021-09-01 RX ADMIN — ENOXAPARIN SODIUM 40 MG: 40 INJECTION SUBCUTANEOUS at 12:54

## 2021-09-01 RX ADMIN — FAMOTIDINE 20 MG: 10 INJECTION INTRAVENOUS at 17:47

## 2021-09-01 RX ADMIN — HYDROMORPHONE HYDROCHLORIDE 0.5 MG: 1 INJECTION, SOLUTION INTRAMUSCULAR; INTRAVENOUS; SUBCUTANEOUS at 14:36

## 2021-09-01 RX ADMIN — ALBUMIN (HUMAN) 25 G: 5 SOLUTION INTRAVENOUS at 10:23

## 2021-09-01 RX ADMIN — PIPERACILLIN AND TAZOBACTAM 4.5 G: 4; .5 INJECTION, POWDER, LYOPHILIZED, FOR SOLUTION INTRAVENOUS; PARENTERAL at 12:54

## 2021-09-01 RX ADMIN — NOREPINEPHRINE BITARTRATE 20 MCG/MIN: 1 INJECTION, SOLUTION, CONCENTRATE INTRAVENOUS at 05:31

## 2021-09-01 RX ADMIN — PIPERACILLIN AND TAZOBACTAM 4.5 G: 4; .5 INJECTION, POWDER, LYOPHILIZED, FOR SOLUTION INTRAVENOUS; PARENTERAL at 20:43

## 2021-09-01 RX ADMIN — SODIUM CHLORIDE, POTASSIUM CHLORIDE, SODIUM LACTATE AND CALCIUM CHLORIDE: 600; 310; 30; 20 INJECTION, SOLUTION INTRAVENOUS at 12:58

## 2021-09-01 RX ADMIN — NOREPINEPHRINE BITARTRATE 25 MCG/MIN: 1 INJECTION, SOLUTION, CONCENTRATE INTRAVENOUS at 01:45

## 2021-09-01 RX ADMIN — VASOPRESSIN 0.04 UNITS/MIN: 20 INJECTION INTRAVENOUS at 12:58

## 2021-09-01 RX ADMIN — SODIUM CHLORIDE 250 ML: 9 INJECTION, SOLUTION INTRAVENOUS at 17:47

## 2021-09-01 RX ADMIN — AMIODARONE HYDROCHLORIDE 0.5 MG/MIN: 1.8 INJECTION, SOLUTION INTRAVENOUS at 20:43

## 2021-09-01 RX ADMIN — VASOPRESSIN 0.04 UNITS/MIN: 20 INJECTION INTRAVENOUS at 06:02

## 2021-09-01 RX ADMIN — SODIUM CHLORIDE, POTASSIUM CHLORIDE, SODIUM LACTATE AND CALCIUM CHLORIDE: 600; 310; 30; 20 INJECTION, SOLUTION INTRAVENOUS at 00:00

## 2021-09-01 ASSESSMENT — COGNITIVE AND FUNCTIONAL STATUS - GENERAL
HELP NEEDED FOR BATHING: TOTAL
STANDING UP FROM CHAIR USING ARMS: TOTAL
MOVING FROM LYING ON BACK TO SITTING ON SIDE OF FLAT BED: UNABLE
SUGGESTED CMS G CODE MODIFIER MOBILITY: CN
EATING MEALS: TOTAL
MOBILITY SCORE: 6
SUGGESTED CMS G CODE MODIFIER DAILY ACTIVITY: CM
TOILETING: TOTAL
DRESSING REGULAR LOWER BODY CLOTHING: TOTAL
PERSONAL GROOMING: A LOT
TURNING FROM BACK TO SIDE WHILE IN FLAT BAD: UNABLE
CLIMB 3 TO 5 STEPS WITH RAILING: TOTAL
MOVING TO AND FROM BED TO CHAIR: UNABLE
DAILY ACTIVITIY SCORE: 8
WALKING IN HOSPITAL ROOM: TOTAL
DRESSING REGULAR UPPER BODY CLOTHING: A LOT

## 2021-09-01 ASSESSMENT — PAIN DESCRIPTION - PAIN TYPE
TYPE: ACUTE PAIN;SURGICAL PAIN

## 2021-09-01 ASSESSMENT — ACTIVITIES OF DAILY LIVING (ADL): TOILETING: INDEPENDENT

## 2021-09-01 ASSESSMENT — FIBROSIS 4 INDEX: FIB4 SCORE: 10.16

## 2021-09-01 ASSESSMENT — PULMONARY FUNCTION TESTS: FVC: .9

## 2021-09-01 ASSESSMENT — GAIT ASSESSMENTS: GAIT LEVEL OF ASSIST: UNABLE TO PARTICIPATE

## 2021-09-01 NOTE — CARE PLAN
The patient is Watcher - Medium risk of patient condition declining or worsening    Shift Goals  Clinical Goals: Hemodynamically stable, wean vasopressors, mobility if cleared   Patient Goals: unable to assess, intubated  Family Goals: Same as patien, wants him to go home     Progress made toward(s) clinical / shift goals:  Pt is still requiring vasopressors for BP management. Pt was cleared to mobilize and sat edge of bed.     Patient is not progressing towards the following goals:      Problem: Skin Care - Ostomy  Goal: Skin remains free from irritation  Outcome: Not Progressing     Problem: Knowledge Deficit - Ostomy  Goal: Patient will demonstrate ability to manage and maintain ostomy  Outcome: Not Met   Pt unable to learn about ostomy due to he is intubated.

## 2021-09-01 NOTE — THERAPY
"Occupational Therapy   Initial Evaluation     Patient Name: Lorraine Bella  Age:  71 y.o., Sex:  male  Medical Record #: [id number]  Today's Date: 9/1/2021     Precautions  Precautions: Fall Risk, Nasogastric Tube  Comments: NGT to suction, intubated off sedation    Assessment  Patient is 71 y.o. male with a diagnosis of peritoneal abscess, large bowel perforation, liver mass now s/p ex lap, drainage of abscess, creation of ileostomy, and liver biopsy. Seen today intubated and off sedation. Additional factors influencing patient status / progress: weakness, fatigue, impaired balance, impaired cognition.      Plan    Recommend Occupational Therapy 3 times per week until therapy goals are met for the following treatments:  Adaptive Equipment, Neuro Re-Education / Balance, Self Care/Activities of Daily Living, Therapeutic Activities and Therapeutic Exercises.    DC Equipment Recommendations: Unable to determine at this time  Discharge Recommendations: Recommend post-acute placement for additional occupational therapy services prior to discharge home     Subjective    \"Love you\" written to his wife     Objective       09/01/21 1413   Prior Living Situation   Prior Services Home-Independent   Housing / Facility 1 Story House   Steps Into Home 1   Bathroom Set up Walk In Shower;Bathtub / Shower Combination   Equipment Owned None   Lives with - Patient's Self Care Capacity Spouse   Comments Info gathered from wife at bedside. Pt normally very independent. They are both retired   Prior Level of ADL Function   Self Feeding Independent   Grooming / Hygiene Independent   Bathing Independent   Dressing Independent   Toileting Independent   Prior Level of IADL Function   Medication Management Independent   Laundry Independent   Kitchen Mobility Independent   Finances Independent   Home Management Independent   Shopping Independent   Prior Level Of Mobility Independent Without Device in Community;Independent Without Device " "in Home   Driving / Transportation Driving Independent   Occupation (Pre-Hospital Vocational) Retired Due To Age   Precautions   Precautions Fall Risk;Nasogastric Tube   Comments NGT to suction, intubated off sedation   Pain 0 - 10 Group   Therapist Pain Assessment Nurse Notified;3   Cognition    Cognition / Consciousness X   Speech/ Communication Intubated / Trached;Nods Appropriately;Writes Comprehensible Notes   Level of Consciousness Alert   Ability To Follow Commands 1 Step   Comments appears to be a bit disoriented, kept writing a note to his wife saying \"Help!!\"   Active ROM Upper Body   Active ROM Upper Body  X   Dominant Hand Right   Comments BUE limited by weakness   Strength Upper Body   Upper Body Strength  X   Gross Strength Generalized Weakness, Equal Bilaterally.    Balance Assessment   Sitting Balance (Static) Trace +   Sitting Balance (Dynamic) Trace   Weight Shift Sitting Poor   Comments posterior lean   Bed Mobility    Supine to Sit Total Assist   Sit to Supine Total Assist   Scooting Total Assist   Rolling Total Assist to Rt.;Total Assist to Lt.   ADL Assessment   Grooming Maximal Assist;Seated   Lower Body Dressing Total Assist   Toileting Total Assist   How much help from another person does the patient currently need...   Putting on and taking off regular lower body clothing? 1   Bathing (including washing, rinsing, and drying)? 1   Toileting, which includes using a toilet, bedpan, or urinal? 1   Putting on and taking off regular upper body clothing? 2   Taking care of personal grooming such as brushing teeth? 2   Eating meals? 1   6 Clicks Daily Activity Score 8   Functional Mobility   Sit to Stand Unable to Participate   Bed, Chair, Wheelchair Transfer Unable to Participate   Toilet Transfers Unable to Participate   Mobility EOB only   ICU Target Mobility Level   ICU Mobility - Targeted Level Level 2   Patient / Family Goals   Patient / Family Goal #1 to go home   Short Term Goals   Short " Term Goal # 1 pt will maintain fair sitting balance for 15 sec in prep for seated ADLs   Short Term Goal # 2 pt will groom seated EOB w/ Andrea   Short Term Goal # 3 pt will demo stand pivot txf to BSC with modA

## 2021-09-01 NOTE — PROGRESS NOTES
Trauma / Surgical Daily Progress Note    Date of Service  9/1/2021    Chief Complaint  71 y.o. male admitted 8/26/2021 with liver cancer and abscess    Interval Events  POD 1 laparotomy and debridement, ileostomy  Amiodarone drip.    Vasoactive drip.  Levophed 10 and Vasopressin.    CVP 1  Marginal UO.    Albumen transfusion.    AB:  Zosyn.    Lovenox.  Begin  TF begin am.    Lactic 3.6 .    Vent orders.    Review of Systems  Review of Systems     Vital Signs for last 24 hours  Temp:  [35.9 °C (96.6 °F)-36.6 °C (97.8 °F)] 36.6 °C (97.8 °F)  Pulse:  [] 95  Resp:  [2-52] 18  BP: ()/(51-73) 93/59  SpO2:  [98 %-100 %] 100 %    Hemodynamic parameters for last 24 hours  CVP:  [3 MM HG-243 MM HG] 210 MM HG    Respiratory Data     Respiration: 18, Pulse Oximetry: 100 %     Work Of Breathing / Effort: Vented  RUL Breath Sounds: Clear, RML Breath Sounds: Diminished, RLL Breath Sounds: Diminished, ASHLEY Breath Sounds: Clear, LLL Breath Sounds: Diminished    Physical Exam  Physical Exam  HENT:      Head: Normocephalic.   Eyes:      General: No scleral icterus.  Cardiovascular:      Rate and Rhythm: Tachycardia present.   Pulmonary:      Effort: No respiratory distress.      Breath sounds: No wheezing.   Abdominal:      General: There is no distension.      Palpations: Abdomen is soft.   Skin:     General: Skin is warm and dry.   Neurological:      General: No focal deficit present.      Mental Status: He is lethargic.         Laboratory  Recent Results (from the past 24 hour(s))   POCT arterial blood gas device results    Collection Time: 08/31/21  9:50 AM   Result Value Ref Range    Ph 7.230 (LL) 7.400 - 7.500    Pco2 54.4 (HH) 26.0 - 37.0 mmHg    Po2 80 64 - 87 mmHg    Tco2 24 20 - 33 mmol/L    S02 93 93 - 99 %    Hco3 22.8 17.0 - 25.0 mmol/L    BE -5 (L) -4 - 3 mmol/L    Body Temp see below degrees    Specimen Arterial    POCT sodium device results    Collection Time: 08/31/21  9:50 AM   Result Value Ref Range     Istat Sodium 137 135 - 145 mmol/L   POCT potassium device results    Collection Time: 21  9:50 AM   Result Value Ref Range    Istat Potassium 4.6 3.6 - 5.5 mmol/L   POCT ionized CA device results    Collection Time: 21  9:50 AM   Result Value Ref Range    Istat Ionized Calcium 1.23 1.10 - 1.30 mmol/L   POCT hematocrit and hemoglobin device results    Collection Time: 21  9:50 AM   Result Value Ref Range    Istat Hematocrit 25 (L) 42 - 52 %    Istat Hemoglobin 8.5 (L) 14.0 - 18.0 g/dL   COD - Adult (Type and Screen)    Collection Time: 21 10:00 AM   Result Value Ref Range    ABO Grouping Only O     Rh Grouping Only POS     Antibody Screen-Cod NEG    UN-XM'D RBC    Collection Time: 21 10:11 AM   Result Value Ref Range    Component R       R99                 Red Cells, LR       [id number]   transfused   21   10:06      Product Type R99     Dispense Status transfused     Unit Number (Barcoded) [id number]     Product Code (Barcoded) N6687Q79     Blood Type (Barcoded) 5100     Component R       R99                 Red Cells, LR       [id number]   released     21   10:15      Product Type R99     Dispense Status /Released     Unit Number (Barcoded) [id number]     Product Code (Barcoded) P8599D35     Blood Type (Barcoded) 5100    Histology Request    Collection Time: 21 11:11 AM   Result Value Ref Range    [facility]    PHOSPHORUS    Collection Time: 21 12:00 PM   Result Value Ref Range    Phosphorus 4.0 2.5 - 4.5 mg/dL   MAGNESIUM    Collection Time: 21 12:00 PM   Result Value Ref Range    Magnesium 1.8 1.5 - 2.5 mg/dL   LACTIC ACID    Collection Time: 21 12:00 PM   Result Value Ref Range    Lactic Acid 3.0 (H) 0.5 - 2.0 mmol/L   HGB    Collection Time: 21 12:00 PM   Result Value Ref Range    Hemoglobin 11.0 (L) 14.0 - 18.0 g/dL   LACTIC ACID    Collection Time: 21  4:28 PM   Result Value Ref Range     Lactic Acid 3.5 (H) 0.5 - 2.0 mmol/L   LACTIC ACID    Collection Time: 08/31/21  8:10 PM   Result Value Ref Range    Lactic Acid 3.6 (H) 0.5 - 2.0 mmol/L   HEMOGLOBIN AND HEMATOCRIT    Collection Time: 08/31/21 10:00 PM   Result Value Ref Range    Hemoglobin 10.7 (L) 14.0 - 18.0 g/dL    Hematocrit 31.8 (L) 42.0 - 52.0 %   POCT venous blood gas device results    Collection Time: 09/01/21  2:27 AM   Result Value Ref Range    Ph 7.417 7.310 - 7.450    Pco2 38.2 (L) 41.0 - 51.0 mmHg    Po2 35 25 - 40 mmHg    Tco2 26 20 - 33 mmol/L    SO2 68 %    Hco3 24.6 24.0 - 28.0 mmol/L    BE 0 -4 - 3 mmol/L    Body Temp 97.0 F degrees    O2 Therapy 40 %    iPF Ratio 88     Ph Temp Correc 7.430 7.310 - 7.450    Pco2 Temp Kyrie 36.8 (L) 41.0 - 51.0 mmHg    Po2 Temp Corre 32 25 - 40 mmHg    Specimen Venous     DelSys Vent     End Tidal Carbon Dioxide 27 mmhg    Tidal Volume 450 mL    Peep End Expiratory Pressure 8 cmh20    Set Rate 18     Mode APV-CMV    Triglycerides Starting now and then Every 3 Days    Collection Time: 09/01/21  4:15 AM   Result Value Ref Range    Triglycerides 100 0 - 149 mg/dL   PHOSPHORUS    Collection Time: 09/01/21  4:15 AM   Result Value Ref Range    Phosphorus 3.0 2.5 - 4.5 mg/dL   MAGNESIUM    Collection Time: 09/01/21  4:15 AM   Result Value Ref Range    Magnesium 1.7 1.5 - 2.5 mg/dL   LACTIC ACID    Collection Time: 09/01/21  4:15 AM   Result Value Ref Range    Lactic Acid 3.6 (H) 0.5 - 2.0 mmol/L   CBC WITH DIFFERENTIAL    Collection Time: 09/01/21  4:15 AM   Result Value Ref Range    WBC 12.8 (H) 4.8 - 10.8 K/uL    RBC 3.30 (L) 4.70 - 6.10 M/uL    Hemoglobin 10.7 (L) 14.0 - 18.0 g/dL    Hematocrit 32.4 (L) 42.0 - 52.0 %    MCV 98.2 (H) 81.4 - 97.8 fL    MCH 32.4 27.0 - 33.0 pg    MCHC 33.0 (L) 33.7 - 35.3 g/dL    RDW 54.8 (H) 35.9 - 50.0 fL    Platelet Count 84 (L) 164 - 446 K/uL    MPV 9.6 9.0 - 12.9 fL    Neutrophils-Polys 96.50 (H) 44.00 - 72.00 %    Lymphocytes 0.90 (L) 22.00 - 41.00 %    Monocytes  2.60 0.00 - 13.40 %    Eosinophils 0.00 0.00 - 6.90 %    Basophils 0.00 0.00 - 1.80 %    Nucleated RBC 3.40 /100 WBC    Neutrophils (Absolute) 12.35 (H) 1.82 - 7.42 K/uL    Lymphs (Absolute) 0.12 (L) 1.00 - 4.80 K/uL    Monos (Absolute) 0.33 0.00 - 0.85 K/uL    Eos (Absolute) 0.00 0.00 - 0.51 K/uL    Baso (Absolute) 0.00 0.00 - 0.12 K/uL    NRBC (Absolute) 0.44 K/uL    Anisocytosis 1+     Macrocytosis 1+    Comp Metabolic Panel    Collection Time: 09/01/21  4:15 AM   Result Value Ref Range    Sodium 135 135 - 145 mmol/L    Potassium 4.2 3.6 - 5.5 mmol/L    Chloride 104 96 - 112 mmol/L    Co2 22 20 - 33 mmol/L    Anion Gap 9.0 7.0 - 16.0    Glucose 189 (H) 65 - 99 mg/dL    Bun 19 8 - 22 mg/dL    Creatinine 0.77 0.50 - 1.40 mg/dL    Calcium 8.0 (L) 8.5 - 10.5 mg/dL    AST(SGOT) 51 (H) 12 - 45 U/L    ALT(SGPT) 18 2 - 50 U/L    Alkaline Phosphatase 154 (H) 30 - 99 U/L    Total Bilirubin 1.0 0.1 - 1.5 mg/dL    Albumin 1.6 (L) 3.2 - 4.9 g/dL    Total Protein 4.4 (L) 6.0 - 8.2 g/dL    Globulin 2.8 1.9 - 3.5 g/dL    A-G Ratio 0.6 g/dL   ESTIMATED GFR    Collection Time: 09/01/21  4:15 AM   Result Value Ref Range    GFR If African American >60 >60 mL/min/1.73 m 2    GFR If Non African American >60 >60 mL/min/1.73 m 2   DIFFERENTIAL MANUAL    Collection Time: 09/01/21  4:15 AM   Result Value Ref Range    Manual Diff Status PERFORMED    PERIPHERAL SMEAR REVIEW    Collection Time: 09/01/21  4:15 AM   Result Value Ref Range    Peripheral Smear Review see below    PLATELET ESTIMATE    Collection Time: 09/01/21  4:15 AM   Result Value Ref Range    Plt Estimation Decreased    MORPHOLOGY    Collection Time: 09/01/21  4:15 AM   Result Value Ref Range    RBC Morphology Present     Polychromia 1+     Poikilocytosis 2+     Ovalocytes 1+     Echinocytes 1+        Fluids    Intake/Output Summary (Last 24 hours) at 9/1/2021 0938  Last data filed at 9/1/2021 0800  Gross per 24 hour   Intake 07710.58 ml   Output 3965 ml   Net 6145.58 ml        Core Measures & Quality Metrics  EKG reviewed, Radiology images reviewed, Labs reviewed and Medications reviewed  Triana catheter: Critically Ill - Requiring Accurate Measurement of Urinary Output      DVT Prophylaxis: Enoxaparin (Lovenox)      Antibiotics: Treating active infection/contamination beyond 24 hours perioperative coverage      CHELI Score  ETOH Screening    Assessment/Plan  * Perforated viscus- (present on admission)  Assessment & Plan  8/31- diverting ileostomy with MARIANA drainage of feculent material.  Abdomen too frozen for formal resection    Sepsis (HCC)- (present on admission)  Assessment & Plan  Secondary to liver abscess  Ongoing volume resuscitation and pressors support  abx rocephin and flagyl  8/30-feculent drain output in late evening  8/31- return to OR for washout, drainage of feculent fluid and diverting ileostomy.    Hepatocellular carcinoma (HCC)  Assessment & Plan  Advanced  Associated abscess    Atrial fibrillation (HCC)  Assessment & Plan  Diuresis  Aggressive electrolyte replacement  Metoprolol and amiodarone   8/31- converted out in OR, continues on amiodarone gtt    Respiratory failure following trauma and surgery (HCC)  Assessment & Plan  On vent, weaning per protocol      Normochromic anemia- (present on admission)  Assessment & Plan  Trend and transfuse for hgb <7.  Hgb today is 7.8    Thrombocytopenia (HCC)- (present on admission)  Assessment & Plan  Trend          Discussed patient condition with RN, RT, Pharmacy and Dietary.  CRITICAL CARE TIME EXCLUDING PROCEDURES:  90    Minutes.Decision making of high complexity.  I reviewed clinical labs, trends and orders for follow up.  Review of imaging,reports, consultant documentation .  Utilization of the information in todays decision making.   I evaluated the patient condition at bedside and discussed the daily plan(s) with available nursing staff,  pharmacists on rounds.  Managing mechanical ventilation/adjust, sepsis, multiple  vasoactive drips, fluid and colloid boluses, amiodarone drip.

## 2021-09-01 NOTE — WOUND TEAM
"[facility]  Inpatient Services  Initial Wound and Skin Care Evaluation    Admission Date: 8/26/2021     Last order of IP CONSULT TO WOUND CARE was found on 8/31/2021 from Hospital Encounter on 8/26/2021     HPI, PMH, SH: Reviewed    Past Surgical History:   Procedure Laterality Date   • PB EXPLORATORY OF ABDOMEN N/A 8/31/2021    Procedure: LAPAROTOMY, EXPLORATORY ,drainage of peritoneal abcess,  creation of loop ileostomy, and trucut liver biopsy;  Surgeon: Kevin Thornton D.O.;  Location: [facility];  Service: General   • PB LAP,DIAGNOSTIC ABDOMEN N/A 8/27/2021    Procedure: LAPAROSCOPY;  Surgeon: Priya You M.D.;  Location: [facility];  Service: General   • PB EXPLORATORY OF ABDOMEN N/A 8/27/2021    Procedure: LAPAROTOMY, EXPLORATORY;  Surgeon: Priya You M.D.;  Location: [facility];  Service: General   • IRRIGATION & DEBRIDEMENT GENERAL  8/27/2021    Procedure: IRRIGATION AND DEBRIDEMENT, INTRA-ABDOMINAL ABCESS;  Surgeon: Priya You M.D.;  Location: [facility];  Service: General   • RESTORATE HEMOSTAS  8/27/2021    Procedure: CONTROL OF LIVER BLEED;  Surgeon: Priya You M.D.;  Location: [facility];  Service: General   • PB LAP,DIAGNOSTIC ABDOMEN  7/9/2021    Procedure: DIAGNOSITIC LAPAROSCOPY WITH WASH OUT AND DRAIN PLACEMENT;  Surgeon: Cody Meza M.D.;  Location: [facility];  Service: Gastroenterology   • OTHER  12/2019    \"IR embolization\"    • PARATHYROIDECTOMY N/A 5/13/2016    Procedure: PARATHYROIDECTOMY;  Surgeon: Kale Lord M.D.;  Location: SURGERY SAME DAY [facility];  Service:    • CATARACT PHACO WITH IOL Left 4/7/2016    Procedure: CATARACT PHACO WITH IOL;  Surgeon: Ephraim Jamison M.D.;  Location: SURGERY [facility] ORS;  Service:    • BONE GRAFT Right 1960's    right wrist   • OTHER      chemoemolozation x2   • WRIST ORIF Right 1960's     Social History     Tobacco Use   • " Smoking status: Current Every Day Smoker     Packs/day: 0.50     Years: 30.00     Pack years: 15.00     Types: Cigarettes     Start date: 1/1/1990   • Smokeless tobacco: Never Used   • Tobacco comment: quit couple times of the years   Substance Use Topics   • Alcohol use: Not Currently     Alcohol/week: 2.4 oz     Types: 4 Shots of liquor per week     Comment: pt quit drinking 2 months ago     Chief Complaint   Patient presents with   • N/V     Diagnosis: Bowel perforation (HCC) [K63.1]  Perforated intestine (HCC) [K63.1]    Unit where seen by Wound Team: S111/00     WOUND CONSULT/FOLLOW UP RELATED TO:  NPWT placement to abdominal surgical incision. Ostomy change (see below)     WOUND HISTORY:  Patient admitted with nausea and vomiting, history of hepatocellular carcinoma with cirrhosis. Went to OR with MD Thornton for peritoneal abscess, large bowel perforation, and liver mass.      WOUND ASSESSMENT/LDA       Negative Pressure Wound Therapy 09/01/21 Surgical Abdomen Midline (Active)   NPWT Pump Mode / Pressure Setting 125 mmHg;Continuous;[place] 09/01/21 1030   Dressing Type Medium;Black Foam (Regular) 09/01/21 1030   Number of Foam Pieces Used 3 09/01/21 1030   Canister Changed Yes 09/01/21 1030   NEXT Dressing Change/Treatment Date 09/03/21 09/01/21 1030            Wound 08/27/21 Incision Abdomen Midline (Active)   Wound Image    09/01/21 1030   Site Assessment Red;[place] 09/01/21 1030   Periwound Assessment Clean;Dry;Intact 09/01/21 1030   Margins Unattached edges;Defined edges 09/01/21 1030   Closure Secondary intention 09/01/21 1030   Drainage Amount Scant 09/01/21 1030   Drainage Description Serosanguineous 09/01/21 1030   Treatments Cleansed;Site care;Tape change 09/01/21 1030   Wound Cleansing Approved Wound Cleanser 09/01/21 1030   Periwound Protectant Skin Protectant Wipes to Periwound;Drape 09/01/21 1030   Dressing Cleansing/Solutions Not Applicable 09/01/21 1030   Dressing Options Wound Vac 09/01/21 1030    Dressing Changed New 09/01/21 1030   Dressing Status Clean;Dry;Intact 09/01/21 1030   Dressing Change/Treatment Frequency Monday, Wednesday, Friday, and As Needed 09/01/21 1030   NEXT Dressing Change/Treatment Date 09/03/21 09/01/21 1030   NEXT Weekly Photo (Inpatient Only) 09/08/21 09/01/21 1030   Number of Staples Removed 5 09/01/21 1030   Non-staged Wound Description Full thickness 09/01/21 1030   Wound Length (cm) 17.5 cm 09/01/21 1030   Wound Width (cm) 2.2 cm 09/01/21 1030   Wound Depth (cm) 2 cm 09/01/21 1030   Wound Surface Area (cm^2) 38.5 cm^2 09/01/21 1030   Wound Volume (cm^3) 77 cm^3 09/01/21 1030   Tunneling (cm) 0.5 cm 09/01/21 1030   Tunneling Clock Position of Wound 12 09/01/21 1030   Tunneling - 2nd Location (cm) 0.5 cm 09/01/21 1030   Tunneling Clock Position of Wound - 2nd Location 6 09/01/21 1030             Vascular:    ZARA:   No results found.    Lab Values:    Lab Results   Component Value Date/Time    WBC 12.8 (H) 09/01/2021 04:15 AM    RBC 3.30 (L) 09/01/2021 04:15 AM    HEMOGLOBIN 10.7 (L) 09/01/2021 04:15 AM    HEMATOCRIT 32.4 (L) 09/01/2021 04:15 AM    CREACTPROT 5.69 (H) 08/30/2021 04:55 AM    HBA1C 6.0 (H) 07/03/2021 03:38 AM        Culture Results show:  Recent Results (from the past 720 hour(s))   CULTURE WOUND W/ GRAM STAIN    Collection Time: 08/27/21  6:17 PM    Specimen: Wound   Result Value Ref Range    Significant Indicator POS (POS)     Source WND     Site Abdominal Wall Fluid     Culture Result (A)      Growth noted after further incubation, see below for  organism identification.      Gram Stain Result Moderate WBCs.  Rare Gram positive cocci.       Culture Result Streptococcus anginosus  Light growth   (A)        Pain Level/Medicated:  Premed with IV and then tolerated very well, eventually fell asleep.        INTERVENTIONS BY WOUND TEAM:  Chart and images reviewed. Discussed with bedside RN. All areas of concern (based on picture review, LDA review and discussion with  bedside RN) have been thoroughly assessed. Documentation of areas based on significant findings. This RN in to assess patient. Performed standard wound care which includes appropriate positioning, dressing removal and non-selective debridement. Pictures and measurements obtained weekly if/when required.  Abdomen:   Preparation for Dressing removal: Dressing soaked with wound cleanser, no retained gauze  Non-selectively Debrided with:  wound cleanser and gauze. Removed 5 ron with MD Thornton permission.   Sharp debridement: n/a.   Cesia wound: Cleansed with wound cleanser, Prepped with mepitel one on staples. No sting skin prep and drape to periwound.   Primary Dressin pieces of black half thickness foam to wound bed.   Secondary (Outer) Dressing: button and trak pad applied. Suction obtained at 125mmhg.     Interdisciplinary consultation: Patient, Bedside RN (Maylin), wound RN Kim, MD Thornton.     EVALUATION / RATIONALE FOR TREATMENT:  Most Recent Date:  21: Patient with full thickness surgical incision to midline abdomen. Fascia appears intact. Some sutures visible. Placed NPWT to assist in closure by secondary intention, manage bioburden and exudate, increase oxygenation and granulation to the wound bed.      Goals: Steady decrease in wound area and depth weekly.    WOUND TEAM PLAN OF CARE ([X] for frequency of wound follow up,):   Nursing to follow orders written for wound care. Contact wound team if area fails to progress, deteriorates or with any questions/concerns  Dressing changes by wound team:                   Follow up 3 times weekly:                NPWT change 3 times weekly:   X  Follow up 1-2 times weekly:      Follow up Bi-Monthly:                   Follow up as needed:     Other (explain):     NURSING PLAN OF CARE ORDERS (X):  Dressing changes: See Dressing Care orders: X  Skin care: See Skin Care orders:   RN Prevention Protocol:   Rectal tube care: See Rectal Tube Care orders:   Other  orders:    RSKIN:   CURRENTLY IN PLACE (X), APPLIED THIS VISIT (A), ORDERED (O):   Q shift Keith:  X  Q shift pressure point assessments:  X    Surface/Positioning   Pressure redistribution mattress            Low Airloss   X       Bariatric foam      Bariatric ASHU     Waffle cushion        Waffle Overlay          Reposition q 2 hours    X  TAPs Turning system     Z Quirino Pillow     Offloading/Redistribution   Sacral Mepilex (Silicone dressing)     Heel Mepilex (Silicone dressing)         Heel float boots (Prevalon boot)             Float Heels off Bed with Pillows           Respiratory   Silicone O2 tubing    n/a     Gray Foam Ear protectors     Cannula fixation Device (Tender )          High flow offloading Clip    Elastic head band offloading device      Anchorfast                                                         Trach with Optifoam split foam             Containment/Moisture Prevention ileostomy    Rectal tube or BMS    Purwick/Condom Cath        Triana Catheter  X  Barrier wipes           Barrier paste       Antifungal tx      Interdry        Mobilization n/a      Up to chair        Ambulate      PT/OT      Nutrition NPO      Dietician        Diabetes Education      PO     TF     TPN     NPO  x # days 1    Other        Anticipated discharge plans: TBD  LTACH:        SNF/Rehab:                  Home Health Care:           Outpatient Wound Center:            Self/Family Care:        Other:        [facility]  Inpatient Services  New Ostomy Management & Teaching    HPI:  Reviewed  PMH: Reviewed   SH: Reviewed    Subjective: Patient intubated.     Objective: appliance intact. In close approximation with Drain and vac.                                    Ileostomy 08/31/21 Loop RLQ (Active)   Wound Image   09/01/21 1030   Stomal Appliance Assessment Clean;Dry;Intact 09/01/21 1200   Stoma Assessment Clean;Intact;Pink;Edema 09/01/21 1200   Stoma Shape Budded Greater Than One Inch;Round 09/01/21  "1200   Peristomal Assessment Clean;Dry;Intact 09/01/21 1030   Mucocutaneous Junction Intact 09/01/21 1030   Treatment Appliance Changed;Bag Change;Cleansed with water/washcloth;Crusted with stoma powder;Site care 09/01/21 1030   Peristomal Protectant No Sting Skin Prep;Paste Ring 09/01/21 1030   Stomal Appliance Paste Ring, 2\";2 3/4\" (70mm) CTF 09/01/21 1030   Output (mL) 10 mL 09/01/21 0400   Output Color Brown 09/01/21 0400   WOUND RN ONLY - Stomal Appliance  2 Piece;2 3/4\" (70mm) CTF;Paste Ring, 2\" 09/01/21 1030   Appliance Brand [place] 09/01/21 1030   Secure Start completed Not Medically Stable 09/01/21 1030   WOUND NURSE ONLY - Time Spent with Patient (mins) 90 09/01/21 1030                                        Ostomy Appliance (type and size): 2 3/4\" 2 piece    Interventions: this wound RN completed change and verbalized steps throughout. Removed current appliance using push pull method. Cleansed peristomal area with moist warm wash cloths. Pat dry. Applied no sting skin prep to periwound. Created template using the 2 3/4\" barrier, cut to fit. Paste ring applied to barrier, attached barrier down to patient, attached bag. Closed end of bag.      Pt education: Questions and concerns addressed    Evaluation: stoma viable and intact. No separation or wounds noted at this time. Patient too unstable for learning and hands on teaching at this time, slept throughout.     Flatus: Not Present  Stool Output: minimum, brown, bloody and liquid  Diet: NPO  Mobility: Not Mobilizing    Plan: Ostomy nurses to continue to follow for ostomy needs and teaching until discharge    Anticipated discharge needs: Supplies, supplier information, possible HH, outpatient ostomy clinic, Skilled Nursing/Rehab     Secure Start Signed Not Medically Stable  Outpatient Referral Placed Not Medically Stable  5 Sets of appliances in Ostomy bag for discharge Not Medically Stable    INSURANCE OPTIONS: [employer].                 [employer] " Care Plus & [facility] [place] [facility] ([place])              MediCARE/MEDICAID & All other Private Insurance companies (Prism Form)              MediCAID & Fee for Service (Care Chest Paperwork + Prism Form)                            Form signed/Catalog Marked and Copy left with patient OR medicaid paperwork given to patient      Anticipated Discharge Plans:  Other TBD

## 2021-09-01 NOTE — PROGRESS NOTES
4 Eyes Skin Assessment Completed by YASMIN Romo and YASMIN Cordoba.    Head WDL  Ears WDL  Nose WDL, OG in the R nare   Mouth WDL, drynes  Neck WDL  Breast/Chest WDL  Shoulder Blades Redness and Blanching  Spine Redness and Blanching  (R) Arm/Elbow/Hand Redness, Blanching, Bruising, Swelling, Weeping and Edema  (L) Arm/Elbow/Hand Redness, Blanching, Bruising, Swelling, Weeping and Edema  Abdomen Incision  Groin Incision  Scrotum/Coccyx/Buttocks Edematous  (R) Leg WDL  (L) Leg WDL  (R) Heel/Foot/Toe Boggy and edema    (L) Heel/Foot/Toe Boggy and Edema          Devices In Places ECG, Blood Pressure Cuff, Pulse Ox, Triana, Arterial Line, SCD's, ET Tube, OG/NG and Central Line      Interventions In Place NC Cheek Stickers, Heel Mepilex, TAP System, Pillows, Q2 Turns, Low Air Loss Mattress, ZFlo Pillow and Heels Loaded W/Pillows    Possible Skin Injury No    Pictures Uploaded Into Epic N/A  Wound Consult Placed Yes  RN Wound Prevention Protocol Ordered Yes

## 2021-09-01 NOTE — PROGRESS NOTES
"Surgery      Postop day 1 after incision and drainage of right upper quadrant abscess and loop ileostomy for large bowel perforation as well as liver biopsy    Lactates greater than 3 but stable  Remains on ventilator  Remains on vasopressors    Wound examined with wound care    BP (!) 93/61   Pulse 92   Temp 36.3 °C (97.3 °F) (Axillary)   Resp 18   Ht 1.803 m (5' 10.98\")   Wt 94.5 kg (208 lb 5.4 oz)   SpO2 100%   BMI 29.07 kg/m²     NG tube: >1 L since surgery  Right drain: 1085 cc serosanguineous  Left drain: 655 cc serosanguineous    Abdomen softly distended  Midline wound examined with wound care team: Wound clean  Ileostomy edematous but viable  Incisional tenderness    Recent Labs     08/30/21 2205 08/30/21  2205 08/31/21  0525 08/31/21  0525 08/31/21  1200 08/31/21  2200 09/01/21  0415   WBC 17.0*  --  17.6*  --   --   --  12.8*   RBC 2.87*  --  3.04*  --   --   --  3.30*   HEMOGLOBIN 9.1*   < > 9.5*   < > 11.0* 10.7* 10.7*   HEMATOCRIT 28.2*   < > 30.2*  --   --  31.8* 32.4*   MCV 98.3*  --  99.3*  --   --   --  98.2*   MCH 31.7  --  31.3  --   --   --  32.4   RDW 59.8*  --  61.0*  --   --   --  54.8*   PLATELETCT 178  --  167  --   --   --  84*   MPV 8.8*  --  9.0  --   --   --  9.6   NEUTSPOLYS 83.60*  --  85.00*  --   --   --  96.50*   LYMPHOCYTES 5.60*  --  5.00*  --   --   --  0.90*   MONOCYTES 9.60  --  9.00  --   --   --  2.60   EOSINOPHILS 0.00  --  0.10  --   --   --  0.00   BASOPHILS 0.20  --  0.20  --   --   --  0.00   RBCMORPHOLO  --   --   --   --   --   --  Present    < > = values in this interval not displayed.       Assessment and plan:  Postop day 1 after incision and drainage of right upper quadrant for presumed colon perforation that cannot be resected due to inflammatory process and involvement with liver mass.    High drain outputs: Likely draining ascites.  Will need to trend.  Cannot diurese secondary to vasopressor requirements.    Plan to place VAC on midline wound but will " need to watch output as this may result in high output and prolonged ascites leak    Continue antibiotics.  New cultures pending.    Awaiting return of spontaneous bowel function    Appreciate ICU service care

## 2021-09-01 NOTE — PROGRESS NOTES
Dr. Jacobs paged about patients increasing need for more pressors. SBP in the 70s-80s. MAP low 60s. Levo currently at 30 mcg. Lactic acid increased to 3.6 from 3.5 after Liter bolus given. UO also decreasing.     MD ordered another liter bolus of LR. Start Vaso at 0.04. Send an H&H. And repeat Lactic with AM labs.

## 2021-09-01 NOTE — CARE PLAN
Problem: Nutritional:  Goal: Nutrition support tolerated and meeting greater than 85% of estimated needs  Outcome: Not Progressing     TF held for surgery yesterday  Consideration for restart tomorrow

## 2021-09-01 NOTE — PALLIATIVE CARE
"Palliative Care follow-up  PC RN provided wife, Sharlene, with copy of patient's advance directive. Patient has been reintubated due to need for repeat ex lap this AM. Sharlene continues to reinforce that \"William is a fighter.\" Support provided. All questions answered.    Plan: Continue to follow clinical picture and provide support. Consider readdressing code status.    Thank you for allowing Palliative Care to support this patient and family. Contact [phone] for additional assistance, change in patient status, or with any questions/concerns.   "

## 2021-09-01 NOTE — DISCHARGE PLANNING
Anticipated Discharge Disposition: TBD    Action: Patient discussed in IDT rounds. No case management or dc needs at this time. Patient was a transfer to ICU for needing higher level of care. Palliative care involved. Patient has an advance directive. Patient was at [facility] which wife does not want him returning to. No post acute orders/referrals.     Remains on vent and vasopressors     Barriers to Discharge: Medically complex with unknown dc needs    Plan: Assist with dc     Care Transition Team Assessment    Information Source  Orientation Level: Unable to assess  Information Given By: Other (Comments)  Informant's Name: EMR  Who is responsible for making decisions for patient? : Spouse    Readmission Evaluation  Is this a readmission?: No    Elopement Risk  Legal Hold: No  Ambulatory or Self Mobile in Wheelchair: No-Not an Elopement Risk  Disoriented: No  Psychiatric Symptoms: None  History of Wandering: No  Elopement this Admit: No  Vocalizing Wanting to Leave: No  Displays Behaviors, Body Language Wanting to Leave: No-Not at Risk for Elopement  Elopement Risk: Not at Risk for Elopement    Interdisciplinary Discharge Planning  Patient or legal guardian wants to designate a caregiver: No    Discharge Preparedness  What is your plan after discharge?: Uncertain - pending medical team collaboration  What are your discharge supports?: Child, Spouse  Prior Functional Level: Ambulatory, Independent with Activities of Daily Living, Independent with Medication Management    Functional Assesment  Prior Functional Level: Ambulatory, Independent with Activities of Daily Living, Independent with Medication Management    Finances  Financial Barriers to Discharge: No  Prescription Coverage: Yes    Advance Directive  Advance Directive?: DPOA for Health Care    Domestic Abuse  Have you ever been the victim of abuse or violence?: No  Physical Abuse or Sexual Abuse: No  Verbal Abuse or Emotional Abuse: No  Possible  Abuse/Neglect Reported to:: Not Applicable    Psychological Assessment  History of Substance Abuse: None  History of Psychiatric Problems: No  Non-compliant with Treatment: No  Newly Diagnosed Illness: Yes    Discharge Risks or Barriers  Discharge risks or barriers?: Complex medical needs    Anticipated Discharge Information  Discharge Disposition: Still a Patient (30)

## 2021-09-01 NOTE — THERAPY
"Physical Therapy   Initial Evaluation     Patient Name: Lorraine Bella  Age:  71 y.o., Sex:  male  Medical Record #: [id number]  Today's Date: 9/1/2021     Precautions  Precautions: Fall Risk;Nasogastric Tube  Comments: NGT to suction, intubated off sedation    Assessment  Patient is 71 y.o. male admitted from [facility] with peritoneal abscess, large bowel perforation, and liver mass. Pt is s/p ex-lap, drainage of the abscess, creation of ileostomy, and liver biopsy. During evaluation, pt was intubated and spouse was at bedside supportive. Pt is currently limited by strength, balance, activity tolerance, safety awareness, and sequencing/initiation.     Plan    Recommend Physical Therapy 3 times per week until therapy goals are met for the following treatments:  Bed Mobility, Gait Training, Neuro Re-Education / Balance, Self Care/Home Evaluation, Stair Training, Therapeutic Activities and Therapeutic Exercises    DC Equipment Recommendations: Unable to determine at this time  Discharge Recommendations: Recommend post-acute placement for additional physical therapy services prior to discharge home       Subjective    \"Help\" (written)       09/01/21 1414   Vitals   O2 Delivery Device Ventilator   Prior Living Situation   Prior Services Home-Independent   Housing / Facility 1 Story House   Steps Into Home 1   Steps In Home 0   Bathroom Set up Bathtub / Shower Combination;Walk In Shower   Equipment Owned None   Lives with - Patient's Self Care Capacity Spouse   Comments Wife at bedside. Pt was independent prior to last hospitalization/SNF stay before being trasnfered back   Prior Level of Functional Mobility   Bed Mobility Independent   Transfer Status Independent   Ambulation Independent   Comments wife reported pt was walking with FWW prior to admission while in SNF   Cognition    Cognition / Consciousness X   Speech/ Communication Intubated / Trached;Nods Appropriately;Writes Comprehensible Notes   Level " "of Consciousness Alert   Ability To Follow Commands 1 Step   Comments written communication used but pt writing \"help\" and \"I love you\" to wife   Strength Lower Body   Lower Body Strength  X   Gross Strength Generalized Weakness, Equal Bilaterally   Balance Assessment   Sitting Balance (Static) Trace +   Sitting Balance (Dynamic) Trace   Weight Shift Sitting Poor   Comments posterior lean   Gait Analysis   Gait Level Of Assist Unable to Participate   Bed Mobility    Supine to Sit Total Assist   Sit to Supine Total Assist   Scooting Total Assist   Rolling Total Assist to Rt.;Total Assist to Lt.   Functional Mobility   Sit to Stand Unable to Participate   Bed, Chair, Wheelchair Transfer Unable to Participate   Toilet Transfers Unable to Participate   Mobility eob only   Short Term Goals    Short Term Goal # 1 pt will be able to complete bed mobility from flat bed with mod assist in 6tx in order to progress to ind   Short Term Goal # 2 pt will be able to complete functional transfers with mod assist in 6tx in order to increase OOB time   Short Term Goal # 3 pt will be able to sit EOB with fair - balance in 6tx in order to improve safety   Short Term Goal # 4 pt will be able to ambulate 10ft with FWW and mod assist in 6tx in order to progress back to prior level   Anticipated Discharge Equipment and Recommendations   DC Equipment Recommendations Unable to determine at this time   Discharge Recommendations Recommend post-acute placement for additional physical therapy services prior to discharge home         "

## 2021-09-01 NOTE — ASSESSMENT & PLAN NOTE
8/26 - Ceftriaxone and metronidazole initiated  8/30 - Peritoneal culture positive for Streptococcus anginosus  8/31 - Abscess drainage and ileostomy creation, antibiotics altered to Zosyn  8/31- diverting ileostomy with MARIANA drainage of feculent material.  Abdomen too frozen for formal resection  9/6 - Zosyn day 7 of 7.

## 2021-09-02 ENCOUNTER — APPOINTMENT (OUTPATIENT)
Dept: RADIOLOGY | Facility: MEDICAL CENTER | Age: 71
DRG: 853 | End: 2021-09-02
Attending: SURGERY
Payer: MEDICARE

## 2021-09-02 PROBLEM — A41.9 SEPSIS (HCC): Status: RESOLVED | Noted: 2021-07-09 | Resolved: 2021-09-02

## 2021-09-02 PROBLEM — E43 PROTEIN-CALORIE MALNUTRITION, SEVERE (HCC): Status: ACTIVE | Noted: 2021-07-14

## 2021-09-02 PROBLEM — R65.21 SEPTIC SHOCK (HCC): Status: ACTIVE | Noted: 2021-07-09

## 2021-09-02 PROBLEM — J90 PLEURAL EFFUSION, BILATERAL: Status: ACTIVE | Noted: 2021-09-02

## 2021-09-02 LAB
ALBUMIN SERPL BCP-MCNC: 2.4 G/DL (ref 3.2–4.9)
ALBUMIN/GLOB SERPL: 1.1 G/DL
ALP SERPL-CCNC: 95 U/L (ref 30–99)
ALT SERPL-CCNC: 12 U/L (ref 2–50)
ANION GAP SERPL CALC-SCNC: 13 MMOL/L (ref 7–16)
AST SERPL-CCNC: 38 U/L (ref 12–45)
BACTERIA WND AEROBE CULT: NORMAL
BARCODED ABORH UBTYP: 5100
BARCODED PRD CODE UBPRD: NORMAL
BARCODED UNIT NUM UBUNT: NORMAL
BASOPHILS # BLD AUTO: 0 % (ref 0–1.8)
BASOPHILS # BLD AUTO: 0 % (ref 0–1.8)
BASOPHILS # BLD: 0 K/UL (ref 0–0.12)
BASOPHILS # BLD: 0 K/UL (ref 0–0.12)
BILIRUB SERPL-MCNC: 1 MG/DL (ref 0.1–1.5)
BUN SERPL-MCNC: 20 MG/DL (ref 8–22)
CA-I SERPL-SCNC: 1.2 MMOL/L (ref 1.1–1.3)
CALCIUM SERPL-MCNC: 8.6 MG/DL (ref 8.5–10.5)
CHLORIDE SERPL-SCNC: 102 MMOL/L (ref 96–112)
CO2 SERPL-SCNC: 21 MMOL/L (ref 20–33)
COMPONENT P 8504P: NORMAL
CREAT SERPL-MCNC: 0.56 MG/DL (ref 0.5–1.4)
EOSINOPHIL # BLD AUTO: 0 K/UL (ref 0–0.51)
EOSINOPHIL # BLD AUTO: 0 K/UL (ref 0–0.51)
EOSINOPHIL NFR BLD: 0 % (ref 0–6.9)
EOSINOPHIL NFR BLD: 0 % (ref 0–6.9)
ERYTHROCYTE [DISTWIDTH] IN BLOOD BY AUTOMATED COUNT: 57.6 FL (ref 35.9–50)
ERYTHROCYTE [DISTWIDTH] IN BLOOD BY AUTOMATED COUNT: 58.3 FL (ref 35.9–50)
GLOBULIN SER CALC-MCNC: 2.1 G/DL (ref 1.9–3.5)
GLUCOSE SERPL-MCNC: 174 MG/DL (ref 65–99)
GRAM STN SPEC: NORMAL
HCT VFR BLD AUTO: 19.9 % (ref 42–52)
HCT VFR BLD AUTO: 22.6 % (ref 42–52)
HGB BLD-MCNC: 6.4 G/DL (ref 14–18)
HGB BLD-MCNC: 7.3 G/DL (ref 14–18)
IMM GRANULOCYTES # BLD AUTO: 0.03 K/UL (ref 0–0.11)
IMM GRANULOCYTES # BLD AUTO: 0.04 K/UL (ref 0–0.11)
IMM GRANULOCYTES NFR BLD AUTO: 0.6 % (ref 0–0.9)
IMM GRANULOCYTES NFR BLD AUTO: 0.7 % (ref 0–0.9)
LYMPHOCYTES # BLD AUTO: 0.36 K/UL (ref 1–4.8)
LYMPHOCYTES # BLD AUTO: 0.48 K/UL (ref 1–4.8)
LYMPHOCYTES NFR BLD: 7.8 % (ref 22–41)
LYMPHOCYTES NFR BLD: 8.6 % (ref 22–41)
MAGNESIUM SERPL-MCNC: 1.9 MG/DL (ref 1.5–2.5)
MCH RBC QN AUTO: 32.6 PG (ref 27–33)
MCH RBC QN AUTO: 33 PG (ref 27–33)
MCHC RBC AUTO-ENTMCNC: 32.2 G/DL (ref 33.7–35.3)
MCHC RBC AUTO-ENTMCNC: 32.3 G/DL (ref 33.7–35.3)
MCV RBC AUTO: 100.9 FL (ref 81.4–97.8)
MCV RBC AUTO: 102.6 FL (ref 81.4–97.8)
MONOCYTES # BLD AUTO: 0.18 K/UL (ref 0–0.85)
MONOCYTES # BLD AUTO: 0.25 K/UL (ref 0–0.85)
MONOCYTES NFR BLD AUTO: 3.9 % (ref 0–13.4)
MONOCYTES NFR BLD AUTO: 4.5 % (ref 0–13.4)
MORPHOLOGY BLD-IMP: NORMAL
NEUTROPHILS # BLD AUTO: 4.07 K/UL (ref 1.82–7.42)
NEUTROPHILS # BLD AUTO: 4.81 K/UL (ref 1.82–7.42)
NEUTROPHILS NFR BLD: 86.2 % (ref 44–72)
NEUTROPHILS NFR BLD: 87.7 % (ref 44–72)
NRBC # BLD AUTO: 0.02 K/UL
NRBC # BLD AUTO: 0.04 K/UL
NRBC BLD-RTO: 0.4 /100 WBC
NRBC BLD-RTO: 0.7 /100 WBC
PHOSPHATE SERPL-MCNC: 2.2 MG/DL (ref 2.5–4.5)
PLATELET # BLD AUTO: 42 K/UL (ref 164–446)
PLATELET # BLD AUTO: 53 K/UL (ref 164–446)
PLATELETS.RETICULATED NFR BLD AUTO: 2.5 K/UL (ref 0.6–13.1)
PMV BLD AUTO: 9.3 FL (ref 9–12.9)
PMV BLD AUTO: 9.7 FL (ref 9–12.9)
POTASSIUM SERPL-SCNC: 3.5 MMOL/L (ref 3.6–5.5)
PRODUCT TYPE UPROD: NORMAL
PROT SERPL-MCNC: 4.5 G/DL (ref 6–8.2)
RBC # BLD AUTO: 1.94 M/UL (ref 4.7–6.1)
RBC # BLD AUTO: 2.24 M/UL (ref 4.7–6.1)
SIGNIFICANT IND 70042: NORMAL
SITE SITE: NORMAL
SODIUM SERPL-SCNC: 136 MMOL/L (ref 135–145)
SOURCE SOURCE: NORMAL
UNIT STATUS USTAT: NORMAL
WBC # BLD AUTO: 4.6 K/UL (ref 4.8–10.8)
WBC # BLD AUTO: 5.6 K/UL (ref 4.8–10.8)

## 2021-09-02 PROCEDURE — 700105 HCHG RX REV CODE 258: Performed by: SURGERY

## 2021-09-02 PROCEDURE — 84100 ASSAY OF PHOSPHORUS: CPT

## 2021-09-02 PROCEDURE — 770022 HCHG ROOM/CARE - ICU (200)

## 2021-09-02 PROCEDURE — 94799 UNLISTED PULMONARY SVC/PX: CPT

## 2021-09-02 PROCEDURE — 71045 X-RAY EXAM CHEST 1 VIEW: CPT

## 2021-09-02 PROCEDURE — 700102 HCHG RX REV CODE 250 W/ 637 OVERRIDE(OP): Performed by: SURGERY

## 2021-09-02 PROCEDURE — 700101 HCHG RX REV CODE 250: Performed by: SURGERY

## 2021-09-02 PROCEDURE — P9045 ALBUMIN (HUMAN), 5%, 250 ML: HCPCS | Mod: JG | Performed by: SURGERY

## 2021-09-02 PROCEDURE — 86923 COMPATIBILITY TEST ELECTRIC: CPT

## 2021-09-02 PROCEDURE — 94150 VITAL CAPACITY TEST: CPT

## 2021-09-02 PROCEDURE — 83735 ASSAY OF MAGNESIUM: CPT

## 2021-09-02 PROCEDURE — 99292 CRITICAL CARE ADDL 30 MIN: CPT | Performed by: SURGERY

## 2021-09-02 PROCEDURE — 94003 VENT MGMT INPAT SUBQ DAY: CPT

## 2021-09-02 PROCEDURE — 80053 COMPREHEN METABOLIC PANEL: CPT

## 2021-09-02 PROCEDURE — A9270 NON-COVERED ITEM OR SERVICE: HCPCS | Performed by: SURGERY

## 2021-09-02 PROCEDURE — 85055 RETICULATED PLATELET ASSAY: CPT

## 2021-09-02 PROCEDURE — 6A550Z2 PHERESIS OF PLATELETS, SINGLE: ICD-10-PCS | Performed by: SURGERY

## 2021-09-02 PROCEDURE — 700111 HCHG RX REV CODE 636 W/ 250 OVERRIDE (IP): Performed by: SURGERY

## 2021-09-02 PROCEDURE — 99291 CRITICAL CARE FIRST HOUR: CPT | Performed by: SURGERY

## 2021-09-02 PROCEDURE — 36430 TRANSFUSION BLD/BLD COMPNT: CPT

## 2021-09-02 PROCEDURE — 85025 COMPLETE CBC W/AUTO DIFF WBC: CPT

## 2021-09-02 PROCEDURE — P9016 RBC LEUKOCYTES REDUCED: HCPCS

## 2021-09-02 PROCEDURE — 82330 ASSAY OF CALCIUM: CPT

## 2021-09-02 PROCEDURE — P9047 ALBUMIN (HUMAN), 25%, 50ML: HCPCS | Mod: JG | Performed by: SURGERY

## 2021-09-02 PROCEDURE — P9034 PLATELETS, PHERESIS: HCPCS

## 2021-09-02 RX ORDER — ALBUMIN, HUMAN INJ 5% 5 %
12.5 SOLUTION INTRAVENOUS ONCE
Status: COMPLETED | OUTPATIENT
Start: 2021-09-02 | End: 2021-09-02

## 2021-09-02 RX ORDER — SPIRONOLACTONE 50 MG/1
50 TABLET, FILM COATED ORAL
Status: DISCONTINUED | OUTPATIENT
Start: 2021-09-02 | End: 2021-09-12

## 2021-09-02 RX ADMIN — HYDROCORTISONE SODIUM SUCCINATE 100 MG: 100 INJECTION, POWDER, FOR SOLUTION INTRAMUSCULAR; INTRAVENOUS at 14:17

## 2021-09-02 RX ADMIN — PIPERACILLIN AND TAZOBACTAM 4.5 G: 4; .5 INJECTION, POWDER, LYOPHILIZED, FOR SOLUTION INTRAVENOUS; PARENTERAL at 04:04

## 2021-09-02 RX ADMIN — PIPERACILLIN AND TAZOBACTAM 4.5 G: 4; .5 INJECTION, POWDER, LYOPHILIZED, FOR SOLUTION INTRAVENOUS; PARENTERAL at 14:18

## 2021-09-02 RX ADMIN — DOCUSATE SODIUM 50 MG AND SENNOSIDES 8.6 MG 2 TABLET: 8.6; 5 TABLET, FILM COATED ORAL at 17:51

## 2021-09-02 RX ADMIN — NOREPINEPHRINE BITARTRATE 6 MCG/MIN: 1 INJECTION, SOLUTION, CONCENTRATE INTRAVENOUS at 02:11

## 2021-09-02 RX ADMIN — HYDROMORPHONE HYDROCHLORIDE 0.5 MG: 1 INJECTION, SOLUTION INTRAMUSCULAR; INTRAVENOUS; SUBCUTANEOUS at 00:09

## 2021-09-02 RX ADMIN — FAMOTIDINE 20 MG: 10 INJECTION INTRAVENOUS at 17:51

## 2021-09-02 RX ADMIN — AMIODARONE HYDROCHLORIDE 0.5 MG/MIN: 1.8 INJECTION, SOLUTION INTRAVENOUS at 06:37

## 2021-09-02 RX ADMIN — HYDROMORPHONE HYDROCHLORIDE 0.5 MG: 1 INJECTION, SOLUTION INTRAMUSCULAR; INTRAVENOUS; SUBCUTANEOUS at 23:30

## 2021-09-02 RX ADMIN — PIPERACILLIN AND TAZOBACTAM 4.5 G: 4; .5 INJECTION, POWDER, LYOPHILIZED, FOR SOLUTION INTRAVENOUS; PARENTERAL at 22:12

## 2021-09-02 RX ADMIN — ALBUMIN (HUMAN) 12.5 G: 2.5 SOLUTION INTRAVENOUS at 15:51

## 2021-09-02 RX ADMIN — SODIUM CHLORIDE, POTASSIUM CHLORIDE, SODIUM LACTATE AND CALCIUM CHLORIDE: 600; 310; 30; 20 INJECTION, SOLUTION INTRAVENOUS at 05:42

## 2021-09-02 RX ADMIN — ENOXAPARIN SODIUM 40 MG: 40 INJECTION SUBCUTANEOUS at 05:40

## 2021-09-02 RX ADMIN — HYDROMORPHONE HYDROCHLORIDE 0.5 MG: 1 INJECTION, SOLUTION INTRAMUSCULAR; INTRAVENOUS; SUBCUTANEOUS at 18:29

## 2021-09-02 RX ADMIN — HYDROCORTISONE SODIUM SUCCINATE 100 MG: 100 INJECTION, POWDER, FOR SOLUTION INTRAMUSCULAR; INTRAVENOUS at 22:12

## 2021-09-02 RX ADMIN — AMIODARONE HYDROCHLORIDE 0.5 MG/MIN: 1.8 INJECTION, SOLUTION INTRAVENOUS at 18:14

## 2021-09-02 RX ADMIN — VASOPRESSIN 0.04 UNITS/MIN: 20 INJECTION INTRAVENOUS at 06:34

## 2021-09-02 RX ADMIN — SODIUM CHLORIDE, POTASSIUM CHLORIDE, SODIUM LACTATE AND CALCIUM CHLORIDE: 600; 310; 30; 20 INJECTION, SOLUTION INTRAVENOUS at 15:39

## 2021-09-02 RX ADMIN — POTASSIUM PHOSPHATE, MONOBASIC POTASSIUM PHOSPHATE, DIBASIC 30 MMOL: 224; 236 INJECTION, SOLUTION, CONCENTRATE INTRAVENOUS at 10:52

## 2021-09-02 RX ADMIN — FAMOTIDINE 20 MG: 10 INJECTION INTRAVENOUS at 05:40

## 2021-09-02 RX ADMIN — HYDROCORTISONE SODIUM SUCCINATE 100 MG: 100 INJECTION, POWDER, FOR SOLUTION INTRAMUSCULAR; INTRAVENOUS at 05:40

## 2021-09-02 RX ADMIN — ALBUMIN (HUMAN) 25 G: 5 SOLUTION INTRAVENOUS at 06:42

## 2021-09-02 RX ADMIN — ALBUMIN (HUMAN) 25 G: 5 SOLUTION INTRAVENOUS at 00:03

## 2021-09-02 ASSESSMENT — PULMONARY FUNCTION TESTS: FVC: .8

## 2021-09-02 ASSESSMENT — FIBROSIS 4 INDEX: FIB4 SCORE: 9.27

## 2021-09-02 NOTE — PROGRESS NOTES
Cortrak Placement    Tube Team verified patient name and medical record number prior to tube placement.  Cortrak tube (43 inches, 10 Faroese) placed at 95 cm in right nare.  Per Cortrak picture, tube appears to be in the small bowel.  Nursing Instructions: Awaiting KUB to confirm placement before use for medications or feeding. Once placement confirmed, flush tube with 30 ml of water, and then remove and save stylet, in patient medication drawer.

## 2021-09-02 NOTE — PROGRESS NOTES
Trauma / Surgical Daily Progress Note    Date of Service  9/2/2021    Chief Complaint  71 y.o. male admitted 8/26/2021 with hepatocellular carcinoma and intra-abdominal abscess, sepsis    Interval Events  Platelet transfusion  BP 93.  Levophed.  Vasopressin  Continued oliguria refractory to multiple albumin bolus.  Begin tube feeds.    Antibiotics Zosyn.  7 days total.    Transfused, Lovenox on hold  Vent:  Sbt:  800 and -17  Review of Systems  Review of Systems     Vital Signs for last 24 hours  Temp:  [35.9 °C (96.7 °F)-37.9 °C (100.3 °F)] 37.1 °C (98.8 °F)  Pulse:  [59-99] 85  Resp:  [12-34] 20  BP: ()/() 91/57  SpO2:  [93 %-100 %] 100 %    Hemodynamic parameters for last 24 hours  CVP:  [9 MM HG-218 MM HG] 212 MM HG    Respiratory Data     Respiration: 20, Pulse Oximetry: 100 %     Work Of Breathing / Effort: Vented  RUL Breath Sounds: Clear, RML Breath Sounds: Diminished, RLL Breath Sounds: Diminished, ASHLEY Breath Sounds: Clear, LLL Breath Sounds: Diminished    Physical Exam  Physical Exam  HENT:      Head: Normocephalic.   Eyes:      General: No scleral icterus.  Cardiovascular:      Rate and Rhythm: Tachycardia present.   Pulmonary:      Effort: No respiratory distress.      Breath sounds: No wheezing.   Abdominal:      General: There is no distension.      Palpations: Abdomen is soft.   Skin:     General: Skin is warm and dry.   Neurological:      General: No focal deficit present.      Mental Status: He is lethargic.         Laboratory  Recent Results (from the past 24 hour(s))   Comp Metabolic Panel    Collection Time: 09/02/21  4:11 AM   Result Value Ref Range    Sodium 136 135 - 145 mmol/L    Potassium 3.5 (L) 3.6 - 5.5 mmol/L    Chloride 102 96 - 112 mmol/L    Co2 21 20 - 33 mmol/L    Anion Gap 13.0 7.0 - 16.0    Glucose 174 (H) 65 - 99 mg/dL    Bun 20 8 - 22 mg/dL    Creatinine 0.56 0.50 - 1.40 mg/dL    Calcium 8.6 8.5 - 10.5 mg/dL    AST(SGOT) 38 12 - 45 U/L    ALT(SGPT) 12 2 - 50 U/L     Alkaline Phosphatase 95 30 - 99 U/L    Total Bilirubin 1.0 0.1 - 1.5 mg/dL    Albumin 2.4 (L) 3.2 - 4.9 g/dL    Total Protein 4.5 (L) 6.0 - 8.2 g/dL    Globulin 2.1 1.9 - 3.5 g/dL    A-G Ratio 1.1 g/dL   IONIZED CALCIUM    Collection Time: 09/02/21  4:11 AM   Result Value Ref Range    Ionized Calcium 1.2 1.1 - 1.3 mmol/L   MAGNESIUM    Collection Time: 09/02/21  4:11 AM   Result Value Ref Range    Magnesium 1.9 1.5 - 2.5 mg/dL   PHOSPHORUS    Collection Time: 09/02/21  4:11 AM   Result Value Ref Range    Phosphorus 2.2 (L) 2.5 - 4.5 mg/dL   ESTIMATED GFR    Collection Time: 09/02/21  4:11 AM   Result Value Ref Range    GFR If African American >60 >60 mL/min/1.73 m 2    GFR If Non African American >60 >60 mL/min/1.73 m 2   CBC WITH DIFFERENTIAL    Collection Time: 09/02/21  4:43 AM   Result Value Ref Range    WBC 5.6 4.8 - 10.8 K/uL    RBC 2.24 (L) 4.70 - 6.10 M/uL    Hemoglobin 7.3 (L) 14.0 - 18.0 g/dL    Hematocrit 22.6 (L) 42.0 - 52.0 %    .9 (H) 81.4 - 97.8 fL    MCH 32.6 27.0 - 33.0 pg    MCHC 32.3 (L) 33.7 - 35.3 g/dL    RDW 57.6 (H) 35.9 - 50.0 fL    Platelet Count 42 (LL) 164 - 446 K/uL    MPV 9.7 9.0 - 12.9 fL    Neutrophils-Polys 86.20 (H) 44.00 - 72.00 %    Lymphocytes 8.60 (L) 22.00 - 41.00 %    Monocytes 4.50 0.00 - 13.40 %    Eosinophils 0.00 0.00 - 6.90 %    Basophils 0.00 0.00 - 1.80 %    Immature Granulocytes 0.70 0.00 - 0.90 %    Nucleated RBC 0.70 /100 WBC    Neutrophils (Absolute) 4.81 1.82 - 7.42 K/uL    Lymphs (Absolute) 0.48 (L) 1.00 - 4.80 K/uL    Monos (Absolute) 0.25 0.00 - 0.85 K/uL    Eos (Absolute) 0.00 0.00 - 0.51 K/uL    Baso (Absolute) 0.00 0.00 - 0.12 K/uL    Immature Granulocytes (abs) 0.04 0.00 - 0.11 K/uL    NRBC (Absolute) 0.04 K/uL   PERIPHERAL SMEAR REVIEW    Collection Time: 09/02/21  4:43 AM   Result Value Ref Range    Peripheral Smear Review see below    IMMATURE PLT FRACTION    Collection Time: 09/02/21  4:43 AM   Result Value Ref Range    Imm. Plt Fraction 2.5 0.6 -  13.1 K/uL   PLATELETS REQUEST    Collection Time: 09/02/21  6:25 AM   Result Value Ref Range    Component P       P71                 Plts,Pheresis       [id number]   issued       09/02/21   07:47      Product Type Platelets Pheresis LR     Dispense Status issued     Unit Number (Barcoded) [id number]     Product Code (Barcoded) O0084B53     Blood Type (Barcoded) 5100    CBC WITH DIFFERENTIAL    Collection Time: 09/02/21 10:05 AM   Result Value Ref Range    WBC 4.6 (L) 4.8 - 10.8 K/uL    RBC 1.94 (L) 4.70 - 6.10 M/uL    Hemoglobin 6.4 (L) 14.0 - 18.0 g/dL    Hematocrit 19.9 (L) 42.0 - 52.0 %    .6 (H) 81.4 - 97.8 fL    MCH 33.0 27.0 - 33.0 pg    MCHC 32.2 (L) 33.7 - 35.3 g/dL    RDW 58.3 (H) 35.9 - 50.0 fL    Platelet Count 53 (L) 164 - 446 K/uL    MPV 9.3 9.0 - 12.9 fL    Neutrophils-Polys 87.70 (H) 44.00 - 72.00 %    Lymphocytes 7.80 (L) 22.00 - 41.00 %    Monocytes 3.90 0.00 - 13.40 %    Eosinophils 0.00 0.00 - 6.90 %    Basophils 0.00 0.00 - 1.80 %    Immature Granulocytes 0.60 0.00 - 0.90 %    Nucleated RBC 0.40 /100 WBC    Neutrophils (Absolute) 4.07 1.82 - 7.42 K/uL    Lymphs (Absolute) 0.36 (L) 1.00 - 4.80 K/uL    Monos (Absolute) 0.18 0.00 - 0.85 K/uL    Eos (Absolute) 0.00 0.00 - 0.51 K/uL    Baso (Absolute) 0.00 0.00 - 0.12 K/uL    Immature Granulocytes (abs) 0.03 0.00 - 0.11 K/uL    NRBC (Absolute) 0.02 K/uL       Fluids    Intake/Output Summary (Last 24 hours) at 9/2/2021 1717  Last data filed at 9/2/2021 1600  Gross per 24 hour   Intake 8260.38 ml   Output 3100 ml   Net 5160.38 ml       Core Measures & Quality Metrics  EKG reviewed, Radiology images reviewed, Labs reviewed and Medications reviewed  Triana catheter: Critically Ill - Requiring Accurate Measurement of Urinary Output      DVT Prophylaxis: Contraindicated - Anemia requiring blood transfusion      Antibiotics: Treating active infection/contamination beyond 24 hours perioperative coverage      CHELI Score  ETOH  Screening    Assessment/Plan  * Perforated viscus- (present on admission)  Assessment & Plan  8/31- diverting ileostomy with MARIANA drainage of feculent material.  Abdomen too frozen for formal resection    Atrial fibrillation (HCC)- (present on admission)  Assessment & Plan  Diuresis  Aggressive electrolyte replacement  Metoprolol and amiodarone   8/31- converted out in OR, continues on amiodarone gtt    Respiratory failure following trauma and surgery (HCC)  Assessment & Plan  On vent, weaning per protocol      Septic shock (HCC)- (present on admission)  Assessment & Plan  Secondary to liver abscess  Ongoing volume resuscitation and pressors support  abx rocephin and flagyl  8/30-feculent drain output in late evening  8/31- return to OR for washout, drainage of feculent fluid and diverting ileostomy.    Hepatocellular carcinoma (HCC)  Assessment & Plan  Advanced  Associated abscess with apparent: Involvement hepatic flexure     Pleural effusion, bilateral  Assessment & Plan  8/31 moderate bilateral pleural effusions on CT  May need drainage if there is difficulty with vent weaning    Normochromic anemia- (present on admission)  Assessment & Plan  Trend and transfuse for hgb <7.  Hgb today is 7.8    Thrombocytopenia (HCC)- (present on admission)  Assessment & Plan  Trend        Discussed patient condition with RN, RT, Pharmacy and Dietary.  CRITICAL CARE TIME EXCLUDING PROCEDURES: 90   Minutes.Decision making of high complexity.  I reviewed clinical labs, trends and orders for follow up.  Review of imaging,reports, consultant documentation .  Utilization of the information in todays decision making.   I evaluated the patient condition at bedside and discussed the daily plan(s) with available nursing staff,  pharmacists on rounds.  Managing multiple transfusions, albumin bolus therapy, continuous tube feeds, advanced liver disease, multiple vasoactive drips, mechanical ventilation/adjust.

## 2021-09-02 NOTE — CARE PLAN
The patient is Unstable - High likelihood or risk of patient condition declining or worsening    Shift Goals  Clinical Goals: Hemodynamically stable, wean vasopressors, mobility if cleared   Patient Goals: unable to assess, intubated  Family Goals: no family present    Progress made toward(s) clinical / shift goals:  Slowly weaning vasopressors. Pt sat edge of bed with staff.    Patient is not progressing towards the following goals:      Problem: Self Care  Goal: Patient will have the ability to perform ADLs independently or with assistance (bathe, groom, dress, toilet and feed)  Outcome: Not Met     Problem: Knowledge Deficit - Ostomy  Goal: Patient will demonstrate ability to manage and maintain ostomy  Outcome: Not Met   Pt unable to perform ostomy care or ADLs at this time due to clinical status and weakness.

## 2021-09-02 NOTE — CARE PLAN
Problem: Ventilation  Goal: Ability to achieve and maintain unassisted ventilation or tolerate decreased levels of ventilator support  Description: Document on Vent flowsheet    1.  Support and monitor invasive and noninvasive mechanical ventilation  2.  Monitor ventilator weaning response  3.  Perform ventilator associated pneumonia prevention interventions  4.  Manage ventilation therapy by monitoring diagnostic test results  Outcome: Progressing      Ventilator Daily Summary    Vent Day # 3  8 @ 23    ASV: 100%/+8/40%    Weaning trials: SBT x 2    Plan: Continue current ventilator settings and wean mechanical ventilation as tolerated per physician orders.

## 2021-09-02 NOTE — PROGRESS NOTES
"Surgery    Postop day 2 after reexploration and diverting ileostomy   VAC placed yesterday  Hemoglobin, platelets down  Drain outputs remain high  Remains on vent  Weaning vasopressors  Some output from ileostomy    BP (!) 90/57   Pulse 84   Temp 36.7 °C (98.1 °F)   Resp 14   Ht 1.803 m (5' 10.98\")   Wt 112 kg (246 lb 11.1 oz)   SpO2 100%   BMI 34.42 kg/m²     VAC: 0 cc  Right drain: 775 cc seropurulent  Left drain output: 1055 cc serosanguineous  NGT 1100 cc       Awake on vent  Nods appropriately  Abdomen softly distended  Bilious drainage in ileostomy bag  Diffuse anasarca    Recent Labs     08/31/21  0525 08/31/21  1200 08/31/21  2200 09/01/21 0415 09/02/21 0443   WBC 17.6*  --   --  12.8* 5.6   RBC 3.04*  --   --  3.30* 2.24*   HEMOGLOBIN 9.5*   < > 10.7* 10.7* 7.3*   HEMATOCRIT 30.2*   < > 31.8* 32.4* 22.6*   MCV 99.3*  --   --  98.2* 100.9*   MCH 31.3  --   --  32.4 32.6   RDW 61.0*  --   --  54.8* 57.6*   PLATELETCT 167  --   --  84* 42*   MPV 9.0  --   --  9.6 9.7   NEUTSPOLYS 85.00*  --   --  96.50* 86.20*   LYMPHOCYTES 5.00*  --   --  0.90* 8.60*   MONOCYTES 9.00  --   --  2.60 4.50   EOSINOPHILS 0.10  --   --  0.00 0.00   BASOPHILS 0.20  --   --  0.00 0.00   RBCMORPHOLO  --   --   --  Present  --     < > = values in this interval not displayed.       Recent Labs     08/31/21  0525 09/01/21 0415 09/02/21 0411   ASTSGOT 53* 51* 38   ALTSGPT 14 18 12   TBILIRUBIN 0.7 1.0 1.0   ALKPHOSPHAT 199* 154* 95   GLOBULIN 3.3 2.8 2.1       Recent Labs     08/31/21  0525 09/01/21  0415 09/02/21  0411   SODIUM 133* 135 136   POTASSIUM 3.5* 4.2 3.5*   CHLORIDE 100 104 102   CO2 23 22 21   GLUCOSE 160* 189* 174*   BUN 21 19 20   CREATININE 0.76 0.77 0.56     Assessment and plan:  71-year-old male with suspected hepatocellular carcinoma involving the hepatic flexure of the large bowel with perforation and abscess.    Continue to monitor drain output, continue antibiotics    VAC for wound care    NG tube output " high but ileostomy looks like it starting to have drainage.  Okay to try trickle feeding but if there is an issue, patient will need to be n.p.o. and have TPN started    Borderline weaning parameters today: Pleural effusions on CT:?  Drainage    Low hemoglobin and platelets this morning:?  Dilution.  Patient is already receiving platelets but will repeat CBC after transfusion    General management per ICU

## 2021-09-02 NOTE — CARE PLAN
The patient is Unstable - High likelihood or risk of patient condition declining or worsening    Shift Goals  Clinical Goals: Hemodynamically stable, wean vasopressors, mobility if cleared   Patient Goals: unable to assess, intubated  Family Goals: Same as patien, wants him to go home         Problem: Knowledge Deficit - Standard  Goal: Patient and family/care givers will demonstrate understanding of plan of care, disease process/condition, diagnostic tests and medications  Outcome: Progressing     Problem: Pain - Standard  Goal: Alleviation of pain or a reduction in pain to the patient’s comfort goal  Outcome: Progressing

## 2021-09-02 NOTE — CARE PLAN
Problem: Ventilation  Goal: Ability to achieve and maintain unassisted ventilation or tolerate decreased levels of ventilator support  Description: Document on Vent flowsheet    1.  Support and monitor invasive and noninvasive mechanical ventilation  2.  Monitor ventilator weaning response  3.  Perform ventilator associated pneumonia prevention interventions  4.  Manage ventilation therapy by monitoring diagnostic test results  Outcome: Progressing      Ventilator Daily Summary    Vent Day # 2    Ventilator settings: 18, 450, +8, 30%    Weaning trials: SBT x2 hrs, tolerated well, weak parameters.    Plan: Continue current ventilator settings and wean mechanical ventilation as tolerated per physician orders.

## 2021-09-02 NOTE — DIETARY
Nutrition support update:  Day 7 of admit.  Lorraine Bella is a 71 y.o. male with admitting DX of Bowel perforation, perforated intestine.  Tube feeding initiated on 8/31. Currently TF is off. Consult received to start trickle tube feedings.    Estimated Nutritional Needs:  Weight was 111.9 kg this morning, 37.3 kg up from admit weight of 74.7 on 8/27. Pt with cirrhosis with ascites, +35 L fluid per I/Os. Aldactone and albumin per MAR.    Estimations based on admit weight of 74.7 kg on 8/27.    Calculation/Equation: MSJ x 1.2 = 1830   Calories: 1850 - 2050 kcal (25 - 27 kcal/kg)  Protein:  g (1.2 - 1.4 g/kg)    Evaluation:   1. TF canceled by surgeon 8/31 following surgery. Exploratory laparotomy found stool spillage from colon found in upper quadrant, unable to mobilize colon secondary to adhesions and inflammation, large liver mass, ileostomy creation per surgery note 8/31.   2. Cortrak placed today, awaiting confirmation of placement.    3. Propofol held since yesterday.   4. Levo @ 5 mcg/min, Vaso stopped this afternoon.   5. Pt with history of cirrhosis with ascites on chronic Lasix, recent paracentesis PTA.   6. Pt with hyperglycemia will benefit from low CHO TF formula with moderate protein.     Malnutrition risk: RD note on 8/29 indicated severe malnutrition.     Recommendations/Plan:  1. Start Diabetisource @ 20 mL/hr and advance only with MD orders.  2. Final goal Diabetisource @ 70 mL/hr will provide 2016 kcals, 101 g protein, and 1378 mL H2O daily.

## 2021-09-02 NOTE — PROGRESS NOTES
BILATERAL DIGITAL SCREENING MAMMOGRAM with CAD: 06/18/18 09:58:00



CLINICAL: Routine screening.



COMPARISON:06/07/17



FINDINGS: The breasts are mostly fatty.  A left retroareolar asymmetry 

on both views requires additional imaging.No architectural distortion 

or suspicious calcifications.The right breast is negative.



IMPRESSION: Left focal asymmetry requiring further workup.



BI-RADS CATEGORY:  0 -- Additional Imaging Evaluation Required



RECOMMENDATION: Recall for left spot magnification ML and CC nipple 

views and left breast ultrasound if needed.



ACR BI-RADS MAMMOGRAPHIC CODES:

0 = Needs additional imaging evaluation; 1 = Negative; 2 = Benign; 3 = 

Probably benign; 4 = Suspicious; 5 = Malignant; 6 = Known biopsy-proven 

malignancy



COMMENT:

      1.   Dense breast tissue, i.e., adenosis, fibrocystic 

            changes, etc., may obscure an underlying neoplasm.

      2.   Approximately 10% of cancers are not detected with

            mammography.

      3.   A negative mammography report should not delay biopsy 

            if a clinically suspicious mass is present.



COMMENT:

Patient follow-up letters are generated via our [employer] application. Lab called with critical result of plt at 42. Critical lab result read back to lab.   Dr. Giang notified of critical lab result at 0620.  Critical lab result read back by Dr. Rahat Giang also notified of 3.4 point drop in HGB.    1 unit platelets ordered     0706: Wife, Sharlene called for 2 RN telephone consent for blood transfusion

## 2021-09-02 NOTE — THERAPY
Missed Therapy     Patient Name: Lorraine Bella  Age:  71 y.o., Sex:  male  Medical Record #: [id number]  Today's Date: 9/2/2021    Discussed missed therapy with RN       09/02/21 1206   Treatment Variance   Reason For Missed Therapy Medical - Patient Is Not Medically Stable   Total Time Spent   Total Time Spent (Mins) 5   Initial Contact Note    Initial Contact Note  Order Received and Verified, Speech Therapy Evaluation in Progress with Full Report to Follow.   Interdisciplinary Plan of Care Collaboration   IDT Collaboration with  Nursing   Collaboration Comments This SLP attempted to see patient for clinical swallow evaluation. Patient still intubated and per RN, no plans to extubate. SLP will cancel orders and await new orders when extubated and appropriate. Thank you.

## 2021-09-03 ENCOUNTER — APPOINTMENT (OUTPATIENT)
Dept: RADIOLOGY | Facility: MEDICAL CENTER | Age: 71
DRG: 853 | End: 2021-09-03
Attending: SURGERY
Payer: MEDICARE

## 2021-09-03 PROBLEM — R18.8 CIRRHOSIS OF LIVER WITH ASCITES (HCC): Status: ACTIVE | Noted: 2021-08-27

## 2021-09-03 LAB
ALBUMIN SERPL BCP-MCNC: 2.8 G/DL (ref 3.2–4.9)
ALBUMIN/GLOB SERPL: 1.3 G/DL
ALP SERPL-CCNC: 125 U/L (ref 30–99)
ALT SERPL-CCNC: 13 U/L (ref 2–50)
ANION GAP SERPL CALC-SCNC: 9 MMOL/L (ref 7–16)
AST SERPL-CCNC: 26 U/L (ref 12–45)
BACTERIA SPEC ANAEROBE CULT: NORMAL
BACTERIA WND AEROBE CULT: NORMAL
BASOPHILS # BLD AUTO: 0.1 % (ref 0–1.8)
BASOPHILS # BLD: 0.01 K/UL (ref 0–0.12)
BILIRUB SERPL-MCNC: 1.1 MG/DL (ref 0.1–1.5)
BUN SERPL-MCNC: 20 MG/DL (ref 8–22)
CA-I SERPL-SCNC: 1.2 MMOL/L (ref 1.1–1.3)
CALCIUM SERPL-MCNC: 8.6 MG/DL (ref 8.5–10.5)
CHLORIDE SERPL-SCNC: 106 MMOL/L (ref 96–112)
CO2 SERPL-SCNC: 24 MMOL/L (ref 20–33)
CREAT SERPL-MCNC: 0.75 MG/DL (ref 0.5–1.4)
CRP SERPL HS-MCNC: 3.39 MG/DL (ref 0–0.75)
EOSINOPHIL # BLD AUTO: 0 K/UL (ref 0–0.51)
EOSINOPHIL NFR BLD: 0 % (ref 0–6.9)
ERYTHROCYTE [DISTWIDTH] IN BLOOD BY AUTOMATED COUNT: 67.9 FL (ref 35.9–50)
GLOBULIN SER CALC-MCNC: 2.1 G/DL (ref 1.9–3.5)
GLUCOSE SERPL-MCNC: 157 MG/DL (ref 65–99)
GRAM STN SPEC: NORMAL
HCT VFR BLD AUTO: 28.3 % (ref 42–52)
HGB BLD-MCNC: 9.1 G/DL (ref 14–18)
IMM GRANULOCYTES # BLD AUTO: 0.05 K/UL (ref 0–0.11)
IMM GRANULOCYTES NFR BLD AUTO: 0.6 % (ref 0–0.9)
LYMPHOCYTES # BLD AUTO: 0.33 K/UL (ref 1–4.8)
LYMPHOCYTES NFR BLD: 4.3 % (ref 22–41)
MAGNESIUM SERPL-MCNC: 1.8 MG/DL (ref 1.5–2.5)
MCH RBC QN AUTO: 31.9 PG (ref 27–33)
MCHC RBC AUTO-ENTMCNC: 32.2 G/DL (ref 33.7–35.3)
MCV RBC AUTO: 99.3 FL (ref 81.4–97.8)
MONOCYTES # BLD AUTO: 0.29 K/UL (ref 0–0.85)
MONOCYTES NFR BLD AUTO: 3.8 % (ref 0–13.4)
MORPHOLOGY BLD-IMP: NORMAL
NEUTROPHILS # BLD AUTO: 7.05 K/UL (ref 1.82–7.42)
NEUTROPHILS NFR BLD: 91.2 % (ref 44–72)
NRBC # BLD AUTO: 0.03 K/UL
NRBC BLD-RTO: 0.4 /100 WBC
NT-PROBNP SERPL IA-MCNC: ABNORMAL PG/ML (ref 0–125)
PHOSPHATE SERPL-MCNC: 2.9 MG/DL (ref 2.5–4.5)
PLATELET # BLD AUTO: 57 K/UL (ref 164–446)
PMV BLD AUTO: 9.5 FL (ref 9–12.9)
POTASSIUM SERPL-SCNC: 3.5 MMOL/L (ref 3.6–5.5)
PREALB SERPL-MCNC: 7.6 MG/DL (ref 18–38)
PROT SERPL-MCNC: 4.9 G/DL (ref 6–8.2)
RBC # BLD AUTO: 2.85 M/UL (ref 4.7–6.1)
SIGNIFICANT IND 70042: NORMAL
SIGNIFICANT IND 70042: NORMAL
SITE SITE: NORMAL
SITE SITE: NORMAL
SODIUM SERPL-SCNC: 139 MMOL/L (ref 135–145)
SOURCE SOURCE: NORMAL
SOURCE SOURCE: NORMAL
TRIGL SERPL-MCNC: 133 MG/DL (ref 0–149)
WBC # BLD AUTO: 7.7 K/UL (ref 4.8–10.8)

## 2021-09-03 PROCEDURE — 83735 ASSAY OF MAGNESIUM: CPT

## 2021-09-03 PROCEDURE — 770022 HCHG ROOM/CARE - ICU (200)

## 2021-09-03 PROCEDURE — 99292 CRITICAL CARE ADDL 30 MIN: CPT | Performed by: SURGERY

## 2021-09-03 PROCEDURE — 94799 UNLISTED PULMONARY SVC/PX: CPT

## 2021-09-03 PROCEDURE — 700101 HCHG RX REV CODE 250: Performed by: SURGERY

## 2021-09-03 PROCEDURE — 700111 HCHG RX REV CODE 636 W/ 250 OVERRIDE (IP): Performed by: SURGERY

## 2021-09-03 PROCEDURE — 700105 HCHG RX REV CODE 258: Performed by: SURGERY

## 2021-09-03 PROCEDURE — 84100 ASSAY OF PHOSPHORUS: CPT

## 2021-09-03 PROCEDURE — 700111 HCHG RX REV CODE 636 W/ 250 OVERRIDE (IP): Mod: JG | Performed by: SURGERY

## 2021-09-03 PROCEDURE — 97605 NEG PRS WND THER DME<=50SQCM: CPT

## 2021-09-03 PROCEDURE — 700102 HCHG RX REV CODE 250 W/ 637 OVERRIDE(OP): Performed by: SURGERY

## 2021-09-03 PROCEDURE — A9270 NON-COVERED ITEM OR SERVICE: HCPCS | Performed by: SURGERY

## 2021-09-03 PROCEDURE — 99291 CRITICAL CARE FIRST HOUR: CPT | Performed by: SURGERY

## 2021-09-03 PROCEDURE — 83880 ASSAY OF NATRIURETIC PEPTIDE: CPT

## 2021-09-03 PROCEDURE — 84134 ASSAY OF PREALBUMIN: CPT

## 2021-09-03 PROCEDURE — P9045 ALBUMIN (HUMAN), 5%, 250 ML: HCPCS | Mod: JG | Performed by: SURGERY

## 2021-09-03 PROCEDURE — 80053 COMPREHEN METABOLIC PANEL: CPT

## 2021-09-03 PROCEDURE — 71045 X-RAY EXAM CHEST 1 VIEW: CPT

## 2021-09-03 PROCEDURE — 84478 ASSAY OF TRIGLYCERIDES: CPT

## 2021-09-03 PROCEDURE — 94150 VITAL CAPACITY TEST: CPT

## 2021-09-03 PROCEDURE — 94003 VENT MGMT INPAT SUBQ DAY: CPT

## 2021-09-03 PROCEDURE — 82330 ASSAY OF CALCIUM: CPT

## 2021-09-03 PROCEDURE — 86140 C-REACTIVE PROTEIN: CPT

## 2021-09-03 PROCEDURE — 85025 COMPLETE CBC W/AUTO DIFF WBC: CPT

## 2021-09-03 RX ORDER — SODIUM CHLORIDE, SODIUM LACTATE, POTASSIUM CHLORIDE, AND CALCIUM CHLORIDE .6; .31; .03; .02 G/100ML; G/100ML; G/100ML; G/100ML
250 INJECTION, SOLUTION INTRAVENOUS EVERY 6 HOURS
Status: COMPLETED | OUTPATIENT
Start: 2021-09-03 | End: 2021-09-03

## 2021-09-03 RX ORDER — ALBUMIN, HUMAN INJ 5% 5 %
12.5 SOLUTION INTRAVENOUS EVERY 6 HOURS
Status: COMPLETED | OUTPATIENT
Start: 2021-09-03 | End: 2021-09-05

## 2021-09-03 RX ADMIN — SODIUM CHLORIDE, POTASSIUM CHLORIDE, SODIUM LACTATE AND CALCIUM CHLORIDE: 600; 310; 30; 20 INJECTION, SOLUTION INTRAVENOUS at 18:08

## 2021-09-03 RX ADMIN — HYDROCORTISONE SODIUM SUCCINATE 100 MG: 100 INJECTION, POWDER, FOR SOLUTION INTRAMUSCULAR; INTRAVENOUS at 14:02

## 2021-09-03 RX ADMIN — FAMOTIDINE 20 MG: 10 INJECTION INTRAVENOUS at 17:03

## 2021-09-03 RX ADMIN — HYDROMORPHONE HYDROCHLORIDE 0.5 MG: 1 INJECTION, SOLUTION INTRAMUSCULAR; INTRAVENOUS; SUBCUTANEOUS at 21:53

## 2021-09-03 RX ADMIN — FAMOTIDINE 20 MG: 10 INJECTION INTRAVENOUS at 05:22

## 2021-09-03 RX ADMIN — HYDROMORPHONE HYDROCHLORIDE 0.5 MG: 1 INJECTION, SOLUTION INTRAMUSCULAR; INTRAVENOUS; SUBCUTANEOUS at 17:16

## 2021-09-03 RX ADMIN — SODIUM CHLORIDE, POTASSIUM CHLORIDE, SODIUM LACTATE AND CALCIUM CHLORIDE: 600; 310; 30; 20 INJECTION, SOLUTION INTRAVENOUS at 13:10

## 2021-09-03 RX ADMIN — ALBUMIN (HUMAN) 12.5 G: 2.5 SOLUTION INTRAVENOUS at 11:07

## 2021-09-03 RX ADMIN — SODIUM CHLORIDE, POTASSIUM CHLORIDE, SODIUM LACTATE AND CALCIUM CHLORIDE: 600; 310; 30; 20 INJECTION, SOLUTION INTRAVENOUS at 07:35

## 2021-09-03 RX ADMIN — SODIUM CHLORIDE, POTASSIUM CHLORIDE, SODIUM LACTATE AND CALCIUM CHLORIDE 250 ML: 600; 310; 30; 20 INJECTION, SOLUTION INTRAVENOUS at 11:47

## 2021-09-03 RX ADMIN — SODIUM CHLORIDE, POTASSIUM CHLORIDE, SODIUM LACTATE AND CALCIUM CHLORIDE 250 ML: 600; 310; 30; 20 INJECTION, SOLUTION INTRAVENOUS at 18:08

## 2021-09-03 RX ADMIN — HYDROCORTISONE SODIUM SUCCINATE 100 MG: 100 INJECTION, POWDER, FOR SOLUTION INTRAMUSCULAR; INTRAVENOUS at 21:44

## 2021-09-03 RX ADMIN — HYDROCORTISONE SODIUM SUCCINATE 100 MG: 100 INJECTION, POWDER, FOR SOLUTION INTRAMUSCULAR; INTRAVENOUS at 05:23

## 2021-09-03 RX ADMIN — DOCUSATE SODIUM 50 MG AND SENNOSIDES 8.6 MG 2 TABLET: 8.6; 5 TABLET, FILM COATED ORAL at 05:22

## 2021-09-03 RX ADMIN — NOREPINEPHRINE BITARTRATE 4 MCG/MIN: 1 INJECTION, SOLUTION, CONCENTRATE INTRAVENOUS at 14:44

## 2021-09-03 RX ADMIN — PIPERACILLIN AND TAZOBACTAM 4.5 G: 4; .5 INJECTION, POWDER, LYOPHILIZED, FOR SOLUTION INTRAVENOUS; PARENTERAL at 21:44

## 2021-09-03 RX ADMIN — PIPERACILLIN AND TAZOBACTAM 4.5 G: 4; .5 INJECTION, POWDER, LYOPHILIZED, FOR SOLUTION INTRAVENOUS; PARENTERAL at 05:23

## 2021-09-03 RX ADMIN — AMIODARONE HYDROCHLORIDE 0.5 MG/MIN: 1.8 INJECTION, SOLUTION INTRAVENOUS at 05:23

## 2021-09-03 RX ADMIN — POTASSIUM BICARBONATE 50 MEQ: 978 TABLET, EFFERVESCENT ORAL at 11:47

## 2021-09-03 RX ADMIN — PIPERACILLIN AND TAZOBACTAM 4.5 G: 4; .5 INJECTION, POWDER, LYOPHILIZED, FOR SOLUTION INTRAVENOUS; PARENTERAL at 12:41

## 2021-09-03 RX ADMIN — AMIODARONE HYDROCHLORIDE 0.5 MG/MIN: 1.8 INJECTION, SOLUTION INTRAVENOUS at 15:27

## 2021-09-03 RX ADMIN — ALBUMIN (HUMAN) 12.5 G: 2.5 SOLUTION INTRAVENOUS at 17:03

## 2021-09-03 ASSESSMENT — PULMONARY FUNCTION TESTS: FVC: 1.4

## 2021-09-03 ASSESSMENT — PAIN DESCRIPTION - PAIN TYPE
TYPE: ACUTE PAIN;SURGICAL PAIN
TYPE: ACUTE PAIN
TYPE: ACUTE PAIN;SURGICAL PAIN
TYPE: ACUTE PAIN

## 2021-09-03 ASSESSMENT — FIBROSIS 4 INDEX: FIB4 SCORE: 14.7

## 2021-09-03 NOTE — CARE PLAN
The patient is Unstable - High likelihood or risk of patient condition declining or worsening    Shift Goals  Clinical Goals: Hemodynamically stable, wean vasopressors, mobility if cleared   Patient Goals: unable to assess, intubated  Family Goals: N/A    Progress made toward(s) clinical / shift goals:    Problem: Safety - Medical Restraint  Goal: Remains free of injury from restraints (Restraint for Interference with Medical Device)  Outcome: Progressing  Flowsheets (Taken 9/2/2021 2034)  Addressed this shift: Remains free of injury from restraints (restraint for interference with medical device):   Determine that other, less restrictive measures have been tried or would not be effective before applying the restraint   Evaluate the patient's condition at the time of restraint application   Inform patient/family regarding the reason for restraint   Every 2 hours: Monitor safety, psychosocial status, comfort, nutrition and hydration     Problem: Pain - Standard  Goal: Alleviation of pain or a reduction in pain to the patient’s comfort goal  Outcome: Progressing     Patient is not progressing towards the following goals:  Still requiring vasopressors to meet MAP goals, still requiring ventilator support

## 2021-09-03 NOTE — CARE PLAN
The patient is Watcher - Medium risk of patient condition declining or worsening    Shift Goals  Clinical Goals: Hemodynamic stability, mobilization  Patient Goals: unable to assess, intubated  Family Goals: Same as patien, wants him to go home     Progress made toward(s) clinical / shift goals:  hemodynamic stability      Problem: Pain - Standard  Goal: Alleviation of pain or a reduction in pain to the patient’s comfort goal  Outcome: Progressing     Problem: Knowledge Deficit - Standard  Goal: Patient and family/care givers will demonstrate understanding of plan of care, disease process/condition, diagnostic tests and medications  Outcome: Progressing     Problem: Safety - Medical Restraint  Goal: Remains free of injury from restraints (Restraint for Interference with Medical Device)  Outcome: Progressing

## 2021-09-03 NOTE — DISCHARGE PLANNING
Spoke with RN LUDWIG Greene with [facility] ([phone]) and provided her with an update. Ramona will remain available if needs arise.

## 2021-09-03 NOTE — PROGRESS NOTES
Surgery  POD3 diverting ileostomy for colon perforation unable to be resected/mobilized  Cirrhosis  Intubated, on levo, amio gtt  Drains >2L past 24 hours, murky   Ileostomy 330cc  TF at 20  WBC 8 today. H/H stable at 9/28  Abdomen soft, stoma pink and productive, VAC in place    Plan:  Appreciate ICU care  Cont abx  Stoma care  VAC x3 weekly  TF as tolerated, can advance with residuals

## 2021-09-03 NOTE — CARE PLAN
Problem: Ventilation  Goal: Ability to achieve and maintain unassisted ventilation or tolerate decreased levels of ventilator support  Description: Document on Vent flowsheet    1.  Support and monitor invasive and noninvasive mechanical ventilation  2.  Monitor ventilator weaning response  3.  Perform ventilator associated pneumonia prevention interventions  4.  Manage ventilation therapy by monitoring diagnostic test results  Outcome: Not Progressing      Ventilator Daily Summary    Vent Day # 4    Ventilator settings changed this shift: no    Weaning trials: sbt 1 hour, good parameters, overall not ready for extubation    Respiratory Procedures: no    Plan: Continue current ventilator settings and wean mechanical ventilation as tolerated per physician orders.

## 2021-09-03 NOTE — PROGRESS NOTES
Trauma / Surgical Daily Progress Note    Date of Service  9/3/2021    Chief Complaint  71 y.o. male admitted 8/26/2021 with hepatic abscess    Interval Events  Levophed drip.  Right Mirna brisk.    Ostomy:    TB advance to goal  AB:  Zosyn.    Lovenox.  No  185/12 urine output.    LR bolus and albumen.    Review of Systems  Review of Systems     Vital Signs for last 24 hours  Temp:  [36.7 °C (98.1 °F)-37.1 °C (98.8 °F)] 36.9 °C (98.5 °F)  Pulse:  [72-90] 89  Resp:  [11-34] 22  BP: ()/(56-75) 99/67  SpO2:  [99 %-100 %] 100 %    Hemodynamic parameters for last 24 hours  CVP:  [10 MM HG-211 MM HG] 14 MM HG    Respiratory Data     Respiration: (!) 22, Pulse Oximetry: 100 %     Work Of Breathing / Effort: Vented  RUL Breath Sounds: Clear, RML Breath Sounds: Clear, RLL Breath Sounds: Diminished, ASHLEY Breath Sounds: Clear, LLL Breath Sounds: Diminished    Physical Exam  Physical Exam  HENT:      Head: Normocephalic.   Eyes:      General: No scleral icterus.  Cardiovascular:      Rate and Rhythm: Tachycardia present.   Pulmonary:      Effort: No respiratory distress.      Breath sounds: No wheezing.   Abdominal:      General: There is no distension.      Palpations: Abdomen is soft.   Skin:     General: Skin is warm and dry.   Neurological:      General: No focal deficit present.      Mental Status: He is lethargic.         Laboratory  Recent Results (from the past 24 hour(s))   PHOSPHORUS    Collection Time: 09/03/21  5:52 AM   Result Value Ref Range    Phosphorus 2.9 2.5 - 4.5 mg/dL   MAGNESIUM    Collection Time: 09/03/21  5:52 AM   Result Value Ref Range    Magnesium 1.8 1.5 - 2.5 mg/dL   Triglycerides Starting now and then Every 3 Days    Collection Time: 09/03/21  5:52 AM   Result Value Ref Range    Triglycerides 133 0 - 149 mg/dL   CRP Quantitive (Non-Cardiac)    Collection Time: 09/03/21  5:52 AM   Result Value Ref Range    Stat C-Reactive Protein 3.39 (H) 0.00 - 0.75 mg/dL   CBC WITH DIFFERENTIAL    Collection  Time: 09/03/21  5:52 AM   Result Value Ref Range    WBC 7.7 4.8 - 10.8 K/uL    RBC 2.85 (L) 4.70 - 6.10 M/uL    Hemoglobin 9.1 (L) 14.0 - 18.0 g/dL    Hematocrit 28.3 (L) 42.0 - 52.0 %    MCV 99.3 (H) 81.4 - 97.8 fL    MCH 31.9 27.0 - 33.0 pg    MCHC 32.2 (L) 33.7 - 35.3 g/dL    RDW 67.9 (H) 35.9 - 50.0 fL    Platelet Count 57 (L) 164 - 446 K/uL    MPV 9.5 9.0 - 12.9 fL    Neutrophils-Polys 91.20 (H) 44.00 - 72.00 %    Lymphocytes 4.30 (L) 22.00 - 41.00 %    Monocytes 3.80 0.00 - 13.40 %    Eosinophils 0.00 0.00 - 6.90 %    Basophils 0.10 0.00 - 1.80 %    Immature Granulocytes 0.60 0.00 - 0.90 %    Nucleated RBC 0.40 /100 WBC    Neutrophils (Absolute) 7.05 1.82 - 7.42 K/uL    Lymphs (Absolute) 0.33 (L) 1.00 - 4.80 K/uL    Monos (Absolute) 0.29 0.00 - 0.85 K/uL    Eos (Absolute) 0.00 0.00 - 0.51 K/uL    Baso (Absolute) 0.01 0.00 - 0.12 K/uL    Immature Granulocytes (abs) 0.05 0.00 - 0.11 K/uL    NRBC (Absolute) 0.03 K/uL   Comp Metabolic Panel    Collection Time: 09/03/21  5:52 AM   Result Value Ref Range    Sodium 139 135 - 145 mmol/L    Potassium 3.5 (L) 3.6 - 5.5 mmol/L    Chloride 106 96 - 112 mmol/L    Co2 24 20 - 33 mmol/L    Anion Gap 9.0 7.0 - 16.0    Glucose 157 (H) 65 - 99 mg/dL    Bun 20 8 - 22 mg/dL    Creatinine 0.75 0.50 - 1.40 mg/dL    Calcium 8.6 8.5 - 10.5 mg/dL    AST(SGOT) 26 12 - 45 U/L    ALT(SGPT) 13 2 - 50 U/L    Alkaline Phosphatase 125 (H) 30 - 99 U/L    Total Bilirubin 1.1 0.1 - 1.5 mg/dL    Albumin 2.8 (L) 3.2 - 4.9 g/dL    Total Protein 4.9 (L) 6.0 - 8.2 g/dL    Globulin 2.1 1.9 - 3.5 g/dL    A-G Ratio 1.3 g/dL   IONIZED CALCIUM    Collection Time: 09/03/21  5:52 AM   Result Value Ref Range    Ionized Calcium 1.2 1.1 - 1.3 mmol/L   PERIPHERAL SMEAR REVIEW    Collection Time: 09/03/21  5:52 AM   Result Value Ref Range    Peripheral Smear Review see below    ESTIMATED GFR    Collection Time: 09/03/21  5:52 AM   Result Value Ref Range    GFR If African American >60 >60 mL/min/1.73 m 2    GFR  If Non African American >60 >60 mL/min/1.73 m 2   PREALBUMIN    Collection Time: 09/03/21  9:05 AM   Result Value Ref Range    Pre-Albumin 7.6 (L) 18.0 - 38.0 mg/dL   proBrain Natriuretic Peptide, NT    Collection Time: 09/03/21 11:55 AM   Result Value Ref Range    NT-proBNP >78699 (H) 0 - 125 pg/mL       Fluids    Intake/Output Summary (Last 24 hours) at 9/3/2021 1714  Last data filed at 9/3/2021 1600  Gross per 24 hour   Intake 5428.05 ml   Output 3475 ml   Net 1953.05 ml       Core Measures & Quality Metrics  EKG reviewed, Radiology images reviewed, Labs reviewed and Medications reviewed  Triana catheter: Critically Ill - Requiring Accurate Measurement of Urinary Output      DVT Prophylaxis: Contraindicated - Anemia requiring blood transfusion      Antibiotics: Treating active infection/contamination beyond 24 hours perioperative coverage      CHELI Score  ETOH Screening    Assessment/Plan  * Perforated viscus- (present on admission)  Assessment & Plan  8/31- diverting ileostomy with MARIANA drainage of feculent material.  Abdomen too frozen for formal resection    Atrial fibrillation (HCC)- (present on admission)  Assessment & Plan  Diuresis  Aggressive electrolyte replacement  Metoprolol and amiodarone   8/31- converted out in OR, continues on amiodarone gtt  9/3: Amiodarone drip.  Consider GI solution soon    Respiratory failure following trauma and surgery (HCC)  Assessment & Plan  On vent, weaning per protocol      Cirrhosis of liver with ascites (HCC)  Assessment & Plan  9/3: Continued 2 liter per day right upper quadrant drain.  Turbid fluid.  Left upper quadrant drain is in the pelvis and producing 100 cc plus per day.  Drains in place for now.  Right upper quadrant drain is in the abscess cavity.  Continued round-the-clock albumin and fluid therapy.  Diuretic therapy.    Hepatocellular carcinoma (HCC)- (present on admission)  Assessment & Plan  Advanced tumor involving most of the right lobe.  Associated  abscess with apparent: Involvement hepatic flexure.  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  Diverting ileostomy.    Pleural effusion, bilateral  Assessment & Plan  8/31 moderate bilateral pleural effusions on CT  May need drainage if there is difficulty with vent weaning    Normochromic anemia- (present on admission)  Assessment & Plan  Trend and transfuse for hgb <7.  Hgb today is 7.8    Septic shock (HCC)- (present on admission)  Assessment & Plan  Secondary to liver abscess  Ongoing volume resuscitation and pressors support  abx rocephin and flagyl  8/30-feculent drain output in late evening  8/31- return to OR for washout, drainage of feculent fluid and diverting ileostomy.  9/3: Remains on small doses of norepinephrine.  Vasopressin has been discontinued.  Remains on every 8 hydrocortisone taper soon      Thrombocytopenia (HCC)- (present on admission)  Assessment & Plan  9/2: Transfused pheresis pack for platelet count 40 in the setting of hemoglobin below 7        Discussed patient condition with RN, RT and Pharmacy.  CRITICAL CARE TIME EXCLUDING PROCEDURES: 90  Minutes.Decision making of high complexity.  I reviewed clinical labs, trends and orders for follow up.  Review of imaging,reports, consultant documentation .  Utilization of the information in todays decision making.   I evaluated the patient condition at bedside and discussed the daily plan(s) with available nursing staff,  pharmacists on rounds.  Managing mechanical ventilation, multiple crystalloid and colloid boluses, continuous tube feeds, intravenous antibiotics, end-stage hepatocellular and cirrhotic liver disease, diuretics, amiodarone drip, vasoactive drip and titration

## 2021-09-04 ENCOUNTER — APPOINTMENT (OUTPATIENT)
Dept: CARDIOLOGY | Facility: MEDICAL CENTER | Age: 71
DRG: 853 | End: 2021-09-04
Attending: SURGERY
Payer: MEDICARE

## 2021-09-04 ENCOUNTER — APPOINTMENT (OUTPATIENT)
Dept: RADIOLOGY | Facility: MEDICAL CENTER | Age: 71
DRG: 853 | End: 2021-09-04
Attending: SURGERY
Payer: MEDICARE

## 2021-09-04 PROBLEM — E43 PROTEIN-CALORIE MALNUTRITION, SEVERE (HCC): Status: RESOLVED | Noted: 2021-07-14 | Resolved: 2021-09-04

## 2021-09-04 LAB
ALBUMIN SERPL BCP-MCNC: 2.5 G/DL (ref 3.2–4.9)
ALBUMIN/GLOB SERPL: 1.1 G/DL
ALP SERPL-CCNC: 120 U/L (ref 30–99)
ALT SERPL-CCNC: 11 U/L (ref 2–50)
ANION GAP SERPL CALC-SCNC: 10 MMOL/L (ref 7–16)
AST SERPL-CCNC: 16 U/L (ref 12–45)
BILIRUB SERPL-MCNC: 1 MG/DL (ref 0.1–1.5)
BUN SERPL-MCNC: 20 MG/DL (ref 8–22)
CA-I SERPL-SCNC: 1.1 MMOL/L (ref 1.1–1.3)
CALCIUM SERPL-MCNC: 8.5 MG/DL (ref 8.5–10.5)
CHLORIDE SERPL-SCNC: 107 MMOL/L (ref 96–112)
CO2 SERPL-SCNC: 22 MMOL/L (ref 20–33)
CREAT SERPL-MCNC: 0.67 MG/DL (ref 0.5–1.4)
GLOBULIN SER CALC-MCNC: 2.2 G/DL (ref 1.9–3.5)
GLUCOSE SERPL-MCNC: 177 MG/DL (ref 65–99)
LV EJECT FRACT  99904: 25
LV EJECT FRACT MOD 2C 99903: 11.19
LV EJECT FRACT MOD 4C 99902: 22.09
LV EJECT FRACT MOD BP 99901: 18.88
POTASSIUM SERPL-SCNC: 3.2 MMOL/L (ref 3.6–5.5)
PROT SERPL-MCNC: 4.7 G/DL (ref 6–8.2)
SODIUM SERPL-SCNC: 139 MMOL/L (ref 135–145)
TRIGL SERPL-MCNC: 98 MG/DL (ref 0–149)

## 2021-09-04 PROCEDURE — 700111 HCHG RX REV CODE 636 W/ 250 OVERRIDE (IP): Mod: JG | Performed by: SURGERY

## 2021-09-04 PROCEDURE — 93306 TTE W/DOPPLER COMPLETE: CPT

## 2021-09-04 PROCEDURE — 700111 HCHG RX REV CODE 636 W/ 250 OVERRIDE (IP): Performed by: SURGERY

## 2021-09-04 PROCEDURE — 82330 ASSAY OF CALCIUM: CPT

## 2021-09-04 PROCEDURE — 700105 HCHG RX REV CODE 258: Performed by: SURGERY

## 2021-09-04 PROCEDURE — 700117 HCHG RX CONTRAST REV CODE 255: Performed by: SURGERY

## 2021-09-04 PROCEDURE — 94003 VENT MGMT INPAT SUBQ DAY: CPT

## 2021-09-04 PROCEDURE — 99291 CRITICAL CARE FIRST HOUR: CPT | Performed by: SURGERY

## 2021-09-04 PROCEDURE — 84478 ASSAY OF TRIGLYCERIDES: CPT

## 2021-09-04 PROCEDURE — 80053 COMPREHEN METABOLIC PANEL: CPT

## 2021-09-04 PROCEDURE — 94150 VITAL CAPACITY TEST: CPT

## 2021-09-04 PROCEDURE — A9270 NON-COVERED ITEM OR SERVICE: HCPCS | Performed by: INTERNAL MEDICINE

## 2021-09-04 PROCEDURE — 770022 HCHG ROOM/CARE - ICU (200)

## 2021-09-04 PROCEDURE — 94799 UNLISTED PULMONARY SVC/PX: CPT

## 2021-09-04 PROCEDURE — 93306 TTE W/DOPPLER COMPLETE: CPT | Mod: 26 | Performed by: INTERNAL MEDICINE

## 2021-09-04 PROCEDURE — 0BP1XDZ REMOVAL OF INTRALUMINAL DEVICE FROM TRACHEA, EXTERNAL APPROACH: ICD-10-PCS | Performed by: SURGERY

## 2021-09-04 PROCEDURE — 71045 X-RAY EXAM CHEST 1 VIEW: CPT

## 2021-09-04 PROCEDURE — 99223 1ST HOSP IP/OBS HIGH 75: CPT | Performed by: INTERNAL MEDICINE

## 2021-09-04 PROCEDURE — 700102 HCHG RX REV CODE 250 W/ 637 OVERRIDE(OP): Performed by: INTERNAL MEDICINE

## 2021-09-04 PROCEDURE — P9045 ALBUMIN (HUMAN), 5%, 250 ML: HCPCS | Mod: JG | Performed by: SURGERY

## 2021-09-04 RX ORDER — POTASSIUM CHLORIDE 29.8 MG/ML
40 INJECTION INTRAVENOUS ONCE
Status: COMPLETED | OUTPATIENT
Start: 2021-09-04 | End: 2021-09-04

## 2021-09-04 RX ORDER — SODIUM CHLORIDE, SODIUM LACTATE, POTASSIUM CHLORIDE, AND CALCIUM CHLORIDE .6; .31; .03; .02 G/100ML; G/100ML; G/100ML; G/100ML
250 INJECTION, SOLUTION INTRAVENOUS EVERY 6 HOURS
Status: DISCONTINUED | OUTPATIENT
Start: 2021-09-04 | End: 2021-09-06

## 2021-09-04 RX ORDER — ATORVASTATIN CALCIUM 40 MG/1
40 TABLET, FILM COATED ORAL EVERY EVENING
Status: DISCONTINUED | OUTPATIENT
Start: 2021-09-04 | End: 2021-09-12

## 2021-09-04 RX ADMIN — ALBUMIN (HUMAN) 12.5 G: 2.5 SOLUTION INTRAVENOUS at 23:08

## 2021-09-04 RX ADMIN — FAMOTIDINE 20 MG: 10 INJECTION INTRAVENOUS at 05:27

## 2021-09-04 RX ADMIN — SODIUM CHLORIDE, POTASSIUM CHLORIDE, SODIUM LACTATE AND CALCIUM CHLORIDE 250 ML: 600; 310; 30; 20 INJECTION, SOLUTION INTRAVENOUS at 17:47

## 2021-09-04 RX ADMIN — HYDROCORTISONE SODIUM SUCCINATE 100 MG: 100 INJECTION, POWDER, FOR SOLUTION INTRAMUSCULAR; INTRAVENOUS at 21:11

## 2021-09-04 RX ADMIN — AMIODARONE HYDROCHLORIDE 0.5 MG/MIN: 1.8 INJECTION, SOLUTION INTRAVENOUS at 14:18

## 2021-09-04 RX ADMIN — ALBUMIN (HUMAN) 12.5 G: 2.5 SOLUTION INTRAVENOUS at 00:04

## 2021-09-04 RX ADMIN — PIPERACILLIN AND TAZOBACTAM 4.5 G: 4; .5 INJECTION, POWDER, LYOPHILIZED, FOR SOLUTION INTRAVENOUS; PARENTERAL at 13:37

## 2021-09-04 RX ADMIN — PIPERACILLIN AND TAZOBACTAM 4.5 G: 4; .5 INJECTION, POWDER, LYOPHILIZED, FOR SOLUTION INTRAVENOUS; PARENTERAL at 21:12

## 2021-09-04 RX ADMIN — SODIUM CHLORIDE, POTASSIUM CHLORIDE, SODIUM LACTATE AND CALCIUM CHLORIDE 250 ML: 600; 310; 30; 20 INJECTION, SOLUTION INTRAVENOUS at 12:16

## 2021-09-04 RX ADMIN — HUMAN ALBUMIN MICROSPHERES AND PERFLUTREN 3 ML: 10; .22 INJECTION, SOLUTION INTRAVENOUS at 13:47

## 2021-09-04 RX ADMIN — FAMOTIDINE 20 MG: 10 INJECTION INTRAVENOUS at 17:38

## 2021-09-04 RX ADMIN — SODIUM CHLORIDE, POTASSIUM CHLORIDE, SODIUM LACTATE AND CALCIUM CHLORIDE: 600; 310; 30; 20 INJECTION, SOLUTION INTRAVENOUS at 08:37

## 2021-09-04 RX ADMIN — POTASSIUM CHLORIDE 40 MEQ: 29.8 INJECTION, SOLUTION INTRAVENOUS at 16:18

## 2021-09-04 RX ADMIN — ALBUMIN (HUMAN) 12.5 G: 2.5 SOLUTION INTRAVENOUS at 05:27

## 2021-09-04 RX ADMIN — HYDROMORPHONE HYDROCHLORIDE 0.5 MG: 1 INJECTION, SOLUTION INTRAMUSCULAR; INTRAVENOUS; SUBCUTANEOUS at 14:13

## 2021-09-04 RX ADMIN — ATORVASTATIN CALCIUM 40 MG: 40 TABLET, FILM COATED ORAL at 23:08

## 2021-09-04 RX ADMIN — HYDROMORPHONE HYDROCHLORIDE 0.5 MG: 1 INJECTION, SOLUTION INTRAMUSCULAR; INTRAVENOUS; SUBCUTANEOUS at 19:37

## 2021-09-04 RX ADMIN — HYDROCORTISONE SODIUM SUCCINATE 100 MG: 100 INJECTION, POWDER, FOR SOLUTION INTRAMUSCULAR; INTRAVENOUS at 05:27

## 2021-09-04 RX ADMIN — ALBUMIN (HUMAN) 12.5 G: 2.5 SOLUTION INTRAVENOUS at 17:38

## 2021-09-04 RX ADMIN — ALBUMIN (HUMAN) 12.5 G: 2.5 SOLUTION INTRAVENOUS at 11:40

## 2021-09-04 RX ADMIN — HYDROCORTISONE SODIUM SUCCINATE 100 MG: 100 INJECTION, POWDER, FOR SOLUTION INTRAMUSCULAR; INTRAVENOUS at 13:37

## 2021-09-04 RX ADMIN — SODIUM CHLORIDE, POTASSIUM CHLORIDE, SODIUM LACTATE AND CALCIUM CHLORIDE: 600; 310; 30; 20 INJECTION, SOLUTION INTRAVENOUS at 21:24

## 2021-09-04 RX ADMIN — HYDROMORPHONE HYDROCHLORIDE 0.5 MG: 1 INJECTION, SOLUTION INTRAMUSCULAR; INTRAVENOUS; SUBCUTANEOUS at 15:41

## 2021-09-04 RX ADMIN — AMIODARONE HYDROCHLORIDE 0.5 MG/MIN: 1.8 INJECTION, SOLUTION INTRAVENOUS at 02:51

## 2021-09-04 RX ADMIN — PIPERACILLIN AND TAZOBACTAM 4.5 G: 4; .5 INJECTION, POWDER, LYOPHILIZED, FOR SOLUTION INTRAVENOUS; PARENTERAL at 05:27

## 2021-09-04 ASSESSMENT — PAIN DESCRIPTION - PAIN TYPE
TYPE: ACUTE PAIN;SURGICAL PAIN
TYPE: ACUTE PAIN;CHRONIC PAIN
TYPE: ACUTE PAIN
TYPE: ACUTE PAIN
TYPE: ACUTE PAIN;SURGICAL PAIN
TYPE: ACUTE PAIN;SURGICAL PAIN

## 2021-09-04 ASSESSMENT — FIBROSIS 4 INDEX: FIB4 SCORE: 8.98

## 2021-09-04 ASSESSMENT — PULMONARY FUNCTION TESTS: FVC: 1.1

## 2021-09-04 NOTE — ASSESSMENT & PLAN NOTE
9/3 - 9/6 high volume ascites output from drains (R MARIANA in abscess cavity, L MARIANA in pelvis.   General surgery: can not remove drains.  Continued round-the-clock albumin and fluid therapy.  Diuretic therapy.  9/8 Resume albumin

## 2021-09-04 NOTE — PROGRESS NOTES
4 eyes assessment complete     Preventative measures in place:  Q 2 hour turns, pillows in place for support and cushioning, heels floated, 2 RN skin assessments, ensuring medical devices/tubing are not under patient, repositioning medical equipment q 2 hours, mepilex in place on heels and sacrum and mid back, low air loss mattress, mobility as tolerated, skin assessed under bony prominences and high pressure point areas, z-gia pillow, TAPS system, moisturizer, wicking moisture with dri-flos, wound following    Following areas of concern with interventions provided:   - Left arm skin tears x2 (mepitel in place)  - Bilateral arms: bruising and fragile  - Abdomen: Skin tears beneath wound vac dressing, bruising   - Left ankle small blister (EKTA)  - Mid back purple bruising along spine (mepilex in place)  - Posterior head blanching redness (z-flow in place, head repositioning  - Sacrum blanching redness with dimples (mepilex in place, taps system in use)    Devices in place:  ECG leads, blood pressure cuff, pulse ox, Triana catherter, SCD's, ET Tube, cortrak, central line, wrist restraints,  MARIANA drains x2, wound vac, ostomy bag, PIV x2

## 2021-09-04 NOTE — WOUND TEAM
"[facility]  Inpatient Services  Initial Wound and Skin Care Evaluation    Admission Date: 8/26/2021     Last order of IP CONSULT TO WOUND CARE was found on 8/31/2021 from Hospital Encounter on 8/26/2021     HPI, PMH, SH: Reviewed    Past Surgical History:   Procedure Laterality Date   • PB EXPLORATORY OF ABDOMEN N/A 8/31/2021    Procedure: LAPAROTOMY, EXPLORATORY ,drainage of peritoneal abcess,  creation of loop ileostomy, and trucut liver biopsy;  Surgeon: Kevin Thornton D.O.;  Location: [facility];  Service: General   • PB LAP,DIAGNOSTIC ABDOMEN N/A 8/27/2021    Procedure: LAPAROSCOPY;  Surgeon: Priya You M.D.;  Location: [facility];  Service: General   • PB EXPLORATORY OF ABDOMEN N/A 8/27/2021    Procedure: LAPAROTOMY, EXPLORATORY;  Surgeon: Priya You M.D.;  Location: [facility];  Service: General   • IRRIGATION & DEBRIDEMENT GENERAL  8/27/2021    Procedure: IRRIGATION AND DEBRIDEMENT, INTRA-ABDOMINAL ABCESS;  Surgeon: Priya You M.D.;  Location: [facility];  Service: General   • RESTORATE HEMOSTAS  8/27/2021    Procedure: CONTROL OF LIVER BLEED;  Surgeon: Priya You M.D.;  Location: [facility];  Service: General   • PB LAP,DIAGNOSTIC ABDOMEN  7/9/2021    Procedure: DIAGNOSITIC LAPAROSCOPY WITH WASH OUT AND DRAIN PLACEMENT;  Surgeon: Cody Meza M.D.;  Location: [facility];  Service: Gastroenterology   • OTHER  12/2019    \"IR embolization\"    • PARATHYROIDECTOMY N/A 5/13/2016    Procedure: PARATHYROIDECTOMY;  Surgeon: Kale Lord M.D.;  Location: SURGERY SAME DAY [facility];  Service:    • CATARACT PHACO WITH IOL Left 4/7/2016    Procedure: CATARACT PHACO WITH IOL;  Surgeon: Ephraim Jamison M.D.;  Location: SURGERY [facility];  Service:    • BONE GRAFT Right 1960's    right wrist   • OTHER      chemoemolozation x2   • WRIST ORIF Right 1960's     Social History     Tobacco Use   • " Smoking status: Current Every Day Smoker     Packs/day: 0.50     Years: 30.00     Pack years: 15.00     Types: Cigarettes     Start date: 1/1/1990   • Smokeless tobacco: Never Used   • Tobacco comment: quit couple times of the years   Substance Use Topics   • Alcohol use: Not Currently     Alcohol/week: 2.4 oz     Types: 4 Shots of liquor per week     Comment: pt quit drinking 2 months ago     Chief Complaint   Patient presents with   • N/V     Diagnosis: Bowel perforation (HCC) [K63.1]  Perforated intestine (HCC) [K63.1]    Unit where seen by Wound Team: S111/00     WOUND CONSULT/FOLLOW UP RELATED TO:  NPWT placement to abdominal surgical incision. Ostomy change (see below)     WOUND HISTORY:  Patient admitted with nausea and vomiting, history of hepatocellular carcinoma with cirrhosis. Went to OR with MD Thornton for peritoneal abscess, large bowel perforation, and liver mass.      WOUND ASSESSMENT/LDA    Negative Pressure Wound Therapy 09/01/21 Surgical Abdomen Midline (Active)   NPWT Pump Mode / Pressure Setting 125 mmHg;Continuous;[place] 09/03/21 1600   Dressing Type Medium;Black Foam (Regular) 09/03/21 1600   Number of Foam Pieces Used 3 09/03/21 1500   Canister Changed No 09/03/21 1600   Output (mL) 0 mL 09/03/21 0400   NEXT Dressing Change/Treatment Date 09/03/21 09/01/21 1030   WOUND NURSE ONLY - Time Spent with Patient (mins) 60 09/03/21 1500     Wound 08/27/21 Incision Abdomen Midline (Active)   Wound Image    09/01/21 1030   Site Assessment Red;[place] 09/03/21 1600   Periwound Assessment [place] 09/03/21 1600   Margins Unattached edges;Defined edges 09/03/21 1600   Closure Secondary intention;Staples 09/03/21 1500   Drainage Amount Moderate 09/03/21 1500   Drainage Description Serous 09/03/21 1500   Treatments Cleansed;Site care 09/03/21 1500   Wound Cleansing Approved Wound Cleanser 09/03/21 1500   Periwound Protectant Drape;Skin Protectant Wipes to Periwound 09/03/21 1500   Dressing Cleansing/Solutions Not  Applicable 09/03/21 1500   Dressing Options Mepitel One;Wound Vac 09/03/21 1500   Dressing Changed Changed 09/03/21 1500   Dressing Status Clean;Dry;Intact 09/03/21 1500   Dressing Change/Treatment Frequency Monday, Wednesday, Friday, and As Needed 09/03/21 1500   NEXT Dressing Change/Treatment Date 09/06/21 09/03/21 1500   NEXT Weekly Photo (Inpatient Only) 09/08/21 09/03/21 1500   Number of Staples Removed 5 09/01/21 1030   Non-staged Wound Description Full thickness 09/03/21 1500   Wound Length (cm) 17.5 cm 09/01/21 1030   Wound Width (cm) 2.2 cm 09/01/21 1030   Wound Depth (cm) 2 cm 09/01/21 1030   Wound Surface Area (cm^2) 38.5 cm^2 09/01/21 1030   Wound Volume (cm^3) 77 cm^3 09/01/21 1030   Tunneling (cm) 0.5 cm 09/01/21 1030   Tunneling Clock Position of Wound 12 09/01/21 1030   Tunneling - 2nd Location (cm) 0.5 cm 09/01/21 1030   Tunneling Clock Position of Wound - 2nd Location 6 09/01/21 1030   Shape linear 09/03/21 1500   Wound Odor None 09/03/21 1500   Exposed Structures Sutures 09/03/21 1500   WOUND NURSE ONLY - Time Spent with Patient (mins) 60 09/03/21 1500       Vascular:    ZARA:   No results found.    Lab Values:    Lab Results   Component Value Date/Time    WBC 7.7 09/03/2021 05:52 AM    RBC 2.85 (L) 09/03/2021 05:52 AM    HEMOGLOBIN 9.1 (L) 09/03/2021 05:52 AM    HEMATOCRIT 28.3 (L) 09/03/2021 05:52 AM    CREACTPROT 3.39 (H) 09/03/2021 05:52 AM    HBA1C 6.0 (H) 07/03/2021 03:38 AM        Culture Results show:  Recent Results (from the past 720 hour(s))   CULTURE WOUND W/ GRAM STAIN    Collection Time: 08/31/21  9:21 AM    Specimen: Wound   Result Value Ref Range    Significant Indicator NEG     Source WND     Site Peritoneal Abscess     Culture Result       Heavy growth enteric [place] including mixed morphologies  Enterococcus sp., several morphologies enteric Gram negative  rods and yeast.      Gram Stain Result       Moderate WBCs.  Many mixed bacteria, no predominant organism seen.         Pain  Level/Medicated:  Mild pain with site care, otherwise able to tolerate     INTERVENTIONS BY WOUND TEAM:  Chart and images reviewed. Discussed with bedside RN. All areas of concern (based on picture review, LDA review and discussion with bedside RN) have been thoroughly assessed. Documentation of areas based on significant findings. This RN in to assess patient. Performed standard wound care which includes appropriate positioning, dressing removal and non-selective debridement. Pictures and measurements obtained weekly if/when required.  Abdomen:   Preparation for Dressing removal: Dressing soaked with wound cleanser  Non-selectively Debrided with: wound cleanser and gauze  Sharp debridement: n/a.   Cesia wound: Cleansed with wound cleanser, Prepped with mepitel one on staples. No sting skin prep and drape to periwound.   Primary Dressin pieces of black half thickness foam tucked into wound bed.   Secondary (Outer) Dressing: button and trak pad applied. Suction obtained at 125mmhg.     Interdisciplinary consultation: Patient, Bedside RN    EVALUATION / RATIONALE FOR TREATMENT:  Most Recent Date:    9/3/2021: Small amount of slough to central aspect of wound bed. Wound bed is pink, continue NPWT to encourage tissue granulation.     21: Patient with full thickness surgical incision to midline abdomen. Fascia appears intact. Some sutures visible. Placed NPWT to assist in closure by secondary intention, manage bioburden and exudate, increase oxygenation and granulation to the wound bed.      Goals: Steady decrease in wound area and depth weekly.    WOUND TEAM PLAN OF CARE ([X] for frequency of wound follow up,):   Nursing to follow orders written for wound care. Contact wound team if area fails to progress, deteriorates or with any questions/concerns  Dressing changes by wound team:                   Follow up 3 times weekly:                NPWT change 3 times weekly:   X  Follow up 1-2 times weekly:      Follow  up Bi-Monthly:                   Follow up as needed:     Other (explain):     NURSING PLAN OF CARE ORDERS (X):  Dressing changes: See Dressing Care orders: X  Skin care: See Skin Care orders:   RN Prevention Protocol:   Rectal tube care: See Rectal Tube Care orders:   Other orders:    RSKIN:   CURRENTLY IN PLACE (X), APPLIED THIS VISIT (A), ORDERED (O):   Q shift Keith:  X  Q shift pressure point assessments:  X    Surface/Positioning   Pressure redistribution mattress            Low Airloss   X       Bariatric foam      Bariatric ASHU     Waffle cushion        Waffle Overlay          Reposition q 2 hours    X  TAPs Turning system     Z Quirino Pillow     Offloading/Redistribution   Sacral Mepilex (Silicone dressing)     Heel Mepilex (Silicone dressing)         Heel float boots (Prevalon boot)             Float Heels off Bed with Pillows           Respiratory   Silicone O2 tubing    n/a     Gray Foam Ear protectors     Cannula fixation Device (Tender )          High flow offloading Clip    Elastic head band offloading device      Anchorfast                                                         Trach with Optifoam split foam             Containment/Moisture Prevention ileostomy    Rectal tube or BMS    Purwick/Condom Cath        Triana Catheter  X  Barrier wipes           Barrier paste       Antifungal tx      Interdry        Mobilization n/a      Up to chair        Ambulate      PT/OT      Nutrition NPO      Dietician        Diabetes Education      PO     TF     TPN     NPO  x # days 1    Other        Anticipated discharge plans: TBD  LTACH:        SNF/Rehab:                  Home Health Care:           Outpatient Wound Center:            Self/Family Care:        Other:        [facility]  Inpatient Services  New Ostomy Management & Teaching    HPI:  Reviewed  PMH: Reviewed   SH: Reviewed    Subjective: Patient intubated.     Objective: appliance intact. In close approximation with Drain and vac.  "             Ileostomy 08/31/21 Loop RLQ (Active)   Wound Image   09/01/21 1030   Stomal Appliance Assessment Clean;Dry;Intact 09/03/21 1600   Stoma Assessment Clean;Intact;Pink;Edema 09/03/21 1600   Stoma Shape Budded Greater Than One Inch;Round 09/03/21 1600   Peristomal Assessment Clean;Dry;Intact 09/03/21 1600   Mucocutaneous Junction Intact 09/03/21 1600   Treatment Appliance Changed;Cleansed with water/washcloth;Site care 09/03/21 1500   Peristomal Protectant Paste Ring 09/03/21 1500   Stomal Appliance 2 3/4\" (70mm) CTF;Paste Ring, 2\" 09/03/21 1500   Output (mL) 300 mL 09/03/21 1400   Output Color Brown 09/03/21 1500   WOUND RN ONLY - Stomal Appliance  2 Piece;Paste Ring, 2\";2 3/4\" (70mm) CTF 09/03/21 1500   Appliance Brand Candido 09/03/21 1500   Secure Start completed Not Medically Stable 09/03/21 1500   WOUND NURSE ONLY - Time Spent with Patient (mins) 60 09/03/21 1500              Ostomy Appliance (type and size): 2 3/4\" 2 piece    Interventions: Removed current appliance using push pull method. Cleansed peristomal area with moist warm wash cloths. Pat dry. Applied no sting skin prep to periwound. Created template using the 2 3/4\" barrier, cut to fit. Paste ring applied to barrier, attached barrier down to patient, attached bag. Closed end of bag.      Pt education: Questions and concerns addressed    Evaluation: stoma viable and intact. No separation or wounds noted at this time. Patient too unstable for learning and hands on teaching at this time, slept throughout.     Flatus: Not Present  Stool Output: minimum, brown, bloody and liquid  Diet: NPO  Mobility: Not Mobilizing    Plan: Ostomy nurses to continue to follow for ostomy needs and teaching until discharge    Anticipated discharge needs: Supplies, supplier information, possible HH, outpatient ostomy clinic, Skilled Nursing/Rehab     Secure Start Signed Not Medically Stable  Outpatient Referral Placed Not Medically Stable  5 Sets of appliances in " Ostomy bag for discharge Not Medically Stable    INSURANCE OPTIONS: [employer].                 [facility] ([place])              MediCARE/MEDICAID & All other Private Insurance companies (Prism Form)              MediCAID & Fee for Service (Care Chest Paperwork + Prism Form)                            Form signed/Catalog Marked and Copy left with patient OR medicaid paperwork given to patient      Anticipated Discharge Plans:  Other TBD

## 2021-09-04 NOTE — CARE PLAN
The patient is Watcher - Medium risk of patient condition declining or worsening    Shift Goals  Clinical Goals: Hemodynamic stability, mobility, pain control  Patient Goals: unable to assess, intubated  Family Goals: Same as patien, wants him to go home     Progress made toward(s) clinical / shift goals:  pain control       Problem: Pain - Standard  Goal: Alleviation of pain or a reduction in pain to the patient’s comfort goal  Outcome: Progressing     Problem: Knowledge Deficit - Standard  Goal: Patient and family/care givers will demonstrate understanding of plan of care, disease process/condition, diagnostic tests and medications  Outcome: Progressing     Problem: Safety - Medical Restraint  Goal: Remains free of injury from restraints (Restraint for Interference with Medical Device)  Outcome: Progressing

## 2021-09-04 NOTE — CONSULTS
Consults   Cardiology Initial Consultation    Date of Service  9/4/2021    Referring Physician  Priya You M.D.    Reason for Consultation  Cardiomyopathy    History of Presenting Illness  William Bella is a 71 y.o. male admitted 8/26/2021 with acute abd, perforated viscus, status post surgery.  Went into afib, CV in OR, currently on BB and amio, back in sinus rhythm  Has very complex medical situation.  Patient currently lying in his hospital bed, maximum assist for most activities.  Reports he was told previously he may have had a heart attack.  Does not recall ever having an episode of severe chest pain.    He is feeling weak, some shortness of breath, significant lower extremity edema.  Does not feel palpitations.    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  proBNP greater than 35,000    Home medications include spironolactone, thiamine, lactulose, oxycodone, famotidine, Zofran, furosemide and potassium    Review of Systems  Review of Systems    All systems reviewed and negative.    Past Medical History   has a past medical history of Alcohol use (3/31/2016), Borderline diabetes, Cancer (HCC) (2019), Cataract, Dental disorder, Diabetes (HCC), Disorder of thyroid, Glaucoma, High cholesterol, and Hypertension.    Surgical History   has a past surgical history that includes bone graft (Right, 1960's); wrist orif (Right, 1960's); cataract phaco with iol (Left, 4/7/2016); parathyroidectomy (N/A, 5/13/2016); other (12/2019); other; pr lap,diagnostic abdomen (7/9/2021); pr lap,diagnostic abdomen (N/A, 8/27/2021); pr exploratory of abdomen (N/A, 8/27/2021); irrigation & debridement general (8/27/2021);  restorate hemostas (8/27/2021); and pr exploratory of abdomen (N/A, 8/31/2021).    Family History  family history includes Cancer in his mother.      Social History   reports that he has been smoking cigarettes. He started smoking about 31 years ago. He has a 15.00 pack-year smoking history. He has never used smokeless tobacco. He reports previous alcohol use of about 2.4 oz of alcohol per week. He reports that he does not use drugs.    Medications  Prior to Admission Medications   Prescriptions Last Dose Informant Patient Reported? Taking?   famotidine (PEPCID) 20 MG Tab 8/26/2021 at 0800  No No   Sig: Take 1 tablet by mouth every 12 hours.   furosemide (LASIX) 40 MG Tab 8/26/2021 at 1430  No No   Sig: Take 1 tablet by mouth 2 times a day.   lactulose 10 GM/15ML Solution 8/23/2021 at [facility]  Yes Yes   Sig: Take 20 g by mouth 2 times a day.   ondansetron (ZOFRAN ODT) 4 MG TABLET DISPERSIBLE 8/26/2021 at 1454  No No   Sig: Take 1 tablet by mouth every 6 hours as needed for Nausea.   oxyCODONE immediate-release (ROXICODONE) 5 MG Tab 8/26/2021 at 0228  Yes Yes   Sig: Take 5 mg by mouth every 6 hours as needed for Severe Pain.   potassium chloride SA (KDUR) 20 MEQ Tab CR 8/26/2021 at 1700  No No   Sig: Take 1 tablet by mouth 2 times a day.   spironolactone (ALDACTONE) 100 MG Tab 8/26/2021 at 1700  Yes Yes   Sig: Take 100 mg by mouth every day.   thiamine (THIAMINE) 100 MG tablet 8/26/2021 at 0800  Yes Yes   Sig: Take 100 mg by mouth every day.      Facility-Administered Medications: None       Allergies  Allergies   Allergen Reactions   • Sulfa Drugs      Reaction unknown       Vital signs in last 24 hours  Temp:  [36.7 °C (98.1 °F)-37.2 °C (98.9 °F)] 37.1 °C (98.7 °F)  Pulse:  [70-94] 91  Resp:  [10-75] 22  BP: ()/(55-75) 114/63  SpO2:  [99 %-100 %] 100 %    Physical Exam  Physical Exam      General: No acute distress.  Chronically ill-appearing  HEENT: EOM grossly intact, no scleral icterus, NG tube in  place  Neck:  No JVD, no bruits, trachea midline  CVS: RRR.  Distant heart sounds, 4+ LE edema, 3+ upper extremity edema.  2+ radial pulses, unable to palpate PT pulses  Resp: Mild increased work of breathing, clear to auscultation of the apices  Abdomen: Stoma present, drains, midline wound VAC dressing  MSK/Ext: No clubbing or cyanosis.  Skin: Warm and dry, no rashes.  Neurological: CN III-XII grossly intact. No focal deficits.,  Very generally weak  Psych: A&O x 3, appropriate affect, good judgement      Lab Review  Lab Results   Component Value Date/Time    WBC 7.7 09/03/2021 05:52 AM    RBC 2.85 (L) 09/03/2021 05:52 AM    HEMOGLOBIN 9.1 (L) 09/03/2021 05:52 AM    HEMATOCRIT 28.3 (L) 09/03/2021 05:52 AM    MCV 99.3 (H) 09/03/2021 05:52 AM    MCH 31.9 09/03/2021 05:52 AM    MCHC 32.2 (L) 09/03/2021 05:52 AM    MPV 9.5 09/03/2021 05:52 AM      Lab Results   Component Value Date/Time    SODIUM 139 09/04/2021 04:15 AM    POTASSIUM 3.2 (L) 09/04/2021 04:15 AM    CHLORIDE 107 09/04/2021 04:15 AM    CO2 22 09/04/2021 04:15 AM    GLUCOSE 177 (H) 09/04/2021 04:15 AM    BUN 20 09/04/2021 04:15 AM    CREATININE 0.67 09/04/2021 04:15 AM      Lab Results   Component Value Date/Time    ASTSGOT 16 09/04/2021 04:15 AM    ALTSGPT 11 09/04/2021 04:15 AM     Lab Results   Component Value Date/Time    CHOLSTRLTOT 166 09/23/2020 06:21 AM     (H) 09/23/2020 06:21 AM    HDL 39 (A) 09/23/2020 06:21 AM    TRIGLYCERIDE 98 09/04/2021 04:15 AM    TROPONINT 19 07/09/2021 08:11 AM    TROPONINT 18 07/09/2021 08:11 AM       Recent Labs     09/03/21  1155   NTPROBNP >75809*       Cardiac Imaging and Procedures Review  EKG:  My personal interpretation of the EKG dated 8-30-21 is Aifb, 137, low voltage throughout, delayed R wave progression, QTc 490    Echocardiogram:  9-4-21  Technically challenging study despite contrast.  Left ventricular ejection fraction is visually estimated to be 25%,   severely reduced.  Severe hypo to akinesis of  the mid through apical LAD territory.  Laminar thrombus is observed in the left ventricular apex, appears old   vs recent.  Estimated right ventricular systolic pressure is 40 mmHg.  Right atrial pressure is estimated to be 8 mmHg.  Pleural effusion present.     Compared to the images of the study done 11-4-2019, findings are new.  Findings communicated to Dr. You at time of report.    Echo 11-3-19  Normal left ventricular systolic function.  Left ventricular ejection fraction is visually estimated to be 60%.  No prior study is available for comparison.    Stress Test:  2015   NUCLEAR IMAGING INTERPRETATION   Normal left ventricular size, ejection fraction, and wall motion.   Normal myocardial perfusion with no ischemia.    Cardiac Catheterization:  NA    Imaging  EC-ECHOCARDIOGRAM COMPLETE W/ CONT         DX-CHEST-PORTABLE (1 VIEW)   Final Result      Stable examination.      DX-CHEST-PORTABLE (1 VIEW)   Final Result      Worsening left unchanged right lower lung zone atelectasis and consolidation with probable edema      DX-ABDOMEN FOR TUBE PLACEMENT   Final Result      Enteric tube projects over the second portion of the duodenum.      DX-CHEST-PORTABLE (1 VIEW)   Final Result      1.  Bilateral airspace opacities likely represent pulmonary edema or multifocal pneumonia.   2.  Atherosclerotic plaque.      DX-CHEST-PORTABLE (1 VIEW)   Final Result      1.  Interval placement of an endotracheal tube which terminates in satisfactory position at the level of the aortic arch.   2.  Previously demonstrated bilateral layering pleural effusions appear less distinct on today's study which may be related to patient positioning.      DX-CHEST-PORTABLE (1 VIEW)   Final Result      Increasing basilar atelectasis, pleural effusions and pulmonary edema      CT-ABDOMEN-PELVIS WITH   Final Result      No contrast extravasation identified but the colon is not contrast opacified as the patient had insufficient rectal tone  and NG tube contrast had not yet reached the cecum      Interval laparotomy with surgical drains in place. No bowel obstruction identified. There is improved small free intraperitoneal air      Anasarca with worsening moderate effusions, stable to slight worsening abdominal pelvic ascites and mesenteric edema      Stable large right hepatic mass      Severe colonic diverticulosis            DX-ABDOMEN FOR TUBE PLACEMENT   Final Result      Enteric tube terminates over the duodenum      NG tube overlies the proximal stomach      Right upper quadrant surgical drains with decrease in pneumoperitoneum      DX-CHEST-PORTABLE (1 VIEW)   Final Result      1.  Supportive tubing as described above.   2.  RIGHT lung base atelectasis.   3.  No pneumothorax.      CT-ABDOMEN-PELVIS WITH   Final Result      1.  Large collection of free intraperitoneal fluid and gas in the RIGHT abdomen extending from the hepatic dome to the pelvis. The gallbladder is also involved but may not be the site of origin. Perforated bowel is suspected.   2.  Additional free fluid in the abdomen   3.  Large RIGHT hepatic mass, unchanged in size   4.  Splenomegaly   5.  1.6 x 1.8 cm cystic pancreatic tail lesion, unchanged   6.  Small bowel ileus   7.  Distal colonic diverticulosis      Findings were discussed with CHANTAL VELEZ on 8/26/2021 9:05 PM.      [id number] VIEW   Final Result      Multiple mildly dilated loops of small and large intestine possibly representing ileus.            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  -Acute hypoxic respiratory failure  -Septic shock  -Hepatocellular carcinoma  -Bilateral pleural effusions  -Cirrhosis with ascites  -Normochromic anemia  -Thrombocytopenia  -Hypokalemia    Plan:  -Very complex, highly guarded  situation with high risk of CVA from PAF and LV thrombus but not yet able to be on anticoagulation  -Anticoagulation would be ideal when ok with surgery, short-term and long-term  -Agree with amio, eventually transition to oral, currently back in sinus rhythm  -Amiodarone is a high risk medication requiring surveillance for bradycardia, prolonged QT, liver, thyroid and lung toxicity, will need TSH, liver function every 6 months, close FU of respiratory symptoms, and symptoms PFTs.   -Pt has a lot of third spacing fluid which will be difficult to address with underlying liver disease/dysfunction   -Presumed CAD, not a candidate for aspirin at this time unless okay with surgery.  Unable to tolerate beta-blocker given pressor support.  Can add atorvastatin.  -On spironolactone to help with volume management  -Beta-blocker when his blood pressure improves.  If he is n.p.o. we will have to be metoprolol tartrate that should be metoprolol succinate but discharge  -Eventual losartan if blood pressure allows  -Palliative care consultation is appropriate, all things added out, consideration of hospice care would be appropriate.    Discussed with Dr. Luigi Gutierrez and Dr. Priya You    Cardiology will continue to follow along.    Thank you for allowing me to participate in the care of this patient.    Please contact me with any questions.    Haylee Bui M.D.   Cardiologist, [facility]  [phone]

## 2021-09-04 NOTE — PROGRESS NOTES
4 eyes assessment complete     Preventative measures in place:  Q 2 hour turns, pillows in place for support and cushioning, heels floated, 2 RN skin assessments, ensuring medical devices/tubing are not under patient, repositioning medical equipment q 2 hours, mepilex in place on heels and sacrum and mid back, low air loss mattress, mobility as tolerated, skin assessed under bony prominences and high pressure point areas, z-gia pillow, TAPS system, moisturizer, wicking moisture with dri-flos, wound following    Following areas of concern with interventions provided:   - Left arm skin tears x3 (mepitel in place)  - Right forearm skin tear under IV dressing (mepitel placed with gauze under IV dressing)   - Bilateral arms: bruising and fragile  - Abdomen: Skin tears beneath wound vac dressing, bruising   - Left ankle small blister (EKTA)  - Mid back purple bruising along spine (mepilex in place)  - Posterior head blanching redness (z-flow in place, head repositioning  - Sacrum blanching redness with dimples (mepilex in place, taps system in use)    Devices in place:  ECG leads, blood pressure cuff, pulse ox, Triana catherter, SCD's, ET Tube, cortrak, central line, wrist restraints,  MARIANA drains x2, wound vac, ostomy bag, PIV x1

## 2021-09-04 NOTE — PROGRESS NOTES
Trauma / Surgical Daily Progress Note    Date of Service  9/4/2021    Chief Complaint  71 y.o. male admitted 8/26/2021 with hepatic flexure perforation, hepatocellular carcinoma.    Interval Events  Good SBT parameters this morning.   Seen by consulting services, recommendations noted and appreciated.  Ongoing large volume serous output from abdominal MARIANA drains complicating resuscitation as insensible losses. Levophed at low dose thiws morning.  Thrombocytopenia persists.  DVT prophylaxis contraindicated due to elevated bleeding risk and low platelet count.     Review of Systems  Review of Systems     Vital Signs for last 24 hours  Temp:  [36.7 °C (98.1 °F)-37.2 °C (98.9 °F)] 37.1 °C (98.7 °F)  Pulse:  [70-94] 88  Resp:  [10-75] 18  BP: ()/(55-75) 98/71  SpO2:  [99 %-100 %] 100 %    Hemodynamic parameters for last 24 hours  CVP:  [11 MM HG-16 MM HG] 11 MM HG    Respiratory Data     Respiration: 18, Pulse Oximetry: 100 %     Work Of Breathing / Effort: Within Normal Limits  RUL Breath Sounds: Clear, RML Breath Sounds: Clear, RLL Breath Sounds: Diminished, ASHLEY Breath Sounds: Clear, LLL Breath Sounds: Diminished    Physical Exam  Physical Exam  Vitals and nursing note reviewed.   Constitutional:       Comments: Intubated but awake. NAD   HENT:      Head: Normocephalic and atraumatic.      Right Ear: External ear normal.      Left Ear: External ear normal.      Nose: Nose normal.      Mouth/Throat:      Mouth: Mucous membranes are moist.      Pharynx: Oropharynx is clear.   Eyes:      Conjunctiva/sclera: Conjunctivae normal.      Pupils: Pupils are equal, round, and reactive to light.   Cardiovascular:      Rate and Rhythm: Normal rate and regular rhythm.      Pulses: Normal pulses.      Heart sounds: Normal heart sounds.   Pulmonary:      Effort: Pulmonary effort is normal.      Breath sounds: Normal breath sounds.   Abdominal:      Comments: Stoma healthy  Midline with complex wound vac dressing.  No surrounding  erythema   Genitourinary:     Comments: Triana in place  Musculoskeletal:      Cervical back: Normal range of motion.      Right lower leg: Edema present.      Left lower leg: Edema present.   Skin:     General: Skin is warm and dry.      Capillary Refill: Capillary refill takes less than 2 seconds.   Neurological:      General: No focal deficit present.      Comments: Follows commands   Psychiatric:      Comments: Unable to assess         Laboratory  Recent Results (from the past 24 hour(s))   Comp Metabolic Panel    Collection Time: 09/04/21  4:15 AM   Result Value Ref Range    Sodium 139 135 - 145 mmol/L    Potassium 3.2 (L) 3.6 - 5.5 mmol/L    Chloride 107 96 - 112 mmol/L    Co2 22 20 - 33 mmol/L    Anion Gap 10.0 7.0 - 16.0    Glucose 177 (H) 65 - 99 mg/dL    Bun 20 8 - 22 mg/dL    Creatinine 0.67 0.50 - 1.40 mg/dL    Calcium 8.5 8.5 - 10.5 mg/dL    AST(SGOT) 16 12 - 45 U/L    ALT(SGPT) 11 2 - 50 U/L    Alkaline Phosphatase 120 (H) 30 - 99 U/L    Total Bilirubin 1.0 0.1 - 1.5 mg/dL    Albumin 2.5 (L) 3.2 - 4.9 g/dL    Total Protein 4.7 (L) 6.0 - 8.2 g/dL    Globulin 2.2 1.9 - 3.5 g/dL    A-G Ratio 1.1 g/dL   IONIZED CALCIUM    Collection Time: 09/04/21  4:15 AM   Result Value Ref Range    Ionized Calcium 1.1 1.1 - 1.3 mmol/L   Triglyceride    Collection Time: 09/04/21  4:15 AM   Result Value Ref Range    Triglycerides 98 0 - 149 mg/dL   ESTIMATED GFR    Collection Time: 09/04/21  4:15 AM   Result Value Ref Range    GFR If African American >60 >60 mL/min/1.73 m 2    GFR If Non African American >60 >60 mL/min/1.73 m 2       Fluids    Intake/Output Summary (Last 24 hours) at 9/4/2021 1319  Last data filed at 9/4/2021 1200  Gross per 24 hour   Intake 8363.5 ml   Output 4745 ml   Net 3618.5 ml       Core Measures & Quality Metrics  Labs reviewed, Radiology images reviewed and Medications reviewed  Triana catheter: Critically Ill - Requiring Accurate Measurement of Urinary Output  Central line in place:  Vasopressors    DVT Prophylaxis: Contraindicated - High bleeding risk  DVT prophylaxis - mechanical: SCDs  Ulcer prophylaxis: Yes  Antibiotics: Treating active infection/contamination beyond 24 hours perioperative coverage      CHELI Score  ETOH Screening    Assessment/Plan  * Perforated viscus- (present on admission)  Assessment & Plan  8/26 - Ceftriaxone and metronidazole initiated  8/30 - Peritoneal culture positive for Streptococcus anginosus  8/31 - Abscess drainage and ileostomy creation, antibiotics altered to Zosyn  8/31- diverting ileostomy with MARIANA drainage of feculent material.  Abdomen too frozen for formal resection  9/4 - Zosyn day 5 of 7    Atrial fibrillation (HCC)- (present on admission)  Assessment & Plan  Diuresis  Aggressive electrolyte replacement  Metoprolol and amiodarone   8/31- converted out in OR, continues on amiodarone gtt  9/3: Amiodarone drip.  Consider GI solution soon    Respiratory failure following trauma and surgery (HCC)  Assessment & Plan  Remained on ventilator post operatively  SICU ventilator bundle and weaning protocol      Septic shock (HCC)- (present on admission)  Assessment & Plan  Secondary to liver abscess  Ongoing volume resuscitation and pressors support  abx rocephin and flagyl  8/30-feculent drain output in late evening  8/31- return to OR for washout, drainage of feculent fluid and diverting ileostomy.  9/3: Remains on small doses of norepinephrine.  Vasopressin has been discontinued.  Remains on every 8 hydrocortisone.  Rapid taper as appropriate.  9/4 wean pressors to off.     Hepatocellular carcinoma (HCC)- (present on admission)  Assessment & Plan  Advanced tumor involving most of the right lobe.  Associated abscess with apparent: Involvement hepatic flexure.  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  Diverting ileostomy.    Pleural effusion, bilateral  Assessment & Plan  8/31 moderate bilateral pleural effusions on CT  May need drainage if there is  difficulty with vent weaning    Normochromic anemia- (present on admission)  Assessment & Plan  Trend and transfuse for hgb <7.  Hgb today is 7.8    Cirrhosis of liver with ascites (HCC)  Assessment & Plan  9/3: Continued 2 liter per day right upper quadrant drain.  Turbid fluid.  Left upper quadrant drain is in the pelvis and producing 100 cc plus per day.  Drains in place for now.  Right upper quadrant drain is in the abscess cavity.  Continued round-the-clock albumin and fluid therapy.  Diuretic therapy.    Thrombocytopenia (HCC)- (present on admission)  Assessment & Plan  9/2: Transfused pheresis pack for platelet count 40 in the setting of hemoglobin below 7      Overall plan:  Wean ventilator - decrease PEEP and FiO2 and increase spontaneous breathing percentages. Hopeful extubation today.  Wean vasopressors.  Enteral support via cortrak  Holding chemical DVT prophylaxis in setting of acute blood loss anemia and significant thrombocytopenia.        Discussed patient condition with RN, RT and Pharmacy.  The patient is/remains critically ill with respiratory failure, hypotension.    I provided the following critical care services: management of above, high risk medication management, ventilator management, pressors management.    Critical care time spent exclusive of procedures: 41 minutes.    Luigi Gutierrez MD  [phone]

## 2021-09-04 NOTE — PROGRESS NOTES
Surgery  POD4 diverting ileostomy for colon perforation unable to be resected/mobilized  Cirrhosis  Extubated this am, still on low dose levo, amio gtt  Drains >2L past 24 hours, murky   Ileostomy >1.0L   Tolerating TF, 1.1L  CBC pending  Abdomen soft, stoma pink and productive, VAC in place     Plan:  Appreciate ICU care  Cont abx  Stoma care  VAC x3 weekly  TF as tolerated, can advance with residuals

## 2021-09-04 NOTE — RESPIRATORY CARE
Extubation    Cuff leak noted Yes  Stridor present No     FiO2%: 40 % (09/04/21 0800)  O2 (LPM): 1 (08/31/21 0800)     Patient toleration Yes  RCP Complete? Yes  Events/Summary/Plan: placed on spont (09/04/21 0654)    Pt extubated, placed on 3 liter nasal cannula.

## 2021-09-04 NOTE — CARE PLAN
The patient is Unstable - High likelihood or risk of patient condition declining or worsening    Shift Goals  Clinical Goals: Hemodynamic stability, mobilization  Patient Goals: unable to assess, intubated  Family Goals: Same as patien, wants him to go home     Progress made toward(s) clinical / shift goals:    Problem: Knowledge Deficit - Standard  Goal: Patient and family/care givers will demonstrate understanding of plan of care, disease process/condition, diagnostic tests and medications  Outcome: Progressing     Problem: Safety - Medical Restraint  Goal: Remains free of injury from restraints (Restraint for Interference with Medical Device)  Outcome: Progressing  Flowsheets (Taken 9/3/2021 1937)  Addressed this shift: Remains free of injury from restraints (restraint for interference with medical device):   Determine that other, less restrictive measures have been tried or would not be effective before applying the restraint   Evaluate the patient's condition at the time of restraint application   Inform patient/family regarding the reason for restraint   Every 2 hours: Monitor safety, psychosocial status, comfort, nutrition and hydration     Problem: Pain - Standard  Goal: Alleviation of pain or a reduction in pain to the patient’s comfort goal  Outcome: Progressing  Note: Nonverbal pain scale used. Administered pain medication when needed per MAR. Non-pharmacological methods for pain control in place such as rest, repositioning, and enforcing a calm and conductive environment.      Patient is not progressing towards the following goals:  Patient persistently requiring vasopressors to maintain hemodynamic stability. MD aware.

## 2021-09-05 LAB
ALBUMIN SERPL BCP-MCNC: 2.8 G/DL (ref 3.2–4.9)
ALBUMIN/GLOB SERPL: 1.5 G/DL
ALP SERPL-CCNC: 99 U/L (ref 30–99)
ALT SERPL-CCNC: 5 U/L (ref 2–50)
ANION GAP SERPL CALC-SCNC: 9 MMOL/L (ref 7–16)
ANISOCYTOSIS BLD QL SMEAR: ABNORMAL
AST SERPL-CCNC: 16 U/L (ref 12–45)
BACTERIA SPEC ANAEROBE CULT: NORMAL
BASOPHILS # BLD AUTO: 0 % (ref 0–1.8)
BASOPHILS # BLD: 0 K/UL (ref 0–0.12)
BILIRUB SERPL-MCNC: 1.1 MG/DL (ref 0.1–1.5)
BUN SERPL-MCNC: 18 MG/DL (ref 8–22)
CA-I SERPL-SCNC: 1.1 MMOL/L (ref 1.1–1.3)
CALCIUM SERPL-MCNC: 8.3 MG/DL (ref 8.5–10.5)
CHLORIDE SERPL-SCNC: 109 MMOL/L (ref 96–112)
CO2 SERPL-SCNC: 22 MMOL/L (ref 20–33)
CREAT SERPL-MCNC: 0.49 MG/DL (ref 0.5–1.4)
EOSINOPHIL # BLD AUTO: 0 K/UL (ref 0–0.51)
EOSINOPHIL NFR BLD: 0 % (ref 0–6.9)
ERYTHROCYTE [DISTWIDTH] IN BLOOD BY AUTOMATED COUNT: 74.4 FL (ref 35.9–50)
GLOBULIN SER CALC-MCNC: 1.9 G/DL (ref 1.9–3.5)
GLUCOSE SERPL-MCNC: 180 MG/DL (ref 65–99)
HCT VFR BLD AUTO: 29.6 % (ref 42–52)
HGB BLD-MCNC: 9.4 G/DL (ref 14–18)
LYMPHOCYTES # BLD AUTO: 0.11 K/UL (ref 1–4.8)
LYMPHOCYTES NFR BLD: 1.8 % (ref 22–41)
MACROCYTES BLD QL SMEAR: ABNORMAL
MANUAL DIFF BLD: NORMAL
MCH RBC QN AUTO: 32.2 PG (ref 27–33)
MCHC RBC AUTO-ENTMCNC: 31.8 G/DL (ref 33.7–35.3)
MCV RBC AUTO: 101.4 FL (ref 81.4–97.8)
MONOCYTES # BLD AUTO: 0.33 K/UL (ref 0–0.85)
MONOCYTES NFR BLD AUTO: 5.3 % (ref 0–13.4)
MORPHOLOGY BLD-IMP: NORMAL
NEUTROPHILS # BLD AUTO: 5.76 K/UL (ref 1.82–7.42)
NEUTROPHILS NFR BLD: 91.1 % (ref 44–72)
NEUTS BAND NFR BLD MANUAL: 1.8 % (ref 0–10)
NRBC # BLD AUTO: 0.03 K/UL
NRBC BLD-RTO: 0.5 /100 WBC
PLATELET # BLD AUTO: 37 K/UL (ref 164–446)
PLATELET BLD QL SMEAR: NORMAL
PLATELETS.RETICULATED NFR BLD AUTO: 2.7 K/UL (ref 0.6–13.1)
PMV BLD AUTO: 10 FL (ref 9–12.9)
POTASSIUM SERPL-SCNC: 3.6 MMOL/L (ref 3.6–5.5)
PROT SERPL-MCNC: 4.7 G/DL (ref 6–8.2)
RBC # BLD AUTO: 2.92 M/UL (ref 4.7–6.1)
RBC BLD AUTO: PRESENT
SIGNIFICANT IND 70042: NORMAL
SITE SITE: NORMAL
SODIUM SERPL-SCNC: 140 MMOL/L (ref 135–145)
SOURCE SOURCE: NORMAL
WBC # BLD AUTO: 6.2 K/UL (ref 4.8–10.8)

## 2021-09-05 PROCEDURE — 700105 HCHG RX REV CODE 258: Performed by: SURGERY

## 2021-09-05 PROCEDURE — A9270 NON-COVERED ITEM OR SERVICE: HCPCS | Performed by: SURGERY

## 2021-09-05 PROCEDURE — 97530 THERAPEUTIC ACTIVITIES: CPT

## 2021-09-05 PROCEDURE — 700111 HCHG RX REV CODE 636 W/ 250 OVERRIDE (IP): Performed by: SURGERY

## 2021-09-05 PROCEDURE — 700102 HCHG RX REV CODE 250 W/ 637 OVERRIDE(OP): Performed by: INTERNAL MEDICINE

## 2021-09-05 PROCEDURE — 99232 SBSQ HOSP IP/OBS MODERATE 35: CPT | Performed by: INTERNAL MEDICINE

## 2021-09-05 PROCEDURE — 85007 BL SMEAR W/DIFF WBC COUNT: CPT

## 2021-09-05 PROCEDURE — 80053 COMPREHEN METABOLIC PANEL: CPT

## 2021-09-05 PROCEDURE — 700111 HCHG RX REV CODE 636 W/ 250 OVERRIDE (IP): Performed by: INTERNAL MEDICINE

## 2021-09-05 PROCEDURE — 85027 COMPLETE CBC AUTOMATED: CPT

## 2021-09-05 PROCEDURE — P9045 ALBUMIN (HUMAN), 5%, 250 ML: HCPCS | Mod: JG | Performed by: SURGERY

## 2021-09-05 PROCEDURE — 99291 CRITICAL CARE FIRST HOUR: CPT | Performed by: SURGERY

## 2021-09-05 PROCEDURE — A9270 NON-COVERED ITEM OR SERVICE: HCPCS | Performed by: INTERNAL MEDICINE

## 2021-09-05 PROCEDURE — 770022 HCHG ROOM/CARE - ICU (200)

## 2021-09-05 PROCEDURE — 82330 ASSAY OF CALCIUM: CPT

## 2021-09-05 PROCEDURE — 85055 RETICULATED PLATELET ASSAY: CPT

## 2021-09-05 PROCEDURE — 700102 HCHG RX REV CODE 250 W/ 637 OVERRIDE(OP): Performed by: SURGERY

## 2021-09-05 PROCEDURE — 700111 HCHG RX REV CODE 636 W/ 250 OVERRIDE (IP): Mod: JG | Performed by: SURGERY

## 2021-09-05 RX ORDER — AMIODARONE HYDROCHLORIDE 200 MG/1
200 TABLET ORAL
Status: DISCONTINUED | OUTPATIENT
Start: 2021-09-16 | End: 2021-09-12

## 2021-09-05 RX ORDER — AMIODARONE HYDROCHLORIDE 200 MG/1
200 TABLET ORAL TWICE DAILY
Status: DISCONTINUED | OUTPATIENT
Start: 2021-09-08 | End: 2021-09-12

## 2021-09-05 RX ORDER — AMIODARONE HYDROCHLORIDE 200 MG/1
400 TABLET ORAL TWICE DAILY
Status: COMPLETED | OUTPATIENT
Start: 2021-09-05 | End: 2021-09-08

## 2021-09-05 RX ADMIN — AMIODARONE HYDROCHLORIDE 0.5 MG/MIN: 1.8 INJECTION, SOLUTION INTRAVENOUS at 01:52

## 2021-09-05 RX ADMIN — HYDROMORPHONE HYDROCHLORIDE 0.5 MG: 1 INJECTION, SOLUTION INTRAMUSCULAR; INTRAVENOUS; SUBCUTANEOUS at 19:47

## 2021-09-05 RX ADMIN — HYDROCORTISONE SODIUM SUCCINATE 100 MG: 100 INJECTION, POWDER, FOR SOLUTION INTRAMUSCULAR; INTRAVENOUS at 21:28

## 2021-09-05 RX ADMIN — HYDROMORPHONE HYDROCHLORIDE 0.5 MG: 1 INJECTION, SOLUTION INTRAMUSCULAR; INTRAVENOUS; SUBCUTANEOUS at 02:27

## 2021-09-05 RX ADMIN — PIPERACILLIN AND TAZOBACTAM 4.5 G: 4; .5 INJECTION, POWDER, LYOPHILIZED, FOR SOLUTION INTRAVENOUS; PARENTERAL at 05:34

## 2021-09-05 RX ADMIN — FAMOTIDINE 20 MG: 10 INJECTION INTRAVENOUS at 05:33

## 2021-09-05 RX ADMIN — DOCUSATE SODIUM 50 MG AND SENNOSIDES 8.6 MG 2 TABLET: 8.6; 5 TABLET, FILM COATED ORAL at 05:33

## 2021-09-05 RX ADMIN — AMIODARONE HYDROCHLORIDE 0.5 MG/MIN: 1.8 INJECTION, SOLUTION INTRAVENOUS at 13:25

## 2021-09-05 RX ADMIN — AMIODARONE HYDROCHLORIDE 400 MG: 200 TABLET ORAL at 13:25

## 2021-09-05 RX ADMIN — AMIODARONE HYDROCHLORIDE 0.5 MG/MIN: 1.8 INJECTION, SOLUTION INTRAVENOUS at 13:07

## 2021-09-05 RX ADMIN — ENOXAPARIN SODIUM 40 MG: 40 INJECTION SUBCUTANEOUS at 15:22

## 2021-09-05 RX ADMIN — HYDROCORTISONE SODIUM SUCCINATE 100 MG: 100 INJECTION, POWDER, FOR SOLUTION INTRAMUSCULAR; INTRAVENOUS at 13:24

## 2021-09-05 RX ADMIN — DOCUSATE SODIUM 50 MG AND SENNOSIDES 8.6 MG 2 TABLET: 8.6; 5 TABLET, FILM COATED ORAL at 18:05

## 2021-09-05 RX ADMIN — SODIUM CHLORIDE, POTASSIUM CHLORIDE, SODIUM LACTATE AND CALCIUM CHLORIDE 250 ML: 600; 310; 30; 20 INJECTION, SOLUTION INTRAVENOUS at 00:00

## 2021-09-05 RX ADMIN — SODIUM CHLORIDE, POTASSIUM CHLORIDE, SODIUM LACTATE AND CALCIUM CHLORIDE 250 ML: 600; 310; 30; 20 INJECTION, SOLUTION INTRAVENOUS at 18:14

## 2021-09-05 RX ADMIN — SODIUM CHLORIDE, POTASSIUM CHLORIDE, SODIUM LACTATE AND CALCIUM CHLORIDE 250 ML: 600; 310; 30; 20 INJECTION, SOLUTION INTRAVENOUS at 06:25

## 2021-09-05 RX ADMIN — ALBUMIN (HUMAN) 12.5 G: 2.5 SOLUTION INTRAVENOUS at 05:33

## 2021-09-05 RX ADMIN — SPIRONOLACTONE 50 MG: 50 TABLET ORAL at 05:33

## 2021-09-05 RX ADMIN — PIPERACILLIN AND TAZOBACTAM 4.5 G: 4; .5 INJECTION, POWDER, LYOPHILIZED, FOR SOLUTION INTRAVENOUS; PARENTERAL at 13:25

## 2021-09-05 RX ADMIN — ATORVASTATIN CALCIUM 40 MG: 40 TABLET, FILM COATED ORAL at 18:06

## 2021-09-05 RX ADMIN — FAMOTIDINE 20 MG: 10 INJECTION INTRAVENOUS at 18:06

## 2021-09-05 RX ADMIN — SODIUM CHLORIDE, POTASSIUM CHLORIDE, SODIUM LACTATE AND CALCIUM CHLORIDE 250 ML: 600; 310; 30; 20 INJECTION, SOLUTION INTRAVENOUS at 11:36

## 2021-09-05 RX ADMIN — SODIUM CHLORIDE, POTASSIUM CHLORIDE, SODIUM LACTATE AND CALCIUM CHLORIDE: 600; 310; 30; 20 INJECTION, SOLUTION INTRAVENOUS at 16:01

## 2021-09-05 RX ADMIN — HYDROMORPHONE HYDROCHLORIDE 0.5 MG: 1 INJECTION, SOLUTION INTRAMUSCULAR; INTRAVENOUS; SUBCUTANEOUS at 11:37

## 2021-09-05 RX ADMIN — POTASSIUM BICARBONATE 50 MEQ: 978 TABLET, EFFERVESCENT ORAL at 13:25

## 2021-09-05 RX ADMIN — HYDROCORTISONE SODIUM SUCCINATE 100 MG: 100 INJECTION, POWDER, FOR SOLUTION INTRAMUSCULAR; INTRAVENOUS at 05:33

## 2021-09-05 RX ADMIN — SODIUM CHLORIDE, POTASSIUM CHLORIDE, SODIUM LACTATE AND CALCIUM CHLORIDE: 600; 310; 30; 20 INJECTION, SOLUTION INTRAVENOUS at 02:28

## 2021-09-05 RX ADMIN — PIPERACILLIN AND TAZOBACTAM 4.5 G: 4; .5 INJECTION, POWDER, LYOPHILIZED, FOR SOLUTION INTRAVENOUS; PARENTERAL at 21:28

## 2021-09-05 ASSESSMENT — ENCOUNTER SYMPTOMS
APNEA: 0
STRIDOR: 0
CHEST TIGHTNESS: 0
COUGH: 0
CHOKING: 0

## 2021-09-05 ASSESSMENT — COGNITIVE AND FUNCTIONAL STATUS - GENERAL
STANDING UP FROM CHAIR USING ARMS: TOTAL
MOVING FROM LYING ON BACK TO SITTING ON SIDE OF FLAT BED: UNABLE
WALKING IN HOSPITAL ROOM: TOTAL
SUGGESTED CMS G CODE MODIFIER MOBILITY: CN
MOBILITY SCORE: 6
TURNING FROM BACK TO SIDE WHILE IN FLAT BAD: UNABLE
CLIMB 3 TO 5 STEPS WITH RAILING: TOTAL
MOVING TO AND FROM BED TO CHAIR: UNABLE

## 2021-09-05 ASSESSMENT — PAIN DESCRIPTION - PAIN TYPE
TYPE: ACUTE PAIN
TYPE: ACUTE PAIN;SURGICAL PAIN
TYPE: ACUTE PAIN

## 2021-09-05 ASSESSMENT — FIBROSIS 4 INDEX: FIB4 SCORE: 6.01

## 2021-09-05 ASSESSMENT — GAIT ASSESSMENTS: GAIT LEVEL OF ASSIST: UNABLE TO PARTICIPATE

## 2021-09-05 NOTE — PROGRESS NOTES
Cardiology Follow Up Progress Note    Date of Service  2021    Attending Physician  Priya You M.D.    PMH: Alcoholic cirrhosis, hepatocellular carcinoma () with cirrhosis treated by Dr. Birch with immunotherapy, fully vaccinated for COVID-19    Presented with  Nausea/vomiting and  mild abdominal fullness, recent history of purulent peritonitis of unclear origin, CT abdomen revealed free air s/p surgery 2021 revealed no clear evidence of perforated viscus.   2021 underwent drainage of peritoneal abscess, creation of loop ileostomy due to hepatic flexure perforation.    New onset of atrial fibrillation 2021, reverted in OR, remains in sinus rhythm on amiodarone.    Cardiology consultation 2021 due to new findings of severe cardiomyopathy, LVEF 25%, LV apex thrombus is observed on echocardiogram.    Interim Events    Extubated 2021  No overnight cardiac events.  Maintaining sinus rhythm on IV/p.o. Amiodarone.  ToleratingTF  Labs reviewed  Sodium, potassium, creatinine stable  Platelet count 57  BP stable off of pressors  EF 25%  Wife at bedside      Review of Systems  Review of Systems   Respiratory: Negative for apnea, cough, choking, chest tightness and stridor.    Cardiovascular: Positive for leg swelling. Negative for chest pain.       Vital signs in last 24 hours  Temp:  [36.7 °C (98 °F)-37.3 °C (99.1 °F)] 37 °C (98.6 °F)  Pulse:  [] 97  Resp:  [13-40] 20  BP: ()/(56-79) 106/72  SpO2:  [92 %-99 %] 95 %    Physical Exam  Physical Exam  Cardiovascular:      Rate and Rhythm: Normal rate and regular rhythm.   Pulmonary:      Effort: Pulmonary effort is normal.   Abdominal:      Comments: Abdominal JPs    Diverting ileostomy   Musculoskeletal:      Right lower le+ Edema present.      Left lower le+ Edema present.   Skin:     General: Skin is warm.   Neurological:      Mental Status: He is alert. Mental status is at baseline.         Lab Review  Lab Results    Component Value Date/Time    WBC 7.7 09/03/2021 05:52 AM    RBC 2.85 (L) 09/03/2021 05:52 AM    HEMOGLOBIN 9.1 (L) 09/03/2021 05:52 AM    HEMATOCRIT 28.3 (L) 09/03/2021 05:52 AM    MCV 99.3 (H) 09/03/2021 05:52 AM    MCH 31.9 09/03/2021 05:52 AM    MCHC 32.2 (L) 09/03/2021 05:52 AM    MPV 9.5 09/03/2021 05:52 AM      Lab Results   Component Value Date/Time    SODIUM 140 09/05/2021 05:50 AM    POTASSIUM 3.6 09/05/2021 05:50 AM    CHLORIDE 109 09/05/2021 05:50 AM    CO2 22 09/05/2021 05:50 AM    GLUCOSE 180 (H) 09/05/2021 05:50 AM    BUN 18 09/05/2021 05:50 AM    CREATININE 0.49 (L) 09/05/2021 05:50 AM      Lab Results   Component Value Date/Time    ASTSGOT 16 09/05/2021 05:50 AM    ALTSGPT 5 09/05/2021 05:50 AM     Lab Results   Component Value Date/Time    CHOLSTRLTOT 166 09/23/2020 06:21 AM     (H) 09/23/2020 06:21 AM    HDL 39 (A) 09/23/2020 06:21 AM    TRIGLYCERIDE 98 09/04/2021 04:15 AM    TROPONINT 19 07/09/2021 08:11 AM    TROPONINT 18 07/09/2021 08:11 AM       Recent Labs     09/03/21  1155   NTPROBNP >[id number]*       Cardiac Imaging and Procedures Review  EKG: Atrial fibrillation dated 8/30/2021, rate 137    Echocardiogram:    9/4/2021  Technically challenging study despite contrast.  LVEF 25%  Severe hypokinesis to akinesis of the mid to apical LAD territory  LV apex thrombus is observed, appears old versus recent.  RVSP 40 mmHg    Compared to the images of the study done, LVEF 60% in 2019    Cardiac Catheterization: Not applicable    Imaging  Chest X-Ray: Stable bilateral infiltrates    Stress Test:    2015  No ischemia    CT abdomen pelvis revealed free air    Assessment/Plan    New dilated cardiomyopathy with severe LV dysfunction, LVEF 25%  -Poor candidate for any invasive cardiac procedure.  -GDMT  -Aspirin when okay with surgery.  -BB when hemodynamically stable.  -Currently on atorvastatin 40 mg.  -Patient is on Spironolactone 50 mg.  -ACE/ARB when hemodynamically stable.    Old LV mural  thrombus  -Ideally needs to be on anticoagulation short-term and long-term when okay with surgery.    PAF, new diagnosis  -Maintaining sinus rhythm on IV/p.o. Amiodarone,   -Amiodarone is high risk medication requiring close watch.  -Anticoagulation when okay with surgery.    Known history of advanced hepatocellular carcinoma (2020)  -Outpatient treatment with immunotherapy by Dr. Birch    Known alcoholic cirrhosis with ascites    Thrombocytopenia  -Platelet count 57  -Close monitoring    Hepatic flexure perforation  : Perforation  -Status post diverting ileostomy 8/21/2021    Appreciate palliative care.  Will follow along.    Thank you for allowing me to participate in the care of this patient.      Please contact me with any questions.    CRISTINE Lau.   Cardiologist, [facility]  [phone]

## 2021-09-05 NOTE — CARE PLAN
Problem: Knowledge Deficit - Standard  Goal: Patient and family/care givers will demonstrate understanding of plan of care, disease process/condition, diagnostic tests and medications  Outcome: Progressing     Problem: Pain - Standard  Goal: Alleviation of pain or a reduction in pain to the patient’s comfort goal  Outcome: Progressing     Problem: Mobility  Goal: Patient's ability to tolerate increased activity will improve  Outcome: Progressing   The patient is Watcher - Medium risk of patient condition declining or worsening    Shift Goals  Clinical Goals: hemodynamic stability, mobility, pain control  Patient Goals: comfort  Family Goals: family not at bedside    Progress made toward(s) clinical / shift goals:  see above      Patient is not progressing towards the following goals:

## 2021-09-05 NOTE — CARE PLAN
The patient is Watcher - Medium risk of patient condition declining or worsening    Shift Goals  Clinical Goals: hemodynamic stability, mobility, pain control  Patient Goals: comfort  Family Goals: family not at bedside      Problem: Pain - Standard  Goal: Alleviation of pain or a reduction in pain to the patient’s comfort goal  Outcome: Progressing     Problem: Mobility  Goal: Patient's ability to tolerate increased activity will improve  Outcome: Progressing

## 2021-09-05 NOTE — PROGRESS NOTES
Trauma / Surgical Daily Progress Note    Date of Service  9/5/2021    Chief Complaint  71 y.o. male admitted 8/26/2021 with hepatic flexure perforation, hepatocellular carcinoma.    Interval Events  Extubated yesterday.   Seen by consulting services, recommendations noted and appreciated.  Ongoing large volume serous output from abdominal MARIANA drains complicating resuscitation as insensible losses.   Tolerating tube feeds, stoma output 1L yesterday.      Review of Systems  Review of Systems       Vital Signs for last 24 hours  Temp:  [36.7 °C (98 °F)-37.3 °C (99.1 °F)] 37 °C (98.6 °F)  Pulse:  [] 97  Resp:  [13-40] 20  BP: ()/(56-79) 106/72  SpO2:  [92 %-99 %] 95 %    Hemodynamic parameters for last 24 hours  CVP:  [10 MM HG-220 MM HG] 220 MM HG    Respiratory Data     Respiration: 20, Pulse Oximetry: 95 %     Work Of Breathing / Effort: Within Normal Limits  RUL Breath Sounds: Clear, RML Breath Sounds: Clear, RLL Breath Sounds: Diminished, ASHLEY Breath Sounds: Clear, LLL Breath Sounds: Diminished    Physical Exam  Physical Exam  Vitals and nursing note reviewed.   Constitutional:       Comments: Awake, confused   HENT:      Head: Normocephalic and atraumatic.      Right Ear: External ear normal.      Left Ear: External ear normal.      Nose: Nose normal.      Mouth/Throat:      Mouth: Mucous membranes are moist.      Pharynx: Oropharynx is clear.   Eyes:      Conjunctiva/sclera: Conjunctivae normal.      Pupils: Pupils are equal, round, and reactive to light.   Cardiovascular:      Rate and Rhythm: Normal rate and regular rhythm.      Pulses: Normal pulses.      Heart sounds: Normal heart sounds.   Pulmonary:      Effort: Pulmonary effort is normal.      Breath sounds: Normal breath sounds.   Abdominal:      Comments: Stoma healthy  Midline with complex wound vac dressing.  No surrounding erythema  Right MARIANA cloudy serous and large volume output  Left MARIANA serous   Genitourinary:     Comments: Kong in  place  Musculoskeletal:      Cervical back: Normal range of motion.      Right lower leg: Edema present.      Left lower leg: Edema present.   Skin:     General: Skin is warm and dry.      Capillary Refill: Capillary refill takes less than 2 seconds.   Neurological:      General: No focal deficit present.      Comments: Follows commands   Psychiatric:      Comments: Unable to assess         Laboratory  Recent Results (from the past 24 hour(s))   Comp Metabolic Panel    Collection Time: 09/05/21  5:50 AM   Result Value Ref Range    Sodium 140 135 - 145 mmol/L    Potassium 3.6 3.6 - 5.5 mmol/L    Chloride 109 96 - 112 mmol/L    Co2 22 20 - 33 mmol/L    Anion Gap 9.0 7.0 - 16.0    Glucose 180 (H) 65 - 99 mg/dL    Bun 18 8 - 22 mg/dL    Creatinine 0.49 (L) 0.50 - 1.40 mg/dL    Calcium 8.3 (L) 8.5 - 10.5 mg/dL    AST(SGOT) 16 12 - 45 U/L    ALT(SGPT) 5 2 - 50 U/L    Alkaline Phosphatase 99 30 - 99 U/L    Total Bilirubin 1.1 0.1 - 1.5 mg/dL    Albumin 2.8 (L) 3.2 - 4.9 g/dL    Total Protein 4.7 (L) 6.0 - 8.2 g/dL    Globulin 1.9 1.9 - 3.5 g/dL    A-G Ratio 1.5 g/dL   IONIZED CALCIUM    Collection Time: 09/05/21  5:50 AM   Result Value Ref Range    Ionized Calcium 1.1 1.1 - 1.3 mmol/L   ESTIMATED GFR    Collection Time: 09/05/21  5:50 AM   Result Value Ref Range    GFR If African American >60 >60 mL/min/1.73 m 2    GFR If Non African American >60 >60 mL/min/1.73 m 2       Fluids    Intake/Output Summary (Last 24 hours) at 9/5/2021 1408  Last data filed at 9/5/2021 1200  Gross per 24 hour   Intake 12786.84 ml   Output 4415 ml   Net 7033.84 ml       Core Measures & Quality Metrics  Labs reviewed, Radiology images reviewed and Medications reviewed  Triana catheter: Critically Ill - Requiring Accurate Measurement of Urinary Output  Central line in place: Vasopressors    DVT Prophylaxis: Contraindicated - High bleeding risk  DVT prophylaxis - mechanical: SCDs  Ulcer prophylaxis: Yes  Antibiotics: Treating active  infection/contamination beyond 24 hours perioperative coverage      CHELI Score    ETOH Screening      Assessment/Plan  * Perforated viscus- (present on admission)  Assessment & Plan  8/26 - Ceftriaxone and metronidazole initiated  8/30 - Peritoneal culture positive for Streptococcus anginosus  8/31 - Abscess drainage and ileostomy creation, antibiotics altered to Zosyn  8/31- diverting ileostomy with MARIANA drainage of feculent material.  Abdomen too frozen for formal resection  9/4 - Zosyn day 5 of 7    Atrial fibrillation (HCC)- (present on admission)  Assessment & Plan  Diuresis  Aggressive electrolyte replacement  Metoprolol and amiodarone   8/31- converted out in OR, continues on amiodarone gtt  9/3: Amiodarone drip  9/5 transition to enteral amiodarone for 2 weeks    Respiratory failure following trauma and surgery (HCC)  Assessment & Plan  Remained on ventilator post operatively  9/5 extubated  Aggressive pulmonary toilette    Septic shock (HCC)- (present on admission)  Assessment & Plan  Secondary to liver abscess  Ongoing volume resuscitation and pressors support  abx rocephin and flagyl  8/30-feculent drain output in late evening  8/31- return to OR for washout, drainage of feculent fluid and diverting ileostomy.  9/3: Remains on small doses of norepinephrine.  Vasopressin has been discontinued.  Remains on every 8 hydrocortisone.  Rapid taper as appropriate.  9/4 wean pressors to off.     Hepatocellular carcinoma (HCC)- (present on admission)  Assessment & Plan  Advanced tumor involving most of the right lobe.  Associated abscess with apparent: Involvement hepatic flexure.  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  Diverting ileostomy.    Pleural effusion, bilateral  Assessment & Plan  8/31 moderate bilateral pleural effusions on CT      Normochromic anemia- (present on admission)  Assessment & Plan  Trend and transfuse for hgb <7.  Hgb today is 7.8    Cirrhosis of liver with ascites  (HCC)  Assessment & Plan  9/3: Continued 2 liter per day right upper quadrant drain.  Turbid fluid.  Left upper quadrant drain is in the pelvis and producing 100 cc plus per day.  Drains in place for now.  Right upper quadrant drain is in the abscess cavity.  Continued round-the-clock albumin and fluid therapy.  Diuretic therapy.    Thrombocytopenia (HCC)- (present on admission)  Assessment & Plan  9/2: Transfused pheresis pack for platelet count 40 in the setting of hemoglobin below 7    Overall plan:  Aggressive pulmonary toilette  CBC pending  Start chemical DVT prophylaxis - high risk given cancer  Out of bed, therapies.   Carefully monitor fluid status with large volume MARIANA output and evolving ileostomy output.   Enteral support via cortrak  Wean pressors to off      Discussed patient condition with RN, RT and Pharmacy.  The patient is/remains critically ill with respiratory failure, hypotension.    I provided the following critical care services: management of above, high risk medication management, pressors management.    Critical care time spent exclusive of procedures: 41 minutes.    Luigi Gutierrez MD  [phone]

## 2021-09-05 NOTE — PROGRESS NOTES
4 Eyes Skin Assessment Completed by YASMIN Neely and YASMIN Gutierrez.    Head WDL  Ears WDL  Nose WDL  Mouth WDL  Neck WDL  Breast/Chest WDL  Shoulder Blades Redness, small open blister between shoulder blades.  Spine Redness  (R) Arm/Elbow/Hand Redness, Bruising and Weeping  (L) Arm/Elbow/Hand Redness, Bruising, Swelling and Weeping, x3 skin tears  Abdomen Redness and Incision to MLI with wound vac in place, dry skin  Groin Swelling  Scrotum/Coccyx/Buttocks Redness and Blanching  (R) Leg Redness, Weeping and Edema  (L) Leg Redness, Weeping and Edema  (R) Heel/Foot/Toe Redness, dry  (L) Heel/Foot/Toe Redness, dry          Devices In Places ECG, Blood Pressure Cuff, Pulse Ox, Triana, SCD's, Cortrak, Central Line and Nasal Cannula      Interventions In Place NC W/Ear Foams, Heel Mepilex, Sacral Mepilex, Heel Float Boots, TAP System, Pillows, Elbow Mepilex, Q2 Turns, Low Air Loss Mattress, ZFlo Pillow and Heels Loaded W/Pillows    Possible Skin Injury Yes    Pictures Uploaded Into Epic No, needs to be completed  Wound Consult Placed Yes  RN Wound Prevention Protocol Ordered Yes
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
## 2021-09-05 NOTE — PROGRESS NOTES
Surgery  POD5 diverting ileostomy for colon perforation unable to be resected/mobilized  Cirrhosis  Extubated yesterday, weaned off levo  Echo with EF 25%  Drains >2L past 24 hours, murky   Ileostomy >2.0L   Tolerating TF at goal  Abdomen soft, stoma pink and productive, VAC in place     Plan:  Appreciate ICU care  Cont abx  Stoma care  >2.0L ileostomy output, fiber/thickener for goal output 1.0L might help with fluid losses/e-lytes  VAC x3 weekly  TF at goal  Cont drains, cannot removed despite high volume/ascites

## 2021-09-06 PROBLEM — R11.2 NAUSEA AND VOMITING: Status: RESOLVED | Noted: 2021-08-27 | Resolved: 2021-09-06

## 2021-09-06 LAB
ALBUMIN SERPL BCP-MCNC: 2.5 G/DL (ref 3.2–4.9)
ALBUMIN/GLOB SERPL: 1.3 G/DL
ALP SERPL-CCNC: 114 U/L (ref 30–99)
ALT SERPL-CCNC: 6 U/L (ref 2–50)
ANION GAP SERPL CALC-SCNC: 7 MMOL/L (ref 7–16)
ANISOCYTOSIS BLD QL SMEAR: ABNORMAL
AST SERPL-CCNC: 16 U/L (ref 12–45)
BASOPHILS # BLD AUTO: 0 % (ref 0–1.8)
BASOPHILS # BLD: 0 K/UL (ref 0–0.12)
BILIRUB SERPL-MCNC: 1 MG/DL (ref 0.1–1.5)
BUN SERPL-MCNC: 18 MG/DL (ref 8–22)
CA-I SERPL-SCNC: 1.2 MMOL/L (ref 1.1–1.3)
CALCIUM SERPL-MCNC: 8.5 MG/DL (ref 8.5–10.5)
CHLORIDE SERPL-SCNC: 110 MMOL/L (ref 96–112)
CO2 SERPL-SCNC: 25 MMOL/L (ref 20–33)
CREAT SERPL-MCNC: 0.51 MG/DL (ref 0.5–1.4)
CRP SERPL HS-MCNC: 2.03 MG/DL (ref 0–0.75)
EOSINOPHIL # BLD AUTO: 0 K/UL (ref 0–0.51)
EOSINOPHIL NFR BLD: 0 % (ref 0–6.9)
ERYTHROCYTE [DISTWIDTH] IN BLOOD BY AUTOMATED COUNT: 79.8 FL (ref 35.9–50)
GLOBULIN SER CALC-MCNC: 2 G/DL (ref 1.9–3.5)
GLUCOSE SERPL-MCNC: 159 MG/DL (ref 65–99)
HCT VFR BLD AUTO: 31 % (ref 42–52)
HGB BLD-MCNC: 9.8 G/DL (ref 14–18)
LYMPHOCYTES # BLD AUTO: 0 K/UL (ref 1–4.8)
LYMPHOCYTES NFR BLD: 0 % (ref 22–41)
MACROCYTES BLD QL SMEAR: ABNORMAL
MANUAL DIFF BLD: NORMAL
MCH RBC QN AUTO: 32.7 PG (ref 27–33)
MCHC RBC AUTO-ENTMCNC: 31.6 G/DL (ref 33.7–35.3)
MCV RBC AUTO: 103.3 FL (ref 81.4–97.8)
METAMYELOCYTES NFR BLD MANUAL: 0.9 %
MONOCYTES # BLD AUTO: 0.18 K/UL (ref 0–0.85)
MONOCYTES NFR BLD AUTO: 3.5 % (ref 0–13.4)
MORPHOLOGY BLD-IMP: NORMAL
NEUTROPHILS # BLD AUTO: 4.97 K/UL (ref 1.82–7.42)
NEUTROPHILS NFR BLD: 93.9 % (ref 44–72)
NEUTS BAND NFR BLD MANUAL: 1.7 % (ref 0–10)
NRBC # BLD AUTO: 0.02 K/UL
NRBC BLD-RTO: 0.4 /100 WBC
OVALOCYTES BLD QL SMEAR: NORMAL
PLATELET # BLD AUTO: 39 K/UL (ref 164–446)
PLATELET BLD QL SMEAR: NORMAL
PLATELETS.RETICULATED NFR BLD AUTO: 4.6 K/UL (ref 0.6–13.1)
PMV BLD AUTO: 10.1 FL (ref 9–12.9)
POIKILOCYTOSIS BLD QL SMEAR: NORMAL
POLYCHROMASIA BLD QL SMEAR: NORMAL
POTASSIUM SERPL-SCNC: 3.3 MMOL/L (ref 3.6–5.5)
PREALB SERPL-MCNC: 10.7 MG/DL (ref 18–38)
PROT SERPL-MCNC: 4.5 G/DL (ref 6–8.2)
RBC # BLD AUTO: 3 M/UL (ref 4.7–6.1)
RBC BLD AUTO: PRESENT
SODIUM SERPL-SCNC: 142 MMOL/L (ref 135–145)
WBC # BLD AUTO: 5.2 K/UL (ref 4.8–10.8)

## 2021-09-06 PROCEDURE — 99232 SBSQ HOSP IP/OBS MODERATE 35: CPT | Performed by: STUDENT IN AN ORGANIZED HEALTH CARE EDUCATION/TRAINING PROGRAM

## 2021-09-06 PROCEDURE — 85027 COMPLETE CBC AUTOMATED: CPT

## 2021-09-06 PROCEDURE — 86140 C-REACTIVE PROTEIN: CPT

## 2021-09-06 PROCEDURE — 80053 COMPREHEN METABOLIC PANEL: CPT

## 2021-09-06 PROCEDURE — 97605 NEG PRS WND THER DME<=50SQCM: CPT

## 2021-09-06 PROCEDURE — 700111 HCHG RX REV CODE 636 W/ 250 OVERRIDE (IP): Performed by: SURGERY

## 2021-09-06 PROCEDURE — A9270 NON-COVERED ITEM OR SERVICE: HCPCS | Performed by: SURGERY

## 2021-09-06 PROCEDURE — 700111 HCHG RX REV CODE 636 W/ 250 OVERRIDE (IP): Performed by: INTERNAL MEDICINE

## 2021-09-06 PROCEDURE — 700105 HCHG RX REV CODE 258: Performed by: SURGERY

## 2021-09-06 PROCEDURE — A9270 NON-COVERED ITEM OR SERVICE: HCPCS | Performed by: INTERNAL MEDICINE

## 2021-09-06 PROCEDURE — A9270 NON-COVERED ITEM OR SERVICE: HCPCS | Performed by: NURSE PRACTITIONER

## 2021-09-06 PROCEDURE — 770022 HCHG ROOM/CARE - ICU (200)

## 2021-09-06 PROCEDURE — 82330 ASSAY OF CALCIUM: CPT

## 2021-09-06 PROCEDURE — 700102 HCHG RX REV CODE 250 W/ 637 OVERRIDE(OP): Performed by: SURGERY

## 2021-09-06 PROCEDURE — 85007 BL SMEAR W/DIFF WBC COUNT: CPT

## 2021-09-06 PROCEDURE — 84134 ASSAY OF PREALBUMIN: CPT

## 2021-09-06 PROCEDURE — 99291 CRITICAL CARE FIRST HOUR: CPT | Performed by: SURGERY

## 2021-09-06 PROCEDURE — 85055 RETICULATED PLATELET ASSAY: CPT

## 2021-09-06 PROCEDURE — 700102 HCHG RX REV CODE 250 W/ 637 OVERRIDE(OP): Performed by: INTERNAL MEDICINE

## 2021-09-06 PROCEDURE — 700102 HCHG RX REV CODE 250 W/ 637 OVERRIDE(OP): Performed by: NURSE PRACTITIONER

## 2021-09-06 RX ORDER — METOPROLOL SUCCINATE 25 MG/1
12.5 TABLET, EXTENDED RELEASE ORAL EVERY EVENING
Status: DISCONTINUED | OUTPATIENT
Start: 2021-09-06 | End: 2021-09-06

## 2021-09-06 RX ORDER — FUROSEMIDE 10 MG/ML
20 INJECTION INTRAMUSCULAR; INTRAVENOUS ONCE
Status: COMPLETED | OUTPATIENT
Start: 2021-09-06 | End: 2021-09-06

## 2021-09-06 RX ORDER — FAMOTIDINE 20 MG/1
20 TABLET, FILM COATED ORAL 2 TIMES DAILY
Status: DISCONTINUED | OUTPATIENT
Start: 2021-09-06 | End: 2021-09-09

## 2021-09-06 RX ADMIN — SODIUM CHLORIDE, POTASSIUM CHLORIDE, SODIUM LACTATE AND CALCIUM CHLORIDE: 600; 310; 30; 20 INJECTION, SOLUTION INTRAVENOUS at 15:27

## 2021-09-06 RX ADMIN — FUROSEMIDE 20 MG: 10 INJECTION, SOLUTION INTRAMUSCULAR; INTRAVENOUS at 10:26

## 2021-09-06 RX ADMIN — POTASSIUM BICARBONATE 50 MEQ: 978 TABLET, EFFERVESCENT ORAL at 17:28

## 2021-09-06 RX ADMIN — METOPROLOL TARTRATE 6.25 MG: 25 TABLET, FILM COATED ORAL at 17:26

## 2021-09-06 RX ADMIN — PIPERACILLIN AND TAZOBACTAM 4.5 G: 4; .5 INJECTION, POWDER, LYOPHILIZED, FOR SOLUTION INTRAVENOUS; PARENTERAL at 21:22

## 2021-09-06 RX ADMIN — PIPERACILLIN AND TAZOBACTAM 4.5 G: 4; .5 INJECTION, POWDER, LYOPHILIZED, FOR SOLUTION INTRAVENOUS; PARENTERAL at 04:48

## 2021-09-06 RX ADMIN — AMIODARONE HYDROCHLORIDE 0.5 MG/MIN: 1.8 INJECTION, SOLUTION INTRAVENOUS at 22:43

## 2021-09-06 RX ADMIN — PIPERACILLIN AND TAZOBACTAM 4.5 G: 4; .5 INJECTION, POWDER, LYOPHILIZED, FOR SOLUTION INTRAVENOUS; PARENTERAL at 12:13

## 2021-09-06 RX ADMIN — AMIODARONE HYDROCHLORIDE 400 MG: 200 TABLET ORAL at 06:10

## 2021-09-06 RX ADMIN — DOCUSATE SODIUM 50 MG AND SENNOSIDES 8.6 MG 2 TABLET: 8.6; 5 TABLET, FILM COATED ORAL at 06:10

## 2021-09-06 RX ADMIN — DOCUSATE SODIUM 50 MG AND SENNOSIDES 8.6 MG 2 TABLET: 8.6; 5 TABLET, FILM COATED ORAL at 17:27

## 2021-09-06 RX ADMIN — SPIRONOLACTONE 50 MG: 50 TABLET ORAL at 06:10

## 2021-09-06 RX ADMIN — AMIODARONE HYDROCHLORIDE 400 MG: 200 TABLET ORAL at 17:27

## 2021-09-06 RX ADMIN — FAMOTIDINE 20 MG: 20 TABLET ORAL at 17:27

## 2021-09-06 RX ADMIN — SODIUM CHLORIDE, POTASSIUM CHLORIDE, SODIUM LACTATE AND CALCIUM CHLORIDE: 600; 310; 30; 20 INJECTION, SOLUTION INTRAVENOUS at 08:32

## 2021-09-06 RX ADMIN — POTASSIUM BICARBONATE 50 MEQ: 978 TABLET, EFFERVESCENT ORAL at 12:13

## 2021-09-06 RX ADMIN — HYDROCORTISONE SODIUM SUCCINATE 50 MG: 100 INJECTION, POWDER, FOR SOLUTION INTRAMUSCULAR; INTRAVENOUS at 17:26

## 2021-09-06 RX ADMIN — AMIODARONE HYDROCHLORIDE 0.5 MG/MIN: 1.8 INJECTION, SOLUTION INTRAVENOUS at 01:04

## 2021-09-06 RX ADMIN — SODIUM CHLORIDE, POTASSIUM CHLORIDE, SODIUM LACTATE AND CALCIUM CHLORIDE 250 ML: 600; 310; 30; 20 INJECTION, SOLUTION INTRAVENOUS at 06:11

## 2021-09-06 RX ADMIN — ENOXAPARIN SODIUM 40 MG: 40 INJECTION SUBCUTANEOUS at 06:09

## 2021-09-06 RX ADMIN — HYDROCORTISONE SODIUM SUCCINATE 100 MG: 100 INJECTION, POWDER, FOR SOLUTION INTRAMUSCULAR; INTRAVENOUS at 06:10

## 2021-09-06 RX ADMIN — ATORVASTATIN CALCIUM 40 MG: 40 TABLET, FILM COATED ORAL at 17:27

## 2021-09-06 RX ADMIN — AMIODARONE HYDROCHLORIDE 0.5 MG/MIN: 1.8 INJECTION, SOLUTION INTRAVENOUS at 12:13

## 2021-09-06 RX ADMIN — FAMOTIDINE 20 MG: 10 INJECTION INTRAVENOUS at 06:10

## 2021-09-06 RX ADMIN — HYDROMORPHONE HYDROCHLORIDE 0.5 MG: 1 INJECTION, SOLUTION INTRAMUSCULAR; INTRAVENOUS; SUBCUTANEOUS at 23:24

## 2021-09-06 RX ADMIN — SODIUM CHLORIDE, POTASSIUM CHLORIDE, SODIUM LACTATE AND CALCIUM CHLORIDE 250 ML: 600; 310; 30; 20 INJECTION, SOLUTION INTRAVENOUS at 00:00

## 2021-09-06 ASSESSMENT — PAIN DESCRIPTION - PAIN TYPE
TYPE: ACUTE PAIN;SURGICAL PAIN
TYPE: ACUTE PAIN
TYPE: ACUTE PAIN
TYPE: ACUTE PAIN;SURGICAL PAIN
TYPE: ACUTE PAIN
TYPE: ACUTE PAIN;SURGICAL PAIN
TYPE: ACUTE PAIN
TYPE: ACUTE PAIN;SURGICAL PAIN

## 2021-09-06 ASSESSMENT — ENCOUNTER SYMPTOMS
VOMITING: 0
NAUSEA: 0
COUGH: 0
FATIGUE: 1
PALPITATIONS: 0
TROUBLE SWALLOWING: 0
CHEST TIGHTNESS: 0
WHEEZING: 0
CHILLS: 0
SHORTNESS OF BREATH: 0

## 2021-09-06 ASSESSMENT — FIBROSIS 4 INDEX: FIB4 SCORE: 13.73

## 2021-09-06 NOTE — CARE PLAN
Problem: Self Care  Goal: Patient will have the ability to perform ADLs independently or with assistance (bathe, groom, dress, toilet and feed)  Outcome: Not Progressing     Problem: Safety - Medical Restraint  Goal: Free from restraint(s) (Restraint for Interference with Medical Device)  Outcome: Progressing     Problem: Pain - Standard  Goal: Alleviation of pain or a reduction in pain to the patient’s comfort goal  Outcome: Progressing   The patient is Watcher - Medium risk of patient condition declining or worsening    Shift Goals  Clinical Goals: mobility, hemodynamic stability, pain control  Patient Goals: comfort, sleep  Family Goals: none present    Progress made toward(s) clinical / shift goals:  pain control    Patient is not progressing towards the following goals: sleep      Problem: Self Care  Goal: Patient will have the ability to perform ADLs independently or with assistance (bathe, groom, dress, toilet and feed)  Outcome: Not Progressing

## 2021-09-06 NOTE — PROGRESS NOTES
Cardiology Follow Up Progress Note    Date of Service  9/6/2021    Attending Physician  Priya You M.D.    Chief Complaint/Reason for Consult:   Afib    HPI  William Bella is a 71 y.o. male admitted 8/26/2021 with nausea/vomitting and abdominal pain.  History significant for cirrhosis and metastatic hepatocellular carcinoma on chemotherapy, s/p radiation therapy. Patient was previously seen here on 8/24/21 due to abdominal swelling wherein paracentesis was done and 1L of fluid was removed. Also notable, he was previously admitted July 2021 due to ruptured hepatic cyst with hemoperitoneum wherein he underwent diagnostic laparoscopy with washout and drain placement.  Found on CT to have free intraperitoneal air and fluid S/P exlap and drainage of intraabdominal abscess with reexploration and  loop ileostomy.  Cardiology consult for AFib management.     Interim Events  Monitored rhythm: Sinus rhythm.  No arrhythmia overnight.   Labs reviewed; Plts 39, H/H sable.      Review of Systems  Review of Systems   Constitutional: Positive for fatigue. Negative for chills.   HENT: Negative for trouble swallowing.    Respiratory: Negative for cough, chest tightness, shortness of breath and wheezing.    Cardiovascular: Negative for chest pain, palpitations and leg swelling.   Gastrointestinal: Negative for nausea and vomiting.        C/O mild abd discomfort with surgical procedures       Vital signs in last 24 hours  Temp:  [36.2 °C (97.1 °F)-37.4 °C (99.4 °F)] 36.8 °C (98.2 °F)  Pulse:  [] 91  Resp:  [12-36] 13  BP: ()/(56-75) 111/70  SpO2:  [97 %-100 %] 98 %    Physical Exam  Physical Exam  Vitals and nursing note reviewed.   Constitutional:       Appearance: Normal appearance. He is ill-appearing.   HENT:      Head: Normocephalic and atraumatic.      Nose: No congestion.      Mouth/Throat:      Mouth: Mucous membranes are moist.      Pharynx: Oropharynx is clear.   Eyes:      Extraocular Movements:  Extraocular movements intact.      Conjunctiva/sclera: Conjunctivae normal.      Pupils: Pupils are equal, round, and reactive to light.   Cardiovascular:      Rate and Rhythm: Normal rate and regular rhythm.      Pulses: Normal pulses.      Heart sounds: Normal heart sounds. No murmur heard.   No friction rub. No gallop.    Pulmonary:      Effort: Pulmonary effort is normal.      Breath sounds: Normal breath sounds. No wheezing, rhonchi or rales.   Abdominal:      Comments: Ileostomy- stoma pink  Surgical drains    Musculoskeletal:      Cervical back: Normal range of motion.      Right lower leg: No edema.      Left lower leg: No edema.   Skin:     General: Skin is warm and dry.   Neurological:      Mental Status: He is alert.      Comments: A/O x3   Psychiatric:         Mood and Affect: Mood normal.         Behavior: Behavior normal.         Lab Review  Lab Results   Component Value Date/Time    WBC 5.2 09/06/2021 04:50 AM    RBC 3.00 (L) 09/06/2021 04:50 AM    HEMOGLOBIN 9.8 (L) 09/06/2021 04:50 AM    HEMATOCRIT 31.0 (L) 09/06/2021 04:50 AM    .3 (H) 09/06/2021 04:50 AM    MCH 32.7 09/06/2021 04:50 AM    MCHC 31.6 (L) 09/06/2021 04:50 AM    MPV 10.1 09/06/2021 04:50 AM      Lab Results   Component Value Date/Time    SODIUM 142 09/06/2021 04:50 AM    POTASSIUM 3.3 (L) 09/06/2021 04:50 AM    CHLORIDE 110 09/06/2021 04:50 AM    CO2 25 09/06/2021 04:50 AM    GLUCOSE 159 (H) 09/06/2021 04:50 AM    BUN 18 09/06/2021 04:50 AM    CREATININE 0.51 09/06/2021 04:50 AM      Lab Results   Component Value Date/Time    ASTSGOT 16 09/06/2021 04:50 AM    ALTSGPT 6 09/06/2021 04:50 AM     Lab Results   Component Value Date/Time    CHOLSTRLTOT 166 09/23/2020 06:21 AM     (H) 09/23/2020 06:21 AM    HDL 39 (A) 09/23/2020 06:21 AM    TRIGLYCERIDE 98 09/04/2021 04:15 AM    TROPONINT 19 07/09/2021 08:11 AM    TROPONINT 18 07/09/2021 08:11 AM       No results for input(s): NTPROBNP in the last 72 hours.    Cardiac Imaging  and Procedures Review  Echocardiogram:  9/4/21  CONCLUSIONS  Technically challenging study despite contrast.  Left ventricular ejection fraction is visually estimated to be 25%,   severely reduced.  Severe hypo to akinesis of the mid through apical LAD territory.  Laminar thrombus is observed in the left ventricular apex, appears old   vs recent.  Estimated right ventricular systolic pressure is 40 mmHg.  Right atrial pressure is estimated to be 8 mmHg.  Pleural effusion present.     Compared to the images of the study done 11-4-2019, findings are new.  Findings communicated to Dr. You at time of report.      Imaging    Assessment/Plan  1. Atrial Fib:  - Maintaining sinus rhythm currently.  No arrhythmias overnight.   - Plan to continue amiodarone loading.  Continue oral and IV overlap for initial 48 hours per previous order then oral loading only.    - Unable to anticoagulate at this time secondary to thrombocytopenia.  Consider DOAC initiation when plt count improved and when cleared by surgery.     2. Cardiomyopathy:  - LVEF assessed to be 25% on echo this admission.    - Concern for silent MI.   - Not a candidate for invasive ischemic workup at this time.   - Continue Aldactone.  Continue high intensity statin.  Trial addition of low dose betablocker this evening  and ACE/ARB when able.     3. LV Apical Thrombus:   - Anticoagulation when able.     4. Advanced Hepatocellular carcinoma:  - S/P exploratory laparotomy and abscess drainage and loop ileotomy.   - Pallative care on board.     Thank you for allowing me to participate in the care of this patient.  I will continue to follow this patient    Please contact me with any questions.    REKHA Conrad.   [facility]  [phone]

## 2021-09-06 NOTE — CARE PLAN
The patient is Stable - Low risk of patient condition declining or worsening    Shift Goals  Clinical Goals: Mobilize; hemodynamic stability  Patient Goals: Pain control; rest  Family Goals: no family at bedside    Progress made toward(s) clinical / shift goals:    Problem: Knowledge Deficit - Standard  Goal: Patient and family/care givers will demonstrate understanding of plan of care, disease process/condition, diagnostic tests and medications  Outcome: Progressing     Problem: Pain - Standard  Goal: Alleviation of pain or a reduction in pain to the patient’s comfort goal  Outcome: Progressing     Problem: Cardiac - Atrial Fibrillation  Goal: Complications related to anticoagulation for unconverted atrial fibrillation will be avoided or minimized  Outcome: Progressing  Note: Patient VS remain stable, transitioning patient to PO pharmacological intervention for Atrial Fibrillation.     Problem: Mobility  Goal: Patient's ability to tolerate increased activity will improve  Outcome: Progressing     Problem: Self Care  Goal: Patient will have the ability to perform ADLs independently or with assistance (bathe, groom, dress, toilet and feed)  Outcome: Progressing     Problem: Hemodynamics  Goal: Patient's hemodynamics, fluid balance and neurologic status will be stable or improve  Outcome: Progressing  Note: Patient remains off of vasopressor therapy. Plan to utilize diuretics per MD order as tolerated and to monitor patient's hemodynamic stability throughout the trial.       Patient is not progressing towards the following goals:

## 2021-09-06 NOTE — PROGRESS NOTES
Trauma / Surgical Daily Progress Note    Date of Service  9/6/2021    Chief Complaint  71 y.o. male admitted 8/26/2021 with hepatic flexure perforation, hepatocellular carcinoma.    Interval Events  Confusion/delirium  Seen by consulting services, recommendations noted and appreciated.  Ongoing large volume ascites output from abdominal MARIANA drains.  Tolerating tube feeds, stoma output a little over 1 L yesterday.   Worked with therapies.   Chemical DVT prophylaxis started yesterday-high risk given cancer malignancy    Review of Systems  Review of Systems       Vital Signs for last 24 hours  Temp:  [36.2 °C (97.1 °F)-37.4 °C (99.4 °F)] 36.8 °C (98.2 °F)  Pulse:  [] 91  Resp:  [12-36] 13  BP: ()/(56-74) 111/70  SpO2:  [97 %-100 %] 98 %    Hemodynamic parameters for last 24 hours  CVP:  [8 MM HG-246 MM HG] 10 MM HG    Respiratory Data     Respiration: 13, Pulse Oximetry: 98 %     Work Of Breathing / Effort: Within Normal Limits  RUL Breath Sounds: Clear, RML Breath Sounds: Clear, RLL Breath Sounds: Diminished, ASHLEY Breath Sounds: Clear, LLL Breath Sounds: Diminished    Physical Exam  Physical Exam  Vitals and nursing note reviewed.   Constitutional:       Comments: Awake, confused   HENT:      Head: Normocephalic and atraumatic.      Right Ear: External ear normal.      Left Ear: External ear normal.      Nose: Nose normal.      Mouth/Throat:      Mouth: Mucous membranes are moist.      Pharynx: Oropharynx is clear.   Eyes:      Conjunctiva/sclera: Conjunctivae normal.      Pupils: Pupils are equal, round, and reactive to light.   Cardiovascular:      Rate and Rhythm: Normal rate and regular rhythm.      Pulses: Normal pulses.      Heart sounds: Normal heart sounds.   Pulmonary:      Effort: Pulmonary effort is normal.      Breath sounds: Normal breath sounds.   Abdominal:      Comments: Stoma healthy  Midline with complex wound vac dressing.  No surrounding erythema  Right MARIANA cloudy serous (more cloudy than  yesterday).  Left MARIANA serous, large volume.   Genitourinary:     Comments: Triana in place  Musculoskeletal:      Cervical back: Normal range of motion.      Right lower leg: Edema present.      Left lower leg: Edema present.   Skin:     General: Skin is warm and dry.      Capillary Refill: Capillary refill takes less than 2 seconds.   Neurological:      General: No focal deficit present.      Mental Status: He is disoriented.      Motor: Weakness present.      Comments: Follows commands   Psychiatric:      Comments: Unable to assess         Laboratory  Recent Results (from the past 24 hour(s))   CBC WITH DIFFERENTIAL    Collection Time: 09/05/21  3:32 PM   Result Value Ref Range    WBC 6.2 4.8 - 10.8 K/uL    RBC 2.92 (L) 4.70 - 6.10 M/uL    Hemoglobin 9.4 (L) 14.0 - 18.0 g/dL    Hematocrit 29.6 (L) 42.0 - 52.0 %    .4 (H) 81.4 - 97.8 fL    MCH 32.2 27.0 - 33.0 pg    MCHC 31.8 (L) 33.7 - 35.3 g/dL    RDW 74.4 (H) 35.9 - 50.0 fL    Platelet Count 37 (LL) 164 - 446 K/uL    MPV 10.0 9.0 - 12.9 fL    Neutrophils-Polys 91.10 (H) 44.00 - 72.00 %    Lymphocytes 1.80 (L) 22.00 - 41.00 %    Monocytes 5.30 0.00 - 13.40 %    Eosinophils 0.00 0.00 - 6.90 %    Basophils 0.00 0.00 - 1.80 %    Nucleated RBC 0.50 /100 WBC    Neutrophils (Absolute) 5.76 1.82 - 7.42 K/uL    Lymphs (Absolute) 0.11 (L) 1.00 - 4.80 K/uL    Monos (Absolute) 0.33 0.00 - 0.85 K/uL    Eos (Absolute) 0.00 0.00 - 0.51 K/uL    Baso (Absolute) 0.00 0.00 - 0.12 K/uL    NRBC (Absolute) 0.03 K/uL    Anisocytosis 2+ (A)     Macrocytosis 2+ (A)    DIFFERENTIAL MANUAL    Collection Time: 09/05/21  3:32 PM   Result Value Ref Range    Bands-Stabs 1.80 0.00 - 10.00 %    Manual Diff Status PERFORMED    PERIPHERAL SMEAR REVIEW    Collection Time: 09/05/21  3:32 PM   Result Value Ref Range    Peripheral Smear Review see below    PLATELET ESTIMATE    Collection Time: 09/05/21  3:32 PM   Result Value Ref Range    Plt Estimation Marked Decrease    MORPHOLOGY     Collection Time: 09/05/21  3:32 PM   Result Value Ref Range    RBC Morphology Present    IMMATURE PLT FRACTION    Collection Time: 09/05/21  3:32 PM   Result Value Ref Range    Imm. Plt Fraction 2.7 0.6 - 13.1 K/uL   Comp Metabolic Panel    Collection Time: 09/06/21  4:50 AM   Result Value Ref Range    Sodium 142 135 - 145 mmol/L    Potassium 3.3 (L) 3.6 - 5.5 mmol/L    Chloride 110 96 - 112 mmol/L    Co2 25 20 - 33 mmol/L    Anion Gap 7.0 7.0 - 16.0    Glucose 159 (H) 65 - 99 mg/dL    Bun 18 8 - 22 mg/dL    Creatinine 0.51 0.50 - 1.40 mg/dL    Calcium 8.5 8.5 - 10.5 mg/dL    AST(SGOT) 16 12 - 45 U/L    ALT(SGPT) 6 2 - 50 U/L    Alkaline Phosphatase 114 (H) 30 - 99 U/L    Total Bilirubin 1.0 0.1 - 1.5 mg/dL    Albumin 2.5 (L) 3.2 - 4.9 g/dL    Total Protein 4.5 (L) 6.0 - 8.2 g/dL    Globulin 2.0 1.9 - 3.5 g/dL    A-G Ratio 1.3 g/dL   IONIZED CALCIUM    Collection Time: 09/06/21  4:50 AM   Result Value Ref Range    Ionized Calcium 1.2 1.1 - 1.3 mmol/L   CBC WITH DIFFERENTIAL    Collection Time: 09/06/21  4:50 AM   Result Value Ref Range    WBC 5.2 4.8 - 10.8 K/uL    RBC 3.00 (L) 4.70 - 6.10 M/uL    Hemoglobin 9.8 (L) 14.0 - 18.0 g/dL    Hematocrit 31.0 (L) 42.0 - 52.0 %    .3 (H) 81.4 - 97.8 fL    MCH 32.7 27.0 - 33.0 pg    MCHC 31.6 (L) 33.7 - 35.3 g/dL    RDW 79.8 (H) 35.9 - 50.0 fL    Platelet Count 39 (LL) 164 - 446 K/uL    MPV 10.1 9.0 - 12.9 fL    Neutrophils-Polys 93.90 (H) 44.00 - 72.00 %    Lymphocytes 0.00 (L) 22.00 - 41.00 %    Monocytes 3.50 0.00 - 13.40 %    Eosinophils 0.00 0.00 - 6.90 %    Basophils 0.00 0.00 - 1.80 %    Nucleated RBC 0.40 /100 WBC    Neutrophils (Absolute) 4.97 1.82 - 7.42 K/uL    Lymphs (Absolute) 0.00 (L) 1.00 - 4.80 K/uL    Monos (Absolute) 0.18 0.00 - 0.85 K/uL    Eos (Absolute) 0.00 0.00 - 0.51 K/uL    Baso (Absolute) 0.00 0.00 - 0.12 K/uL    NRBC (Absolute) 0.02 K/uL    Anisocytosis 1+     Macrocytosis 1+    ESTIMATED GFR    Collection Time: 09/06/21  4:50 AM   Result  Value Ref Range    GFR If African American >60 >60 mL/min/1.73 m 2    GFR If Non African American >60 >60 mL/min/1.73 m 2   MORPHOLOGY    Collection Time: 09/06/21  4:50 AM   Result Value Ref Range    RBC Morphology Present     Polychromia 1+     Poikilocytosis 1+     Ovalocytes 1+    PERIPHERAL SMEAR REVIEW    Collection Time: 09/06/21  4:50 AM   Result Value Ref Range    Peripheral Smear Review see below    IMMATURE PLT FRACTION    Collection Time: 09/06/21  4:50 AM   Result Value Ref Range    Imm. Plt Fraction 4.6 0.6 - 13.1 K/uL   DIFFERENTIAL MANUAL    Collection Time: 09/06/21  4:50 AM   Result Value Ref Range    Bands-Stabs 1.70 0.00 - 10.00 %    Metamyelocytes 0.90 %    Manual Diff Status PERFORMED    PLATELET ESTIMATE    Collection Time: 09/06/21  4:50 AM   Result Value Ref Range    Plt Estimation Marked Decrease    CRP Quantitive (Non-Cardiac)    Collection Time: 09/06/21 10:37 AM   Result Value Ref Range    Stat C-Reactive Protein 2.03 (H) 0.00 - 0.75 mg/dL   Prealbumin    Collection Time: 09/06/21 10:37 AM   Result Value Ref Range    Pre-Albumin 10.7 (L) 18.0 - 38.0 mg/dL       Fluids    Intake/Output Summary (Last 24 hours) at 9/6/2021 1322  Last data filed at 9/6/2021 1200  Gross per 24 hour   Intake 6401.33 ml   Output 5580 ml   Net 821.33 ml       Core Measures & Quality Metrics  Labs reviewed, Radiology images reviewed and Medications reviewed  Triana catheter: Critically Ill - Requiring Accurate Measurement of Urinary Output  Central line in place: Vasopressors    DVT Prophylaxis: Contraindicated - High bleeding risk  DVT prophylaxis - mechanical: SCDs  Ulcer prophylaxis: Yes  Antibiotics: Treating active infection/contamination beyond 24 hours perioperative coverage      CHELI Score    ETOH Screening      Assessment/Plan  * Perforated viscus- (present on admission)  Assessment & Plan  8/26 - Ceftriaxone and metronidazole initiated  8/30 - Peritoneal culture positive for Streptococcus anginosus  8/31  - Abscess drainage and ileostomy creation, antibiotics altered to Zosyn  8/31- diverting ileostomy with MARIANA drainage of feculent material.  Abdomen too frozen for formal resection  9/6 - Zosyn day 7 of 7.     Atrial fibrillation (HCC)- (present on admission)  Assessment & Plan  Diuresis  Aggressive electrolyte replacement  Metoprolol and amiodarone   8/31- converted out in OR, continues on amiodarone gtt  9/3: Amiodarone drip  9/5 transition to enteral amiodarone for 2 weeks    Respiratory failure following trauma and surgery (HCC)  Assessment & Plan  Remained on ventilator post operatively  9/5 extubated  Aggressive pulmonary toilette    Septic shock (HCC)- (present on admission)  Assessment & Plan  Secondary to liver abscess  Ongoing volume resuscitation and pressors support  abx rocephin and flagyl  8/30-feculent drain output in late evening  8/31- return to OR for washout, drainage of feculent fluid and diverting ileostomy.  9/3: Remains on small doses of norepinephrine.  Vasopressin has been discontinued.  Remains on every 8 hydrocortisone.  Rapid taper as appropriate.  9/4 wean pressors to off.     Hepatocellular carcinoma (HCC)- (present on admission)  Assessment & Plan  Advanced tumor involving most of the right lobe. Immunotherapy/chemo prior to presentation with bowel perforation.  Associated abscess with apparent involvement of hepatic flexure.  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  Diverting ileostomy.    Pleural effusion, bilateral  Assessment & Plan  8/31 moderate bilateral pleural effusions on CT      Normochromic anemia- (present on admission)  Assessment & Plan  Trend and transfuse for hgb <7.  Hgb today is 7.8    Cirrhosis of liver with ascites (HCC)  Assessment & Plan  9/3 - 9/6 high volume ascites output from drains (R MARIANA in abscess cavity, L MARIANA in pelvis.   General surgery: can not remove drains.  Continued round-the-clock albumin and fluid therapy.  Diuretic  therapy.    Thrombocytopenia (HCC)- (present on admission)  Assessment & Plan  9/2: Transfused pheresis pack for platelet count 40 in the setting of hemoglobin below 7  Likely mixed etiology advanced liver disease and resolving sepsis.  Trend, transfuse for clinical bleeding.    Overall plan:  Aggressive pulmonary toilette  Prophylactic Lovenox given his high risk profile.  Trend thrombocytopenia and h/h  Continue chemical DVT prophylaxis - high risk given cancer  Out of bed, therapies.   Carefully monitor fluid status with large volume MARIANA output and evolving ileostomy output.   Enteral support via cortrak  Would benefit from removal of at least the left abdominal drain and suturing/gluing the drain site closed but defer to primary service.       Discussed patient condition with RN, RT and Pharmacy.  The patient is/remains critically ill with respiratory failure, hypotension.    I provided the following critical care services: management of above, high risk medication management, pressors management.    Critical care time spent exclusive of procedures: 40 minutes.    Luigi Gutierrez MD  [phone]

## 2021-09-06 NOTE — PROGRESS NOTES
4 Eyes Skin Assessment Completed by Evelyn RN and YASMIN Ying.    Head WDL  Ears WDL  Nose WDL  Mouth WDL  Neck WDL  Breast/Chest WDL  Shoulder Blades Redness - mepilex in place  Spine Redness - mepilex in place  (R) Arm/Elbow/Hand Redness and Bruising  (L) Arm/Elbow/Hand Redness, Bruising and Weeping - skin tears  Abdomen Redness, Bruising and Incision - bilateral MARIANA drain, midline incision with wound vac in place  Groin Swelling  Scrotum/Coccyx/Buttocks Redness - mepilex in place  (R) Leg Weeping and Edema  (L) Leg Weeping and Edema  (R) Heel/Foot/Toe Edema, Dry  (L) Heel/Foot/Toe Edema, Dry          Devices In Places ECG, Tele Box, Blood Pressure Cuff, Pulse Ox, Triana, SCD's, Cortrak, Central Line, Nasal Cannula and Compression Dressing      Interventions In Place NC W/Ear Foams, Sacral Mepilex, Heel Float Boots, TAP System, Pillows, Q2 Turns, Low Air Loss Mattress, Barrier Cream, ZFlo Pillow and Heels Loaded W/Pillows    Possible Skin Injury No    Pictures Uploaded Into Epic N/A  Wound Consult Placed Yes  RN Wound Prevention Protocol Ordered Yes

## 2021-09-06 NOTE — PROGRESS NOTES
Dr. Gutierrez notified of patient's platelet count of 37 K/uL and blood-tinged urine.  No orders received.

## 2021-09-06 NOTE — WOUND TEAM
"[facility]  Inpatient Services  Initial Wound and Skin Care Evaluation    Admission Date: 8/26/2021     Last order of IP CONSULT TO WOUND CARE was found on 8/31/2021 from Hospital Encounter on 8/26/2021     HPI, PMH, SH: Reviewed    Past Surgical History:   Procedure Laterality Date   • PB EXPLORATORY OF ABDOMEN N/A 8/31/2021    Procedure: LAPAROTOMY, EXPLORATORY ,drainage of peritoneal abcess,  creation of loop ileostomy, and trucut liver biopsy;  Surgeon: Kevin Thornton D.O.;  Location: [facility];  Service: General   • PB LAP,DIAGNOSTIC ABDOMEN N/A 8/27/2021    Procedure: LAPAROSCOPY;  Surgeon: Priya You M.D.;  Location: [facility];  Service: General   • PB EXPLORATORY OF ABDOMEN N/A 8/27/2021    Procedure: LAPAROTOMY, EXPLORATORY;  Surgeon: Priya You M.D.;  Location: [facility];  Service: General   • IRRIGATION & DEBRIDEMENT GENERAL  8/27/2021    Procedure: IRRIGATION AND DEBRIDEMENT, INTRA-ABDOMINAL ABCESS;  Surgeon: Priya You M.D.;  Location: [facility];  Service: General   • RESTORATE HEMOSTAS  8/27/2021    Procedure: CONTROL OF LIVER BLEED;  Surgeon: Priya You M.D.;  Location: [facility];  Service: General   • PB LAP,DIAGNOSTIC ABDOMEN  7/9/2021    Procedure: DIAGNOSITIC LAPAROSCOPY WITH WASH OUT AND DRAIN PLACEMENT;  Surgeon: Cody Meza M.D.;  Location: [facility];  Service: Gastroenterology   • OTHER  12/2019    \"IR embolization\"    • PARATHYROIDECTOMY N/A 5/13/2016    Procedure: PARATHYROIDECTOMY;  Surgeon: Kale Lord M.D.;  Location: SURGERY SAME DAY [facility];  Service:    • CATARACT PHACO WITH IOL Left 4/7/2016    Procedure: CATARACT PHACO WITH IOL;  Surgeon: Ephraim Jamison M.D.;  Location: SURGERY [facility] OR[facility];  Service:    • BONE GRAFT Right 1960's    right wrist   • OTHER      chemoemolozation x2   • WRIST ORIF Right 1960's     Social History     Tobacco Use   • " Smoking status: Current Every Day Smoker     Packs/day: 0.50     Years: 30.00     Pack years: 15.00     Types: Cigarettes     Start date: 1/1/1990   • Smokeless tobacco: Never Used   • Tobacco comment: quit couple times of the years   Substance Use Topics   • Alcohol use: Not Currently     Alcohol/week: 2.4 oz     Types: 4 Shots of liquor per week     Comment: pt quit drinking 2 months ago     Chief Complaint   Patient presents with   • N/V     Diagnosis: Bowel perforation (HCC) [K63.1]  Perforated intestine (HCC) [K63.1]    Unit where seen by Wound Team: S111/00     WOUND CONSULT/FOLLOW UP RELATED TO:  NPWT placement to abdominal surgical incision. Ostomy change (see below)     WOUND HISTORY:  Patient admitted with nausea and vomiting, history of hepatocellular carcinoma with cirrhosis. Went to OR with MD Thornton for peritoneal abscess, large bowel perforation, and liver mass.      WOUND ASSESSMENT/LDA     Negative Pressure Wound Therapy 09/01/21 Surgical Abdomen Midline (Active)   NPWT Pump Mode / Pressure Setting 125 mmHg;Continuous 09/06/21 1200   Dressing Type Medium;Black Foam (Regular)    Number of Foam Pieces Used 3    Canister Changed No    Output (mL) 45 mL 09/05/21 1800   NEXT Dressing Change/Treatment Date 09/08/21            Wound 08/27/21 Incision Abdomen Midline (Active)         09/01/21 1030   Site Assessment Pink;Red;Tan    Periwound Assessment Edema;Fragile    Margins Defined edges;Unattached edges    Closure Secondary intention;Staples    Drainage Amount Scant    Drainage Description Serosanguineous    Treatments Cleansed    Wound Cleansing Foam Cleanser/Washcloth    Periwound Protectant Skin Protectant Wipes to Periwound;Mepitel;Drape    Dressing Cleansing/Solutions Not Applicable    Dressing Options Wound Vac    Dressing Changed Changed    Dressing Status Intact    Dressing Change/Treatment Frequency Monday, Wednesday, Friday, and As Needed    NEXT Dressing Change/Treatment Date 09/08/21    NEXT  Weekly Photo (Inpatient Only) 09/08/21    Number of Staples Removed 5 09/01/21 1030   Non-staged Wound Description Full thickness 09/06/21 1130   Wound Length (cm) 17.5 cm 09/01/21 1030   Wound Width (cm) 2.2 cm 09/01/21 1030   Wound Depth (cm) 2 cm 09/01/21 1030   Wound Surface Area (cm^2) 38.5 cm^2 09/01/21 1030   Wound Volume (cm^3) 77 cm^3 09/01/21 1030   Tunneling (cm) 0.5 cm 09/01/21 1030   Tunneling Clock Position of Wound 12 09/01/21 1030   Tunneling - 2nd Location (cm) 0.5 cm 09/01/21 1030   Tunneling Clock Position of Wound - 2nd Location 6 09/01/21 1030   Shape linear    Wound Odor None    Exposed Structures Sutures           Vascular:    ZARA:   No results found.    Lab Values:    Lab Results   Component Value Date/Time    WBC 5.2 09/06/2021 04:50 AM    RBC 3.00 (L) 09/06/2021 04:50 AM    HEMOGLOBIN 9.8 (L) 09/06/2021 04:50 AM    HEMATOCRIT 31.0 (L) 09/06/2021 04:50 AM    CREACTPROT 2.03 (H) 09/06/2021 10:37 AM    HBA1C 6.0 (H) 07/03/2021 03:38 AM        Culture Results show:  Recent Results (from the past 720 hour(s))   CULTURE WOUND W/ GRAM STAIN    Collection Time: 08/31/21  9:21 AM    Specimen: Wound   Result Value Ref Range    Significant Indicator NEG     Source WND     Site Peritoneal Abscess     Culture Result       Heavy growth enteric [place] including mixed morphologies  Enterococcus sp., several morphologies enteric Gram negative  rods and yeast.      Gram Stain Result       Moderate WBCs.  Many mixed bacteria, no predominant organism seen.         Pain Level/Medicated:  Mild pain with site care, otherwise able to tolerate     INTERVENTIONS BY WOUND TEAM:  Chart and images reviewed. Discussed with bedside RN. All areas of concern (based on picture review, LDA review and discussion with bedside RN) have been thoroughly assessed. Documentation of areas based on significant findings. This RN in to assess patient. Performed standard wound care which includes appropriate positioning, dressing removal  and non-selective debridement. Pictures and measurements obtained weekly if/when required.  Abdomen:   Preparation for Dressing removal: Dressing soaked with wound cleanser  Non-selectively Debrided with: wound cleanser and gauze  Sharp debridement: n/a.   Cesia wound: Cleansed with wound cleanser, Prepped with mepitel one over staples. No sting skin prep and drape to periwound.   Primary Dressin pieces of black half thickness foam tucked into wound bed.   Secondary (Outer) Dressing: button and TRAC pad applied. Suction resumed at 125 mmHg.     Interdisciplinary consultation: Patient, Bedside RN    EVALUATION / RATIONALE FOR TREATMENT:  Most Recent Date:    21: Wound still with scant amount of slough. Since there are 2 areas if staples in middle of wound there is tunneling under skin. Appears clean in this area. Continue NPWT. Pt confused, when alarm off for distal obstruction to IV he would keep trying to answer the telephone.   9/3/2021: Small amount of slough to central aspect of wound bed. Wound bed is pink, continue NPWT to encourage tissue granulation.     21: Patient with full thickness surgical incision to midline abdomen. Fascia appears intact. Some sutures visible. Placed NPWT to assist in closure by secondary intention, manage bioburden and exudate, increase oxygenation and granulation to the wound bed.      Goals: Steady decrease in wound area and depth weekly.    WOUND TEAM PLAN OF CARE ([X] for frequency of wound follow up,):   Nursing to follow orders written for wound care. Contact wound team if area fails to progress, deteriorates or with any questions/concerns  Dressing changes by wound team:                   Follow up 3 times weekly:                NPWT change 3 times weekly:   X  Follow up 1-2 times weekly:      Follow up Bi-Monthly:                   Follow up as needed:     Other (explain):     NURSING PLAN OF CARE ORDERS (X):  Dressing changes: See Dressing Care orders: X  Skin  "care: See Skin Care orders:   RN Prevention Protocol:   Rectal tube care: See Rectal Tube Care orders:   Other orders:    RSKIN:   CURRENTLY IN PLACE (X), APPLIED THIS VISIT (A), ORDERED (O):   Q shift Keith:  X  Q shift pressure point assessments:  X    Surface/Positioning   Pressure redistribution mattress            Low Airloss   X       Bariatric foam      Bariatric ASHU     Waffle cushion        Waffle Overlay          Reposition q 2 hours    X  TAPs Turning system     Z Quirino Pillow     Offloading/Redistribution not assessed  Sacral Mepilex (Silicone dressing)     Heel Mepilex (Silicone dressing)         Heel float boots (Prevalon boot)             Float Heels off Bed with Pillows           Respiratory   Silicone O2 tubing    x    Gray Foam Ear protectors     Cannula fixation Device (Tender )          High flow offloading Clip    Elastic head band offloading device      Anchorfast                                                         Trach with Optifoam split foam             Containment/Moisture Prevention ileostomy    Rectal tube or BMS    Purwick/Condom Cath        Triana Catheter  X  Barrier wipes           Barrier paste       Antifungal tx      Interdry        Mobilization not assessed     Up to chair        Ambulate      PT/OT      Nutrition      Dietician  x      Diabetes Education      PO     TF x    TPN     NPO   # days     Other        Anticipated discharge plans: TBD may need VAC and more ostomy education  LTACH:        SNF/Rehab:                  Home Health Care:           Outpatient Wound Center:            Self/Family Care:        Other:        [facility]  Inpatient Services  New Ostomy Management & Teaching    HPI:  Reviewed  PMH: Reviewed   SH: Reviewed    Subjective: \"I need my wife to do it.\"    Objective: Appliance intact, large amount of watery yellow with pieces of yellow soft output.        Ileostomy 08/31/21 Loop RLQ (Active)      09/01/21 1030   Stomal Appliance " "Assessment Clean;Dry;Intact    Stoma Assessment Clean;Dry;Intact;Beefy red    Stoma Shape Budded Greater Than One Inch    Stoma Size (in) 2    Peristomal Assessment ALILA    Mucocutaneous Junction Intact    Treatment Appliance Checked    Peristomal Protectant Paste Ring    Stomal Appliance 2 3/4\" (70mm) CTF;Paste Ring, 2\"    Output (mL) 250 mL    Output Color Yellow    WOUND RN ONLY - Stomal Appliance  2 Piece;2 3/4\" (70mm) CTF;Paste Ring, 2\"    Appliance (Pouch) # [id number]    Appliance Brand Candido    Secure Start completed Not Medically Stable                    Ostomy Appliance (type and size): 2 3/4\" 2 piece and paste ring    Interventions: Removed current appliance using push pull method. Cleansed peristomal area with moist warm wash cloths. Pat dry. Applied no sting skin prep to periwound. Created template using the 2 3/4\" barrier, cut to fit. Paste ring applied to barrier, applied barrier to skin, attached bag and then closed.   Pt education: Questions and concerns addressed    Evaluation: Stoma viable and intact. No separation or wounds noted at this time. Patient unable for learning and hands on teaching at this time, slept throughout.     Flatus: Present  Stool Output: large, yellow and liquid  Diet: NPO  Mobility: Not Mobilizing    Plan: Ostomy nurses to continue to follow for ostomy needs and teaching until discharge    Anticipated discharge needs: Supplies, supplier information, possible HH, outpatient ostomy clinic, Skilled Nursing/Rehab     Secure Start Signed Not Medically Stable  wants wife to sign  Outpatient Referral Placed Not Medically Stable  5 Sets of appliances in Ostomy bag for discharge Not Medically Stable    INSURANCE OPTIONS: [employer].                 [employer] ([employer])              MediCARE/MEDICAID & All other Private Insurance companies (Prism Form)              MediCAID & Fee for Service (Care Chest Paperwork + Prism Form)                            " Form signed/Catalog Marked and Copy left with patient OR medicaid paperwork given to patient      Anticipated Discharge Plans:  Other TBD

## 2021-09-06 NOTE — PROGRESS NOTES
Surgery  POD6 diverting ileostomy for colon perforation unable to be resected/mobilized  Cirrhosis  Afebrile, HR 70s  Drains >2.4L past 24 hours, murky with debris, R MARIANA brown/feculent   Ileostomy >2.5L   Tolerating TF at goal  Abdomen soft, stoma pink and productive, VAC in place     Plan:  Appreciate ICU care  Cont abx  Stoma care  >2.0L ileostomy output, fiber/thickener for goal output 1.0L might help with fluid losses/e-lytes  VAC x3 weekly  TF at goal  Cont drains, cannot removed despite high volume/ascites

## 2021-09-07 ENCOUNTER — APPOINTMENT (OUTPATIENT)
Dept: RADIOLOGY | Facility: MEDICAL CENTER | Age: 71
DRG: 853 | End: 2021-09-07
Attending: SURGERY
Payer: MEDICARE

## 2021-09-07 PROBLEM — E87.70 FLUID OVERLOAD: Status: ACTIVE | Noted: 2021-09-07

## 2021-09-07 LAB
ALBUMIN SERPL BCP-MCNC: 2.4 G/DL (ref 3.2–4.9)
ALBUMIN/GLOB SERPL: 1.3 G/DL
ALP SERPL-CCNC: 119 U/L (ref 30–99)
ALT SERPL-CCNC: 8 U/L (ref 2–50)
AMMONIA PLAS-SCNC: 26 UMOL/L (ref 11–45)
ANION GAP SERPL CALC-SCNC: 5 MMOL/L (ref 7–16)
ANISOCYTOSIS BLD QL SMEAR: ABNORMAL
AST SERPL-CCNC: 15 U/L (ref 12–45)
BASOPHILS # BLD AUTO: 0 % (ref 0–1.8)
BASOPHILS # BLD: 0 K/UL (ref 0–0.12)
BILIRUB SERPL-MCNC: 0.9 MG/DL (ref 0.1–1.5)
BUN SERPL-MCNC: 17 MG/DL (ref 8–22)
CA-I SERPL-SCNC: 1.2 MMOL/L (ref 1.1–1.3)
CALCIUM SERPL-MCNC: 8.4 MG/DL (ref 8.5–10.5)
CHLORIDE SERPL-SCNC: 110 MMOL/L (ref 96–112)
CO2 SERPL-SCNC: 27 MMOL/L (ref 20–33)
CREAT SERPL-MCNC: 0.49 MG/DL (ref 0.5–1.4)
EOSINOPHIL # BLD AUTO: 0 K/UL (ref 0–0.51)
EOSINOPHIL NFR BLD: 0 % (ref 0–6.9)
ERYTHROCYTE [DISTWIDTH] IN BLOOD BY AUTOMATED COUNT: 80.6 FL (ref 35.9–50)
GLOBULIN SER CALC-MCNC: 1.9 G/DL (ref 1.9–3.5)
GLUCOSE SERPL-MCNC: 150 MG/DL (ref 65–99)
HCT VFR BLD AUTO: 36.7 % (ref 42–52)
HGB BLD-MCNC: 11.5 G/DL (ref 14–18)
LYMPHOCYTES # BLD AUTO: 0.24 K/UL (ref 1–4.8)
LYMPHOCYTES NFR BLD: 6.1 % (ref 22–41)
MACROCYTES BLD QL SMEAR: ABNORMAL
MANUAL DIFF BLD: NORMAL
MCH RBC QN AUTO: 32.5 PG (ref 27–33)
MCHC RBC AUTO-ENTMCNC: 31.3 G/DL (ref 33.7–35.3)
MCV RBC AUTO: 103.7 FL (ref 81.4–97.8)
MONOCYTES # BLD AUTO: 0.18 K/UL (ref 0–0.85)
MONOCYTES NFR BLD AUTO: 4.4 % (ref 0–13.4)
MORPHOLOGY BLD-IMP: NORMAL
NEUTROPHILS # BLD AUTO: 3.58 K/UL (ref 1.82–7.42)
NEUTROPHILS NFR BLD: 80.7 % (ref 44–72)
NEUTS BAND NFR BLD MANUAL: 8.8 % (ref 0–10)
NRBC # BLD AUTO: 0.02 K/UL
NRBC BLD-RTO: 0.5 /100 WBC
PLATELET # BLD AUTO: 45 K/UL (ref 164–446)
PLATELET BLD QL SMEAR: NORMAL
PLATELETS.RETICULATED NFR BLD AUTO: 3.2 K/UL (ref 0.6–13.1)
PMV BLD AUTO: 9.9 FL (ref 9–12.9)
POTASSIUM SERPL-SCNC: 3 MMOL/L (ref 3.6–5.5)
POTASSIUM SERPL-SCNC: 3.2 MMOL/L (ref 3.6–5.5)
POTASSIUM SERPL-SCNC: 3.3 MMOL/L (ref 3.6–5.5)
PROT SERPL-MCNC: 4.3 G/DL (ref 6–8.2)
RBC # BLD AUTO: 3.54 M/UL (ref 4.7–6.1)
RBC BLD AUTO: PRESENT
SODIUM SERPL-SCNC: 142 MMOL/L (ref 135–145)
TOXIC GRANULES BLD QL SMEAR: SLIGHT
TRIGL SERPL-MCNC: 87 MG/DL (ref 0–149)
WBC # BLD AUTO: 4 K/UL (ref 4.8–10.8)

## 2021-09-07 PROCEDURE — 97530 THERAPEUTIC ACTIVITIES: CPT

## 2021-09-07 PROCEDURE — 700111 HCHG RX REV CODE 636 W/ 250 OVERRIDE (IP): Performed by: SURGERY

## 2021-09-07 PROCEDURE — A9270 NON-COVERED ITEM OR SERVICE: HCPCS | Performed by: SURGERY

## 2021-09-07 PROCEDURE — 85007 BL SMEAR W/DIFF WBC COUNT: CPT

## 2021-09-07 PROCEDURE — 700102 HCHG RX REV CODE 250 W/ 637 OVERRIDE(OP): Performed by: SURGERY

## 2021-09-07 PROCEDURE — A9270 NON-COVERED ITEM OR SERVICE: HCPCS | Performed by: NURSE PRACTITIONER

## 2021-09-07 PROCEDURE — 700102 HCHG RX REV CODE 250 W/ 637 OVERRIDE(OP): Performed by: INTERNAL MEDICINE

## 2021-09-07 PROCEDURE — A9270 NON-COVERED ITEM OR SERVICE: HCPCS

## 2021-09-07 PROCEDURE — 302106 OSTOMY POWDER: Performed by: SURGERY

## 2021-09-07 PROCEDURE — 302098 PASTE RING (FLAT): Performed by: SURGERY

## 2021-09-07 PROCEDURE — 700102 HCHG RX REV CODE 250 W/ 637 OVERRIDE(OP)

## 2021-09-07 PROCEDURE — 84132 ASSAY OF SERUM POTASSIUM: CPT | Mod: 91

## 2021-09-07 PROCEDURE — 82330 ASSAY OF CALCIUM: CPT

## 2021-09-07 PROCEDURE — 99233 SBSQ HOSP IP/OBS HIGH 50: CPT | Performed by: SURGERY

## 2021-09-07 PROCEDURE — 85027 COMPLETE CBC AUTOMATED: CPT

## 2021-09-07 PROCEDURE — 80053 COMPREHEN METABOLIC PANEL: CPT

## 2021-09-07 PROCEDURE — 700111 HCHG RX REV CODE 636 W/ 250 OVERRIDE (IP): Performed by: STUDENT IN AN ORGANIZED HEALTH CARE EDUCATION/TRAINING PROGRAM

## 2021-09-07 PROCEDURE — 770022 HCHG ROOM/CARE - ICU (200)

## 2021-09-07 PROCEDURE — 700105 HCHG RX REV CODE 258: Performed by: SURGERY

## 2021-09-07 PROCEDURE — 99232 SBSQ HOSP IP/OBS MODERATE 35: CPT | Performed by: STUDENT IN AN ORGANIZED HEALTH CARE EDUCATION/TRAINING PROGRAM

## 2021-09-07 PROCEDURE — 700102 HCHG RX REV CODE 250 W/ 637 OVERRIDE(OP): Performed by: NURSE PRACTITIONER

## 2021-09-07 PROCEDURE — 700111 HCHG RX REV CODE 636 W/ 250 OVERRIDE (IP): Performed by: INTERNAL MEDICINE

## 2021-09-07 PROCEDURE — 306565 RIGID MIT RESTRAINT(PAIR): Performed by: SURGERY

## 2021-09-07 PROCEDURE — 84478 ASSAY OF TRIGLYCERIDES: CPT

## 2021-09-07 PROCEDURE — 82140 ASSAY OF AMMONIA: CPT

## 2021-09-07 PROCEDURE — A9270 NON-COVERED ITEM OR SERVICE: HCPCS | Performed by: INTERNAL MEDICINE

## 2021-09-07 PROCEDURE — 85055 RETICULATED PLATELET ASSAY: CPT

## 2021-09-07 RX ORDER — POTASSIUM CHLORIDE 7.45 MG/ML
10 INJECTION INTRAVENOUS ONCE
Status: COMPLETED | OUTPATIENT
Start: 2021-09-07 | End: 2021-09-07

## 2021-09-07 RX ORDER — POTASSIUM CHLORIDE 14.9 MG/ML
20 INJECTION INTRAVENOUS ONCE
Status: COMPLETED | OUTPATIENT
Start: 2021-09-07 | End: 2021-09-07

## 2021-09-07 RX ORDER — FUROSEMIDE 10 MG/ML
40 INJECTION INTRAMUSCULAR; INTRAVENOUS EVERY 12 HOURS
Status: DISCONTINUED | OUTPATIENT
Start: 2021-09-07 | End: 2021-09-08

## 2021-09-07 RX ORDER — POTASSIUM CHLORIDE 29.8 MG/ML
40 INJECTION INTRAVENOUS ONCE
Status: COMPLETED | OUTPATIENT
Start: 2021-09-07 | End: 2021-09-07

## 2021-09-07 RX ORDER — MAGNESIUM SULFATE HEPTAHYDRATE 40 MG/ML
2 INJECTION, SOLUTION INTRAVENOUS ONCE
Status: COMPLETED | OUTPATIENT
Start: 2021-09-07 | End: 2021-09-07

## 2021-09-07 RX ADMIN — HYDROCORTISONE SODIUM SUCCINATE 50 MG: 100 INJECTION, POWDER, FOR SOLUTION INTRAMUSCULAR; INTRAVENOUS at 05:37

## 2021-09-07 RX ADMIN — FUROSEMIDE 40 MG: 10 INJECTION, SOLUTION INTRAMUSCULAR; INTRAVENOUS at 10:17

## 2021-09-07 RX ADMIN — DOCUSATE SODIUM 50 MG AND SENNOSIDES 8.6 MG 2 TABLET: 8.6; 5 TABLET, FILM COATED ORAL at 05:36

## 2021-09-07 RX ADMIN — POTASSIUM CHLORIDE 20 MEQ: 14.9 INJECTION, SOLUTION INTRAVENOUS at 14:08

## 2021-09-07 RX ADMIN — HYDROCORTISONE SODIUM SUCCINATE 50 MG: 100 INJECTION, POWDER, FOR SOLUTION INTRAMUSCULAR; INTRAVENOUS at 17:28

## 2021-09-07 RX ADMIN — PIPERACILLIN AND TAZOBACTAM 4.5 G: 4; .5 INJECTION, POWDER, LYOPHILIZED, FOR SOLUTION INTRAVENOUS; PARENTERAL at 05:36

## 2021-09-07 RX ADMIN — SODIUM CHLORIDE, POTASSIUM CHLORIDE, SODIUM LACTATE AND CALCIUM CHLORIDE: 600; 310; 30; 20 INJECTION, SOLUTION INTRAVENOUS at 18:01

## 2021-09-07 RX ADMIN — DOCUSATE SODIUM 50 MG AND SENNOSIDES 8.6 MG 2 TABLET: 8.6; 5 TABLET, FILM COATED ORAL at 17:28

## 2021-09-07 RX ADMIN — METOPROLOL TARTRATE 6.25 MG: 25 TABLET, FILM COATED ORAL at 05:37

## 2021-09-07 RX ADMIN — SODIUM CHLORIDE, POTASSIUM CHLORIDE, SODIUM LACTATE AND CALCIUM CHLORIDE: 600; 310; 30; 20 INJECTION, SOLUTION INTRAVENOUS at 04:35

## 2021-09-07 RX ADMIN — POTASSIUM CHLORIDE 20 MEQ: 14.9 INJECTION, SOLUTION INTRAVENOUS at 20:10

## 2021-09-07 RX ADMIN — POTASSIUM CHLORIDE 10 MEQ: 10 INJECTION, SOLUTION INTRAVENOUS at 22:38

## 2021-09-07 RX ADMIN — SPIRONOLACTONE 50 MG: 50 TABLET ORAL at 05:36

## 2021-09-07 RX ADMIN — ENOXAPARIN SODIUM 40 MG: 40 INJECTION SUBCUTANEOUS at 05:37

## 2021-09-07 RX ADMIN — FAMOTIDINE 20 MG: 20 TABLET ORAL at 05:36

## 2021-09-07 RX ADMIN — FAMOTIDINE 20 MG: 20 TABLET ORAL at 17:28

## 2021-09-07 RX ADMIN — ONDANSETRON 4 MG: 2 INJECTION INTRAMUSCULAR; INTRAVENOUS at 10:46

## 2021-09-07 RX ADMIN — SODIUM CHLORIDE, POTASSIUM CHLORIDE, SODIUM LACTATE AND CALCIUM CHLORIDE: 600; 310; 30; 20 INJECTION, SOLUTION INTRAVENOUS at 11:28

## 2021-09-07 RX ADMIN — AMIODARONE HYDROCHLORIDE 0.5 MG/MIN: 1.8 INJECTION, SOLUTION INTRAVENOUS at 09:42

## 2021-09-07 RX ADMIN — POTASSIUM CHLORIDE 40 MEQ: 400 INJECTION, SOLUTION INTRAVENOUS at 10:17

## 2021-09-07 RX ADMIN — ATORVASTATIN CALCIUM 40 MG: 40 TABLET, FILM COATED ORAL at 17:28

## 2021-09-07 RX ADMIN — AMIODARONE HYDROCHLORIDE 400 MG: 200 TABLET ORAL at 05:36

## 2021-09-07 RX ADMIN — MAGNESIUM SULFATE 2 G: 2 INJECTION INTRAVENOUS at 10:17

## 2021-09-07 RX ADMIN — AMIODARONE HYDROCHLORIDE 400 MG: 200 TABLET ORAL at 17:28

## 2021-09-07 ASSESSMENT — COGNITIVE AND FUNCTIONAL STATUS - GENERAL
MOBILITY SCORE: 6
WALKING IN HOSPITAL ROOM: TOTAL
MOVING TO AND FROM BED TO CHAIR: UNABLE
DRESSING REGULAR UPPER BODY CLOTHING: A LOT
EATING MEALS: TOTAL
DAILY ACTIVITIY SCORE: 8
TOILETING: TOTAL
PERSONAL GROOMING: A LOT
STANDING UP FROM CHAIR USING ARMS: TOTAL
HELP NEEDED FOR BATHING: TOTAL
CLIMB 3 TO 5 STEPS WITH RAILING: TOTAL
DRESSING REGULAR LOWER BODY CLOTHING: TOTAL
MOVING FROM LYING ON BACK TO SITTING ON SIDE OF FLAT BED: UNABLE
TURNING FROM BACK TO SIDE WHILE IN FLAT BAD: UNABLE
SUGGESTED CMS G CODE MODIFIER DAILY ACTIVITY: CM
SUGGESTED CMS G CODE MODIFIER MOBILITY: CN

## 2021-09-07 ASSESSMENT — ENCOUNTER SYMPTOMS
STRIDOR: 0
FEVER: 0
ARTHRALGIAS: 0
VOMITING: 0
FATIGUE: 1
UNEXPECTED WEIGHT CHANGE: 1
NAUSEA: 0
CONSTIPATION: 0
WHEEZING: 0
NEUROLOGICAL NEGATIVE: 1
ABDOMINAL DISTENTION: 1
MYALGIAS: 1
ABDOMINAL PAIN: 1
SHORTNESS OF BREATH: 1
BACK PAIN: 0
NECK PAIN: 0
COUGH: 0

## 2021-09-07 ASSESSMENT — FIBROSIS 4 INDEX: FIB4 SCORE: 11.89

## 2021-09-07 ASSESSMENT — PAIN DESCRIPTION - PAIN TYPE
TYPE: ACUTE PAIN;SURGICAL PAIN
TYPE: ACUTE PAIN;SURGICAL PAIN
TYPE: ACUTE PAIN
TYPE: ACUTE PAIN;SURGICAL PAIN
TYPE: ACUTE PAIN

## 2021-09-07 ASSESSMENT — GAIT ASSESSMENTS: GAIT LEVEL OF ASSIST: UNABLE TO PARTICIPATE

## 2021-09-07 NOTE — PROGRESS NOTES
Surgery  8/27 diverting ileostomy for colon perforation unable to be resected/mobilized  Cirrhosis  Confused indicates comfortable  Abdomen soft, stoma pink and productive, VAC in place     Plan:  Appreciate ICU care  Cont abx  Stoma care  VAC x3 weekly

## 2021-09-07 NOTE — THERAPY
"Occupational Therapy  Daily Treatment     Patient Name: Lorraine Bella  Age:  71 y.o., Sex:  male  Medical Record #: [id number]  Today's Date: 9/7/2021     Precautions  Precautions: Fall Risk, Nasogastric Tube  Comments: extubated    Assessment    Needed encouragement to remain sitting EOB for 5 min today, frequently asking to lay down. Refused seated ADLs. Able to hold self by holding onto therapists hands EOB. Remains limited by weakness, fatigue, impaired balance, pain.    Plan    Continue current treatment plan.    DC Equipment Recommendations: Unable to determine at this time  Discharge Recommendations: Recommend post-acute placement for additional occupational therapy services prior to discharge home    Subjective    \"Can I lay down now?\"     Objective       09/07/21 0935   Cognition    Cognition / Consciousness X   Speech/ Communication Delayed Responses   Level of Consciousness Alert   Ability To Follow Commands 1 Step   Comments pleasant and cooperative, nonsensical at times but when asked he was oriented   Balance   Sitting Balance (Static) Poor -   Sitting Balance (Dynamic) Trace +   Weight Shift Sitting Poor   Skilled Intervention Verbal Cuing;Tactile Cuing   Comments pt holding onto therapists hands to hold himself forward, unable to prop forward onto knees   Bed Mobility    Supine to Sit Total Assist   Sit to Supine Total Assist   Scooting Total Assist   Rolling Total Assist to Rt.;Total Assist to Lt.   Skilled Intervention Verbal Cuing   Activities of Daily Living   Grooming   (Refused)   Lower Body Dressing Total Assist   How much help from another person does the patient currently need...   Putting on and taking off regular lower body clothing? 1   Bathing (including washing, rinsing, and drying)? 1   Toileting, which includes using a toilet, bedpan, or urinal? 1   Putting on and taking off regular upper body clothing? 2   Taking care of personal grooming such as brushing teeth? 2   Eating " meals? 1   6 Clicks Daily Activity Score 8   Functional Mobility   Sit to Stand Unable to Participate   Bed, Chair, Wheelchair Transfer Unable to Participate   Toilet Transfers Unable to Participate   Mobility EOB only x5 min   Comments pt frequently requesting to lay down, needed encouragement to stay up for 5 min   ICU Target Mobility Level   ICU Mobility - Targeted Level Level 2   Patient / Family Goals   Patient / Family Goal #1 to go home   Short Term Goals   Short Term Goal # 1 pt will maintain fair sitting balance for 15 sec in prep for seated ADLs   Goal Outcome # 1 Goal not met   Short Term Goal # 2 pt will groom seated EOB w/ Andrea   Goal Outcome # 2 Goal not met

## 2021-09-07 NOTE — DIETARY
Nutrition support weekly update:  Day 12 of admit. Lorraine Bella is a 71 y.o. male with admitting DX of Bowel perforation, perforated intestine. Tube feeding initiated on 8/31. Currently TF is Diabetisource @ 70 mL/hr providing 2016 kcals, 101 g protein, and 1378 mL H2O daily.    Estimated Nutritional Needs:  Weight today was 112.2 kg, increased 37.5 kg from admit weight of 74.7 on 8/27. Pt with cirrhosis with ascites, 49 L fluid positive since admit per I/Os. Lasix for coverage per MAR.    Evaluation:   1. Pt extubated 9/5, appeared more alert and working with OT during rounds.   2. Prealbumin 9/6 was 10.7, increased from 7.6 on 9/3. CRP 9/6 was 2.03, decreased from 3.39 on 9/3. Indicates decreased inflammation.   3. Pt with history of cirrhosis with ascites on chronic Lasix, most recent paracentesis on 8/24 PTA.   4. Hypokalemic - K 3.0 today, KCl for coverage per MAR.   5. Current low CHO TF formula containing omega-3 and moderate protein remains appropriate to meet pt's nutritional needs.     Malnutrition risk: RD note on 8/29 indicated severe malnutrition.     Recommendations/Plan:  1. Current TF formula and rate remains appropriate.   2. Diet per SLP when appropriate.

## 2021-09-07 NOTE — CARE PLAN
Problem: Knowledge Deficit - Standard  Goal: Patient and family/care givers will demonstrate understanding of plan of care, disease process/condition, diagnostic tests and medications  Outcome: Progressing     Problem: Pain - Standard  Goal: Alleviation of pain or a reduction in pain to the patient’s comfort goal  Outcome: Progressing   The patient is Watcher - Medium risk of patient condition declining or worsening    Shift Goals  Clinical Goals: mobilize, hemodynamic stability  Patient Goals: comfort and sleep  Family Goals: none present    Progress made toward(s) clinical / shift goals:  see above      Patient is not progressing towards the following goals:

## 2021-09-07 NOTE — PROGRESS NOTES
[facility] Follow-up Note    Date of Service:    9/7/2021      Name:   Lorraine Bella   YOB: 1950  Age:   71 y.o.  male   MRN:   [id number]      Chief Complaint: afib    HPI:  Mr Bella is a 72 y/o fellow with PMH including history of paroxysmal afib, alcoholic cirrhosis, hepatocellular carcinoma (2020) on immunotherpy who presented with N/V, abdominal fullness  admitted for perforated viscus who is s/p diverting ilestomy for colon perforation 8/31/2021. Cardiology was consulted for  afib and new onset HFrEF/LV thrombus.     Interim Events:  He has been successfully rhythm controlled.   His LE are very edematous  C/o of dry mouth and is asking for ice water.   Has NG.     ROS  Respiratory:  Denies shortness of breath, no cough.  Cardiovascular:  Denies chest pain.  +++ lower extremity edema.  Denies orthopnea or PND.  : denies polyuria, no dysuria.  GI:  Denies nausea/vomiting.  No abdominal distention.  Neuro:  Denies dizziness, syncope.  Hem/lymph: Denies easy bleeding/bruising.      All other review of systems reviewed and negative.    Past medical, surgical, social, and family history reviewed and unchanged from admission except as noted in HPI.    Medications: Reviewed in MAR  Current Facility-Administered Medications   Medication Dose Frequency Provider Last Rate Last Admin   • K+ Scale: Goal of 4.5  1 Each Q6HRS Kevin Thornton D.O.       • potassium chloride in water (KCL) ivpb (ICU ONLY) 40 mEq  40 mEq Once Kevin Thornton D.O. 50 mL/hr at 09/07/21 1017 40 mEq at 09/07/21 1017   • magnesium sulfate IVPB premix 2 g  2 g Once Kevin Thornton D.O. 25 mL/hr at 09/07/21 1017 2 g at 09/07/21 1017   • furosemide (LASIX) injection 40 mg  40 mg Q12HRS Kevin Thornton D.O.   40 mg at 09/07/21 1017   • famotidine (PEPCID) tablet 20 mg  20 mg BID Luigi Gutierrez M.D.   20 mg at 09/07/21 0536   • hydrocortisone sodium succinate PF (Solu-CORTEF) 100 MG injection 50 mg  50 mg Q12HRS  Luigi Gutierrez M.D.   50 mg at 09/07/21 0537    Followed by   • [START ON 9/8/2021] hydrocortisone sodium succinate PF (Solu-CORTEF) 100 MG injection 50 mg  50 mg DAILY Luigi Gutierrez M.D.       • metoprolol tartrate (LOPRESSOR) tablet 6.25 mg  6.25 mg TWICE DAILY TATA Conrad   6.25 mg at 09/07/21 0537   • amiodarone (Cordarone) tablet 400 mg  400 mg TWICE DAILY Luigi Gutierrez M.D.   400 mg at 09/07/21 0536    Followed by   • [START ON 9/8/2021] amiodarone (Cordarone) tablet 200 mg  200 mg TWICE DAILY Luigi Gutierrez M.D.        Followed by   • [START ON 9/16/2021] amiodarone (Cordarone) tablet 200 mg  200 mg Q DAY Luigi Gutierrez M.D.       • amiodarone (NEXTERONE) 360 mg/200 mL infusion  0.5 mg/min Continuous Jaime Batres M.D. 17 mL/hr at 09/07/21 0942 0.5 mg/min at 09/07/21 0942   • enoxaparin (LOVENOX) inj 40 mg  40 mg DAILY Luigi Gutierrez M.D.   40 mg at 09/07/21 0537   • atorvastatin (LIPITOR) tablet 40 mg  40 mg Q EVENING Haylee Bui M.D.   40 mg at 09/06/21 1727   • Pharmacy Consult: Enteral tube insertion - review meds/change route/product selection  1 Each PHARMACY TO DOSE Jaylen Giang M.D.       • spironolactone (ALDACTONE) tablet 50 mg  50 mg Q DAY Jaylen Giang M.D.   50 mg at 09/07/21 0536   • Respiratory Therapy Consult   Continuous RT Jaylen Giang M.D.       • HYDROmorphone (Dilaudid) injection 0.25-0.5 mg  0.25-0.5 mg Q HOUR PRN Kevin Thornton D.O.   0.5 mg at 09/06/21 6284   • norepinephrine (Levophed) 8 mg in 250 mL NS infusion (premix)  0-30 mcg/min Continuous Kale Lord M.D.   Paused at 09/05/21 0354   • [Held by provider] thiamine (Vitamin B-1) tablet 100 mg  100 mg DAILY Kale Lord M.D.   100 mg at 08/30/21 0516   • enalaprilat (VASOTEC) injection 1.25 mg  1.25 mg Q6HRS PRN Philip Hancock, D.O.       • labetalol (NORMODYNE/TRANDATE) injection 10 mg  10 mg Q4HRS PRN Philip Hancock, D.O.       • ondansetron (ZOFRAN)  "syringe/vial injection 4 mg  4 mg Q4HRS PRN Philip Hancock D.O.   4 mg at 09/07/21 1046   • acetaminophen (Tylenol) tablet 650 mg  650 mg Q6HRS PRN Nelson Persaud M.D.       • [Held by provider] lactulose 20 GM/30ML solution 10 mL  10 mL BID Nelson Persaud M.D.   10 mL at 08/30/21 1743   • ondansetron (ZOFRAN ODT) dispertab 4 mg  4 mg Q4HRS PRN Nelson Persaud M.D.       • senna-docusate (PERICOLACE or SENOKOT S) 8.6-50 MG per tablet 2 Tablet  2 Tablet BID Nelson Persaud M.D.   2 Tablet at 09/07/21 0536    And   • polyethylene glycol/lytes (MIRALAX) PACKET 1 Packet  1 Packet QDAY PRN Nelson Persaud M.D.   1 Packet at 08/30/21 1743    And   • magnesium hydroxide (MILK OF MAGNESIA) suspension 30 mL  30 mL QDAY PRN Nelson Persaud M.D.        And   • bisacodyl (DULCOLAX) suppository 10 mg  10 mg QDAY PRN Nelson Persaud M.D.       • lactated ringers infusion   Continuous Nelson Persaud M.D. 150 mL/hr at 09/07/21 1128 New Bag at 09/07/21 1128   Last reviewed on 8/27/2021 12:00 AM by Fady Rushing    Allergies   Allergen Reactions   • Sulfa Drugs      Reaction unknown       Physical Exam  Body mass index is 34.51 kg/m². /59   Pulse 92   Temp 36.7 °C (98 °F) (Temporal)   Resp 14   Ht 1.803 m (5' 10.98\")   Wt 112 kg (247 lb 5.7 oz)   SpO2 100%    Vitals:    09/07/21 0300 09/07/21 0400 09/07/21 0500 09/07/21 0600   BP: 116/63 109/65 114/51 114/59   Pulse: 85 87 89 92   Resp: 12 12 17 14   Temp:  36.8 °C (98.2 °F)  36.7 °C (98 °F)   TempSrc:  Temporal  Temporal   SpO2: 97% 100% 100% 100%   Weight:  112 kg (247 lb 5.7 oz)     Height:        Oxygen Therapy:  Pulse Oximetry: 100 %, O2 (LPM): 1, O2 Delivery Device: Silicone Nasal Cannula    General: no apparent distress  Neck: + JVD   Lungs: normal effort,  without crackles, no wheezing or rhonchi  Heart: normal rate, regular rhythm, no murmur, no rub  EXT: 3+ lower extremity edema extending up to the " abdomen.  Abdomen: soft, non tender, non distended  Neurological: No focal deficits, no facial asymmetry.  Normal speech  Psychiatric: Appropriate affect, alert and oriented x 3  Skin: Warm extremities, no rash.  Scattered ecchymosis.    Labs (personally reviewed):     Lab Results   Component Value Date/Time    SODIUM 142 2021 05:54 AM    POTASSIUM 3.0 (L) 2021 05:54 AM    CHLORIDE 110 2021 05:54 AM    CO2 27 2021 05:54 AM    GLUCOSE 150 (H) 2021 05:54 AM    BUN 17 2021 05:54 AM    CREATININE 0.49 (L) 2021 05:54 AM     Lab Results   Component Value Date/Time    ALKPHOSPHAT 119 (H) 2021 05:54 AM    ASTSGOT 15 2021 05:54 AM    ALTSGPT 8 2021 05:54 AM    TBILIRUBIN 0.9 2021 05:54 AM      Lab Results   Component Value Date/Time    CHOLSTRLTOT 166 2020 06:21 AM     (H) 2020 06:21 AM    HDL 39 (A) 2020 06:21 AM    TRIGLYCERIDE 87 2021 05:54 AM         Cardiac Imaging and Procedures Review:      Personal Telemetry Review:  SR in the 90s    Echo 2021:  CONCLUSIONS  Technically challenging study despite contrast.  Left ventricular ejection fraction is visually estimated to be 25%,   severely reduced.  Severe hypo to akinesis of the mid through apical LAD territory.  Laminar thrombus is observed in the left ventricular apex, appears old   vs recent.  Estimated right ventricular systolic pressure is 40 mmHg.  Right atrial pressure is estimated to be 8 mmHg.  Pleural effusion present.      Assessment and Medical Decision Makin   paroxysmal atrial fibrillation   -    Maintaining SR.  -    Continue amiodarone load as outlined.   -    Not eligible for OAC at this time due pancytopenia with severe thrombocytopenia    2   Acute HRrEF, EF 25% eitology undetermined at this time, though suspicious for ischemia given WMA on echo.   -    Can continue metoprolol, can change to XL at d/c to meet guidelines  -    Continue  spironolactone.  -    BP low, no room to add ACEI/ARB/ARNI    3   LV thrombus   -    Again not a candidate for anticoagulation at this time   -    May be added in the future should his blood counts improve    4   Hepatocellular carcinoma  5   cirrhosis secondary to ETOH  6   RUQ abscess, peritonitis s/p laparotomy, irrigation/debridement      Cardiology will sign off at this time.  Please contact our service directly with further questions/concerns.      Priya Golden PA-C  [facility]

## 2021-09-07 NOTE — WOUND TEAM
" [facility]  Inpatient Services  New Ostomy Management & Teaching    HPI:  Reviewed  PMH: Reviewed   SH: Reviewed    Subjective: \"I think I need to poop\"     Objective: Appliance intact but leaked between the flange and pouch.  large amount of watery yellow with pieces of yellow soft output.     Ileostomy 08/31/21 Loop RLQ (Active)   Wound Image   09/01/21 1030   Stomal Appliance Assessment Leaking;Changed    Stoma Assessment Beefy red;Red    Stoma Shape Budded Greater Than One Inch;Oval    Stoma Size (in) 2    Peristomal Assessment Intact    Mucocutaneous Junction Intact    Treatment Appliance Changed;Cleansed with water/washcloth    Peristomal Protectant Paste Ring    Stomal Appliance Paste Ring, 2\";2 3/4\" (70mm) CTF    Output (mL) 140 mL    Output Color Yellow    WOUND RN ONLY - Stomal Appliance  2 Piece;Paste Ring, 2\";2 3/4\" (70mm) CTF    Appliance (Pouch) # [id number]    Appliance Brand [place]    Secure Start completed Not Medically Stable    WOUND NURSE ONLY - Time Spent with Patient (mins) 40       Ostomy Appliance (type and size): 2 3/4\" 2 piece and paste ring    Interventions: Removed current appliance using push pull method. Cleansed peristomal area with moist warm wash cloths. Pat dry.  cut barrier to fit. Paste ring applied to barrier, applied barrier to skin, attached bag and then closed.  Ordered additional supplies and high output pouch.     Pt education: Questions and concerns addressed    Evaluation: Stoma viable and intact. No separation or wounds noted at this time. Patient unable for learning and hands on teaching at this time.       Flatus: Present  Stool Output: large, yellow and liquid  Diet: NPO  Mobility: Not Mobilizing    Plan: Ostomy nurses to continue to follow for ostomy needs and teaching until discharge    Anticipated discharge needs: Supplies, supplier information, possible HH, outpatient ostomy clinic, Skilled Nursing/Rehab     Secure Start Signed Not Medically Stable  " wants wife to sign  Outpatient Referral Placed Not Medically Stable  5 Sets of appliances in Ostomy bag for discharge Not Medically Stable    INSURANCE OPTIONS: [employer] [facility].                 correction [facility])              MediCARE/MEDICAID & All other Private Insurance companies (Prism Form)              MediCAID & Fee for Service (Care Chest Paperwork + Prism Form)                            Form signed/Catalog Marked and Copy left with patient OR medicaid paperwork given to patient      Anticipated Discharge Plans:  Other TBD

## 2021-09-08 ENCOUNTER — APPOINTMENT (OUTPATIENT)
Dept: RADIOLOGY | Facility: MEDICAL CENTER | Age: 71
DRG: 853 | End: 2021-09-08
Attending: SURGERY
Payer: MEDICARE

## 2021-09-08 PROBLEM — E27.40 ADRENAL INSUFFICIENCY (HCC): Status: ACTIVE | Noted: 2021-09-08

## 2021-09-08 LAB
ALBUMIN SERPL BCP-MCNC: 2 G/DL (ref 3.2–4.9)
ALBUMIN/GLOB SERPL: 1 G/DL
ALP SERPL-CCNC: 104 U/L (ref 30–99)
ALT SERPL-CCNC: 9 U/L (ref 2–50)
AMMONIA PLAS-SCNC: 18 UMOL/L (ref 11–45)
ANION GAP SERPL CALC-SCNC: 5 MMOL/L (ref 7–16)
ANION GAP SERPL CALC-SCNC: 6 MMOL/L (ref 7–16)
ANION GAP SERPL CALC-SCNC: 7 MMOL/L (ref 7–16)
ANISOCYTOSIS BLD QL SMEAR: ABNORMAL
AST SERPL-CCNC: 18 U/L (ref 12–45)
BASOPHILS # BLD AUTO: 0 % (ref 0–1.8)
BASOPHILS # BLD: 0 K/UL (ref 0–0.12)
BILIRUB SERPL-MCNC: 0.9 MG/DL (ref 0.1–1.5)
BUN SERPL-MCNC: 18 MG/DL (ref 8–22)
BUN SERPL-MCNC: 18 MG/DL (ref 8–22)
BUN SERPL-MCNC: 19 MG/DL (ref 8–22)
CA-I SERPL-SCNC: 1.2 MMOL/L (ref 1.1–1.3)
CALCIUM SERPL-MCNC: 8.2 MG/DL (ref 8.5–10.5)
CALCIUM SERPL-MCNC: 8.5 MG/DL (ref 8.5–10.5)
CALCIUM SERPL-MCNC: 8.7 MG/DL (ref 8.5–10.5)
CHLORIDE SERPL-SCNC: 109 MMOL/L (ref 96–112)
CHLORIDE SERPL-SCNC: 109 MMOL/L (ref 96–112)
CHLORIDE SERPL-SCNC: 110 MMOL/L (ref 96–112)
CO2 SERPL-SCNC: 27 MMOL/L (ref 20–33)
CO2 SERPL-SCNC: 29 MMOL/L (ref 20–33)
CO2 SERPL-SCNC: 30 MMOL/L (ref 20–33)
CREAT SERPL-MCNC: 0.49 MG/DL (ref 0.5–1.4)
CREAT SERPL-MCNC: 0.5 MG/DL (ref 0.5–1.4)
CREAT SERPL-MCNC: 0.5 MG/DL (ref 0.5–1.4)
EOSINOPHIL # BLD AUTO: 0 K/UL (ref 0–0.51)
EOSINOPHIL NFR BLD: 0 % (ref 0–6.9)
ERYTHROCYTE [DISTWIDTH] IN BLOOD BY AUTOMATED COUNT: 81.6 FL (ref 35.9–50)
GLOBULIN SER CALC-MCNC: 2.1 G/DL (ref 1.9–3.5)
GLUCOSE SERPL-MCNC: 124 MG/DL (ref 65–99)
GLUCOSE SERPL-MCNC: 136 MG/DL (ref 65–99)
GLUCOSE SERPL-MCNC: 164 MG/DL (ref 65–99)
HCT VFR BLD AUTO: 31.9 % (ref 42–52)
HGB BLD-MCNC: 10.1 G/DL (ref 14–18)
LYMPHOCYTES # BLD AUTO: 0.11 K/UL (ref 1–4.8)
LYMPHOCYTES NFR BLD: 3.5 % (ref 22–41)
MACROCYTES BLD QL SMEAR: ABNORMAL
MAGNESIUM SERPL-MCNC: 1.8 MG/DL (ref 1.5–2.5)
MANUAL DIFF BLD: ABNORMAL
MCH RBC QN AUTO: 32.3 PG (ref 27–33)
MCHC RBC AUTO-ENTMCNC: 31.7 G/DL (ref 33.7–35.3)
MCV RBC AUTO: 101.9 FL (ref 81.4–97.8)
MICROCYTES BLD QL SMEAR: ABNORMAL
MONOCYTES # BLD AUTO: 0.18 K/UL (ref 0–0.85)
MONOCYTES NFR BLD AUTO: 6.1 % (ref 0–13.4)
MORPHOLOGY BLD-IMP: NORMAL
MYELOCYTES NFR BLD MANUAL: 0.8 %
NEUTROPHILS # BLD AUTO: 2.69 K/UL (ref 1.82–7.42)
NEUTROPHILS NFR BLD: 74.8 % (ref 44–72)
NEUTS BAND NFR BLD MANUAL: 14.8 % (ref 0–10)
NRBC # BLD AUTO: 0 K/UL
NRBC BLD-RTO: 0 /100 WBC
PLATELET # BLD AUTO: 30 K/UL (ref 164–446)
PLATELET BLD QL SMEAR: NORMAL
PLATELETS.RETICULATED NFR BLD AUTO: 2 K/UL (ref 0.6–13.1)
PMV BLD AUTO: 10.1 FL (ref 9–12.9)
POTASSIUM SERPL-SCNC: 3.2 MMOL/L (ref 3.6–5.5)
POTASSIUM SERPL-SCNC: 3.4 MMOL/L (ref 3.6–5.5)
POTASSIUM SERPL-SCNC: 3.5 MMOL/L (ref 3.6–5.5)
POTASSIUM SERPL-SCNC: 3.7 MMOL/L (ref 3.6–5.5)
PROT SERPL-MCNC: 4.1 G/DL (ref 6–8.2)
RBC # BLD AUTO: 3.13 M/UL (ref 4.7–6.1)
RBC BLD AUTO: PRESENT
SODIUM SERPL-SCNC: 142 MMOL/L (ref 135–145)
SODIUM SERPL-SCNC: 144 MMOL/L (ref 135–145)
SODIUM SERPL-SCNC: 146 MMOL/L (ref 135–145)
WBC # BLD AUTO: 3 K/UL (ref 4.8–10.8)

## 2021-09-08 PROCEDURE — 700102 HCHG RX REV CODE 250 W/ 637 OVERRIDE(OP): Performed by: SURGERY

## 2021-09-08 PROCEDURE — P9045 ALBUMIN (HUMAN), 5%, 250 ML: HCPCS | Mod: JG | Performed by: SURGERY

## 2021-09-08 PROCEDURE — 85027 COMPLETE CBC AUTOMATED: CPT

## 2021-09-08 PROCEDURE — 700102 HCHG RX REV CODE 250 W/ 637 OVERRIDE(OP): Performed by: NURSE PRACTITIONER

## 2021-09-08 PROCEDURE — 99291 CRITICAL CARE FIRST HOUR: CPT | Performed by: SURGERY

## 2021-09-08 PROCEDURE — A9270 NON-COVERED ITEM OR SERVICE: HCPCS | Performed by: SURGERY

## 2021-09-08 PROCEDURE — A9270 NON-COVERED ITEM OR SERVICE: HCPCS | Performed by: INTERNAL MEDICINE

## 2021-09-08 PROCEDURE — 82140 ASSAY OF AMMONIA: CPT

## 2021-09-08 PROCEDURE — 83735 ASSAY OF MAGNESIUM: CPT

## 2021-09-08 PROCEDURE — 85055 RETICULATED PLATELET ASSAY: CPT

## 2021-09-08 PROCEDURE — A9270 NON-COVERED ITEM OR SERVICE: HCPCS | Performed by: NURSE PRACTITIONER

## 2021-09-08 PROCEDURE — 97605 NEG PRS WND THER DME<=50SQCM: CPT

## 2021-09-08 PROCEDURE — 770022 HCHG ROOM/CARE - ICU (200)

## 2021-09-08 PROCEDURE — 71045 X-RAY EXAM CHEST 1 VIEW: CPT

## 2021-09-08 PROCEDURE — 84132 ASSAY OF SERUM POTASSIUM: CPT

## 2021-09-08 PROCEDURE — 82330 ASSAY OF CALCIUM: CPT

## 2021-09-08 PROCEDURE — 85007 BL SMEAR W/DIFF WBC COUNT: CPT

## 2021-09-08 PROCEDURE — 700111 HCHG RX REV CODE 636 W/ 250 OVERRIDE (IP): Performed by: SURGERY

## 2021-09-08 PROCEDURE — 80053 COMPREHEN METABOLIC PANEL: CPT

## 2021-09-08 PROCEDURE — 80048 BASIC METABOLIC PNL TOTAL CA: CPT | Mod: 91

## 2021-09-08 PROCEDURE — 700102 HCHG RX REV CODE 250 W/ 637 OVERRIDE(OP): Performed by: INTERNAL MEDICINE

## 2021-09-08 RX ORDER — POTASSIUM CHLORIDE 14.9 MG/ML
20 INJECTION INTRAVENOUS ONCE
Status: COMPLETED | OUTPATIENT
Start: 2021-09-08 | End: 2021-09-08

## 2021-09-08 RX ORDER — ALBUMIN, HUMAN INJ 5% 5 %
50 SOLUTION INTRAVENOUS ONCE
Status: COMPLETED | OUTPATIENT
Start: 2021-09-08 | End: 2021-09-08

## 2021-09-08 RX ORDER — FUROSEMIDE 10 MG/ML
80 INJECTION INTRAMUSCULAR; INTRAVENOUS ONCE
Status: COMPLETED | OUTPATIENT
Start: 2021-09-08 | End: 2021-09-08

## 2021-09-08 RX ORDER — NOREPINEPHRINE BITARTRATE 0.03 MG/ML
0-30 INJECTION, SOLUTION INTRAVENOUS CONTINUOUS
Status: DISCONTINUED | OUTPATIENT
Start: 2021-09-08 | End: 2021-09-10

## 2021-09-08 RX ORDER — FUROSEMIDE 10 MG/ML
40 INJECTION INTRAMUSCULAR; INTRAVENOUS ONCE
Status: COMPLETED | OUTPATIENT
Start: 2021-09-08 | End: 2021-09-08

## 2021-09-08 RX ORDER — MAGNESIUM SULFATE HEPTAHYDRATE 40 MG/ML
2 INJECTION, SOLUTION INTRAVENOUS ONCE
Status: COMPLETED | OUTPATIENT
Start: 2021-09-08 | End: 2021-09-08

## 2021-09-08 RX ORDER — POTASSIUM CHLORIDE 7.45 MG/ML
10 INJECTION INTRAVENOUS ONCE
Status: COMPLETED | OUTPATIENT
Start: 2021-09-08 | End: 2021-09-08

## 2021-09-08 RX ADMIN — POTASSIUM CHLORIDE 20 MEQ: 14.9 INJECTION, SOLUTION INTRAVENOUS at 01:22

## 2021-09-08 RX ADMIN — FAMOTIDINE 20 MG: 20 TABLET ORAL at 05:35

## 2021-09-08 RX ADMIN — ENOXAPARIN SODIUM 40 MG: 40 INJECTION SUBCUTANEOUS at 05:38

## 2021-09-08 RX ADMIN — POTASSIUM CHLORIDE 20 MEQ: 14.9 INJECTION, SOLUTION INTRAVENOUS at 18:33

## 2021-09-08 RX ADMIN — POTASSIUM CHLORIDE 20 MEQ: 14.9 INJECTION, SOLUTION INTRAVENOUS at 09:05

## 2021-09-08 RX ADMIN — ATORVASTATIN CALCIUM 40 MG: 40 TABLET, FILM COATED ORAL at 17:15

## 2021-09-08 RX ADMIN — ALBUMIN (HUMAN) 50 G: 2.5 SOLUTION INTRAVENOUS at 18:25

## 2021-09-08 RX ADMIN — DOCUSATE SODIUM 50 MG AND SENNOSIDES 8.6 MG 2 TABLET: 8.6; 5 TABLET, FILM COATED ORAL at 05:35

## 2021-09-08 RX ADMIN — METOPROLOL TARTRATE 6.25 MG: 25 TABLET, FILM COATED ORAL at 17:16

## 2021-09-08 RX ADMIN — AMIODARONE HYDROCHLORIDE 400 MG: 200 TABLET ORAL at 05:35

## 2021-09-08 RX ADMIN — FUROSEMIDE 40 MG: 10 INJECTION, SOLUTION INTRAMUSCULAR; INTRAVENOUS at 20:47

## 2021-09-08 RX ADMIN — ALBUMIN (HUMAN) 50 G: 2.5 SOLUTION INTRAVENOUS at 09:05

## 2021-09-08 RX ADMIN — AMIODARONE HYDROCHLORIDE 200 MG: 200 TABLET ORAL at 17:15

## 2021-09-08 RX ADMIN — THIAMINE HCL TAB 100 MG 100 MG: 100 TAB at 09:07

## 2021-09-08 RX ADMIN — FAMOTIDINE 20 MG: 20 TABLET ORAL at 17:15

## 2021-09-08 RX ADMIN — SPIRONOLACTONE 50 MG: 50 TABLET ORAL at 05:36

## 2021-09-08 RX ADMIN — POTASSIUM CHLORIDE 10 MEQ: 10 INJECTION, SOLUTION INTRAVENOUS at 20:48

## 2021-09-08 RX ADMIN — MAGNESIUM SULFATE 2 G: 2 INJECTION INTRAVENOUS at 17:17

## 2021-09-08 RX ADMIN — POTASSIUM CHLORIDE 20 MEQ: 14.9 INJECTION, SOLUTION INTRAVENOUS at 12:52

## 2021-09-08 RX ADMIN — FUROSEMIDE 80 MG: 10 INJECTION, SOLUTION INTRAMUSCULAR; INTRAVENOUS at 11:58

## 2021-09-08 RX ADMIN — HYDROCORTISONE SODIUM SUCCINATE 50 MG: 100 INJECTION, POWDER, FOR SOLUTION INTRAMUSCULAR; INTRAVENOUS at 05:37

## 2021-09-08 RX ADMIN — HYDROMORPHONE HYDROCHLORIDE 0.5 MG: 1 INJECTION, SOLUTION INTRAMUSCULAR; INTRAVENOUS; SUBCUTANEOUS at 15:21

## 2021-09-08 ASSESSMENT — PAIN DESCRIPTION - PAIN TYPE
TYPE: ACUTE PAIN
TYPE: ACUTE PAIN;SURGICAL PAIN
TYPE: ACUTE PAIN

## 2021-09-08 ASSESSMENT — FIBROSIS 4 INDEX: FIB4 SCORE: 8.37

## 2021-09-08 NOTE — CARE PLAN
The patient is Watcher - Medium risk of patient condition declining or worsening      Progress made toward(s) clinical / shift goals:    Problem: Knowledge Deficit - Standard  Goal: Patient and family/care givers will demonstrate understanding of plan of care, disease process/condition, diagnostic tests and medications  Outcome: Progressing     Problem: Safety - Medical Restraint  Goal: Remains free of injury from restraints (Restraint for Interference with Medical Device)  Outcome: Progressing     Problem: Pain - Standard  Goal: Alleviation of pain or a reduction in pain to the patient’s comfort goal  Outcome: Progressing     Problem: Cardiac - Atrial Fibrillation  Goal: Complications related to anticoagulation for unconverted atrial fibrillation will be avoided or minimized  Outcome: Progressing  Note: Patient transitioned to PO Amiodarone, plan to continue dosing per MD order.     Problem: Mobility  Goal: Patient's ability to tolerate increased activity will improve  Outcome: Progressing  Note: Patient mobilized with OT in the morning, reported feeling dizzy, plan to try again in the evening with PT.     Problem: Self Care  Goal: Patient will have the ability to perform ADLs independently or with assistance (bathe, groom, dress, toilet and feed)  Outcome: Progressing       Patient is not progressing towards the following goals:      Problem: Hemodynamics  Goal: Patient's hemodynamics, fluid balance and neurologic status will be stable or improve  Outcome: Not Progressing  Note: Patient edematous, Lasix administered, patient became dizzy and hypotensive. Discussed with MD, plan to hold next Lasix dose and readdress in AM rounds based on patient's blood pressures throughout the night.

## 2021-09-08 NOTE — CARE PLAN
Problem: Self Care  Goal: Patient will have the ability to perform ADLs independently or with assistance (bathe, groom, dress, toilet and feed)  Outcome: Not Progressing     Problem: Knowledge Deficit - Standard  Goal: Patient and family/care givers will demonstrate understanding of plan of care, disease process/condition, diagnostic tests and medications  Outcome: Progressing     Problem: Safety - Medical Restraint  Goal: Remains free of injury from restraints (Restraint for Interference with Medical Device)  Outcome: Progressing   The patient is Watcher - Medium risk of patient condition declining or worsening    Shift Goals  Clinical Goals: correct K levels, monitor vitals  Patient Goals: rest  Family Goals: no family at bedside    Progress made toward(s) clinical / shift goals:  see above  Patient is not progressing towards the following goals:      Problem: Self Care  Goal: Patient will have the ability to perform ADLs independently or with assistance (bathe, groom, dress, toilet and feed)  Outcome: Not Progressing

## 2021-09-08 NOTE — CARE PLAN
The patient is Watcher - Medium risk of patient condition declining or worsening    Shift Goals  Clinical Goals: Improved neuro exam, hemodynamic stability, mobility  Patient Goals: Rest  Family Goals: No family at bedside     Progress made toward(s) clinical / shift goals:    Problem: Skin Care - Ostomy  Goal: Skin remains free from irritation  Outcome: Progressing     Problem: Safety - Medical Restraint  Goal: Remains free of injury from restraints (Restraint for Interference with Medical Device)  Outcome: Met   Patient has remained out of restraints entire shift.     Problem: Hemodynamics  Goal: Patient's hemodynamics, fluid balance and neurologic status will be stable or improve  Outcome: Progressing   Hemodynamic status remains stable with albumin and Lasix administration.     Patient is not progressing towards the following goals:    Problem: Mobility  Goal: Patient's ability to tolerate increased activity will improve  Outcome: Not Progressing   Patient did not tolerate sitting edge of bed this afternoon. Reported feeling light headed and dizzy about 2 minutes after RN and CNA sat him up. Q2h turns remain in place.

## 2021-09-08 NOTE — PROGRESS NOTES
"Trauma / Surgical Daily Progress Note    Date of Service  9/7/2021    Chief Complaint  71 y.o. male admitted 8/26/2021 with     Interval Events  No acute clinical changes  Remains off vent but status is tenuous: If no improvement will order thoracentesis.  Continuing tube feeding  Mobilizing with PT and OT  Afebrile and white count okay off antibiotics: Trend  Remains 47 L positive: Aggressive diuresis and K scale ordered  Keep central line for K scale    Review of Systems  Review of Systems   Constitutional: Positive for fatigue and unexpected weight change. Negative for fever.   Respiratory: Positive for shortness of breath. Negative for cough, wheezing and stridor.    Gastrointestinal: Positive for abdominal distention and abdominal pain. Negative for constipation, nausea and vomiting.   Musculoskeletal: Positive for myalgias. Negative for arthralgias, back pain and neck pain.   Neurological: Negative.         Vital Signs for last 24 hours  Temp:  [36.3 °C (97.4 °F)-37.1 °C (98.8 °F)] 37.1 °C (98.8 °F)  Pulse:  [82-92] 90  Resp:  [12-26] 22  BP: ()/(51-68) 98/56  SpO2:  [80 %-100 %] 100 %    Hemodynamic parameters for last 24 hours  CVP:  [8 MM HG-13 MM HG] 13 MM HG  BP (!) 98/56   Pulse 90   Temp 37.1 °C (98.8 °F) (Temporal)   Resp (!) 22   Ht 1.803 m (5' 10.98\")   Wt 112 kg (247 lb 5.7 oz)   SpO2 100%   BMI 34.51 kg/m²     Respiratory Data     Respiration: (!) 22, Pulse Oximetry: 100 %     Work Of Breathing / Effort: Within Normal Limits  RUL Breath Sounds: Clear, RML Breath Sounds: Clear, RLL Breath Sounds: Diminished, ASHLEY Breath Sounds: Clear, LLL Breath Sounds: Diminished    Physical Exam  Physical Exam  Vitals and nursing note reviewed.   HENT:      Head: Normocephalic and atraumatic.   Eyes:      Conjunctiva/sclera: Conjunctivae normal.      Pupils: Pupils are equal, round, and reactive to light.   Cardiovascular:      Rate and Rhythm: Normal rate and regular rhythm.      Pulses: Normal " pulses.      Heart sounds: Normal heart sounds.   Pulmonary:      Effort: Pulmonary effort is normal.      Breath sounds: No stridor. Examination of the right-lower field reveals decreased breath sounds. Examination of the left-lower field reveals decreased breath sounds. Decreased breath sounds present. No wheezing.   Abdominal:      General: There is no distension.      Palpations: Abdomen is soft.      Tenderness: There is no abdominal tenderness.      Comments: VAC intact  RUQ drain brown watery  Left drain serosanguineous   Genitourinary:     Comments: underwood  Musculoskeletal:         General: Normal range of motion.      Right lower leg: Edema present.      Left lower leg: Edema present.      Comments: Diffuse anasarca   Skin:     General: Skin is warm and dry.      Coloration: Skin is not pale.   Neurological:      General: No focal deficit present.      Mental Status: He is disoriented.         Laboratory  Recent Results (from the past 24 hour(s))   Comp Metabolic Panel    Collection Time: 09/07/21  5:54 AM   Result Value Ref Range    Sodium 142 135 - 145 mmol/L    Potassium 3.0 (L) 3.6 - 5.5 mmol/L    Chloride 110 96 - 112 mmol/L    Co2 27 20 - 33 mmol/L    Anion Gap 5.0 (L) 7.0 - 16.0    Glucose 150 (H) 65 - 99 mg/dL    Bun 17 8 - 22 mg/dL    Creatinine 0.49 (L) 0.50 - 1.40 mg/dL    Calcium 8.4 (L) 8.5 - 10.5 mg/dL    AST(SGOT) 15 12 - 45 U/L    ALT(SGPT) 8 2 - 50 U/L    Alkaline Phosphatase 119 (H) 30 - 99 U/L    Total Bilirubin 0.9 0.1 - 1.5 mg/dL    Albumin 2.4 (L) 3.2 - 4.9 g/dL    Total Protein 4.3 (L) 6.0 - 8.2 g/dL    Globulin 1.9 1.9 - 3.5 g/dL    A-G Ratio 1.3 g/dL   IONIZED CALCIUM    Collection Time: 09/07/21  5:54 AM   Result Value Ref Range    Ionized Calcium 1.2 1.1 - 1.3 mmol/L   CBC WITH DIFFERENTIAL    Collection Time: 09/07/21  5:54 AM   Result Value Ref Range    WBC 4.0 (L) 4.8 - 10.8 K/uL    RBC 3.54 (L) 4.70 - 6.10 M/uL    Hemoglobin 11.5 (L) 14.0 - 18.0 g/dL    Hematocrit 36.7 (L) 42.0  - 52.0 %    .7 (H) 81.4 - 97.8 fL    MCH 32.5 27.0 - 33.0 pg    MCHC 31.3 (L) 33.7 - 35.3 g/dL    RDW 80.6 (H) 35.9 - 50.0 fL    Platelet Count 45 (LL) 164 - 446 K/uL    MPV 9.9 9.0 - 12.9 fL    Neutrophils-Polys 80.70 (H) 44.00 - 72.00 %    Lymphocytes 6.10 (L) 22.00 - 41.00 %    Monocytes 4.40 0.00 - 13.40 %    Eosinophils 0.00 0.00 - 6.90 %    Basophils 0.00 0.00 - 1.80 %    Nucleated RBC 0.50 /100 WBC    Neutrophils (Absolute) 3.58 1.82 - 7.42 K/uL    Lymphs (Absolute) 0.24 (L) 1.00 - 4.80 K/uL    Monos (Absolute) 0.18 0.00 - 0.85 K/uL    Eos (Absolute) 0.00 0.00 - 0.51 K/uL    Baso (Absolute) 0.00 0.00 - 0.12 K/uL    NRBC (Absolute) 0.02 K/uL    Anisocytosis 1+     Macrocytosis 1+    AMMONIA    Collection Time: 09/07/21  5:54 AM   Result Value Ref Range    Ammonia 26 11 - 45 umol/L   Triglyceride    Collection Time: 09/07/21  5:54 AM   Result Value Ref Range    Triglycerides 87 0 - 149 mg/dL   ESTIMATED GFR    Collection Time: 09/07/21  5:54 AM   Result Value Ref Range    GFR If African American >60 >60 mL/min/1.73 m 2    GFR If Non African American >60 >60 mL/min/1.73 m 2   DIFFERENTIAL MANUAL    Collection Time: 09/07/21  5:54 AM   Result Value Ref Range    Bands-Stabs 8.80 0.00 - 10.00 %    Manual Diff Status PERFORMED    PERIPHERAL SMEAR REVIEW    Collection Time: 09/07/21  5:54 AM   Result Value Ref Range    Peripheral Smear Review see below    PLATELET ESTIMATE    Collection Time: 09/07/21  5:54 AM   Result Value Ref Range    Plt Estimation Marked Decrease    MORPHOLOGY    Collection Time: 09/07/21  5:54 AM   Result Value Ref Range    RBC Morphology Present     Toxic Gran Slight    IMMATURE PLT FRACTION    Collection Time: 09/07/21  5:54 AM   Result Value Ref Range    Imm. Plt Fraction 3.2 0.6 - 13.1 K/uL   POTASSIUM SERUM (K)    Collection Time: 09/07/21  1:10 PM   Result Value Ref Range    Potassium 3.3 (L) 3.6 - 5.5 mmol/L   POTASSIUM SERUM (K)    Collection Time: 09/07/21  5:40 PM   Result  Value Ref Range    Potassium 3.2 (L) 3.6 - 5.5 mmol/L       Fluids    Intake/Output Summary (Last 24 hours) at 9/7/2021 1824  Last data filed at 9/7/2021 1800  Gross per 24 hour   Intake 4782 ml   Output 5925 ml   Net -1143 ml       Core Measures & Quality Metrics  Medications reviewed, Radiology images reviewed and Labs reviewed  Triana catheter: Critically Ill - Requiring Accurate Measurement of Urinary Output      DVT Prophylaxis: Enoxaparin (Lovenox)  DVT prophylaxis - mechanical: SCDs  Ulcer prophylaxis: Not indicated    Assessed for rehab: Patient was assess for and/or received rehabilitation services during this hospitalization    CHELI Score  ETOH Screening    Assessment/Plan  * Perforated viscus- (present on admission)  Assessment & Plan  8/26 - Ceftriaxone and metronidazole initiated  8/30 - Peritoneal culture positive for Streptococcus anginosus  8/31 - Abscess drainage and ileostomy creation, antibiotics altered to Zosyn  8/31- diverting ileostomy with MARIANA drainage of feculent material.  Abdomen too frozen for formal resection  9/6 - Zosyn day 7 of 7.     Respiratory failure following trauma and surgery (HCC)  Assessment & Plan  Remained on ventilator post operatively  9/5 extubated  Aggressive pulmonary toilette    Septic shock (HCC)- (present on admission)  Assessment & Plan  Secondary to liver abscess  Ongoing volume resuscitation and pressors support  abx rocephin and flagyl  8/30-feculent drain output in late evening  8/31- return to OR for washout, drainage of feculent fluid and diverting ileostomy.  9/3: Remains on small doses of norepinephrine.  Vasopressin has been discontinued.  Remains on every 8 hydrocortisone.  Rapid taper as appropriate.  9/4 wean pressors to off.     Hepatocellular carcinoma (HCC)- (present on admission)  Assessment & Plan  Advanced tumor involving most of the right lobe. Immunotherapy/chemo prior to presentation with bowel perforation.  Associated abscess with apparent  involvement of hepatic flexure.  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  Diverting ileostomy.    Pleural effusion, bilateral  Assessment & Plan  8/31 moderate bilateral pleural effusions on CT      Atrial fibrillation (HCC)- (present on admission)  Assessment & Plan  Diuresis  Aggressive electrolyte replacement  Metoprolol and amiodarone   8/31- converted out in OR, continues on amiodarone gtt  9/3: Amiodarone drip  9/5 transition to enteral amiodarone for 2 weeks    Cirrhosis of liver with ascites (HCC)  Assessment & Plan  9/3 - 9/6 high volume ascites output from drains (R MARIANA in abscess cavity, L MARIANA in pelvis.   General surgery: can not remove drains.  Continued round-the-clock albumin and fluid therapy.  Diuretic therapy.    Hypokalemia- (present on admission)  Assessment & Plan  Potassium persistently low  Starting Lasix regimen so we will place patient on K scale    Normochromic anemia- (present on admission)  Assessment & Plan  Trend and transfuse for hgb <7.  Hgb today is 7.8    Thrombocytopenia (HCC)- (present on admission)  Assessment & Plan  9/2: Transfused pheresis pack for platelet count 40 in the setting of hemoglobin below 7  Likely mixed etiology advanced liver disease and resolving sepsis.  Trend, transfuse for clinical bleeding.          Discussed patient condition with RN, RT, Pharmacy,  and Patient.

## 2021-09-08 NOTE — PROGRESS NOTES
Surgery  8/27 diverting ileostomy for colon perforation unable to be resected/mobilized  Cirrhosis  Lorraine Bright Bella  Awake interactive  Seems less Confused   indicates comfortable  Abdomen soft, stoma pink and productive, VAC in place   drains in place total 1770 right 990 left 780  Plan:  Appreciate ICU care  Nutritional support tf  Cont abx  Stoma care  Drain care  VAC x3 weekly

## 2021-09-08 NOTE — PROGRESS NOTES
4 Eyes Skin Assessment Completed by Evelyn RN and Trevor RN.     Head WDL  Ears WDL  Nose WDL  Mouth WDL  Neck WDL  Breast/Chest WDL  Shoulder Blades Redness, Blanchable - mepilex in place  Spine Redness, Blanchable - mepilex in place  (R) Arm/Elbow/Hand Redness and Bruising  (L) Arm/Elbow/Hand Redness, Bruising and Weeping - skin tears  Abdomen Redness, Bruising, Weeping and Incision - bilateral MARINAA drain, midline incision with wound vac in place  Groin Swelling - interdry in place  Scrotum/Coccyx/Buttocks Redness, Blanchable - mepilex in place  (R) Leg Weeping and Edema  (L) Leg Weeping and Edema  (R) Heel/Foot/Toe Edema, Dry  (L) Heel/Foot/Toe Edema, Dry              Devices In Places ECG, Tele Box, Blood Pressure Cuff, Pulse Ox, Triana, SCD's, Cortrak, Central Line, Nasal Cannula and Compression Dressing        Interventions In Place NC W/Ear Foams, Sacral Mepilex, Heel Float Boots, TAP System, Pillows, Q2 Turns, Low Air Loss Mattress, Barrier Cream, ZFlo Pillow and Heels Loaded W/Pillows, Interdry     Possible Skin Injury No     Pictures Uploaded Into Epic N/A  Wound Consult Placed Yes  RN Wound Prevention Protocol Ordered Yes

## 2021-09-08 NOTE — PROGRESS NOTES
Trauma / Surgical Daily Progress Note    Date of Service  9/8/2021    Chief Complaint  71 y.o. male admitted 8/26/2021 with     Interval Events  CRITICAL CARE DECISION MAKING:    Patient examined and discussed with team at bedside.    Addressed pulmonary hygiene concerns as well as oxygenation/ventilation.  Work of breathing seems to be improved today.  Left effusion nearly resolved on x-ray this morning and right effusion smaller.  Hold off on thoracentesis.  Continue aggressive pulmonary hygiene    Delirium seems better as well: Continue to minimize sedation/analgesia with frequent reorientation.  Most recent ammonia was yesterday: 26.    Large volume overload: Weight up more than 50 pounds since admission.  Patient dropped blood pressure with Lasix yesterday, so we tried loading with albumin prior to diuresis with fair amount of success.  We will continue to resuscitate with albumin as needed and diuresis with Lasix.  Watch blood pressure.  Support with Levophed if there is significant hypotension.    Labs reviewed, electrolytes addressed, renal function assessed  Reviewed nutrition strategies, recent indices: Tolerating tube feeding  Addressed GI prophylaxis and bowel frequency: Pepcid and bowel protocol  Assessed/discussed/titrated analgesics and need for sedatives: Minimize narcotics  Addressed DVT prophylaxis: Lovenox and SCDs  Addressed line days, underwood catheter days, access needs: Underwood remains in place during diuresis  Addressed family and discharge concerns      Review of Systems  Review of Systems     Vital Signs for last 24 hours  Temp:  [36.3 °C (97.3 °F)-37.3 °C (99.1 °F)] 36.3 °C (97.3 °F)  Pulse:  [73-94] 85  Resp:  [10-44] 16  BP: ()/(53-70) 108/59  SpO2:  [76 %-100 %] 100 %    Hemodynamic parameters for last 24 hours  CVP:  [7 MM HG-15 MM HG] 10 MM HG    Respiratory Data     Respiration: 16, Pulse Oximetry: 100 %     Work Of Breathing / Effort: Within Normal Limits  RUL Breath Sounds: Clear,  RML Breath Sounds: Clear, RLL Breath Sounds: Diminished, ASHLEY Breath Sounds: Clear, LLL Breath Sounds: Diminished    Physical Exam  Physical Exam  HENT:      Head: Normocephalic and atraumatic.      Nose:      Comments: Core track     Mouth/Throat:      Mouth: Mucous membranes are moist.      Pharynx: Oropharynx is clear.   Eyes:      Extraocular Movements: Extraocular movements intact.      Conjunctiva/sclera: Conjunctivae normal.      Pupils: Pupils are equal, round, and reactive to light.   Cardiovascular:      Rate and Rhythm: Normal rate and regular rhythm.      Pulses: Normal pulses.   Abdominal:      General: There is distension.      Palpations: Abdomen is soft.      Tenderness: There is no abdominal tenderness.      Comments: Dressing intact  Right MARIANA: 990 cc tan thick  Left MARIANA: 780 cc serosanguineous   Genitourinary:     Comments: Triana  Musculoskeletal:         General: Normal range of motion.      Cervical back: Neck supple.      Right lower leg: Edema present.      Left lower leg: Edema present.   Skin:     General: Skin is warm.      Coloration: Skin is not pale.   Neurological:      General: No focal deficit present.      Mental Status: He is disoriented.         Laboratory  Recent Results (from the past 24 hour(s))   POTASSIUM SERUM (K)    Collection Time: 09/07/21  5:40 PM   Result Value Ref Range    Potassium 3.2 (L) 3.6 - 5.5 mmol/L   POTASSIUM SERUM (K)    Collection Time: 09/08/21 12:21 AM   Result Value Ref Range    Potassium 3.4 (L) 3.6 - 5.5 mmol/L   Comp Metabolic Panel    Collection Time: 09/08/21  6:05 AM   Result Value Ref Range    Sodium 142 135 - 145 mmol/L    Potassium 3.5 (L) 3.6 - 5.5 mmol/L    Chloride 110 96 - 112 mmol/L    Co2 27 20 - 33 mmol/L    Anion Gap 5.0 (L) 7.0 - 16.0    Glucose 124 (H) 65 - 99 mg/dL    Bun 18 8 - 22 mg/dL    Creatinine 0.49 (L) 0.50 - 1.40 mg/dL    Calcium 8.2 (L) 8.5 - 10.5 mg/dL    AST(SGOT) 18 12 - 45 U/L    ALT(SGPT) 9 2 - 50 U/L    Alkaline  Phosphatase 104 (H) 30 - 99 U/L    Total Bilirubin 0.9 0.1 - 1.5 mg/dL    Albumin 2.0 (L) 3.2 - 4.9 g/dL    Total Protein 4.1 (L) 6.0 - 8.2 g/dL    Globulin 2.1 1.9 - 3.5 g/dL    A-G Ratio 1.0 g/dL   IONIZED CALCIUM    Collection Time: 09/08/21  6:05 AM   Result Value Ref Range    Ionized Calcium 1.2 1.1 - 1.3 mmol/L   CBC WITH DIFFERENTIAL    Collection Time: 09/08/21  6:05 AM   Result Value Ref Range    WBC 3.0 (L) 4.8 - 10.8 K/uL    RBC 3.13 (L) 4.70 - 6.10 M/uL    Hemoglobin 10.1 (L) 14.0 - 18.0 g/dL    Hematocrit 31.9 (L) 42.0 - 52.0 %    .9 (H) 81.4 - 97.8 fL    MCH 32.3 27.0 - 33.0 pg    MCHC 31.7 (L) 33.7 - 35.3 g/dL    RDW 81.6 (H) 35.9 - 50.0 fL    Platelet Count 30 (LL) 164 - 446 K/uL    MPV 10.1 9.0 - 12.9 fL    Neutrophils-Polys 74.80 (H) 44.00 - 72.00 %    Lymphocytes 3.50 (L) 22.00 - 41.00 %    Monocytes 6.10 0.00 - 13.40 %    Eosinophils 0.00 0.00 - 6.90 %    Basophils 0.00 0.00 - 1.80 %    Nucleated RBC 0.00 /100 WBC    Neutrophils (Absolute) 2.69 1.82 - 7.42 K/uL    Lymphs (Absolute) 0.11 (L) 1.00 - 4.80 K/uL    Monos (Absolute) 0.18 0.00 - 0.85 K/uL    Eos (Absolute) 0.00 0.00 - 0.51 K/uL    Baso (Absolute) 0.00 0.00 - 0.12 K/uL    NRBC (Absolute) 0.00 K/uL    Anisocytosis 3+ (A)     Macrocytosis 1+     Microcytosis 2+ (A)    AMMONIA    Collection Time: 09/08/21  6:05 AM   Result Value Ref Range    Ammonia 18 11 - 45 umol/L   MAGNESIUM    Collection Time: 09/08/21  6:05 AM   Result Value Ref Range    Magnesium 1.8 1.5 - 2.5 mg/dL   ESTIMATED GFR    Collection Time: 09/08/21  6:05 AM   Result Value Ref Range    GFR If African American >60 >60 mL/min/1.73 m 2    GFR If Non African American >60 >60 mL/min/1.73 m 2   DIFFERENTIAL MANUAL    Collection Time: 09/08/21  6:05 AM   Result Value Ref Range    Bands-Stabs 14.80 (H) 0.00 - 10.00 %    Myelocytes 0.80 %    Manual Diff Status PERFORMED    PERIPHERAL SMEAR REVIEW    Collection Time: 09/08/21  6:05 AM   Result Value Ref Range    Peripheral  Smear Review see below    PLATELET ESTIMATE    Collection Time: 09/08/21  6:05 AM   Result Value Ref Range    Plt Estimation Marked Decrease    MORPHOLOGY    Collection Time: 09/08/21  6:05 AM   Result Value Ref Range    RBC Morphology Present    IMMATURE PLT FRACTION    Collection Time: 09/08/21  6:05 AM   Result Value Ref Range    Imm. Plt Fraction 2.0 0.6 - 13.1 K/uL   Basic Metabolic Panel    Collection Time: 09/08/21 11:40 AM   Result Value Ref Range    Sodium 144 135 - 145 mmol/L    Potassium 3.7 3.6 - 5.5 mmol/L    Chloride 109 96 - 112 mmol/L    Co2 29 20 - 33 mmol/L    Glucose 136 (H) 65 - 99 mg/dL    Bun 18 8 - 22 mg/dL    Creatinine 0.50 0.50 - 1.40 mg/dL    Calcium 8.5 8.5 - 10.5 mg/dL    Anion Gap 6.0 (L) 7.0 - 16.0   ESTIMATED GFR    Collection Time: 09/08/21 11:40 AM   Result Value Ref Range    GFR If African American >60 >60 mL/min/1.73 m 2    GFR If Non African American >60 >60 mL/min/1.73 m 2       Fluids    Intake/Output Summary (Last 24 hours) at 9/8/2021 1552  Last data filed at 9/8/2021 1400  Gross per 24 hour   Intake 3180.83 ml   Output 7710 ml   Net -4529.17 ml       Core Measures & Quality Metrics  Core Measures & Quality Metrics  CHELI Score  ETOH Screening    Assessment/Plan  * Perforated viscus- (present on admission)  Assessment & Plan  8/26 - Ceftriaxone and metronidazole initiated  8/30 - Peritoneal culture positive for Streptococcus anginosus  8/31 - Abscess drainage and ileostomy creation, antibiotics altered to Zosyn  8/31- diverting ileostomy with MARIANA drainage of feculent material.  Abdomen too frozen for formal resection  9/6 - Zosyn day 7 of 7.     Fluid overload  Assessment & Plan  Admission weight under 70 kg  Fluid balance +47 L  Step up aggressive diuresis  Trend electrolytes closely    Respiratory failure following trauma and surgery (HCC)  Assessment & Plan  Remained on ventilator post operatively  9/5 extubated  Aggressive pulmonary toilette    Septic shock (HCC)- (present  on admission)  Assessment & Plan  Secondary to liver abscess  Ongoing volume resuscitation and pressors support  abx rocephin and flagyl  8/30-feculent drain output in late evening  8/31- return to OR for washout, drainage of feculent fluid and diverting ileostomy.  9/3: Remains on small doses of norepinephrine.  Vasopressin has been discontinued.  Remains on every 8 hydrocortisone.  Rapid taper as appropriate.  9/4 wean pressors to off.     Hepatocellular carcinoma (HCC)- (present on admission)  Assessment & Plan  Advanced tumor involving most of the right lobe. Immunotherapy/chemo prior to presentation with bowel perforation.  Associated abscess with apparent involvement of hepatic flexure.  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  Diverting ileostomy.    Pleural effusion, bilateral  Assessment & Plan  8/31 moderate bilateral pleural effusions on CT      Atrial fibrillation (HCC)- (present on admission)  Assessment & Plan  Diuresis  Aggressive electrolyte replacement  Metoprolol and amiodarone   8/31- converted out in OR, continues on amiodarone gtt  9/3: Amiodarone drip  9/5 transition to enteral amiodarone for 2 weeks    Cirrhosis of liver with ascites (HCC)  Assessment & Plan  9/3 - 9/6 high volume ascites output from drains (R MARIANA in abscess cavity, L MARIANA in pelvis.   General surgery: can not remove drains.  Continued round-the-clock albumin and fluid therapy.  Diuretic therapy.  9/8 Resume albumin    Hypokalemia- (present on admission)  Assessment & Plan  Potassium persistently low  Starting Lasix regimen so we will place patient on K scale    Normochromic anemia- (present on admission)  Assessment & Plan  Trend and transfuse for hgb <7.  Hgb today is 7.8    Adrenal insufficiency (HCC)  Assessment & Plan  8/29 Cortisol low in the setting of shock  Hydrocortisone started  9/8 Taper    Thrombocytopenia (HCC)- (present on admission)  Assessment & Plan  9/2: Transfused pheresis pack for platelet count 40  in the setting of hemoglobin below 7  Likely mixed etiology advanced liver disease and resolving sepsis.  Trend, transfuse for clinical bleeding.          Discussed patient condition with Family, RN, RT, Pharmacy,  and Patient.  CRITICAL CARE TIME EXCLUDING PROCEDURES: 36    minutes

## 2021-09-08 NOTE — THERAPY
"Physical Therapy   Daily Treatment     Patient Name: Lorraine Bella  Age:  71 y.o., Sex:  male  Medical Record #: [id number]  Today's Date: 9/7/2021    Precautions: Fall Risk;Nasogastric Tube  Comments: afib, LEs weeping 2/2 edema    Assessment    Pt agreeable to participate in therapy this afternoon. He required max A of 2 to negotiate bed mob, assisting by reaching with top arm for bed rail and pushing through LEs for rolling. He conts to demonstrate quick fatigue EOB however was able to participate in quick seated LE exercises. Increasing posterior lean noted with fatigue while sitting. Declined attempt to attempt standing today 2/2 fatigue. Notified RN of LEs weeping at end of session. PT to cont to follow.    Plan    Continue current treatment plan.    DC Equipment Recommendations: Unable to determine at this time  Discharge Recommendations: Recommend post-acute placement for additional physical therapy services prior to discharge home     Objective     09/07/21 1620   Cognition    Level of Consciousness Alert   Ability To Follow Commands 2 Step   Comments Pleasant and cooperative, intermittently random on topics stating \"I'm going to kill the shirin that did this to me\"   Passive ROM Lower Body   Comments BLE PROM limited by edema   Strength Lower Body   Gross Strength Generalized Weakness, Equal Bilaterally   Comments able to actively extend knees slightly limited in extension EOB   Sitting Lower Body Exercises   Long Arc Quad (1 set of 3 B)   Balance   Sitting Balance (Static) Poor +   Sitting Balance (Dynamic) Poor   Comments increasing posterior lean with fatigue   Gait Analysis   Gait Level Of Assist Unable to Participate   Bed Mobility    Supine to Sit Maximal Assist   Sit to Supine Total Assist   Functional Mobility   Sit to Stand Unable to Participate   Short Term Goals    Short Term Goal # 1 pt will be able to complete bed mobility from flat bed with mod assist in 6tx in order to progress to ind "   Goal Outcome # 1 goal not met   Short Term Goal # 2 pt will be able to complete functional transfers with mod assist in 6tx in order to increase OOB time   Goal Outcome # 2 Goal not met   Short Term Goal # 3 pt will be able to sit EOB with fair - balance in 6tx in order to improve safety   Goal Outcome # 3 Goal not met   Short Term Goal # 4 pt will be able to ambulate 10ft with FWW and mod assist in 6tx in order to progress back to prior level   Goal Outcome # 4 Goal not met

## 2021-09-09 PROBLEM — Z53.09 CONTRAINDICATION TO ANTICOAGULATION THERAPY: Status: ACTIVE | Noted: 2021-09-09

## 2021-09-09 PROBLEM — I25.5 ISCHEMIC CARDIOMYOPATHY: Status: ACTIVE | Noted: 2021-09-09

## 2021-09-09 PROBLEM — I51.3 LEFT VENTRICULAR THROMBUS: Status: ACTIVE | Noted: 2021-09-09

## 2021-09-09 PROBLEM — D61.818 PANCYTOPENIA (HCC): Status: ACTIVE | Noted: 2019-10-31

## 2021-09-09 LAB
ALBUMIN SERPL BCP-MCNC: 2.8 G/DL (ref 3.2–4.9)
ALBUMIN/GLOB SERPL: 1.6 G/DL
ALP SERPL-CCNC: 96 U/L (ref 30–99)
ALT SERPL-CCNC: 7 U/L (ref 2–50)
ANION GAP SERPL CALC-SCNC: 4 MMOL/L (ref 7–16)
ANION GAP SERPL CALC-SCNC: 4 MMOL/L (ref 7–16)
ANION GAP SERPL CALC-SCNC: 5 MMOL/L (ref 7–16)
ANION GAP SERPL CALC-SCNC: 6 MMOL/L (ref 7–16)
ANISOCYTOSIS BLD QL SMEAR: ABNORMAL
AST SERPL-CCNC: 17 U/L (ref 12–45)
BASOPHILS # BLD AUTO: 0 % (ref 0–1.8)
BASOPHILS # BLD: 0 K/UL (ref 0–0.12)
BILIRUB SERPL-MCNC: 0.9 MG/DL (ref 0.1–1.5)
BUN SERPL-MCNC: 19 MG/DL (ref 8–22)
BUN SERPL-MCNC: 21 MG/DL (ref 8–22)
CA-I SERPL-SCNC: 1.2 MMOL/L (ref 1.1–1.3)
CALCIUM SERPL-MCNC: 8.3 MG/DL (ref 8.5–10.5)
CALCIUM SERPL-MCNC: 8.3 MG/DL (ref 8.5–10.5)
CALCIUM SERPL-MCNC: 8.5 MG/DL (ref 8.5–10.5)
CALCIUM SERPL-MCNC: 8.5 MG/DL (ref 8.5–10.5)
CHLORIDE SERPL-SCNC: 108 MMOL/L (ref 96–112)
CHLORIDE SERPL-SCNC: 109 MMOL/L (ref 96–112)
CHLORIDE SERPL-SCNC: 109 MMOL/L (ref 96–112)
CHLORIDE SERPL-SCNC: 110 MMOL/L (ref 96–112)
CO2 SERPL-SCNC: 30 MMOL/L (ref 20–33)
CO2 SERPL-SCNC: 31 MMOL/L (ref 20–33)
CO2 SERPL-SCNC: 31 MMOL/L (ref 20–33)
CO2 SERPL-SCNC: 33 MMOL/L (ref 20–33)
CREAT SERPL-MCNC: 0.47 MG/DL (ref 0.5–1.4)
CREAT SERPL-MCNC: 0.48 MG/DL (ref 0.5–1.4)
CREAT SERPL-MCNC: 0.48 MG/DL (ref 0.5–1.4)
CREAT SERPL-MCNC: 0.53 MG/DL (ref 0.5–1.4)
EOSINOPHIL # BLD AUTO: 0 K/UL (ref 0–0.51)
EOSINOPHIL NFR BLD: 0 % (ref 0–6.9)
ERYTHROCYTE [DISTWIDTH] IN BLOOD BY AUTOMATED COUNT: 81.9 FL (ref 35.9–50)
GLOBULIN SER CALC-MCNC: 1.7 G/DL (ref 1.9–3.5)
GLUCOSE SERPL-MCNC: 141 MG/DL (ref 65–99)
GLUCOSE SERPL-MCNC: 144 MG/DL (ref 65–99)
GLUCOSE SERPL-MCNC: 148 MG/DL (ref 65–99)
GLUCOSE SERPL-MCNC: 166 MG/DL (ref 65–99)
HCT VFR BLD AUTO: 28.8 % (ref 42–52)
HGB BLD-MCNC: 8.9 G/DL (ref 14–18)
LYMPHOCYTES # BLD AUTO: 0.12 K/UL (ref 1–4.8)
LYMPHOCYTES NFR BLD: 6.1 % (ref 22–41)
MACROCYTES BLD QL SMEAR: ABNORMAL
MAGNESIUM SERPL-MCNC: 2 MG/DL (ref 1.5–2.5)
MANUAL DIFF BLD: NORMAL
MCH RBC QN AUTO: 32.2 PG (ref 27–33)
MCHC RBC AUTO-ENTMCNC: 30.9 G/DL (ref 33.7–35.3)
MCV RBC AUTO: 104.3 FL (ref 81.4–97.8)
MICROCYTES BLD QL SMEAR: ABNORMAL
MONOCYTES # BLD AUTO: 0.14 K/UL (ref 0–0.85)
MONOCYTES NFR BLD AUTO: 6.9 % (ref 0–13.4)
MORPHOLOGY BLD-IMP: NORMAL
NEUTROPHILS # BLD AUTO: 1.74 K/UL (ref 1.82–7.42)
NEUTROPHILS NFR BLD: 83.5 % (ref 44–72)
NEUTS BAND NFR BLD MANUAL: 3.5 % (ref 0–10)
NRBC # BLD AUTO: 0 K/UL
NRBC BLD-RTO: 0 /100 WBC
PLATELET # BLD AUTO: 27 K/UL (ref 164–446)
PLATELET BLD QL SMEAR: NORMAL
PLATELETS.RETICULATED NFR BLD AUTO: 2.3 K/UL (ref 0.6–13.1)
PMV BLD AUTO: 9.7 FL (ref 9–12.9)
POTASSIUM SERPL-SCNC: 3 MMOL/L (ref 3.6–5.5)
POTASSIUM SERPL-SCNC: 3.3 MMOL/L (ref 3.6–5.5)
POTASSIUM SERPL-SCNC: 3.5 MMOL/L (ref 3.6–5.5)
POTASSIUM SERPL-SCNC: 3.5 MMOL/L (ref 3.6–5.5)
PROT SERPL-MCNC: 4.5 G/DL (ref 6–8.2)
RBC # BLD AUTO: 2.76 M/UL (ref 4.7–6.1)
RBC BLD AUTO: PRESENT
SODIUM SERPL-SCNC: 144 MMOL/L (ref 135–145)
SODIUM SERPL-SCNC: 145 MMOL/L (ref 135–145)
SODIUM SERPL-SCNC: 145 MMOL/L (ref 135–145)
SODIUM SERPL-SCNC: 146 MMOL/L (ref 135–145)
WBC # BLD AUTO: 2 K/UL (ref 4.8–10.8)

## 2021-09-09 PROCEDURE — A9270 NON-COVERED ITEM OR SERVICE: HCPCS | Performed by: SURGERY

## 2021-09-09 PROCEDURE — 80048 BASIC METABOLIC PNL TOTAL CA: CPT | Mod: 91

## 2021-09-09 PROCEDURE — 700102 HCHG RX REV CODE 250 W/ 637 OVERRIDE(OP): Performed by: SURGERY

## 2021-09-09 PROCEDURE — A9270 NON-COVERED ITEM OR SERVICE: HCPCS | Performed by: INTERNAL MEDICINE

## 2021-09-09 PROCEDURE — 85027 COMPLETE CBC AUTOMATED: CPT

## 2021-09-09 PROCEDURE — 85007 BL SMEAR W/DIFF WBC COUNT: CPT

## 2021-09-09 PROCEDURE — 700102 HCHG RX REV CODE 250 W/ 637 OVERRIDE(OP): Performed by: INTERNAL MEDICINE

## 2021-09-09 PROCEDURE — 770022 HCHG ROOM/CARE - ICU (200)

## 2021-09-09 PROCEDURE — 700111 HCHG RX REV CODE 636 W/ 250 OVERRIDE (IP): Performed by: SURGERY

## 2021-09-09 PROCEDURE — 83735 ASSAY OF MAGNESIUM: CPT

## 2021-09-09 PROCEDURE — P9045 ALBUMIN (HUMAN), 5%, 250 ML: HCPCS | Mod: JG | Performed by: SURGERY

## 2021-09-09 PROCEDURE — 85055 RETICULATED PLATELET ASSAY: CPT

## 2021-09-09 PROCEDURE — 80053 COMPREHEN METABOLIC PANEL: CPT

## 2021-09-09 PROCEDURE — 82330 ASSAY OF CALCIUM: CPT

## 2021-09-09 PROCEDURE — 99233 SBSQ HOSP IP/OBS HIGH 50: CPT | Performed by: SURGERY

## 2021-09-09 RX ORDER — FUROSEMIDE 10 MG/ML
40 INJECTION INTRAMUSCULAR; INTRAVENOUS ONCE
Status: COMPLETED | OUTPATIENT
Start: 2021-09-09 | End: 2021-09-09

## 2021-09-09 RX ORDER — POTASSIUM CHLORIDE 14.9 MG/ML
20 INJECTION INTRAVENOUS ONCE
Status: COMPLETED | OUTPATIENT
Start: 2021-09-09 | End: 2021-09-09

## 2021-09-09 RX ORDER — ALBUMIN, HUMAN INJ 5% 5 %
25 SOLUTION INTRAVENOUS ONCE
Status: COMPLETED | OUTPATIENT
Start: 2021-09-09 | End: 2021-09-10

## 2021-09-09 RX ORDER — POTASSIUM CHLORIDE 7.45 MG/ML
10 INJECTION INTRAVENOUS ONCE
Status: COMPLETED | OUTPATIENT
Start: 2021-09-09 | End: 2021-09-09

## 2021-09-09 RX ORDER — FAMOTIDINE 20 MG/1
20 TABLET, FILM COATED ORAL 2 TIMES DAILY
Status: DISCONTINUED | OUTPATIENT
Start: 2021-09-09 | End: 2021-09-09

## 2021-09-09 RX ORDER — OXYCODONE HYDROCHLORIDE 5 MG/1
5-10 TABLET ORAL EVERY 6 HOURS PRN
Status: DISCONTINUED | OUTPATIENT
Start: 2021-09-09 | End: 2021-09-12

## 2021-09-09 RX ORDER — OMEPRAZOLE 20 MG/1
20 CAPSULE, DELAYED RELEASE ORAL DAILY
Status: DISCONTINUED | OUTPATIENT
Start: 2021-09-09 | End: 2021-09-09

## 2021-09-09 RX ADMIN — HYDROMORPHONE HYDROCHLORIDE 0.5 MG: 1 INJECTION, SOLUTION INTRAMUSCULAR; INTRAVENOUS; SUBCUTANEOUS at 07:57

## 2021-09-09 RX ADMIN — POTASSIUM CHLORIDE 10 MEQ: 10 INJECTION, SOLUTION INTRAVENOUS at 03:41

## 2021-09-09 RX ADMIN — ALBUMIN (HUMAN) 25 G: 2.5 SOLUTION INTRAVENOUS at 23:14

## 2021-09-09 RX ADMIN — OMEPRAZOLE 40 MG: KIT at 14:02

## 2021-09-09 RX ADMIN — POTASSIUM CHLORIDE 20 MEQ: 14.9 INJECTION, SOLUTION INTRAVENOUS at 01:35

## 2021-09-09 RX ADMIN — AMIODARONE HYDROCHLORIDE 200 MG: 200 TABLET ORAL at 17:47

## 2021-09-09 RX ADMIN — AMIODARONE HYDROCHLORIDE 200 MG: 200 TABLET ORAL at 06:22

## 2021-09-09 RX ADMIN — FUROSEMIDE 40 MG: 10 INJECTION, SOLUTION INTRAMUSCULAR; INTRAVENOUS at 10:28

## 2021-09-09 RX ADMIN — THIAMINE HCL TAB 100 MG 100 MG: 100 TAB at 06:22

## 2021-09-09 RX ADMIN — OXYCODONE 5 MG: 5 TABLET ORAL at 10:17

## 2021-09-09 RX ADMIN — POTASSIUM CHLORIDE 20 MEQ: 14.9 INJECTION, SOLUTION INTRAVENOUS at 13:02

## 2021-09-09 RX ADMIN — POTASSIUM CHLORIDE 20 MEQ: 14.9 INJECTION, SOLUTION INTRAVENOUS at 07:39

## 2021-09-09 RX ADMIN — POTASSIUM CHLORIDE 20 MEQ: 14.9 INJECTION, SOLUTION INTRAVENOUS at 20:23

## 2021-09-09 RX ADMIN — FAMOTIDINE 20 MG: 20 TABLET ORAL at 06:22

## 2021-09-09 RX ADMIN — HYDROCORTISONE SODIUM SUCCINATE 50 MG: 100 INJECTION, POWDER, FOR SOLUTION INTRAMUSCULAR; INTRAVENOUS at 06:22

## 2021-09-09 RX ADMIN — HYDROMORPHONE HYDROCHLORIDE 0.5 MG: 1 INJECTION, SOLUTION INTRAMUSCULAR; INTRAVENOUS; SUBCUTANEOUS at 03:01

## 2021-09-09 RX ADMIN — ATORVASTATIN CALCIUM 40 MG: 40 TABLET, FILM COATED ORAL at 17:47

## 2021-09-09 RX ADMIN — SPIRONOLACTONE 50 MG: 50 TABLET ORAL at 06:22

## 2021-09-09 ASSESSMENT — PAIN DESCRIPTION - PAIN TYPE
TYPE: ACUTE PAIN
TYPE: ACUTE PAIN;CHRONIC PAIN
TYPE: ACUTE PAIN

## 2021-09-09 NOTE — ASSESSMENT & PLAN NOTE
Echo with ejection fraction of 25% along LAD distribution  Likely chronic based on history  Blood pressure too low for ACE inhibitor or beta-blocker  Appreciate cardiology recommendations

## 2021-09-09 NOTE — DISCHARGE PLANNING
Spoke with Ramona with RN CM [facility] about pt and his current status and anticipated dc plan. Pt will need post acute placement. Level of placement is unknown at this time as pt still remains medically complex. Ramona is agreeable with plan and will continue to follow.

## 2021-09-09 NOTE — PROGRESS NOTES
Trauma / Surgical Daily Progress Note    Date of Service  9/9/2021    Chief Complaint  71 y.o. male admitted 8/26/2021 with     Interval Events  CRITICAL CARE DECISION MAKING:    Patient examined and discussed with team at bedside.    Numerous ongoing concerns with slow continued deterioration:    Protein calorie malnutrition with diffuse anasarca: Patient remains more than 40 L positive fluid balance.  Not tolerating diuresis so are continuing with colloid boluses to enhance fluid offloading but our meeting with limited success.     Ischemic cardiomyopathy with low ejection fraction and left ventricular thrombus.  Patient on amiodarone for his PAF.  Anticoagulation recommended by cardiology for his LV  thrombus    Pancytopenia: Ongoing worsening leukopenia, anemia and thrombocytopenia.  Prophylactic anticoagulation held and we are declining to start therapeutic anticoagulation for left ventricular thrombus because of this.  In the setting of his advanced cancer diagnosis, plan to discuss with hematology    Malignant perforation of the colon    Addressed pulmonary hygiene concerns as well as oxygenation/ventilation.  Remains off ventilator  Labs reviewed, electrolytes addressed, renal function assessed: Potassium scale  Reviewed nutrition strategies, recent indices: Core track feeding  Addressed GI prophylaxis and bowel frequency: Pepcid changed to omeprazole because of low platelet count  Assessed/discussed/titrated analgesics and need for sedatives: Minimizing sedative/narcotics  Addressed DVT prophylaxis: Lovenox held, SCDs in place  Addressed line days, underwood catheter days, access needs: Central line in place    Addressed family and discharge concerns: Nursing case with oncology and will try to revisit palliative care assessment.  Family update.  Team feels that patient may most benefit from hospice or comfort measures.     Long discussion was had with patient at the bedside with palliative care team and nursing  "present.  Patient's wife was there as well.  We discussed his long hospital course over the last 2 months as well as his poor general prognosis in the setting of cirrhosis, hepatocellular carcinoma, inability to receive chemotherapy for the indefinite future, malignant bowel perforation, ascites, ischemic cardiomyopathy with left ventricular thrombus, pancytopenia contraindicating anticoagulation therapy, protein calorie malnutrition, and prolonged ICU stay due to sepsis.    Patient was very clear that he would like to pursue comfort measures, however his wife was quite resistant.  She clearly stated her understanding of his suffering but felt that given enough time he could recover.  We reinforced that this was likely not the case.  Patient just wants to eat and go outside to the garden with his wife.  Palliative care assisted with this discussion.  Patient and wife will continue talking over the matter and palliative care will follow up tomorrow and follow along with the case.    Patient is now DNR/DNI.  This was confirmed with the wife and patient at the bedside with palliative care present.        Review of Systems  Review of Systems   Unable to perform ROS: Patient nonverbal        Vital Signs for last 24 hours  Temp:  [35.9 °C (96.7 °F)-36.7 °C (98 °F)] 35.9 °C (96.7 °F)  Pulse:  [76-87] 80  Resp:  [8-44] 12  BP: ()/(52-70) 104/59  SpO2:  [93 %-100 %] 99 %    Hemodynamic parameters for last 24 hours  CVP:  [5 MM HG-16 MM HG] 10 MM HG  /59   Pulse 80   Temp 35.9 °C (96.7 °F) (Temporal)   Resp 12   Ht 1.803 m (5' 10.98\")   Wt 111 kg (244 lb 14.9 oz)   SpO2 99%   BMI 34.18 kg/m²     Respiratory Data     Respiration: 12, Pulse Oximetry: 99 %     Work Of Breathing / Effort: Shallow  RUL Breath Sounds: Clear, RML Breath Sounds: Clear, RLL Breath Sounds: Diminished, ASHLEY Breath Sounds: Clear, LLL Breath Sounds: Diminished    Physical Exam  Physical Exam  Vitals and nursing note reviewed. "   Constitutional:       Appearance: He is ill-appearing.      Interventions: He is restrained. Nasal cannula in place.   HENT:      Head: Normocephalic and atraumatic.      Mouth/Throat:      Mouth: Mucous membranes are dry.      Pharynx: Oropharynx is clear.   Eyes:      Extraocular Movements: Extraocular movements intact.      Conjunctiva/sclera: Conjunctivae normal.      Pupils: Pupils are equal, round, and reactive to light.   Cardiovascular:      Rate and Rhythm: Normal rate and regular rhythm.      Pulses: Normal pulses.   Pulmonary:      Effort: No respiratory distress.      Breath sounds: No stridor. Examination of the right-lower field reveals decreased breath sounds. Examination of the left-lower field reveals decreased breath sounds. Decreased breath sounds present. No wheezing.   Abdominal:      General: There is distension.      Palpations: Abdomen is soft.      Tenderness: There is no abdominal tenderness.      Comments: Dressing CDI  Left upper quadrant drain: 850 cc serosanguineous  Right upper quadrant drain: 440 cc brown/purulent   Genitourinary:     Comments: Triana  Musculoskeletal:      Cervical back: Neck supple.      Comments: Diffuse anasarca   Skin:     General: Skin is warm and dry.      Coloration: Skin is pale. Skin is not jaundiced.   Neurological:      General: No focal deficit present.      Mental Status: He is easily aroused. He is disoriented.   Psychiatric:         Behavior: Behavior is cooperative.         Laboratory  Recent Results (from the past 24 hour(s))   Basic Metabolic Panel    Collection Time: 09/08/21  5:00 PM   Result Value Ref Range    Sodium 146 (H) 135 - 145 mmol/L    Potassium 3.2 (L) 3.6 - 5.5 mmol/L    Chloride 109 96 - 112 mmol/L    Co2 30 20 - 33 mmol/L    Glucose 164 (H) 65 - 99 mg/dL    Bun 19 8 - 22 mg/dL    Creatinine 0.50 0.50 - 1.40 mg/dL    Calcium 8.7 8.5 - 10.5 mg/dL    Anion Gap 7.0 7.0 - 16.0   ESTIMATED GFR    Collection Time: 09/08/21  5:00 PM    Result Value Ref Range    GFR If African American >60 >60 mL/min/1.73 m 2    GFR If Non African American >60 >60 mL/min/1.73 m 2   Basic Metabolic Panel    Collection Time: 09/09/21 12:05 AM   Result Value Ref Range    Sodium 145 135 - 145 mmol/L    Potassium 3.0 (L) 3.6 - 5.5 mmol/L    Chloride 109 96 - 112 mmol/L    Co2 31 20 - 33 mmol/L    Glucose 141 (H) 65 - 99 mg/dL    Bun 19 8 - 22 mg/dL    Creatinine 0.53 0.50 - 1.40 mg/dL    Calcium 8.3 (L) 8.5 - 10.5 mg/dL    Anion Gap 5.0 (L) 7.0 - 16.0   ESTIMATED GFR    Collection Time: 09/09/21 12:05 AM   Result Value Ref Range    GFR If African American >60 >60 mL/min/1.73 m 2    GFR If Non African American >60 >60 mL/min/1.73 m 2   Comp Metabolic Panel    Collection Time: 09/09/21  6:05 AM   Result Value Ref Range    Sodium 145 135 - 145 mmol/L    Potassium 3.3 (L) 3.6 - 5.5 mmol/L    Chloride 110 96 - 112 mmol/L    Co2 31 20 - 33 mmol/L    Anion Gap 4.0 (L) 7.0 - 16.0    Glucose 144 (H) 65 - 99 mg/dL    Bun 19 8 - 22 mg/dL    Creatinine 0.48 (L) 0.50 - 1.40 mg/dL    Calcium 8.3 (L) 8.5 - 10.5 mg/dL    AST(SGOT) 17 12 - 45 U/L    ALT(SGPT) 7 2 - 50 U/L    Alkaline Phosphatase 96 30 - 99 U/L    Total Bilirubin 0.9 0.1 - 1.5 mg/dL    Albumin 2.8 (L) 3.2 - 4.9 g/dL    Total Protein 4.5 (L) 6.0 - 8.2 g/dL    Globulin 1.7 (L) 1.9 - 3.5 g/dL    A-G Ratio 1.6 g/dL   IONIZED CALCIUM    Collection Time: 09/09/21  6:05 AM   Result Value Ref Range    Ionized Calcium 1.2 1.1 - 1.3 mmol/L   CBC WITH DIFFERENTIAL    Collection Time: 09/09/21  6:05 AM   Result Value Ref Range    WBC 2.0 (LL) 4.8 - 10.8 K/uL    RBC 2.76 (L) 4.70 - 6.10 M/uL    Hemoglobin 8.9 (L) 14.0 - 18.0 g/dL    Hematocrit 28.8 (L) 42.0 - 52.0 %    .3 (H) 81.4 - 97.8 fL    MCH 32.2 27.0 - 33.0 pg    MCHC 30.9 (L) 33.7 - 35.3 g/dL    RDW 81.9 (H) 35.9 - 50.0 fL    Platelet Count 27 (LL) 164 - 446 K/uL    MPV 9.7 9.0 - 12.9 fL    Neutrophils-Polys 83.50 (H) 44.00 - 72.00 %    Lymphocytes 6.10 (L) 22.00 -  41.00 %    Monocytes 6.90 0.00 - 13.40 %    Eosinophils 0.00 0.00 - 6.90 %    Basophils 0.00 0.00 - 1.80 %    Nucleated RBC 0.00 /100 WBC    Neutrophils (Absolute) 1.74 (L) 1.82 - 7.42 K/uL    Lymphs (Absolute) 0.12 (L) 1.00 - 4.80 K/uL    Monos (Absolute) 0.14 0.00 - 0.85 K/uL    Eos (Absolute) 0.00 0.00 - 0.51 K/uL    Baso (Absolute) 0.00 0.00 - 0.12 K/uL    NRBC (Absolute) 0.00 K/uL    Anisocytosis 1+     Macrocytosis 1+     Microcytosis 1+    MAGNESIUM    Collection Time: 09/09/21  6:05 AM   Result Value Ref Range    Magnesium 2.0 1.5 - 2.5 mg/dL   ESTIMATED GFR    Collection Time: 09/09/21  6:05 AM   Result Value Ref Range    GFR If African American >60 >60 mL/min/1.73 m 2    GFR If Non African American >60 >60 mL/min/1.73 m 2   DIFFERENTIAL MANUAL    Collection Time: 09/09/21  6:05 AM   Result Value Ref Range    Bands-Stabs 3.50 0.00 - 10.00 %    Manual Diff Status PERFORMED    PERIPHERAL SMEAR REVIEW    Collection Time: 09/09/21  6:05 AM   Result Value Ref Range    Peripheral Smear Review see below    PLATELET ESTIMATE    Collection Time: 09/09/21  6:05 AM   Result Value Ref Range    Plt Estimation Marked Decrease    MORPHOLOGY    Collection Time: 09/09/21  6:05 AM   Result Value Ref Range    RBC Morphology Present    IMMATURE PLT FRACTION    Collection Time: 09/09/21  6:05 AM   Result Value Ref Range    Imm. Plt Fraction 2.3 0.6 - 13.1 K/uL   Basic Metabolic Panel    Collection Time: 09/09/21 11:30 AM   Result Value Ref Range    Sodium 146 (H) 135 - 145 mmol/L    Potassium 3.5 (L) 3.6 - 5.5 mmol/L    Chloride 109 96 - 112 mmol/L    Co2 33 20 - 33 mmol/L    Glucose 166 (H) 65 - 99 mg/dL    Bun 19 8 - 22 mg/dL    Creatinine 0.47 (L) 0.50 - 1.40 mg/dL    Calcium 8.5 8.5 - 10.5 mg/dL    Anion Gap 4.0 (L) 7.0 - 16.0   ESTIMATED GFR    Collection Time: 09/09/21 11:30 AM   Result Value Ref Range    GFR If African American >60 >60 mL/min/1.73 m 2    GFR If Non African American >60 >60 mL/min/1.73 m 2        Fluids    Intake/Output Summary (Last 24 hours) at 9/9/2021 1301  Last data filed at 9/9/2021 1200  Gross per 24 hour   Intake 7324.16 ml   Output 7025 ml   Net 299.16 ml       Core Measures & Quality Metrics  Labs reviewed, Medications reviewed and Radiology images reviewed  Triana catheter: Critically Ill - Requiring Accurate Measurement of Urinary Output      DVT Prophylaxis: Contraindicated - High bleeding risk  DVT prophylaxis - mechanical: SCDs  Ulcer prophylaxis: Yes    Assessed for rehab: Patient was assess for and/or received rehabilitation services during this hospitalization    CHELI Score  ETOH Screening    Assessment/Plan  * Perforated viscus- (present on admission)  Assessment & Plan  8/26 - Ceftriaxone and metronidazole initiated  8/30 - Peritoneal culture positive for Streptococcus anginosus  8/31 - Abscess drainage and ileostomy creation, antibiotics altered to Zosyn  8/31- diverting ileostomy with MARIANA drainage of feculent material.  Abdomen too frozen for formal resection  9/6 - Zosyn day 7 of 7.     Left ventricular thrombus  Assessment & Plan  Notified an echocardiogram on 9/4  Hold off on starting therapeutic anticoagulation because of very low platelet count    Fluid overload  Assessment & Plan  Admission weight under 70 kg  Fluid balance +47 L  Diuresis  Trend electrolytes closely    Respiratory failure following trauma and surgery (HCC)  Assessment & Plan  Remained on ventilator post operatively  9/5 extubated  Aggressive pulmonary toilette    Septic shock (HCC)- (present on admission)  Assessment & Plan  Secondary to liver abscess  Ongoing volume resuscitation and pressors support  abx rocephin and flagyl  8/30-feculent drain output in late evening  8/31- return to OR for washout, drainage of feculent fluid and diverting ileostomy.  9/3: Remains on small doses of norepinephrine.  Vasopressin has been discontinued.  Remains on every 8 hydrocortisone.  Rapid taper as appropriate.  9/4 wean  pressors to off.     Hepatocellular carcinoma (HCC)- (present on admission)  Assessment & Plan  Advanced tumor involving most of the right lobe. Immunotherapy/chemo prior to presentation with bowel perforation.  Associated abscess with apparent involvement of hepatic flexure.  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  Diverting ileostomy.    Contraindication to anticoagulation therapy  Assessment & Plan  Low platelet count: Holding off on prophylactic/therapeutic anticoagulation  9/9 screening duplex ordered    Ischemic cardiomyopathy  Assessment & Plan  Echo with ejection fraction of 25% along LAD distribution  Likely chronic based on history  Blood pressure too low for ACE inhibitor or beta-blocker  Appreciate cardiology recommendations    Pleural effusion, bilateral  Assessment & Plan  8/31 moderate bilateral pleural effusions on CT      Atrial fibrillation (HCC)- (present on admission)  Assessment & Plan  Diuresis  Aggressive electrolyte replacement  Metoprolol and amiodarone   8/31- converted out in OR, continues on amiodarone gtt  9/3: Amiodarone drip  9/5 transition to enteral amiodarone    Cirrhosis of liver with ascites (HCC)  Assessment & Plan  9/3 - 9/6 high volume ascites output from drains (R MARIANA in abscess cavity, L MARIANA in pelvis.   General surgery: can not remove drains.  Continued round-the-clock albumin and fluid therapy.  Diuretic therapy.  9/8 Resume albumin    Hypokalemia- (present on admission)  Assessment & Plan  Potassium persistently low  Starting Lasix regimen so we will place patient on K scale    Normochromic anemia- (present on admission)  Assessment & Plan  Trend and transfuse for hgb <7.  Hgb today is 7.8    Pancytopenia (HCC)- (present on admission)  Assessment & Plan  Chronic anemia, leukopenia, thrombocytopenia  9/2: Transfused pheresis pack for platelet count 40 in the setting of hemoglobin below 7  Likely mixed etiology advanced liver disease and resolving sepsis.  Trend,  transfuse for clinical bleeding.  9/9 ongoing low cell counts  We will discuss with hematology    Adrenal insufficiency (HCC)  Assessment & Plan  8/29 Cortisol low in the setting of shock  Hydrocortisone started  9/8 Taper        Discussed patient condition with Family, RN, RT, Pharmacy,  and Patient.  CRITICAL CARE TIME EXCLUDING PROCEDURES:37   minutes

## 2021-09-09 NOTE — ASSESSMENT & PLAN NOTE
Low platelet count: Holding off on prophylactic/therapeutic anticoagulation  9/9 screening duplex ordered

## 2021-09-09 NOTE — WOUND TEAM
"[facility]  Inpatient Services  Initial Wound and Skin Care Evaluation    Admission Date: 8/26/2021     Last order of IP CONSULT TO WOUND CARE was found on 8/31/2021 from Hospital Encounter on 8/26/2021     HPI, PMH, SH: Reviewed    Past Surgical History:   Procedure Laterality Date   • PB EXPLORATORY OF ABDOMEN N/A 8/31/2021    Procedure: LAPAROTOMY, EXPLORATORY ,drainage of peritoneal abcess,  creation of loop ileostomy, and trucut liver biopsy;  Surgeon: Kevin Thornton D.O.;  Location: [facility];  Service: General   • PB LAP,DIAGNOSTIC ABDOMEN N/A 8/27/2021    Procedure: LAPAROSCOPY;  Surgeon: Priya You M.D.;  Location: [facility];  Service: General   • PB EXPLORATORY OF ABDOMEN N/A 8/27/2021    Procedure: LAPAROTOMY, EXPLORATORY;  Surgeon: Priya You M.D.;  Location: [facility];  Service: General   • IRRIGATION & DEBRIDEMENT GENERAL  8/27/2021    Procedure: IRRIGATION AND DEBRIDEMENT, INTRA-ABDOMINAL ABCESS;  Surgeon: Priya You M.D.;  Location: [facility];  Service: General   • RESTORATE HEMOSTAS  8/27/2021    Procedure: CONTROL OF LIVER BLEED;  Surgeon: Priya You M.D.;  Location: [facility];  Service: General   • PB LAP,DIAGNOSTIC ABDOMEN  7/9/2021    Procedure: DIAGNOSITIC LAPAROSCOPY WITH WASH OUT AND DRAIN PLACEMENT;  Surgeon: Cody Meza M.D.;  Location: [facility];  Service: Gastroenterology   • OTHER  12/2019    \"IR embolization\"    • PARATHYROIDECTOMY N/A 5/13/2016    Procedure: PARATHYROIDECTOMY;  Surgeon: Kale Lord M.D.;  Location: SURGERY SAME DAY [facility];  Service:    • CATARACT PHACO WITH IOL Left 4/7/2016    Procedure: CATARACT PHACO WITH IOL;  Surgeon: Ephraim Jamison M.D.;  Location: SURGERY [facility];  Service:    • BONE GRAFT Right 1960's    right wrist   • OTHER      chemoemolozation x2   • WRIST ORIF Right 1960's     Social History     Tobacco Use   • " Smoking status: Current Every Day Smoker     Packs/day: 0.50     Years: 30.00     Pack years: 15.00     Types: Cigarettes     Start date: 1/1/1990   • Smokeless tobacco: Never Used   • Tobacco comment: quit couple times of the years   Substance Use Topics   • Alcohol use: Not Currently     Alcohol/week: 2.4 oz     Types: 4 Shots of liquor per week     Comment: pt quit drinking 2 months ago     Chief Complaint   Patient presents with   • N/V     Diagnosis: Bowel perforation (HCC) [K63.1]  Perforated intestine (HCC) [K63.1]    Unit where seen by Wound Team: S111/00     WOUND CONSULT/FOLLOW UP RELATED TO:  NPWT placement to abdominal surgical incision. Ostomy change (see below)     WOUND HISTORY:  Patient admitted with nausea and vomiting, history of hepatocellular carcinoma with cirrhosis. Went to OR with MD Thornton for peritoneal abscess, large bowel perforation, and liver mass.      WOUND ASSESSMENT/LDA    Negative Pressure Wound Therapy 09/01/21 Surgical Abdomen Midline (Active)   NPWT Pump Mode / Pressure Setting 125 mmHg;Continuous    Dressing Type Medium;Black Foam (Regular)    Number of Foam Pieces Used 3    Canister Changed No    Output (mL) 0 mL 09/07/21 1800   NEXT Dressing Change/Treatment Date 09/10/21         Wound 08/27/21 Incision Abdomen Midline (Active)      09/08/21 1600   Site Assessment Pink;Red;Tan    Periwound Assessment Edema;Fragile    Margins Defined edges;Unattached edges    Closure Secondary intention    Drainage Amount Scant    Drainage Description Serosanguineous    Treatments Cleansed    Wound Cleansing Approved Wound Cleanser    Periwound Protectant Skin Protectant Wipes to Periwound;Drape;Mepitel    Dressing Cleansing/Solutions Not Applicable    Dressing Options Wound Vac    Dressing Changed Changed    Dressing Status Clean;Dry;Intact    Dressing Change/Treatment Frequency Monday, Wednesday, Friday, and As Needed    NEXT Dressing Change/Treatment Date 09/10/21    NEXT Weekly Photo  (Inpatient Only) 09/15/21    Number of Staples Removed 5 09/01/21 1030   Non-staged Wound Description Full thickness 09/08/21 1600   Wound Length (cm) 15.3 cm 09/08/21 1600   Wound Width (cm) 3 cm 09/08/21 1600   Wound Depth (cm) 1.7 cm 09/08/21 1600   Wound Surface Area (cm^2) 45.9 cm^2 09/08/21 1600   Wound Volume (cm^3) 78.03 cm^3 09/08/21 1600   Wound Healing % -1 09/08/21 1600   Tunneling (cm) 1.8 cm 09/08/21 1600   Tunneling Clock Position of Wound 12 09/08/21 1600   Tunneling - 2nd Location (cm) 2.4 cm 09/08/21 1600   Tunneling Clock Position of Wound - 2nd Location 6 09/08/21 1600   Shape linear    Wound Odor None    Exposed Structures Sutures             Vascular:    ZARA:   No results found.    Lab Values:    Lab Results   Component Value Date/Time    WBC 3.0 (L) 09/08/2021 06:05 AM    RBC 3.13 (L) 09/08/2021 06:05 AM    HEMOGLOBIN 10.1 (L) 09/08/2021 06:05 AM    HEMATOCRIT 31.9 (L) 09/08/2021 06:05 AM    CREACTPROT 2.03 (H) 09/06/2021 10:37 AM    HBA1C 6.0 (H) 07/03/2021 03:38 AM        Culture Results show:  Recent Results (from the past 720 hour(s))   CULTURE WOUND W/ GRAM STAIN    Collection Time: 08/31/21  9:21 AM    Specimen: Wound   Result Value Ref Range    Significant Indicator NEG     Source WND     Site Peritoneal Abscess     Culture Result       Heavy growth enteric [place] including mixed morphologies  Enterococcus sp., several morphologies enteric Gram negative  rods and yeast.      Gram Stain Result       Moderate WBCs.  Many mixed bacteria, no predominant organism seen.         Pain Level/Medicated:  Mild pain with site care, otherwise able to tolerate     INTERVENTIONS BY WOUND TEAM:  Chart and images reviewed. Discussed with bedside RN. All areas of concern (based on picture review, LDA review and discussion with bedside RN) have been thoroughly assessed. Documentation of areas based on significant findings. This RN in to assess patient. Performed standard wound care which includes  appropriate positioning, dressing removal and non-selective debridement. Pictures and measurements obtained weekly if/when required.  Abdomen:   Preparation for Dressing removal: Dressing soaked with wound cleanser  Non-selectively Debrided with: wound cleanser and gauze  Sharp debridement: n/a.   Cesia wound: Cleansed with wound cleanser, Prepped with mepitel one over staples. No sting skin prep and drape to periwound.   Primary Dressin pieces of black half thickness foam tucked into wound bed.   Secondary (Outer) Dressing: button and TRAC pad applied. Suction resumed at 125 mmHg.     Interdisciplinary consultation: Patient, Bedside RN    EVALUATION / RATIONALE FOR TREATMENT:  Most Recent Date:  : Pt's Wound still has tan tissue. No odor. Tunneling present @ 1200 and 0600 then underneath skin bridge formed by staples in middle of wound. He tolerated drsg change better today.  21: Wound still with scant amount of slough. Since there are 2 areas if staples in middle of wound there is tunneling under skin. Appears clean in this area. Continue NPWT. Pt confused, when alarm off for distal obstruction to IV he would keep trying to answer the telephone.   9/3/2021: Small amount of slough to central aspect of wound bed. Wound bed is pink, continue NPWT to encourage tissue granulation.     21: Patient with full thickness surgical incision to midline abdomen. Fascia appears intact. Some sutures visible. Placed NPWT to assist in closure by secondary intention, manage bioburden and exudate, increase oxygenation and granulation to the wound bed.      Goals: Steady decrease in wound area and depth weekly.    WOUND TEAM PLAN OF CARE ([X] for frequency of wound follow up,):   Nursing to follow orders written for wound care. Contact wound team if area fails to progress, deteriorates or with any questions/concerns  Dressing changes by wound team:                   Follow up 3 times weekly:                NPWT change 3  times weekly:   X  Follow up 1-2 times weekly:      Follow up Bi-Monthly:                   Follow up as needed:     Other (explain):     NURSING PLAN OF CARE ORDERS (X):  Dressing changes: See Dressing Care orders: X  Skin care: See Skin Care orders:   RN Prevention Protocol:   Rectal tube care: See Rectal Tube Care orders:   Other orders:    RSKIN:   CURRENTLY IN PLACE (X), APPLIED THIS VISIT (A), ORDERED (O):   Q shift Keith:  X  Q shift pressure point assessments:  X    Surface/Positioning   Pressure redistribution mattress            Low Airloss   X       Bariatric foam      Bariatric ASHU     Waffle cushion        Waffle Overlay          Reposition q 2 hours    X  TAPs Turning system     Z Quirino Pillow     Offloading/Redistribution not assessed  Sacral Mepilex (Silicone dressing)     Heel Mepilex (Silicone dressing)         Heel float boots (Prevalon boot)             Float Heels off Bed with Pillows           Respiratory   Silicone O2 tubing    x    Gray Foam Ear protectors     Cannula fixation Device (Tender )          High flow offloading Clip    Elastic head band offloading device      Anchorfast                                                         Trach with Optifoam split foam             Containment/Moisture Prevention ileostomy    Rectal tube or BMS    Purwick/Condom Cath        Triana Catheter  X  Barrier wipes           Barrier paste       Antifungal tx      Interdry        Mobilization not assessed     Up to chair        Ambulate      PT/OT      Nutrition      Dietician  x      Diabetes Education      PO     TF x    TPN     NPO   # days     Other        Anticipated discharge plans: TBD may need VAC and more ostomy education  LTACH:        SNF/Rehab:                  Home Health Care:           Outpatient Wound Center:            Self/Family Care:        Other:         Vac Discharge Needs:   Not Applicable Pt not on a wound vac:                         Regular Vac in use:   x                       "                                       Veraflo Vac while inpatient, ok to transition to Regular Vac on Discharge:                                 Veraflo Vac while inpatient, will need to remain on Veraflo Vac upon discharge:              [facility]  Inpatient Services  New Ostomy Management & Teaching    HPI:  Reviewed  PMH: Reviewed   SH: Reviewed    Subjective: \"Okay\"    Objective: Appliance slightly lifted, large amount of watery yellow with pieces of yellow soft output.        Ileostomy 08/31/21 Loop RLQ (Active)      09/08/21 1600   Stomal Appliance Assessment Changed    Stoma Assessment Beefy red;Red    Stoma Shape Budded Greater Than One Inch;Oval    Stoma Size (in) 2    Peristomal Assessment Intact    Mucocutaneous Junction Intact    Treatment Cleansed with water/washcloth;Crusted with stoma powder;Appliance Changed    Peristomal Protectant Paste Ring    Stomal Appliance 2 3/4\" (70mm) CTF;Paste Ring, 2\"    Output (mL) 200 mL 09/08/21 1800   Output Color Yellow    WOUND RN ONLY - Stomal Appliance  2 Piece;2 3/4\" (70mm) CTF;High Output Pouch;Paste Ring, 2\"    Appliance (Pouch) # [id number]    Appliance Brand [employer]    Appliance Supplier [employer]    Secure Start completed Not Medically Stable    Ostomy Care Resources Provided UOAA Tip Sheet                         Ostomy Appliance (type and size): 2 3/4\" 2 piece and paste ring placed high output pouch to dd    Interventions: Removed current appliance using push pull method. Cleansed peristomal area with moist warm wash cloths. Pat dry. ACrusted with ostomy powder and No Sting skin prep to periwound. Traced template, cut barrier to fit. Paste ring applied to barrier, applied barrier to skin, attached bag and then closed. RN to attach dd bag when available   Pt education: Questions and concerns addressed    Evaluation: Stoma viable and intact. No separation there are small petechia proximal edge of barrier. Patient unable for learning and hands on " teaching at this time, slept throughout. Wife, Sharlene observed and asked questions. She signed the Secure Start for pt.     Flatus: Present  Stool Output: large, yellow and liquid  Diet: NPO  Mobility: Up to chair    Plan: Ostomy nurses to continue to follow for ostomy needs and teaching until discharge    Anticipated discharge needs: Supplies, supplier information, possible HH, outpatient ostomy clinic, Skilled Nursing/Rehab     Secure Start Signed Yes  wife  signed  Outpatient Referral Placed Not Medically Stable  5 Sets of appliances in Ostomy bag for discharge Not Medically Stable    INSURANCE OPTIONS: [employer].                 [employer] & [employer] ([employer])         x     MediCARE/MEDICAID & All other Private Insurance companies (Prism Form)              MediCAID & Fee for Service (Care Chest Paperwork + [employer] Form)                            Form signed/Catalog Marked and Copy left with patient OR medicaid paperwork given to patient      Anticipated Discharge Plans:  Other TBD

## 2021-09-09 NOTE — PROGRESS NOTES
4 Eyes Skin Assessment Completed by YASMIN Valentin and YASMIN Lawton.    Head Blanching and Redness to posterior head. Sluggish to kami, Z-Quirino placed. No open areas of note.   Ears Redness and Blanching to tops of ears bilaterally. Silicone O2 tubing with grey foams in place.  Nose WDL.  Mouth WDL (dry) - moisturized PRN.  Neck WDL  Breast/Chest Bruising  Shoulder Blades Blanching  Spine Redness and Blanching  (R) Arm/Elbow/Hand Bruising and Edema  (L) Arm/Elbow/Hand Bruising and Edema + skin tear x3 extending over forearm, elbow and bicep. Forearm and upper arm dressed with Mepitel 1, elbow EKTA.  Abdomen Bruising and Incision (midline with wound vac). Additionally, a stapled site noted to the L of the midline incision, bilateral MARIANA drains (EKTA), and RLW ileostomy noted.   Groin Swelling.  Scrotum/Coccyx/Buttocks Redness and Blanching.  (R) Leg Blanching, Bruising, Swelling, Abrasion and Edema .  (L) Leg Blanching, Bruising, Swelling, Abrasion and Edema.  (R) Heel/Foot/Toe Redness, Blanching and Boggy.  (L) Heel/Foot/Toe Redness, Blanching and Boggy.    Devices In Places ECG, Blood Pressure Cuff, Pulse Ox, Triana, SCD's, Cortrak, Central Line and Nasal Cannula.      Interventions In Place NC W/Ear Foams, Heel Mepilex, Sacral Mepilex, Heel Float Boots, TAP System, Pillows, Q2 Turns, Low Air Loss Mattress, Barrier Cream, ZFlo Pillow and Dri-Quirino Pads.    Possible Skin Injury No (none new, all incisions/drains appropriately documented).    Pictures Uploaded Into Epic Yes - ileostomy/abdominal wound photos up to date. Tears to LUE/posterior head/heels have not yet been photographed and will update day RN if unable to perform this shift.     Wound Consult Placed N/A - already being seen by WOCN.  RN Wound Prevention Protocol Ordered Yes

## 2021-09-09 NOTE — ASSESSMENT & PLAN NOTE
Notified an echocardiogram on 9/4  Hold off on starting therapeutic anticoagulation because of very low platelet count

## 2021-09-09 NOTE — PROGRESS NOTES
12-hour chart check.  Shift monitor summary: SR/AFib rate 70-90 bpm    See below measurements.

## 2021-09-09 NOTE — PALLIATIVE CARE
"Palliative Care   PC RNs Sheba and Sandra to bedside along with Dr. Thornton to revisit goals of care discussion. Wife, Sharlene, at bedside and the patient is quite lucid and able to participate in conversation today.     Dr. Thornton gave a lengthy clinical update, dating back to his initial HCC diagnosis and focusing on his most recent hospitalization/surgeries. MD explained pts reduced EF of 25%, left ventricular thrombus, sepsis, pancytopenia, malignant perforation of the colon, and liver cirrhosis. Discussed how these complicate William's overall treatment plan.   Discussed different treatment options including full treatment, selective treatment (medical management without resuscitation), or comfort care. Spoke about what aggressive treatment would like if continued, likely discharge to LTAC then to SNF with multiple repeat admissions due to pts frail condition and multiple commodities.     After Dr. Thornton departed, PC RNs remained and continued conversation with William and Sharlene. Explored William's thoughts on end of life and quality of life. He states \"put me in a pot, give me water, and take me out in the sunshine.\" We explained comfort care in detail including eliminating diagnostics, unhooking him from tubes/wires, diet for pleasure, and possible trips to the [employer] in the bed, etc. Pt reports \"I want all of this off of me.\" We discussed diet for pleasure/eating despite risk further and the pt mentioned wanting a steak/hamburger. Sharlene voiced her concerns about swallow function and talked about \"blending it up and putting it down the tube\". PC RN reiterated that if William chooses comfort care, he can choose to eat desite risk and while we will try to make this as safe as possible the main goal is his pleasure.    We did discuss code status at length. The patient was adamantly opposed to CPR or reintubation, stating \"I don't want to do it again.\" His wife is in agreement at this time. Sharlene does express that this " "is a very difficult time for her as she also lost her father two years ago. Reiterated with Sharlene that this is William's decision and that we need to be respectful of his choices and not increase his burden. She verbalizes understanding and agreement.    Regarding comfort care, patient would like to discuss this with his wife and daughter prior to making decisions. He is quite tearful throughout our visit reiterating again \"I just want to be in the sunlight with my wife.\" We did discuss his advance directive, which indicates that the burden as well as the benefit of treatment should be considered. When asked to consider when the burden outweighs the benefit for him he states \"I'm already there.\"    Throughout the conversation, PC RN provided therapeutic communication including open ended questions and reflective listening. All questions answered and concerns addressed. PC RN contact information provided.    Updated: MD and bedside RN    Plan: Revisit tomorrow after the family has had time to discuss.     Thank you for allowing Palliative Care to support this patient and family. Contact [phone] for additional assistance, change in patient status, or with any questions/concerns.   "

## 2021-09-10 ENCOUNTER — APPOINTMENT (OUTPATIENT)
Dept: RADIOLOGY | Facility: MEDICAL CENTER | Age: 71
End: 2021-09-10
Attending: FAMILY MEDICINE
Payer: MEDICARE

## 2021-09-10 ENCOUNTER — APPOINTMENT (OUTPATIENT)
Dept: RADIOLOGY | Facility: MEDICAL CENTER | Age: 71
DRG: 853 | End: 2021-09-10
Attending: SURGERY
Payer: MEDICARE

## 2021-09-10 LAB
ALBUMIN SERPL BCP-MCNC: 3 G/DL (ref 3.2–4.9)
ALBUMIN/GLOB SERPL: 1.6 G/DL
ALP SERPL-CCNC: 122 U/L (ref 30–99)
ALT SERPL-CCNC: 9 U/L (ref 2–50)
ANION GAP SERPL CALC-SCNC: 6 MMOL/L (ref 7–16)
ANION GAP SERPL CALC-SCNC: 6 MMOL/L (ref 7–16)
ANION GAP SERPL CALC-SCNC: 7 MMOL/L (ref 7–16)
ANION GAP SERPL CALC-SCNC: 8 MMOL/L (ref 7–16)
ANISOCYTOSIS BLD QL SMEAR: ABNORMAL
AST SERPL-CCNC: 19 U/L (ref 12–45)
BASOPHILS # BLD AUTO: 0 % (ref 0–1.8)
BASOPHILS # BLD: 0 K/UL (ref 0–0.12)
BILIRUB SERPL-MCNC: 1.1 MG/DL (ref 0.1–1.5)
BUN SERPL-MCNC: 20 MG/DL (ref 8–22)
BUN SERPL-MCNC: 20 MG/DL (ref 8–22)
BUN SERPL-MCNC: 21 MG/DL (ref 8–22)
BUN SERPL-MCNC: 21 MG/DL (ref 8–22)
CA-I SERPL-SCNC: 1.2 MMOL/L (ref 1.1–1.3)
CALCIUM SERPL-MCNC: 8.4 MG/DL (ref 8.5–10.5)
CALCIUM SERPL-MCNC: 8.5 MG/DL (ref 8.5–10.5)
CALCIUM SERPL-MCNC: 8.8 MG/DL (ref 8.5–10.5)
CALCIUM SERPL-MCNC: 8.9 MG/DL (ref 8.5–10.5)
CHLORIDE SERPL-SCNC: 108 MMOL/L (ref 96–112)
CHLORIDE SERPL-SCNC: 111 MMOL/L (ref 96–112)
CO2 SERPL-SCNC: 27 MMOL/L (ref 20–33)
CO2 SERPL-SCNC: 28 MMOL/L (ref 20–33)
CO2 SERPL-SCNC: 28 MMOL/L (ref 20–33)
CO2 SERPL-SCNC: 29 MMOL/L (ref 20–33)
CREAT SERPL-MCNC: 0.4 MG/DL (ref 0.5–1.4)
CREAT SERPL-MCNC: 0.44 MG/DL (ref 0.5–1.4)
EOSINOPHIL # BLD AUTO: 0.02 K/UL (ref 0–0.51)
EOSINOPHIL NFR BLD: 0.9 % (ref 0–6.9)
ERYTHROCYTE [DISTWIDTH] IN BLOOD BY AUTOMATED COUNT: 79.7 FL (ref 35.9–50)
GLOBULIN SER CALC-MCNC: 1.9 G/DL (ref 1.9–3.5)
GLUCOSE SERPL-MCNC: 125 MG/DL (ref 65–99)
GLUCOSE SERPL-MCNC: 131 MG/DL (ref 65–99)
GLUCOSE SERPL-MCNC: 137 MG/DL (ref 65–99)
GLUCOSE SERPL-MCNC: 137 MG/DL (ref 65–99)
HCT VFR BLD AUTO: 28.8 % (ref 42–52)
HGB BLD-MCNC: 9.2 G/DL (ref 14–18)
LYMPHOCYTES # BLD AUTO: 0.3 K/UL (ref 1–4.8)
LYMPHOCYTES NFR BLD: 11.4 % (ref 22–41)
MACROCYTES BLD QL SMEAR: ABNORMAL
MAGNESIUM SERPL-MCNC: 2 MG/DL (ref 1.5–2.5)
MAGNESIUM SERPL-MCNC: 2 MG/DL (ref 1.5–2.5)
MANUAL DIFF BLD: NORMAL
MCH RBC QN AUTO: 32.9 PG (ref 27–33)
MCHC RBC AUTO-ENTMCNC: 31.9 G/DL (ref 33.7–35.3)
MCV RBC AUTO: 102.9 FL (ref 81.4–97.8)
MICROCYTES BLD QL SMEAR: ABNORMAL
MONOCYTES # BLD AUTO: 0.11 K/UL (ref 0–0.85)
MONOCYTES NFR BLD AUTO: 4.4 % (ref 0–13.4)
MORPHOLOGY BLD-IMP: NORMAL
NEUTROPHILS # BLD AUTO: 2.17 K/UL (ref 1.82–7.42)
NEUTROPHILS NFR BLD: 79.8 % (ref 44–72)
NEUTS BAND NFR BLD MANUAL: 3.5 % (ref 0–10)
NRBC # BLD AUTO: 0 K/UL
NRBC BLD-RTO: 0 /100 WBC
OVALOCYTES BLD QL SMEAR: NORMAL
PLATELET # BLD AUTO: 27 K/UL (ref 164–446)
PLATELET BLD QL SMEAR: NORMAL
PLATELETS.RETICULATED NFR BLD AUTO: 2.1 K/UL (ref 0.6–13.1)
PMV BLD AUTO: 10.7 FL (ref 9–12.9)
POIKILOCYTOSIS BLD QL SMEAR: NORMAL
POTASSIUM SERPL-SCNC: 3.6 MMOL/L (ref 3.6–5.5)
POTASSIUM SERPL-SCNC: 3.7 MMOL/L (ref 3.6–5.5)
POTASSIUM SERPL-SCNC: 3.9 MMOL/L (ref 3.6–5.5)
POTASSIUM SERPL-SCNC: 4.1 MMOL/L (ref 3.6–5.5)
PROT SERPL-MCNC: 4.9 G/DL (ref 6–8.2)
RBC # BLD AUTO: 2.8 M/UL (ref 4.7–6.1)
RBC BLD AUTO: PRESENT
SODIUM SERPL-SCNC: 142 MMOL/L (ref 135–145)
SODIUM SERPL-SCNC: 143 MMOL/L (ref 135–145)
SODIUM SERPL-SCNC: 144 MMOL/L (ref 135–145)
SODIUM SERPL-SCNC: 145 MMOL/L (ref 135–145)
TRIGL SERPL-MCNC: 80 MG/DL (ref 0–149)
WBC # BLD AUTO: 2.6 K/UL (ref 4.8–10.8)

## 2021-09-10 PROCEDURE — 700102 HCHG RX REV CODE 250 W/ 637 OVERRIDE(OP): Performed by: SURGERY

## 2021-09-10 PROCEDURE — 82330 ASSAY OF CALCIUM: CPT

## 2021-09-10 PROCEDURE — 80053 COMPREHEN METABOLIC PANEL: CPT

## 2021-09-10 PROCEDURE — 84478 ASSAY OF TRIGLYCERIDES: CPT

## 2021-09-10 PROCEDURE — A9270 NON-COVERED ITEM OR SERVICE: HCPCS | Performed by: SURGERY

## 2021-09-10 PROCEDURE — 93970 EXTREMITY STUDY: CPT

## 2021-09-10 PROCEDURE — 99233 SBSQ HOSP IP/OBS HIGH 50: CPT | Performed by: SURGERY

## 2021-09-10 PROCEDURE — 700102 HCHG RX REV CODE 250 W/ 637 OVERRIDE(OP): Performed by: NURSE PRACTITIONER

## 2021-09-10 PROCEDURE — 92610 EVALUATE SWALLOWING FUNCTION: CPT

## 2021-09-10 PROCEDURE — 85027 COMPLETE CBC AUTOMATED: CPT

## 2021-09-10 PROCEDURE — 83735 ASSAY OF MAGNESIUM: CPT

## 2021-09-10 PROCEDURE — 700111 HCHG RX REV CODE 636 W/ 250 OVERRIDE (IP): Performed by: SURGERY

## 2021-09-10 PROCEDURE — 770022 HCHG ROOM/CARE - ICU (200)

## 2021-09-10 PROCEDURE — 80048 BASIC METABOLIC PNL TOTAL CA: CPT | Mod: 91

## 2021-09-10 PROCEDURE — A9270 NON-COVERED ITEM OR SERVICE: HCPCS | Performed by: NURSE PRACTITIONER

## 2021-09-10 PROCEDURE — 97602 WOUND(S) CARE NON-SELECTIVE: CPT

## 2021-09-10 PROCEDURE — A9270 NON-COVERED ITEM OR SERVICE: HCPCS | Performed by: INTERNAL MEDICINE

## 2021-09-10 PROCEDURE — 85055 RETICULATED PLATELET ASSAY: CPT

## 2021-09-10 PROCEDURE — 85007 BL SMEAR W/DIFF WBC COUNT: CPT

## 2021-09-10 PROCEDURE — 97605 NEG PRS WND THER DME<=50SQCM: CPT

## 2021-09-10 PROCEDURE — 700102 HCHG RX REV CODE 250 W/ 637 OVERRIDE(OP): Performed by: INTERNAL MEDICINE

## 2021-09-10 RX ORDER — POTASSIUM CHLORIDE 14.9 MG/ML
20 INJECTION INTRAVENOUS ONCE
Status: COMPLETED | OUTPATIENT
Start: 2021-09-10 | End: 2021-09-10

## 2021-09-10 RX ORDER — FUROSEMIDE 20 MG/1
20 TABLET ORAL
Status: DISCONTINUED | OUTPATIENT
Start: 2021-09-10 | End: 2021-09-12

## 2021-09-10 RX ORDER — POTASSIUM CHLORIDE 7.45 MG/ML
10 INJECTION INTRAVENOUS ONCE
Status: COMPLETED | OUTPATIENT
Start: 2021-09-10 | End: 2021-09-10

## 2021-09-10 RX ADMIN — AMIODARONE HYDROCHLORIDE 200 MG: 200 TABLET ORAL at 17:25

## 2021-09-10 RX ADMIN — FUROSEMIDE 20 MG: 20 TABLET ORAL at 17:25

## 2021-09-10 RX ADMIN — POTASSIUM CHLORIDE 10 MEQ: 10 INJECTION, SOLUTION INTRAVENOUS at 13:40

## 2021-09-10 RX ADMIN — POTASSIUM CHLORIDE 20 MEQ: 14.9 INJECTION, SOLUTION INTRAVENOUS at 01:10

## 2021-09-10 RX ADMIN — DOCUSATE SODIUM 50 MG AND SENNOSIDES 8.6 MG 2 TABLET: 8.6; 5 TABLET, FILM COATED ORAL at 17:25

## 2021-09-10 RX ADMIN — METOPROLOL TARTRATE 6.25 MG: 25 TABLET, FILM COATED ORAL at 17:25

## 2021-09-10 RX ADMIN — THIAMINE HCL TAB 100 MG 100 MG: 100 TAB at 05:34

## 2021-09-10 RX ADMIN — OMEPRAZOLE 40 MG: KIT at 05:33

## 2021-09-10 RX ADMIN — ATORVASTATIN CALCIUM 40 MG: 40 TABLET, FILM COATED ORAL at 17:25

## 2021-09-10 RX ADMIN — SPIRONOLACTONE 50 MG: 50 TABLET ORAL at 05:33

## 2021-09-10 RX ADMIN — HYDROMORPHONE HYDROCHLORIDE 0.5 MG: 1 INJECTION, SOLUTION INTRAMUSCULAR; INTRAVENOUS; SUBCUTANEOUS at 11:42

## 2021-09-10 RX ADMIN — AMIODARONE HYDROCHLORIDE 200 MG: 200 TABLET ORAL at 05:33

## 2021-09-10 RX ADMIN — POTASSIUM CHLORIDE 10 MEQ: 10 INJECTION, SOLUTION INTRAVENOUS at 18:20

## 2021-09-10 RX ADMIN — OXYCODONE 10 MG: 5 TABLET ORAL at 22:29

## 2021-09-10 RX ADMIN — POTASSIUM CHLORIDE 20 MEQ: 14.9 INJECTION, SOLUTION INTRAVENOUS at 07:49

## 2021-09-10 ASSESSMENT — PAIN DESCRIPTION - PAIN TYPE
TYPE: ACUTE PAIN;SURGICAL PAIN;CHRONIC PAIN
TYPE: ACUTE PAIN

## 2021-09-10 ASSESSMENT — FIBROSIS 4 INDEX: FIB4 SCORE: 16.9

## 2021-09-10 NOTE — CARE PLAN
The patient is Watcher - Medium risk of patient condition declining or worsening    Shift Goals  Clinical Goals: Hemodynamic stability, fluid balance with improved urine output, mobility  Patient Goals: Rest, ice chips, visit with his spouse at bedside   Family Goals: No family present at this time     Progress made toward(s) clinical / shift goals:      Problem: Pain - Standard  Goal: Alleviation of pain or a reduction in pain to the patient’s comfort goal  Outcome: Progressing   Patient rates pain using 0-10 pain rating scale.   Problem: Mobility  Goal: Patient's ability to tolerate increased activity will improve  Outcome: Progressing   Patient agreeable to sitting in bed-chair position while he visits with his wife. Tolerated level 1 mobility.     Problem: Self Care  Goal: Patient will have the ability to perform ADLs independently or with assistance (bathe, groom, dress, toilet and feed)  Outcome: Progressing   Daily bath and linen change performed. Patient actively participating and brushing teeth and grooming.     Problem: Hemodynamics  Goal: Patient's hemodynamics, fluid balance and neurologic status will be stable or improve  Outcome: Progressing   Close monitoring of fluid balance and output of drains, ostomy and urinary catheter.    Patient is not progressing towards the following goals: N/A

## 2021-09-10 NOTE — PROGRESS NOTES
4 Eyes Skin Assessment Completed by YASMIN Valentin and YASMIN Lawton.     Head Blanching and Redness to posterior head. Sluggish to kami, Z-Quirino placed. No open areas noted.  Ears Redness and Blanching to tops of ears bilaterally. Silicone O2 tubing with grey foams in place.  Nose WDL.  Mouth WDL (dry) - moisturized PRN.  Neck WDL  Breast/Chest Bruising  Shoulder Blades Blanching  Spine Redness and Blanching  (R) Arm/Elbow/Hand Bruising and Edema  (L) Arm/Elbow/Hand Bruising and Edema + skin tear x3 extending over forearm, elbow and bicep. Forearm and upper arm dressed with Mepitel 1, elbow remains ETKA.  Abdomen Bruising and Incision (midline with wound vac). Additionally, a stapled site noted to the L of the midline incision, bilateral MARIANA drains (EKTA), and RLW ileostomy noted.   Groin Swelling.  Scrotum/Coccyx/Buttocks Redness and Blanching.  (R) Leg Blanching, Bruising, Swelling, Abrasion and Edema .  (L) Leg Blanching, Bruising, Swelling, Abrasion and Edema.  (R) Heel/Foot/Toe Redness, Blanching and Boggy.  (L) Heel/Foot/Toe Redness, Blanching and Boggy.     Devices In Places ECG, Blood Pressure Cuff, Pulse Ox, Triana, SCD's, Cortrak, Central Line and Nasal Cannula.        Interventions In Place NC W/Ear Foams, Heel Mepilex, Sacral Mepilex, Heel Float Boots, TAP System, Pillows, Q2 Turns, Low Air Loss Mattress, Barrier Cream, ZFlo Pillow and Dri-Quirino Pads.     Possible Skin Injury No (none new, all incisions/drains appropriately documented).     Pictures Uploaded Into Epic Yes - ileostomy/abdominal wound photos up to date. Tears to LUE/posterior head/heels have not yet been photographed.     Wound Consult Placed N/A - already being seen by WOCN at least 3x/week for vac dressing changes.  RN Wound Prevention Protocol Ordered: Yes

## 2021-09-10 NOTE — CARE PLAN
The patient is Watcher - Medium risk of patient condition declining or worsening    Shift Goals  Clinical Goals: Hemodynamic stability, improve fluid balance, mobility  Patient Goals: Pain control, rest   Family Goals: No family present at this time     Progress made toward(s) clinical / shift goals:    Problem: Pain - Standard  Goal: Alleviation of pain or a reduction in pain to the patient’s comfort goal  Outcome: Progressing     Problem: Mobility  Goal: Patient's ability to tolerate increased activity will improve  Outcome: Progressing     Problem: Hemodynamics  Goal: Patient's hemodynamics, fluid balance and neurologic status will be stable or improve  Outcome: Progressing       Patient is not progressing towards the following goals:

## 2021-09-10 NOTE — PROGRESS NOTES
Trauma / Surgical Daily Progress Note    Date of Service  9/10/2021    Chief Complaint  71 y.o. male admitted 8/26/2021 with     Interval Events  Maintenance Lasix dosage initiated.  Palliative care follow-up.  The patient remains critically ill with hepatocellular carcinoma.  The patient was seen and examined on rounds and discussed with the multidisciplinary critical care team and consulting physicians. Critically evaluated laboratory tests, culture data, medications, imaging, and other diagnostic tests.    The patient has impairment of one or more vital organ systems and a high probability of imminent or life-threatening deterioration in condition. Provided high complexity decision making to assess, manipulate, and support vital system functions to treat vital organ system failure and/or to prevent further life-threatening deterioration of the patient's condition. Requires continued ICU and hospital admission.    Review of Systems  Review of Systems   Unable to perform ROS: Patient nonverbal        Vital Signs for last 24 hours  Temp:  [36.1 °C (97 °F)-37 °C (98.6 °F)] 36.1 °C (97 °F)  Pulse:  [68-99] 98  Resp:  [9-23] 18  BP: ()/(50-63) 110/62  SpO2:  [92 %-100 %] 100 %    Hemodynamic parameters for last 24 hours  CVP:  [7 MM HG-15 MM HG] 15 MM HG    Respiratory Data     Respiration: 18, Pulse Oximetry: 100 %     Work Of Breathing / Effort: Shallow  RUL Breath Sounds: Clear, RML Breath Sounds: Clear, RLL Breath Sounds: Diminished, ASHLEY Breath Sounds: Clear, LLL Breath Sounds: Diminished    Physical Exam  Physical Exam  Vitals and nursing note reviewed.   Constitutional:       Appearance: He is ill-appearing.      Interventions: He is restrained. Nasal cannula in place.   HENT:      Head: Normocephalic and atraumatic.      Mouth/Throat:      Mouth: Mucous membranes are dry.      Pharynx: Oropharynx is clear.   Eyes:      Extraocular Movements: Extraocular movements intact.      Conjunctiva/sclera:  Conjunctivae normal.      Pupils: Pupils are equal, round, and reactive to light.   Cardiovascular:      Rate and Rhythm: Normal rate and regular rhythm.      Pulses: Normal pulses.   Pulmonary:      Effort: No respiratory distress.      Breath sounds: No stridor. Examination of the right-lower field reveals decreased breath sounds. Examination of the left-lower field reveals decreased breath sounds. Decreased breath sounds present. No wheezing.   Abdominal:      General: There is distension.      Palpations: Abdomen is soft.      Tenderness: There is no abdominal tenderness.      Comments: Dressing CDI  Left upper quadrant drain: 850 cc serosanguineous  Right upper quadrant drain: 440 cc brown/purulent   Genitourinary:     Comments: Triana  Musculoskeletal:      Cervical back: Neck supple.      Comments: Diffuse anasarca   Skin:     General: Skin is warm and dry.      Coloration: Skin is pale. Skin is not jaundiced.   Neurological:      General: No focal deficit present.      Mental Status: He is easily aroused. He is disoriented.   Psychiatric:         Behavior: Behavior is cooperative.         Laboratory  Recent Results (from the past 24 hour(s))   Basic Metabolic Panel    Collection Time: 09/09/21  5:21 PM   Result Value Ref Range    Sodium 144 135 - 145 mmol/L    Potassium 3.5 (L) 3.6 - 5.5 mmol/L    Chloride 108 96 - 112 mmol/L    Co2 30 20 - 33 mmol/L    Glucose 148 (H) 65 - 99 mg/dL    Bun 21 8 - 22 mg/dL    Creatinine 0.48 (L) 0.50 - 1.40 mg/dL    Calcium 8.5 8.5 - 10.5 mg/dL    Anion Gap 6.0 (L) 7.0 - 16.0   ESTIMATED GFR    Collection Time: 09/09/21  5:21 PM   Result Value Ref Range    GFR If African American >60 >60 mL/min/1.73 m 2    GFR If Non African American >60 >60 mL/min/1.73 m 2   Basic Metabolic Panel    Collection Time: 09/09/21 11:55 PM   Result Value Ref Range    Sodium 144 135 - 145 mmol/L    Potassium 3.6 3.6 - 5.5 mmol/L    Chloride 108 96 - 112 mmol/L    Co2 28 20 - 33 mmol/L    Glucose 137  (H) 65 - 99 mg/dL    Bun 21 8 - 22 mg/dL    Creatinine 0.44 (L) 0.50 - 1.40 mg/dL    Calcium 8.4 (L) 8.5 - 10.5 mg/dL    Anion Gap 8.0 7.0 - 16.0   ESTIMATED GFR    Collection Time: 09/09/21 11:55 PM   Result Value Ref Range    GFR If African American >60 >60 mL/min/1.73 m 2    GFR If Non African American >60 >60 mL/min/1.73 m 2   Comp Metabolic Panel    Collection Time: 09/10/21  5:30 AM   Result Value Ref Range    Sodium 145 135 - 145 mmol/L    Potassium 3.7 3.6 - 5.5 mmol/L    Chloride 111 96 - 112 mmol/L    Co2 27 20 - 33 mmol/L    Anion Gap 7.0 7.0 - 16.0    Glucose 131 (H) 65 - 99 mg/dL    Bun 21 8 - 22 mg/dL    Creatinine 0.44 (L) 0.50 - 1.40 mg/dL    Calcium 8.5 8.5 - 10.5 mg/dL    AST(SGOT) 19 12 - 45 U/L    ALT(SGPT) 9 2 - 50 U/L    Alkaline Phosphatase 122 (H) 30 - 99 U/L    Total Bilirubin 1.1 0.1 - 1.5 mg/dL    Albumin 3.0 (L) 3.2 - 4.9 g/dL    Total Protein 4.9 (L) 6.0 - 8.2 g/dL    Globulin 1.9 1.9 - 3.5 g/dL    A-G Ratio 1.6 g/dL   IONIZED CALCIUM    Collection Time: 09/10/21  5:30 AM   Result Value Ref Range    Ionized Calcium 1.2 1.1 - 1.3 mmol/L   CBC WITH DIFFERENTIAL    Collection Time: 09/10/21  5:30 AM   Result Value Ref Range    WBC 2.6 (L) 4.8 - 10.8 K/uL    RBC 2.80 (L) 4.70 - 6.10 M/uL    Hemoglobin 9.2 (L) 14.0 - 18.0 g/dL    Hematocrit 28.8 (L) 42.0 - 52.0 %    .9 (H) 81.4 - 97.8 fL    MCH 32.9 27.0 - 33.0 pg    MCHC 31.9 (L) 33.7 - 35.3 g/dL    RDW 79.7 (H) 35.9 - 50.0 fL    Platelet Count 27 (LL) 164 - 446 K/uL    MPV 10.7 9.0 - 12.9 fL    Neutrophils-Polys 79.80 (H) 44.00 - 72.00 %    Lymphocytes 11.40 (L) 22.00 - 41.00 %    Monocytes 4.40 0.00 - 13.40 %    Eosinophils 0.90 0.00 - 6.90 %    Basophils 0.00 0.00 - 1.80 %    Nucleated RBC 0.00 /100 WBC    Neutrophils (Absolute) 2.17 1.82 - 7.42 K/uL    Lymphs (Absolute) 0.30 (L) 1.00 - 4.80 K/uL    Monos (Absolute) 0.11 0.00 - 0.85 K/uL    Eos (Absolute) 0.02 0.00 - 0.51 K/uL    Baso (Absolute) 0.00 0.00 - 0.12 K/uL    NRBC  (Absolute) 0.00 K/uL    Anisocytosis 1+     Macrocytosis 1+     Microcytosis 1+    MAGNESIUM    Collection Time: 09/10/21  5:30 AM   Result Value Ref Range    Magnesium 2.0 1.5 - 2.5 mg/dL   Triglyceride    Collection Time: 09/10/21  5:30 AM   Result Value Ref Range    Triglycerides 80 0 - 149 mg/dL   ESTIMATED GFR    Collection Time: 09/10/21  5:30 AM   Result Value Ref Range    GFR If African American >60 >60 mL/min/1.73 m 2    GFR If Non African American >60 >60 mL/min/1.73 m 2   PERIPHERAL SMEAR REVIEW    Collection Time: 09/10/21  5:30 AM   Result Value Ref Range    Peripheral Smear Review see below    DIFFERENTIAL MANUAL    Collection Time: 09/10/21  5:30 AM   Result Value Ref Range    Bands-Stabs 3.50 0.00 - 10.00 %    Manual Diff Status PERFORMED    PLATELET ESTIMATE    Collection Time: 09/10/21  5:30 AM   Result Value Ref Range    Plt Estimation Marked Decrease    MORPHOLOGY    Collection Time: 09/10/21  5:30 AM   Result Value Ref Range    RBC Morphology Present     Poikilocytosis 1+     Ovalocytes 1+    IMMATURE PLT FRACTION    Collection Time: 09/10/21  5:30 AM   Result Value Ref Range    Imm. Plt Fraction 2.1 0.6 - 13.1 K/uL   MAGNESIUM    Collection Time: 09/10/21 12:05 PM   Result Value Ref Range    Magnesium 2.0 1.5 - 2.5 mg/dL   Basic Metabolic Panel    Collection Time: 09/10/21 12:05 PM   Result Value Ref Range    Sodium 143 135 - 145 mmol/L    Potassium 3.9 3.6 - 5.5 mmol/L    Chloride 108 96 - 112 mmol/L    Co2 29 20 - 33 mmol/L    Glucose 125 (H) 65 - 99 mg/dL    Bun 20 8 - 22 mg/dL    Creatinine 0.44 (L) 0.50 - 1.40 mg/dL    Calcium 8.8 8.5 - 10.5 mg/dL    Anion Gap 6.0 (L) 7.0 - 16.0   ESTIMATED GFR    Collection Time: 09/10/21 12:05 PM   Result Value Ref Range    GFR If African American >60 >60 mL/min/1.73 m 2    GFR If Non African American >60 >60 mL/min/1.73 m 2       Fluids    Intake/Output Summary (Last 24 hours) at 9/10/2021 1605  Last data filed at 9/10/2021 1400  Gross per 24 hour    Intake 2938.33 ml   Output 3810 ml   Net -871.67 ml       Core Measures & Quality Metrics  Labs reviewed, Medications reviewed and Radiology images reviewed  Triana catheter: Critically Ill - Requiring Accurate Measurement of Urinary Output      DVT Prophylaxis: Contraindicated - High bleeding risk  DVT prophylaxis - mechanical: SCDs  Ulcer prophylaxis: Yes    Assessed for rehab: Patient was assess for and/or received rehabilitation services during this hospitalization    CHELI Score  ETOH Screening    Assessment/Plan  * Perforated viscus- (present on admission)  Assessment & Plan  8/26 - Ceftriaxone and metronidazole initiated  8/30 - Peritoneal culture positive for Streptococcus anginosus  8/31 - Abscess drainage and ileostomy creation, antibiotics altered to Zosyn  8/31- diverting ileostomy with MARIANA drainage of feculent material.  Abdomen too frozen for formal resection  9/6 - Zosyn day 7 of 7.     Left ventricular thrombus  Assessment & Plan  Notified an echocardiogram on 9/4  Hold off on starting therapeutic anticoagulation because of very low platelet count    Fluid overload  Assessment & Plan  Admission weight under 70 kg  Fluid balance +47 L  Diuresis  Trend electrolytes closely    Respiratory failure following trauma and surgery (HCC)  Assessment & Plan  Remained on ventilator post operatively  9/5 extubated  Aggressive pulmonary toilette    Septic shock (HCC)- (present on admission)  Assessment & Plan  Secondary to liver abscess  Ongoing volume resuscitation and pressors support  abx rocephin and flagyl  8/30-feculent drain output in late evening  8/31- return to OR for washout, drainage of feculent fluid and diverting ileostomy.  9/3: Remains on small doses of norepinephrine.  Vasopressin has been discontinued.  Remains on every 8 hydrocortisone.  Rapid taper as appropriate.  9/4 wean pressors to off.     Hepatocellular carcinoma (HCC)- (present on admission)  Assessment & Plan  Advanced tumor involving  most of the right lobe. Immunotherapy/chemo prior to presentation with bowel perforation.  Associated abscess with apparent involvement of hepatic flexure.  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  Diverting ileostomy.    Contraindication to anticoagulation therapy  Assessment & Plan  Low platelet count: Holding off on prophylactic/therapeutic anticoagulation  9/9 screening duplex ordered    Ischemic cardiomyopathy  Assessment & Plan  Echo with ejection fraction of 25% along LAD distribution  Likely chronic based on history  Blood pressure too low for ACE inhibitor or beta-blocker  Appreciate cardiology recommendations    Pleural effusion, bilateral  Assessment & Plan  8/31 moderate bilateral pleural effusions on CT      Atrial fibrillation (HCC)- (present on admission)  Assessment & Plan  Diuresis  Aggressive electrolyte replacement  Metoprolol and amiodarone   8/31- converted out in OR, continues on amiodarone gtt  9/3: Amiodarone drip  9/5 transition to enteral amiodarone    Cirrhosis of liver with ascites (HCC)  Assessment & Plan  9/3 - 9/6 high volume ascites output from drains (R MARIANA in abscess cavity, L MARIANA in pelvis.   General surgery: can not remove drains.  Continued round-the-clock albumin and fluid therapy.  Diuretic therapy.  9/8 Resume albumin    Hypokalemia- (present on admission)  Assessment & Plan  Potassium persistently low  Starting Lasix regimen so we will place patient on K scale    Normochromic anemia- (present on admission)  Assessment & Plan  Trend and transfuse for hgb <7.  Hgb today is 7.8    Pancytopenia (HCC)- (present on admission)  Assessment & Plan  Chronic anemia, leukopenia, thrombocytopenia  9/2: Transfused pheresis pack for platelet count 40 in the setting of hemoglobin below 7  Likely mixed etiology advanced liver disease and resolving sepsis.  Trend, transfuse for clinical bleeding.  9/9 ongoing low cell counts  We will discuss with hematology    Adrenal insufficiency  ([facility])  Assessment & Plan  8/29 Cortisol low in the setting of shock  Hydrocortisone started  9/8 Taper      Discussed patient condition with RN, RT, Pharmacy, Dietary,  and general surgery.  CRITICAL CARE TIME EXCLUDING PROCEDURES: 36 minutes

## 2021-09-10 NOTE — PROGRESS NOTES
[facility]    POD # 14  POD # 10    Doing well  Tolerating tube feeds  Stoma with good function  Ongoing diuresis  Mgt per ICU team  No changes from ACS standpoint

## 2021-09-10 NOTE — PROGRESS NOTES
Placed call to Dr. Thornton for clarification on code status, as current orders do not coincide with PC RN/MD notes today indicating that pt wishes to be DNR/DNI. Received verbal clarification that pt wishes to be DNR/DNI status and order will be changed to reflect this.

## 2021-09-10 NOTE — PROGRESS NOTES
Updated Dr. Thornton via telephone regarding drop in urine output for 2200 hour. New orders received for 25g 5% albumin once now; do not need to re-page for low urine output overnight once albumin given.

## 2021-09-10 NOTE — PROGRESS NOTES
Surgery  8/27 diverting ileostomy for colon perforation unable to be resected/mobilized  Cirrhosis    Lorraine Bright Bella is Awake interactive  Tolerating ice chips  He reports feeling better today   indicates comfortable  Abdomen soft, stoma pink and productive, VAC in place   drains in place  Plan:  Appreciate ICU care  Nutritional support   Cont abx  Stoma care  Drain care  VAC x3 weekly

## 2021-09-10 NOTE — THERAPY
Occupational Therapy Contact Note    Discussed with RN. Per palliative note, pt trying to decide on GOC with his family. Last session pt did not enjoy sitting EOB and frequently requested to lay down. Will hold treatment today and attempt once GOC have been decided. RN in agreement.    Elle Jorgensen, OTR/L

## 2021-09-10 NOTE — THERAPY
"Speech Language Pathology   Clinical Swallow Evaluation     Patient Name: Lorraine Bella  AGE:  71 y.o., SEX:  male  Medical Record #: [id number]  Today's Date: 9/10/2021     Precautions  Precautions: Fall Risk, Nasogastric Tube, Swallow Precautions ( See Comments)  Comments: afib, LEs weeping 2/2 edema    Assessment    Patient is 71 y.o. male with a diagnosis of perforated viscus, malnutrition, anemia, cirrhosis and thombocytopenia. Patient is s/p exploratory laparotomy, drainage of peritoneal abscess and creation of loop ileostomy. He was intubated x2. Recently extubated on 9/4/21. CxR 9/8/21 reports \"Pulmonary edema and/or infiltrates are identified, which appear somewhat increased since the prior exam\"  He has a medical history of hepatocellular carcinoma with cirrhosis treated by Dr Birch.   RN reported MD gave clearance for swallow evaluation/provide trials of PO.   Patient presents with weak voice, weak cough, weak laryngeal elevation and mildly decreased lingual strength. His voice improved once moisture was given to his mouth. Throat clearing was observed with all trials, except for ice chips. Patient given ice, 1/2tsp and 1tsp amounts of MTL, 1/2tsp amounts of thin liquid, 1/2 tsp of liquidised. Currently, this patient is not yet ready for a PO diet. Recommend NPO except for ice chips, prn. Patient in agreement. SLP will continue to follow. Patient appears motivated to work with tx and eat again.    Plan    Recommend Speech Therapy 5 times per week until therapy goals are met for the following treatments:  Dysphagia Training and Patient / Family / Caregiver Education.    Discharge Recommendations: Recommend post-acute placement for additional speech therapy services prior to discharge home       Objective       09/10/21 1015   Prior Living Situation   Prior Services Home-Independent   Housing / Facility 1 Story House   Steps Into Home 1   Steps In Home 0   Bathroom Set up Bathtub / Shower " Combination;Walk In Shower   Equipment Owned None   Lives with - Patient's Self Care Capacity Spouse   Comments Per notes, the patient was independent prior to last hospitalization   Prior Level Of Function   Communication Within Functional Limits   Swallow Within Functional Limits   Dentition Intact   Dentures None   Hearing Within Functional Limits for Evaluation   Vision Reading    Patient's Primary Language English   Occupation (Pre-Hospital Vocational) Retired Due To Age   Oral Motor Eval    Is Patient Able to Complete Oral Motor Eval Yes but Impaired   Labial Function   Labial Structure At Rest Within Functional Limits   Labial Vowel Production / I /, / U / Within Functional Limits   Labial Sequence / I /, / U / Within Functional Limits   Frown, Pucker Minimal  (decreased protrusion and strength of labial seal)   Lingual Function   Lingual Structure At Rest Within Functional Limits   Lingual Protrude Within Functional Limits   Lingual Retract Within Functional Limits   Lateralization Minimal Right;Minimal Left  (for strength)   Lick Lips (Circular) Within Functional Limits   / Pa / 5X's Minimal   / Ta / 5X's Minimal   / Ka / 5X's Minimal   / [place] / 5X's Minimal   Jaw   Jaw Structure At Rest Within Functional Limits   Bite ([place]) Within Functional Limits   Chew (Rotary) Within Functional Limits   Jaw Open / Resist Within Functional Limits   Jaw Close / Resist Within Functional Limits   Velar Function   Velar Structure At Rest Within Functional Limits   / A / Prolonged   (slight deviation to the left, possibly due to Cortrak)   Laryngeal Function   Voice Quality Moderate  (but improved after PO trials were given)   Volutional Cough Moderate   Excursion Upon Swallow Weak    Max Phonation Time (Seconds) 5  (on 3rd attempt)   Oral Food Presentation   Ice Chips Within Functional Limits   Single Swallow Mildly Thick (2) - (Nectar Thick)  Minimal   Serial Swallow Mildly Thick (2) - (Nectar Thick) Not Tested  "  Single Swallow Thin (0) Minimal   Serial Swallow Thin (0) Not Tested   Liquidised (3) Moderate  (pt immediately asking for ice chips to help clear the bolus)   Pureed (4) Not Tested   Regular (7) Not Tested   Self Feeding Needs Assistance   Tracheostomy   Tracheostomy  No   Dysphagia Strategies / Recommendations   Strategies / Interventions Recommended (Yes / No) Yes   Compensatory Strategies Strict 1:1 Feeding;Head of Bed 90 Degrees During Eating / Drinking;Liquids Via Spoon;Multiple Swallows   Diet / Liquid Recommendation NPO   Medication Administration  Via Gastric Tube   Therapy Interventions Dysphagia Therapy By Speech Language Pathologist   Dysphagia Rating   Nutritional Liquid Intake Rating Scale Nothing by mouth   Nutritional Food Intake Rating Scale Tube dependent with minimal attempts of oral intake   Patient / Family Goals   Patient / Family Goal #1 \"ice chips\"   Short Term Goals   Short Term Goal # 1 The patient will consume PO trials without overt signs of aspiration given 1:1 feeding by SLP         "

## 2021-09-10 NOTE — CARE PLAN
"The patient is {Patient Stability:[id number]}    Shift Goals  Clinical Goals: Improved urine output, improvement in ADL performance/mobility tolerance, hemodynamic stability  Patient Goals: Ice chips PRN, sleep, \"see my wife\"  Family Goals: No family present at this time    Progress made toward(s) clinical / shift goals:  ***    Patient is not progressing towards the following goals:      Problem: Self Care  Goal: Patient will have the ability to perform ADLs independently or with assistance (bathe, groom, dress, toilet and feed)  Outcome: Not Progressing     Problem: Knowledge Deficit - Ostomy  Goal: Patient will demonstrate ability to manage and maintain ostomy  Outcome: Not Progressing     Problem: Discharge Barriers/Self-Care - Ostomy  Goal: Ostomy patient's continuum of care needs will be met  Outcome: Not Progressing     "

## 2021-09-10 NOTE — WOUND TEAM
"[facility]  Inpatient Services  Wound and Skin Care Evaluation    Admission Date: 8/26/2021     Last order of IP CONSULT TO WOUND CARE was found on 8/31/2021 from Hospital Encounter on 8/26/2021     HPI, PMH, SH: Reviewed    Past Surgical History:   Procedure Laterality Date   • PB EXPLORATORY OF ABDOMEN N/A 8/31/2021    Procedure: LAPAROTOMY, EXPLORATORY ,drainage of peritoneal abcess,  creation of loop ileostomy, and trucut liver biopsy;  Surgeon: Kevin Thornton D.O.;  Location: [facility];  Service: General   • PB LAP,DIAGNOSTIC ABDOMEN N/A 8/27/2021    Procedure: LAPAROSCOPY;  Surgeon: Priya You M.D.;  Location: [facility];  Service: General   • PB EXPLORATORY OF ABDOMEN N/A 8/27/2021    Procedure: LAPAROTOMY, EXPLORATORY;  Surgeon: Priya You M.D.;  Location: [facility];  Service: General   • IRRIGATION & DEBRIDEMENT GENERAL  8/27/2021    Procedure: IRRIGATION AND DEBRIDEMENT, INTRA-ABDOMINAL ABCESS;  Surgeon: Priya You M.D.;  Location: [facility];  Service: General   • RESTORATE HEMOSTAS  8/27/2021    Procedure: CONTROL OF LIVER BLEED;  Surgeon: Priya You M.D.;  Location: [facility];  Service: General   • PB LAP,DIAGNOSTIC ABDOMEN  7/9/2021    Procedure: DIAGNOSITIC LAPAROSCOPY WITH WASH OUT AND DRAIN PLACEMENT;  Surgeon: Cody Meza M.D.;  Location: [facility];  Service: Gastroenterology   • OTHER  12/2019    \"IR embolization\"    • PARATHYROIDECTOMY N/A 5/13/2016    Procedure: PARATHYROIDECTOMY;  Surgeon: Kale Lord M.D.;  Location: SURGERY SAME DAY [facility] ORS;  Service:    • CATARACT PHACO WITH IOL Left 4/7/2016    Procedure: CATARACT PHACO WITH IOL;  Surgeon: Ephraim Jamison M.D.;  Location: SURGERY [facility] ORS;  Service:    • BONE GRAFT Right 1960's    right wrist   • OTHER      chemoemolozation x2   • WRIST ORIF Right 1960's     Social History     Tobacco Use   • Smoking " status: Current Every Day Smoker     Packs/day: 0.50     Years: 30.00     Pack years: 15.00     Types: Cigarettes     Start date: 1/1/1990   • Smokeless tobacco: Never Used   • Tobacco comment: quit couple times of the years   Substance Use Topics   • Alcohol use: Not Currently     Alcohol/week: 2.4 oz     Types: 4 Shots of liquor per week     Comment: pt quit drinking 2 months ago     Chief Complaint   Patient presents with   • N/V     Diagnosis: Bowel perforation (HCC) [K63.1]  Perforated intestine (HCC) [K63.1]    Unit where seen by Wound Team: S111/00     WOUND CONSULT/FOLLOW UP RELATED TO:  NPWT placement to abdominal surgical incision. Ostomy change (see below)     WOUND HISTORY:  Patient admitted with nausea and vomiting, history of hepatocellular carcinoma with cirrhosis. Went to OR with MD Thornton for peritoneal abscess, large bowel perforation, and liver mass.      WOUND ASSESSMENT/LDA     Negative Pressure Wound Therapy 09/01/21 Surgical Abdomen Midline (Active)   Vacuum Serial Number [id number] 09/09/21 2000   NPWT Pump Mode / Pressure Setting Ulta;Continuous;125 mmHg 09/10/21 1130   Dressing Type Black Foam (Regular);Medium 09/10/21 1130   Number of Foam Pieces Used 4 09/10/21 1130   Canister Changed No 09/10/21 1130   Output (mL) 30 mL 09/09/21 1800   NEXT Dressing Change/Treatment Date 09/13/21 09/10/21 1130   WOUND NURSE ONLY - Time Spent with Patient (mins) 60 09/10/21 1130        Wound 08/27/21 Incision Abdomen Midline (Active)   Wound Image   09/08/21 1600   Site Assessment Red;Fragile;Painful 09/10/21 1130   Periwound Assessment Edema;Fragile;Satellite lesions 09/10/21 1130   Margins Defined edges;Unattached edges 09/10/21 1130   Closure Secondary intention 09/10/21 1130   Drainage Amount Small 09/10/21 1130   Drainage Description Serosanguineous 09/10/21 1130   Treatments Cleansed;Site care 09/10/21 1130   Wound Cleansing Approved Wound Cleanser 09/10/21 1130   Periwound Protectant Mepitel;Skin  Protectant Wipes to Periwound;Drape 09/10/21 1130   Dressing Cleansing/Solutions Not Applicable 09/10/21 1130   Dressing Options Wound Vac 09/10/21 1130   Dressing Changed Changed 09/10/21 1130   Dressing Status Clean;Dry;Intact 09/10/21 1130   Dressing Change/Treatment Frequency Monday, Wednesday, Friday, and As Needed 09/10/21 1130   NEXT Dressing Change/Treatment Date 09/13/21 09/10/21 1130   NEXT Weekly Photo (Inpatient Only) 09/15/21 09/10/21 1130   Number of Staples Removed 5 09/01/21 1030   Non-staged Wound Description Full thickness 09/10/21 1130   Wound Length (cm) 15.3 cm 09/08/21 1600   Wound Width (cm) 3 cm 09/08/21 1600   Wound Depth (cm) 1.7 cm 09/08/21 1600   Wound Surface Area (cm^2) 45.9 cm^2 09/08/21 1600   Wound Volume (cm^3) 78.03 cm^3 09/08/21 1600   Wound Healing % -1 09/08/21 1600   Tunneling (cm) 1.8 cm 09/08/21 1600   Tunneling Clock Position of Wound 12 09/08/21 1600   Tunneling - 2nd Location (cm) 2.4 cm 09/08/21 1600   Tunneling Clock Position of Wound - 2nd Location 6 09/08/21 1600   Shape linear 09/10/21 1130   Wound Odor None 09/10/21 1130   Exposed Structures Sutures 09/10/21 1130   WOUND NURSE ONLY - Time Spent with Patient (mins) 60 09/10/21 1130        Vascular:    ZARA:   No results found.    Lab Values:    Lab Results   Component Value Date/Time    WBC 2.6 (L) 09/10/2021 05:30 AM    RBC 2.80 (L) 09/10/2021 05:30 AM    HEMOGLOBIN 9.2 (L) 09/10/2021 05:30 AM    HEMATOCRIT 28.8 (L) 09/10/2021 05:30 AM    CREACTPROT 2.03 (H) 09/06/2021 10:37 AM    HBA1C 6.0 (H) 07/03/2021 03:38 AM        Culture Results show:  Recent Results (from the past 720 hour(s))   CULTURE WOUND W/ GRAM STAIN    Collection Time: 08/31/21  9:21 AM    Specimen: Wound   Result Value Ref Range    Significant Indicator NEG     Source WND     Site Peritoneal Abscess     Culture Result       Heavy growth enteric [place] including mixed morphologies  Enterococcus sp., several morphologies enteric Gram negative  rods and  yeast.      Gram Stain Result       Moderate WBCs.  Many mixed bacteria, no predominant organism seen.         Pain Level/Medicated:  Pre-medicated with IV Dilaudid prior to dressing change     INTERVENTIONS BY WOUND TEAM:  Chart and images reviewed. Discussed with bedside RN. All areas of concern (based on picture review, LDA review and discussion with bedside RN) have been thoroughly assessed. Documentation of areas based on significant findings. This RN in to assess patient. Performed standard wound care which includes appropriate positioning, dressing removal and non-selective debridement. Pictures and measurements obtained weekly if/when required.    Abdomen:   Preparation for Dressing removal: Dressing soaked with wound cleanser  Non-selectively Debrided with: wound cleanser and gauze  Sharp debridement: n/a.   Cesia wound: Cleansed with wound cleanser, Prepped with mepitel one over staples. No sting skin prep and drape to periwound.   Primary Dressing: 3 pieces of black half thickness foam tucked into wound bed.   Secondary (Outer) Dressing: button and TRAC pad applied. Suction resumed at 125 mmHg.     Interdisciplinary consultation: Patient, Bedside RN, Wound RN Deepti     EVALUATION / RATIONALE FOR TREATMENT:  Most Recent Date:    9/8: Pt's Wound still has tan tissue. No odor. Tunneling present @ 1200 and 0600 then underneath skin bridge formed by staples in middle of wound. He tolerated drsg change better today.  9/6/21: Wound still with scant amount of slough. Since there are 2 areas if staples in middle of wound there is tunneling under skin. Appears clean in this area. Continue NPWT. Pt confused, when alarm off for distal obstruction to IV he would keep trying to answer the telephone.   9/3/2021: Small amount of slough to central aspect of wound bed. Wound bed is pink, continue NPWT to encourage tissue granulation.     9/1/21: Patient with full thickness surgical incision to midline abdomen. Fascia  appears intact. Some sutures visible. Placed NPWT to assist in closure by secondary intention, manage bioburden and exudate, increase oxygenation and granulation to the wound bed.      Goals: Steady decrease in wound area and depth weekly.    WOUND TEAM PLAN OF CARE ([X] for frequency of wound follow up,):   Nursing to follow orders written for wound care. Contact wound team if area fails to progress, deteriorates or with any questions/concerns  Dressing changes by wound team:                   Follow up 3 times weekly:                NPWT change 3 times weekly:   X  Follow up 1-2 times weekly:      Follow up Bi-Monthly:                   Follow up as needed:     Other (explain):     NURSING PLAN OF CARE ORDERS (X):  Dressing changes: See Dressing Care orders: X  Skin care: See Skin Care orders:   RN Prevention Protocol:   Rectal tube care: See Rectal Tube Care orders:   Other orders:    RSKIN:   CURRENTLY IN PLACE (X), APPLIED THIS VISIT (A), ORDERED (O):   Q shift Keith:  X  Q shift pressure point assessments:  X    Surface/Positioning   Pressure redistribution mattress            Low Airloss   X       Bariatric foam      Bariatric ASHU     Waffle cushion        Waffle Overlay          Reposition q 2 hours    X  TAPs Turning system     Z Quirino Pillow     Offloading/Redistribution not assessed  Sacral Mepilex (Silicone dressing)     Heel Mepilex (Silicone dressing)         Heel float boots (Prevalon boot)             Float Heels off Bed with Pillows           Respiratory   Silicone O2 tubing    x    Gray Foam Ear protectors     Cannula fixation Device (Tender )          High flow offloading Clip    Elastic head band offloading device      Anchorfast                                                         Trach with Optifoam split foam             Containment/Moisture Prevention ileostomy    Rectal tube or BMS    Purwick/Condom Cath        Triana Catheter  X  Barrier wipes           Barrier paste       Antifungal  "tx      Interdry        Mobilization not assessed     Up to chair        Ambulate      PT/OT      Nutrition      Dietician  x      Diabetes Education      PO     TF x    TPN     NPO   # days     Other        Anticipated discharge plans: TBD may need VAC and more ostomy education  LTACH:        SNF/Rehab:                  Home Health Care:           Outpatient Wound Center:            Self/Family Care:        Other:         Vac Discharge Needs:   Not Applicable Pt not on a wound vac:                         Regular Vac in use:   x                                                             Veraflo Vac while inpatient, ok to transition to Regular Vac on Discharge:                                 Veraflo Vac while inpatient, will need to remain on Veraflo Vac upon discharge:              [facility]  Inpatient Services  New Ostomy Management & Teaching    HPI:  Reviewed  PMH: Reviewed   SH: Reviewed    Subjective: \"Okay\"    Objective: Appliance slightly lifted, large amount of watery yellow with pieces of yellow soft output.        Ileostomy 08/31/21 Loop RLQ (Active)   Wound Image   09/08/21 1600   Stomal Appliance Assessment Changed 09/10/21 1130   Stoma Assessment Beefy red;Red 09/10/21 1130   Stoma Shape Oval 09/10/21 1130   Stoma Size (in) 2 09/10/21 1130   Peristomal Assessment Pink;Excoriation;Red 09/10/21 1130   Mucocutaneous Junction Intact 09/10/21 1130   Treatment Appliance Changed;Cleansed with water/washcloth;Bag Change;Site care 09/10/21 1130   Peristomal Protectant Paste Ring;No Sting Skin Prep 09/10/21 1130   Stomal Appliance Paste Ring, 2\";2 3/4\" (70mm) [facility] 09/10/21 1130   Output (mL) 2100 mL 09/09/21 1800   Output Color Yellow 09/10/21 1130   WOUND RN ONLY - Stomal Appliance  2 Piece;Paste Ring, 2\";2 3/4\" (70mm) [facility] 09/10/21 1130   Appliance (Pouch) # [id number] 09/08/21 1600   Appliance Brand [place] 09/10/21 1130   Appliance Supplier [employer] 09/10/21 1130   Secure Start completed Not " "Medically Stable 09/10/21 1130   Ostomy Care Resources Provided [facility] Tip Sheet 09/10/21 1130   WOUND NURSE ONLY - Time Spent with Patient (mins) 60 09/10/21 1130        Ostomy Appliance (type and size): 2 3/4\" 2 piece and paste ring placed high output pouch to dd    Interventions: Removed current appliance using push/pull method. Cleansed peristomal area with moist warm wash cloths. Pat dry. Applied No Sting skin prep to periwound. Traced template, cut barrier to fit. Paste ring applied to barrier, applied barrier to skin, attached bag and bag attached to down drain.     Pt education: Questions and concerns addressed. Patient's spouse was able to verbalize step by step instruction of ileostomy change.     Evaluation: Stoma viable and intact. No separation there are small petechia proximal edge of barrier. Patient unable for learning and hands on teaching at this time, slept throughout. Wife, Sharlene observed, verbalized step by step instructions regarding change, and asked questions.     Flatus: Present  Stool Output: large, yellow and liquid  Diet: NPO  Mobility: Up to chair    Plan: Ostomy nurses to continue to follow for ostomy needs and teaching until discharge    Anticipated discharge needs: Supplies, supplier information, possible HH, outpatient ostomy clinic, Skilled Nursing/Rehab     Secure Start Signed Yes  wife  signed  Outpatient Referral Placed Not Medically Stable  5 Sets of appliances in Ostomy bag for discharge Not Medically Stable    INSURANCE OPTIONS: [employer].                 [employer] ([employer])         x     MediCARE/MEDICAID & All other Private Insurance companies (Prism Form)              MediCAID & Fee for Service (Care Chest Paperwork + Prism Form)                            Form signed/Catalog Marked and Copy left with patient OR medicaid paperwork given to patient      Anticipated Discharge Plans:  Other TBD             "

## 2021-09-10 NOTE — PROGRESS NOTES
12-hour chart check.  Shift monitor summary: SR/AFib, rate 80-90 bpm    See below measurements.

## 2021-09-10 NOTE — CARE PLAN
"The patient is Watcher - Medium risk of patient condition declining or worsening.    Shift Goals:  Clinical Goals: Improved urine output, improvement in ADL performance/mobility tolerance, hemodynamic stability  Patient Goals: Ice chips PRN, sleep, \"see my wife\"  Family Goals: No family present at this time    Progress made toward(s) clinical / shift goals:  See below.   Problem: Knowledge Deficit - Standard  Goal: Patient and family/care givers will demonstrate understanding of plan of care, disease process/condition, diagnostic tests and medications  9/9/2021 2346 by Barbie Davis R.N.  Outcome: Progressing  9/9/2021 2345 by Barbie Davis R.N.  Outcome: Progressing     Problem: Pain - Standard  Goal: Alleviation of pain or a reduction in pain to the patient’s comfort goal  9/9/2021 2346 by Barbie Davis R.N.  Outcome: Progressing  9/9/2021 2345 by Barbie Davis R.N.  Outcome: Progressing     Problem: Skin Integrity - Diabetes  Goal: Patient's skin on legs and feet will remain intact while hospitalized  9/9/2021 2346 by Barbie Davis R.N.  Outcome: Progressing  9/9/2021 2345 by Barbie Davis R.N.  Outcome: Progressing     Problem: Cardiac - Atrial Fibrillation  Goal: Complications related to anticoagulation for unconverted atrial fibrillation will be avoided or minimized  Outcome: Progressing     Problem: Mobility  Goal: Patient's ability to tolerate increased activity will improve  9/9/2021 2346 by Barbie Davis R.N.  Outcome: Progressing  9/9/2021 2345 by Barbie Davis R.N.  Outcome: Progressing     Problem: Knowledge Deficit - Atrial Fibrillation  Goal: Patient and family/care givers will demonstrate understanding of atrial fibrillation condition and follow-up  Outcome: Progressing     Patient is not progressing towards the following goals:    Problem: Self Care  Goal: Patient will have the ability to perform ADLs independently or with assistance (bathe, groom, dress, toilet and " feed)  9/9/2021 2346 by Barbie Davis RKassandraNKassandra  Outcome: Not Progressing  9/9/2021 2345 by Barbie Davis R.NKassandra  Outcome: Not Progressing     Problem: Skin Care - Ostomy  Goal: Skin remains free from irritation  Outcome: Not Progressing     Problem: Knowledge Deficit - Ostomy  Goal: Patient will demonstrate ability to manage and maintain ostomy  9/9/2021 2346 by MAIKEL RoseNKassandra  Outcome: Not Progressing  9/9/2021 2345 by Barbie Davis R.N.  Outcome: Not Progressing     Problem: Discharge Barriers/Self-Care - Ostomy  Goal: Ostomy patient's continuum of care needs will be met  Outcome: Not Progressing     Problem: Hemodynamics  Goal: Patient's hemodynamics, fluid balance and neurologic status will be stable or improve  9/9/2021 2346 by Barbie Davis R.NKassandra  Outcome: Not Progressing  9/9/2021 2345 by Barbie Davis R.NKassandra  Outcome: Progressing

## 2021-09-10 NOTE — PALLIATIVE CARE
"PALLIATIVE CARE FOLLOW-UP:    Met with pt and wife Sharlene to follow up with their discussion with PC and Dr. Thornton yesterday. William stated feeling better after bed bath and shave, and expressed \"just wanting to spend some time with my wife.\" Sharlene shared that she had spoken with their dtr Deonna and she was \"devastated,\" but Sharlene and pt have not had time to talk through thoroughly. Sharlene asked about visitation on comfort care which I explained is a fluid situation and could depend on the unit. Assured pt/wife I would check back and encouraged them to call with any further questions.    Plan:  Palliative Care to continue to follow, provide support, and help facilitate decision-making as clinical picture evolves.    Thank you for allowing Palliative Care to support this pt and his family.  Contact [phone] for additional assistance, patient status change, questions or concerns.  "

## 2021-09-11 LAB
ALBUMIN SERPL BCP-MCNC: 2.7 G/DL (ref 3.2–4.9)
ALBUMIN/GLOB SERPL: 1.2 G/DL
ALP SERPL-CCNC: 129 U/L (ref 30–99)
ALT SERPL-CCNC: 11 U/L (ref 2–50)
ANION GAP SERPL CALC-SCNC: 4 MMOL/L (ref 7–16)
ANION GAP SERPL CALC-SCNC: 6 MMOL/L (ref 7–16)
ANION GAP SERPL CALC-SCNC: 7 MMOL/L (ref 7–16)
ANISOCYTOSIS BLD QL SMEAR: ABNORMAL
AST SERPL-CCNC: 22 U/L (ref 12–45)
BASOPHILS # BLD AUTO: 0 % (ref 0–1.8)
BASOPHILS # BLD: 0 K/UL (ref 0–0.12)
BILIRUB SERPL-MCNC: 1 MG/DL (ref 0.1–1.5)
BUN SERPL-MCNC: 20 MG/DL (ref 8–22)
BUN SERPL-MCNC: 20 MG/DL (ref 8–22)
BUN SERPL-MCNC: 21 MG/DL (ref 8–22)
CA-I SERPL-SCNC: 1.2 MMOL/L (ref 1.1–1.3)
CALCIUM SERPL-MCNC: 8.4 MG/DL (ref 8.5–10.5)
CALCIUM SERPL-MCNC: 8.4 MG/DL (ref 8.5–10.5)
CALCIUM SERPL-MCNC: 8.6 MG/DL (ref 8.5–10.5)
CHLORIDE SERPL-SCNC: 107 MMOL/L (ref 96–112)
CHLORIDE SERPL-SCNC: 111 MMOL/L (ref 96–112)
CHLORIDE SERPL-SCNC: 111 MMOL/L (ref 96–112)
CO2 SERPL-SCNC: 26 MMOL/L (ref 20–33)
CO2 SERPL-SCNC: 28 MMOL/L (ref 20–33)
CO2 SERPL-SCNC: 28 MMOL/L (ref 20–33)
CREAT SERPL-MCNC: 0.38 MG/DL (ref 0.5–1.4)
CREAT SERPL-MCNC: 0.4 MG/DL (ref 0.5–1.4)
CREAT SERPL-MCNC: 0.41 MG/DL (ref 0.5–1.4)
EOSINOPHIL # BLD AUTO: 0.02 K/UL (ref 0–0.51)
EOSINOPHIL NFR BLD: 0.9 % (ref 0–6.9)
ERYTHROCYTE [DISTWIDTH] IN BLOOD BY AUTOMATED COUNT: 82.1 FL (ref 35.9–50)
GLOBULIN SER CALC-MCNC: 2.2 G/DL (ref 1.9–3.5)
GLUCOSE SERPL-MCNC: 136 MG/DL (ref 65–99)
GLUCOSE SERPL-MCNC: 137 MG/DL (ref 65–99)
GLUCOSE SERPL-MCNC: 148 MG/DL (ref 65–99)
HCT VFR BLD AUTO: 30.5 % (ref 42–52)
HGB BLD-MCNC: 9.4 G/DL (ref 14–18)
LYMPHOCYTES # BLD AUTO: 0.04 K/UL (ref 1–4.8)
LYMPHOCYTES NFR BLD: 1.7 % (ref 22–41)
MACROCYTES BLD QL SMEAR: ABNORMAL
MAGNESIUM SERPL-MCNC: 2 MG/DL (ref 1.5–2.5)
MANUAL DIFF BLD: NORMAL
MCH RBC QN AUTO: 32.2 PG (ref 27–33)
MCHC RBC AUTO-ENTMCNC: 30.8 G/DL (ref 33.7–35.3)
MCV RBC AUTO: 104.5 FL (ref 81.4–97.8)
MONOCYTES # BLD AUTO: 0.08 K/UL (ref 0–0.85)
MONOCYTES NFR BLD AUTO: 3.5 % (ref 0–13.4)
MORPHOLOGY BLD-IMP: NORMAL
NEUTROPHILS # BLD AUTO: 2.07 K/UL (ref 1.82–7.42)
NEUTROPHILS NFR BLD: 90.4 % (ref 44–72)
NEUTS BAND NFR BLD MANUAL: 3.5 % (ref 0–10)
NRBC # BLD AUTO: 0 K/UL
NRBC BLD-RTO: 0 /100 WBC
PLATELET # BLD AUTO: 22 K/UL (ref 164–446)
PLATELET BLD QL SMEAR: NORMAL
PLATELETS.RETICULATED NFR BLD AUTO: 2.7 K/UL (ref 0.6–13.1)
PMV BLD AUTO: 9.9 FL (ref 9–12.9)
POIKILOCYTOSIS BLD QL SMEAR: NORMAL
POTASSIUM SERPL-SCNC: 4.1 MMOL/L (ref 3.6–5.5)
POTASSIUM SERPL-SCNC: 4.2 MMOL/L (ref 3.6–5.5)
POTASSIUM SERPL-SCNC: 4.3 MMOL/L (ref 3.6–5.5)
PROT SERPL-MCNC: 4.9 G/DL (ref 6–8.2)
RBC # BLD AUTO: 2.92 M/UL (ref 4.7–6.1)
RBC BLD AUTO: PRESENT
SODIUM SERPL-SCNC: 142 MMOL/L (ref 135–145)
SODIUM SERPL-SCNC: 143 MMOL/L (ref 135–145)
SODIUM SERPL-SCNC: 143 MMOL/L (ref 135–145)
TOXIC GRANULES BLD QL SMEAR: NORMAL
WBC # BLD AUTO: 2.2 K/UL (ref 4.8–10.8)

## 2021-09-11 PROCEDURE — A9270 NON-COVERED ITEM OR SERVICE: HCPCS | Performed by: SURGERY

## 2021-09-11 PROCEDURE — 51798 US URINE CAPACITY MEASURE: CPT

## 2021-09-11 PROCEDURE — 700102 HCHG RX REV CODE 250 W/ 637 OVERRIDE(OP): Performed by: SURGERY

## 2021-09-11 PROCEDURE — 83735 ASSAY OF MAGNESIUM: CPT

## 2021-09-11 PROCEDURE — 99233 SBSQ HOSP IP/OBS HIGH 50: CPT | Performed by: SURGERY

## 2021-09-11 PROCEDURE — A9270 NON-COVERED ITEM OR SERVICE: HCPCS | Performed by: INTERNAL MEDICINE

## 2021-09-11 PROCEDURE — 700102 HCHG RX REV CODE 250 W/ 637 OVERRIDE(OP): Performed by: INTERNAL MEDICINE

## 2021-09-11 PROCEDURE — 85055 RETICULATED PLATELET ASSAY: CPT

## 2021-09-11 PROCEDURE — 80053 COMPREHEN METABOLIC PANEL: CPT

## 2021-09-11 PROCEDURE — 99233 SBSQ HOSP IP/OBS HIGH 50: CPT | Performed by: HOSPITALIST

## 2021-09-11 PROCEDURE — 85007 BL SMEAR W/DIFF WBC COUNT: CPT

## 2021-09-11 PROCEDURE — 82330 ASSAY OF CALCIUM: CPT

## 2021-09-11 PROCEDURE — 770006 HCHG ROOM/CARE - MED/SURG/GYN SEMI*

## 2021-09-11 PROCEDURE — 700111 HCHG RX REV CODE 636 W/ 250 OVERRIDE (IP): Performed by: SURGERY

## 2021-09-11 PROCEDURE — 80048 BASIC METABOLIC PNL TOTAL CA: CPT

## 2021-09-11 PROCEDURE — 85027 COMPLETE CBC AUTOMATED: CPT

## 2021-09-11 RX ORDER — POTASSIUM CHLORIDE 7.45 MG/ML
10 INJECTION INTRAVENOUS ONCE
Status: COMPLETED | OUTPATIENT
Start: 2021-09-11 | End: 2021-09-11

## 2021-09-11 RX ADMIN — SPIRONOLACTONE 50 MG: 50 TABLET ORAL at 05:14

## 2021-09-11 RX ADMIN — FUROSEMIDE 20 MG: 20 TABLET ORAL at 05:13

## 2021-09-11 RX ADMIN — AMIODARONE HYDROCHLORIDE 200 MG: 200 TABLET ORAL at 05:14

## 2021-09-11 RX ADMIN — OMEPRAZOLE 40 MG: KIT at 05:13

## 2021-09-11 RX ADMIN — OXYCODONE 5 MG: 5 TABLET ORAL at 18:13

## 2021-09-11 RX ADMIN — POTASSIUM CHLORIDE 10 MEQ: 10 INJECTION, SOLUTION INTRAVENOUS at 02:29

## 2021-09-11 RX ADMIN — POTASSIUM CHLORIDE 10 MEQ: 10 INJECTION, SOLUTION INTRAVENOUS at 09:26

## 2021-09-11 RX ADMIN — OXYCODONE 10 MG: 5 TABLET ORAL at 23:59

## 2021-09-11 RX ADMIN — ATORVASTATIN CALCIUM 40 MG: 40 TABLET, FILM COATED ORAL at 17:53

## 2021-09-11 RX ADMIN — THIAMINE HCL TAB 100 MG 100 MG: 100 TAB at 05:14

## 2021-09-11 RX ADMIN — AMIODARONE HYDROCHLORIDE 200 MG: 200 TABLET ORAL at 17:53

## 2021-09-11 ASSESSMENT — ENCOUNTER SYMPTOMS
BACK PAIN: 0
EYE DISCHARGE: 0
ABDOMINAL PAIN: 1
BLURRED VISION: 0
PALPITATIONS: 0
SHORTNESS OF BREATH: 0
SPUTUM PRODUCTION: 0
LOSS OF CONSCIOUSNESS: 0
ABDOMINAL PAIN: 0
COUGH: 0
CONSTIPATION: 0
SENSORY CHANGE: 0
MYALGIAS: 1
FEVER: 0
CHILLS: 0
SORE THROAT: 0
NAUSEA: 0
DIZZINESS: 0
DOUBLE VISION: 0
DIARRHEA: 0
HEADACHES: 0
WEAKNESS: 1
VOMITING: 0
SPEECH CHANGE: 0
FOCAL WEAKNESS: 0
DEPRESSION: 0
EYE PAIN: 0

## 2021-09-11 ASSESSMENT — FIBROSIS 4 INDEX: FIB4 SCORE: 16.65

## 2021-09-11 ASSESSMENT — LIFESTYLE VARIABLES: SUBSTANCE_ABUSE: 0

## 2021-09-11 ASSESSMENT — PAIN DESCRIPTION - PAIN TYPE
TYPE: ACUTE PAIN;CHRONIC PAIN;SURGICAL PAIN
TYPE: ACUTE PAIN;CHRONIC PAIN;SURGICAL PAIN
TYPE: ACUTE PAIN

## 2021-09-11 NOTE — PROGRESS NOTES
[facility] service    POD # 15  POD # 11    Stable  Tolerating tube feeds  Benign exam  Stoma with good fx  Anticipate floor transfer to medicine service  following

## 2021-09-11 NOTE — PROGRESS NOTES
4 Eyes Skin Assessment Completed by Nicolasa Raza, RN and Sadie Guerrero, YASMIN.    Head WDL  Ears WDL  Nose R nare coretrak with tape  Mouth WDL  Neck Right dressing from prior central line, CDI  Breast/Chest Bruising, petechia, and tele monitoring stickers.   Shoulder Blades WDL  Spine WDL with preventative mepilex in place  (R) Arm/Elbow/Hand Bruising and Abrasion x1 gauze tegaderm to forearm and PIV  (L) Arm/Elbow/Hand Bruising and Abrasion x2 mepitel light dressing skin tears  Abdomen Bruising and Incision. R ileostomy with down drain bag, scattered incisions with stitches, MLI with woundvac. Bilateral MRAIANA drains EKTA.   Groin WDL  Scrotum/Coccyx/Buttocks Redness and Blanching  To sacrum, mepilex in place. Scrotal edema.  (R) Leg Redness, Bruising and Edema. 2-3+ pitting edema. Scattered bruising and abrasions  (L) Leg Bruising and Abrasion. 2-3+ pitting edema. Scattered bruising and abrasions. Small notable circular abrasion to upper medial thigh.    (R) Heel/Foot/Toe Redness, Blanching and Edema  (L) Heel/Foot/Toe Redness, Blanching and Edema          Devices In Places Tele Box, Blood Pressure Cuff, Pulse Ox, Cortrak and Compression Dressing      Interventions In Place Sacral Mepilex, Waffle Overlay, TAP System, Pillows, Q2 Turns, Barrier Cream, ZFlo Pillow, Heels Loaded W/Pillows and Pressure Redistribution Mattress    Possible Skin Injury No    Pictures Uploaded Into Epic N/A  Wound Consult Placed N/A  RN Wound Prevention Protocol Ordered Yes

## 2021-09-11 NOTE — PROGRESS NOTES
Trauma / Surgical Daily Progress Note    Date of Service  9/11/2021    Chief Complaint  71 y.o. male admitted 8/26/2021 with hepatic flexure perforation, hepatocellular carcinoma.  POD # 15 Laparoscopy, ;laparotomy exploratory, I & D, control of liver bleed  POD # 12 Laparotomy, drainage of peritoneal abcess  Interval Events  Patient remains thrombocytopenic and neutropenic  Patient states pain is controlled and only complaint is would like more ice chips  Patient be transferred back to the medicine service with surgery consulting on transfer  Dr. Brad Faulkner consulted and appreciated.    -Transfer to [place]  -Stoma with good function  MARIANA drains with 945 in 24 hours per nursing    Review of Systems  Review of Systems   Constitutional: Positive for malaise/fatigue. Negative for chills and fever.   HENT: Negative for sore throat.    Eyes: Negative for pain and discharge.   Respiratory: Negative for sputum production.    Cardiovascular: Negative for palpitations.   Gastrointestinal: Positive for abdominal pain. Negative for constipation, diarrhea, melena, nausea and vomiting.   Genitourinary: Negative for dysuria.   Musculoskeletal: Positive for myalgias. Negative for back pain.   Neurological: Positive for weakness. Negative for sensory change, speech change, focal weakness and loss of consciousness.   Psychiatric/Behavioral: Negative for depression, substance abuse and suicidal ideas.        Vital Signs  Temp:  [36 °C (96.8 °F)-38 °C (100.4 °F)] 36 °C (96.8 °F)  Pulse:  [] 94  Resp:  [10-25] 13  BP: ()/(54-63) 100/56  SpO2:  [97 %-100 %] 99 %    Physical Exam  Physical Exam  Vitals and nursing note reviewed.   Constitutional:       Appearance: He is cachectic. He is ill-appearing.      Interventions: He is restrained. Nasal cannula in place.   HENT:      Head: Normocephalic and atraumatic.      Mouth/Throat:      Mouth: Mucous membranes are dry.      Pharynx: Oropharynx is clear.   Eyes:       Extraocular Movements: Extraocular movements intact.      Conjunctiva/sclera: Conjunctivae normal.      Pupils: Pupils are equal, round, and reactive to light.   Cardiovascular:      Rate and Rhythm: Normal rate and regular rhythm.      Pulses: Normal pulses.   Pulmonary:      Effort: No respiratory distress.      Breath sounds: No stridor. Examination of the right-lower field reveals decreased breath sounds. Examination of the left-lower field reveals decreased breath sounds. Decreased breath sounds present. No wheezing.   Abdominal:      General: There is distension.      Palpations: Abdomen is soft.      Tenderness: There is no abdominal tenderness.      Comments: Dressing CDI  Left upper quadrant drain:615 cc serosanguineous  Right upper quadrant drain:  305cc brown/purulent   Genitourinary:     Comments: Triana  Musculoskeletal:      Cervical back: Neck supple.   Skin:     General: Skin is warm and dry.      Coloration: Skin is pale. Skin is not jaundiced.   Neurological:      General: No focal deficit present.      Mental Status: He is easily aroused.      Motor: Weakness present.   Psychiatric:         Behavior: Behavior is cooperative.         Laboratory  Recent Results (from the past 24 hour(s))   Basic Metabolic Panel    Collection Time: 09/10/21  5:20 PM   Result Value Ref Range    Sodium 142 135 - 145 mmol/L    Potassium 4.1 3.6 - 5.5 mmol/L    Chloride 108 96 - 112 mmol/L    Co2 28 20 - 33 mmol/L    Glucose 137 (H) 65 - 99 mg/dL    Bun 20 8 - 22 mg/dL    Creatinine 0.40 (L) 0.50 - 1.40 mg/dL    Calcium 8.9 8.5 - 10.5 mg/dL    Anion Gap 6.0 (L) 7.0 - 16.0   ESTIMATED GFR    Collection Time: 09/10/21  5:20 PM   Result Value Ref Range    GFR If African American >60 >60 mL/min/1.73 m 2    GFR If Non African American >60 >60 mL/min/1.73 m 2   Basic Metabolic Panel    Collection Time: 09/11/21 12:48 AM   Result Value Ref Range    Sodium 142 135 - 145 mmol/L    Potassium 4.1 3.6 - 5.5 mmol/L    Chloride 107 96  - 112 mmol/L    Co2 28 20 - 33 mmol/L    Glucose 148 (H) 65 - 99 mg/dL    Bun 21 8 - 22 mg/dL    Creatinine 0.41 (L) 0.50 - 1.40 mg/dL    Calcium 8.6 8.5 - 10.5 mg/dL    Anion Gap 7.0 7.0 - 16.0   ESTIMATED GFR    Collection Time: 09/11/21 12:48 AM   Result Value Ref Range    GFR If African American >60 >60 mL/min/1.73 m 2    GFR If Non African American >60 >60 mL/min/1.73 m 2   Comp Metabolic Panel    Collection Time: 09/11/21  5:30 AM   Result Value Ref Range    Sodium 143 135 - 145 mmol/L    Potassium 4.2 3.6 - 5.5 mmol/L    Chloride 111 96 - 112 mmol/L    Co2 28 20 - 33 mmol/L    Anion Gap 4.0 (L) 7.0 - 16.0    Glucose 137 (H) 65 - 99 mg/dL    Bun 20 8 - 22 mg/dL    Creatinine 0.40 (L) 0.50 - 1.40 mg/dL    Calcium 8.4 (L) 8.5 - 10.5 mg/dL    AST(SGOT) 22 12 - 45 U/L    ALT(SGPT) 11 2 - 50 U/L    Alkaline Phosphatase 129 (H) 30 - 99 U/L    Total Bilirubin 1.0 0.1 - 1.5 mg/dL    Albumin 2.7 (L) 3.2 - 4.9 g/dL    Total Protein 4.9 (L) 6.0 - 8.2 g/dL    Globulin 2.2 1.9 - 3.5 g/dL    A-G Ratio 1.2 g/dL   IONIZED CALCIUM    Collection Time: 09/11/21  5:30 AM   Result Value Ref Range    Ionized Calcium 1.2 1.1 - 1.3 mmol/L   CBC WITH DIFFERENTIAL    Collection Time: 09/11/21  5:30 AM   Result Value Ref Range    WBC 2.2 (LL) 4.8 - 10.8 K/uL    RBC 2.92 (L) 4.70 - 6.10 M/uL    Hemoglobin 9.4 (L) 14.0 - 18.0 g/dL    Hematocrit 30.5 (L) 42.0 - 52.0 %    .5 (H) 81.4 - 97.8 fL    MCH 32.2 27.0 - 33.0 pg    MCHC 30.8 (L) 33.7 - 35.3 g/dL    RDW 82.1 (H) 35.9 - 50.0 fL    Platelet Count 22 (LL) 164 - 446 K/uL    MPV 9.9 9.0 - 12.9 fL    Neutrophils-Polys 90.40 (H) 44.00 - 72.00 %    Lymphocytes 1.70 (L) 22.00 - 41.00 %    Monocytes 3.50 0.00 - 13.40 %    Eosinophils 0.90 0.00 - 6.90 %    Basophils 0.00 0.00 - 1.80 %    Nucleated RBC 0.00 /100 WBC    Neutrophils (Absolute) 2.07 1.82 - 7.42 K/uL    Lymphs (Absolute) 0.04 (L) 1.00 - 4.80 K/uL    Monos (Absolute) 0.08 0.00 - 0.85 K/uL    Eos (Absolute) 0.02 0.00 - 0.51  K/uL    Baso (Absolute) 0.00 0.00 - 0.12 K/uL    NRBC (Absolute) 0.00 K/uL    Anisocytosis 1+     Macrocytosis 1+    MAGNESIUM    Collection Time: 09/11/21  5:30 AM   Result Value Ref Range    Magnesium 2.0 1.5 - 2.5 mg/dL   ESTIMATED GFR    Collection Time: 09/11/21  5:30 AM   Result Value Ref Range    GFR If African American >60 >60 mL/min/1.73 m 2    GFR If Non African American >60 >60 mL/min/1.73 m 2   DIFFERENTIAL MANUAL    Collection Time: 09/11/21  5:30 AM   Result Value Ref Range    Bands-Stabs 3.50 0.00 - 10.00 %    Manual Diff Status PERFORMED    PERIPHERAL SMEAR REVIEW    Collection Time: 09/11/21  5:30 AM   Result Value Ref Range    Peripheral Smear Review see below    PLATELET ESTIMATE    Collection Time: 09/11/21  5:30 AM   Result Value Ref Range    Plt Estimation Marked Decrease    MORPHOLOGY    Collection Time: 09/11/21  5:30 AM   Result Value Ref Range    RBC Morphology Present     Poikilocytosis 1+     Toxic Gran Moderate    IMMATURE PLT FRACTION    Collection Time: 09/11/21  5:30 AM   Result Value Ref Range    Imm. Plt Fraction 2.7 0.6 - 13.1 K/uL       Fluids    Intake/Output Summary (Last 24 hours) at 9/11/2021 1222  Last data filed at 9/11/2021 1026  Gross per 24 hour   Intake 2395 ml   Output 5715 ml   Net -3320 ml       Core Measures & Quality Metrics  Labs reviewed, Medications reviewed and Radiology images reviewed  Triana catheter: Critically Ill - Requiring Accurate Measurement of Urinary Output      DVT Prophylaxis: Contraindicated - High bleeding risk  DVT prophylaxis - mechanical: SCDs  Ulcer prophylaxis: Yes    Assessed for rehab: Patient was assess for and/or received rehabilitation services during this hospitalization    RAP Score Total: 0    ETOH Screening    Assessment/Plan  * Perforated viscus- (present on admission)  Assessment & Plan  8/26 - Ceftriaxone and metronidazole initiated  8/30 - Peritoneal culture positive for Streptococcus anginosus  8/31 - Abscess drainage and  ileostomy creation, antibiotics altered to Zosyn  8/31- diverting ileostomy with MARIANA drainage of feculent material.  Abdomen too frozen for formal resection  9/6 - Zosyn day 7 of 7.     Left ventricular thrombus- (present on admission)  Assessment & Plan  Notified an echocardiogram on 9/4  Hold off on starting therapeutic anticoagulation because of very low platelet count    Fluid overload- (present on admission)  Assessment & Plan  Admission weight under 70 kg  Fluid balance +47 L  Diuresis  Trend electrolytes closely    Respiratory failure following trauma and surgery (HCC)  Assessment & Plan  Remained on ventilator post operatively  9/5 extubated  Aggressive pulmonary toilette    Septic shock (HCC)- (present on admission)  Assessment & Plan  Secondary to liver abscess  Ongoing volume resuscitation and pressors support  abx rocephin and flagyl  8/30-feculent drain output in late evening  8/31- return to OR for washout, drainage of feculent fluid and diverting ileostomy.  9/3: Remains on small doses of norepinephrine.  Vasopressin has been discontinued.  Remains on every 8 hydrocortisone.  Rapid taper as appropriate.  9/4 wean pressors to off.     Hepatocellular carcinoma (HCC)- (present on admission)  Assessment & Plan  Advanced tumor involving most of the right lobe. Immunotherapy/chemo prior to presentation with bowel perforation.  Associated abscess with apparent involvement of hepatic flexure.  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  Diverting ileostomy.    Contraindication to anticoagulation therapy  Assessment & Plan  Low platelet count: Holding off on prophylactic/therapeutic anticoagulation  9/9 screening duplex ordered    Ischemic cardiomyopathy  Assessment & Plan  Echo with ejection fraction of 25% along LAD distribution  Likely chronic based on history  Blood pressure too low for ACE inhibitor or beta-blocker  Appreciate cardiology recommendations    Pleural effusion, bilateral  Assessment &  Plan  8/31 moderate bilateral pleural effusions on CT      Atrial fibrillation (HCC)- (present on admission)  Assessment & Plan  Diuresis  Aggressive electrolyte replacement  Metoprolol and amiodarone   8/31- converted out in OR, continues on amiodarone gtt  9/3: Amiodarone drip  9/5 transition to enteral amiodarone    Cirrhosis of liver with ascites (HCC)  Assessment & Plan  9/3 - 9/6 high volume ascites output from drains (R MARIANA in abscess cavity, L MARIANA in pelvis.   General surgery: can not remove drains.  Continued round-the-clock albumin and fluid therapy.  Diuretic therapy.  9/8 Resume albumin    Hypokalemia- (present on admission)  Assessment & Plan  Potassium persistently low  Starting Lasix regimen so we will place patient on K scale    Normochromic anemia- (present on admission)  Assessment & Plan  Trend and transfuse for hgb <7.  Hgb today is 7.8    Pancytopenia (HCC)- (present on admission)  Assessment & Plan  Chronic anemia, leukopenia, thrombocytopenia  9/2: Transfused pheresis pack for platelet count 40 in the setting of hemoglobin below 7  Likely mixed etiology advanced liver disease and resolving sepsis.  Trend, transfuse for clinical bleeding.  9/9 ongoing low cell counts  We will discuss with hematology    Adrenal insufficiency (HCC)  Assessment & Plan  8/29 Cortisol low in the setting of shock  Hydrocortisone started  9/8 Taper        Discussed patient condition with RN, Patient and trauma surgery Dr Carbajal.

## 2021-09-11 NOTE — PROGRESS NOTES
Patient transferred to [facility] with ACLS RN and CCT on monitor and all belongings in place. Bedside report given to Jaspreet HOFFMAN.

## 2021-09-11 NOTE — PROGRESS NOTES
[facility] Medicine Daily Progress Note    Date of Service  9/11/2021    Chief Complaint  Lorraine Bella is a 71 y.o. male admitted 8/26/2021 with N/V    Hospital Course  70yo admitted 8/26 with NX.  PMHx DM, ETOH, HCC, cirrhosis, f/b Dr Birch Heme/Onc and on immunotherapy.  Imaging on presentation showing intra-abd free air.  Admitted to the ICU with Dx of sepsis.  Required pressors and IV Hydrocortisone  8/27 OR with Dr You for Ex Lap and drainage of intra-abd abscess.  Bowel perforation found to be secondary to HCC  8/31 OR with Dr Thornton for Ex Lap, abscess drainage, creation of loop ileostomy  On this admission pt also found to have an LVEF of 25% and L ventricular thrombus, new Dx AFib      Interval Problem Update  Patient complains of generalized fatigue and dry mouth.  He states he is not experiencing any pain right now.  No nausea.  Afebrile  Sinus rhythm on the monitor heart rate in the 90s  Blood pressures have been running  systolic    I have personally seen and examined the patient at bedside. I discussed the plan of care with patient, family, bedside RN and Surgical ICU team.    Consultants/Specialty  cardiology and general surgery    Code Status  DNAR/DNI    Disposition  Patient is not medically cleared.   Anticipate discharge to to hospice.  I have placed the appropriate orders for post-discharge needs.    Review of Systems  Review of Systems   Constitutional: Negative for chills and fever.   HENT: Negative for nosebleeds and sore throat.    Eyes: Negative for blurred vision and double vision.   Respiratory: Negative for cough and shortness of breath.    Cardiovascular: Negative for chest pain, palpitations and leg swelling.   Gastrointestinal: Negative for abdominal pain, diarrhea, nausea and vomiting.   Genitourinary: Negative for dysuria and urgency.   Musculoskeletal: Negative for back pain.   Skin: Negative for rash.   Neurological: Positive for weakness. Negative for  dizziness, loss of consciousness and headaches.        Physical Exam  Temp:  [36 °C (96.8 °F)-38 °C (100.4 °F)] 36 °C (96.8 °F)  Pulse:  [] 94  Resp:  [10-25] 13  BP: ()/(54-63) 100/56  SpO2:  [97 %-100 %] 99 %    Physical Exam  Constitutional:       General: He is not in acute distress.     Appearance: He is well-developed. He is cachectic. He is not diaphoretic.   HENT:      Head: Normocephalic and atraumatic.   Eyes:      Conjunctiva/sclera: Conjunctivae normal.   Neck:      Vascular: No JVD.   Cardiovascular:      Rate and Rhythm: Normal rate.      Heart sounds: Murmur heard.   No gallop.    Pulmonary:      Effort: Pulmonary effort is normal. No respiratory distress.      Breath sounds: No stridor. No wheezing or rales.   Abdominal:      Palpations: Abdomen is soft.      Tenderness: There is no abdominal tenderness. There is no guarding or rebound.      Comments: Ostomy noted with good output   Musculoskeletal:         General: No tenderness.      Right lower leg: Edema present.      Left lower leg: Edema present.   Skin:     General: Skin is warm and dry.      Findings: No rash.   Neurological:      Mental Status: He is alert and oriented to person, place, and time.   Psychiatric:         Thought Content: Thought content normal.         Fluids    Intake/Output Summary (Last 24 hours) at 9/11/2021 1229  Last data filed at 9/11/2021 1026  Gross per 24 hour   Intake 2395 ml   Output 5715 ml   Net -3320 ml       Laboratory  Recent Labs     09/09/21  0605 09/10/21  0530 09/11/21  0530   WBC 2.0* 2.6* 2.2*   RBC 2.76* 2.80* 2.92*   HEMOGLOBIN 8.9* 9.2* 9.4*   HEMATOCRIT 28.8* 28.8* 30.5*   .3* 102.9* 104.5*   MCH 32.2 32.9 32.2   MCHC 30.9* 31.9* 30.8*   RDW 81.9* 79.7* 82.1*   PLATELETCT 27* 27* 22*   MPV 9.7 10.7 9.9     Recent Labs     09/10/21  1720 09/11/21  0048 09/11/21  0530   SODIUM 142 142 143   POTASSIUM 4.1 4.1 4.2   CHLORIDE 108 107 111   CO2 28 28 28   GLUCOSE 137* 148* 137*   BUN 20  21 20   CREATININE 0.40* 0.41* 0.40*   CALCIUM 8.9 8.6 8.4*             Recent Labs     09/10/21  0530   TRIGLYCERIDE 80       Imaging  US-TRAUMA VEIN SCREEN LOWER BILAT EXTREMITY   Final Result      DX-CHEST-PORTABLE (1 VIEW)   Final Result         1.  Pulmonary edema and/or infiltrates are identified, which appear somewhat increased since the prior exam.   2.  Small right pleural effusion   3.  Cardiomegaly   4.  Atherosclerosis      DX-ABDOMEN FOR TUBE PLACEMENT   Final Result         1.  Nonspecific bowel gas pattern.   2.  Dobbhoff tube with tip terminating overlying the expected location of the third or fourth duodenal segment.   3.  Coronary edema and/or infiltrates.   4.  Small layering right pleural effusion   5.  Atherosclerosis      EC-ECHOCARDIOGRAM COMPLETE W/ CONT   Final Result      DX-CHEST-PORTABLE (1 VIEW)   Final Result      Stable examination.      DX-CHEST-PORTABLE (1 VIEW)   Final Result      Worsening left unchanged right lower lung zone atelectasis and consolidation with probable edema      DX-ABDOMEN FOR TUBE PLACEMENT   Final Result      Enteric tube projects over the second portion of the duodenum.      DX-CHEST-PORTABLE (1 VIEW)   Final Result      1.  Bilateral airspace opacities likely represent pulmonary edema or multifocal pneumonia.   2.  Atherosclerotic plaque.      DX-CHEST-PORTABLE (1 VIEW)   Final Result      1.  Interval placement of an endotracheal tube which terminates in satisfactory position at the level of the aortic arch.   2.  Previously demonstrated bilateral layering pleural effusions appear less distinct on today's study which may be related to patient positioning.      DX-CHEST-PORTABLE (1 VIEW)   Final Result      Increasing basilar atelectasis, pleural effusions and pulmonary edema      CT-ABDOMEN-PELVIS WITH   Final Result      No contrast extravasation identified but the colon is not contrast opacified as the patient had insufficient rectal tone and NG tube  contrast had not yet reached the cecum      Interval laparotomy with surgical drains in place. No bowel obstruction identified. There is improved small free intraperitoneal air      Anasarca with worsening moderate effusions, stable to slight worsening abdominal pelvic ascites and mesenteric edema      Stable large right hepatic mass      Severe colonic diverticulosis            DX-ABDOMEN FOR TUBE PLACEMENT   Final Result      Enteric tube terminates over the duodenum      NG tube overlies the proximal stomach      Right upper quadrant surgical drains with decrease in pneumoperitoneum      DX-CHEST-PORTABLE (1 VIEW)   Final Result      1.  Supportive tubing as described above.   2.  RIGHT lung base atelectasis.   3.  No pneumothorax.      CT-ABDOMEN-PELVIS WITH   Final Result      1.  Large collection of free intraperitoneal fluid and gas in the RIGHT abdomen extending from the hepatic dome to the pelvis. The gallbladder is also involved but may not be the site of origin. Perforated bowel is suspected.   2.  Additional free fluid in the abdomen   3.  Large RIGHT hepatic mass, unchanged in size   4.  Splenomegaly   5.  1.6 x 1.8 cm cystic pancreatic tail lesion, unchanged   6.  Small bowel ileus   7.  Distal colonic diverticulosis      Findings were discussed with CHANTAL VELEZ on 8/26/2021 9:05 PM.      [id number] VIEW   Final Result      Multiple mildly dilated loops of small and large intestine possibly representing ileus.           Assessment/Plan  * Perforated viscus- (present on admission)  Assessment & Plan  Malignant perforation secondary to HCC  S/p Ex Lap x 2 with creation of loop ileostomy  Srgcl team following  S/p completed Abx's         Ischemic cardiomyopathy  Assessment & Plan  LVEF 25%  Probable silent LAD AMI  Cards have evaluated and feel he is not an interventional candidate given his Hx of HCC, thrombocytopenia and ESLD  Recommend against anticoagulation and ASA for same reasons  Tolerating  low dose BB  On high dose statin    Left ventricular thrombus- (present on admission)  Assessment & Plan  Not an anticoagulation candidate given thrombocytopenia    Adrenal insufficiency (HCC)  Assessment & Plan  DO in setting of sepsis  Now off steroids    Pleural effusion, bilateral  Assessment & Plan  Small R on last CXR  Cont to follow  Cont diuresis    Atrial fibrillation (HCC)- (present on admission)  Assessment & Plan  Amio and Metoprolol for rate/rythm control  Not an anticoagualation candidate due to thrombocytopenia  Cards has signed off    Respiratory failure following trauma and surgery (HCC)  Assessment & Plan  Extubated 9/5  Still 28 litres + from admission  -fluid balance on lasix and spironolactone  R pleural effusion; cont to follow, may yet need a tap  Mobilize  Cont O2/RT protocols  Now DNR/I    Cirrhosis of liver with ascites (HCC)  Assessment & Plan  Unclear if alcohol involved or primarily hepatocellular carcinoma etiology.  On spironolactone and lasix.  Holding spironolactone and lasix since NPO on IVFs for OR.    Hypokalemia- (present on admission)  Assessment & Plan  Follow and replace    Normochromic anemia- (present on admission)  Assessment & Plan    Transfuse hemoglobin less than 7    Septic shock (HCC)- (present on admission)  Assessment & Plan  intra-abd source: malignant bowel perforation  Cultures from abd Strep species  Has completed a total of 13 days of Abx's on 9/1    Hyponatremia- (present on admission)  Assessment & Plan  Resolved  Cont to follow    Hepatocellular carcinoma (HCC)- (present on admission)  Assessment & Plan  Continue Lasix 40 mg p.o. twice daily  Continue spironolactone 100 mg p.o. daily  On immunotherapy with Dr. Birch.  Is not a candidate for further chemo due to other comorbidities and low functional status  Pt asking for comfort care status, wife would like a little more time to talk about it with him    Pancytopenia (HCC)- (present on admission)  Assessment  & Plan  No indication of active bleed at this time and no indication for platelet transfusion  Secondary to liver dysfunction/ HCC.  Follow counts daily       VTE prophylaxis: pharmacologic prophylaxis contraindicated due to thrombocytopenia    I have performed a physical exam and reviewed and updated ROS and Plan today (9/11/2021). In review of yesterday's note (9/10/2021), there are no changes except as documented above.

## 2021-09-11 NOTE — ASSESSMENT & PLAN NOTE
intra-abd source: malignant bowel perforation  Cultures from abd Strep species  Has completed a total of 13 days of Abx's on 9/1

## 2021-09-12 ENCOUNTER — HOSPICE ADMISSION (OUTPATIENT)
Dept: HOSPICE | Facility: HOSPICE | Age: 71
End: 2021-09-12
Payer: MEDICARE

## 2021-09-12 ENCOUNTER — HOME CARE VISIT (OUTPATIENT)
Dept: HOSPICE | Facility: HOSPICE | Age: 71
End: 2021-09-12
Payer: MEDICARE

## 2021-09-12 LAB
ANION GAP SERPL CALC-SCNC: 10 MMOL/L (ref 7–16)
ANION GAP SERPL CALC-SCNC: 7 MMOL/L (ref 7–16)
ANION GAP SERPL CALC-SCNC: 7 MMOL/L (ref 7–16)
ANISOCYTOSIS BLD QL SMEAR: ABNORMAL
BASOPHILS # BLD AUTO: 0 % (ref 0–1.8)
BASOPHILS # BLD: 0 K/UL (ref 0–0.12)
BUN SERPL-MCNC: 22 MG/DL (ref 8–22)
CALCIUM SERPL-MCNC: 8.5 MG/DL (ref 8.5–10.5)
CALCIUM SERPL-MCNC: 8.7 MG/DL (ref 8.5–10.5)
CALCIUM SERPL-MCNC: 9.1 MG/DL (ref 8.5–10.5)
CHLORIDE SERPL-SCNC: 106 MMOL/L (ref 96–112)
CHLORIDE SERPL-SCNC: 108 MMOL/L (ref 96–112)
CHLORIDE SERPL-SCNC: 109 MMOL/L (ref 96–112)
CO2 SERPL-SCNC: 23 MMOL/L (ref 20–33)
CO2 SERPL-SCNC: 25 MMOL/L (ref 20–33)
CO2 SERPL-SCNC: 25 MMOL/L (ref 20–33)
CREAT SERPL-MCNC: 0.44 MG/DL (ref 0.5–1.4)
CREAT SERPL-MCNC: 0.46 MG/DL (ref 0.5–1.4)
CREAT SERPL-MCNC: 0.53 MG/DL (ref 0.5–1.4)
EOSINOPHIL # BLD AUTO: 0.03 K/UL (ref 0–0.51)
EOSINOPHIL NFR BLD: 0.9 % (ref 0–6.9)
ERYTHROCYTE [DISTWIDTH] IN BLOOD BY AUTOMATED COUNT: 76.7 FL (ref 35.9–50)
GLUCOSE SERPL-MCNC: 113 MG/DL (ref 65–99)
GLUCOSE SERPL-MCNC: 140 MG/DL (ref 65–99)
GLUCOSE SERPL-MCNC: 145 MG/DL (ref 65–99)
HCT VFR BLD AUTO: 30.2 % (ref 42–52)
HGB BLD-MCNC: 9.9 G/DL (ref 14–18)
LYMPHOCYTES # BLD AUTO: 0.31 K/UL (ref 1–4.8)
LYMPHOCYTES NFR BLD: 11.2 % (ref 22–41)
MACROCYTES BLD QL SMEAR: ABNORMAL
MAGNESIUM SERPL-MCNC: 2 MG/DL (ref 1.5–2.5)
MANUAL DIFF BLD: NORMAL
MCH RBC QN AUTO: 33.1 PG (ref 27–33)
MCHC RBC AUTO-ENTMCNC: 32.8 G/DL (ref 33.7–35.3)
MCV RBC AUTO: 101 FL (ref 81.4–97.8)
MONOCYTES # BLD AUTO: 0.1 K/UL (ref 0–0.85)
MONOCYTES NFR BLD AUTO: 3.4 % (ref 0–13.4)
MORPHOLOGY BLD-IMP: NORMAL
NEUTROPHILS # BLD AUTO: 2.37 K/UL (ref 1.82–7.42)
NEUTROPHILS NFR BLD: 84.5 % (ref 44–72)
NRBC # BLD AUTO: 0 K/UL
NRBC BLD-RTO: 0 /100 WBC
PLATELET # BLD AUTO: 19 K/UL (ref 164–446)
PLATELET BLD QL SMEAR: NORMAL
PLATELETS.RETICULATED NFR BLD AUTO: 3.6 K/UL (ref 0.6–13.1)
PMV BLD AUTO: 10.6 FL (ref 9–12.9)
POTASSIUM SERPL-SCNC: 4.2 MMOL/L (ref 3.6–5.5)
POTASSIUM SERPL-SCNC: 4.2 MMOL/L (ref 3.6–5.5)
POTASSIUM SERPL-SCNC: 4.5 MMOL/L (ref 3.6–5.5)
RBC # BLD AUTO: 2.99 M/UL (ref 4.7–6.1)
RBC BLD AUTO: PRESENT
SODIUM SERPL-SCNC: 139 MMOL/L (ref 135–145)
SODIUM SERPL-SCNC: 140 MMOL/L (ref 135–145)
SODIUM SERPL-SCNC: 141 MMOL/L (ref 135–145)
WBC # BLD AUTO: 2.8 K/UL (ref 4.8–10.8)

## 2021-09-12 PROCEDURE — A9270 NON-COVERED ITEM OR SERVICE: HCPCS | Performed by: SURGERY

## 2021-09-12 PROCEDURE — 700111 HCHG RX REV CODE 636 W/ 250 OVERRIDE (IP): Performed by: STUDENT IN AN ORGANIZED HEALTH CARE EDUCATION/TRAINING PROGRAM

## 2021-09-12 PROCEDURE — 700102 HCHG RX REV CODE 250 W/ 637 OVERRIDE(OP): Performed by: STUDENT IN AN ORGANIZED HEALTH CARE EDUCATION/TRAINING PROGRAM

## 2021-09-12 PROCEDURE — 80048 BASIC METABOLIC PNL TOTAL CA: CPT | Mod: 91

## 2021-09-12 PROCEDURE — 85055 RETICULATED PLATELET ASSAY: CPT

## 2021-09-12 PROCEDURE — 700102 HCHG RX REV CODE 250 W/ 637 OVERRIDE(OP): Performed by: SURGERY

## 2021-09-12 PROCEDURE — 36415 COLL VENOUS BLD VENIPUNCTURE: CPT

## 2021-09-12 PROCEDURE — A9270 NON-COVERED ITEM OR SERVICE: HCPCS | Performed by: STUDENT IN AN ORGANIZED HEALTH CARE EDUCATION/TRAINING PROGRAM

## 2021-09-12 PROCEDURE — 83735 ASSAY OF MAGNESIUM: CPT

## 2021-09-12 PROCEDURE — 770006 HCHG ROOM/CARE - MED/SURG/GYN SEMI*

## 2021-09-12 PROCEDURE — A9270 NON-COVERED ITEM OR SERVICE: HCPCS | Performed by: NURSE PRACTITIONER

## 2021-09-12 PROCEDURE — 85027 COMPLETE CBC AUTOMATED: CPT

## 2021-09-12 PROCEDURE — 700102 HCHG RX REV CODE 250 W/ 637 OVERRIDE(OP): Performed by: NURSE PRACTITIONER

## 2021-09-12 PROCEDURE — 84134 ASSAY OF PREALBUMIN: CPT

## 2021-09-12 PROCEDURE — 99233 SBSQ HOSP IP/OBS HIGH 50: CPT | Performed by: STUDENT IN AN ORGANIZED HEALTH CARE EDUCATION/TRAINING PROGRAM

## 2021-09-12 PROCEDURE — 85007 BL SMEAR W/DIFF WBC COUNT: CPT

## 2021-09-12 RX ORDER — POLYVINYL ALCOHOL 14 MG/ML
2 SOLUTION/ DROPS OPHTHALMIC EVERY 6 HOURS PRN
Status: DISCONTINUED | OUTPATIENT
Start: 2021-09-12 | End: 2021-09-17 | Stop reason: HOSPADM

## 2021-09-12 RX ORDER — MORPHINE SULFATE 10 MG/ML
5 INJECTION, SOLUTION INTRAMUSCULAR; INTRAVENOUS
Status: DISCONTINUED | OUTPATIENT
Start: 2021-09-12 | End: 2021-09-17 | Stop reason: HOSPADM

## 2021-09-12 RX ORDER — SCOLOPAMINE TRANSDERMAL SYSTEM 1 MG/1
1 PATCH, EXTENDED RELEASE TRANSDERMAL
Status: DISCONTINUED | OUTPATIENT
Start: 2021-09-12 | End: 2021-09-17 | Stop reason: HOSPADM

## 2021-09-12 RX ORDER — HALOPERIDOL 5 MG/ML
1 INJECTION INTRAMUSCULAR EVERY 6 HOURS PRN
Status: DISCONTINUED | OUTPATIENT
Start: 2021-09-12 | End: 2021-09-17 | Stop reason: HOSPADM

## 2021-09-12 RX ORDER — OXYCODONE HYDROCHLORIDE 5 MG/1
5-10 TABLET ORAL EVERY 4 HOURS PRN
Status: DISCONTINUED | OUTPATIENT
Start: 2021-09-12 | End: 2021-09-13

## 2021-09-12 RX ORDER — DIPHENHYDRAMINE HYDROCHLORIDE 50 MG/ML
25 INJECTION INTRAMUSCULAR; INTRAVENOUS EVERY 6 HOURS PRN
Status: DISCONTINUED | OUTPATIENT
Start: 2021-09-12 | End: 2021-09-17 | Stop reason: HOSPADM

## 2021-09-12 RX ORDER — ATROPINE SULFATE 10 MG/ML
2 SOLUTION/ DROPS OPHTHALMIC EVERY 4 HOURS PRN
Status: DISCONTINUED | OUTPATIENT
Start: 2021-09-12 | End: 2021-09-17 | Stop reason: HOSPADM

## 2021-09-12 RX ORDER — HALOPERIDOL 2 MG/ML
1 SOLUTION ORAL EVERY 6 HOURS PRN
Status: DISCONTINUED | OUTPATIENT
Start: 2021-09-12 | End: 2021-09-17 | Stop reason: HOSPADM

## 2021-09-12 RX ADMIN — MINERAL OIL, PETROLATUM 1 APPLICATION: 425; 573 OINTMENT OPHTHALMIC at 21:21

## 2021-09-12 RX ADMIN — SCOPALAMINE 1 PATCH: 1 PATCH, EXTENDED RELEASE TRANSDERMAL at 14:20

## 2021-09-12 RX ADMIN — AMIODARONE HYDROCHLORIDE 200 MG: 200 TABLET ORAL at 05:24

## 2021-09-12 RX ADMIN — ONDANSETRON 4 MG: 2 INJECTION INTRAMUSCULAR; INTRAVENOUS at 17:17

## 2021-09-12 RX ADMIN — OXYCODONE 10 MG: 5 TABLET ORAL at 05:45

## 2021-09-12 RX ADMIN — DIPHENHYDRAMINE HYDROCHLORIDE 25 MG: 50 INJECTION INTRAMUSCULAR; INTRAVENOUS at 15:17

## 2021-09-12 RX ADMIN — THIAMINE HCL TAB 100 MG 100 MG: 100 TAB at 05:25

## 2021-09-12 RX ADMIN — SPIRONOLACTONE 50 MG: 50 TABLET ORAL at 05:26

## 2021-09-12 RX ADMIN — FUROSEMIDE 20 MG: 20 TABLET ORAL at 05:25

## 2021-09-12 RX ADMIN — OMEPRAZOLE 40 MG: KIT at 07:44

## 2021-09-12 RX ADMIN — OXYCODONE 10 MG: 5 TABLET ORAL at 09:43

## 2021-09-12 ASSESSMENT — PAIN DESCRIPTION - PAIN TYPE
TYPE: ACUTE PAIN

## 2021-09-12 ASSESSMENT — ENCOUNTER SYMPTOMS
COUGH: 0
VOMITING: 0
DIZZINESS: 0
NAUSEA: 0

## 2021-09-12 NOTE — PROGRESS NOTES
[facility]    POD #16  POD #12    No complaints  Pain controlled  Tolerating feeds  Stoma with good output  Vac in place  P  Continue feeds  Placement  No changes from surgery standpoing

## 2021-09-12 NOTE — PROGRESS NOTES
Pt AO x 4, forgetful of time.   Vitals signs stable  Pt denies chest pain or SOB  O2 sat >90% on RA breathing unlabored   Pt endorses 3/10  Pain, will continue to monitor, and medicate per MAR.   Pt denies N/V  RLQ ostomy to down drain bag, putting out large amount of liquid yellow stool.   + voiding, incontinent  Pt turned q2h with taps system, mepilex on sacrum CDI  Bilateral abd MARIANA drains, L drain large about of serous ouput; R drain tan milky output. Both to bulb suction.  Generalized edema, BLE +3 edema.   R nare coretrak in place with diabetasourse @70ml/hr, Q4 hr 50 cc free water flushes.   Pt tolerates ice chips under supervision   Tele monitor in place     Updated plan of care discussed with pt. Safety education done. Falls precautions in place.       COVID 19 surge in effect

## 2021-09-12 NOTE — PROGRESS NOTES
[facility] Medicine Daily Progress Note    Date of Service  9/12/2021    Chief Complaint  Lorraine Bella is a 71 y.o. male admitted 8/26/2021 with persistent nausea and vomiting    Hospital Course  72yo admitted 8/26 with NX.  PMHx DM, ETOH, HCC, cirrhosis, f/b Dr Birch Heme/Onc and on immunotherapy.  Imaging on presentation showing intra-abd free air.  Admitted to the ICU with Dx of sepsis.  Required pressors and IV Hydrocortisone  8/27 OR with Dr You for Ex Lap and drainage of intra-abd abscess.  Bowel perforation found to be secondary to HCC  8/31 OR with Dr Thornton for Ex Lap, abscess drainage, creation of loop ileostomy  On this admission pt also found to have an LVEF of 25% and L ventricular thrombus, new Dx Afib.  As per oncology patient is not a candidate for further chemotherapy.     Interval Problem Update  Patient remained stable  I had lengthy discussion with the patient and patient's spouse Sharlene .  Patient and patient spouse are aware about the poor prognosis.  Patient and his wife like to pursue comfort measures.  They verbalized understanding about comfort care.    Palliative following  We will place hospice evaluation      I have personally seen and examined the patient at bedside. I discussed the plan of care with patient.    Consultants/Specialty  cardiology and general surgery  Intensivist    Code Status  DNAR/DNI    Disposition  Patient is not medically cleared.   Anticipate discharge to to hospice.  I have placed the appropriate orders for post-discharge needs.    Review of Systems  Review of Systems   Constitutional: Positive for malaise/fatigue.   HENT: Negative for hearing loss.    Respiratory: Negative for cough.    Cardiovascular: Negative for chest pain.   Gastrointestinal: Negative for nausea and vomiting.   Genitourinary: Negative for dysuria.   Skin: Negative for rash.   Neurological: Negative for dizziness.        Physical Exam  Temp:  [36.3 °C (97.4 °F)-36.8 °C (98.2  °F)] 36.6 °C (97.9 °F)  Pulse:  [] 98  Resp:  [16-22] 19  BP: (103-108)/(58-66) 106/61  SpO2:  [92 %-99 %] 93 %    Physical Exam  Constitutional:       General: He is not in acute distress.     Appearance: He is ill-appearing and toxic-appearing.   HENT:      Head: Normocephalic.      Right Ear: Tympanic membrane normal.      Left Ear: Tympanic membrane normal.      Nose: Nose normal.      Mouth/Throat:      Mouth: Mucous membranes are moist.   Cardiovascular:      Rate and Rhythm: Normal rate and regular rhythm.      Pulses: Normal pulses.      Heart sounds: Normal heart sounds. No murmur heard.     Pulmonary:      Effort: Pulmonary effort is normal. No respiratory distress.      Breath sounds: Normal breath sounds.   Abdominal:      Palpations: Abdomen is soft.      Comments: Ostomy noted  With drain on place   Abdominal drain noted    Musculoskeletal:      Right lower leg: No edema.      Left lower leg: No edema.   Skin:     General: Skin is warm.   Neurological:      General: No focal deficit present.      Mental Status: He is alert and oriented to person, place, and time.   Psychiatric:         Mood and Affect: Mood normal.         Fluids    Intake/Output Summary (Last 24 hours) at 9/12/2021 1240  Last data filed at 9/12/2021 1200  Gross per 24 hour   Intake 1130 ml   Output 1745 ml   Net -615 ml       Laboratory  Recent Labs     09/10/21  0530 09/11/21  0530 09/12/21  0535   WBC 2.6* 2.2* 2.8*   RBC 2.80* 2.92* 2.99*   HEMOGLOBIN 9.2* 9.4* 9.9*   HEMATOCRIT 28.8* 30.5* 30.2*   .9* 104.5* 101.0*   MCH 32.9 32.2 33.1*   MCHC 31.9* 30.8* 32.8*   RDW 79.7* 82.1* 76.7*   PLATELETCT 27* 22* 19*   MPV 10.7 9.9 10.6     Recent Labs     09/11/21  1200 09/12/21  0037 09/12/21  0535   SODIUM 143 140 139   POTASSIUM 4.3 4.2 4.2   CHLORIDE 111 108 109   CO2 26 25 23   GLUCOSE 136* 145* 140*   BUN 20 22 22   CREATININE 0.38* 0.44* 0.46*   CALCIUM 8.4* 8.7 8.5             Recent Labs     09/10/21  0530    TRIGLYCERIDE 80       Imaging  US-TRAUMA VEIN SCREEN LOWER BILAT EXTREMITY   Final Result      DX-CHEST-PORTABLE (1 VIEW)   Final Result         1.  Pulmonary edema and/or infiltrates are identified, which appear somewhat increased since the prior exam.   2.  Small right pleural effusion   3.  Cardiomegaly   4.  Atherosclerosis      DX-ABDOMEN FOR TUBE PLACEMENT   Final Result         1.  Nonspecific bowel gas pattern.   2.  Dobbhoff tube with tip terminating overlying the expected location of the third or fourth duodenal segment.   3.  Coronary edema and/or infiltrates.   4.  Small layering right pleural effusion   5.  Atherosclerosis      EC-ECHOCARDIOGRAM COMPLETE W/ CONT   Final Result      DX-CHEST-PORTABLE (1 VIEW)   Final Result      Stable examination.      DX-CHEST-PORTABLE (1 VIEW)   Final Result      Worsening left unchanged right lower lung zone atelectasis and consolidation with probable edema      DX-ABDOMEN FOR TUBE PLACEMENT   Final Result      Enteric tube projects over the second portion of the duodenum.      DX-CHEST-PORTABLE (1 VIEW)   Final Result      1.  Bilateral airspace opacities likely represent pulmonary edema or multifocal pneumonia.   2.  Atherosclerotic plaque.      DX-CHEST-PORTABLE (1 VIEW)   Final Result      1.  Interval placement of an endotracheal tube which terminates in satisfactory position at the level of the aortic arch.   2.  Previously demonstrated bilateral layering pleural effusions appear less distinct on today's study which may be related to patient positioning.      DX-CHEST-PORTABLE (1 VIEW)   Final Result      Increasing basilar atelectasis, pleural effusions and pulmonary edema      CT-ABDOMEN-PELVIS WITH   Final Result      No contrast extravasation identified but the colon is not contrast opacified as the patient had insufficient rectal tone and NG tube contrast had not yet reached the cecum      Interval laparotomy with surgical drains in place. No bowel  obstruction identified. There is improved small free intraperitoneal air      Anasarca with worsening moderate effusions, stable to slight worsening abdominal pelvic ascites and mesenteric edema      Stable large right hepatic mass      Severe colonic diverticulosis            DX-ABDOMEN FOR TUBE PLACEMENT   Final Result      Enteric tube terminates over the duodenum      NG tube overlies the proximal stomach      Right upper quadrant surgical drains with decrease in pneumoperitoneum      DX-CHEST-PORTABLE (1 VIEW)   Final Result      1.  Supportive tubing as described above.   2.  RIGHT lung base atelectasis.   3.  No pneumothorax.      CT-ABDOMEN-PELVIS WITH   Final Result      1.  Large collection of free intraperitoneal fluid and gas in the RIGHT abdomen extending from the hepatic dome to the pelvis. The gallbladder is also involved but may not be the site of origin. Perforated bowel is suspected.   2.  Additional free fluid in the abdomen   3.  Large RIGHT hepatic mass, unchanged in size   4.  Splenomegaly   5.  1.6 x 1.8 cm cystic pancreatic tail lesion, unchanged   6.  Small bowel ileus   7.  Distal colonic diverticulosis      Findings were discussed with CHANTAL VELEZ on 8/26/2021 9:05 PM.      [id number] VIEW   Final Result      Multiple mildly dilated loops of small and large intestine possibly representing ileus.           Assessment/Plan  Principal Problem:    Perforated viscus POA: Yes      Overview: 8/26 - Ceftriaxone and metronidazole initiated      8/30 - Peritoneal culture positive for Streptococcus anginosus      8/31 - Abscess drainage and ileostomy creation, antibiotics altered to       Zosyn      Completed Abx's 9/7  Active Problems:    Pancytopenia (HCC) POA: Yes    Hepatocellular carcinoma (HCC) POA: Yes    Hyponatremia POA: Yes    Septic shock (HCC) POA: Yes    Normochromic anemia POA: Yes    Hypokalemia POA: Yes    Cirrhosis of liver with ascites (HCC) POA: Unknown    Respiratory failure  following trauma and surgery (HCC) POA: Unknown      Overview: Remained on  Vent post op      Continue full mechanical ventilatory support. Ventilator bundle and Trauma       weaning protocol.    Atrial fibrillation (HCC) POA: Yes    Pleural effusion, bilateral POA: Unknown      Overview: 8/31 moderate bilateral pleural effusions on CT      May need drainage if there is difficulty with vent weaning    Fluid overload POA: Yes    Adrenal insufficiency (HCC) POA: Unknown    Left ventricular thrombus POA: Yes    Ischemic cardiomyopathy POA: Unknown      Overview: Echo with ejection fraction of 25% along LAD distribution      Likely chronic based on history      Blood pressure too low for ACE inhibitor or beta-blocker      Appreciate cardiology recommendations    Contraindication to anticoagulation therapy POA: Unknown  Resolved Problems:    Protein-calorie malnutrition, severe (HCC) POA: Yes    Nausea and vomiting POA: Yes      I had lengthy discussion with the patient and patient's spouse Sharlene .  Patient and patient' spouse are aware about the poor prognosis.  Patient and his wife like to pursue comfort measures.  They verbalized understanding about comfort care.      Comfort care initiated,    Palliative following  Hospice evaluation requested        VTE prophylaxis: SCDs/TEDs and enoxaparin ppx    I have performed a physical exam and reviewed and updated ROS and Plan today (9/12/2021). In review of yesterday's note (9/11/2021), there are no changes except as documented above.

## 2021-09-12 NOTE — PALLIATIVE CARE
PALLIATIVE CARE FOLLOW-UP:    Joined Dr. King at bedside. Pt and wife Sharlene decided to transition now to comfort care, including cortrak removed - pt stated wanting pizza, but thought chocolate milkshake would be good too - assured this would be ordered for him. Sharlene shared that pt's dtr Deonna has been struggling - offered to talk with her to try to support in her grief processing. Sharlene expressed appreciation for offer.    Discussed hospice and possibility of pt d/c home with their support. Sharlene has had experience with hospice in the past and she does not feel that she can provide all the care needed when they are not present - turning, toileting, bathing - and does not have the finances for private caregiver help. Their dtr does not have a car and cannot help. Sharlene has emotional support from her [employer] and friends who have also recently lost their spouses.    Discussed with/Updated:  Dr. King, primary RN Tasha, [facility] RN Philip        Plan:  Comfort care. Referral to [facility] pending acceptance. Palliative Care to continue to follow, provide support, and help facilitate decision-making as needed. Will need POLST if discharges.    Thank you for allowing Palliative Care to support this pt and his family.  Contact [phone] for additional assistance, patient status change, questions or concerns.   No

## 2021-09-12 NOTE — PROGRESS NOTES
Pt AO x 3, forgetful of time.   Vitals signs stable  Pt denies chest pain or SOB  O2 sat >90% on RA breathing unlabored   Pt endorses 7/10  Pain, medicated per MAR.   Pt denies N/V/  RLQ ostomy putting out large amount of liquid stool.   + voiding, incontinent  Pt turned q2h with taps system  Bilateral abd MARIANA drains, L drain large about of serous ouput > 160ml; R drain >40ml tan milky fluid out overnight.   Generalized edema, BLE +3 edema.   R nare coretrak in place with diabetasourse @70ml/hr, TF equipment changed  Pt tolerates ice chips under supervision       Updated plan of care discussed with pt. Safety education done. Falls precautions in place.   Pt safety maintained. Hourly rounding done.

## 2021-09-13 LAB
ANION GAP SERPL CALC-SCNC: 7 MMOL/L (ref 7–16)
ANION GAP SERPL CALC-SCNC: 8 MMOL/L (ref 7–16)
ANISOCYTOSIS BLD QL SMEAR: ABNORMAL
BASOPHILS # BLD AUTO: 0 % (ref 0–1.8)
BASOPHILS # BLD: 0 K/UL (ref 0–0.12)
BUN SERPL-MCNC: 22 MG/DL (ref 8–22)
BUN SERPL-MCNC: 22 MG/DL (ref 8–22)
CALCIUM SERPL-MCNC: 8.4 MG/DL (ref 8.5–10.5)
CALCIUM SERPL-MCNC: 8.7 MG/DL (ref 8.5–10.5)
CHLORIDE SERPL-SCNC: 107 MMOL/L (ref 96–112)
CHLORIDE SERPL-SCNC: 108 MMOL/L (ref 96–112)
CO2 SERPL-SCNC: 25 MMOL/L (ref 20–33)
CO2 SERPL-SCNC: 27 MMOL/L (ref 20–33)
CREAT SERPL-MCNC: 0.5 MG/DL (ref 0.5–1.4)
CREAT SERPL-MCNC: 0.59 MG/DL (ref 0.5–1.4)
CRP SERPL HS-MCNC: 9.63 MG/DL (ref 0–0.75)
EOSINOPHIL # BLD AUTO: 0.03 K/UL (ref 0–0.51)
EOSINOPHIL NFR BLD: 0.9 % (ref 0–6.9)
ERYTHROCYTE [DISTWIDTH] IN BLOOD BY AUTOMATED COUNT: 77.9 FL (ref 35.9–50)
GLUCOSE SERPL-MCNC: 92 MG/DL (ref 65–99)
GLUCOSE SERPL-MCNC: 94 MG/DL (ref 65–99)
HCT VFR BLD AUTO: 30.8 % (ref 42–52)
HGB BLD-MCNC: 9.9 G/DL (ref 14–18)
HYPOCHROMIA BLD QL SMEAR: ABNORMAL
LYMPHOCYTES # BLD AUTO: 0.35 K/UL (ref 1–4.8)
LYMPHOCYTES NFR BLD: 10.6 % (ref 22–41)
MACROCYTES BLD QL SMEAR: ABNORMAL
MAGNESIUM SERPL-MCNC: 2 MG/DL (ref 1.5–2.5)
MANUAL DIFF BLD: NORMAL
MCH RBC QN AUTO: 32.1 PG (ref 27–33)
MCHC RBC AUTO-ENTMCNC: 32.1 G/DL (ref 33.7–35.3)
MCV RBC AUTO: 100 FL (ref 81.4–97.8)
MICROCYTES BLD QL SMEAR: ABNORMAL
MONOCYTES # BLD AUTO: 0.17 K/UL (ref 0–0.85)
MONOCYTES NFR BLD AUTO: 5.3 % (ref 0–13.4)
MORPHOLOGY BLD-IMP: NORMAL
MYELOCYTES NFR BLD MANUAL: 0.9 %
NEUTROPHILS # BLD AUTO: 2.72 K/UL (ref 1.82–7.42)
NEUTROPHILS NFR BLD: 73.5 % (ref 44–72)
NEUTS BAND NFR BLD MANUAL: 8.8 % (ref 0–10)
NRBC # BLD AUTO: 0 K/UL
NRBC BLD-RTO: 0 /100 WBC
PLATELET # BLD AUTO: 22 K/UL (ref 164–446)
PLATELET BLD QL SMEAR: NORMAL
PLATELETS.RETICULATED NFR BLD AUTO: 2.9 K/UL (ref 0.6–13.1)
PMV BLD AUTO: 10.9 FL (ref 9–12.9)
POTASSIUM SERPL-SCNC: 4.3 MMOL/L (ref 3.6–5.5)
POTASSIUM SERPL-SCNC: 4.5 MMOL/L (ref 3.6–5.5)
RBC # BLD AUTO: 3.08 M/UL (ref 4.7–6.1)
RBC BLD AUTO: PRESENT
SODIUM SERPL-SCNC: 141 MMOL/L (ref 135–145)
SODIUM SERPL-SCNC: 141 MMOL/L (ref 135–145)
TOXIC GRANULES BLD QL SMEAR: SLIGHT
WBC # BLD AUTO: 3.3 K/UL (ref 4.8–10.8)

## 2021-09-13 PROCEDURE — A9270 NON-COVERED ITEM OR SERVICE: HCPCS | Performed by: STUDENT IN AN ORGANIZED HEALTH CARE EDUCATION/TRAINING PROGRAM

## 2021-09-13 PROCEDURE — 85007 BL SMEAR W/DIFF WBC COUNT: CPT

## 2021-09-13 PROCEDURE — 86140 C-REACTIVE PROTEIN: CPT

## 2021-09-13 PROCEDURE — 85055 RETICULATED PLATELET ASSAY: CPT

## 2021-09-13 PROCEDURE — 302098 PASTE RING (FLAT): Performed by: STUDENT IN AN ORGANIZED HEALTH CARE EDUCATION/TRAINING PROGRAM

## 2021-09-13 PROCEDURE — 83735 ASSAY OF MAGNESIUM: CPT

## 2021-09-13 PROCEDURE — 700102 HCHG RX REV CODE 250 W/ 637 OVERRIDE(OP): Performed by: STUDENT IN AN ORGANIZED HEALTH CARE EDUCATION/TRAINING PROGRAM

## 2021-09-13 PROCEDURE — 97605 NEG PRS WND THER DME<=50SQCM: CPT

## 2021-09-13 PROCEDURE — 85027 COMPLETE CBC AUTOMATED: CPT

## 2021-09-13 PROCEDURE — 770006 HCHG ROOM/CARE - MED/SURG/GYN SEMI*

## 2021-09-13 PROCEDURE — 80048 BASIC METABOLIC PNL TOTAL CA: CPT

## 2021-09-13 PROCEDURE — 700111 HCHG RX REV CODE 636 W/ 250 OVERRIDE (IP): Performed by: STUDENT IN AN ORGANIZED HEALTH CARE EDUCATION/TRAINING PROGRAM

## 2021-09-13 PROCEDURE — 99232 SBSQ HOSP IP/OBS MODERATE 35: CPT | Performed by: STUDENT IN AN ORGANIZED HEALTH CARE EDUCATION/TRAINING PROGRAM

## 2021-09-13 PROCEDURE — 36415 COLL VENOUS BLD VENIPUNCTURE: CPT

## 2021-09-13 RX ORDER — ACETAMINOPHEN 325 MG/1
650 TABLET ORAL EVERY 6 HOURS PRN
Status: DISCONTINUED | OUTPATIENT
Start: 2021-09-13 | End: 2021-09-17 | Stop reason: HOSPADM

## 2021-09-13 RX ORDER — OXYCODONE HYDROCHLORIDE 5 MG/1
5-10 TABLET ORAL EVERY 4 HOURS PRN
Status: DISCONTINUED | OUTPATIENT
Start: 2021-09-13 | End: 2021-09-17 | Stop reason: HOSPADM

## 2021-09-13 RX ORDER — HYDROMORPHONE HYDROCHLORIDE 1 MG/ML
1 INJECTION, SOLUTION INTRAMUSCULAR; INTRAVENOUS; SUBCUTANEOUS
Status: DISCONTINUED | OUTPATIENT
Start: 2021-09-13 | End: 2021-09-17 | Stop reason: HOSPADM

## 2021-09-13 RX ORDER — ONDANSETRON 4 MG/1
4 TABLET, ORALLY DISINTEGRATING ORAL EVERY 4 HOURS PRN
Status: DISCONTINUED | OUTPATIENT
Start: 2021-09-13 | End: 2021-09-17 | Stop reason: HOSPADM

## 2021-09-13 RX ORDER — OMEPRAZOLE 20 MG/1
20 CAPSULE, DELAYED RELEASE ORAL DAILY
Status: DISCONTINUED | OUTPATIENT
Start: 2021-09-13 | End: 2021-09-17 | Stop reason: HOSPADM

## 2021-09-13 RX ADMIN — OXYCODONE 10 MG: 5 TABLET ORAL at 07:26

## 2021-09-13 RX ADMIN — OXYCODONE 10 MG: 5 TABLET ORAL at 11:22

## 2021-09-13 RX ADMIN — MORPHINE SULFATE 5 MG: 10 INJECTION INTRAVENOUS at 14:29

## 2021-09-13 RX ADMIN — OXYCODONE 5 MG: 5 TABLET ORAL at 03:39

## 2021-09-13 RX ADMIN — OMEPRAZOLE 20 MG: 20 CAPSULE, DELAYED RELEASE ORAL at 05:08

## 2021-09-13 ASSESSMENT — PAIN DESCRIPTION - PAIN TYPE
TYPE: ACUTE PAIN

## 2021-09-13 NOTE — HOSPICE
New hospice consult.  During visit, pt’s symptoms appeared well managed.  Per spouse, pt able to eat small amounts of sherbet.  Hospice services reviewed.  Case reviewed with YESSI Sung, who states pt appropriate for [facility], but not [facility] at this time.

## 2021-09-13 NOTE — DIETARY
Nutrition Services:  Brief Update    Pt previously on tube feedings.  Per chart review, pt has transitioned to comfort care.  Cortrak has been removed and tube feedings are off.  Full liquid diet ordered.  RD will re-screen pt weekly.  Consult RD as needed.    RD available PRN.

## 2021-09-13 NOTE — PROGRESS NOTES
Assessment complete.  AA&Ox4.   SpO2 93% on RA. Denies SOB.  Midline woundvac, CDI.  Right MARIANA drain, CDI.  Left MARIANA drain, CDI.   Tolerating full liquid diet. Denies N/V.  + void.   + BM RLQ ostomy.   Pt up with max assist.   All needs met at this time. Call light within reach. Pt calls appropriately.     COVID-19 surge in effect

## 2021-09-13 NOTE — PROGRESS NOTES
Pt AO x 4.  Vitals signs stable  Pt denies chest pain or SOB  O2 sat >90% on RA breathing unlabored   Pt endorses 5/10  Pain, medicated per MAR.   Pt denies N/V, will continue to monitor  RLQ ostomy to down drain bag, putting out large amount of liquid brown stool.   + voiding, incontinent  Pt turned q2h with taps system, mepilex on sacrum CDI. Repositioned as patient tolerates.  Bilateral abd MARIANA drains, L drain large about of serous ouput; R drain tan milky output. Both to bulb suction.  Generalized edema, BLE +3 edema.   PT tolerating full liquid diet.  Pt tolerates ice chips under supervision        Updated plan of care discussed with pt. Safety education done. Falls precautions in place.         COVID 19 surge in effect

## 2021-09-13 NOTE — PROGRESS NOTES
Pt declined going to [facility] at this time, will check in with pt later. Shampoo hair cap provided and pt hair shampooed and brushed.

## 2021-09-13 NOTE — DISCHARGE PLANNING
Agency/Facility Name: [facility]   Spoke To: Corrina   Outcome: Corrina requested DPA fax 72 hours of all progress and palliative notes

## 2021-09-13 NOTE — THERAPY
Missed Therapy     Patient Name: Lorraine Bella  Age:  71 y.o., Sex:  male  Medical Record #: [id number]  Today's Date: 9/13/2021    Discussed missed therapy with RN        09/13/21 1522   Treatment Variance   Reason For Missed Therapy Medical - Other (Please Comment)   Interdisciplinary Plan of Care Collaboration   Collaboration Comments OT orders discontinued pt is now comfor care    Session Information   Date / Session Number  9/13 d/c OT    Priority 0

## 2021-09-13 NOTE — PROGRESS NOTES
[facility] Medicine Daily Progress Note    Date of Service  9/13/2021  Chief Complaint  Lorraine Bella is a 71 y.o. male admitted 8/26/2021 with persistent nausea and vomiting     Hospital Course  72yo admitted 8/26 with NX.  PMHx DM, ETOH, HCC, cirrhosis, f/b Dr Birch Heme/Onc and on immunotherapy.  Imaging on presentation showing intra-abd free air.  Admitted to the ICU with Dx of sepsis.  Required pressors and IV Hydrocortisone  8/27 OR with Dr You for Ex Lap and drainage of intra-abd abscess.  Bowel perforation found to be secondary to HCC  8/31 OR with Dr Thornton for Ex Lap, abscess drainage, creation of loop ileostomy  On this admission pt also found to have an LVEF of 25% and L ventricular thrombus, new Dx Afib.  As per oncology patient is not a candidate for further chemotherapy.     Interval Problem Update  9/12/2021    Patient remained stable  I had lengthy discussion with the patient and patient's spouse Sharlene .  Patient and patient spouse are aware about the poor prognosis.  Patient and his wife like to pursue comfort measures.  They verbalized understanding about comfort care.     Palliative following  We will place hospice evaluation     9/13/2021  Patient on comfort care  IV morphine as needed for pain   assisting for hospice arrangement     I have personally seen and examined the patient at bedside. I discussed the plan of care with patient.     Consultants/Specialty  cardiology and general surgery  Intensivist     Code Status  DNAR/DNI     Disposition  Patient is not medically cleared.   Anticipate discharge to to hospice.  I have placed the appropriate orders for post-discharge needs.     Review of Systems  Review of Systems   Constitutional: Positive for malaise/fatigue.   HENT: Negative for hearing loss.    Respiratory: Negative for cough.    Cardiovascular: Negative for chest pain.   Gastrointestinal: Negative for nausea and vomiting.   Genitourinary: Negative for  dysuria.   Skin: Negative for rash.   Neurological: Negative for dizziness.         Physical Exam  Temp:  [36.3 °C (97.4 °F)-36.8 °C (98.2 °F)] 36.6 °C (97.9 °F)  Pulse:  [] 98  Resp:  [16-22] 19  BP: (103-108)/(58-66) 106/61  SpO2:  [92 %-99 %] 93 %     Physical Exam  Constitutional:       General: He is not in acute distress.     Appearance: He is ill-appearing and toxic-appearing.   HENT:      Head: Normocephalic.      Right Ear: Tympanic membrane normal.      Left Ear: Tympanic membrane normal.      Nose: Nose normal.      Mouth/Throat:      Mouth: Mucous membranes are moist.   Cardiovascular:      Rate and Rhythm: Normal rate and regular rhythm.      Pulses: Normal pulses.      Heart sounds: Normal heart sounds. No murmur heard.     Pulmonary:      Effort: Pulmonary effort is normal. No respiratory distress.      Breath sounds: Normal breath sounds.   Abdominal:      Palpations: Abdomen is soft.      Comments: Ostomy noted  With drain on place   Abdominal drain noted    Musculoskeletal:      Right lower leg: No edema.      Left lower leg: No edema.   Skin:     General: Skin is warm.   Neurological:      General: No focal deficit present.      Mental Status: He is alert and oriented to person, place, and time.   Psychiatric:         Mood and Affect: Mood normal.     Fluids    Intake/Output Summary (Last 24 hours) at 9/13/2021 1424  Last data filed at 9/13/2021 1115  Gross per 24 hour   Intake 240 ml   Output 2755 ml   Net -2515 ml       Laboratory  Recent Labs     09/11/21  0530 09/12/21  0535 09/13/21  0508   WBC 2.2* 2.8* 3.3*   RBC 2.92* 2.99* 3.08*   HEMOGLOBIN 9.4* 9.9* 9.9*   HEMATOCRIT 30.5* 30.2* 30.8*   .5* 101.0* 100.0*   MCH 32.2 33.1* 32.1   MCHC 30.8* 32.8* 32.1*   RDW 82.1* 76.7* 77.9*   PLATELETCT 22* 19* 22*   MPV 9.9 10.6 10.9     Recent Labs     09/12/21  1859 09/13/21  0010 09/13/21  0508   SODIUM 141 141 141   POTASSIUM 4.5 4.3 4.5   CHLORIDE 106 107 108   CO2 25 27 25   GLUCOSE  113* 94 92   BUN 22 22 22   CREATININE 0.53 0.59 0.50   CALCIUM 9.1 8.7 8.4*                   Imaging  US-TRAUMA VEIN SCREEN LOWER BILAT EXTREMITY   Final Result      DX-CHEST-PORTABLE (1 VIEW)   Final Result         1.  Pulmonary edema and/or infiltrates are identified, which appear somewhat increased since the prior exam.   2.  Small right pleural effusion   3.  Cardiomegaly   4.  Atherosclerosis      DX-ABDOMEN FOR TUBE PLACEMENT   Final Result         1.  Nonspecific bowel gas pattern.   2.  Dobbhoff tube with tip terminating overlying the expected location of the third or fourth duodenal segment.   3.  Coronary edema and/or infiltrates.   4.  Small layering right pleural effusion   5.  Atherosclerosis      EC-ECHOCARDIOGRAM COMPLETE W/ CONT   Final Result      DX-CHEST-PORTABLE (1 VIEW)   Final Result      Stable examination.      DX-CHEST-PORTABLE (1 VIEW)   Final Result      Worsening left unchanged right lower lung zone atelectasis and consolidation with probable edema      DX-ABDOMEN FOR TUBE PLACEMENT   Final Result      Enteric tube projects over the second portion of the duodenum.      DX-CHEST-PORTABLE (1 VIEW)   Final Result      1.  Bilateral airspace opacities likely represent pulmonary edema or multifocal pneumonia.   2.  Atherosclerotic plaque.      DX-CHEST-PORTABLE (1 VIEW)   Final Result      1.  Interval placement of an endotracheal tube which terminates in satisfactory position at the level of the aortic arch.   2.  Previously demonstrated bilateral layering pleural effusions appear less distinct on today's study which may be related to patient positioning.      DX-CHEST-PORTABLE (1 VIEW)   Final Result      Increasing basilar atelectasis, pleural effusions and pulmonary edema      CT-ABDOMEN-PELVIS WITH   Final Result      No contrast extravasation identified but the colon is not contrast opacified as the patient had insufficient rectal tone and NG tube contrast had not yet reached the cecum       Interval laparotomy with surgical drains in place. No bowel obstruction identified. There is improved small free intraperitoneal air      Anasarca with worsening moderate effusions, stable to slight worsening abdominal pelvic ascites and mesenteric edema      Stable large right hepatic mass      Severe colonic diverticulosis            DX-ABDOMEN FOR TUBE PLACEMENT   Final Result      Enteric tube terminates over the duodenum      NG tube overlies the proximal stomach      Right upper quadrant surgical drains with decrease in pneumoperitoneum      DX-CHEST-PORTABLE (1 VIEW)   Final Result      1.  Supportive tubing as described above.   2.  RIGHT lung base atelectasis.   3.  No pneumothorax.      CT-ABDOMEN-PELVIS WITH   Final Result      1.  Large collection of free intraperitoneal fluid and gas in the RIGHT abdomen extending from the hepatic dome to the pelvis. The gallbladder is also involved but may not be the site of origin. Perforated bowel is suspected.   2.  Additional free fluid in the abdomen   3.  Large RIGHT hepatic mass, unchanged in size   4.  Splenomegaly   5.  1.6 x 1.8 cm cystic pancreatic tail lesion, unchanged   6.  Small bowel ileus   7.  Distal colonic diverticulosis      Findings were discussed with CHANTAL VELEZ on 8/26/2021 9:05 PM.      [id number] VIEW   Final Result      Multiple mildly dilated loops of small and large intestine possibly representing ileus.           Assessment/Plan   Principal Problem:    Perforated viscus POA: Yes      Overview: 8/26 - Ceftriaxone and metronidazole initiated      8/30 - Peritoneal culture positive for Streptococcus anginosus      8/31 - Abscess drainage and ileostomy creation, antibiotics altered to       Zosyn      Completed Abx's 9/7  Active Problems:    Pancytopenia (HCC) POA: Yes    Hepatocellular carcinoma (HCC) POA: Yes    Hyponatremia POA: Yes    Septic shock (HCC) POA: Yes    Normochromic anemia POA: Yes    Hypokalemia POA: Yes     Cirrhosis of liver with ascites (HCC) POA: Unknown    Respiratory failure following trauma and surgery (HCC) POA: Unknown      Overview: Remained on  Vent post op      Continue full mechanical ventilatory support. Ventilator bundle and Trauma       weaning protocol.    Atrial fibrillation (HCC) POA: Yes    Pleural effusion, bilateral POA: Unknown      Overview: 8/31 moderate bilateral pleural effusions on CT      May need drainage if there is difficulty with vent weaning    Fluid overload POA: Yes    Adrenal insufficiency (HCC) POA: Unknown    Left ventricular thrombus POA: Yes    Ischemic cardiomyopathy POA: Unknown      Overview: Echo with ejection fraction of 25% along LAD distribution      Likely chronic based on history      Blood pressure too low for ACE inhibitor or beta-blocker      Appreciate cardiology recommendations    Contraindication to anticoagulation therapy POA: Unknown  Resolved Problems:    Protein-calorie malnutrition, severe (HCC) POA: Yes    Nausea and vomiting POA: Yes        I had lengthy discussion with the patient and patient's spouse Sharlene .  Patient and patient' spouse are aware about the poor prognosis.  Patient and his wife like to pursue comfort measures.  They verbalized understanding about comfort care.        Comfort care initiated,     Palliative following  Hospice evaluation requested    VTE prophylaxis: SCDs/TEDs and enoxaparin ppx    I have performed a physical exam and reviewed and updated ROS and Plan today (9/13/2021). In review of yesterday's note (9/12/2021), there are no changes except as documented above.

## 2021-09-13 NOTE — THERAPY
PT Contact Note    Pt now comfort care status, will not longer be actively followed by acute PT services. Will remain available for DC planning as needed.    Madyson Prince, PT, DPT  Ext. [phone]

## 2021-09-13 NOTE — DISCHARGE PLANNING
"Anticipated Discharge Disposition: TBD    Action: Discussed this case during IDT Rounds. Per MD, patient is appropriate for hospice services.    LSW received a message via [employer] from Jacquelyn Palliative RN. Per Sharlene Valladares, patient's wife is hesitant to take patient home on hospice.    LSW called Sharlene [phone] and [phone], message left requesting a call back to discuss patient's discharge needs.    LSW spoke with YASMIN Greene CM with [facility]. Per Ramona, if patient choose hospice, [facility] will no longer be the payer source as hospice services are covered by Medicare.    2:44pm - LSW met with patient and Sharlene, patient's wife at bedside. LSW requested clarification regarding the patient's anticipated discharge disposition. Sharlene stated she is the only person available to assist at home. Sharlene denies any family in [place] and their friends can not commit to provide consistent assistance at home. LSW explained Home with Hospice and Private Care Givers VS  with Hospice.    Sharlene voiced a high level of frustration. Sharlene claimed, a representative with [employer] met with her yesterday and informed him patient does not meet GIP Criteria which \"is not what I was told last week\" Sharlene stated she did not anticipate having to care for patient at home and is not ready to sign choice for Hospice. Sharlene informed LSW she will call the insurance to clarify the services that will be cover by insurance before making any decisions.    Barriers to Discharge: Lack of Support at Home.    Plan: As Above. LSW will continue to communicate with Sharlene regarding patient's discharge disposition.  "

## 2021-09-13 NOTE — HOSPITAL COURSE
70yo admitted 8/26 with NX.  PMHx DM, ETOH, HCC, cirrhosis, f/b Dr Birch Heme/Onc and on immunotherapy.  Imaging on presentation showing intra-abd free air.  Admitted to the ICU with Dx of sepsis.  Required pressors and IV Hydrocortisone  8/27 OR with Dr You for Ex Lap and drainage of intra-abd abscess.  Bowel perforation found to be secondary to HCC  8/31 OR with Dr Thornton for Ex Lap, abscess drainage, creation of loop ileostomy  On this admission pt also found to have an LVEF of 25% and L ventricular thrombus, new Dx Afib.  As per oncology patient is not a candidate for further chemotherapy.  Initiated on comfort care as per patient wish.

## 2021-09-13 NOTE — PROGRESS NOTES
Yaritza from lab called with a critical lab result of platelets of 22.   Previous platelet count was 19.

## 2021-09-14 LAB — PREALB SERPL-MCNC: 8.1 MG/DL (ref 18–38)

## 2021-09-14 PROCEDURE — 770006 HCHG ROOM/CARE - MED/SURG/GYN SEMI*

## 2021-09-14 PROCEDURE — 99232 SBSQ HOSP IP/OBS MODERATE 35: CPT | Performed by: HOSPITALIST

## 2021-09-14 PROCEDURE — 700102 HCHG RX REV CODE 250 W/ 637 OVERRIDE(OP): Performed by: STUDENT IN AN ORGANIZED HEALTH CARE EDUCATION/TRAINING PROGRAM

## 2021-09-14 PROCEDURE — A9270 NON-COVERED ITEM OR SERVICE: HCPCS | Performed by: STUDENT IN AN ORGANIZED HEALTH CARE EDUCATION/TRAINING PROGRAM

## 2021-09-14 RX ADMIN — OMEPRAZOLE 20 MG: 20 CAPSULE, DELAYED RELEASE ORAL at 04:04

## 2021-09-14 RX ADMIN — OXYCODONE 5 MG: 5 TABLET ORAL at 12:57

## 2021-09-14 ASSESSMENT — PAIN DESCRIPTION - PAIN TYPE
TYPE: ACUTE PAIN
TYPE: ACUTE PAIN

## 2021-09-14 ASSESSMENT — ENCOUNTER SYMPTOMS
DIZZINESS: 0
HEADACHES: 0
SHORTNESS OF BREATH: 0
VOMITING: 0
CHILLS: 0
FEVER: 0
ABDOMINAL PAIN: 0
COUGH: 0
NAUSEA: 0
PALPITATIONS: 0

## 2021-09-14 NOTE — PROGRESS NOTES
[facility] Medicine Daily Progress Note    Date of Service  9/14/2021    Chief Complaint  Lorraine Bella is a 71 y.o. male admitted 8/26/2021 with nausea and vomiting    Hospital Course  70yo admitted 8/26 with NX.  PMHx DM, ETOH, HCC, cirrhosis, f/b Dr Birch Heme/Onc and on immunotherapy.  Imaging on presentation showing intra-abd free air.  Admitted to the ICU with Dx of sepsis.  Required pressors and IV Hydrocortisone  8/27 OR with Dr You for Ex Lap and drainage of intra-abd abscess.  Bowel perforation found to be secondary to HCC  8/31 OR with Dr Thornton for Ex Lap, abscess drainage, creation of loop ileostomy  On this admission pt also found to have an LVEF of 25% and L ventricular thrombus, new Dx Afib.  As per oncology patient is not a candidate for further chemotherapy.  Initiated on comfort care as per patient wish.      Interval Problem Update  ALICIA events overnight  Awaiting hospice acceptance    I have personally seen and examined the patient at bedside. I discussed the plan of care with patient, family, bedside RN, charge RN,  and pharmacy.    Consultants/Specialty  cardiology, critical care and general surgery    Code Status  Comfort Care/DNR    Disposition  Patient is medically cleared.   Anticipate discharge to to hospice.  I have placed the appropriate orders for post-discharge needs.    Review of Systems  Review of Systems   Constitutional: Negative for chills and fever.   Respiratory: Negative for cough and shortness of breath.    Cardiovascular: Negative for chest pain and palpitations.   Gastrointestinal: Negative for abdominal pain, nausea and vomiting.   Genitourinary: Negative for dysuria and urgency.   Neurological: Negative for dizziness and headaches.   All other systems reviewed and are negative.       Physical Exam  Temp:  [36.7 °C (98 °F)] 36.7 °C (98 °F)  Pulse:  [94-98] 94  Resp:  [18] 18  BP: (105)/(65) 105/65  SpO2:  [97 %-98 %] 97 %    Physical  Exam  Vitals and nursing note reviewed.   Constitutional:       Appearance: He is ill-appearing.   Cardiovascular:      Rate and Rhythm: Normal rate and regular rhythm.      Heart sounds: No murmur heard.     Pulmonary:      Effort: Pulmonary effort is normal. No respiratory distress.      Breath sounds: Normal breath sounds.   Abdominal:      Comments: Ostomy +   Neurological:      General: No focal deficit present.      Mental Status: He is alert and oriented to person, place, and time.         Fluids    Intake/Output Summary (Last 24 hours) at 9/14/2021 0952  Last data filed at 9/14/2021 0200  Gross per 24 hour   Intake --   Output 500 ml   Net -500 ml       Laboratory  Recent Labs     09/12/21  0535 09/13/21  0508   WBC 2.8* 3.3*   RBC 2.99* 3.08*   HEMOGLOBIN 9.9* 9.9*   HEMATOCRIT 30.2* 30.8*   .0* 100.0*   MCH 33.1* 32.1   MCHC 32.8* 32.1*   RDW 76.7* 77.9*   PLATELETCT 19* 22*   MPV 10.6 10.9     Recent Labs     09/12/21  1859 09/13/21  0010 09/13/21  0508   SODIUM 141 141 141   POTASSIUM 4.5 4.3 4.5   CHLORIDE 106 107 108   CO2 25 27 25   GLUCOSE 113* 94 92   BUN 22 22 22   CREATININE 0.53 0.59 0.50   CALCIUM 9.1 8.7 8.4*                   Imaging  US-TRAUMA VEIN SCREEN LOWER BILAT EXTREMITY   Final Result      DX-CHEST-PORTABLE (1 VIEW)   Final Result         1.  Pulmonary edema and/or infiltrates are identified, which appear somewhat increased since the prior exam.   2.  Small right pleural effusion   3.  Cardiomegaly   4.  Atherosclerosis      DX-ABDOMEN FOR TUBE PLACEMENT   Final Result         1.  Nonspecific bowel gas pattern.   2.  Dobbhoff tube with tip terminating overlying the expected location of the third or fourth duodenal segment.   3.  Coronary edema and/or infiltrates.   4.  Small layering right pleural effusion   5.  Atherosclerosis      EC-ECHOCARDIOGRAM COMPLETE W/ CONT   Final Result      DX-CHEST-PORTABLE (1 VIEW)   Final Result      Stable examination.      DX-CHEST-PORTABLE (1  VIEW)   Final Result      Worsening left unchanged right lower lung zone atelectasis and consolidation with probable edema      DX-ABDOMEN FOR TUBE PLACEMENT   Final Result      Enteric tube projects over the second portion of the duodenum.      DX-CHEST-PORTABLE (1 VIEW)   Final Result      1.  Bilateral airspace opacities likely represent pulmonary edema or multifocal pneumonia.   2.  Atherosclerotic plaque.      DX-CHEST-PORTABLE (1 VIEW)   Final Result      1.  Interval placement of an endotracheal tube which terminates in satisfactory position at the level of the aortic arch.   2.  Previously demonstrated bilateral layering pleural effusions appear less distinct on today's study which may be related to patient positioning.      DX-CHEST-PORTABLE (1 VIEW)   Final Result      Increasing basilar atelectasis, pleural effusions and pulmonary edema      CT-ABDOMEN-PELVIS WITH   Final Result      No contrast extravasation identified but the colon is not contrast opacified as the patient had insufficient rectal tone and NG tube contrast had not yet reached the cecum      Interval laparotomy with surgical drains in place. No bowel obstruction identified. There is improved small free intraperitoneal air      Anasarca with worsening moderate effusions, stable to slight worsening abdominal pelvic ascites and mesenteric edema      Stable large right hepatic mass      Severe colonic diverticulosis            DX-ABDOMEN FOR TUBE PLACEMENT   Final Result      Enteric tube terminates over the duodenum      NG tube overlies the proximal stomach      Right upper quadrant surgical drains with decrease in pneumoperitoneum      DX-CHEST-PORTABLE (1 VIEW)   Final Result      1.  Supportive tubing as described above.   2.  RIGHT lung base atelectasis.   3.  No pneumothorax.      CT-ABDOMEN-PELVIS WITH   Final Result      1.  Large collection of free intraperitoneal fluid and gas in the RIGHT abdomen extending from the hepatic dome to  the pelvis. The gallbladder is also involved but may not be the site of origin. Perforated bowel is suspected.   2.  Additional free fluid in the abdomen   3.  Large RIGHT hepatic mass, unchanged in size   4.  Splenomegaly   5.  1.6 x 1.8 cm cystic pancreatic tail lesion, unchanged   6.  Small bowel ileus   7.  Distal colonic diverticulosis      Findings were discussed with CHANTAL VELEZ on 8/26/2021 9:05 PM.      [id number] VIEW   Final Result      Multiple mildly dilated loops of small and large intestine possibly representing ileus.           Assessment/Plan  * Perforated viscus- (present on admission)  Assessment & Plan  Malignant perforation secondary to HCC  S/p Ex Lap x 2 with creation of loop ileostomy  Srgcl team following  S/p completed Abx's         Ischemic cardiomyopathy  Assessment & Plan  LVEF 25%  Probable silent LAD AMI  Cards have evaluated and feel he is not an interventional candidate given his Hx of HCC, thrombocytopenia and ESLD  Recommend against anticoagulation and ASA for same reasons  Tolerating low dose BB  On high dose statin    Left ventricular thrombus- (present on admission)  Assessment & Plan  Not an anticoagulation candidate given thrombocytopenia    Adrenal insufficiency (HCC)  Assessment & Plan  DO in setting of sepsis  Now off steroids    Pleural effusion, bilateral  Assessment & Plan  Small R on last CXR  Cont to follow  Cont diuresis    Atrial fibrillation (HCC)- (present on admission)  Assessment & Plan  Amio and Metoprolol for rate/rythm control  Not an anticoagualation candidate due to thrombocytopenia  Cards has signed off    Respiratory failure following trauma and surgery (HCC)  Assessment & Plan  Extubated 9/5  Still 28 litres + from admission  -fluid balance on lasix and spironolactone  R pleural effusion; cont to follow, may yet need a tap  Mobilize  Cont O2/RT protocols  Now DNR/I    Cirrhosis of liver with ascites (HCC)  Assessment & Plan  Unclear if alcohol  involved or primarily hepatocellular carcinoma etiology.  On spironolactone and lasix.  Holding spironolactone and lasix since NPO on IVFs for OR.    Hypokalemia- (present on admission)  Assessment & Plan  Follow and replace    Normochromic anemia- (present on admission)  Assessment & Plan    Transfuse hemoglobin less than 7    Septic shock (HCC)- (present on admission)  Assessment & Plan  intra-abd source: malignant bowel perforation  Cultures from abd Strep species  Has completed a total of 13 days of Abx's on 9/1    Hyponatremia- (present on admission)  Assessment & Plan  Resolved  Cont to follow    Hepatocellular carcinoma (HCC)- (present on admission)  Assessment & Plan  Continue Lasix 40 mg p.o. twice daily  Continue spironolactone 100 mg p.o. daily  On immunotherapy with Dr. Birch.  Is not a candidate for further chemo due to other comorbidities and low functional status  Pt asking for comfort care status, wife would like a little more time to talk about it with him    Pancytopenia (HCC)- (present on admission)  Assessment & Plan  No indication of active bleed at this time and no indication for platelet transfusion  Secondary to liver dysfunction/ HCC.  Follow counts daily         VTE prophylaxis: none, pt comfort care    I have performed a physical exam and reviewed and updated ROS and Plan today (9/14/2021). In review of yesterday's note (9/13/2021), there are no changes except as documented above.

## 2021-09-14 NOTE — WOUND TEAM
"[facility]  Inpatient Services  Wound and Skin Care Evaluation    Admission Date: 8/26/2021     Last order of IP CONSULT TO WOUND CARE was found on 8/31/2021 from Hospital Encounter on 8/26/2021     HPI, PMH, SH: Reviewed    Past Surgical History:   Procedure Laterality Date   • PB EXPLORATORY OF ABDOMEN N/A 8/31/2021    Procedure: LAPAROTOMY, EXPLORATORY ,drainage of peritoneal abcess,  creation of loop ileostomy, and trucut liver biopsy;  Surgeon: Kevin Thornton D.O.;  Location: [facility];  Service: General   • PB LAP,DIAGNOSTIC ABDOMEN N/A 8/27/2021    Procedure: LAPAROSCOPY;  Surgeon: Priya You M.D.;  Location: [facility];  Service: General   • PB EXPLORATORY OF ABDOMEN N/A 8/27/2021    Procedure: LAPAROTOMY, EXPLORATORY;  Surgeon: Priya You M.D.;  Location: [facility];  Service: General   • IRRIGATION & DEBRIDEMENT GENERAL  8/27/2021    Procedure: IRRIGATION AND DEBRIDEMENT, INTRA-ABDOMINAL ABCESS;  Surgeon: Priya You M.D.;  Location: [facility];  Service: General   • RESTORATE HEMOSTAS  8/27/2021    Procedure: CONTROL OF LIVER BLEED;  Surgeon: Priya You M.D.;  Location: [facility];  Service: General   • PB LAP,DIAGNOSTIC ABDOMEN  7/9/2021    Procedure: DIAGNOSITIC LAPAROSCOPY WITH WASH OUT AND DRAIN PLACEMENT;  Surgeon: Cody Meza M.D.;  Location: [facility];  Service: Gastroenterology   • OTHER  12/2019    \"IR embolization\"    • PARATHYROIDECTOMY N/A 5/13/2016    Procedure: PARATHYROIDECTOMY;  Surgeon: Kale oLrd M.D.;  Location: SURGERY SAME DAY [facility];  Service:    • CATARACT PHACO WITH IOL Left 4/7/2016    Procedure: CATARACT PHACO WITH IOL;  Surgeon: Ephraim Jamison M.D.;  Location: SURGERY [facility];  Service:    • BONE GRAFT Right 1960's    right wrist   • OTHER      chemoemolozation x2   • WRIST ORIF Right 1960's     Social History     Tobacco Use   • Smoking " status: Current Every Day Smoker     Packs/day: 0.50     Years: 30.00     Pack years: 15.00     Types: Cigarettes     Start date: 1/1/1990   • Smokeless tobacco: Never Used   • Tobacco comment: quit couple times of the years   Substance Use Topics   • Alcohol use: Not Currently     Alcohol/week: 2.4 oz     Types: 4 Shots of liquor per week     Comment: pt quit drinking 2 months ago     Chief Complaint   Patient presents with   • N/V     Diagnosis: Bowel perforation (HCC) [K63.1]  Perforated intestine (HCC) [K63.1]    Unit where seen by Wound Team: T432/02     WOUND CONSULT/FOLLOW UP RELATED TO:  NPWT placement to abdominal surgical incision. Ostomy change (see below)     WOUND HISTORY:  Patient admitted with nausea and vomiting, history of hepatocellular carcinoma with cirrhosis. Went to OR with MD Thornton for peritoneal abscess, large bowel perforation, and liver mass.      WOUND ASSESSMENT/LDA    Negative Pressure Wound Therapy 09/01/21 Surgical Abdomen Midline (Active)   Vacuum Serial Number 1600 09/13/21 2000   NPWT Pump Mode / Pressure Setting Ulta;Continuous;125 mmHg    Dressing Type Black Foam (Regular);Medium    Number of Foam Pieces Used 4    Canister Changed No    Output (mL) 0 mL 09/12/21 2030   NEXT Dressing Change/Treatment Date 09/15/21        Wound 08/27/21 Incision Abdomen Midline (Active)      09/08/21 1600   Site Assessment Red;Tan;Painful;Fragile    Periwound Assessment Edema;Fragile    Margins Defined edges;Unattached edges    Closure Secondary intention;Staples    Drainage Amount Scant    Drainage Description Serosanguineous    Treatments Cleansed    Wound Cleansing Approved Wound Cleanser    Periwound Protectant Skin Protectant Wipes to Periwound;Drape    Dressing Cleansing/Solutions Not Applicable    Dressing Options Wound Vac    Dressing Changed Changed    Dressing Status Intact    Dressing Change/Treatment Frequency Monday, Wednesday, Friday, and As Needed    NEXT Dressing Change/Treatment  Date 09/15/21    NEXT Weekly Photo (Inpatient Only) 09/15/21    Number of Staples Removed 5 09/01/21 1030   Non-staged Wound Description Full thickness 09/13/21 1600   Wound Length (cm) 15.3 cm 09/08/21 1600   Wound Width (cm) 3 cm 09/08/21 1600   Wound Depth (cm) 1.7 cm 09/08/21 1600   Wound Surface Area (cm^2) 45.9 cm^2 09/08/21 1600   Wound Volume (cm^3) 78.03 cm^3 09/08/21 1600   Wound Healing % -1 09/08/21 1600   Tunneling (cm) 1.8 cm 09/08/21 1600   Tunneling Clock Position of Wound 12 09/08/21 1600   Tunneling - 2nd Location (cm) 2.4 cm 09/08/21 1600   Tunneling Clock Position of Wound - 2nd Location 6 09/08/21 1600   Shape linear    Wound Odor None    Exposed Structures Sutures              Vascular:    ZARA:   No results found.    Lab Values:    Lab Results   Component Value Date/Time    WBC 3.3 (L) 09/13/2021 05:08 AM    RBC 3.08 (L) 09/13/2021 05:08 AM    HEMOGLOBIN 9.9 (L) 09/13/2021 05:08 AM    HEMATOCRIT 30.8 (L) 09/13/2021 05:08 AM    CREACTPROT 9.63 (H) 09/13/2021 05:08 AM    HBA1C 6.0 (H) 07/03/2021 03:38 AM        Culture Results show:  Recent Results (from the past 720 hour(s))   CULTURE WOUND W/ GRAM STAIN    Collection Time: 08/31/21  9:21 AM    Specimen: Wound   Result Value Ref Range    Significant Indicator NEG     Source WND     Site Peritoneal Abscess     Culture Result       Heavy growth enteric [place] including mixed morphologies  Enterococcus sp., several morphologies enteric Gram negative  rods and yeast.      Gram Stain Result       Moderate WBCs.  Many mixed bacteria, no predominant organism seen.         Pain Level/Medicated:  Pre-medicated with IV Dilaudid prior to dressing change     INTERVENTIONS BY WOUND TEAM:  Chart and images reviewed. Discussed with bedside RN. All areas of concern (based on picture review, LDA review and discussion with bedside RN) have been thoroughly assessed. Documentation of areas based on significant findings. This RN in to assess patient. Performed  standard wound care which includes appropriate positioning, dressing removal and non-selective debridement. Pictures and measurements obtained weekly if/when required.    Abdomen:   Preparation for Dressing removal: Dressing soaked with wound cleanser  Non-selectively Debrided with: wound cleanser and gauze  Sharp debridement: NA   Cesia wound: Cleansed with wound cleanser, Prepped with mepitel one over staples. No sting skin prep and drape to periwound.   Primary Dressing: 3 pieces of black half thickness foam applied to wound bed.   Secondary (Outer) Dressing: button and TRAC pad applied. Suction resumed at 125 mmHg.     Interdisciplinary consultation: Patient, Bedside RN    EVALUATION / RATIONALE FOR TREATMENT:  Most Recent Date:  9/13: pt's wound appears to have more tan tissue present, scant granulation. Sill has skin bridge with staples. Tolerated drsg change. Expect if staples removed from middle of wound skin bridge will probable break.   9/8: Pt's Wound still has tan tissue. No odor. Tunneling present @ 1200 and 0600 then underneath skin bridge formed by staples in middle of wound. He tolerated drsg change better today.  9/6/21: Wound still with scant amount of slough. Since there are 2 areas if staples in middle of wound there is tunneling under skin. Appears clean in this area. Continue NPWT. Pt confused, when alarm off for distal obstruction to IV he would keep trying to answer the telephone.   9/3/2021: Small amount of slough to central aspect of wound bed. Wound bed is pink, continue NPWT to encourage tissue granulation.     9/1/21: Patient with full thickness surgical incision to midline abdomen. Fascia appears intact. Some sutures visible. Placed NPWT to assist in closure by secondary intention, manage bioburden and exudate, increase oxygenation and granulation to the wound bed.      Goals: Steady decrease in wound area and depth weekly.    WOUND TEAM PLAN OF CARE ([X] for frequency of wound follow  up,):   Nursing to follow orders written for wound care. Contact wound team if area fails to progress, deteriorates or with any questions/concerns  Dressing changes by wound team:                   Follow up 3 times weekly:                NPWT change 3 times weekly:   X  Follow up 1-2 times weekly:      Follow up Bi-Monthly:                   Follow up as needed:     Other (explain):     NURSING PLAN OF CARE ORDERS (X):  Dressing changes: See Dressing Care orders: X  Skin care: See Skin Care orders:   RN Prevention Protocol:   Rectal tube care: See Rectal Tube Care orders:   Other orders:    RSKIN:   CURRENTLY IN PLACE (X), APPLIED THIS VISIT (A), ORDERED (O):   Q shift Keith:  X  Q shift pressure point assessments:  X    Surface/Positioning   Pressure redistribution mattress   x         Low Airloss          Bariatric foam      Bariatric ASHU     Waffle cushion        Waffle Overlay          Reposition q 2 hours    X  TAPs Turning system     Z Quirino Pillow     Offloading/Redistribution not assessed  Sacral Mepilex (Silicone dressing)     Heel Mepilex (Silicone dressing)         Heel float boots (Prevalon boot)             Float Heels off Bed with Pillows           Respiratory   Silicone O2 tubing    x    Gray Foam Ear protectors     Cannula fixation Device (Tender )          High flow offloading Clip    Elastic head band offloading device      Anchorfast                                                         Trach with Optifoam split foam             Containment/Moisture Prevention ileostomy    Rectal tube or BMS    Purwick/Condom Cath        Triana Catheter  X  Barrier wipes           Barrier paste       Antifungal tx      Interdry        Mobilization not assessed     Up to chair        Ambulate      PT/OT      Nutrition      Dietician  x      Diabetes Education      PO x   TF    TPN     NPO   # days     Other        Anticipated discharge plans: TBD may need VAC and more ostomy education  LTACH:        SNF/Rehab:  "                 Home Health Care:           Outpatient Wound Center:            Self/Family Care:        Other:         Vac Discharge Needs:   Not Applicable Pt not on a wound vac:                         Regular Vac in use:   x                                                             Veraflo Vac while inpatient, ok to transition to Regular Vac on Discharge:                                 Veraflo Vac while inpatient, will need to remain on Veraflo Vac upon discharge:              [facility]  Inpatient Services  New Ostomy Management & Teaching    HPI:  Reviewed  PMH: Reviewed   SH: Reviewed    Subjective: \"Okay\"    Objective: Appliance slightly lifted, large amount of watery yellow with pieces of yellow soft output.         Ileostomy 08/31/21 Loop RLQ (Active)      09/08/21 1600   Stomal Appliance Assessment Changed    Stoma Assessment Beefy red;Red    Stoma Shape Oval    Stoma Size (in) 2    Peristomal Assessment Pink;Excoriation;Red    Mucocutaneous Junction Intact    Treatment Cleansed with water/washcloth;Appliance Changed    Peristomal Protectant Paste Ring;No Sting Skin Prep    Stomal Appliance 2 3/4\" (70mm) CTF;Paste Ring, 2\";High Output Pouch    Output (mL) 300 mL 09/13/21 1115   Output Color Yellow    WOUND RN ONLY - Stomal Appliance  2 Piece;2 3/4\" (70mm) CTF;High Output Pouch;Paste Ring, 2\";Down Drain Bag    Appliance (Pouch) # [id number]    Appliance Brand [employer]    Appliance Supplier [employer]    Secure Start completed Not Medically Stable    Ostomy Care Resources Provided [facility] Tip Sheet              Ostomy Appliance (type and size): 2 3/4\" 2 piece and paste ring with high output pouch to dd    Interventions: Removed current appliance using push/pull method. Cleaned peristomal area with moist warm wash cloths. Dried. Applied No Sting skin prep to periwound. Traced template, cut barrier to fit. Paste ring applied to barrier, applied barrier to skin, attached pouch and pouch attached to " down drain.     Pt education: Questions and concerns addressed. Patient's spouse, Sharlene, observed last part of appliance change.      Evaluation: Stoma viable and intact. No separation there are small petechia proximal edge of barrier. Patient unable tp participate in learning and hands on teaching at this time, slept throughout. Wife, Sharlene observed and asked questions. Need to bring [employer] paperwork for Sharlene to sign.     Flatus: Present  Stool Output: large, yellow and liquid  Diet: Clears  Mobility: Up to chair    Plan: Ostomy nurses to continue to follow for ostomy needs and teaching until discharge    Anticipated discharge needs: Supplies, supplier information, possible HH, outpatient ostomy clinic, Skilled Nursing/Rehab     Secure Start Signed Yes  wife  signed  Outpatient Referral Placed Not Medically Stable  5 Sets of appliances in Ostomy bag for discharge Not Medically Stable    INSURANCE OPTIONS: [employer].                 [employer] & [employer] ([employer])         x     MediCARE/MEDICAID & All other Private Insurance companies ([employer] Form)              MediCAID & Fee for Service (Care Chest Paperwork + [employer] Form)                            Form signed/Catalog Marked and Copy left with patient OR medicaid paperwork given to patient      Anticipated Discharge Plans:  Other TBD

## 2021-09-14 NOTE — PROGRESS NOTES
Assessment complete.  AA&Ox4.   SpO2 98% on 2L. Denies SOB. Apneic while asleep.   Midline woundvac, CDI.  Right MARIANA drain, CDI.  Left MARIANA drain, CDI.   Tolerating full liquid diet. Denies N/V.  + void.   + BM RLQ ostomy.   Pt up with max assist.   All needs met at this time. Call light within reach. Pt calls appropriately.      COVID-19 surge in effect

## 2021-09-14 NOTE — THERAPY
Missed Therapy     Patient Name: Lorraine Bella  Age:  71 y.o., Sex:  male  Medical Record #: [id number]  Today's Date: 9/14/2021 09/14/21 0835   Treatment Variance   Reason For Missed Therapy Medical - Other (Please Comment)  (Pt now comfort care )   Total Time Spent   Total Time Spent (Mins) 2   Interdisciplinary Plan of Care Collaboration   IDT Collaboration with  Nursing   Collaboration Comments Per EMR, patient has transitioned to comfort care. SLP will d/c services. Please re-consult SLP if POC changes. Thank you.

## 2021-09-14 NOTE — PROGRESS NOTES
"Patient comfort care. RN spoke to patient this am he wanted to remain on O2 until his wife arrived. Wife Sharlene arrived and RN provided education and O2 was removed. Patient 2-3+ pittng edema generalized. RLQ ostomy to down drain bag, loose brown stool, a lot of flatus. 2 MARIANA drains right drain white/tan purulent drainage, left MARIANA sero-sang. Patient incontinent of urine. Q2 hours turns in use. Midline with wound vac patent. /65   Pulse 94   Temp 36.7 °C (98 °F) (Temporal)   Resp 18   Ht 1.803 m (5' 10.98\")   Wt 100 kg (221 lb 5.5 oz)   SpO2 97%   BMI 30.88 kg/m²     "

## 2021-09-14 NOTE — HOSPICE
Wife informed me that they are choosing to work with [facility], we are signing off.

## 2021-09-14 NOTE — DISCHARGE PLANNING
Anticipated Discharge Disposition: TBD    Action: LSW spoke with Margarita Greene RN CM. Per Ramona, she spoke with Sharlene, patient's wife yesterday afternoon and explained insurance is not able to assist with personal caregiver services. Per Ramona, Sharlene stated she is overwhelmed and is struggling to take care of patient. Per Ramona, Sharlene denied having any family able to assist at home. Ramona also reported Sharlene refused to provide information regarding their income, but stated their income may be too high to qualify for Medicaid Group Home Waiver.    LSW met with Sharlene at bedside. LSW provided information regarding group homes, personal caregivers, hospice, and other community resources.     LSW highly encouraged Sharlene to make the necessary arrangements to have this patient transition back to the community and avoid a lapse in coverage as patient does not meet criteria for inpatient hospital.    LSW provided the above information to MD during IDT Rounds. MD stated she will meet with Sharlene, patient's wife this afternoon to discuss all the options.    Barriers to Discharge: NOK decision regarding patient's discharge disposition.    Plan: As Above. Awaiting further instructions from the medical team.

## 2021-09-15 PROCEDURE — 700102 HCHG RX REV CODE 250 W/ 637 OVERRIDE(OP): Performed by: STUDENT IN AN ORGANIZED HEALTH CARE EDUCATION/TRAINING PROGRAM

## 2021-09-15 PROCEDURE — 770006 HCHG ROOM/CARE - MED/SURG/GYN SEMI*

## 2021-09-15 PROCEDURE — A9270 NON-COVERED ITEM OR SERVICE: HCPCS | Performed by: STUDENT IN AN ORGANIZED HEALTH CARE EDUCATION/TRAINING PROGRAM

## 2021-09-15 PROCEDURE — 99231 SBSQ HOSP IP/OBS SF/LOW 25: CPT | Performed by: HOSPITALIST

## 2021-09-15 RX ADMIN — OXYCODONE 5 MG: 5 TABLET ORAL at 18:00

## 2021-09-15 RX ADMIN — SCOPALAMINE 1 PATCH: 1 PATCH, EXTENDED RELEASE TRANSDERMAL at 14:22

## 2021-09-15 RX ADMIN — OXYCODONE 5 MG: 5 TABLET ORAL at 09:52

## 2021-09-15 RX ADMIN — OMEPRAZOLE 20 MG: 20 CAPSULE, DELAYED RELEASE ORAL at 05:23

## 2021-09-15 ASSESSMENT — PAIN DESCRIPTION - PAIN TYPE
TYPE: ACUTE PAIN

## 2021-09-15 ASSESSMENT — ENCOUNTER SYMPTOMS
ABDOMINAL PAIN: 0
FEVER: 0
DIZZINESS: 0
CHILLS: 0
NAUSEA: 0
COUGH: 0
VOMITING: 0
PALPITATIONS: 0
HEADACHES: 0
SHORTNESS OF BREATH: 0

## 2021-09-15 NOTE — PROGRESS NOTES
Report received from Day RN at 1915. Assumed care of patient. Plan of care discussed, questions answered. Assessment completed. VSS, A&O x2-3, denies chest pain or SOB at this time, denies pain. See MAR. Call light in reach. Patient educated on use of call light for assistance.    Patient on comfort care  Generalized pitting edema (2-3)  Right MARIANA drain white, purulent drainage  Left MARIANA drain serosanguineous drainage  Midline wound vac in place  RLQ ileostomy in place, large output, + flatus  Q2 hour turns

## 2021-09-15 NOTE — PROGRESS NOTES
[facility] Medicine Daily Progress Note    Date of Service  9/15/2021    Chief Complaint  Lorraine Bella is a 71 y.o. male admitted 8/26/2021 with nausea and vomiting    Hospital Course  70yo admitted 8/26 with NX.  PMHx DM, ETOH, HCC, cirrhosis, f/b Dr Birch Heme/Onc and on immunotherapy.  Imaging on presentation showing intra-abd free air.  Admitted to the ICU with Dx of sepsis.  Required pressors and IV Hydrocortisone  8/27 OR with Dr You for Ex Lap and drainage of intra-abd abscess.  Bowel perforation found to be secondary to HCC  8/31 OR with Dr Thornton for Ex Lap, abscess drainage, creation of loop ileostomy  On this admission pt also found to have an LVEF of 25% and L ventricular thrombus, new Dx Afib.  As per oncology patient is not a candidate for further chemotherapy.  Initiated on comfort care as per patient wish.      Interval Problem Update  ALICIA events overnight  Awaiting hospice acceptance    I have personally seen and examined the patient at bedside. I discussed the plan of care with patient, family, bedside RN, charge RN,  and pharmacy.    Consultants/Specialty  cardiology, critical care and general surgery    Code Status  Comfort Care/DNR    Disposition  Patient is medically cleared.   Anticipate discharge to to hospice.  I have placed the appropriate orders for post-discharge needs.    Review of Systems  Review of Systems   Constitutional: Negative for chills and fever.   Respiratory: Negative for cough and shortness of breath.    Cardiovascular: Negative for chest pain and palpitations.   Gastrointestinal: Negative for abdominal pain, nausea and vomiting.   Genitourinary: Negative for dysuria and urgency.   Neurological: Negative for dizziness and headaches.   All other systems reviewed and are negative.       Physical Exam       Physical Exam  Vitals and nursing note reviewed.   Constitutional:       Appearance: He is ill-appearing.   Cardiovascular:      Rate and  Rhythm: Normal rate and regular rhythm.      Heart sounds: No murmur heard.     Pulmonary:      Effort: Pulmonary effort is normal. No respiratory distress.      Breath sounds: Normal breath sounds.   Abdominal:      Comments: Ostomy +   Neurological:      General: No focal deficit present.      Mental Status: He is alert and oriented to person, place, and time.         Fluids    Intake/Output Summary (Last 24 hours) at 9/15/2021 0926  Last data filed at 9/15/2021 0500  Gross per 24 hour   Intake 600 ml   Output 4660 ml   Net -4060 ml       Laboratory  Recent Labs     09/13/21  0508   WBC 3.3*   RBC 3.08*   HEMOGLOBIN 9.9*   HEMATOCRIT 30.8*   .0*   MCH 32.1   MCHC 32.1*   RDW 77.9*   PLATELETCT 22*   MPV 10.9     Recent Labs     09/12/21  1859 09/13/21  0010 09/13/21  0508   SODIUM 141 141 141   POTASSIUM 4.5 4.3 4.5   CHLORIDE 106 107 108   CO2 25 27 25   GLUCOSE 113* 94 92   BUN 22 22 22   CREATININE 0.53 0.59 0.50   CALCIUM 9.1 8.7 8.4*                   Imaging  US-TRAUMA VEIN SCREEN LOWER BILAT EXTREMITY   Final Result      DX-CHEST-PORTABLE (1 VIEW)   Final Result         1.  Pulmonary edema and/or infiltrates are identified, which appear somewhat increased since the prior exam.   2.  Small right pleural effusion   3.  Cardiomegaly   4.  Atherosclerosis      DX-ABDOMEN FOR TUBE PLACEMENT   Final Result         1.  Nonspecific bowel gas pattern.   2.  Dobbhoff tube with tip terminating overlying the expected location of the third or fourth duodenal segment.   3.  Coronary edema and/or infiltrates.   4.  Small layering right pleural effusion   5.  Atherosclerosis      EC-ECHOCARDIOGRAM COMPLETE W/ CONT   Final Result      DX-CHEST-PORTABLE (1 VIEW)   Final Result      Stable examination.      DX-CHEST-PORTABLE (1 VIEW)   Final Result      Worsening left unchanged right lower lung zone atelectasis and consolidation with probable edema      DX-ABDOMEN FOR TUBE PLACEMENT   Final Result      Enteric tube  projects over the second portion of the duodenum.      DX-CHEST-PORTABLE (1 VIEW)   Final Result      1.  Bilateral airspace opacities likely represent pulmonary edema or multifocal pneumonia.   2.  Atherosclerotic plaque.      DX-CHEST-PORTABLE (1 VIEW)   Final Result      1.  Interval placement of an endotracheal tube which terminates in satisfactory position at the level of the aortic arch.   2.  Previously demonstrated bilateral layering pleural effusions appear less distinct on today's study which may be related to patient positioning.      DX-CHEST-PORTABLE (1 VIEW)   Final Result      Increasing basilar atelectasis, pleural effusions and pulmonary edema      CT-ABDOMEN-PELVIS WITH   Final Result      No contrast extravasation identified but the colon is not contrast opacified as the patient had insufficient rectal tone and NG tube contrast had not yet reached the cecum      Interval laparotomy with surgical drains in place. No bowel obstruction identified. There is improved small free intraperitoneal air      Anasarca with worsening moderate effusions, stable to slight worsening abdominal pelvic ascites and mesenteric edema      Stable large right hepatic mass      Severe colonic diverticulosis            DX-ABDOMEN FOR TUBE PLACEMENT   Final Result      Enteric tube terminates over the duodenum      NG tube overlies the proximal stomach      Right upper quadrant surgical drains with decrease in pneumoperitoneum      DX-CHEST-PORTABLE (1 VIEW)   Final Result      1.  Supportive tubing as described above.   2.  RIGHT lung base atelectasis.   3.  No pneumothorax.      CT-ABDOMEN-PELVIS WITH   Final Result      1.  Large collection of free intraperitoneal fluid and gas in the RIGHT abdomen extending from the hepatic dome to the pelvis. The gallbladder is also involved but may not be the site of origin. Perforated bowel is suspected.   2.  Additional free fluid in the abdomen   3.  Large RIGHT hepatic mass,  unchanged in size   4.  Splenomegaly   5.  1.6 x 1.8 cm cystic pancreatic tail lesion, unchanged   6.  Small bowel ileus   7.  Distal colonic diverticulosis      Findings were discussed with CHANTAL VELEZ on 8/26/2021 9:05 PM.      HE-[id number]-7 VIEW   Final Result      Multiple mildly dilated loops of small and large intestine possibly representing ileus.           Assessment/Plan  * Perforated viscus- (present on admission)  Assessment & Plan  Malignant perforation secondary to HCC  S/p Ex Lap x 2 with creation of loop ileostomy  Srgcl team following  S/p completed Abx's         Ischemic cardiomyopathy  Assessment & Plan  LVEF 25%  Probable silent LAD AMI  Cards have evaluated and feel he is not an interventional candidate given his Hx of HCC, thrombocytopenia and ESLD  Recommend against anticoagulation and ASA for same reasons  Tolerating low dose BB  On high dose statin    Left ventricular thrombus- (present on admission)  Assessment & Plan  Not an anticoagulation candidate given thrombocytopenia    Adrenal insufficiency (HCC)  Assessment & Plan  DO in setting of sepsis  Now off steroids    Pleural effusion, bilateral  Assessment & Plan  Small R on last CXR  Cont to follow  Cont diuresis    Atrial fibrillation (HCC)- (present on admission)  Assessment & Plan  Amio and Metoprolol for rate/rythm control  Not an anticoagualation candidate due to thrombocytopenia  Cards has signed off    Respiratory failure following trauma and surgery (HCC)  Assessment & Plan  Extubated 9/5  Still 28 litres + from admission  -fluid balance on lasix and spironolactone  R pleural effusion; cont to follow, may yet need a tap  Mobilize  Cont O2/RT protocols  Now DNR/I    Cirrhosis of liver with ascites (HCC)  Assessment & Plan  Unclear if alcohol involved or primarily hepatocellular carcinoma etiology.  On spironolactone and lasix.  Holding spironolactone and lasix since NPO on IVFs for OR.    Hypokalemia- (present on  admission)  Assessment & Plan  Follow and replace    Normochromic anemia- (present on admission)  Assessment & Plan    Transfuse hemoglobin less than 7    Septic shock (HCC)- (present on admission)  Assessment & Plan  intra-abd source: malignant bowel perforation  Cultures from abd Strep species  Has completed a total of 13 days of Abx's on 9/1    Hyponatremia- (present on admission)  Assessment & Plan  Resolved  Cont to follow    Hepatocellular carcinoma (HCC)- (present on admission)  Assessment & Plan  Continue Lasix 40 mg p.o. twice daily  Continue spironolactone 100 mg p.o. daily  On immunotherapy with Dr. Birch.  Is not a candidate for further chemo due to other comorbidities and low functional status  Pt asking for comfort care status, wife would like a little more time to talk about it with him    Pancytopenia (HCC)- (present on admission)  Assessment & Plan  No indication of active bleed at this time and no indication for platelet transfusion  Secondary to liver dysfunction/ HCC.  Follow counts daily       VTE prophylaxis: none, pt comfort care    I have performed a physical exam and reviewed and updated ROS and Plan today (9/15/2021). In review of yesterday's note (9/14/2021), there are no changes except as documented above.

## 2021-09-15 NOTE — PALLIATIVE CARE
Palliative Care follow-up  Spoke with Unit CM RN and discussed pts case. Pts wife is discussing placement with  and [facility].   Will complete POLST once DC plan is closer to being arranged.   Discussed with bedside RN on possibility to take pt outside if able.     Plan: Palliative RN to complete POLST prior to DC.     Thank you for allowing Palliative Care to participate in this patient's care. Please feel free to call [phone] with any questions or concerns.

## 2021-09-15 NOTE — WOUND TEAM
Pt's code status has changed. Discussed with pt and family about Tx. Wound team to change wound VAC tomorrow 9/16 and will then do 2x/week, Mon and Thurs. When pt to DC VAC to be removed and wet to dry or 1/4 strength Dakins daily drsg changes recommended for pt's comfort. Ostomy appliance checked and it is intact. It will be changed tomorrow with VAC drsg.

## 2021-09-15 NOTE — DISCHARGE PLANNING
Received Choice form at [phone]  Agency/Facility Name: [facility]   Referral sent per Choice form @ [phone]

## 2021-09-15 NOTE — CARE PLAN
Problem: Knowledge Deficit - Standard  Goal: Patient and family/care givers will demonstrate understanding of plan of care, disease process/condition, diagnostic tests and medications  Outcome: Progressing     Problem: Pain - Standard  Goal: Alleviation of pain or a reduction in pain to the patient’s comfort goal  Outcome: Progressing     Problem: Skin Care - Ostomy  Goal: Skin remains free from irritation  Outcome: Progressing   The patient is Stable - Low risk of patient condition declining or worsening    Shift Goals  Clinical Goals: Comfort  Patient Goals: Comfort  Family Goals: comfort    Progress made toward(s) clinical / shift goals:  pain control, ostomy care, hourly rounding in place    Patient is not progressing towards the following goals:

## 2021-09-15 NOTE — DISCHARGE PLANNING
Anticipated Discharge Disposition: Group Home with hopsice    Action: RN CM received a call from Ramona with [employer] insurance.  Per Ramona she spoke with the patient's wife yesterday and the patient's wife would like [facility]. Per Ramona the wife has decided to place her  in a group home and has $3500 a month to spend towards the group home. Per Ramona the wife was given a list of group homes to review, but feels overwhelmed.     RN CM spoke with [facility] palliative RN who confirmed the patient's wife would like [facility].     RN CM received a call from [facility] requesting referral to be sent.     Barriers to Discharge: Hospice acceptance, group home placement    Plan: Follow up with medical team.     Addendum:    5139  RN CM spoke with patient's wife Sharlene at bedside. Sharlene confirmed she would like [facility] as her father had them and she liked their service. Choice received for: [facility]. Sharlene confirmed she has $3500 to go towards a group home each month. Sharlene reviewed the group home list but feels overwhelmed with making a choice. Sharlene prefers a group home in [place] if possible, but is open to [place] if a group home is not available in [place].     1220  RN CM called [facility] group home and spoke with owner Marta [phone]. Per Marta she only has shared female beds open at this time.     RN CM called [facility]. There are no open beds at this time.     YASMIN MUNROE called Arslan with [facility] [phone]. Per Arslan he has an opening in his [place] location; however, the patient would need a private room and it would start around $5000 for a private room.     1600  Keara with [facility] [phone]. Keara did an assessment of the patient and spoke with the patient's wife. Per Keara she has a contact Constanza with [facility] [phone]. Per Keara the patient's wife Sharlene will be touring the group home  tomorrow.     1620  Per bedside RN the MD will be removing the MARIANA drains and wound vac. The dressing will be daily wet to dry. Voicemail left with Keara with [facility] to update her [phone].     1640  Received a call from Keara at [facility]. Keara updated on MARIANA drain removal and dressing changing to daily wet to dry. Per Keara it will be up to their DON if they can provide daily dressing changes. Per Keara she spoke with the patient's bedside RN in regards to having a port placed.

## 2021-09-15 NOTE — PROGRESS NOTES
Report received. Assumed care. Pt in bed resting. A/O x 2-3. VSS. Responds appropriately. Denies pain, SOB. Assessment complete. Midline WV in place. R MARIANA drain in place. L MARIANA drain in place. RLQ ileostomy in place. Discussed POC, pt verbalizes understanding. Explained importance of calling before getting OOB. Call light and belongings within reach. Bed alarm on. Bed in the lowest position. Treaded socks in place. Hourly rounding in progress. Will continue to monitor.    COVID-19 surge in effect.

## 2021-09-16 PROBLEM — E87.6 HYPOKALEMIA: Status: RESOLVED | Noted: 2021-07-20 | Resolved: 2021-09-16

## 2021-09-16 PROBLEM — R65.21 SEPTIC SHOCK (HCC): Status: RESOLVED | Noted: 2021-07-09 | Resolved: 2021-09-16

## 2021-09-16 PROBLEM — E87.1 HYPONATREMIA: Status: RESOLVED | Noted: 2021-07-02 | Resolved: 2021-09-16

## 2021-09-16 PROBLEM — A41.9 SEPTIC SHOCK (HCC): Status: RESOLVED | Noted: 2021-07-09 | Resolved: 2021-09-16

## 2021-09-16 PROCEDURE — 700102 HCHG RX REV CODE 250 W/ 637 OVERRIDE(OP): Performed by: STUDENT IN AN ORGANIZED HEALTH CARE EDUCATION/TRAINING PROGRAM

## 2021-09-16 PROCEDURE — 770006 HCHG ROOM/CARE - MED/SURG/GYN SEMI*

## 2021-09-16 PROCEDURE — 99232 SBSQ HOSP IP/OBS MODERATE 35: CPT | Performed by: HOSPITALIST

## 2021-09-16 PROCEDURE — 97602 WOUND(S) CARE NON-SELECTIVE: CPT

## 2021-09-16 PROCEDURE — 700111 HCHG RX REV CODE 636 W/ 250 OVERRIDE (IP): Performed by: STUDENT IN AN ORGANIZED HEALTH CARE EDUCATION/TRAINING PROGRAM

## 2021-09-16 PROCEDURE — A9270 NON-COVERED ITEM OR SERVICE: HCPCS | Performed by: STUDENT IN AN ORGANIZED HEALTH CARE EDUCATION/TRAINING PROGRAM

## 2021-09-16 RX ORDER — SODIUM HYPOCHLORITE 1.25 MG/ML
SOLUTION TOPICAL DAILY
Status: DISCONTINUED | OUTPATIENT
Start: 2021-09-16 | End: 2021-09-16

## 2021-09-16 RX ADMIN — HYDROMORPHONE HYDROCHLORIDE 1 MG: 1 INJECTION, SOLUTION INTRAMUSCULAR; INTRAVENOUS; SUBCUTANEOUS at 22:00

## 2021-09-16 RX ADMIN — OXYCODONE 5 MG: 5 TABLET ORAL at 04:29

## 2021-09-16 RX ADMIN — HYDROMORPHONE HYDROCHLORIDE 1 MG: 1 INJECTION, SOLUTION INTRAMUSCULAR; INTRAVENOUS; SUBCUTANEOUS at 14:20

## 2021-09-16 RX ADMIN — OXYCODONE 10 MG: 5 TABLET ORAL at 09:55

## 2021-09-16 ASSESSMENT — ENCOUNTER SYMPTOMS
CHILLS: 0
VOMITING: 0
FEVER: 0
SHORTNESS OF BREATH: 0
PALPITATIONS: 0
NAUSEA: 0
HEADACHES: 0
COUGH: 0
ABDOMINAL PAIN: 0
DIZZINESS: 0

## 2021-09-16 ASSESSMENT — PAIN DESCRIPTION - PAIN TYPE
TYPE: ACUTE PAIN

## 2021-09-16 NOTE — PROGRESS NOTES
Report received. Assumed care. Pt in bed resting. A/O x 2-3. VSS. Responds appropriately. C/O pain but denies intervention. Denies SOB. Assessment complete. Midline WV in place. R MARIANA drain in place. L MARIANA drain in place. RLQ ileostomy in place. Condom cath in place. Discussed POC, pt verbalized understanding. Explained importance of calling before getting OOB. Call light and belongings within reach. Bed alarm on. Bed in the lowest position. Treaded socks in place. Hourly rounding in progress. Will continue to monitor.     COVID-19 surge in effect

## 2021-09-16 NOTE — DISCHARGE PLANNING
Anticipated Discharge Disposition: Home on Hospice.    Action: Per Byron with [facility], patient is accepted. Per Sharlene Matthews, patient's wife toured [facility] today and the anticipated discharge disposition is for this patient to discharge tomorrow with hospice. Per Byron, [facility] is able to manage the wound wet to dry daily dressing changes.    W left a message for Sharlene requesting a call back to authorize the discharge location before arranging the transportation.       Barriers to Discharge: [facility] Authorization for transfer.    Plan: As Above. Awaiting call back from [facility].

## 2021-09-16 NOTE — PROGRESS NOTES
[facility] Medicine Daily Progress Note    Date of Service  9/16/2021    Chief Complaint  Lorraine Bella is a 71 y.o. male admitted 8/26/2021 with nausea and vomiting    Hospital Course  70yo admitted 8/26 with NX.  PMHx DM, ETOH, HCC, cirrhosis, f/b Dr Birch Heme/Onc and on immunotherapy.  Imaging on presentation showing intra-abd free air.  Admitted to the ICU with Dx of sepsis.  Required pressors and IV Hydrocortisone  8/27 OR with Dr You for Ex Lap and drainage of intra-abd abscess.  Bowel perforation found to be secondary to HCC  8/31 OR with Dr Thornton for Ex Lap, abscess drainage, creation of loop ileostomy  On this admission pt also found to have an LVEF of 25% and L ventricular thrombus, new Dx Afib.  As per oncology patient is not a candidate for further chemotherapy.  Initiated on comfort care as per patient wish.      Interval Problem Update  ALICIA events overnight  Awaiting GH selection  LUQ drain removed today    I have personally seen and examined the patient at bedside. I discussed the plan of care with patient, family, bedside RN, charge RN,  and pharmacy.    Consultants/Specialty  cardiology, critical care and general surgery    Code Status  Comfort Care/DNR    Disposition  Patient is medically cleared.   Anticipate discharge to to hospice.  I have placed the appropriate orders for post-discharge needs.    Review of Systems  Review of Systems   Constitutional: Negative for chills and fever.   Respiratory: Negative for cough and shortness of breath.    Cardiovascular: Negative for chest pain and palpitations.   Gastrointestinal: Negative for abdominal pain, nausea and vomiting.   Genitourinary: Negative for dysuria and urgency.   Neurological: Negative for dizziness and headaches.   All other systems reviewed and are negative.       Physical Exam  Temp:  [36.7 °C (98.1 °F)-36.9 °C (98.4 °F)] 36.7 °C (98.1 °F)  Pulse:  [100-104] 104  Resp:  [18-19] 18  BP: (85-94)/(53-58)  85/53  SpO2:  [93 %-94 %] 93 %    Physical Exam  Vitals and nursing note reviewed.   Constitutional:       Appearance: He is ill-appearing.   Cardiovascular:      Rate and Rhythm: Normal rate and regular rhythm.      Heart sounds: No murmur heard.     Pulmonary:      Effort: Pulmonary effort is normal. No respiratory distress.      Breath sounds: Normal breath sounds.   Abdominal:      Comments: Ostomy +, LUQ drain, RUQ drain, wound vac on midline incisional wound   Neurological:      General: No focal deficit present.      Mental Status: He is alert and oriented to person, place, and time.         Fluids    Intake/Output Summary (Last 24 hours) at 9/16/2021 1129  Last data filed at 9/16/2021 0740  Gross per 24 hour   Intake 0 ml   Output 1058 ml   Net -1058 ml       Laboratory                        Imaging  US-TRAUMA VEIN SCREEN LOWER BILAT EXTREMITY   Final Result      DX-CHEST-PORTABLE (1 VIEW)   Final Result         1.  Pulmonary edema and/or infiltrates are identified, which appear somewhat increased since the prior exam.   2.  Small right pleural effusion   3.  Cardiomegaly   4.  Atherosclerosis      DX-ABDOMEN FOR TUBE PLACEMENT   Final Result         1.  Nonspecific bowel gas pattern.   2.  Dobbhoff tube with tip terminating overlying the expected location of the third or fourth duodenal segment.   3.  Coronary edema and/or infiltrates.   4.  Small layering right pleural effusion   5.  Atherosclerosis      EC-ECHOCARDIOGRAM COMPLETE W/ CONT   Final Result      DX-CHEST-PORTABLE (1 VIEW)   Final Result      Stable examination.      DX-CHEST-PORTABLE (1 VIEW)   Final Result      Worsening left unchanged right lower lung zone atelectasis and consolidation with probable edema      DX-ABDOMEN FOR TUBE PLACEMENT   Final Result      Enteric tube projects over the second portion of the duodenum.      DX-CHEST-PORTABLE (1 VIEW)   Final Result      1.  Bilateral airspace opacities likely represent pulmonary edema or  multifocal pneumonia.   2.  Atherosclerotic plaque.      DX-CHEST-PORTABLE (1 VIEW)   Final Result      1.  Interval placement of an endotracheal tube which terminates in satisfactory position at the level of the aortic arch.   2.  Previously demonstrated bilateral layering pleural effusions appear less distinct on today's study which may be related to patient positioning.      DX-CHEST-PORTABLE (1 VIEW)   Final Result      Increasing basilar atelectasis, pleural effusions and pulmonary edema      CT-ABDOMEN-PELVIS WITH   Final Result      No contrast extravasation identified but the colon is not contrast opacified as the patient had insufficient rectal tone and NG tube contrast had not yet reached the cecum      Interval laparotomy with surgical drains in place. No bowel obstruction identified. There is improved small free intraperitoneal air      Anasarca with worsening moderate effusions, stable to slight worsening abdominal pelvic ascites and mesenteric edema      Stable large right hepatic mass      Severe colonic diverticulosis            DX-ABDOMEN FOR TUBE PLACEMENT   Final Result      Enteric tube terminates over the duodenum      NG tube overlies the proximal stomach      Right upper quadrant surgical drains with decrease in pneumoperitoneum      DX-CHEST-PORTABLE (1 VIEW)   Final Result      1.  Supportive tubing as described above.   2.  RIGHT lung base atelectasis.   3.  No pneumothorax.      CT-ABDOMEN-PELVIS WITH   Final Result      1.  Large collection of free intraperitoneal fluid and gas in the RIGHT abdomen extending from the hepatic dome to the pelvis. The gallbladder is also involved but may not be the site of origin. Perforated bowel is suspected.   2.  Additional free fluid in the abdomen   3.  Large RIGHT hepatic mass, unchanged in size   4.  Splenomegaly   5.  1.6 x 1.8 cm cystic pancreatic tail lesion, unchanged   6.  Small bowel ileus   7.  Distal colonic diverticulosis      Findings were  discussed with CHANTAL VELEZ on 8/26/2021 9:05 PM.      [id number] VIEW   Final Result      Multiple mildly dilated loops of small and large intestine possibly representing ileus.           Assessment/Plan  * Perforated viscus- (present on admission)  Assessment & Plan  Malignant perforation secondary to HCC  S/p Ex Lap x 2 with creation of loop ileostomy  S/p completed Abx's   LUQ drain removed 9/16  Will plan to remove RUQ drain in 1-2 days  Remove wound vac prior to dc to hospice        Ischemic cardiomyopathy  Assessment & Plan  LVEF 25%  Probable silent LAD AMI  comfrot care    Left ventricular thrombus- (present on admission)  Assessment & Plan  Not an anticoagulation candidate given thrombocytopenia    Adrenal insufficiency (HCC)  Assessment & Plan  DO in setting of sepsis  Now off steroids    Pleural effusion, bilateral  Assessment & Plan  Small R on last CXR      Atrial fibrillation (HCC)- (present on admission)  Assessment & Plan  meds dc as comfort care    Respiratory failure following trauma and surgery (HCC)  Assessment & Plan  On CC    Cirrhosis of liver with ascites (HCC)  Assessment & Plan  Unclear if alcohol involved or primarily hepatocellular carcinoma etiology.  On comfort care    Normochromic anemia- (present on admission)  Assessment & Plan  CC    Hepatocellular carcinoma (HCC)- (present on admission)  Assessment & Plan  Pt comfort care status    Pancytopenia (HCC)- (present on admission)  Assessment & Plan  No indication of active bleed at this time and no indication for platelet transfusion  Secondary to liver dysfunction/ HCC.       VTE prophylaxis: none, pt comfort care    I have performed a physical exam and reviewed and updated ROS and Plan today (9/16/2021). In review of yesterday's note (9/15/2021), there are no changes except as documented above.

## 2021-09-16 NOTE — PALLIATIVE CARE
Palliative Care follow-up  Stopped by the pts room, pt currently sleeping with no family present.   Called and spoke with pts wife, Sharlene. Provided support and empathy as she spoke about the struggle with updating friends and family, while searching for a GH and making arrangements.   Sharlene stated that she found a GH 10 minutes from their home and is very happy to be able to see him much easier once he is moved, hopefully tomorrow.   Discussed POLST for DC. Sharlene was unsure if she had completed this already or not. She says that she hopes to be at the bedside at approximately between 10-11 tomorrow. Agreed to meet tomorrow at the bedside to complete POLST.     Plan: Meet with spouse, Sharlene, to complete POLST prior to DC.     Thank you for allowing Palliative Care to participate in this patient's care. Please feel free to call [phone] with any questions or concerns.

## 2021-09-16 NOTE — DISCHARGE PLANNING
Agency/Facility Name: [facility]   Spoke To: Mariya  Outcome: Per Mariya, DON was concerned about tube drainage, but all consents were signed and Pt is pending placement

## 2021-09-16 NOTE — WOUND TEAM
"[facility]  Inpatient Services  Wound and Skin Care Evaluation    Admission Date: 8/26/2021     Last order of IP CONSULT TO WOUND CARE was found on 8/31/2021 from Hospital Encounter on 8/26/2021     HPI, PMH, SH: Reviewed    Past Surgical History:   Procedure Laterality Date   • PB EXPLORATORY OF ABDOMEN N/A 8/31/2021    Procedure: LAPAROTOMY, EXPLORATORY ,drainage of peritoneal abcess,  creation of loop ileostomy, and trucut liver biopsy;  Surgeon: Kevin Thornton D.O.;  Location: [facility];  Service: General   • PB LAP,DIAGNOSTIC ABDOMEN N/A 8/27/2021    Procedure: LAPAROSCOPY;  Surgeon: Priya You M.D.;  Location: [facility];  Service: General   • PB EXPLORATORY OF ABDOMEN N/A 8/27/2021    Procedure: LAPAROTOMY, EXPLORATORY;  Surgeon: Priya You M.D.;  Location: [facility];  Service: General   • IRRIGATION & DEBRIDEMENT GENERAL  8/27/2021    Procedure: IRRIGATION AND DEBRIDEMENT, INTRA-ABDOMINAL ABCESS;  Surgeon: Priya You M.D.;  Location: [facility];  Service: General   • RESTORATE HEMOSTAS  8/27/2021    Procedure: CONTROL OF LIVER BLEED;  Surgeon: Priya You M.D.;  Location: [facility];  Service: General   • PB LAP,DIAGNOSTIC ABDOMEN  7/9/2021    Procedure: DIAGNOSITIC LAPAROSCOPY WITH WASH OUT AND DRAIN PLACEMENT;  Surgeon: Cody Meza M.D.;  Location: [facility];  Service: Gastroenterology   • OTHER  12/2019    \"IR embolization\"    • PARATHYROIDECTOMY N/A 5/13/2016    Procedure: PARATHYROIDECTOMY;  Surgeon: Kale Lord M.D.;  Location: SURGERY SAME DAY [facility];  Service:    • CATARACT PHACO WITH IOL Left 4/7/2016    Procedure: CATARACT PHACO WITH IOL;  Surgeon: Ephraim Jamison M.D.;  Location: SURGERY [facility];  Service:    • BONE GRAFT Right 1960's    right wrist   • OTHER      chemoemolozation x2   • WRIST ORIF Right 1960's     Social History     Tobacco Use   • Smoking " status: Current Every Day Smoker     Packs/day: 0.50     Years: 30.00     Pack years: 15.00     Types: Cigarettes     Start date: 1/1/1990   • Smokeless tobacco: Never Used   • Tobacco comment: quit couple times of the years   Substance Use Topics   • Alcohol use: Not Currently     Alcohol/week: 2.4 oz     Types: 4 Shots of liquor per week     Comment: pt quit drinking 2 months ago     Chief Complaint   Patient presents with   • N/V     Diagnosis: Bowel perforation (HCC) [K63.1]  Perforated intestine (HCC) [K63.1]    Unit where seen by Wound Team: T432/02     WOUND CONSULT/FOLLOW UP RELATED TO:  NPWT change    WOUND HISTORY:  Patient admitted with nausea and vomiting, history of hepatocellular carcinoma with cirrhosis. Went to OR with MD Thorntno for peritoneal abscess, large bowel perforation, and liver mass.      WOUND ASSESSMENT/LDA     Wound 08/27/21 Full Thickness Wound Abdomen Midline (Active)   Wound Image   09/08/21 1600   Site Assessment Yellow;Red    Periwound Assessment Clean;Dry;Intact    Margins Attached edges;Defined edges    Closure Staples    Drainage Amount Scant    Drainage Description Serosanguineous    Treatments Cleansed;Site care;Offloading    Wound Cleansing Approved Wound Cleanser    Periwound Protectant Skin Protectant Wipes to Periwound    Dressing Cleansing/Solutions Not Applicable    Dressing Options Hydrofera Blue Ready;Hypafix Tape    Dressing Changed Changed    Dressing Status Clean;Dry;Intact    Dressing Change/Treatment Frequency Every 72 hrs, and As Needed    NEXT Dressing Change/Treatment Date 09/19/21    NEXT Weekly Photo (Inpatient Only) 09/19/21    Number of Staples Removed 5    Non-staged Wound Description Full thickness    Wound Length (cm) 15.3 cm    Wound Width (cm) 3 cm    Wound Depth (cm) 1.7 cm    Wound Surface Area (cm^2) 45.9 cm^2    Wound Volume (cm^3) 78.03 cm^3    Wound Healing % -1    Tunneling (cm) 1.8 cm    Tunneling Clock Position of Wound 12    Tunneling - 2nd  Location (cm) 2.4 cm    Tunneling Clock Position of Wound - 2nd Location 6    Shape Linear    Wound Odor None    Exposed Structures Sutures    WOUND NURSE ONLY - Time Spent with Patient (mins) 60      Vascular:    ZARA:   No results found.    Lab Values:    Lab Results   Component Value Date/Time    WBC 3.3 (L) 09/13/2021 05:08 AM    RBC 3.08 (L) 09/13/2021 05:08 AM    HEMOGLOBIN 9.9 (L) 09/13/2021 05:08 AM    HEMATOCRIT 30.8 (L) 09/13/2021 05:08 AM    CREACTPROT 9.63 (H) 09/13/2021 05:08 AM    HBA1C 6.0 (H) 07/03/2021 03:38 AM      Culture Results show:  Recent Results (from the past 720 hour(s))   CULTURE WOUND W/ GRAM STAIN    Collection Time: 08/31/21  9:21 AM    Specimen: Wound   Result Value Ref Range    Significant Indicator NEG     Source WND     Site Peritoneal Abscess     Culture Result       Heavy growth enteric [place] including mixed morphologies  Enterococcus sp., several morphologies enteric Gram negative  rods and yeast.      Gram Stain Result       Moderate WBCs.  Many mixed bacteria, no predominant organism seen.       Pain Level/Medicated:  Pre-medicated with IV Dilaudid prior to dressing change     INTERVENTIONS BY WOUND TEAM:  Chart and images reviewed. Discussed with bedside RN. All areas of concern (based on picture review, LDA review and discussion with bedside RN) have been thoroughly assessed. Documentation of areas based on significant findings. This RN in to assess patient. Performed standard wound care which includes appropriate positioning, dressing removal and non-selective debridement. Pictures and measurements obtained weekly if/when required.    Abdomen:   Preparation for Dressing removal: Dressing soaked with wound cleanser  Non-selectively Debrided with: wound cleanser and gauze  Sharp debridement: NA   Cesia wound: Cleansed with wound cleanser, Prepped with no sting  Primary Dressing: 3 pieces of hydrofera blue applied over wounds  Secondary (Outer) Dressing: secured with hypafix  tape    Interdisciplinary consultation: Patient, Bedside RN ([place])    EVALUATION / RATIONALE FOR TREATMENT:  Most Recent Date:  9/16/21: Pt now transitioned to comfort care. Pt likely DC to group home as early as Friday 9/17/21. Pt transitioned to hydrofera blue to allow for DC and decrease frequency of dressing changes. Hydrofera blue bacteriostatic.     9/13: pt's wound appears to have more tan tissue present, scant granulation. Sill has skin bridge with staples. Tolerated drsg change. Expect if staples removed from middle of wound skin bridge will probable break.   9/8: Pt's Wound still has tan tissue. No odor. Tunneling present @ 1200 and 0600 then underneath skin bridge formed by staples in middle of wound. He tolerated drsg change better today.  9/6/21: Wound still with scant amount of slough. Since there are 2 areas if staples in middle of wound there is tunneling under skin. Appears clean in this area. Continue NPWT. Pt confused, when alarm off for distal obstruction to IV he would keep trying to answer the telephone.   9/3/2021: Small amount of slough to central aspect of wound bed. Wound bed is pink, continue NPWT to encourage tissue granulation.     9/1/21: Patient with full thickness surgical incision to midline abdomen. Fascia appears intact. Some sutures visible. Placed NPWT to assist in closure by secondary intention, manage bioburden and exudate, increase oxygenation and granulation to the wound bed.      Goals: Steady decrease in wound area and depth weekly.    WOUND TEAM PLAN OF CARE ([X] for frequency of wound follow up,):   Nursing to follow orders written for wound care. Contact wound team if area fails to progress, deteriorates or with any questions/concerns  Dressing changes by wound team:                   Follow up 3 times weekly:                NPWT change 3 times weekly:   X  Follow up 1-2 times weekly:      Follow up Bi-Monthly:                   Follow up as needed:     Other  (explain):     NURSING PLAN OF CARE ORDERS (X):  Dressing changes: See Dressing Care orders: X  Skin care: See Skin Care orders:   RN Prevention Protocol:   Rectal tube care: See Rectal Tube Care orders:   Other orders:    RSKIN:   CURRENTLY IN PLACE (X), APPLIED THIS VISIT (A), ORDERED (O):   Q shift Keith:  X  Q shift pressure point assessments:  X    Surface/Positioning   Pressure redistribution mattress   x         Low Airloss          Bariatric foam      Bariatric ASHU     Waffle cushion        Waffle Overlay          Reposition q 2 hours    X  TAPs Turning system     Z Quirino Pillow     Offloading/Redistribution not assessed  Sacral Mepilex (Silicone dressing)     Heel Mepilex (Silicone dressing)         Heel float boots (Prevalon boot)             Float Heels off Bed with Pillows           Respiratory   Silicone O2 tubing    x    Gray Foam Ear protectors     Cannula fixation Device (Tender )          High flow offloading Clip    Elastic head band offloading device      Anchorfast                                                         Trach with Optifoam split foam             Containment/Moisture Prevention ileostomy    Rectal tube or BMS    Purwick/Condom Cath        Triana Catheter  X  Barrier wipes           Barrier paste       Antifungal tx      Interdry        Mobilization not assessed     Up to chair        Ambulate      PT/OT      Nutrition      Dietician  x      Diabetes Education      PO x   TF    TPN     NPO   # days     Other        Anticipated discharge plans:   LTACH:        SNF/Rehab:                  Home Health Care:           Outpatient Wound Center:            Self/Family Care:        Other:   X, Group home and Hospice      Vac Discharge Needs:   Not Applicable Pt not on a wound vac:                         Regular Vac in use:   x                                                             Veraflo Vac while inpatient, ok to transition to Regular Vac on Discharge:                              "    Veraflo Vac while inpatient, will need to remain on Veraflo Vac upon discharge:              [facility]  Inpatient Services  New Ostomy Management & Teaching    HPI:  Reviewed  PMH: Reviewed   SH: Reviewed    Subjective: \"Okay\"    Objective: Appliance slightly lifted, large amount of watery yellow with pieces of yellow soft output.            Ileostomy 21 Loop RLQ (Active)   Wound Image   21 1600   Stomal Appliance Assessment Changed    Stoma Assessment Red    Stoma Shape Budded Greater Than One Inch;Oval    Stoma Size (in) 2    Peristomal Assessment Pink;Excoriation;Red    Mucocutaneous Junction Intact    Treatment Appliance Changed;Cleansed with water/washcloth;Site care    Peristomal Protectant Paste Ring;No Sting Skin Prep    Stomal Appliance Paste Ring, 2\";2 3/4\" (70mm) CTF    Output (mL) 550 mL    Output Color Brown    WOUND RN ONLY - Stomal Appliance  2 Piece;2 3/4\" (70mm) CTF;Paste Ring, 2\"    Appliance (Pouch) # Pouch: [id number], Barrier: [id number], Paste Rin    Appliance Brand [email or web address]    Appliance Supplier [employer]    Secure Start completed Not Medically Stable    Ostomy Care Resources Provided [facility] Tip Sheet    WOUND NURSE ONLY - Time Spent with Patient (mins) 20      Ostomy Appliance (type and size): 2 3/4\" 2 piece and paste ring with high output pouch to dd    Interventions: Removed current appliance using push/pull method. Cleaned peristomal area with moist warm wash cloths. Dried. Applied No Sting skin prep to periwound. Traced template, cut barrier to fit. Paste ring applied to barrier, applied barrier to skin, attached pouch and pouch attached to down drain.     Pt education: Questions and concerns addressed.     Evaluation: Stoma viable and intact. No separation there are small petechia proximal edge of barrier. Patient unable tp participate in learning and hands on teaching at this time, slept throughout. Wife, Sharlene observed and asked questions. Need to bring [employer] " paperwork for Sharlene to sign.     Flatus: Present  Stool Output: large, yellow and liquid  Diet: Clears  Mobility: Up to chair    Plan: Ostomy nurses to continue to follow for ostomy needs and teaching until discharge    Anticipated discharge needs: Supplies, supplier information, possible HH, outpatient ostomy clinic, Skilled Nursing/Rehab     Secure Start Signed Yes  wife  signed  Outpatient Referral Placed Not Medically Stable  5 Sets of appliances in Ostomy bag for discharge Not Medically Stable    INSURANCE OPTIONS: [employer].                 assisted [employer] ([employer])         x     MediCARE/MEDICAID & All other Private Insurance companies ([employer] Form)              MediCAID & Fee for Service (Care Chest Paperwork + [employer] Form)                            Form signed/Catalog Marked and Copy left with patient OR medicaid paperwork given to patient      Anticipated Discharge Plans:  Other TBD

## 2021-09-16 NOTE — PROGRESS NOTES
Notified Dr. Smith, per pt's wife, [place] [facility] recommends keeping the 2 MARIANA drains in place. No new orders received. Will continue to monitor.

## 2021-09-17 ENCOUNTER — APPOINTMENT (OUTPATIENT)
Dept: RADIOLOGY | Facility: MEDICAL CENTER | Age: 71
End: 2021-09-17
Attending: FAMILY MEDICINE

## 2021-09-17 VITALS
SYSTOLIC BLOOD PRESSURE: 92 MMHG | WEIGHT: 221.34 LBS | HEART RATE: 100 BPM | RESPIRATION RATE: 16 BRPM | TEMPERATURE: 98.4 F | BODY MASS INDEX: 30.99 KG/M2 | DIASTOLIC BLOOD PRESSURE: 52 MMHG | HEIGHT: 71 IN | OXYGEN SATURATION: 95 %

## 2021-09-17 PROCEDURE — 700111 HCHG RX REV CODE 636 W/ 250 OVERRIDE (IP): Performed by: STUDENT IN AN ORGANIZED HEALTH CARE EDUCATION/TRAINING PROGRAM

## 2021-09-17 PROCEDURE — 700102 HCHG RX REV CODE 250 W/ 637 OVERRIDE(OP): Performed by: STUDENT IN AN ORGANIZED HEALTH CARE EDUCATION/TRAINING PROGRAM

## 2021-09-17 PROCEDURE — 99239 HOSP IP/OBS DSCHRG MGMT >30: CPT | Performed by: HOSPITALIST

## 2021-09-17 PROCEDURE — 36415 COLL VENOUS BLD VENIPUNCTURE: CPT

## 2021-09-17 PROCEDURE — 86480 TB TEST CELL IMMUN MEASURE: CPT

## 2021-09-17 PROCEDURE — A9270 NON-COVERED ITEM OR SERVICE: HCPCS | Performed by: STUDENT IN AN ORGANIZED HEALTH CARE EDUCATION/TRAINING PROGRAM

## 2021-09-17 PROCEDURE — 700102 HCHG RX REV CODE 250 W/ 637 OVERRIDE(OP): Performed by: HOSPITALIST

## 2021-09-17 PROCEDURE — A9270 NON-COVERED ITEM OR SERVICE: HCPCS | Performed by: HOSPITALIST

## 2021-09-17 RX ORDER — CYCLOPENTOLATE HYDROCHLORIDE 10 MG/ML
1 SOLUTION/ DROPS OPHTHALMIC PRN
Refills: 0 | Status: SHIPPED
Start: 2021-09-17

## 2021-09-17 RX ORDER — POLYVINYL ALCOHOL 14 MG/ML
2 SOLUTION/ DROPS OPHTHALMIC EVERY 6 HOURS PRN
Refills: 3 | Status: SHIPPED
Start: 2021-09-17

## 2021-09-17 RX ORDER — SCOLOPAMINE TRANSDERMAL SYSTEM 1 MG/1
1 PATCH, EXTENDED RELEASE TRANSDERMAL
Qty: 4 PATCH | Refills: 3 | Status: SHIPPED
Start: 2021-09-18

## 2021-09-17 RX ORDER — PHENAZOPYRIDINE HYDROCHLORIDE 100 MG/1
100 TABLET, FILM COATED ORAL
Status: DISCONTINUED | OUTPATIENT
Start: 2021-09-17 | End: 2021-09-17 | Stop reason: HOSPADM

## 2021-09-17 RX ORDER — OXYCODONE HYDROCHLORIDE 5 MG/1
5-10 TABLET ORAL EVERY 4 HOURS PRN
Qty: 30 TABLET | Refills: 0 | Status: SHIPPED
Start: 2021-09-17 | End: 2021-09-22

## 2021-09-17 RX ORDER — ONDANSETRON 4 MG/1
4 TABLET, ORALLY DISINTEGRATING ORAL EVERY 4 HOURS PRN
Qty: 10 TABLET | Refills: 0 | Status: SHIPPED | OUTPATIENT
Start: 2021-09-17

## 2021-09-17 RX ADMIN — MORPHINE SULFATE 5 MG: 10 INJECTION INTRAVENOUS at 12:58

## 2021-09-17 RX ADMIN — OXYCODONE 10 MG: 5 TABLET ORAL at 10:06

## 2021-09-17 RX ADMIN — OXYCODONE 10 MG: 5 TABLET ORAL at 17:12

## 2021-09-17 RX ADMIN — OMEPRAZOLE 20 MG: 20 CAPSULE, DELAYED RELEASE ORAL at 04:49

## 2021-09-17 RX ADMIN — OXYCODONE 5 MG: 5 TABLET ORAL at 04:55

## 2021-09-17 RX ADMIN — PHENAZOPYRIDINE HYDROCHLORIDE 100 MG: 100 TABLET ORAL at 15:33

## 2021-09-17 ASSESSMENT — PAIN DESCRIPTION - PAIN TYPE
TYPE: ACUTE PAIN
TYPE: CHRONIC PAIN
TYPE: ACUTE PAIN
TYPE: ACUTE PAIN

## 2021-09-17 NOTE — DISCHARGE SUMMARY
Discharge Summary    CHIEF COMPLAINT ON ADMISSION  Chief Complaint   Patient presents with   • N/V       Reason for Admission  EMS     Admission Date  8/26/2021    CODE STATUS  Comfort Care/DNR    HPI & HOSPITAL COURSE    72yo admitted 8/26 with  PMHx DM, ETOH, HCC, cirrhosis, f/b Dr Birch Heme/Onc and on immunotherapy.  Imaging on presentation showing intra-abd free air.  Admitted to the ICU with sepsis secondary to RUQ abscess. He was taken to OR on 8/27  for Ex Lap and drainage of intra-abd abscess with no clear evidence of viscus perforation and intra-abdo drain placement. The drain began draining feculant discharge and pt developed a fib RVR with worsening hemodynamic status requiring increased vasopressor support. Patient emergently to OR 8/31 on with Dr Thornton for Ex Lap, abscess drainage, creation of loop ileostomy.   Patient also found to have an LVEF of 25% and L ventricular thrombus.  As per oncology patient is not a candidate for further chemotherapy.   Given poor prognosis, patient was transitioned to comfort care on 9/12/21.      Therefore, he is discharged in guarded and stable condition to hospice.    The patient met 2-midnight criteria for an inpatient stay at the time of discharge.    Discharge Date  9/17/21    FOLLOW UP ITEMS POST DISCHARGE  none    DISCHARGE DIAGNOSES  Principal Problem:    Perforated viscus POA: Yes      Overview: 8/26 - Ceftriaxone and metronidazole initiated      8/30 - Peritoneal culture positive for Streptococcus anginosus      8/31 - Abscess drainage and ileostomy creation, antibiotics altered to       Zosyn      Completed Abx's 9/7  Active Problems:    Pancytopenia (HCC) POA: Yes    Hepatocellular carcinoma (HCC) POA: Yes    Normochromic anemia POA: Yes    Cirrhosis of liver with ascites (HCC) POA: Unknown    Respiratory failure following trauma and surgery (HCC) POA: Unknown      Overview: Remained on  Vent post op      Continue full mechanical ventilatory support.  Ventilator bundle and Trauma       weaning protocol.    Atrial fibrillation (HCC) POA: Yes    Pleural effusion, bilateral POA: Unknown      Overview: 8/31 moderate bilateral pleural effusions on CT      May need drainage if there is difficulty with vent weaning    Fluid overload POA: Yes    Adrenal insufficiency (HCC) POA: Unknown    Left ventricular thrombus POA: Yes    Ischemic cardiomyopathy POA: Unknown      Overview: Echo with ejection fraction of 25% along LAD distribution      Likely chronic based on history      Blood pressure too low for ACE inhibitor or beta-blocker      Appreciate cardiology recommendations    Contraindication to anticoagulation therapy POA: Unknown  Resolved Problems:    Hyponatremia POA: Yes    Septic shock (HCC) POA: Yes    Protein-calorie malnutrition, severe (HCC) POA: Yes    Hypokalemia POA: Yes    Nausea and vomiting POA: Yes      FOLLOW UP  No future appointments.  No follow-up provider specified.    MEDICATIONS ON DISCHARGE     Medication List      START taking these medications      Instructions   artificial tears 1.4 % Soln   Administer 2 Drops into both eyes every 6 hours as needed (Dry eyes   ).  Dose: 2 Drop     cyclopentolate 1 % Soln  Commonly known as: CYCLOGYL   Administer 1 Drop into both eyes as needed.  Dose: 1 Drop     scopolamine 1 mg/72hr Pt72  Start taking on: September 18, 2021  Commonly known as: TRANSDERM-SCOP   Place 1 Patch on the skin every 72 hours.  Dose: 1 Patch        CHANGE how you take these medications      Instructions   ondansetron 4 MG Tbdp  What changed:   · when to take this  · reasons to take this  Commonly known as: ZOFRAN ODT   Take 1 Tablet by mouth every four hours as needed for Nausea (give PO if IV route is unavailable.).  Dose: 4 mg     oxyCODONE immediate-release 5 MG Tabs  What changed:   · how much to take  · when to take this  · reasons to take this  Commonly known as: ROXICODONE   Take 1-2 Tablets by mouth every four hours as needed  for up to 5 days.  Dose: 5-10 mg        STOP taking these medications    famotidine 20 MG Tabs  Commonly known as: PEPCID     furosemide 40 MG Tabs  Commonly known as: LASIX     lactulose 10 GM/15ML Soln     potassium chloride SA 20 MEQ Tbcr  Commonly known as: Kdur     spironolactone 100 MG Tabs  Commonly known as: ALDACTONE     thiamine 100 MG tablet  Commonly known as: THIAMINE            Allergies  Allergies   Allergen Reactions   • Sulfa Drugs      Reaction unknown       DIET  Orders Placed This Encounter   Procedures   • Diet Order Diet: Full Liquid (peaches are okay)     Standing Status:   Standing     Number of Occurrences:   1     Order Specific Question:   Diet:     Answer:   Full Liquid [11]     Comments:   peaches are okay       ACTIVITY  As tolerated.  Weight bearing as tolerated    CONSULTATIONS  Critical care  General surgery  Cardiology    PROCEDURES  Ex lap x 2  Intubation  Central line    LABORATORY  Lab Results   Component Value Date    SODIUM 141 09/13/2021    POTASSIUM 4.5 09/13/2021    CHLORIDE 108 09/13/2021    CO2 25 09/13/2021    GLUCOSE 92 09/13/2021    BUN 22 09/13/2021    CREATININE 0.50 09/13/2021        Lab Results   Component Value Date    WBC 3.3 (L) 09/13/2021    HEMOGLOBIN 9.9 (L) 09/13/2021    HEMATOCRIT 30.8 (L) 09/13/2021    PLATELETCT 22 (LL) 09/13/2021        Total time of the discharge process exceeds 42 minutes.

## 2021-09-17 NOTE — DISCHARGE INSTRUCTIONS
Discharge Instructions    Discharged to [facility] home by medical transportation via [facility] with medical escort. Discharged via ambulance, hospital escort: Yes.  Special equipment needed: Not Applicable    Be sure to schedule a follow-up appointment with your primary care doctor or any specialists as instructed.     Discharge Plan:        I understand that a diet low in cholesterol, fat, and sodium is recommended for good health. Unless I have been given specific instructions below for another diet, I accept this instruction as my diet prescription.   Other diet: full liquid     Special Instructions: None    · Is patient discharged on Warfarin / Coumadin?   No     ABDOMEN: Nursing to remove old dressing. Cleanse wound with wound cleanser and gauze. Pat dry. Apply no sting skin barrier to gwen-wound. Cut a piece of hydrofera blue slightly larger than wound and apply to wound bed foam side down (writing side up). Secure in place with hypafix tape or adhesive foam. Nursing to change every 72hrs and PRN dislodgement and or drainage saturation.        Ileostomy, Care After  This sheet gives you information about how to care for yourself after your procedure. Your health care provider may also give you more specific instructions. If you have problems or questions, contact your health care provider.  What can I expect after the procedure?  After the procedure, it is common to have:  · A small amount of blood or clear fluid leaking from your stoma.  · Pain and discomfort in your abdomen, especially around your stoma.  · Irregular bowel movements for several days.  · Loose stool.  Follow these instructions at home:  Medicines  · Take over-the-counter and prescription medicines only as told by your health care provider.  · If you were prescribed an antibiotic medicine, take it as told by your health care provider. Do not stop taking the antibiotic even if you start to feel better.  Stoma and incision care    · Keep the skin that  surrounds your stoma clean and dry.  · Follow instructions from your health care provider about how to take care of your incision. Make sure you:  ? Wash your hands with soap and water before and after you change your bandage (dressing). If soap and water are not available, use hand .  ? Change your dressing as told by your health care provider.  ? Leave stitches (sutures), skin glue, or adhesive strips in place. These skin closures may need to stay in place for 2 weeks or longer. If adhesive strip edges start to loosen and curl up, you may trim the loose edges. Do not remove adhesive strips completely unless your health care provider tells you to do that.  · Check your stoma area every day for signs of infection. Check for:  ? More redness, swelling, or pain.  ? More fluid or blood.  ? Warmth.  ? Pus or a bad smell.  · Follow your health care provider's instructions about changing and cleaning your ostomy pouch.  · Keep supplies with you at all times to care for your stoma and ostomy pouch. Also keep extra clothing with you.  Eating and drinking    · Follow instructions from your health care provider about eating or drinking restrictions.  · Pay attention to which foods and drinks cause problems with digestion, such as gas, constipation, or diarrhea.  · Avoid spicy foods and caffeine while your stoma heals.  · Eat meals and snacks at regular intervals.  · Drink enough fluid to keep your urine pale yellow.  Activity    · Return to your normal activities as told by your health care provider. Ask your health care provider what activities are safe for you.  · Rest as much as possible while your stoma heals.  · Avoid intense physical activity for as long as you are told by your health care provider.  · Do not lift anything that is heavier than 10 lb (4.5 kg), or the limit that you are told, for 6 weeks or until your health care provider says that it is safe.  General instructions  · Do not drive or use heavy  machinery while taking prescription pain medicine.  · Wear compression stockings as told by your health care provider. These stockings help to prevent blood clots and reduce swelling in your legs.  · Do not take baths, swim, or use a hot tub until your health care provider approves. Ask your health care provider if you may take showers.  · Do not use any products that contain nicotine or tobacco, such as cigarettes, e-cigarettes, and chewing tobacco. These can delay incision healing after surgery. If you need help quitting, ask your health care provider.  · (Women) Talk with your health care provider if you plan to become pregnant or if you take birth control pills.  · Keep all follow-up visits as told by your health care provider. This is important.  Contact a health care provider if:  · You have more redness, swelling, or pain at or around your stoma.  · You have more fluid or blood coming from your stoma.  · Your stoma feels warm to the touch.  · You have pus or a bad smell coming from your stoma.  · You have a fever.  · You have loose stools that do not become thicker after several weeks.  · You have bowel movements more often or less often than your health care provider tells you to expect.  · You feel nauseous.  · You vomit.  · You have abdominal pain, bloating, pressure, or cramping.  · You have problems with sexual activity.  · You have an unusual lack of energy (fatigue).  · You are unusually thirsty or you always have a dry mouth.  Get help right away if:  · You feel dizzy or light-headed.  · You have pain or cramps in your abdomen that get worse or do not go away with medicine.  · Your stoma suddenly changes size or color.  · You have shortness of breath.  · You have bleeding from your stoma that does not stop.  · You vomit more than one time.  · You faint.  · You have internal tissue coming out of your stoma (prolapse).  · You have an irregular heartbeat.  · You have chest pain.  Summary  · Take  over-the-counter and prescription medicines only as told by your health care provider.  · Follow your health care provider's instructions about how to take care of your incision and stoma.  · Follow instructions from your health care provider about eating or drinking restrictions.  · Do not take baths, swim, or use a hot tub until your health care provider approves. Ask your health care provider if you may take showers.  · Contact a health care provider if you have more redness, swelling, or pain at or around your stoma.  This information is not intended to replace advice given to you by your health care provider. Make sure you discuss any questions you have with your health care provider.  Document Released: 11/28/2016 Document Revised: 08/26/2019 Document Reviewed: 08/26/2019  Elsevier Patient Education © 2020 [employer]    Surgical Drain Home Care  Surgical drains are used to remove extra fluid that normally builds up in a surgical wound after surgery. A surgical drain helps to heal a surgical wound. Different kinds of surgical drains include:  · Active drains. These drains use suction to pull drainage away from the surgical wound. Drainage flows through a tube to a container outside of the body. With these drains, you need to keep the bulb or the drainage container flat (compressed) at all times, except while you empty it. Flattening the bulb or container creates suction.  · Passive drains. These drains allow fluid to drain naturally, by gravity. Drainage flows through a tube to a bandage (dressing) or a container outside of the body. Passive drains do not need to be emptied.  A drain is placed during surgery. Right after surgery, drainage is usually bright red and a little thicker than water. The drainage may gradually turn yellow or pink and become thinner. It is likely that your health care provider will remove the drain when the drainage stops or when the amount decreases to 1-2 Tbsp (15-30 mL) during a  24-hour period.  Supplies needed:  · Tape.  · Germ-free cleaning solution (sterile saline).  · Cotton swabs.  · Split gauze drain sponge: 4 x 4 inches (10 x 10 cm).  · Gauze square: 4 x 4 inches (10 x 10 cm).  How to care for your surgical drain  Care for your drain as told by your health care provider. This is important to help prevent infection. If your drain is placed at your back, or any other hard-to-reach area, ask another person to assist you in performing the following tasks:  General care  · Keep the skin around the drain dry and covered with a dressing at all times.  · Check your drain area every day for signs of infection. Check for:  ? Redness, swelling, or pain.  ? Pus or a bad smell.  ? Cloudy drainage.  ? Tenderness or pressure at the drain exit site.  Changing the dressing  Follow instructions from your health care provider about how to change your dressing. Change your dressing at least once a day. Change it more often if needed to keep the dressing dry. Make sure you:  1. Gather your supplies.  2. Wash your hands with soap and water before you change your dressing. If soap and water are not available, use hand .  3. Remove the old dressing. Avoid using scissors to do that.  4. Wash your hands with soap and water again after removing the old dressing.  5. Use sterile saline to clean your skin around the drain. You may need to use a cotton swab to clean the skin.  6. Place the tube through the slit in a drain sponge. Place the drain sponge so that it covers your wound.  7. Place the gauze square or another drain sponge on top of the drain sponge that is on the wound. Make sure the tube is between those layers.  8. Tape the dressing to your skin.  9. Tape the drainage tube to your skin 1-2 inches (2.5-5 cm) below the place where the tube enters your body. Taping keeps the tube from pulling on any stitches (sutures) that you have.  10. Wash your hands with soap and water.  11. Write down the  color of your drainage and how often you change your dressing.  How to empty your active drain    1. Make sure that you have a measuring cup that you can empty your drainage into.  2. Wash your hands with soap and water. If soap and water are not available, use hand .  3. Loosen any pins or clips that hold the tube in place.  4. If your health care provider tells you to strip the tube to prevent clots and tube blockages:  ? Hold the tube at the skin with one hand. Use your other hand to pinch the tubing with your thumb and first finger.  ? Gently move your fingers down the tube while squeezing very lightly. This clears any drainage, clots, or tissue from the tube.  ? You may need to do this several times each day to keep the tube clear. Do not pull on the tube.  5. Open the bulb cap or the drain plug. Do not touch the inside of the cap or the bottom of the plug.  6. Turn the device upside down and gently squeeze.  7. Empty all of the drainage into the measuring cup.  8. Compress the bulb or the container and replace the cap or the plug. To compress the bulb or the container, squeeze it firmly in the middle while you close the cap or plug the container.  9. Write down the amount of drainage that you have in each 24-hour period. If you have less than 2 Tbsp (30 mL) of drainage during 24 hours, contact your health care provider.  10. Flush the drainage down the toilet.  11. Wash your hands with soap and water.  Contact a health care provider if:  · You have redness, swelling, or pain around your drain area.  · You have pus or a bad smell coming from your drain area.  · You have a fever or chills.  · The skin around your drain is warm to the touch.  · The amount of drainage that you have is increasing instead of decreasing.  · You have drainage that is cloudy.  · There is a sudden stop or a sudden decrease in the amount of drainage that you have.  · Your drain tube falls out.  · Your active drain does not stay  compressed after you empty it.  Summary  · Surgical drains are used to remove extra fluid that normally builds up in a surgical wound after surgery.  · Different kinds of surgical drains include active drains and passive drains. Active drains use suction to pull drainage away from the surgical wound, and passive drains allow fluid to drain naturally.  · It is important to care for your drain to prevent infection. If your drain is placed at your back, or any other hard-to-reach area, ask another person to assist you.  · Contact your health care provider if you have redness, swelling, or pain around your drain area.  This information is not intended to replace advice given to you by your health care provider. Make sure you discuss any questions you have with your health care provider.  Document Released: 12/15/2001 Document Revised: 01/22/2020 Document Reviewed: 01/22/2020  [employer] Patient Education © 2020 [employer]    Depression / Suicide Risk    As you are discharged from this [facility] facility, it is important to learn how to keep safe from harming yourself.    Recognize the warning signs:  · Abrupt changes in personality, positive or negative- including increase in energy   · Giving away possessions  · Change in eating patterns- significant weight changes-  positive or negative  · Change in sleeping patterns- unable to sleep or sleeping all the time   · Unwillingness or inability to communicate  · Depression  · Unusual sadness, discouragement and loneliness  · Talk of wanting to die  · Neglect of personal appearance   · Rebelliousness- reckless behavior  · Withdrawal from people/activities they love  · Confusion- inability to concentrate     If you or a loved one observes any of these behaviors or has concerns about self-harm, here's what you can do:  · Talk about it- your feelings and reasons for harming yourself  · Remove any means that you might use to hurt yourself (examples: pills, rope, extension  cords, firearm)  · Get professional help from the community (Mental Health, Substance Abuse, psychological counseling)  · Do not be alone:Call your Safe Contact- someone whom you trust who will be there for you.  · Call your local CRISIS HOTLINE 536-1227 or 497-898-2605  · Call your local Children's Mobile Crisis Response Team [place] (459) 412-4474 or www.[employer]  · Call the toll free National Suicide Prevention Hotlines   · National Suicide Prevention Lifeline 881-644-ANWN (4728)  · National Hope Line Network 800-SUICIDE (024-4533)

## 2021-09-17 NOTE — PALLIATIVE CARE
"Palliative Care follow-up  Met with pts wife, Sharlene, at the bedside. Provided support for Sharlene who has been struggling with the reality of \"losing\"  Her  and fielding friends and family phone calls. She says she has some designated friends to help field these call, but it has been difficult. Validated her struggle with these changes.  We spoke about his discharge to the  with Hospice support. She feels glad to be able to get him closer to home for easier visitation. Explained POLST for DC and at the . POLST completed for DNR/DNI with [facility] focused care.  POLST left with Unit SW with message to Dr Smith to review and sign.   Once signed Palliative RN will scan into EMR and original will go with the pt.     Updated: Dr Smith, Unit [facility]     Plan: DC to  with Hospice support once arrangements can be completed.     Thank you for allowing Palliative Care to participate in this patient's care. Please feel free to call [phone] with any questions or concerns.  "

## 2021-09-17 NOTE — DISCHARGE PLANNING
Anticipated Discharge Disposition: Group Home with Hospice.    Action: LSW received a voice message from Sharlene, patient's wife authorizing the discharge to [facility] located at [address] [place] [address], phone # [phone].    LSW faxed the [facility], Transportation Form, Facesheet, and Approved Services Form to [facility]. Per Malorie [facility] transportation is scheduled for today at 1300.     LSW spoke with RACHEL Apodaca Owner regarding the anticipated time of departure. Per RACHEL Apodaca Intake Forms and Quantiferum need to be provided prior to admission, MD notified.    Per MD, the Stat Order for the Quantiferom has been placed. LSW requested [facility] cancels the transfer at 1300 and places the transfer in Will Call.    Barriers to Discharge: None.    Plan: Group Home and Hospice, pending transportation arrangements.    1:27pm - LSW spoke with Constanza and explained the discharge is postpone due to lack of Quentiferum result. Constanza stated she is able to accept this patient today and have the Quentiferum results fax to her when available [phone], RN LUDWIG Mcbride and ABDIRIZAK Murray listened in to the conversation.    LSW requested MD signs the Intake Forms and POLST as these forms need to be fax to Constanza prior to transfer.     LSW spoke with Byron at [facility] and informed her that patient is [facility] this afternoon. [facility]    PLAN: GH and Hospice, pending Intake Forms and Transportation Arrangements.    2:20pm - LSW faxed the Intake Forms and Negative Covid Test dated 8/27/2, notified Constanza via phone of the incoming fax.    LSW spoke with Constanza. Per Constanza, the information has been reviewed and the patient can be accepted today to their second location [address] [facility], LSW faxed updated transfer forms to [facility] and notified Malorie with [facility] via [employer].    PLAN: Patient will discharge today to [facility] [address] [place]  [address], pending transportation arrangements.    3:00pm - LSW spoke with Alysia [facility] Coordinator regarding the incoming request for [facility] transfer to [facility]. Per Alysia's request, LSW faxed updated [facility] Form indicating [facility] agreed to accept this patient pending the Quantiferon Test Result.    PLAN: Group Home, pending transportation arrangements.    3:30pm - Per Alysia with [facility], [facility] will  patient today at 4:30pm, LSW notified Byron with [facility],  Owner, RN aware.

## 2021-09-17 NOTE — PROGRESS NOTES
Report received. Assumed care. Pt in bed resting. A/O x 2-3. VSS. Responds appropriately. Denies pain, SOB. Assessment complete. R and L MARIANA drains in place. RLQ ileostomy in place. Discussed POC, pt verbalized understanding. Explained importance of calling before getting out of bed. Call light and belongings within reach. Bed alarm on. Bed in the lowest position and locked. Treaded socks in place. Hourly rounding in progress. Will continue to monitor.     COVID 19 surge in effect.

## 2021-09-17 NOTE — PALLIATIVE CARE
Palliative Care follow-up  Received updated from Unit [facility]IR[facility] Salmon that Dr Smith has reviewed and signed pts POLST.  POLST scanned into EMR, original provided to pts bedside RN for potential DC to  later today.     Updated: Bedside RN Nicolasa     Plan: DC to  with Hospice support once arrangements can be completed.     Thank you for allowing Palliative Care to participate in this patient's care. Please feel free to call [phone] with any questions or concerns.   Viral illness

## 2021-09-18 NOTE — PROGRESS NOTES
Pt D/C'd. Discharge instructions provided to pt and pt wife. Pt states understanding. Pt states all questions have been answered. Copy of discharge provided to pt. Signed copy in chart.  Pt states that all personal belongings are in possession. Pt escorted off unit with assistance from MIO without incident.

## 2021-09-20 LAB
GAMMA INTERFERON BACKGROUND BLD IA-ACNC: 0.03 IU/ML
M TB IFN-G BLD-IMP: NEGATIVE
M TB IFN-G CD4+ BCKGRND COR BLD-ACNC: -0.01 IU/ML
MITOGEN IGNF BCKGRD COR BLD-ACNC: 3.13 IU/ML
QFT TB2 - NIL TBQ2: 0 IU/ML

## 2021-09-24 NOTE — DOCUMENTATION QUERY
[facility]                                                                       Query Response Note      PATIENT:               SANDRO DIAZ  ACCT #:                  [id number]  MRN:                     [id number]  :                      1950  ADMIT DATE:       2021 5:42 PM  DISCH DATE:        2021 6:00 PM  RESPONDING  PROVIDER #:        [id number]           QUERY TEXT:    A possible intraoperative condition (lacerated liver repair) was noted in the Medical Record.  Please clarify if the condition noted is:    The patient's Clinical Indicators include:  Clinical Indicators:  Per Op report :  intra-operatively, Liver lacerated upon mobilization at the junction between the left lobe of the liver and the right lobe of the liver.     Treatment: Intra-operative consult; gelfoam and thrombin and surgi-gia in the liver laceration and held pressure for 10 minutes.    Related conditions that impact care: liver cancer, liver abscess, peritonitis.    Thank You,  [email or web address]  Options provided:   -- A complication of the surgery   -- An inherent part of the surgical procedure   -- Clinically insignificant finding   -- Unable to determine      Query created by: Vonnie Lamar on 2021 8:26 AM    RESPONSE TEXT:    A complication of the surgery          Electronically signed by:  KAMILLE WALTERS MD 2021 10:06 AM

## 2021-09-27 NOTE — DOCUMENTATION QUERY
[facility]                                                                       Query Response Note      PATIENT:               SANDRO DIAZ  ACCT #:                  [id number]  MRN:                     [id number]  :                      1950  ADMIT DATE:       2021 5:42 PM  DISCH DATE:        2021 6:00 PM  RESPONDING  PROVIDER #:        [id number]           QUERY TEXT:    Please clarify the term ?ileus? related to the documented condition in the Medical Record.      NOTE:  If an appropriate response is not listed below, please respond with a new note.                Documentation indicators that raised basis for question:   Clinical Indicators:  Per H&P and Ed report imaging report  notes small bowel ileus   Multiple mildly dilated loops of small and large intestine possibly representing ileus.    ileus noted - discharge summary  CT- abdomen pelvis :    IMPRESSION:  1.  Large collection of free intraperitoneal fluid and gas in the RIGHT abdomen extending from the hepatic dome to the pelvis. The gallbladder is also involved but may not be the site of origin. Perforated bowel is suspected.  2.  Additional free fluid in the abdomen  3.  Large RIGHT hepatic mass, unchanged in size  4.  Splenomegaly  5.  1.6 x 1.8 cm cystic pancreatic tail lesion, unchanged  6.  Small bowel ileus  7.  Distal colonic diverticulosis  Perforated viscus S/P Ex lap x 2 with creation of loop ileostomy.    Treatment: NG tube    Related conditions that impact care: peritonitis, liver cancer, sepsis    Thank You,  [email or web address]  Options provided:   -- ileus is unexpected and a complication of the procedure performed   -- ileus  is not a complication due to or associated with the procedure   -- ileus is routinely expected and integral/inherent to the procedure performed   -- ileus is related to another etiology   --  Unable to determine      Query created by: Vonnie Lamar on 9/24/2021 8:26 AM    RESPONSE TEXT:    ileus is routinely expected and integral/inherent to the procedure performed          Electronically signed by:  KAMILLE WALTERS MD 9/27/2021 7:27 AM

## 2021-10-07 LAB
FUNGUS SPEC CULT: ABNORMAL
FUNGUS SPEC CULT: ABNORMAL
FUNGUS SPEC CULT: NORMAL
FUNGUS SPEC FUNGUS STN: ABNORMAL
FUNGUS SPEC FUNGUS STN: NORMAL
SIGNIFICANT IND 70042: ABNORMAL
SIGNIFICANT IND 70042: NORMAL
SITE SITE: ABNORMAL
SITE SITE: NORMAL
SOURCE SOURCE: ABNORMAL
SOURCE SOURCE: NORMAL

## 2022-09-22 NOTE — ANESTHESIA QCDR
"Attempted to call pt. To allyson'd NP appt.  "No vm setup" x 1  " 2019 [facility] Clinical Data Registry (for Quality Improvement)     Postoperative nausea/vomiting risk protocol (Adult = 18 yrs and Pediatric 3-17 yrs)- (430 and 463)  General inhalation anesthetic (NOT TIVA) with PONV risk factors: No  Provision of anti-emetic therapy with at least 2 different classes of agents: N/A  Patient DID NOT receive anti-emetic therapy and reason is documented in Medical Record: N/A    Multimodal Pain Management- (AQI59)  Patient undergoing Elective Surgery (i.e. Outpatient, or ASC, or Prescheduled Surgery prior to Hospital Admission): No  Use of Multimodal Pain Management, two or more drugs and/or interventions, NOT including systemic opioids: N/A  Exception: Documented allergy to multiple classes of analgesics: N/A    PACU assessment of acute postoperative pain prior to Anesthesia Care End- Applies to Patients Age = 18- (ABG7)  Initial PACU pain score is which of the following: < 7/10  Patient unable to report pain score: N/A    Post-anesthetic transfer of care checklist/protocol to PACU/ICU- (426 and 427)  Upon conclusion of case, patient transferred to which of the following locations: PACU/Non-ICU  Use of transfer checklist/protocol: Yes  Exclusion: Service Performed in Patient Hospital Room (and thus did not require transfer): N/A    PACU Reintubation- (AQI31)  General anesthesia requiring endotracheal intubation (ETT) along with subsequent extubation in OR or PACU: No  Required reintubation in the PACU: N/A  Extubation was a planned trial documented in the medical record prior to removal of the original airway device: N/A    Unplanned admission to ICU related to anesthesia service up through end of PACU care- (MD51)  Unplanned admission to ICU (not initially anticipated at anesthesia start time): No

## 2023-10-25 NOTE — ED NOTES
ERP at bedside    Azathioprine Counseling:  I discussed with the patient the risks of azathioprine including but not limited to myelosuppression, immunosuppression, hepatotoxicity, lymphoma, and infections.  The patient understands that monitoring is required including baseline LFTs, Creatinine, possible TPMP genotyping and weekly CBCs for the first month and then every 2 weeks thereafter.  The patient verbalized understanding of the proper use and possible adverse effects of azathioprine.  All of the patient's questions and concerns were addressed.

## (undated) DEVICE — SET SUCT.W/SLEEVE VIA-GUARD - (10/BX10BX/CA)

## (undated) DEVICE — TUBING CLEARLINK DUO-VENT - C-FLO (48EA/CA)

## (undated) DEVICE — DRAPE IOBAN II INCISE 23X17 - (10EA/BX 4BX/CA)

## (undated) DEVICE — GLOVE BIOGEL SZ 8 SURGICAL PF LTX - (50PR/BX 4BX/CA)

## (undated) DEVICE — KIT ANESTHESIA W/CIRCUIT & 3/LT BAG W/FILTER (20EA/CA)

## (undated) DEVICE — RESERVOIR SUCTION 100 CC - SILICONE (20EA/CA)

## (undated) DEVICE — SET EXTENSION WITH 2 PORTS (48EA/CA) ***PART #2C8610 IS A SUBSTITUTE*****

## (undated) DEVICE — SET LEADWIRE 5 LEAD BEDSIDE DISPOSABLE ECG (1SET OF 5/EA)

## (undated) DEVICE — SUCTION INSTRUMENT YANKAUER OPEN TIP W/O VENT (50EA/CA)

## (undated) DEVICE — SUTURE 0 COATED VICRYL 6-18IN - (12PK/BX)

## (undated) DEVICE — SODIUM CHL IRRIGATION 0.9% 1000ML (12EA/CA)

## (undated) DEVICE — TUBE CONNECT SUCTION CLEAR 120 X 1/4" (50EA/CA)"

## (undated) DEVICE — GLOVE BIOGEL PI INDICATOR SZ 7.5 SURGICAL PF LF -(50/BX 4BX/CA)

## (undated) DEVICE — SURGIFOAM (SIZE 100) - (6EA/CA)

## (undated) DEVICE — HEAD HOLDER JUNIOR/ADULT

## (undated) DEVICE — CHLORAPREP 26 ML APPLICATOR - ORANGE TINT(25/CA)

## (undated) DEVICE — SPONGE DRAIN 4 X 4IN 6-PLY - (2/PK25PK/BX12BX/CS)

## (undated) DEVICE — SET SUCTION/IRRIGATION WITH DISPOSABLE TIP (6/CA )PART #0250-070-520 IS A SUB

## (undated) DEVICE — SUTURE 3-0 SILK SH C/R 18 IN - (12/BX)

## (undated) DEVICE — LIGASURE TISSUE FUSION  - SINGLE USE (6/CA)

## (undated) DEVICE — GLOVE BIOGEL INDICATOR SZ 7SURGICAL PF LTX - (50/BX 4BX/CA)

## (undated) DEVICE — DRAPE LARGE 3 QUARTER - (20/CA)

## (undated) DEVICE — SLEEVE VASO CALF MED - (10PR/CA)

## (undated) DEVICE — SPONGE LAP XR ST 36X36 LF - (1/PK40PK/CA)

## (undated) DEVICE — Device

## (undated) DEVICE — SUTURE 3-0 VICRYL PLUS SH - 8X 18 INCH (12/BX)

## (undated) DEVICE — GLOVE SIZE 7.0 SURGEON ACCELERATOR FREE GREEN (50PR/BX 4BX/CA)

## (undated) DEVICE — ELECTRODE 850 FOAM ADHESIVE - HYDROGEL RADIOTRNSPRNT (50/PK)

## (undated) DEVICE — NEEDLE INSFL 120MM 14GA VRRS - (20/BX)

## (undated) DEVICE — CANNULA W/SEAL 5X100 Z-THRE - ADED KII (12/BX)

## (undated) DEVICE — SUTURE 2-0 ETHILON FS - (36/BX) 18 INCH

## (undated) DEVICE — BLADE SURGICAL #11 - (50/BX)

## (undated) DEVICE — GLOVE BIOGEL M SZ 8 SURGICAL PF LTX - (50/BX 4BX/CA)

## (undated) DEVICE — ELECTRODE DUAL RETURN W/ CORD - (50/PK)

## (undated) DEVICE — PROTECTOR ULNA NERVE - (36PR/CA)

## (undated) DEVICE — MASK ANESTHESIA ADULT  - (100/CA)

## (undated) DEVICE — SUTURE 1 PDS-2 PLUS CTX - (24/BX)

## (undated) DEVICE — GLOVE SZ 6 BIOGEL PI MICRO - PF LF (50PR/BX 4BX/CA)

## (undated) DEVICE — LACTATED RINGERS INJ 1000 ML - (14EA/CA 60CA/PF)

## (undated) DEVICE — GLOVE BIOGEL PI INDICATOR SZ 6.5 SURGICAL PF LF - (50/BX 4BX/CA)

## (undated) DEVICE — GOWN WARMING STANDARD FLEX - (30/CA)

## (undated) DEVICE — KIT SURGIFLO W/OUT THROMBIN - (6EA/CA)

## (undated) DEVICE — SENSOR SPO2 NEO LNCS ADHESIVE (20/BX) SEE USER NOTES

## (undated) DEVICE — CANISTER SUCTION 3000ML MECHANICAL FILTER AUTO SHUTOFF MEDI-VAC NONSTERILE LF DISP  (40EA/CA)

## (undated) DEVICE — TOWEL STOP TIMEOUT SAFETY FLAG (40EA/CA)

## (undated) DEVICE — BLADE SURGICAL #10 - (50/BX)

## (undated) DEVICE — SUTURE GENERAL

## (undated) DEVICE — DRESSING TRANSPARENT FILM TEGADERM 2.375 X 2.75"  (100EA/BX)"

## (undated) DEVICE — SUTURE 2-0 COATED VICRYL PLUS - 12 X 18 INCH (12/BX)

## (undated) DEVICE — STAPLER SKIN DISP - (6/BX 10BX/CA) VISISTAT

## (undated) DEVICE — SUTURE 1 PDS TP-1 54 (12EA/BX)"

## (undated) DEVICE — NEPTUNE 4 PORT MANIFOLD - (20/PK)

## (undated) DEVICE — NEEDLE MONOPTY 14GAX16CM - (10EA/CA)

## (undated) DEVICE — SET TUBING PNEUMOCLEAR HIGH FLOW SMOKE EVACUATION (10EA/BX)

## (undated) DEVICE — GLOVE BIOGEL PI ORTHO SZ 8 PF LF (40PR/BX)

## (undated) DEVICE — SUTURE 3-0 MONOCRYL SH (36PK/BX)

## (undated) DEVICE — BLADE SURGICAL #15 - (50/BX 3BX/CA)

## (undated) DEVICE — SUTURE 1 PDS II PLUS TP-1 - (12PK/BX)

## (undated) DEVICE — GLOVE BIOGEL SZ 7.5 SURGICAL PF LTX - (50PR/BX 4BX/CA)

## (undated) DEVICE — GOWN SURGEONS X-LARGE - DISP. (30/CA)

## (undated) DEVICE — DRESSING SURGICAL NUKNIT 6X9 (10EA/BX)

## (undated) DEVICE — SLEEVE, VASO, THIGH, MED

## (undated) DEVICE — SUCTION INSTRUMENT YANKAUER BULBOUS TIP W/O VENT (50EA/CA)

## (undated) DEVICE — GLOVE BIOGEL SZ 6.5 SURGICAL PF LTX (50PR/BX 4BX/CA)

## (undated) DEVICE — DRAPESURG STERI-DRAPE LONG - (10/BX 4BX/CA)

## (undated) DEVICE — SUTURE 3-0 ETHILON FS-1 - (36/BX) 30 INCH

## (undated) DEVICE — PACK MAJOR BASIN - (2EA/CA)

## (undated) DEVICE — TUBE NG SALEM SUMP 16FR (50EA/CA)

## (undated) DEVICE — HEMOBLAST

## (undated) DEVICE — CONTAINER SPECIMEN BAG OR - STERILE 4 OZ W/LID (100EA/CA)

## (undated) DEVICE — GLOVE COTTON STERILE (50/CA)

## (undated) DEVICE — SPONGE GAUZESTER 4 X 4 4PLY - (128PK/CA)

## (undated) DEVICE — PAD LAP STERILE 18 X 18 - (5/PK 40PK/CA)

## (undated) DEVICE — SUTURE 4-0 MONOCRYL PLUS PS-2 - 27 INCH (36/BX)

## (undated) DEVICE — DRAPE MEDI-SLUSH STERILE  - (40/CA)

## (undated) DEVICE — KIT 2.75IN COL ILSTM 2 PC DRN - 70MM 2 3/4 INCH THIS IS FOR THE OR ONLY (5/BX)

## (undated) DEVICE — SUTURE 3-0 SILK 12 X 18 IN - (36/BX)

## (undated) DEVICE — TUBE LUKI TRAP STERILE DISP. - 8CC (12/BX 4BX/CA)

## (undated) DEVICE — HEMOSTAT ARISTA PWD 5 GRAM - (5/BX)

## (undated) DEVICE — BLADE SURGICAL CLIPPER - (50EA/CA)

## (undated) DEVICE — TROCAR 5X100 NON BLADED Z-TH - READ KII (6/BX)

## (undated) DEVICE — ANTI-FOG SOLUTION - 60BTL/CA

## (undated) DEVICE — TRAY CATHETER FOLEY URINE METER W/STATLOCK 350ML (10EA/CA)

## (undated) DEVICE — ELECTRODE 5MM LHK LAPSCP STERILE DISP- MEGADYNE  (5/CA)

## (undated) DEVICE — GLOVE SZ 7 BIOGEL PI MICRO - PF LF (50PR/BX 4BX/CA)

## (undated) DEVICE — BOVIE  BLADE 6 EXTENDED - (50/PK)

## (undated) DEVICE — SUTURE 2-0 SILK 12 X 18" (36PK/BX)"